# Patient Record
Sex: FEMALE | Race: WHITE | NOT HISPANIC OR LATINO | Employment: OTHER | URBAN - METROPOLITAN AREA
[De-identification: names, ages, dates, MRNs, and addresses within clinical notes are randomized per-mention and may not be internally consistent; named-entity substitution may affect disease eponyms.]

---

## 2017-03-16 ENCOUNTER — ALLSCRIPTS OFFICE VISIT (OUTPATIENT)
Dept: OTHER | Facility: OTHER | Age: 57
End: 2017-03-16

## 2017-03-16 DIAGNOSIS — M25.561 PAIN IN RIGHT KNEE: ICD-10-CM

## 2017-03-16 DIAGNOSIS — Z12.11 ENCOUNTER FOR SCREENING FOR MALIGNANT NEOPLASM OF COLON: ICD-10-CM

## 2017-03-16 DIAGNOSIS — M47.816 SPONDYLOSIS OF LUMBAR REGION WITHOUT MYELOPATHY OR RADICULOPATHY: ICD-10-CM

## 2017-03-16 DIAGNOSIS — M54.50 LOW BACK PAIN: ICD-10-CM

## 2017-03-16 DIAGNOSIS — M96.1 POSTLAMINECTOMY SYNDROME, NOT ELSEWHERE CLASSIFIED: ICD-10-CM

## 2017-03-30 ENCOUNTER — GENERIC CONVERSION - ENCOUNTER (OUTPATIENT)
Dept: OTHER | Facility: OTHER | Age: 57
End: 2017-03-30

## 2017-04-11 ENCOUNTER — ALLSCRIPTS OFFICE VISIT (OUTPATIENT)
Dept: OTHER | Facility: OTHER | Age: 57
End: 2017-04-11

## 2017-04-11 ENCOUNTER — TRANSCRIBE ORDERS (OUTPATIENT)
Dept: ADMINISTRATIVE | Facility: HOSPITAL | Age: 57
End: 2017-04-11

## 2017-04-11 ENCOUNTER — HOSPITAL ENCOUNTER (OUTPATIENT)
Dept: RADIOLOGY | Facility: MEDICAL CENTER | Age: 57
Discharge: HOME | End: 2017-04-11
Payer: MEDICARE

## 2017-04-11 DIAGNOSIS — M54.50 LOW BACK PAIN: ICD-10-CM

## 2017-04-11 DIAGNOSIS — M96.1 POSTLAMINECTOMY SYNDROME, NOT ELSEWHERE CLASSIFIED: ICD-10-CM

## 2017-04-11 PROCEDURE — 72114 X-RAY EXAM L-S SPINE BENDING: CPT

## 2017-04-14 ENCOUNTER — GENERIC CONVERSION - ENCOUNTER (OUTPATIENT)
Dept: OTHER | Facility: OTHER | Age: 57
End: 2017-04-14

## 2017-04-19 ENCOUNTER — GENERIC CONVERSION - ENCOUNTER (OUTPATIENT)
Dept: OTHER | Facility: OTHER | Age: 57
End: 2017-04-19

## 2017-04-19 ENCOUNTER — ALLSCRIPTS OFFICE VISIT (OUTPATIENT)
Dept: RADIOLOGY | Facility: MEDICAL CENTER | Age: 57
End: 2017-04-19
Payer: MEDICARE

## 2017-04-26 ENCOUNTER — GENERIC CONVERSION - ENCOUNTER (OUTPATIENT)
Dept: OTHER | Facility: OTHER | Age: 57
End: 2017-04-26

## 2017-05-04 ENCOUNTER — ALLSCRIPTS OFFICE VISIT (OUTPATIENT)
Dept: OTHER | Facility: OTHER | Age: 57
End: 2017-05-04

## 2017-05-10 ENCOUNTER — ALLSCRIPTS OFFICE VISIT (OUTPATIENT)
Dept: RADIOLOGY | Facility: MEDICAL CENTER | Age: 57
End: 2017-05-10
Payer: MEDICARE

## 2017-05-22 RX ORDER — DEXLANSOPRAZOLE 60 MG/1
60 CAPSULE, DELAYED RELEASE ORAL DAILY
COMMUNITY
End: 2018-02-09 | Stop reason: SDUPTHER

## 2017-05-22 RX ORDER — DULOXETIN HYDROCHLORIDE 60 MG/1
60 CAPSULE, DELAYED RELEASE ORAL DAILY
COMMUNITY
End: 2018-02-09 | Stop reason: SDUPTHER

## 2017-05-22 RX ORDER — ROPINIROLE 2 MG/1
2 TABLET, FILM COATED ORAL
COMMUNITY
End: 2018-02-06 | Stop reason: SDUPTHER

## 2017-05-24 ENCOUNTER — HOSPITAL ENCOUNTER (OUTPATIENT)
Facility: MEDICAL CENTER | Age: 57
Setting detail: OUTPATIENT SURGERY
Discharge: HOME | End: 2017-05-24
Attending: INTERNAL MEDICINE | Admitting: INTERNAL MEDICINE
Payer: MEDICARE

## 2017-05-24 ENCOUNTER — ANESTHESIA (OUTPATIENT)
Dept: GASTROENTEROLOGY | Facility: MEDICAL CENTER | Age: 57
End: 2017-05-24
Payer: MEDICARE

## 2017-05-24 ENCOUNTER — GENERIC CONVERSION - ENCOUNTER (OUTPATIENT)
Dept: GASTROENTEROLOGY | Facility: MEDICAL CENTER | Age: 57
End: 2017-05-24

## 2017-05-24 ENCOUNTER — ANESTHESIA EVENT (OUTPATIENT)
Dept: GASTROENTEROLOGY | Facility: MEDICAL CENTER | Age: 57
End: 2017-05-24
Payer: MEDICARE

## 2017-05-24 VITALS
HEIGHT: 64 IN | BODY MASS INDEX: 36.7 KG/M2 | WEIGHT: 215 LBS | TEMPERATURE: 97.5 F | OXYGEN SATURATION: 96 % | DIASTOLIC BLOOD PRESSURE: 50 MMHG | RESPIRATION RATE: 20 BRPM | SYSTOLIC BLOOD PRESSURE: 95 MMHG | HEART RATE: 79 BPM

## 2017-05-24 DIAGNOSIS — K22.70 BARRETT'S ESOPHAGUS WITHOUT DYSPLASIA: ICD-10-CM

## 2017-05-24 DIAGNOSIS — M46.96 INFLAMMATORY SPONDYLOPATHY OF LUMBAR REGION (HCC): ICD-10-CM

## 2017-05-24 DIAGNOSIS — M17.11 PRIMARY OSTEOARTHRITIS OF RIGHT KNEE: ICD-10-CM

## 2017-05-24 PROCEDURE — 88305 TISSUE EXAM BY PATHOLOGIST: CPT | Performed by: INTERNAL MEDICINE

## 2017-05-24 RX ORDER — SODIUM CHLORIDE 9 MG/ML
125 INJECTION, SOLUTION INTRAVENOUS CONTINUOUS
Status: DISCONTINUED | OUTPATIENT
Start: 2017-05-24 | End: 2017-05-24 | Stop reason: HOSPADM

## 2017-05-24 RX ORDER — RANITIDINE 150 MG/1
150 TABLET ORAL
Status: ON HOLD | COMMUNITY
End: 2018-04-04 | Stop reason: ALTCHOICE

## 2017-05-24 RX ORDER — PROPOFOL 10 MG/ML
INJECTION, EMULSION INTRAVENOUS AS NEEDED
Status: DISCONTINUED | OUTPATIENT
Start: 2017-05-24 | End: 2017-05-24 | Stop reason: SURG

## 2017-05-24 RX ADMIN — PROPOFOL 50 MG: 10 INJECTION, EMULSION INTRAVENOUS at 09:18

## 2017-05-24 RX ADMIN — SODIUM CHLORIDE 125 ML/HR: 0.9 INJECTION, SOLUTION INTRAVENOUS at 08:38

## 2017-05-24 RX ADMIN — PROPOFOL 150 MG: 10 INJECTION, EMULSION INTRAVENOUS at 09:11

## 2017-05-24 RX ADMIN — PROPOFOL 50 MG: 10 INJECTION, EMULSION INTRAVENOUS at 09:14

## 2017-05-25 ENCOUNTER — GENERIC CONVERSION - ENCOUNTER (OUTPATIENT)
Dept: OTHER | Facility: OTHER | Age: 57
End: 2017-05-25

## 2017-06-01 ENCOUNTER — HOSPITAL ENCOUNTER (EMERGENCY)
Facility: HOSPITAL | Age: 57
Discharge: HOME | End: 2017-06-01
Attending: EMERGENCY MEDICINE | Admitting: EMERGENCY MEDICINE
Payer: MEDICARE

## 2017-06-01 ENCOUNTER — APPOINTMENT (EMERGENCY)
Dept: NON INVASIVE DIAGNOSTICS | Facility: HOSPITAL | Age: 57
End: 2017-06-01
Payer: MEDICARE

## 2017-06-01 VITALS
TEMPERATURE: 97.7 F | BODY MASS INDEX: 36.9 KG/M2 | SYSTOLIC BLOOD PRESSURE: 112 MMHG | RESPIRATION RATE: 16 BRPM | WEIGHT: 215 LBS | OXYGEN SATURATION: 96 % | DIASTOLIC BLOOD PRESSURE: 57 MMHG | HEART RATE: 59 BPM

## 2017-06-01 DIAGNOSIS — R60.0 EDEMA OF RIGHT LOWER EXTREMITY: Primary | ICD-10-CM

## 2017-06-01 LAB
ATRIAL RATE: 63 BPM
P AXIS: 46 DEGREES
PR INTERVAL: 136 MS
QRS AXIS: 52 DEGREES
QRSD INTERVAL: 82 MS
QT INTERVAL: 404 MS
QTC INTERVAL: 413 MS
T WAVE AXIS: 85 DEGREES
VENTRICULAR RATE: 63 BPM

## 2017-06-01 PROCEDURE — 93971 EXTREMITY STUDY: CPT

## 2017-06-01 PROCEDURE — 99285 EMERGENCY DEPT VISIT HI MDM: CPT

## 2017-06-01 PROCEDURE — 93005 ELECTROCARDIOGRAM TRACING: CPT | Performed by: EMERGENCY MEDICINE

## 2017-06-02 ENCOUNTER — ALLSCRIPTS OFFICE VISIT (OUTPATIENT)
Dept: OTHER | Facility: OTHER | Age: 57
End: 2017-06-02

## 2017-06-02 ENCOUNTER — GENERIC CONVERSION - ENCOUNTER (OUTPATIENT)
Dept: OTHER | Facility: OTHER | Age: 57
End: 2017-06-02

## 2017-06-12 ENCOUNTER — ALLSCRIPTS OFFICE VISIT (OUTPATIENT)
Dept: RADIOLOGY | Facility: MEDICAL CENTER | Age: 57
End: 2017-06-12
Payer: MEDICARE

## 2017-06-12 PROCEDURE — 77003 FLUOROGUIDE FOR SPINE INJECT: CPT | Performed by: PHYSICAL MEDICINE & REHABILITATION

## 2017-06-14 ENCOUNTER — GENERIC CONVERSION - ENCOUNTER (OUTPATIENT)
Dept: OTHER | Facility: OTHER | Age: 57
End: 2017-06-14

## 2017-06-22 ENCOUNTER — GENERIC CONVERSION - ENCOUNTER (OUTPATIENT)
Dept: OTHER | Facility: OTHER | Age: 57
End: 2017-06-22

## 2017-07-10 ENCOUNTER — ALLSCRIPTS OFFICE VISIT (OUTPATIENT)
Dept: OTHER | Facility: OTHER | Age: 57
End: 2017-07-10

## 2017-07-10 DIAGNOSIS — M54.16 RADICULOPATHY OF LUMBAR REGION: ICD-10-CM

## 2017-07-10 DIAGNOSIS — Z12.31 ENCOUNTER FOR SCREENING MAMMOGRAM FOR MALIGNANT NEOPLASM OF BREAST: ICD-10-CM

## 2017-07-10 DIAGNOSIS — Z13.220 ENCOUNTER FOR SCREENING FOR LIPOID DISORDERS: ICD-10-CM

## 2017-07-10 DIAGNOSIS — Z13.29 ENCOUNTER FOR SCREENING FOR OTHER SUSPECTED ENDOCRINE DISORDER: ICD-10-CM

## 2017-07-10 DIAGNOSIS — M25.552 PAIN IN LEFT HIP: ICD-10-CM

## 2017-07-10 DIAGNOSIS — M25.561 PAIN IN RIGHT KNEE: ICD-10-CM

## 2017-07-10 DIAGNOSIS — M25.562 PAIN IN LEFT KNEE: ICD-10-CM

## 2017-07-10 DIAGNOSIS — Z13.21 ENCOUNTER FOR SCREENING FOR NUTRITIONAL DISORDER: ICD-10-CM

## 2017-07-10 DIAGNOSIS — Z13.89 ENCOUNTER FOR SCREENING FOR OTHER DISORDER: ICD-10-CM

## 2017-07-10 DIAGNOSIS — M17.11 PRIMARY OSTEOARTHRITIS OF RIGHT KNEE: ICD-10-CM

## 2017-07-19 ENCOUNTER — ALLSCRIPTS OFFICE VISIT (OUTPATIENT)
Dept: OTHER | Facility: OTHER | Age: 57
End: 2017-07-19

## 2017-07-19 PROCEDURE — G0145 SCR C/V CYTO,THINLAYER,RESCR: HCPCS | Performed by: OBSTETRICS & GYNECOLOGY

## 2017-07-20 ENCOUNTER — LAB REQUISITION (OUTPATIENT)
Dept: LAB | Facility: HOSPITAL | Age: 57
End: 2017-07-20
Payer: MEDICARE

## 2017-07-20 DIAGNOSIS — Z01.419 ENCOUNTER FOR GYNECOLOGICAL EXAMINATION WITHOUT ABNORMAL FINDING: ICD-10-CM

## 2017-07-25 ENCOUNTER — ALLSCRIPTS OFFICE VISIT (OUTPATIENT)
Dept: OTHER | Facility: OTHER | Age: 57
End: 2017-07-25

## 2017-07-25 ENCOUNTER — APPOINTMENT (OUTPATIENT)
Dept: RADIOLOGY | Facility: MEDICAL CENTER | Age: 57
End: 2017-07-25
Payer: MEDICARE

## 2017-07-25 ENCOUNTER — TRANSCRIBE ORDERS (OUTPATIENT)
Dept: ADMINISTRATIVE | Facility: HOSPITAL | Age: 57
End: 2017-07-25

## 2017-07-25 DIAGNOSIS — M25.552 PAIN IN LEFT HIP: ICD-10-CM

## 2017-07-25 LAB
LAB AP GYN PRIMARY INTERPRETATION: NORMAL
Lab: NORMAL

## 2017-07-25 PROCEDURE — 73502 X-RAY EXAM HIP UNI 2-3 VIEWS: CPT

## 2017-07-26 ENCOUNTER — APPOINTMENT (OUTPATIENT)
Dept: LAB | Facility: HOSPITAL | Age: 57
End: 2017-07-26
Payer: MEDICARE

## 2017-07-26 DIAGNOSIS — M25.561 PAIN IN RIGHT KNEE: ICD-10-CM

## 2017-07-26 DIAGNOSIS — Z13.220 ENCOUNTER FOR SCREENING FOR LIPOID DISORDERS: ICD-10-CM

## 2017-07-26 DIAGNOSIS — Z13.21 ENCOUNTER FOR SCREENING FOR NUTRITIONAL DISORDER: ICD-10-CM

## 2017-07-26 DIAGNOSIS — Z13.29 ENCOUNTER FOR SCREENING FOR OTHER SUSPECTED ENDOCRINE DISORDER: ICD-10-CM

## 2017-07-26 DIAGNOSIS — Z13.89 ENCOUNTER FOR SCREENING FOR OTHER DISORDER: ICD-10-CM

## 2017-07-26 LAB
25(OH)D3 SERPL-MCNC: 22.6 NG/ML (ref 30–100)
ALBUMIN SERPL BCP-MCNC: 3.7 G/DL (ref 3.5–5)
ALP SERPL-CCNC: 64 U/L (ref 46–116)
ALT SERPL W P-5'-P-CCNC: 29 U/L (ref 12–78)
ANION GAP SERPL CALCULATED.3IONS-SCNC: 9 MMOL/L (ref 4–13)
AST SERPL W P-5'-P-CCNC: 19 U/L (ref 5–45)
BASOPHILS # BLD AUTO: 0.04 THOUSANDS/ΜL (ref 0–0.1)
BASOPHILS NFR BLD AUTO: 1 % (ref 0–1)
BILIRUB SERPL-MCNC: 0.36 MG/DL (ref 0.2–1)
BILIRUB UR QL STRIP: NEGATIVE
BUN SERPL-MCNC: 21 MG/DL (ref 5–25)
CALCIUM SERPL-MCNC: 9.1 MG/DL (ref 8.3–10.1)
CHLORIDE SERPL-SCNC: 102 MMOL/L (ref 100–108)
CHOLEST SERPL-MCNC: 221 MG/DL (ref 50–200)
CLARITY UR: CLEAR
CO2 SERPL-SCNC: 28 MMOL/L (ref 21–32)
COLOR UR: YELLOW
CREAT SERPL-MCNC: 0.72 MG/DL (ref 0.6–1.3)
EOSINOPHIL # BLD AUTO: 0.1 THOUSAND/ΜL (ref 0–0.61)
EOSINOPHIL NFR BLD AUTO: 2 % (ref 0–6)
ERYTHROCYTE [DISTWIDTH] IN BLOOD BY AUTOMATED COUNT: 14.4 % (ref 11.6–15.1)
GFR SERPL CREATININE-BSD FRML MDRD: 95 ML/MIN/1.73SQ M
GLUCOSE P FAST SERPL-MCNC: 106 MG/DL (ref 65–99)
GLUCOSE UR STRIP-MCNC: NEGATIVE MG/DL
HCT VFR BLD AUTO: 42.4 % (ref 34.8–46.1)
HDLC SERPL-MCNC: 49 MG/DL (ref 40–60)
HGB BLD-MCNC: 13.5 G/DL (ref 11.5–15.4)
HGB UR QL STRIP.AUTO: NEGATIVE
KETONES UR STRIP-MCNC: NEGATIVE MG/DL
LDLC SERPL CALC-MCNC: 147 MG/DL (ref 0–100)
LEUKOCYTE ESTERASE UR QL STRIP: NEGATIVE
LYMPHOCYTES # BLD AUTO: 1.79 THOUSANDS/ΜL (ref 0.6–4.47)
LYMPHOCYTES NFR BLD AUTO: 32 % (ref 14–44)
MCH RBC QN AUTO: 27.5 PG (ref 26.8–34.3)
MCHC RBC AUTO-ENTMCNC: 31.8 G/DL (ref 31.4–37.4)
MCV RBC AUTO: 86 FL (ref 82–98)
MONOCYTES # BLD AUTO: 0.33 THOUSAND/ΜL (ref 0.17–1.22)
MONOCYTES NFR BLD AUTO: 6 % (ref 4–12)
NEUTROPHILS # BLD AUTO: 3.35 THOUSANDS/ΜL (ref 1.85–7.62)
NEUTS SEG NFR BLD AUTO: 59 % (ref 43–75)
NITRITE UR QL STRIP: NEGATIVE
PH UR STRIP.AUTO: 5 [PH] (ref 4.5–8)
PLATELET # BLD AUTO: 321 THOUSANDS/UL (ref 149–390)
PMV BLD AUTO: 9.6 FL (ref 8.9–12.7)
POTASSIUM SERPL-SCNC: 3.6 MMOL/L (ref 3.5–5.3)
PROT SERPL-MCNC: 7.4 G/DL (ref 6.4–8.2)
PROT UR STRIP-MCNC: NEGATIVE MG/DL
RBC # BLD AUTO: 4.91 MILLION/UL (ref 3.81–5.12)
SODIUM SERPL-SCNC: 139 MMOL/L (ref 136–145)
SP GR UR STRIP.AUTO: 1.02 (ref 1–1.03)
TRIGL SERPL-MCNC: 126 MG/DL
TSH SERPL DL<=0.05 MIU/L-ACNC: 2.58 UIU/ML (ref 0.36–3.74)
UROBILINOGEN UR QL STRIP.AUTO: 0.2 E.U./DL
WBC # BLD AUTO: 5.61 THOUSAND/UL (ref 4.31–10.16)

## 2017-07-26 PROCEDURE — 80053 COMPREHEN METABOLIC PANEL: CPT

## 2017-07-26 PROCEDURE — 84443 ASSAY THYROID STIM HORMONE: CPT

## 2017-07-26 PROCEDURE — 80061 LIPID PANEL: CPT

## 2017-07-26 PROCEDURE — 81003 URINALYSIS AUTO W/O SCOPE: CPT

## 2017-07-26 PROCEDURE — 82306 VITAMIN D 25 HYDROXY: CPT

## 2017-07-26 PROCEDURE — 85025 COMPLETE CBC W/AUTO DIFF WBC: CPT

## 2017-07-26 PROCEDURE — 36415 COLL VENOUS BLD VENIPUNCTURE: CPT

## 2017-07-31 ENCOUNTER — ALLSCRIPTS OFFICE VISIT (OUTPATIENT)
Dept: OTHER | Facility: OTHER | Age: 57
End: 2017-07-31

## 2017-07-31 ENCOUNTER — ALLSCRIPTS OFFICE VISIT (OUTPATIENT)
Dept: RADIOLOGY | Facility: MEDICAL CENTER | Age: 57
End: 2017-07-31
Payer: MEDICARE

## 2017-07-31 PROCEDURE — 77002 NEEDLE LOCALIZATION BY XRAY: CPT | Performed by: PHYSICAL MEDICINE & REHABILITATION

## 2017-08-03 ENCOUNTER — ALLSCRIPTS OFFICE VISIT (OUTPATIENT)
Dept: OTHER | Facility: OTHER | Age: 57
End: 2017-08-03

## 2017-08-03 ENCOUNTER — APPOINTMENT (OUTPATIENT)
Dept: RADIOLOGY | Facility: CLINIC | Age: 57
End: 2017-08-03
Payer: MEDICARE

## 2017-08-03 DIAGNOSIS — M25.561 PAIN IN RIGHT KNEE: ICD-10-CM

## 2017-08-03 DIAGNOSIS — M25.562 PAIN IN LEFT KNEE: ICD-10-CM

## 2017-08-03 PROCEDURE — 73564 X-RAY EXAM KNEE 4 OR MORE: CPT

## 2017-08-03 PROCEDURE — 73560 X-RAY EXAM OF KNEE 1 OR 2: CPT

## 2017-08-08 ENCOUNTER — GENERIC CONVERSION - ENCOUNTER (OUTPATIENT)
Dept: OTHER | Facility: OTHER | Age: 57
End: 2017-08-08

## 2017-08-11 ENCOUNTER — ALLSCRIPTS OFFICE VISIT (OUTPATIENT)
Dept: OTHER | Facility: OTHER | Age: 57
End: 2017-08-11

## 2017-08-14 ENCOUNTER — HOSPITAL ENCOUNTER (OUTPATIENT)
Dept: MRI IMAGING | Facility: HOSPITAL | Age: 57
Discharge: HOME | End: 2017-08-14
Attending: PHYSICAL MEDICINE & REHABILITATION
Payer: MEDICARE

## 2017-08-14 ENCOUNTER — APPOINTMENT (OUTPATIENT)
Dept: PHYSICAL THERAPY | Age: 57
End: 2017-08-14
Payer: MEDICARE

## 2017-08-14 DIAGNOSIS — M54.16 RADICULOPATHY OF LUMBAR REGION: ICD-10-CM

## 2017-08-14 DIAGNOSIS — M17.11 PRIMARY OSTEOARTHRITIS OF RIGHT KNEE: ICD-10-CM

## 2017-08-14 PROCEDURE — G8990 OTHER PT/OT CURRENT STATUS: HCPCS

## 2017-08-14 PROCEDURE — 97162 PT EVAL MOD COMPLEX 30 MIN: CPT

## 2017-08-14 PROCEDURE — G8991 OTHER PT/OT GOAL STATUS: HCPCS

## 2017-08-14 PROCEDURE — 72148 MRI LUMBAR SPINE W/O DYE: CPT

## 2017-08-15 ENCOUNTER — TRANSCRIBE ORDERS (OUTPATIENT)
Dept: SLEEP CENTER | Facility: CLINIC | Age: 57
End: 2017-08-15

## 2017-08-15 DIAGNOSIS — G47.33 OSA (OBSTRUCTIVE SLEEP APNEA): Primary | ICD-10-CM

## 2017-08-17 ENCOUNTER — APPOINTMENT (OUTPATIENT)
Dept: PHYSICAL THERAPY | Age: 57
End: 2017-08-17
Payer: MEDICARE

## 2017-08-17 PROCEDURE — 97113 AQUATIC THERAPY/EXERCISES: CPT

## 2017-08-21 ENCOUNTER — GENERIC CONVERSION - ENCOUNTER (OUTPATIENT)
Dept: OTHER | Facility: OTHER | Age: 57
End: 2017-08-21

## 2017-08-22 ENCOUNTER — APPOINTMENT (OUTPATIENT)
Dept: PHYSICAL THERAPY | Age: 57
End: 2017-08-22
Payer: MEDICARE

## 2017-08-22 PROCEDURE — 97113 AQUATIC THERAPY/EXERCISES: CPT

## 2017-08-24 ENCOUNTER — HOSPITAL ENCOUNTER (OUTPATIENT)
Dept: MAMMOGRAPHY | Facility: MEDICAL CENTER | Age: 57
Discharge: HOME | End: 2017-08-24
Payer: MEDICARE

## 2017-08-24 ENCOUNTER — APPOINTMENT (OUTPATIENT)
Dept: PHYSICAL THERAPY | Age: 57
End: 2017-08-24
Payer: MEDICARE

## 2017-08-24 DIAGNOSIS — Z12.31 ENCOUNTER FOR SCREENING MAMMOGRAM FOR MALIGNANT NEOPLASM OF BREAST: ICD-10-CM

## 2017-08-24 DIAGNOSIS — R92.8 OTHER ABNORMAL AND INCONCLUSIVE FINDINGS ON DIAGNOSTIC IMAGING OF BREAST: ICD-10-CM

## 2017-08-24 PROCEDURE — 77063 BREAST TOMOSYNTHESIS BI: CPT

## 2017-08-24 PROCEDURE — G0202 SCR MAMMO BI INCL CAD: HCPCS

## 2017-08-25 ENCOUNTER — GENERIC CONVERSION - ENCOUNTER (OUTPATIENT)
Dept: OTHER | Facility: OTHER | Age: 57
End: 2017-08-25

## 2017-08-29 ENCOUNTER — APPOINTMENT (OUTPATIENT)
Dept: PHYSICAL THERAPY | Age: 57
End: 2017-08-29
Payer: MEDICARE

## 2017-08-30 ENCOUNTER — ALLSCRIPTS OFFICE VISIT (OUTPATIENT)
Dept: OTHER | Facility: OTHER | Age: 57
End: 2017-08-30

## 2017-08-30 ENCOUNTER — HOSPITAL ENCOUNTER (OUTPATIENT)
Dept: MAMMOGRAPHY | Facility: CLINIC | Age: 57
Discharge: HOME | End: 2017-08-30
Payer: MEDICARE

## 2017-08-30 ENCOUNTER — HOSPITAL ENCOUNTER (OUTPATIENT)
Dept: ULTRASOUND IMAGING | Facility: CLINIC | Age: 57
Discharge: HOME | End: 2017-08-30
Payer: MEDICARE

## 2017-08-30 DIAGNOSIS — R92.8 ABNORMAL SCREENING MAMMOGRAM: ICD-10-CM

## 2017-08-30 DIAGNOSIS — R92.8 OTHER ABNORMAL AND INCONCLUSIVE FINDINGS ON DIAGNOSTIC IMAGING OF BREAST: ICD-10-CM

## 2017-08-30 PROCEDURE — 76642 ULTRASOUND BREAST LIMITED: CPT

## 2017-08-30 PROCEDURE — G0279 TOMOSYNTHESIS, MAMMO: HCPCS

## 2017-08-30 PROCEDURE — G0206 DX MAMMO INCL CAD UNI: HCPCS

## 2017-08-31 ENCOUNTER — HOSPITAL ENCOUNTER (OUTPATIENT)
Facility: HOSPITAL | Age: 57
Setting detail: OUTPATIENT SURGERY
Discharge: HOME | End: 2017-08-31
Attending: INTERNAL MEDICINE | Admitting: INTERNAL MEDICINE
Payer: MEDICARE

## 2017-08-31 ENCOUNTER — APPOINTMENT (OUTPATIENT)
Dept: PHYSICAL THERAPY | Age: 57
End: 2017-08-31
Payer: MEDICARE

## 2017-08-31 ENCOUNTER — ANESTHESIA (OUTPATIENT)
Dept: GASTROENTEROLOGY | Facility: HOSPITAL | Age: 57
End: 2017-08-31
Payer: MEDICARE

## 2017-08-31 ENCOUNTER — GENERIC CONVERSION - ENCOUNTER (OUTPATIENT)
Dept: OTHER | Facility: OTHER | Age: 57
End: 2017-08-31

## 2017-08-31 ENCOUNTER — ANESTHESIA EVENT (OUTPATIENT)
Dept: GASTROENTEROLOGY | Facility: HOSPITAL | Age: 57
End: 2017-08-31
Payer: MEDICARE

## 2017-08-31 VITALS
WEIGHT: 199 LBS | TEMPERATURE: 98.5 F | BODY MASS INDEX: 33.97 KG/M2 | SYSTOLIC BLOOD PRESSURE: 161 MMHG | OXYGEN SATURATION: 94 % | RESPIRATION RATE: 16 BRPM | HEART RATE: 80 BPM | DIASTOLIC BLOOD PRESSURE: 86 MMHG | HEIGHT: 64 IN

## 2017-08-31 PROCEDURE — C1769 GUIDE WIRE: HCPCS | Performed by: INTERNAL MEDICINE

## 2017-08-31 PROCEDURE — C1726 CATH, BAL DIL, NON-VASCULAR: HCPCS | Performed by: INTERNAL MEDICINE

## 2017-08-31 RX ORDER — MONTELUKAST SODIUM 10 MG/1
10 TABLET ORAL
COMMUNITY
End: 2018-02-27 | Stop reason: SDUPTHER

## 2017-08-31 RX ORDER — GABAPENTIN 300 MG/1
100 CAPSULE ORAL 3 TIMES DAILY
COMMUNITY
End: 2018-02-09 | Stop reason: SDUPTHER

## 2017-08-31 RX ORDER — SUCRALFATE ORAL 1 G/10ML
1000 SUSPENSION ORAL EVERY 6 HOURS SCHEDULED
Status: DISCONTINUED | OUTPATIENT
Start: 2017-08-31 | End: 2017-08-31 | Stop reason: HOSPADM

## 2017-08-31 RX ORDER — PROPOFOL 10 MG/ML
INJECTION, EMULSION INTRAVENOUS CONTINUOUS PRN
Status: DISCONTINUED | OUTPATIENT
Start: 2017-08-31 | End: 2017-08-31 | Stop reason: SURG

## 2017-08-31 RX ORDER — LIDOCAINE HYDROCHLORIDE 10 MG/ML
INJECTION, SOLUTION EPIDURAL; INFILTRATION; INTRACAUDAL; PERINEURAL AS NEEDED
Status: DISCONTINUED | OUTPATIENT
Start: 2017-08-31 | End: 2017-08-31 | Stop reason: SURG

## 2017-08-31 RX ORDER — ACETAMINOPHEN 160 MG/5ML
500 SUSPENSION, ORAL (FINAL DOSE FORM) ORAL ONCE
Status: COMPLETED | OUTPATIENT
Start: 2017-08-31 | End: 2017-08-31

## 2017-08-31 RX ORDER — FENTANYL CITRATE 50 UG/ML
INJECTION, SOLUTION INTRAMUSCULAR; INTRAVENOUS AS NEEDED
Status: DISCONTINUED | OUTPATIENT
Start: 2017-08-31 | End: 2017-08-31 | Stop reason: SURG

## 2017-08-31 RX ORDER — PROPOFOL 10 MG/ML
INJECTION, EMULSION INTRAVENOUS AS NEEDED
Status: DISCONTINUED | OUTPATIENT
Start: 2017-08-31 | End: 2017-08-31 | Stop reason: SURG

## 2017-08-31 RX ORDER — SODIUM CHLORIDE 9 MG/ML
50 INJECTION, SOLUTION INTRAVENOUS CONTINUOUS
Status: DISCONTINUED | OUTPATIENT
Start: 2017-08-31 | End: 2017-08-31 | Stop reason: HOSPADM

## 2017-08-31 RX ADMIN — SODIUM CHLORIDE 50 ML/HR: 0.9 INJECTION, SOLUTION INTRAVENOUS at 10:43

## 2017-08-31 RX ADMIN — LIDOCAINE HYDROCHLORIDE 10 ML: 20 SOLUTION ORAL; TOPICAL at 13:35

## 2017-08-31 RX ADMIN — PROPOFOL 90 MG: 10 INJECTION, EMULSION INTRAVENOUS at 11:56

## 2017-08-31 RX ADMIN — LIDOCAINE HYDROCHLORIDE 50 MG: 10 INJECTION, SOLUTION EPIDURAL; INFILTRATION; INTRACAUDAL; PERINEURAL at 11:56

## 2017-08-31 RX ADMIN — SUCRALFATE 1000 MG: 1 SUSPENSION ORAL at 13:34

## 2017-08-31 RX ADMIN — FENTANYL CITRATE 25 MCG: 50 INJECTION, SOLUTION INTRAMUSCULAR; INTRAVENOUS at 11:56

## 2017-08-31 RX ADMIN — FENTANYL CITRATE 25 MCG: 50 INJECTION, SOLUTION INTRAMUSCULAR; INTRAVENOUS at 12:05

## 2017-08-31 RX ADMIN — ACETAMINOPHEN: 160 SUSPENSION ORAL at 13:32

## 2017-08-31 RX ADMIN — FENTANYL CITRATE 25 MCG: 50 INJECTION, SOLUTION INTRAMUSCULAR; INTRAVENOUS at 12:32

## 2017-08-31 RX ADMIN — PROPOFOL 120 MCG/KG/MIN: 10 INJECTION, EMULSION INTRAVENOUS at 11:56

## 2017-08-31 RX ADMIN — FENTANYL CITRATE 25 MCG: 50 INJECTION, SOLUTION INTRAMUSCULAR; INTRAVENOUS at 12:15

## 2017-09-05 ENCOUNTER — APPOINTMENT (OUTPATIENT)
Dept: PHYSICAL THERAPY | Age: 57
End: 2017-09-05
Payer: MEDICARE

## 2017-09-05 PROCEDURE — 97113 AQUATIC THERAPY/EXERCISES: CPT

## 2017-09-07 ENCOUNTER — APPOINTMENT (OUTPATIENT)
Dept: PHYSICAL THERAPY | Age: 57
End: 2017-09-07
Payer: MEDICARE

## 2017-09-07 PROCEDURE — 97113 AQUATIC THERAPY/EXERCISES: CPT

## 2017-09-11 ENCOUNTER — GENERIC CONVERSION - ENCOUNTER (OUTPATIENT)
Dept: OTHER | Facility: OTHER | Age: 57
End: 2017-09-11

## 2017-09-12 ENCOUNTER — APPOINTMENT (OUTPATIENT)
Dept: PHYSICAL THERAPY | Age: 57
End: 2017-09-12
Payer: MEDICARE

## 2017-09-12 PROCEDURE — 97113 AQUATIC THERAPY/EXERCISES: CPT

## 2017-09-14 ENCOUNTER — APPOINTMENT (OUTPATIENT)
Dept: PHYSICAL THERAPY | Age: 57
End: 2017-09-14
Payer: MEDICARE

## 2017-09-14 PROCEDURE — G8991 OTHER PT/OT GOAL STATUS: HCPCS

## 2017-09-14 PROCEDURE — 97113 AQUATIC THERAPY/EXERCISES: CPT

## 2017-09-14 PROCEDURE — G8990 OTHER PT/OT CURRENT STATUS: HCPCS

## 2017-09-25 ENCOUNTER — APPOINTMENT (OUTPATIENT)
Dept: PHYSICAL THERAPY | Age: 57
End: 2017-09-25
Payer: MEDICARE

## 2017-09-27 ENCOUNTER — GENERIC CONVERSION - ENCOUNTER (OUTPATIENT)
Dept: OTHER | Facility: OTHER | Age: 57
End: 2017-09-27

## 2017-09-28 ENCOUNTER — ALLSCRIPTS OFFICE VISIT (OUTPATIENT)
Dept: OTHER | Facility: OTHER | Age: 57
End: 2017-09-28

## 2017-09-28 DIAGNOSIS — M25.552 PAIN IN LEFT HIP: ICD-10-CM

## 2017-09-28 DIAGNOSIS — M96.1 POSTLAMINECTOMY SYNDROME, NOT ELSEWHERE CLASSIFIED: ICD-10-CM

## 2017-09-28 DIAGNOSIS — M54.50 LOW BACK PAIN: ICD-10-CM

## 2017-09-29 ENCOUNTER — HOSPITAL ENCOUNTER (OUTPATIENT)
Dept: SLEEP CENTER | Facility: CLINIC | Age: 57
Discharge: HOME | End: 2017-09-29
Payer: MEDICARE

## 2017-09-29 ENCOUNTER — TRANSCRIBE ORDERS (OUTPATIENT)
Dept: SLEEP CENTER | Facility: CLINIC | Age: 57
End: 2017-09-29

## 2017-09-29 DIAGNOSIS — G47.33 OBSTRUCTIVE SLEEP APNEA (ADULT) (PEDIATRIC): Primary | ICD-10-CM

## 2017-09-29 DIAGNOSIS — G47.33 OSA (OBSTRUCTIVE SLEEP APNEA): ICD-10-CM

## 2017-09-29 NOTE — PROGRESS NOTES
Consultation - Sleep Center   [facility]  56 y o  female  Sheeba Lin  IKI:[id number]    Physician Requesting Consult: Diandra Pérez MD     Reason for Consult : At your kind request I saw this patient for initial sleep evaluation today  She was diagnosed with restless leg syndrome approximately a year ago for which she was prescribed ropinirole  She is here because she is experiencing nausea with use of the medication  Medications, Past, Family and Social Histories were reviewed  Comorbidities are notable for chronic back pain, lumbar post laminectomy syndrome, left hip pain, mood disturbance and asthma  She also has history of iron deficiency for which she required iron infusion in the past  Herewith findings in summary  Full details are available from our records  HPI:  Her restless leg symptoms manifested after she weaned herself of OxyContin  The restless leg symptoms are controlled on ropinirole and taking split dose has helped with the nausea but causes drowsiness  She has radicular symptoms involving the left lower extremity  She was started on gabapentin recently and her drowsiness has escalated  She has been snoring for years but states it is intermittent  She sleeps alone and could not characterize the snoring any further  She is not aware of breathing difficulties during sleep  She reported no features of parasomnia  Sleep Routine:  She is spending 8 hours in bed  She takes up to 2 hours to fall asleep  She reports excessive stressors but denied racing thoughts  Sleep is interrupted 3-4 times a night because of her musculoskeletal aches and pain and headaches around 4 times a week  She awakens feeling and refreshed and is dragging all day  She feels like napping and tends to doze off when sedentary while watching TV  Habits:   reports that she has never smoked  She does not have any smokeless tobacco history on file  ,  reports that she does not drink alcohol ,  reports that she does not use drugs  , she uses limited caffeine  She exercises by walking  ROS:  She reports approximately 60 lb weight gain over the past few years  She has nasal congestion  She feels her asthma is stable  She reports difficulty with memory  She has swelling of her legs that she attributed to venous insufficiency  She denied abnormal blood loss  A 10 point review of systems was otherwise unremarkable  Physical Exam: Salient findings on exam, are a body mass index of approximately 35 (she declined being weighed)  Vitals are stable  Mental state appears normal   Craniofacial anatomy is normal  Neck Circumference is 38 cm  There are no abnormal neck masses  Nasal airway is patent  Mucous membranes appeared normal  The oral airway revealed Base of tongue is at Mallampati class I  Respiratory effort is normal  Air entry is good bilaterally  There are no wheezes or rales  She has truncal obesity  The rest of her exam was unremarkable  Impression:   1  Restless leg syndrome and she may also have periodic limb movements of sleep  2  Insomnia partly due to the above  3  Possible obstructive sleep apnea  4  Chronic pain and   5  Mood disturbance are contributing to her symptoms  6  Comorbidities as outlined above    Plan:  1  With respect to above conditions, I counseled on pathophysiology, diagnosis, treatment options, risks and benefits; interrelationship and effects on symptoms and comorbidities; risks of no treatment; costs and insurance aspects  2  I advised on weight reduction, avoiding sleeping supine, using alcohol or sedating medications close to bed time and on safe driving practices  3  I did some  Cognitive behavioral therapy, advised on Sleep Hygiene and behavioral techniques to manage Insomnia  Specifically limiting her time in bed to 7 hours or less, avoiding daytime naps and on relaxation techniques    4   Since she is now taking gabapentin, this may be sufficient to also control her restless leg symptoms  She may require dose adjustment with up to 600 mg q h s   If sufficient for control of her restless leg symptoms, she may discontinue ropinirole  5   I requested ferritin assay in view of her history of iron deficiency  6 Nocturnal polysomnography is indicated and a diagnostic study will be scheduled  7  Patient is willing to try Positive airway pressure therapy and will be scheduled for a titration study if indicated  8  Follow-up will be scheduled after the studies to review results, further details of treatment options and to initiate/adjust therapy  Thank you for allowing me to participate in the care of this patient  I will keep you apprised of developments       Sincerely,    Ernesto Romero MD  Board Certified Specialist

## 2017-10-04 ENCOUNTER — APPOINTMENT (OUTPATIENT)
Dept: PHYSICAL THERAPY | Age: 57
End: 2017-10-04
Payer: MEDICARE

## 2017-10-04 PROCEDURE — G8991 OTHER PT/OT GOAL STATUS: HCPCS

## 2017-10-04 PROCEDURE — G8990 OTHER PT/OT CURRENT STATUS: HCPCS

## 2017-10-04 PROCEDURE — 97164 PT RE-EVAL EST PLAN CARE: CPT

## 2017-10-09 ENCOUNTER — GENERIC CONVERSION - ENCOUNTER (OUTPATIENT)
Dept: OTHER | Facility: OTHER | Age: 57
End: 2017-10-09

## 2017-10-10 ENCOUNTER — GENERIC CONVERSION - ENCOUNTER (OUTPATIENT)
Dept: OTHER | Facility: OTHER | Age: 57
End: 2017-10-10

## 2017-10-25 NOTE — PROGRESS NOTES
Assessment  1  Intervertebral disc disorder with radiculopathy of lumbar region (724 4) (M51 16)   2  Anterolisthesis (756 12) (M43 10)   3  Chronic pain syndrome (338 4) (G89 4)   4  Chronic low back pain (724 2,338 29) (M54 5,G89 29)    Plan  Chronic pain syndrome    · Cyclobenzaprine HCl - 10 MG Oral Tablet; Take 1 tablet 2 times daily as needed   Rx By: Edmond Conde; Dispense: 3 Days ; #:6 Tablet; Refill: 2;For: Chronic pain syndrome; TOSHA = N; Verified Transmission to [facility]; Last Updated By: System, [employer]; 8/25/2017 4:15:29 PM  PMH: Left lumbar radiculitis    · Gabapentin 300 MG Oral Capsule; 1 TAB QHS X 3D, THEN 1 TAB BID X 3D, THEN  1 TAB TID   Rx By: Lonna Phoenix; Dispense: 30 Days ; #:180 Capsule; Refill: 1;For: PMH: Left lumbar radiculitis; TOSHA = N; Verified Transmission to [facility]; Last Updated By: System, [employer]; 8/30/2017 1:58:09 PM    Discussion/Summary    Ms Cheryle Hipps presents today to review her Lumbar spine MRI and discuss treatment options  She is currently taking cyclobenzaprine 10 mg 1 tablet twice a day  She does state that these medications make her feel tired and spacey but they do help her sleep better  did review her lumbar spine MRI today that does show moderate to severe S-shaped scoliotic deformity of the spine  Postop changes as she has a fusion at L3-4  She does have mild grade 1 anterior listhesis of L4 on L5 with a left neural foraminal disc protrusion at this level as well  I did review with her that based on her symptoms and the MRI we could provide her with a left L5 transforaminal epidural steroid injection to decrease any inflammatory components of her pain symptoms   The patient tells me that she is not interested at this time in taking a steroid injection as she states that she is trying to lose weight and she knows that the steroid will not help with that   did offer her the option of gabapentin 300 mg working up to one tablet 3 times a day for her pain symptoms  The patient is more agreeable to this option as she would like to try things other than the injection at this time  She was given instruction on how to safely increase the dose by taking one tablet at bedtime for 3 days, then increasing to 2 tablets daily for 3 days, then finally staying at 3 tablets daily  She was educated on the most common side effects which are drowsiness, dizziness, blurry vision, and swelling of the hands and feet  She is aware that if she would like to start the medication she needs to call the office so we can give her weaning instructions or else she puts herself at risk of having a seizure  PDMP was reviewed today and was appropriate  will follow up in 8 weeks at that time we will reevaluate her response to gabapentin  Patient is able to Self-Care  The treatment plan was reviewed with the patient/guardian  The patient/guardian understands and agrees with the treatment plan      Chief Complaint  1  Back Pain  left low back and leg pain; worsened      History of Present Illness  Ms Demetra Virgen presents today to review her Lumbar spine MRI and discuss treatment options  She is currently taking cyclobenzaprine 10 mg 1 tablet twice a day  She does state that these medications make her feel tired and spacey but they do help her sleep better  the patient rates her pain 7-8/10, this is constant in nature and bothersome throughout the entirety of the day  She describes her pain as pressure-like and soreness  She localizes her pain to her left hip and her left leg as well as her low back    have personally reviewed and/or updated the patient's past medical history, past surgical history, family history, social history, current medications, allergies, and vital signs today  Alfonso Velazquez presents with complaints of gradual onset of constant episodes of severe bilateral lower back pain, radiating to the left buttock, left thigh, left lower leg and left foot   On a scale of 1 to 10, the patient rates the pain as 8  Symptoms are worsening  Review of Systems    Constitutional: no fever,-- no recent weight gain-- and-- no recent weight loss  Eyes: no double vision-- and-- no blurry vision  Cardiovascular: no chest pain,-- no palpitations-- and-- no lower extremity edema  Respiratory: no complaints of shortness of breath-- and-- no wheezing  Musculoskeletal: difficulty walking,-- joint stiffness-- and-- decreased range of motion, but-- no muscle weakness,-- no joint swelling,-- no limb swelling-- and-- no pain in extremity  Neurological: no dizziness,-- no difficulty swallowing,-- no memory loss,-- no loss of consciousness-- and-- no seizures  Gastrointestinal: nausea, but-- no vomiting,-- no constipation-- and-- no diarrhea  Genitourinary: no difficulty initiating urine stream,-- no genital pain-- and-- no frequent urination  Integumentary: no complaints of skin rash  Psychiatric: no depression  Endocrine: no excessive thirst,-- no adrenal disease,-- no hypothyroidism-- and-- no hyperthyroidism  Hematologic/Lymphatic: no tendency for easy bruising-- and-- no tendency for easy bleeding  Active Problems  1  Abnormal finding on mammography (793 80) (R92 8)   2  Acute pain of right knee (719 46) (M25 561)   3  Anosmia (781 1) (R43 0)   4  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 96)   5  Arthritis of right knee (716 96) (M17 11)   6  Dolan's esophagus (530 85) (K22 70)   7  Blood glucose elevated (790 29) (R73 9)   8  Chronic low back pain (724 2,338 29) (M54 5,G89 29)   9  Chronic pain syndrome (338 4) (G89 4)   10  Chronic venous insufficiency (459 81) (I87 2)   11  Cough variant asthma (493 82) (J45 991)   12  Depression (311) (F32 9)   13  Hyperlipidemia (272 4) (E78 5)   14  Left hip pain (719 45) (M25 552)   15  Left knee pain (719 46) (M25 562)   16  Lumbar postlaminectomy syndrome (722 83) (M96 1)   17  Morbid obesity (278 01) (E66 01)   18  Prepatellar bursitis of right knee (726 65) (M70 41)   19  Primary localized osteoarthritis of right knee (715 16) (M17 11)   20  Primary osteoarthritis of left hip (715 15) (M16 12)   21  Restless legs syndrome (333 94) (G25 81)   22  Right knee pain (719 46) (M25 561)   23  Seasonal allergies (477 9) (J30 2)   24  Visit for screening mammogram (V76 12) (Z12 31)    Past Medical History  1  History of asthma (V12 69) (Z87 09)   2  Denied: History of pregnancy   3  History of Left lumbar radiculitis (724 4) (M54 16)    Surgical History  1  History of Arthrodesis Lumbar   2  History of Spinal Arthrodesis For Deformity    Family History  Mother    1  Family history of lung cancer (V16 1) (Z80 1)   2  Family history of malignant neoplasm (V16 9) (Z80 9)  Father    3  Family history of alcoholism (V17 0) (Z81 1)   4  Family history of cardiac disorder (V17 49) (Z82 49)   5  Family history of depression (V17 0) (Z81 8)   6  Family history of hypertension (V17 49) (Z82 49)   7  Family history of myocardial infarction (V17 3) (Z82 49)  Maternal Grandmother    8  Family history of malignant neoplasm of breast (V16 3) (Z80 3)  Aunt    9  Family history of type 1 diabetes mellitus (V18 0) (Z83 3)    Social History   ·    · Never a smoker   · No alcohol use   · No illicit drug use    Current Meds   1  Cetirizine HCl - 10 MG Oral Tablet; TAKE 1 TABLET Bedtime PRN; Therapy: 56SXX2799 to (Evaluate:45Gss4171); Last Rx:02Jun2017 Ordered   2  Dexilant 60 MG Oral Capsule Delayed Release; Therapy: 11FRE2499 to Recorded   3  DULoxetine HCl - 60 MG Oral Capsule Delayed Release Particles; TAKE ONE CAPSULE   BY MOUTH AT BEDTIME; Therapy: 11CZB5992 to (Evaluate:63Uiq2175); Last Rx:16Mar2017 Ordered   4  Flovent HFA 44 MCG/ACT Inhalation Aerosol; INHALE 2 PUFFS Daily; Therapy: 11Aug2017 to (Evaluate:11Obn3020)  Requested for: 11Aug2017; Last   Rx:11Aug2017 Ordered   5   Montelukast Sodium 10 MG Oral Tablet; TAKE 1 TABLET BY MOUTH EVERY DAY; Therapy: 22CWF6958 to (Evaluate:45Brj6361)  Requested for: 30Vdz5675; Last   Rx:06Aug2017 Ordered   6  ProAir RespiClick 187 (90 Base) MCG/ACT Inhalation Aerosol Powder Breath Activated; 2   PUFFS EVERY 4 TO 6 HOURS AS NEEDED FOR SYMPTOMS;   Therapy: 09TLG9917 to (Last Rx:02Jun2017) Ordered   7  Zantac 150 MG Oral Tablet; take 1 tablet at bedtime; Therapy: 30VAE8987 to (96 [id number])  Requested for: 56QZN4363; Last   Rx:84Zhu7935 Ordered    Allergies  1  Latex Exam Gloves MISC   2  Sulfa Drugs    Vitals  Vital Signs    Recorded: 30Aug2017 01:29PM   Heart Rate 76   Respiration 14   Systolic 847   Diastolic 86   Height 5 ft 3 in   Weight 204 lb    BMI Calculated 36 14   BSA Calculated 1 95   Pain Scale 8     Physical Exam    Constitutional   General appearance: Well developed, well nourished, alert, in no distress, non-toxic and no overt pain behavior  Eyes   Sclera: anicteric   HEENT   Hearing grossly intact  Pulmonary   Respiratory effort: Even and unlabored  Psychiatric   Mood and affect: Mood and affect appropriate  Neurologic   Cranial nerves: Cranial nerves II-XII grossly intact  Musculoskeletal   Gait and station: Normal     Lumbar/Sacral Spine examination demonstrates Lumbosacral Spine:   Tenderness: right paraspinal-- and-- left paraspinal    ROM Lumbosacral Spine: Full except as noted: Left lateral flexion was restricted-- and-- was painful  Right lateral flexion was restricted-- and-- was painful  Rotation to the left was painful  Rotation to the right was painful  Foot and ankle strength was normal bilaterally  Knee strength was normal bilaterally  Hip strength was normal bilaterally  Special Tests: positive Slump test on left, but-- negative Slump test on right  Results/Data  Results Free Text Form [facility]:   Results     MRI:  MRI LUMBAR SPINE WITHOUT CONTRAST    INDICATION: M54 16: Radiculopathy, lumbar region   History taken directly from the electronic ordering system  Chronic spinal pain  History of prior lumbar spine surgery for scoliosis in 1978  History of previous spine surgery at L4 and 2001  COMPARISON: Radiograph 4/11/2017    TECHNIQUE: Sagittal T1, sagittal T2, sagittal inversion recovery, axial T1 and axial T2, coronal T2     IMAGE QUALITY: Diagnostic    FINDINGS:    ALIGNMENT: Moderate to severe S-shaped scoliotic deformity of the visualized spine with a dextroscoliosis of the mid to lower thoracic spine and a levoscoliosis of the mid lumbar spine  Trace grade 1 anterolisthesis of L4 and L5  MARROW SIGNAL: Pedicle screws at L3 and L4 noted  The vertebrae are somewhat elongated, possibly related to developmental variation  No definite segmentation anomaly  DISTAL CORD AND CONUS: Normal size and signal within the distal cord and conus  The conus ends at the L1 level  PARASPINAL SOFT TISSUES: Paraspinal soft tissues are unremarkable  SACRUM: Normal signal within the sacrum  No evidence of insufficiency or stress fracture  LOWER THORACIC DISC SPACES: Normal disc height and signal  No disc herniation, canal stenosis or foraminal narrowing  LUMBAR DISC SPACES:    L1-L2: Normal     L2-L3: Normal     L3-L4: Postoperative fusion noted  No evidence of recurrent or residual disc herniation  Central canal and neural foramina surgically capacious  L4-L5: Mild uncovering of the intervertebral disc space  Advanced facet hypertrophy  Small left neural foraminal disc trusion  Moderate left neural foraminal narrowing  Central canal and right neural foramen patent  L5-S1: There is a diffuse disk bulge  No significant central canal or neural foraminal narrowing  Bilateral facet hypertrophy noted  IMPRESSION:      1  Moderate to severe S-shaped scoliotic deformity of the visualized spine  2  Postoperative changes status post fusion L3-4  3   Mild grade 1 anterolisthesis of L4 on L5 with a left neural foraminal disc protrusion at this level as well  Future Appointments    Date/Time Provider Specialty Site   09/28/2017 01:45 PM Durga Wills DO Pain Management [facility] 15   08/31/2017 11:00 AM Vesna Leary MD [facility]ology [facility] [place] OUT[facility]   10/25/2017 02:15 PM ROGE Gil Pain [facility] 15     Signatures   Electronically signed by : Louise Proctor;  Aug 30 2017  2:22PM EST                       (Author)    Electronically signed by : Nayan Hernández DO; Aug 30 2017  4:28PM EST

## 2017-11-03 ENCOUNTER — HOSPITAL ENCOUNTER (OUTPATIENT)
Dept: SLEEP CENTER | Facility: CLINIC | Age: 57
Discharge: HOME | End: 2017-11-03
Payer: MEDICARE

## 2017-11-03 DIAGNOSIS — G47.33 OBSTRUCTIVE SLEEP APNEA: ICD-10-CM

## 2017-11-03 PROCEDURE — 95810 POLYSOM 6/> YRS 4/> PARAM: CPT

## 2017-11-10 DIAGNOSIS — R73.9 HYPERGLYCEMIA: ICD-10-CM

## 2017-11-10 DIAGNOSIS — E78.5 HYPERLIPIDEMIA: ICD-10-CM

## 2017-11-16 ENCOUNTER — ALLSCRIPTS OFFICE VISIT (OUTPATIENT)
Dept: OTHER | Facility: OTHER | Age: 57
End: 2017-11-16

## 2017-11-17 NOTE — PROGRESS NOTES
Assessment    1  Cough variant asthma (493 82) (J45 991)   2  GERD (gastroesophageal reflux disease) (530 81) (K21 9)   3  Sleep disturbance (780 50) (G47 9)    Plan  Cough variant asthma    · Flovent HFA 44 MCG/ACT Inhalation Aerosol   · Montelukast Sodium 10 MG Oral Tablet; TAKE 1 TABLET BY MOUTH EVERY DAY   · ProAir RespiClick 427 (90 Base) MCG/ACT Inhalation Aerosol Powder BreathActivated; 2 PUFFS EVERY 4 TO 6 HOURS AS NEEDED FOR SYMPTOMS    Discussion/Summary    Cough variant asthma: We had a long conversation about this diagnosis, the need to reestablish preventive care, especially with low-grade but chronic wheezing, made worse when she lays down including at night when she goes sleep, which seems to be contributing to her sleep disturbance  Current increased symptoms seem to be post viral in nature  is to restart ProAir inhaler, 2 inhalations every 4 hours as needed, and also 2 relation to poor bedtime on a regular basis, restart montelukast   discussed that the Flovent prescribed in the past was to prevent symptoms and that this medicine does not cause any immediate symptom relief  She stopped this but she did not apparently understand the way this medicine works, and a lack of immediate benefit  We agreed that if she was not responding to montelukast as far symptom prevention, we would restart Flovent  will call back to talk to me in 12 to 14 days regarding her initial response to these changes  with Dolan's esophagus: The need to continue PPI therapy indefinitely was discussed  The symptoms seem to be well controlled  She is going to follow up with Gastroenterology for retesting regarding Dolan's esophagus cytology  disturbance:  This seems multifactorial including elements of GERD, which seem well controlled, elements of nocturnal asthma, which are not well controlled, some degree of restless leg syndrome which is responding to ropinirole and apparently very mild obstructive sleep apnea for which the patient wants to hold off on CPAP which is reasonable  is to assess how she is doing with sleep after controlling her asthma, including patient calling back and told to 14 days  Chief Complaint  c/o cough for a few weeks  History of Present Illness  HPI: Luis Ng is here today with persistent cough for 10 to 14 days, following viral upper respiratory illness symptoms  Luis Ng also has a history of asthma with a cough variant asthma component, and this is year-round and Luis Ng has been instructed to use Flovent daily, and Singulair, which she has not complying with  also has not used her rescue medicine for her cough and notices wheezing when she does cough  There is no severe dyspnea, and Luis Ng does report nocturnal wheezing and occasionally waking up, over many years, with gasping feeling  has well documented GERD and is on PPI therapy, and denies any heartburn, hoarseness of voice, or any active reflux symptoms  also was re-evaluated by her sleep specialist recently, because of history of restless legs syndrome, and testing results showed mild sleep apnea, which Luis Ng did not feel was a significant factor in her restless leg symptoms, which have responded medication, nor her waking up with coughing and wheezing  She declined CPAP therapy  Active Problems  1  Anosmia (781 1) (R43 0)   2  Anterolisthesis (756 12) (M43 10)   3  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 96)   4  Arthritis of right knee (716 96) (M17 11)   5  Dolan's esophagus (530 85) (K22 70)   6  Blood glucose elevated (790 29) (R73 9)   7  Chronic low back pain (724 2,338 29) (M54 5,G89 29)   8  Chronic pain syndrome (338 4) (G89 4)   9  Chronic venous insufficiency (459 81) (I87 2)   10  Cough variant asthma (493 82) (J45 991)   11  Counseling About Travel   12  Depression (311) (F32 9)   13  Hyperlipidemia (272 4) (E78 5)   14  Intervertebral disc disorder with radiculopathy of lumbar region (724 4) (M51 16)   15   Left hip pain (719 45) (M25 552)   16  Left knee pain (719 46) (M25 562)   17  Lumbar postlaminectomy syndrome (722 83) (M96 1)   18  Morbid obesity (278 01) (E66 01)   19  Prepatellar bursitis of right knee (726 65) (M70 41)   20  Primary localized osteoarthritis of right knee (715 16) (M17 11)   21  Primary osteoarthritis of left hip (715 15) (M16 12)   22  Restless legs syndrome (333 94) (G25 81)   23  Right knee pain (719 46) (M25 561)   24  Seasonal allergies (477 9) (J30 2)    Past Medical History  Active Problems And Past Medical History Reviewed: The active problems and past medical history were reviewed and updated today  Social History     ·    · Never a smoker   · No alcohol use   · No illicit drug use  The social history was reviewed and updated today  The social history was reviewed and is unchanged  Current Meds   1  Cetirizine HCl - 10 MG Oral Tablet; TAKE 1 TABLET Bedtime PRN; Therapy: 15VTH2003 to (Evaluate:10Enn1810); Last Rx:02Jun2017 Ordered   2  Dexilant 60 MG Oral Capsule Delayed Release; Therapy: 60BDZ1903 to Recorded   3  DULoxetine HCl - 60 MG Oral Capsule Delayed Release Particles; TAKE ONE CAPSULE BY MOUTH AT BEDTIME; Therapy: 90ULB0683 to (Evaluate:78Zzi0494); Last Rx:16Mar2017 Ordered   4  Flovent HFA 44 MCG/ACT Inhalation Aerosol; INHALE 2 PUFFS Daily; Therapy: 11Aug2017 to (Evaluate:42Eyz7669)  Requested for: 11Aug2017; Last Rx:11Aug2017 Ordered   5  Gabapentin 300 MG Oral Capsule; 1 TAB QHS X 3D, THEN 1 TAB BID X 3D, THEN 1 TAB TID; Therapy: 72Mlh2556 to (96 022032)  Requested for: 02Eal5384; Last Rx:30Aug2017 Ordered   6  ProAir RespiClick 272 (90 Base) MCG/ACT Inhalation Aerosol Powder Breath Activated; 2 PUFFS EVERY 4 TO 6 HOURS AS NEEDED FOR SYMPTOMS; Therapy: 59YDE2659 to (Last Rx:02Jun2017) Ordered   7  Zantac 150 MG Oral Tablet; take 1 tablet at bedtime;  Therapy: 37FGV8731 to (96 059950)  Requested for: 13HSG1096; Last Rx:46Wjx7928 Ordered    The medication list was reviewed and updated today  Allergies  1  Latex Exam Gloves MISC   2  Sulfa Drugs    Vitals   Recorded: 23HZC0804 09:09AM   Temperature 98 9 F, Tympanic    Heart Rate 83    Systolic 955, LUE, Sitting    Diastolic 86, LUE, Sitting    Height 5 ft 3 in    Patient Refused Weight Yes Yes   O2 Saturation 98        Physical Exam   Constitutional Vital signs stable, afebrile, pulse regular, awake and alert in good spirits  There is occasional coughing with obvious wheezing identified coughing  The cough sounds nonproductive  ENT exam normal  There is no JVD  There is no head or neck adenopathy  Trachea is midline without stridor and there is no thyromegaly  Lungs: Clear other than when the patient coughs and then there is obvious expiratory wheezing  There are no rales throughout  There are no focal decreased or generalized decreased breath sounds  The expiratory phase of respiration is not prolonged  Cardiac: Regular rate and rhythm, normal S1-S2, no murmur, no S4 or S3  There is no clubbing cyanosis or edema  Peripheral circulation intact          Future Appointments    Date/Time Provider Specialty Site   12/04/2017 10:45 AM Keven Pina MD Gastroenterology Adult [facility] [address] [place] [address] ENDOSCOPY       Signatures   Electronically signed by : BRENNA Lew ; Nov 16 2017 12:22PM EST                       (Author)

## 2017-11-30 ENCOUNTER — ANESTHESIA EVENT (OUTPATIENT)
Dept: GASTROENTEROLOGY | Facility: MEDICAL CENTER | Age: 57
End: 2017-11-30

## 2017-12-04 ENCOUNTER — ANESTHESIA (OUTPATIENT)
Dept: GASTROENTEROLOGY | Facility: MEDICAL CENTER | Age: 57
End: 2017-12-04

## 2017-12-15 ENCOUNTER — GENERIC CONVERSION - ENCOUNTER (OUTPATIENT)
Dept: OTHER | Facility: OTHER | Age: 57
End: 2017-12-15

## 2018-01-09 NOTE — MISCELLANEOUS
Message   Recorded as Task   Date: 10/09/2017 02:35 PM, Created By: Gee Zamora   Task Name: Miscellaneous   Assigned To: [id number] [place] [address] clinical,Team   Regarding Patient: Nick Meza, Status: Active   Comment:    RenemaribelSary herman - 09 Oct 2017 2:35 PM     TASK CREATED  Pt called stating she had a sleep study done due to her restless leg syndrome  She said the sleep study  suggested she have her Gabapentin/Neurotin dose increased because that medication is good for that diagnosis  She said it is a [facility]'s doctor and a note should be in her chart with that information  She is asking Dr Dio Hill to take a look at it  If it is increased, pt uses [facility] (on file in [facility])  Pt can be reached at [phone]  Debbie Madsen - 09 Oct 2017 2:42 PM     TASK IN PROGRESS   Chan Muniz - 09 Oct 2017 2:49 PM     TASK EDITED  S/w pt  She states she is scheduled for sleep study on 11/3  States sleep study  suggested an increase in her gabapentin dosing prior to sleep study  Pt is currently on 300mg 3x daily  Advised that Vu Orr is not in office today but will give him message upon returning to office  Office to call pt back to let her know if JE recommends an increase  Chan Muniz - 09 Oct 2017 2:49 PM     TASK REASSIGNED: Previously Assigned To [id number] [place] [address] clinical,Team   Sherif Peterson - 10 Oct 2017 7:04 AM     TASK REPLIED TO: Previously Assigned To Sherif Peterson                      i see nothing from a sleep specialist in her chart  please get the recommended dosing and we will adjust    Barbara Denson - 10 Oct 2017 9:58 AM     TASK EDITED   Barbara Denson - 10 Oct 2017 10:04 AM     TASK EDITED  Attemtped to reach pt regarding above  VMMLOM on home/cell phone # with c/b # office hours and location provided  Argelia Da Silva - 10 Oct 2017 1:17 PM     TASK EDITED  Pt left a message w/the service returning a call  Pt can be reached at [phone]     Barbara Denson - 10 Oct 2017 2:06 PM TASK EDITED  ***FYI***    S/w pt regarding same  Pt stated that she has a sleep study appt on 11/3/17  Able to read  sleep study consult in EPIC part of chart dated 9/29 by Dr Ban Rodriguez  Per consult recommendation made to have pt split up her 900mg of gabapentin a day into 300mg in evening and 600mg at HS  Dr Nam Holland agreed with same and RN advised pt of change in medication schedule  Pt aware of office number if any questions or concerns about same  Pt appreciative of our time  Luana Romero - 10 Oct 2017 2:10 PM     TASK REPLIED TO: Previously Assigned To Luana Romero                      agree thanks        Active Problems    1  Abnormal finding on mammography (793 80) (R92 8)   2  Acute pain of right knee (719 46) (M25 561)   3  Anosmia (781 1) (R43 0)   4  Anterolisthesis (756 12) (M43 10)   5  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 96)   6  Arthritis of right knee (716 96) (M17 11)   7  Dolan's esophagus (530 85) (K22 70)   8  Blood glucose elevated (790 29) (R73 9)   9  Chronic low back pain (724 2,338 29) (M54 5,G89 29)   10  Chronic pain syndrome (338 4) (G89 4)   11  Chronic venous insufficiency (459 81) (I87 2)   12  Cough variant asthma (493 82) (J45 991)   13  Counseling About Travel   14  Depression (311) (F32 9)   15  Hyperlipidemia (272 4) (E78 5)   16  Intervertebral disc disorder with radiculopathy of lumbar region (724 4) (M51 16)   17  Left hip pain (719 45) (M25 552)   18  Left knee pain (719 46) (M25 562)   19  Lumbar postlaminectomy syndrome (722 83) (M96 1)   20  Morbid obesity (278 01) (E66 01)   21  Prepatellar bursitis of right knee (726 65) (M70 41)   22  Primary localized osteoarthritis of right knee (715 16) (M17 11)   23  Primary osteoarthritis of left hip (715 15) (M16 12)   24  Restless legs syndrome (333 94) (G25 81)   25  Right knee pain (719 46) (M25 561)   26  Seasonal allergies (477 9) (J30 2)   27   Visit for screening mammogram (V76 12) (Z12 31)    Current Meds   1  Carafate 1 GM/10ML Oral Suspension; TAKE 10 ML 4 TIMES DAILY; Therapy: 67Lvp1000 to (Evaluate:33Xlz3148)  Requested for: 12Ehy2627; Last   Rx:81Lvg7776 Ordered   2  Cetirizine HCl - 10 MG Oral Tablet; TAKE 1 TABLET Bedtime PRN; Therapy: 74KNW1460 to (Evaluate:62Ddt3119); Last Rx:02Jun2017 Ordered   3  Cyclobenzaprine HCl - 10 MG Oral Tablet; Take 1 tablet 2 times daily as needed; Therapy: 68Uwa0312 to (Evaluate:47Dmp5862)  Requested for: 37Zkq2876; Last   Rx:85Exn0530 Ordered   4  Dexilant 60 MG Oral Capsule Delayed Release; Therapy: 56ONL5236 to Recorded   5  DULoxetine HCl - 60 MG Oral Capsule Delayed Release Particles (Cymbalta); TAKE   ONE CAPSULE BY MOUTH AT BEDTIME; Therapy: 15LKR8587 to (Evaluate:40Lud5631); Last Rx:16Mar2017 Ordered   6  Flovent HFA 44 MCG/ACT Inhalation Aerosol; INHALE 2 PUFFS Daily; Therapy: 11Aug2017 to (Evaluate:03Ocg4520)  Requested for: 11Aug2017; Last   Rx:11Aug2017 Ordered   7  Gabapentin 300 MG Oral Capsule; 1 TAB QHS X 3D, THEN 1 TAB BID X 3D, THEN 1   TAB TID; Therapy: 44Wud4719 to [phone])  Requested for: 71Kzn5353; Last   Rx:62Rpa9273 Ordered   8  Montelukast Sodium 10 MG Oral Tablet; TAKE 1 TABLET BY MOUTH EVERY DAY; Therapy: 26ZIN8487 to (Evaluate:28Pwt5819)  Requested for: 33Lkg1554; Last   Rx:36Tpv3483 Ordered   9  Omeprazole 40 MG Oral Capsule Delayed Release; TAKE 1 CAPSULE TWICE DAILY; Therapy: 43Sfp8200 to (Evaluate:54Vxb3537)  Requested for: 32Epy2885; Last   Rx:57Sgm0914 Ordered   10  ProAir RespiClick 854 (90 Base) MCG/ACT Inhalation Aerosol Powder Breath Activated;    2 PUFFS EVERY 4 TO 6 HOURS AS NEEDED FOR SYMPTOMS;    Therapy: 63QHB5591 to (Last Rx:02Jun2017) Ordered   11  Zantac 150 MG Oral Tablet (RaNITidine HCl); take 1 tablet at bedtime; Therapy: 84WCE8888 to [phone])  Requested for: 20VZS7259; Last    Rx:94Sqa5704 Ordered    Allergies    1  Latex Exam Gloves MISC   2   Sulfa Drugs    Signatures   Electronically signed by : Sherlyn Sneed RN; Oct 10 2017  2:17PM EST                       (Author)

## 2018-01-10 NOTE — RESULT NOTES
Verified Results  * MRI LUMBAR SPINE WO CONTRAST [id number] 08:08PM [facility] Order Number: [id number]    - Patient Instructions: To schedule this appointment, please contact Central Scheduling at [phone]  Test Name Result Flag Reference   MRI LUMBAR SPINE [address] (Report)     MRI LUMBAR SPINE WITHOUT CONTRAST     INDICATION: M54 16: Radiculopathy, lumbar region  History taken directly from the electronic ordering system  Chronic spinal pain  History of prior lumbar spine surgery for scoliosis in 1978  History of previous spine surgery at L4 and 2001  COMPARISON: Radiograph 4/11/2017     TECHNIQUE: Sagittal T1, sagittal T2, sagittal inversion recovery, axial T1 and axial T2, coronal T2       IMAGE QUALITY: Diagnostic     FINDINGS:     ALIGNMENT: Moderate to severe S-shaped scoliotic deformity of the visualized spine with a dextroscoliosis of the mid to lower thoracic spine and a levoscoliosis of the mid lumbar spine  Trace grade 1 anterolisthesis of L4 and L5  MARROW SIGNAL: Pedicle screws at L3 and L4 noted  The vertebrae are somewhat elongated, possibly related to developmental variation  No definite segmentation anomaly  DISTAL CORD AND CONUS: Normal size and signal within the distal cord and conus  The conus ends at the L1 level  PARASPINAL SOFT TISSUES: Paraspinal soft tissues are unremarkable  SACRUM: Normal signal within the sacrum  No evidence of insufficiency or stress fracture  LOWER THORACIC DISC SPACES: Normal disc height and signal  No disc herniation, canal stenosis or foraminal narrowing  LUMBAR DISC SPACES:        L1-L2: Normal      L2-L3: Normal      L3-L4: Postoperative fusion noted  No evidence of recurrent or residual disc herniation  Central canal and neural foramina surgically capacious  L4-L5: Mild uncovering of the intervertebral disc space  Advanced facet hypertrophy  Small left neural foraminal disc trusion   Moderate left neural foraminal narrowing  Central canal and right neural foramen patent  L5-S1: There is a diffuse disk bulge  No significant central canal or neural foraminal narrowing  Bilateral facet hypertrophy noted  IMPRESSION:       1  Moderate to severe S-shaped scoliotic deformity of the visualized spine   2  Postoperative changes status post fusion L3-4   3  Mild grade 1 anterolisthesis of L4 on L5 with a left neural foraminal disc protrusion at this level as well  Workstation performed: [id number]     Signed by:    Yadira Calabrese MD   8/15/17

## 2018-01-10 NOTE — MISCELLANEOUS
Message   Recorded as Task   Date: 06/13/2017 10:21 AM, Created By: Shamar Frederick   Task Name: Follow Up   Assigned To: [id number] [address] end clinical,Team   Regarding Patient: Lucy Acosta, Status: Active   Comment:    Barbara Denson - 13 Jun 2017 10:21 AM     TASK CREATED  Pt s/p LT L4-5 RFA on 6/12 at [address] w/ JE  For 6W F/U POST RFA on 7/25  at 1PM arrival time w/ JE at [address]  Barbara Denson - 13 Jun 2017 10:59 AM     TASK EDITED  Attempted to reach pt regarding above  LVMMOM w/ c/b number, office location and hours provided  Zabrina Medina - 14 Jun 2017 9:03 AM     TASK EDITED  *2nd attempt to reach pt~ spoke to pt she states that the first day was painful and in the morning her back felt bruised,pt used ice which helped and the discomfort has subsided  Pt reported that she still has pain and pain in her hips which she was hoping this procedure was going to help  Did advise pt that it can take a few weeks until she notices improvement in pain  Pt stated that she forgot that  Confirmed upcoming sovs    Po Holguin - 14 Jun 2017 11:53 AM     TASK REPLIED TO: Previously Assigned To Po Holguin                      agree        Active Problems    1  Anosmia (781 1) (R43 0)   2  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 96)   3  Arthritis of right knee (716 96) (M17 11)   4  Dolan's esophagus (530 85) (K22 70)   5  Chronic low back pain (724 2,338 29) (M54 5,G89 29)   6  Chronic pain syndrome (338 4) (G89 4)   7  Chronic venous insufficiency (459 81) (I87 2)   8  Cough variant asthma (493 82) (J45 991)   9  Depression (311) (F32 9)   10  Lumbar postlaminectomy syndrome (722 83) (M96 1)   11  Seasonal allergies (477 9) (J30 2)    Current Meds   1  Cetirizine HCl - 10 MG Oral Tablet; TAKE 1 TABLET Bedtime PRN; Therapy: 05BFE4173 to (Evaluate:85Kro1706); Last Rx:02Jun2017 Ordered   2  Dexilant 60 MG Oral Capsule Delayed Release; Therapy: 47YWI3317 to Recorded   3   DULoxetine HCl - 60 MG Oral Capsule Delayed Release Particles (Cymbalta); TAKE   ONE CAPSULE BY MOUTH AT BEDTIME; Therapy: 80PKH3758 to (Evaluate:58Jzq2682); Last Rx:16Mar2017 Ordered   4  Montelukast Sodium 10 MG Oral Tablet; TAKE 1 TABLET BY MOUTH EVERY DAY; Therapy: 23SKC2192 to (Evaluate:22Jun2017)  Requested for: 42NDI7213; Last   Rx:02Jun2017 Ordered   5  ProAir RespiClick 037 (90 Base) MCG/ACT Inhalation Aerosol Powder Breath Activated;   2 PUFFS EVERY 4 TO 6 HOURS AS NEEDED FOR SYMPTOMS;   Therapy: 50XPD9636 to (Last Rx:02Jun2017) Ordered   6  ROPINIRole HCl - 2 MG Oral Tablet; TAKE 1 TABLET Bedtime PRN insomia; Therapy: 23EYS6363 to (Evaluate:15Apr2017)  Requested for: 44WRA1915; Last   Rx:16Mar2017 Ordered   7  Zantac 150 MG Oral Tablet (RaNITidine HCl); take 1 tablet at bedtime; Therapy: 45IIJ5657 to (Gregorio Smith)  Requested for: 24QUJ9649; Last   CW:58HOU4831 Ordered    Allergies    1  Latex Exam Gloves MISC   2   Sulfa Drugs    Signatures   Electronically signed by : Gianfranco Fernandez RN; Jun 14 2017 12:01PM EST                       (Author)

## 2018-01-11 NOTE — MISCELLANEOUS
Message     Recorded as Task   Date: 08/21/2017 12:59 PM, Created By: Durga Wills   Task Name: Call Patient with results   Assigned To: Durga Wills   Regarding Patient: Dk Cohen, Status: In Progress   CommentUrban Crimes - 21 Aug 2017 12:59 PM     Patient Phone: [phone]      please review impression with patient  recommend sovs to discuss procedure options   Destini Diehl - 21 Aug 2017 1:12 PM     TASK IN [employer] - 21 Aug 2017 2:05 PM     TASK EDITED  S/W pt and reviewed with her the impression of the Lumbar MRI as stated in report  I explained what anterolisthesis grade 1 was  Pt prefers to c/b to schedule the ov to discuss procedure options when she is near a calendar  Pt told she does not need to s/w a nurse when she c/b to schedule that ov, front office staff can assist her with making appt  Active Problems    1  Acute pain of right knee (719 46) (M25 561)   2  Anosmia (781 1) (R43 0)   3  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 96)   4  Arthritis of right knee (716 96) (M17 11)   5  Dolan's esophagus (530 85) (K22 70)   6  Blood glucose elevated (790 29) (R73 9)   7  Chronic low back pain (724 2,338 29) (M54 5,G89 29)   8  Chronic pain syndrome (338 4) (G89 4)   9  Chronic venous insufficiency (459 81) (I87 2)   10  Cough variant asthma (493 82) (J45 991)   11  Depression (311) (F32 9)   12  Hyperlipidemia (272 4) (E78 5)   13  Left hip pain (719 45) (M25 552)   14  Left knee pain (719 46) (M25 562)   15  Left lumbar radiculitis (724 4) (M54 16)   16  Lumbar postlaminectomy syndrome (722 83) (M96 1)   17  Morbid obesity (278 01) (E66 01)   18  Prepatellar bursitis of right knee (726 65) (M70 41)   19  Primary localized osteoarthritis of right knee (715 16) (M17 11)   20  Primary osteoarthritis of left hip (715 15) (M16 12)   21  Restless legs syndrome (333 94) (G25 81)   22  Right knee pain (719 46) (M25 561)   23   Seasonal allergies (477 9) (J30 2)    Current Meds   1  Cetirizine HCl - 10 MG Oral Tablet; TAKE 1 TABLET Bedtime PRN; Therapy: 72YYD0741 to (Evaluate:73Lvx8969); Last Rx:02Jun2017 Ordered   2  Dexilant 60 MG Oral Capsule Delayed Release; Therapy: 21NZK7022 to Recorded   3  DULoxetine HCl - 60 MG Oral Capsule Delayed Release Particles (Cymbalta); TAKE   ONE CAPSULE BY MOUTH AT BEDTIME; Therapy: 65UFE2257 to (Evaluate:17Ovr9101); Last Rx:16Mar2017 Ordered   4  Flovent HFA 44 MCG/ACT Inhalation Aerosol; INHALE 2 PUFFS Daily; Therapy: 11Aug2017 to (Evaluate:26Xmi7537)  Requested for: 11Aug2017; Last   Rx:11Aug2017 Ordered   5  Montelukast Sodium 10 MG Oral Tablet; TAKE 1 TABLET BY MOUTH EVERY DAY; Therapy: 28LHW3116 to (Evaluate:94Vxe2572)  Requested for: 66Ywv9662; Last   Rx:06Aug2017 Ordered   6  ProAir RespiClick 364 (90 Base) MCG/ACT Inhalation Aerosol Powder Breath Activated;   2 PUFFS EVERY 4 TO 6 HOURS AS NEEDED FOR SYMPTOMS;   Therapy: 65BZA0015 to (Last Rx:02Jun2017) Ordered   7  Zantac 150 MG Oral Tablet (RaNITidine HCl); take 1 tablet at bedtime; Therapy: 23QTY6041 to (96 [phone])  Requested for: 47CEU4608; Last   Rx:67Llz5013 Ordered    Allergies    1  Latex Exam Gloves MISC   2   Sulfa Drugs    Signatures   Electronically signed by : Lucia Fischer, ; Aug 21 2017  2:05PM EST                       (Author)

## 2018-01-11 NOTE — MISCELLANEOUS
Active Problems    1  Abnormal finding on mammography (793 80) (R92 8)   2  Acute pain of right knee (719 46) (M25 561)   3  Anosmia (781 1) (R43 0)   4  Anterolisthesis (756 12) (M43 10)   5  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 96)   6  Arthritis of right knee (716 96) (M17 11)   7  Dolan's esophagus (530 85) (K22 70)   8  Blood glucose elevated (790 29) (R73 9)   9  Chronic low back pain (724 2,338 29) (M54 5,G89 29)   10  Chronic pain syndrome (338 4) (G89 4)   11  Chronic venous insufficiency (459 81) (I87 2)   12  Cough variant asthma (493 82) (J45 991)   13  Counseling About Travel   14  Depression (311) (F32 9)   15  Hyperlipidemia (272 4) (E78 5)   16  Intervertebral disc disorder with radiculopathy of lumbar region (724 4) (M51 16)   17  Left hip pain (719 45) (M25 552)   18  Left knee pain (719 46) (M25 562)   19  Lumbar postlaminectomy syndrome (722 83) (M96 1)   20  Morbid obesity (278 01) (E66 01)   21  Prepatellar bursitis of right knee (726 65) (M70 41)   22  Primary localized osteoarthritis of right knee (715 16) (M17 11)   23  Primary osteoarthritis of left hip (715 15) (M16 12)   24  Restless legs syndrome (333 94) (G25 81)   25  Right knee pain (719 46) (M25 561)   26  Seasonal allergies (477 9) (J30 2)   27  Visit for screening mammogram (V76 12) (Z12 31)    Current Meds   1  Carafate 1 GM/10ML Oral Suspension; TAKE 10 ML 4 TIMES DAILY; Therapy: 61Agm8170 to (Evaluate:50Rld3934)  Requested for: 31Aug2017; Last   Rx:31Aug2017 Ordered   2  Cetirizine HCl - 10 MG Oral Tablet; TAKE 1 TABLET Bedtime PRN; Therapy: 86OBY5318 to (Evaluate:04Myo9580); Last Rx:02Jun2017 Ordered   3  Cyclobenzaprine HCl - 10 MG Oral Tablet; Take 1 tablet 2 times daily as needed; Therapy: 47Dvs4731 to (Evaluate:03Sep2017)  Requested for: 25Aug2017; Last   Rx:00Xac8457 Ordered   4  Dexilant 60 MG Oral Capsule Delayed Release; Therapy: 57OLX7238 to Recorded   5   DULoxetine HCl - 60 MG Oral Capsule Delayed Release Particles (Cymbalta); TAKE   ONE CAPSULE BY MOUTH AT BEDTIME; Therapy: 23DSQ9977 to (Evaluate:03Qxc8109); Last Rx:16Mar2017 Ordered   6  Flovent HFA 44 MCG/ACT Inhalation Aerosol; INHALE 2 PUFFS Daily; Therapy: 11Aug2017 to (Evaluate:52Ail3121)  Requested for: 11Aug2017; Last   Rx:11Aug2017 Ordered   7  Gabapentin 300 MG Oral Capsule; 1 TAB QHS X 3D, THEN 1 TAB BID X 3D, THEN 1   TAB TID; Therapy: 72Sqa8323 to (03 17 74 30 53)  Requested for: 50Szx4609; Last   Rx:98Pxx8898 Ordered   8  Montelukast Sodium 10 MG Oral Tablet; TAKE 1 TABLET BY MOUTH EVERY DAY; Therapy: 30XPO0089 to (Evaluate:44Mdj4347)  Requested for: 25Pib5770; Last   Rx:34Pha4239 Ordered   9  Omeprazole 40 MG Oral Capsule Delayed Release; TAKE 1 CAPSULE TWICE DAILY; Therapy: 91Lzd3637 to (Evaluate:06Ldm1358)  Requested for: 66Xth4367; Last   Rx:97Rzl4693 Ordered   10  ProAir RespiClick 188 (90 Base) MCG/ACT Inhalation Aerosol Powder Breath Activated;    2 PUFFS EVERY 4 TO 6 HOURS AS NEEDED FOR SYMPTOMS;    Therapy: 43JEY9712 to (Last Rx:02Jun2017) Ordered   11  Zantac 150 MG Oral Tablet (RaNITidine HCl); take 1 tablet at bedtime; Therapy: 06HNE7910 to (03 17 74 30 53)  Requested for: 27JBZ8536; Last    Rx:29Cow2483 Ordered    Allergies    1  Latex Exam Gloves MISC   2   Sulfa Drugs    Signatures   Electronically signed by : Stew Valencia RN; Oct 10 2017  2:17PM EST                       (Author)

## 2018-01-11 NOTE — MISCELLANEOUS
Message  GI Reminder Recall Bartolome Lowers:   Date: 09/11/2017   Dear Joselyn Torres:     Review of our records shows you are due for the following: EGD  Or records indicate that you are due at this time to have a follow-up examination for a EGD  As you know, these tests are done to prevent cancer, a very common disease in the [place] and responsible for thousands of patient deaths each year  We at [facility] are concerned for your health, and would very much appreciate you getting in touch with us at your earliest convenience  Again, this examination is vital to your proper health maintenance and for the prevention of cancer  We have attempted to reach you and have been unsuccessful  Please contact our office to schedule your procedure  Thank You       Please call the following office to schedule your appointment:   [address], [address], [place], [address] [facility] [phone]  We look forward to hearing from you!      Sincerely,         Signatures   Electronically signed by : Bin Sepulveda, ; Sep 11 2017  2:07PM EST                       (Author)

## 2018-01-12 VITALS
DIASTOLIC BLOOD PRESSURE: 76 MMHG | HEART RATE: 72 BPM | RESPIRATION RATE: 14 BRPM | SYSTOLIC BLOOD PRESSURE: 122 MMHG | HEIGHT: 63 IN

## 2018-01-12 VITALS
SYSTOLIC BLOOD PRESSURE: 142 MMHG | DIASTOLIC BLOOD PRESSURE: 88 MMHG | BODY MASS INDEX: 36.14 KG/M2 | WEIGHT: 204 LBS | HEIGHT: 63 IN

## 2018-01-12 NOTE — MISCELLANEOUS
Message   Recorded as Task   Date: 06/13/2017 10:21 AM, Created By: Daria Springer   Task Name: Follow Up   Assigned To: [facility] clerical,Team   Regarding Patient: Lonny Traore, Status: In Progress   Comment:    Barbara Denson - 13 Jun 2017 10:21 AM     TASK CREATED  Pt s/p LT L4-5 RFA on 6/12 at [address] w/ JE  For 6W F/U POST RFA on 7/25  at 1PM arrival time w/ JE at [address]  Barbara Denson - 13 Jun 2017 10:59 AM     TASK EDITED  Attempted to reach pt regarding above  LVMMOM w/ c/b number, office location and hours provided  Zabrina Medina - 14 Jun 2017 9:03 AM     TASK EDITED  *2nd attempt to reach pt~ spoke to pt she states that the first day was painful and in the morning her back felt bruised,pt used ice which helped and the discomfort has subsided  Pt reported that she still has pain and pain in her hips which she was hoping this procedure was going to help  Did advise pt that it can take a few weeks until she notices improvement in pain  Pt stated that she forgot that  Confirmed upcoming sovs    Josseline Chawla - 14 Jun 2017 11:53 AM     TASK REPLIED TO: Previously Assigned To Josseline Chawla                      agree   Katelyn Knife - 14 Jun 2017 12:01 PM     TASK COMPLETED   Yolette Beltre - 20 Jun 2017 1:33 PM     TASK REACTIVATED   Yolette Beltre - 20 Jun 2017 1:34 PM     TASK EDITED  pt is calling saying that she is still in pain after the procedure and wants to know if this is normal  please call pt back at [phone]   Henri Rodriguez - 20 Jun 2017 3:23 PM     TASK EDITED  Left a detailed message on machine as per release of info on file  Advised pt, returning your call re: RFA w/ JE on 6/12  Normal to have pain after procedure - relief will come gradually over 4 - 6 weeks  If redness, drainage, swelling, fever 100+ or sunburn like sensation in the are of the procedure present, please cb - that is not normal  Ok to take medications as prescribed / directed, rest, ice / heat  c/b w/ questions or concerns  provided cb number and office hrs  Linda Pena - 20 Jun 2017 3:25 PM     TASK EDITED  [address] Call Center- Patient called back stating she listened to message that was left but is confused  Stated that the pain is not her normal pain  Please call patient back [phone]   Marilee Murphyint - 20 Jun 2017 3:48 PM     TASK EDITED  Attempted to contact pt [facility]   Roseanne Vela - 20 Jun 2017 3:48 PM     TASK IN PROGRESS   Cara Seymour - 21 Jun 2017 3:59 PM     TASK EDITED  pt returning call please call pt back at [phone]   [facility] - 21 Jun 2017 4:34 PM     TASK EDITED    I spoke with pt, states she is now having left sided low back pain that goes into the back of her left thigh  States she normally doesn't get this pain and has a hard time getting comfortable and is having difficulty sleeping  No S/S infection  Advised I will discuss with DR Yesica Tubbs and [facility] tomorrow with recommendations  ***************   Segun Shafer - 22 Jun 2017 8:24 AM     TASK REPLIED TO: Previously Assigned To Torrey Boyd              aware please schedule follow up visit for further evaluation   Barbara Denson - 22 Jun 2017 8:54 AM     TASK REASSIGNED: Previously Assigned To [address] [place] [address] clinical,Team    *****please schedule****  Please contact pt and schedule above as per Lakisha Lopez - 22 Jun 2017 2:57 PM     TASK IN PROGRESS   Lakisha Keith - 22 Jun 2017 2:58 PM     TASK EDITED  Pt already has f/u scheduled per IDX        Active Problems    1  Anosmia (781 1) (R43 0)   2  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 96)   3  Arthritis of right knee (716 96) (M17 11)   4  Dolan's esophagus (530 85) (K22 70)   5  Chronic low back pain (724 2,338 29) (M54 5,G89 29)   6  Chronic pain syndrome (338 4) (G89 4)   7  Chronic venous insufficiency (459 81) (I87 2)   8  Cough variant asthma (493 82) (J45 991)   9  Depression (311) (F32 9)   10  Lumbar postlaminectomy syndrome (722 83) (M96 1)   11   Seasonal allergies (477 9) (J30 2)    Current Meds   1  Cetirizine HCl - 10 MG Oral Tablet; TAKE 1 TABLET Bedtime PRN; Therapy: 23CHC9684 to (Evaluate:15Aof4668); Last Rx:02Jun2017 Ordered   2  Dexilant 60 MG Oral Capsule Delayed Release; Therapy: 06XRT1347 to Recorded   3  DULoxetine HCl - 60 MG Oral Capsule Delayed Release Particles (Cymbalta); TAKE   ONE CAPSULE BY MOUTH AT BEDTIME; Therapy: 16OSX2840 to (Evaluate:00Ejr4658); Last Rx:16Mar2017 Ordered   4  Montelukast Sodium 10 MG Oral Tablet; TAKE 1 TABLET BY MOUTH EVERY DAY; Therapy: 38OQB1616 to (Evaluate:22Jun2017)  Requested for: 71FSL0551; Last   Rx:02Jun2017 Ordered   5  ProAir RespiClick 430 (90 Base) MCG/ACT Inhalation Aerosol Powder Breath Activated;   2 PUFFS EVERY 4 TO 6 HOURS AS NEEDED FOR SYMPTOMS;   Therapy: 83ZMQ2922 to (Last Rx:02Jun2017) Ordered   6  ROPINIRole HCl - 2 MG Oral Tablet; TAKE 1 TABLET Bedtime PRN insomia; Therapy: 74MUD6447 to (Evaluate:15Apr2017)  Requested for: 38OIW5026; Last   Rx:16Mar2017 Ordered   7  Zantac 150 MG Oral Tablet (RaNITidine HCl); take 1 tablet at bedtime; Therapy: 96NVW3900 to (Alva Dye)  Requested for: 90HGU2527; Last   JK:23UOE7695 Ordered    Allergies    1  Latex Exam Gloves MISC   2   Sulfa Drugs    Signatures   Electronically signed by : Isha Bonds, ; Jun 22 2017  2:58PM EST                       (Author)

## 2018-01-12 NOTE — RESULT NOTES
Message   Recorded as Task   Date: 08/25/2017 03:25 PM, Created By: Scarlet Payne   Task Name: Miscellaneous   Assigned To: [id number] [address] clinical,Team   Regarding Patient: Mark Baires, Status: Active   Comment:    NetojanetSary merchant - 25 Aug 2017 3:25 PM     TASK CREATED  Pt called requesting a non-narcotic pain medication to get through the weekend  She said the last 2 days she has been having increased pain in her lower back  She said she cannot take nsaids and tylenol does not work  Pls call pt at [phone]  Ghazal Mulligan - 25 Aug 2017 3:54 PM     TASK EDITED  S/w the pt  and she stated she started with increased pain for the past few days  She is c/o pain in her LB going down the left side  She is taking the duloxetine at [facility], she can not take nsaid's because of reflux  She would like something called in until she comes next week  SI to advise  Thanks  ******SHARMILA pt  -WE********   Romel Zapata - 25 Aug 2017 4:13 PM     TASK REPLIED TO: Previously Assigned To Romel Zapata  I spoke with patient to get a better idea of her pain  She states that she feels very sore in her low back due antherolisthesis  She states that it does feel like spasms  I informed her that I will send over a short term supply of a muscle relaxant flexeril 10mg po bid x 3 days to get her through the weekend          Signatures   Electronically signed by : Isis Cota, ; Aug 25 2017  4:15PM EST                       (Author)

## 2018-01-13 VITALS
TEMPERATURE: 98.4 F | OXYGEN SATURATION: 97 % | HEART RATE: 100 BPM | DIASTOLIC BLOOD PRESSURE: 80 MMHG | HEIGHT: 64 IN | WEIGHT: 221 LBS | BODY MASS INDEX: 37.73 KG/M2 | SYSTOLIC BLOOD PRESSURE: 124 MMHG

## 2018-01-13 VITALS
TEMPERATURE: 98.9 F | SYSTOLIC BLOOD PRESSURE: 124 MMHG | OXYGEN SATURATION: 98 % | HEIGHT: 63 IN | HEART RATE: 83 BPM | DIASTOLIC BLOOD PRESSURE: 86 MMHG

## 2018-01-13 VITALS
OXYGEN SATURATION: 97 % | DIASTOLIC BLOOD PRESSURE: 60 MMHG | HEART RATE: 83 BPM | SYSTOLIC BLOOD PRESSURE: 118 MMHG | BODY MASS INDEX: 36.54 KG/M2 | WEIGHT: 214 LBS | HEIGHT: 64 IN

## 2018-01-13 VITALS
DIASTOLIC BLOOD PRESSURE: 86 MMHG | RESPIRATION RATE: 14 BRPM | WEIGHT: 204 LBS | BODY MASS INDEX: 36.14 KG/M2 | SYSTOLIC BLOOD PRESSURE: 122 MMHG | HEIGHT: 63 IN | HEART RATE: 76 BPM

## 2018-01-13 VITALS
OXYGEN SATURATION: 98 % | BODY MASS INDEX: 35.79 KG/M2 | HEIGHT: 63 IN | DIASTOLIC BLOOD PRESSURE: 82 MMHG | SYSTOLIC BLOOD PRESSURE: 110 MMHG | WEIGHT: 202 LBS | HEART RATE: 83 BPM

## 2018-01-13 NOTE — RESULT NOTES
Message   Recorded as Task   Date: 05/24/2017 03:52 PM, Created By: Davonte Horton   Task Name: Follow Up   Assigned To: [facility] surgery sched,Team   Regarding Patient: Mike Jett, Status: Active   Comment:    Davonte Horton - 24 May 2017 3:52 PM     TASK CREATED  please schedule RFA and follow up 6 weeks later   Barbara Denson - 24 May 2017 3:59 PM     TASK REASSIGNED: Previously Assigned To [address] [place] [address] Boo   Dee Mart 25 May 2017 10:26 AM     TASK IN PROGRESS   Dee Mart 25 May 2017 11:05 AM     TASK EDITED  Called patient and scheduled:     left L4-5 RFA on 6/12  right L4-5 RFA on 7/3  6w f/u on 7/25    Reviewed instructions: , NPO 1 hour prior, loose-fitting/comfortable pants, if ill/start abx/fever/infx to call and reschedule  She stated verbal understanding  Dee Mart 25 May 2017 11:07 AM     TASK EDITED  While scheduling her RFA, she mentioned that she does not have pain on the right side but definately on the left and center of her back  She questioned about needing right RFA  I said that right side would probably help to capture that center pain but that I would inquire about needing the right side also  We did go ahead and schedule both  Davonte Horton - 25 May 2017 11:56 AM     TASK REPLIED TO: Previously Assigned To Davonte Horton                      if no pain on right side cancel that one and we will see what's still painful after the left sided RFA in follow up    thanks!    Dee Mart 25 May 2017 1:39 PM     TASK EDITED  noted

## 2018-01-14 VITALS
BODY MASS INDEX: 36.32 KG/M2 | WEIGHT: 205 LBS | SYSTOLIC BLOOD PRESSURE: 126 MMHG | TEMPERATURE: 96.5 F | HEART RATE: 71 BPM | HEIGHT: 63 IN | OXYGEN SATURATION: 98 % | DIASTOLIC BLOOD PRESSURE: 74 MMHG

## 2018-01-14 VITALS
RESPIRATION RATE: 14 BRPM | HEIGHT: 64 IN | HEART RATE: 76 BPM | SYSTOLIC BLOOD PRESSURE: 128 MMHG | DIASTOLIC BLOOD PRESSURE: 78 MMHG | BODY MASS INDEX: 37.73 KG/M2 | WEIGHT: 221 LBS

## 2018-01-14 VITALS
HEART RATE: 76 BPM | SYSTOLIC BLOOD PRESSURE: 102 MMHG | OXYGEN SATURATION: 97 % | BODY MASS INDEX: 35.17 KG/M2 | DIASTOLIC BLOOD PRESSURE: 60 MMHG | WEIGHT: 206 LBS | HEIGHT: 64 IN

## 2018-01-14 VITALS
BODY MASS INDEX: 35.97 KG/M2 | WEIGHT: 203 LBS | SYSTOLIC BLOOD PRESSURE: 119 MMHG | DIASTOLIC BLOOD PRESSURE: 77 MMHG | HEIGHT: 63 IN | HEART RATE: 78 BPM

## 2018-01-14 VITALS
DIASTOLIC BLOOD PRESSURE: 76 MMHG | HEIGHT: 63 IN | WEIGHT: 203 LBS | HEART RATE: 72 BPM | RESPIRATION RATE: 12 BRPM | SYSTOLIC BLOOD PRESSURE: 108 MMHG | BODY MASS INDEX: 35.97 KG/M2

## 2018-01-14 NOTE — MISCELLANEOUS
Message   Recorded as Task   Date: 03/30/2017 01:39 PM, Created By: Monica Gan   Task Name: Follow Up   Assigned To: Cristy Gordon   Regarding Patient: Tim Berman, Status: Active   CommentBertrum Patient - 30 Mar 2017 1:39 PM     TASK CREATED   pt called lm on script line stating that when she was in she decided that she wanted to start decreasing her Vicodin script from three times a day to twice daily and in the beginning it was ok but pain is getting back again and wants to go back on 3 times daily is this ok to do? and if so can we give her a new script when she runs out of this one? Please advised  She may be reach at [address] 2:12 PM     TASK REPLIED TO: Previously Assigned To Katherine Vences  Yes: She will need to c/b when she is out of her current rx and we will prescribe at 3 times per day until April 11, when she will be seeing Dr Masood Davila for the first time  Cristy Gordon - 30 Mar 2017 2:43 PM     TASK EDITED  called pt gave information        Active Problems    1  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 96)   2  Arthritis of right knee (716 96) (M17 11)   3  Dolan's esophagus (530 85) (K22 70)   4  Chronic pain syndrome (338 4) (G89 4)   5  Depression (311) (F32 9)    Current Meds   1  DULoxetine HCl - 60 MG Oral Capsule Delayed Release Particles; TAKE ONE CAPSULE   BY MOUTH AT BEDTIME; Therapy: 74GWH6047 to (Evaluate:68Ulk2134); Last Rx:16Mar2017 Ordered   2  Esomeprazole Magnesium 40 MG Oral Capsule Delayed Release; TAKE 1 CAPSULE   ONCE DAILY; Therapy: 22FVL1199 to (Evaluate:11Mar2018); Last Rx:16Mar2017 Ordered   3  Hydrocodone-Acetaminophen 7 5-325 MG Oral Tablet; Take 1 tablet twice daily; Therapy: 90CGF1865 to (Evaluate:15Apr2017); Last Rx:16Mar2017 Ordered   4  ROPINIRole HCl - 2 MG Oral Tablet; TAKE 1 TABLET Bedtime PRN insomia;    Therapy: 37QPF9148 to (Evaluate:15Apr2017)  Requested for: 26ANU8580; Last   Rx:16Mar2017 Ordered    Allergies    1  Latex Exam Gloves MISC   2   Sulfa Drugs    Signatures   Electronically signed by : BRENNA Sandoval ; Mar 30 2017  2:59PM EST                       (Author)

## 2018-01-15 VITALS
HEART RATE: 114 BPM | HEIGHT: 64 IN | WEIGHT: 217.5 LBS | OXYGEN SATURATION: 98 % | DIASTOLIC BLOOD PRESSURE: 90 MMHG | BODY MASS INDEX: 37.13 KG/M2 | SYSTOLIC BLOOD PRESSURE: 132 MMHG

## 2018-01-15 NOTE — RESULT NOTES
Message   Recorded as Task   Date: 04/24/2017 11:32 AM, Created By: Brien Vargas   Task Name: Miscellaneous   Assigned To: [facility] surgery sched,Team   Regarding Patient: Nick Meza, Status: In Progress   Saray Erickson - 24 Apr 2017 11:32 AM     TASK CREATED  Unable to reactivate task from 4/19  Pt called regarding having pain post procedure on 4/19  Has questions regarding what she can do  Please call back at [phone]  Barbara Denson - 24 Apr 2017 3:13 PM     TASK EDITED  ***pt of JE***    S/w pt regarding above  Per pt she did send her pain diary back on Thursday via mail  RN advised pt that it is not as of yet scanned into computer  RN asked pt to describe the pain relief  Per pt she had great pain relief on the right side of her back, however she never really has a problem on the right side  Per pt the left side she had relief in the % range for several hours and then moved into the 50-80% range and then ws back in the less than 50% range by 6PM     Per pt she went to the mall to go walking post MBB because the mall gives her a lot of pain usually when walking there  Per pt she noticed that her left low back felt really good, however she had pain in her left hip that she doesn't ususally notice  Per pt she realizes that when you take one pain away at times you notice other pain  Per pt then the back pain returned, however she still has the left hip pain as well  Per pt this pain is starting to become "unbearable "  Per pt not enough to have to go to the ER, but enough that it is stopping her from being able to do anything for herself  Per pt she was supposed to go grocery shopping today and just can't go with this amt  of pain  Per pt she is taking her last vicodin today, and although it takes the edge off she realizes that it wers off in one hour  Pt stated that she tried heat and rest, and would like to know what else she can do for the pain        Advised pt that if she received the amt of pain relied during her first MBB then Lisa Hubbard will likely have her scheduled for the second/ confirmatory MBB  Advised pt that Lisa Hubbard is out of the office until 5/1, however I will send this information to AO who is in the office tomorrow to see if she can recommend anything else for her pain at this point  Per pt  she has a diagnosis of mccartney's esophagus and that is the reason she does not consistently take nsaids  Pt appreciative of c/b and will wait for suggestions  ***please advise, thank you***   Theodora Ruiz - 25 Apr 2017 1:48 PM     TASK REPLIED TO: Previously Assigned To Theodora Ruiz  please schedule patient for b/l L4-5 MBB #2  We expect the pain to return as the MBB is just a diagnostic test    Barbara Denson - 25 Apr 2017 2:52 PM     TASK REASSIGNED: Previously Assigned To [address] [place] [address] clinical,Team   Dee Mart - 25 Apr 2017 3:40 PM     TASK EDITED  noted, will coordinate   Dee Mart - 26 Apr 2017 11:17 AM     TASK EDITED  Left message for patient to call me back   Dee Mart - 26 Apr 2017 11:31 AM     TASK EDITED  Patient called and scheduled:     bilateral L4-5 MBB #2 on 5/10    Reviewed instructions: , NPO 1 hour prior, loose-fitting/comfortable pants, if ill/start abx/infx/fever to call and reschedule  Also reviewed to refrain from PRN, as-needed pain meds 6h prior  She stated verbal understanding

## 2018-01-16 NOTE — RESULT NOTES
Verified Results  XR SPINE LUMBAR COMPLETE Rigoberto Amelia MINIMUM 6 VIEWS [id number] 09:46AM Shawn Velazco    Order Number: [id number]     Test Name Result Flag Reference   XR SPINE LUMBAR COMPLETE W BENDING MINIMUM 6 VIEWS (Report)     LUMBAR SPINE     INDICATION: Low back pain  Prior history of laminectomy  COMPARISON: None     VIEWS: AP, bilateral oblique, coned down and lateral projections in neutral, flexion and extension     IMAGES: 8     FINDINGS:     There is a spinal stabilization nydia seen extending from T9 through L3  Additional posterior plain screw fixation is noted extending across L3-L4 satisfactory appearance of this indwelling hardware  There is grade 1 anterolisthesis L4 on L5  This appears to increase with flexion and decreases with extension which may suggest a degree of instability  There is no radiographic evidence of acute fracture or destructive osseous lesion  Disc space narrowing seen at the L4-L5 L5-S1 levels  Visualized soft tissues appear unremarkable  IMPRESSION:     Grade 1 anterolisthesis L4 on L5 which appears to increase with flexion suggesting a degree of instability  Stable appearance of the indwelling hardware and posterior fusion of L3-L4         Workstation performed: [id number]     Signed by:   Yeny Terry MD   4/12/17

## 2018-01-16 NOTE — RESULT NOTES
Verified Results  (1) TISSUE EXAM [id number] 09:15AM Moreno Richards     Test Name Result Flag Reference   LAB AP CASE REPORT (Report)     Surgical Pathology Report             Case: [id number]                   Authorizing Provider: Faisal Salazar MD       Collected:      05/24/2017 0915        Ordering Location:   [facility]    Received:      05/24/2017 [facility]                            Pathologist:      Hollie Alves DO                               Specimens:  A) - Stomach, gastric polyp bx                                     B) - Esophagus, bx          C) - Esophagus, bx          D) - Esophagus, bx   LAB AP FINAL DIAGNOSIS (Report)     A  Stomach, polyp, biopsy:  - Fundic gland polyp  B  Esophagus, biopsy 31 cm:  - Junctional mucosa with intestinal metaplasia consistent with Dolan's   esophagus   - Acute and chronic inflammation with reactive epithelial changes  - Negative for dysplasia  C  Esophagus, biopsy at 32 cm:  - Gastric glandular mucosa with foveolar hyperplasia and chronic   inflammation   - Negative for intestinal metaplasia or dysplasia  D  Esophagus, biopsy at 29 cm:  - Gastric glandular mucosa with intestinal metaplasia, consistent with   Dolan's esophagus   - Negative for dysplasia  Interpretation performed at [facility]  Electronically signed by Hollie Alves DO on 5/25/2017 at 12:21 PM   LAB AP SURGICAL ADDITIONAL INFORMATION (Report)     These tests were developed and their performance characteristics   determined by [facility] or [employer]  They may not be cleared or approved by the [place]  Food and   Drug Administration  The FDA has determined that such clearance or   approval is not necessary  These tests are used for clinical purposes  They should not be regarded as investigational or for research   This   laboratory has been approved by CLIA 88, designated as a high-complexity   laboratory and is qualified to perform these tests  LAB AP GROSS DESCRIPTION (Report)     A  The specimen is received in formalin, labeled with the patient's name   and hospital number, and is designated gastric polyp, is a single   irregularly shaped fragment of tan soft tissue measuring 0 2 cm in   greatest dimension  Entirely submitted  One cassette  B  The specimen is received in formalin, labeled with the patient's name   and hospital number, and is designated biopsy at 31 cm, are two   irregularly shaped fragments of tan soft tissue each measuring 0 3 cm in   greatest dimension  Entirely submitted  One cassette  C  The specimen is received in formalin, labeled with the patient's name   and hospital number, and is designated biopsy at 32 cm, are two   irregularly shaped fragments of tan soft tissue measuring 0 3 and 0 1 cm   in greatest dimension  Entirely submitted  One cassette  D  The specimen is received in formalin, labeled with the patient's name   and hospital number, and is designated biopsy 29 cm, are two   irregularly shaped fragments of tan soft tissue measuring 0 3 and 0 2 cm   in greatest dimension  Entirely submitted  One cassette  Note: The estimated total formalin fixation time based upon information   provided by the submitting clinician and the standard processing schedule   is 17 25 hours        RLR   LAB AP CLINICAL INFORMATION Gastric polyp     Gastric polyp

## 2018-01-16 NOTE — MISCELLANEOUS
Message   Recorded as Task   Date: 04/14/2017 04:22 PM, Created By: Suzi Mireles   Task Name: Call Patient with results   Assigned To: [address] [place] [address] clinical,Team   Regarding Patient: Marva Ariza, Status: In Progress   Comment:    Suzi Mireles - 14 Apr 2017 4:22 PM     Patient Phone: [phone]      please schedule follow up to review imaging with me and discuss procedural options   Debbie Quintanillain - 17 Apr 2017 9:23 AM     TASK REASSIGNED: Previously Assigned To Michael Caballero - 17 Apr 2017 1:12 PM     TASK IN PROGRESS   Sangeetha Frank - 17 Apr 2017 1:19 PM     TASK REPLIED TO: Previously Assigned To [address] [place] [address] clinical,Team  Appointment scheduled for 5/5/17, Dr Zo Duarte next available [facility]  Pt states she would like to discuss results over the phone if possible so she can be scheduled for procedure sooner  Sangeetha Frank - 17 Apr 2017 1:19 PM     TASK EDITED   Cathie Jean - 17 Apr 2017 1:21 PM     TASK REASSIGNED: Previously Assigned To [address] [place] [address] clinical,Team  Please see above notation from [place]  Is there any particular information regarding imaging results you would like RN to relay to pt  or is pt  just to f/u as scheduled on 5/5? Please advise  Thank you  Suzi Mireles - 17 Apr 2017 3:02 PM     TASK REPLIED TO: Previously Assigned To Suzi Mireles                      you can share the impression with her   Rosemary Quintanilla - 17 Apr 2017 3:15 PM     TASK REPLIED TO: Previously Assigned To [address] [place] [address] clinical,Team  Will call pt  and relay impression  However, above it states that pt  wants the results relayed to her by phone, so that she can be scheduled for a sooner procedure, instead of waiting until her OV on 5/5/17 to schedule an inj  Do you still want pt  to be seen in office on 5/5/17 to discuss treatment options or is there an injection that you would like the pt  to be offered before then? Please advise  Thank you     Suzi Mireles - 17 Apr 2017 3:32 PM     TASK REPLIED TO: Previously Assigned To Torrey Boyd                      schedule for bilateral L4-5 MBB#1    M47 816  [id number]   RollyDebbie mendozain - 17 Apr 2017 4:33 PM     TASK REPLIED TO: Previously Assigned To [address] [place] [address] clinical,Team  S/w pt  and advised of the impression of lumbar x-ray and Dr Mike Montano procedure recommendation  Pt  verbalized understanding and asked if JE mentioned anything about the SCS  Pt  was advised that usually if there is a less invasive option, [address] [place] [address] will try that first, before moving to the SCS trial  Pt  verbalized understanding  **Pt  states she had a pain management doctor in the past, Dr Miesha Alvarez at [facility], who did multiple MBB's and rhizotomy's on her  Pt  states that she is wondering if it would be beneficial for Dr Roderick Dickinson to obtain those files for review  Pt  states that she understands that Dr Roderick Dickinson may still want to try the MBB even w/ the review of files, but she figured it may be helpful  Pt  states that Dr Zahira Shaw number is [phone], if [address] [place] [address] wants to retrieve records  Pt  was advised that RN will send this information to [address] [place] [address] and see how he would like to proceed  Pt  appreciative  **    **Pt  also states that the anterolisthesis noted on the x-ray is concerning her slightly  Pt  wondering what could have caused that if her fusion and hardware is stable  Pt  was advised that RN will relay her concerns to [address] [place] [address] and see if he has any information regarding the anterolisthesis noted at L4 and L5 on the x-ray  ** Pt  appreciative and asked for the x-ray results to also be faxed to Dr Zunilda Glaser at [facility] since he is the doctor who did her fusion  Pt  was advised will fax the x-ray results to Dr Zunilda Glaser at this time  Pt  appreciative  Lumbar x-ray results faxed to Dr Zunilda Glaser at [phone]  Please advise  Thank you     Gilmer Proctor - 18 Apr 2017 7:14 AM     TASK REPLIED TO: Previously Assigned To Gilmer Proctor  her entire spine is not fused down to the sacrum and it's common to develop a small amount of anterolisthesis  this is where all the stress is going  she may still benefit from [facility] and RFA  reviewing records will not help me unless it includes actual imaging of the procedure with placement of the RFA probes which it will not have  I recommend pursuing the MBBs and RFA if she has a good result and can still consider SCS if that doesn't help her  Rosemary Quintanilla - 18 Apr 2017 9:24 AM     TASK REPLIED TO: Previously Assigned To [id number] [place] [address] clinical,Team  Attempted to reach pt  LMOM for pt  to c/b  RollyRosemary mendoza - 18 Apr 2017 9:24 AM     TASK IN PROGRESS   Yolette Beltre - 18 Apr 2017 11:01 AM     TASK EDITED  pt returning call please call pt back at [phone]   Tommy Angelesmann - 18 Apr 2017 11:29 AM     TASK REPLIED TO: Previously Assigned To [id number] [place] [address] clinical,Team  Attempted to reach pt  LMOM for pt  to c/b  Rosemary Quintanilla - 18 Apr 2017 11:29 AM     TASK IN PROGRESS   Sary Wolfe - 18 Apr 2017 11:36 AM     TASK EDITED  Pt returned call  Pls call pt at [phone]  Rosemary Quintanilla - 18 Apr 2017 11:50 AM     TASK REPLIED TO: Previously Assigned To [address] [place] [address] clinical,Team  S/w pt  and advised of above  Pt  agreeable w/ plan, will try MBB  Pt  asked if she would need a   Pt  was told yes, a  is required, can be numb for several hours after the procedure because of the local anesthetic  Pt  verbalized understanding and states that she is going to call her friend quick and see if he/she can drive her tomorrow  Pt  did request to tentatively schedule the procedure for tomorrow so she does not miss the appt  Pt  was tentatively scheduled for tomorrow 4/19 at 945 am for B/L L4-5 MBB #1  Pt  will c/b after she s/w her friend     DwightRosemary - 18 Apr 2017 11:50 AM     TASK IN PROGRESS   Myron Og - 18 Apr 2017 12:26 PM     TASK EDITED  [facility] call center- patient called back and stated  she will make scheduled appt  Julian Nobletlin - 18 Apr 2017 1:29 PM     TASK REPLIED TO: Previously Assigned To [address] [address] clinical,Team  Attempted to reach pt  Left detailed message, per release, advising that RN did receive the message that pt  can proceed as scheduled  Pre-procedure instructions left on message: arrival time 930 AM, report to [address] [facility], [address], same location that pt  saw Dr Nam Holland in the office  Pain level needs to be a 5 or greater in order to proceed w/ procedure, avoid short-acting pain medications tomorrow morning  If pt  wakes up w/ no pain or pain below a 5, call our office, may need to reschedule  NPO 1 hr prior, needs , if pt  develops infection or is put on abx call our office to reschedule  Wear loose fitting/comfortable clothing  Message advised that if pt  has any questions regarding procedure instructions, c/b    DwightRosemary - 18 Apr 2017 1:29 PM     TASK IN PROGRESS        Active Problems    1  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 96)   2  Arthritis of right knee (716 96) (M17 11)   3  Dolan's esophagus (530 85) (K22 70)   4  Chronic low back pain (724 2,338 29) (M54 5,G89 29)   5  Chronic pain syndrome (338 4) (G89 4)   6  Depression (311) (F32 9)   7  Lumbar postlaminectomy syndrome (722 83) (M96 1)    Current Meds   1  DULoxetine HCl - 60 MG Oral Capsule Delayed Release Particles (Cymbalta); TAKE   ONE CAPSULE BY MOUTH AT BEDTIME; Therapy: 46MAT1746 to (Evaluate:99Keh9197); Last Rx:16Mar2017 Ordered   2  Esomeprazole Magnesium 40 MG Oral Capsule Delayed Release (NexIUM); TAKE 1   CAPSULE ONCE DAILY; Therapy: 84JJX8877 to (Evaluate:11Mar2018); Last Rx:16Mar2017 Ordered   3  Hydrocodone-Acetaminophen 7 5-325 MG Oral Tablet; Take 1 tablet twice daily for 7   days then 1 tablet daily for 7 days and then discontinue; Therapy: 76JJP5536 to (Evaluate:25Apr2017); Last Rx:11Apr2017 Ordered   4   ROPINIRole HCl - 2 MG Oral Tablet; TAKE 1 TABLET Bedtime PRN insomia; Therapy: 12BHV5155 to (Evaluate:15Apr2017)  Requested for: 79XNR0929; Last   Rx:16Mar2017 Ordered    Allergies    1  Latex Exam Gloves MISC   2   Sulfa Drugs    Signatures   Electronically signed by : Milka Johnston RN; Apr 19 2017  8:00AM EST                       (Author)

## 2018-01-17 NOTE — RESULT NOTES
Message   Recorded as Task   Date: 08/04/2017 01:23 PM, Created By: Elyssa Medicine   Task Name: Follow Up   Assigned To: [id number] [address] end procedure,Team   Regarding Patient: Mark Baires, Status: Active   [facility] - 04 Aug 2017 1:23 PM     TASK CREATED  Pt  is S/P LT HIP INJ on 7/31 by Dr Julieta Sanchez  F/U is PRN  Elyssa Lucia - 08 Aug 2017 8:54 AM     TASK EDITED  Pt  reports no relief post inj   missed appt  for MRI will reschedule when returns from vac  F/U is PRN     Austin Birch - 08 Aug 2017 9:02 AM     TASK REPLIED TO: Previously Assigned To Austin Birch                      aware agree        Signatures   Electronically signed by : George Crook, ; Aug  8 2017  9:25AM EST                       (Author)

## 2018-01-17 NOTE — MISCELLANEOUS
Message   Recorded as Task   Date: 08/21/2017 10:47 AM, Created By: Mey Dias   Task Name: Miscellaneous   Assigned To: [address] [address] clinical,Team   Regarding Patient: Karen Allen, Status: Active   Comment:    Mey Dias - 21 Aug 2017 10:47 AM     TASK CREATED  pt calling in to see if her mri results are in  please call pt back at [phone] - 21 Aug 2017 11:32 AM     TASK EDITED  Results for Lumbar MRI from 8/14 are avail in allscripts to be reviewed  Hanna Wilson - 21 Aug 2017 12:59 PM     TASK REPLIED TO: Previously Assigned To Hanna Wilson                      see other task   Omayra  - 21 Aug 2017 2:06 PM     TASK EDITED  Already addressed in another task  Active Problems    1  Acute pain of right knee (719 46) (M25 561)   2  Anosmia (781 1) (R43 0)   3  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 96)   4  Arthritis of right knee (716 96) (M17 11)   5  Dolan's esophagus (530 85) (K22 70)   6  Blood glucose elevated (790 29) (R73 9)   7  Chronic low back pain (724 2,338 29) (M54 5,G89 29)   8  Chronic pain syndrome (338 4) (G89 4)   9  Chronic venous insufficiency (459 81) (I87 2)   10  Cough variant asthma (493 82) (J45 991)   11  Depression (311) (F32 9)   12  Hyperlipidemia (272 4) (E78 5)   13  Left hip pain (719 45) (M25 552)   14  Left knee pain (719 46) (M25 562)   15  Left lumbar radiculitis (724 4) (M54 16)   16  Lumbar postlaminectomy syndrome (722 83) (M96 1)   17  Morbid obesity (278 01) (E66 01)   18  Prepatellar bursitis of right knee (726 65) (M70 41)   19  Primary localized osteoarthritis of right knee (715 16) (M17 11)   20  Primary osteoarthritis of left hip (715 15) (M16 12)   21  Restless legs syndrome (333 94) (G25 81)   22  Right knee pain (719 46) (M25 561)   23  Seasonal allergies (477 9) (J30 2)    Current Meds   1  Cetirizine HCl - 10 MG Oral Tablet; TAKE 1 TABLET Bedtime PRN; Therapy: 48VYA3136 to (Evaluate:63Mxl8162);  Last Rx:02Jun2017 Ordered   2  Dexilant 60 MG Oral Capsule Delayed Release; Therapy: 65PAR7516 to Recorded   3  DULoxetine HCl - 60 MG Oral Capsule Delayed Release Particles (Cymbalta); TAKE   ONE CAPSULE BY MOUTH AT BEDTIME; Therapy: 27SAX6453 to (Evaluate:14Bsg7097); Last Rx:16Mar2017 Ordered   4  Flovent HFA 44 MCG/ACT Inhalation Aerosol; INHALE 2 PUFFS Daily; Therapy: 11Aug2017 to (Evaluate:46Uxb6189)  Requested for: 11Aug2017; Last   Rx:11Aug2017 Ordered   5  Montelukast Sodium 10 MG Oral Tablet; TAKE 1 TABLET BY MOUTH EVERY DAY; Therapy: 29SWM9629 to (Evaluate:18Rmx4426)  Requested for: 43Kod0962; Last   Rx:06Aug2017 Ordered   6  ProAir RespiClick 111 (90 Base) MCG/ACT Inhalation Aerosol Powder Breath Activated;   2 PUFFS EVERY 4 TO 6 HOURS AS NEEDED FOR SYMPTOMS;   Therapy: 20VXN8410 to (Last Rx:02Jun2017) Ordered   7  Zantac 150 MG Oral Tablet (RaNITidine HCl); take 1 tablet at bedtime; Therapy: 40DBL8223 to [phone])  Requested for: 06FRQ9007; Last   Rx:85Lto9492 Ordered    Allergies    1  Latex Exam Gloves MISC   2   Sulfa Drugs    Signatures   Electronically signed by : Stephanie Wood, ; Aug 21 2017  2:06PM EST                       (Author)

## 2018-01-18 ENCOUNTER — ALLSCRIPTS OFFICE VISIT (OUTPATIENT)
Dept: OTHER | Facility: OTHER | Age: 58
End: 2018-01-18

## 2018-01-19 ENCOUNTER — GENERIC CONVERSION - ENCOUNTER (OUTPATIENT)
Dept: OTHER | Facility: OTHER | Age: 58
End: 2018-01-19

## 2018-01-19 NOTE — PROGRESS NOTES
Assessment   1  Cough variant asthma (493 82) (J45 991)   2  Environmental allergies (V15 09) (Z91 09)   3  Caregiver stress (V61 49) (Z63 6)    Plan   Cough variant asthma    · Advair Diskus 100-50 MCG/DOSE Inhalation Aerosol Powder Breath Activated; INHALE 1    PUFFS Every twelve hours   · Fexofenadine HCl - 60 MG Oral Tablet (Allegra Allergy); TAKE 1 TABLET EVERY 12 HOURS    DAILY   · ProAir RespiClick 763 (90 Base) MCG/ACT Inhalation Aerosol Powder Breath Activated; 2    PUFFS EVERY 4 TO 6 HOURS AS NEEDED FOR SYMPTOMS  Lumbar postlaminectomy syndrome    · MethylPREDNISolone 4 MG Oral Tablet Therapy Pack    Discussion/Summary   Discussion Summary:    Cough variant asthma: Continue montelukast, discontinue cetirizine because of drowsiness and start Allegra 60 milligrams twice daily, continue Proventil, and because patient cannot afford inhaled steroids and I have no samples, I do of samples of Advair 100/50, 1 lesion twice daily which will be started today and she will be re-evaluated in 10 to 14 days  allergies: Plan is to consider change in environment by moving on to a new job  In the meantime, start Allegra and re-evaluate in 10 to 14 days  stress: This situation she is in his does not seem to be good for the patient she is taking care of or for her, and I did suggest, after counselor today, which did help, to contact area on aging  Chief Complaint   Chief Complaint Free Text Note Form: pt is here for follow up to asthma      History of Present Illness   HPI: Alonso Esposito made appointment today because of problems with control of her environmental allergies and her cough variant asthma  Since last visit, she has continue Flovent but just ran out and can't afford a new prescription  She also has been taking montelukast daily, and has been trying to take cetirizine but it makes her too drowsy  is using ProAir twice a day but still has some background wheezing with no severe dyspnea     has not had any upper respiratory or lower respiratory infectious problems  is in a very terrie environment with little air movement 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 by a family near by    is under lot of stress as she is not being provided extra help by this family, can't afford to move out of the home, and does not even have enough time to find a new job    had a lengthy discussion about this and this situation does not seem to be 10 oval for the patient she has take care for for her  She has had meetings with the family but they are not helping  did suggest contacting the area on aging regarding the situation  Active Problems   1  Anosmia (781 1) (R43 0)   2  Anterolisthesis (756 12) (M43 10)   3  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 96)   4  Arthritis of right knee (716 96) (M17 11)   5  Dolan's esophagus (530 85) (K22 70)   6  Blood glucose elevated (790 29) (R73 9)   7  Chronic low back pain (724 2,338 29) (M54 5,G89 29)   8  Chronic pain syndrome (338 4) (G89 4)   9  Chronic venous insufficiency (459 81) (I87 2)   10  Cough variant asthma (493 82) (J45 991)   11  Counseling About Travel   12  Depression (311) (F32 9)   13  GERD (gastroesophageal reflux disease) (530 81) (K21 9)   14  Hyperlipidemia (272 4) (E78 5)   15  Intervertebral disc disorder with radiculopathy of lumbar region (724 4) (M51 16)   16  Left hip pain (719 45) (M25 552)   17  Left knee pain (719 46) (M25 562)   18  Lumbar postlaminectomy syndrome (722 83) (M96 1)   19  Morbid obesity (278 01) (E66 01)   20  Prepatellar bursitis of right knee (726 65) (M70 41)   21  Primary localized osteoarthritis of right knee (715 16) (M17 11)   22  Primary osteoarthritis of left hip (715 15) (M16 12)   23  Restless legs syndrome (333 94) (G25 81)   24  Right knee pain (719 46) (M25 561)   25  Seasonal allergies (477 9) (J30 2)   26  Sleep disturbance (780 50) (G47 9)    Past Medical History   1   History of asthma (V12 69) (Z87 09)   2  Denied: History of pregnancy   3  History of Left lumbar radiculitis (724 4) (M54 16)  Active Problems And Past Medical History Reviewed: The active problems and past medical history were reviewed and updated today  Surgical History   1  History of Arthrodesis Lumbar   2  History of Spinal Arthrodesis For Deformity    Family History   Mother    1  Family history of lung cancer (V16 1) (Z80 1)   2  Family history of malignant neoplasm (V16 9) (Z80 9)  Father    3  Family history of alcoholism (V17 0) (Z81 1)   4  Family history of cardiac disorder (V17 49) (Z82 49)   5  Family history of depression (V17 0) (Z81 8)   6  Family history of hypertension (V17 49) (Z82 49)   7  Family history of myocardial infarction (V17 3) (Z82 49)  Maternal Grandmother    8  Family history of malignant neoplasm of breast (V16 3) (Z80 3)  Aunt    9  Family history of type 1 diabetes mellitus (V18 0) (Z83 3)    Social History    ·    · Never a smoker   · No alcohol use   · No illicit drug use    Current Meds    1  Cetirizine HCl - 10 MG Oral Tablet; TAKE 1 TABLET Bedtime PRN; Therapy: 22GDN8886 to (Evaluate:55Fjm5557); Last Rx:02Jun2017 Ordered   2  Dexilant 60 MG Oral Capsule Delayed Release; Therapy: 77CLN1815 to Recorded   3  DULoxetine HCl - 60 MG Oral Capsule Delayed Release Particles; TAKE ONE CAPSULE BY MOUTH     AT BEDTIME; Therapy: 98NOZ8585 to (Evaluate:11Omp5079); Last Rx:16Mar2017 Ordered   4  Gabapentin 300 MG Oral Capsule; 1 TAB QHS X 3D, THEN 1 TAB BID X 3D, THEN 1 TAB TID; Therapy: 22Dhj1826 to [phone])  Requested for: 04Ghe1365; Last Rx:97Nxf4070     Ordered   5  MethylPREDNISolone 4 MG Oral Tablet Therapy Pack; TAKE AS DIRECTED ON DOSEPAK; Therapy: 89COK0252 to (Last Rx:04Piv4867)  Requested for: 23SYB1105 Ordered   6  Montelukast Sodium 10 MG Oral Tablet; TAKE 1 TABLET BY MOUTH EVERY DAY;      Therapy: 70HPU3993 to (Evaluate:11Nov2018)  Requested for: 07TAR5640; Last Rx:16Nov2017     Ordered   7  ProAir RespiClick 589 (90 Base) MCG/ACT Inhalation Aerosol Powder Breath Activated; 2 PUFFS     EVERY 4 TO 6 HOURS AS NEEDED FOR SYMPTOMS;     Therapy: 70KCV9466 to (Last Rx:16Nov2017) Ordered   8  Zantac 150 MG Oral Tablet; take 1 tablet at bedtime; Therapy: 40OWO3682 to (Mikhail Goodwin)  Requested for: 90NNP4063; Last Rx:74Cvg1249 Ordered  Medication List Reviewed: The medication list was reviewed and updated today  Allergies   1  Latex Exam Gloves MISC   2  Sulfa Drugs    Vitals   Vital Signs    Recorded: 95KMN9597 01:12PM   Heart Rate 96    Systolic 747    Diastolic 84    Height 5 ft 3 in    Patient Refused Weight Yes Yes   O2 Saturation 98      Physical Exam        Constitutional Vital signs stable, pulse regular  There is no acute respiratory distress  There is occasional coughing with expiratory wheezing heard at the end of expiration  Lungs: Clear to auscultation including no wheezing, no focal decreased breath sounds, and expiratory phase of respiration is not prolonged  ENT exam unremarkable  No JVD  Cardiac exam normal on auscultation           Signatures    Electronically signed by : Colletta Labella, M D ; Jan 18 2018  2:07PM EST                       (Author)

## 2018-01-23 VITALS
DIASTOLIC BLOOD PRESSURE: 84 MMHG | HEIGHT: 63 IN | HEART RATE: 96 BPM | OXYGEN SATURATION: 98 % | SYSTOLIC BLOOD PRESSURE: 120 MMHG

## 2018-01-23 NOTE — MISCELLANEOUS
Message   Recorded as Task   Date: 01/18/2018 03:34 PM, Created By: Chad Lockett   Task Name: Call Back   Assigned To: [id number] [address] clinical,Team   Regarding Patient: Jojo Michaels, Status: Active   Comment:    Chad Lockett - 18 Jan 2018 3:34 PM     TASK CREATED  [address]- patient called requesting to get a refill of Gabapentin   not sure how may mg   would like refill sent to her pharmacy   stated she doesn't remember how many she has left   c/b [phone]   Barbara Denson - 18 Jan 2018 3:51 PM     TASK EDITED  Attempted to reach pt regarding above  detailed VMMLOM requesting a c/b for more information regarding refill request     office hours location and phone number provided  Linda Pena - 18 Jan 2018 4:04 PM     TASK EDITED  [facility]- Patient returned call   c/b [phone]   Ashley Hayden - 19 Jan 2018 8:43 AM     TASK EDITED  S/W pt  Pt asking for refill of Gabapentin 300mg capsule, takes 1 in am or early afternoon and takes 2 at hs  Pt has about 3-4 pills left  Pharmacy on file  Please advise  Ascencion White - 19 Jan 2018 10:36 AM     TASK REPLIED TO: Previously Assigned To Torrey Boyd                      Refill sent, if patient needs further refills she  will need to schedule a follow-up appointment with Tho Torres or have her primary care physician take over prescribing   Ashley Hayden - 19 Jan 2018 11:03 AM     TASK EDITED  S/W pt  Advised pt of the same  Pt verbalized understanding  Active Problems    1  Anosmia (781 1) (R43 0)   2  Anterolisthesis (756 12) (M43 10)   3  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 96)   4  Arthritis of right knee (716 96) (M17 11)   5  Dolan's esophagus (530 85) (K22 70)   6  Blood glucose elevated (790 29) (R73 9)   7  Caregiver stress (V61 49) (Z63 6)   8  Chronic low back pain (724 2,338 29) (M54 5,G89 29)   9  Chronic pain syndrome (338 4) (G89 4)   10  Chronic venous insufficiency (459 81) (I87 2)   11   Cough variant asthma (493 82) (J45 991)   12  Counseling About Travel   13  Depression (311) (F32 9)   14  Environmental allergies (V15 09) (Z91 09)   15  GERD (gastroesophageal reflux disease) (530 81) (K21 9)   16  Hyperlipidemia (272 4) (E78 5)   17  Intervertebral disc disorder with radiculopathy of lumbar region (724 4) (M51 16)   18  Left hip pain (719 45) (M25 552)   19  Left knee pain (719 46) (M25 562)   20  Lumbar postlaminectomy syndrome (722 83) (M96 1)   21  Morbid obesity (278 01) (E66 01)   22  Prepatellar bursitis of right knee (726 65) (M70 41)   23  Primary localized osteoarthritis of right knee (715 16) (M17 11)   24  Primary osteoarthritis of left hip (715 15) (M16 12)   25  Restless legs syndrome (333 94) (G25 81)   26  Right knee pain (719 46) (M25 561)   27  Seasonal allergies (477 9) (J30 2)   28  Sleep disturbance (780 50) (G47 9)    Current Meds   1  Advair Diskus 100-50 MCG/DOSE Inhalation Aerosol Powder Breath Activated; INHALE   1 PUFFS Every twelve hours; Therapy: 80QZZ5346 to (Last Rx:18Jan2018) Ordered   2  Cetirizine HCl - 10 MG Oral Tablet; TAKE 1 TABLET Bedtime PRN; Therapy: 70MDO3851 to (Evaluate:27Rqd0621); Last Rx:02Jun2017 Ordered   3  Dexilant 60 MG Oral Capsule Delayed Release; Therapy: 15BTX6455 to Recorded   4  DULoxetine HCl - 60 MG Oral Capsule Delayed Release Particles (Cymbalta); TAKE   ONE CAPSULE BY MOUTH AT BEDTIME; Therapy: 69PBL3140 to (Evaluate:86Rhk6154); Last Rx:16Mar2017 Ordered   5  Fexofenadine HCl - 60 MG Oral Tablet (Allegra Allergy); TAKE 1 TABLET EVERY 12   HOURS DAILY; Therapy: 81WWQ1019 to (Evaluate:19Mar2018); Last Rx:18Jan2018 Ordered   6  Gabapentin 300 MG Oral Capsule; 1 TAB QHS X 3D, THEN 1 TAB BID X 3D, THEN 1   TAB TID; Therapy: 29Das2746 to (Evaluate:20Mar2018)  Requested for: 00HMB7358; Last   Rx:19Jan2018 Ordered   7  Montelukast Sodium 10 MG Oral Tablet; TAKE 1 TABLET BY MOUTH EVERY DAY;    Therapy: 87XLS4640 to (Evaluate:11Nov2018)  Requested for: 16INP6213; Last   Rx:16Nov2017 Ordered   8  ProAir  (90 Base) MCG/ACT Inhalation Aerosol Solution; INHALE 1 TO 2 PUFFS   EVERY 4 TO 6 HOURS AS NEEDED; Therapy: 32SGQ4183 to (Jannice Fillers)  Requested for: 83HPI1454; Last   Rx:18Jan2018 Ordered   9  ProAir RespiClick 320 (90 Base) MCG/ACT Inhalation Aerosol Powder Breath Activated;   2 PUFFS EVERY 4 TO 6 HOURS AS NEEDED FOR SYMPTOMS;   Therapy: 72CIG1127 to (Last Rx:18Jan2018)  Requested for: 75DBW1338 Ordered   10  Zantac 150 MG Oral Tablet (RaNITidine HCl); take 1 tablet at bedtime; Therapy: 88AJL7376 to (77 873 135)  Requested for: 56PAY8234; Last    Rx:60Tcs1098 Ordered    Allergies    1  Latex Exam Gloves MISC   2   Sulfa Drugs    Signatures   Electronically signed by : Evelyne Schmitz, ; Jan 19 2018 11:03AM EST                       (Author)

## 2018-01-23 NOTE — MISCELLANEOUS
Message   Recorded as Task   Date: 12/15/2017 11:42 AM, Created By: Imtiaz Solorzano   Task Name: Miscellaneous   Assigned To: [id number] [address] end clinical,Team   Regarding Patient: Bradford Washington, Status: Active   Comment:    Imtiaz Solorzano - 15 Dec 2017 11:42 AM     TASK CREATED  Pt called stating that she is having some nerve pain again and she is supposed to be leaving tomorrow for the holidays  Pt wants to know if there is anything that you can prescribe for her that is non-narcotic  She is already taking Gabapentin  Pt uses [facility] (on file)  Pt can be reached at [phone]  InoRocio haq - 15 Dec 2017 1:13 PM     TASK EDITED  Attempted to reach pt left message on  and provided cb# and office hours  [employer] - 15 Dec 2017 2:06 PM     TASK EDITED  Given to:             [address] [facility]    Reason: ROUTINE/OFFICE   Pt's Dr: Jammie Nance       For: OFFICE     2nd Call: NO        From: Bradford Washington     Phone: [phone]   Ext:      Pt Name:     Pt : 1960     Message: Gareth Shah PT, [address] [place] [address]              -----------------------------------------------------------------     CALLER ID: [id number]      CSN: [id number]      Taken By: CARA 12/15/2017 01:23 PM  Delivered By: EDUAR 12/15/2017 01:30 PM  -----------------------------------------------------------------  12/15/2017 01:26 PM    OP1    ,[id number]   Dianna Monroy - 15 Dec 2017 3:19 PM     TASK EDITED   Imtiaz Solorzano - 15 Dec 2017 3:21 PM     TASK EDITED  Pt calling back stating that the pain is getting worse  The heating pad helps a little bit, but the Gabapentin is not working at all for the pain  She is supposed to leave tomorrow for the holidays  With the clinical staff being in training this afternoon, I am not comfortable making the determination if this can wait until Monday  Please advise  Pt can be reacheda t [phone]     José Miguel Ochoa - 15 Dec 2017 3:27 PM     TASK REPLIED TO: Previously Assigned To Dev Debby                      will send in medrol dosepak,  recommend she increase her gabapentin to 2 tabs at night  Roico Lazo - 15 Dec 2017 3:36 PM     TASK EDITED  spoke with pt and made her aware that Samia Limon sent in a medrol dosepak into her pharmacy and to increase her gabapentin to 2tabs at night  Pt verbalized understanding  Active Problems    1  Anosmia (781 1) (R43 0)   2  Anterolisthesis (756 12) (M43 10)   3  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 96)   4  Arthritis of right knee (716 96) (M17 11)   5  Dolan's esophagus (530 85) (K22 70)   6  Blood glucose elevated (790 29) (R73 9)   7  Chronic low back pain (724 2,338 29) (M54 5,G89 29)   8  Chronic pain syndrome (338 4) (G89 4)   9  Chronic venous insufficiency (459 81) (I87 2)   10  Cough variant asthma (493 82) (J45 991)   11  Counseling About Travel   12  Depression (311) (F32 9)   13  GERD (gastroesophageal reflux disease) (530 81) (K21 9)   14  Hyperlipidemia (272 4) (E78 5)   15  Intervertebral disc disorder with radiculopathy of lumbar region (724 4) (M51 16)   16  Left hip pain (719 45) (M25 552)   17  Left knee pain (719 46) (M25 562)   18  Lumbar postlaminectomy syndrome (722 83) (M96 1)   19  Morbid obesity (278 01) (E66 01)   20  Prepatellar bursitis of right knee (726 65) (M70 41)   21  Primary localized osteoarthritis of right knee (715 16) (M17 11)   22  Primary osteoarthritis of left hip (715 15) (M16 12)   23  Restless legs syndrome (333 94) (G25 81)   24  Right knee pain (719 46) (M25 561)   25  Seasonal allergies (477 9) (J30 2)   26  Sleep disturbance (780 50) (G47 9)    Current Meds   1  Cetirizine HCl - 10 MG Oral Tablet; TAKE 1 TABLET Bedtime PRN; Therapy: 02IHE9380 to (Evaluate:51Zws3105); Last Rx:02Jun2017 Ordered   2  Dexilant 60 MG Oral Capsule Delayed Release; Therapy: 94MEM0590 to Recorded   3   DULoxetine HCl - 60 MG Oral Capsule Delayed Release Particles (Cymbalta); TAKE   ONE CAPSULE BY MOUTH AT BEDTIME; Therapy: 73DJA7536 to (Evaluate:27Xro2850); Last Rx:16Mar2017 Ordered   4  Gabapentin 300 MG Oral Capsule; 1 TAB QHS X 3D, THEN 1 TAB BID X 3D, THEN 1   TAB TID; Therapy: 14Cfk8331 to (0488 71 46 12)  Requested for: 73Lyu4045; Last   Rx:14Fue9314 Ordered   5  MethylPREDNISolone 4 MG Oral Tablet Therapy Pack; TAKE AS DIRECTED ON   DOSEPAK; Therapy: 94RDH3909 to (Last Rx:67Coh6092)  Requested for: 87IWZ1790 Ordered   6  Montelukast Sodium 10 MG Oral Tablet; TAKE 1 TABLET BY MOUTH EVERY DAY; Therapy: 59QCR5571 to (Evaluate:11Nov2018)  Requested for: 06KZM7971; Last   Rx:16Nov2017 Ordered   7  ProAir RespiClick 496 (90 Base) MCG/ACT Inhalation Aerosol Powder Breath Activated;   2 PUFFS EVERY 4 TO 6 HOURS AS NEEDED FOR SYMPTOMS;   Therapy: 36ZIF3353 to (Last Rx:16Nov2017) Ordered   8  Zantac 150 MG Oral Tablet (RaNITidine HCl); take 1 tablet at bedtime; Therapy: 24IWN4516 to (0488 71 46 12)  Requested for: 83BVS0166; Last   Rx:71Dvy5664 Ordered    Allergies    1  Latex Exam Gloves MISC   2   Sulfa Drugs    Signatures   Electronically signed by : Jeanette Sorensen, ; Dec 15 2017  3:36PM EST                       (Author)

## 2018-02-01 ENCOUNTER — OFFICE VISIT (OUTPATIENT)
Dept: INTERNAL MEDICINE CLINIC | Facility: CLINIC | Age: 58
End: 2018-02-01
Payer: MEDICARE

## 2018-02-01 VITALS
SYSTOLIC BLOOD PRESSURE: 110 MMHG | HEART RATE: 84 BPM | OXYGEN SATURATION: 97 % | DIASTOLIC BLOOD PRESSURE: 86 MMHG | HEIGHT: 64 IN

## 2018-02-01 DIAGNOSIS — J30.2 SEASONAL ALLERGIES: ICD-10-CM

## 2018-02-01 DIAGNOSIS — J45.991 COUGH VARIANT ASTHMA: Primary | ICD-10-CM

## 2018-02-01 DIAGNOSIS — K43.9 HERNIA OF ANTERIOR ABDOMINAL WALL: ICD-10-CM

## 2018-02-01 PROBLEM — K22.70 BARRETT'S ESOPHAGUS: Status: ACTIVE | Noted: 2017-03-16

## 2018-02-01 PROBLEM — K21.9 GERD (GASTROESOPHAGEAL REFLUX DISEASE): Status: ACTIVE | Noted: 2017-11-16

## 2018-02-01 PROBLEM — M16.12 PRIMARY OSTEOARTHRITIS OF LEFT HIP: Status: ACTIVE | Noted: 2017-07-26

## 2018-02-01 PROBLEM — R73.9 BLOOD GLUCOSE ELEVATED: Status: ACTIVE | Noted: 2017-08-11

## 2018-02-01 PROBLEM — M17.11 ARTHRITIS OF RIGHT KNEE: Status: ACTIVE | Noted: 2017-03-16

## 2018-02-01 PROBLEM — G89.29 CHRONIC LOW BACK PAIN: Status: ACTIVE | Noted: 2017-04-11

## 2018-02-01 PROBLEM — G47.9 SLEEP DISTURBANCE: Status: ACTIVE | Noted: 2017-11-16

## 2018-02-01 PROBLEM — R43.0 ANOSMIA: Status: ACTIVE | Noted: 2017-06-02

## 2018-02-01 PROBLEM — F32.A DEPRESSION: Status: ACTIVE | Noted: 2017-03-16

## 2018-02-01 PROBLEM — M17.11 PRIMARY LOCALIZED OSTEOARTHRITIS OF RIGHT KNEE: Status: ACTIVE | Noted: 2017-08-03

## 2018-02-01 PROBLEM — E66.01 MORBID OBESITY (HCC): Status: ACTIVE | Noted: 2017-08-11

## 2018-02-01 PROBLEM — G25.81 RESTLESS LEGS SYNDROME: Status: ACTIVE | Noted: 2017-07-11

## 2018-02-01 PROBLEM — M43.10 ANTEROLISTHESIS: Status: ACTIVE | Noted: 2017-08-30

## 2018-02-01 PROBLEM — M54.50 CHRONIC LOW BACK PAIN: Status: ACTIVE | Noted: 2017-04-11

## 2018-02-01 PROBLEM — M47.816 ARTHRITIS OF LUMBAR SPINE: Status: ACTIVE | Noted: 2017-03-16

## 2018-02-01 PROBLEM — E78.5 HYPERLIPIDEMIA: Status: ACTIVE | Noted: 2017-08-11

## 2018-02-01 PROBLEM — M96.1 LUMBAR POSTLAMINECTOMY SYNDROME: Status: ACTIVE | Noted: 2017-04-11

## 2018-02-01 PROBLEM — I87.2 CHRONIC VENOUS INSUFFICIENCY: Status: ACTIVE | Noted: 2017-06-02

## 2018-02-01 PROBLEM — G89.4 CHRONIC PAIN DISORDER: Status: ACTIVE | Noted: 2017-03-16

## 2018-02-01 PROCEDURE — 99213 OFFICE O/P EST LOW 20 MIN: CPT | Performed by: INTERNAL MEDICINE

## 2018-02-01 RX ORDER — MONTELUKAST SODIUM 10 MG/1
1 TABLET ORAL DAILY
COMMUNITY
Start: 2017-11-16 | End: 2018-02-09 | Stop reason: SDUPTHER

## 2018-02-01 RX ORDER — FLUTICASONE PROPIONATE 44 MCG
2 AEROSOL WITH ADAPTER (GRAM) INHALATION 4 TIMES DAILY PRN
Refills: 5 | Status: ON HOLD | COMMUNITY
Start: 2017-11-16 | End: 2019-03-14 | Stop reason: ALTCHOICE

## 2018-02-01 RX ORDER — CETIRIZINE HYDROCHLORIDE 10 MG/1
1 TABLET ORAL DAILY
COMMUNITY
Start: 2017-06-02 | End: 2021-01-15

## 2018-02-01 RX ORDER — GABAPENTIN 300 MG/1
300 CAPSULE ORAL 3 TIMES DAILY
COMMUNITY
Start: 2017-08-30 | End: 2018-06-21 | Stop reason: ALTCHOICE

## 2018-02-01 RX ORDER — RANITIDINE 150 MG/1
1 TABLET ORAL 2 TIMES DAILY
COMMUNITY
Start: 2017-07-31 | End: 2018-02-09 | Stop reason: SDUPTHER

## 2018-02-01 RX ORDER — DULOXETIN HYDROCHLORIDE 60 MG/1
1 CAPSULE, DELAYED RELEASE ORAL DAILY
COMMUNITY
Start: 2017-03-16 | End: 2018-03-29 | Stop reason: CLARIF

## 2018-02-01 RX ORDER — FEXOFENADINE HYDROCHLORIDE 60 MG/1
1 TABLET, FILM COATED ORAL EVERY 12 HOURS
COMMUNITY
Start: 2018-01-18 | End: 2018-02-27 | Stop reason: SDUPTHER

## 2018-02-01 RX ORDER — DEXLANSOPRAZOLE 60 MG/1
60 CAPSULE, DELAYED RELEASE ORAL DAILY
COMMUNITY
Start: 2017-05-04 | End: 2018-03-29 | Stop reason: CLARIF

## 2018-02-01 RX ORDER — ALBUTEROL SULFATE 90 UG/1
1-2 AEROSOL, METERED RESPIRATORY (INHALATION) 4 TIMES DAILY PRN
COMMUNITY
Start: 2018-01-18 | End: 2018-02-27 | Stop reason: SDUPTHER

## 2018-02-01 NOTE — PROGRESS NOTES
HPI:  Olivia Colón returns for follow-up visit, 2 weeks after being seen for cough variant asthma  In the interim, she was able to fill her original prescription for her controller medicine prescribed by her specialist but forgets what this is  This has resulted in substantial improvement in her symptoms with resolution of cough and dyspnea is now minimal   She still using her rescue medication twice a day on a regular basis as suggested, and has not had to resort to as needed use  She is looking to transition to a new job in getting help from a local the job training center  We discussed that he does her environment is very allergenic for her, that she should stay on her controller medication, and then come back here once her environment is changed and we can try tapering the dose  She also reports development of a protrusion the upper abdomen that may be hernia  She has had some very minor local pain but GI review of systems is negative  There is no previous history of hernia  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 and has to lift at times  She feels she will not be able to lift exam today did confirm hernia and 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  The possibility of carcinoid shin symptoms to look out were discussed including the need to go the emergency room right away  Confirmation of diagnosis and early elective repair were recommended and surgical referral was made  Physical exam:    Vital signs stable, in no distress  No respiratory distress in particular  No use of accessory muscles of respiration  No cough  Trachea midline without stridor  Lungs clear including resolution of wheezing present last visit  This includes when asked to cough  Cardiac exam normal on auscultation    Abdominal exam:  Normal bowel sounds and nondistended with at localized upper abdominal hernia to the right of the midline in the epigastric zone, easily reducible, nontender  No abdominal masses noted otherwise

## 2018-02-01 NOTE — PATIENT INSTRUCTIONS
Please see the general surgeon in the near future  Continue her current medicines were asthma and allergies  Please call for any problems  I would like to see you after you change your living location

## 2018-02-05 ENCOUNTER — OFFICE VISIT (OUTPATIENT)
Dept: SURGERY | Facility: CLINIC | Age: 58
End: 2018-02-05
Payer: MEDICARE

## 2018-02-05 ENCOUNTER — LAB (OUTPATIENT)
Dept: LAB | Facility: HOSPITAL | Age: 58
End: 2018-02-05
Payer: MEDICARE

## 2018-02-05 ENCOUNTER — TRANSCRIBE ORDERS (OUTPATIENT)
Dept: ADMINISTRATIVE | Facility: HOSPITAL | Age: 58
End: 2018-02-05

## 2018-02-05 VITALS
HEIGHT: 64 IN | TEMPERATURE: 98.6 F | WEIGHT: 220 LBS | DIASTOLIC BLOOD PRESSURE: 80 MMHG | BODY MASS INDEX: 37.56 KG/M2 | HEART RATE: 76 BPM | SYSTOLIC BLOOD PRESSURE: 170 MMHG | RESPIRATION RATE: 16 BRPM

## 2018-02-05 DIAGNOSIS — K43.6 INCARCERATED VENTRAL HERNIA: Primary | ICD-10-CM

## 2018-02-05 DIAGNOSIS — R73.9 HYPERGLYCEMIA: ICD-10-CM

## 2018-02-05 DIAGNOSIS — G89.4 CHRONIC PAIN DISORDER: ICD-10-CM

## 2018-02-05 DIAGNOSIS — E78.5 HYPERLIPIDEMIA: ICD-10-CM

## 2018-02-05 DIAGNOSIS — E66.9 OBESITY (BMI 35.0-39.9 WITHOUT COMORBIDITY): ICD-10-CM

## 2018-02-05 DIAGNOSIS — K43.6 INCARCERATED VENTRAL HERNIA: ICD-10-CM

## 2018-02-05 LAB
BUN SERPL-MCNC: 10 MG/DL (ref 5–25)
CHOLEST SERPL-MCNC: 240 MG/DL (ref 50–200)
CREAT SERPL-MCNC: 0.69 MG/DL (ref 0.6–1.3)
EST. AVERAGE GLUCOSE BLD GHB EST-MCNC: 120 MG/DL
GFR SERPL CREATININE-BSD FRML MDRD: 98 ML/MIN/1.73SQ M
HBA1C MFR BLD: 5.8 % (ref 4.2–6.3)
HDLC SERPL-MCNC: 59 MG/DL (ref 40–60)
LDLC SERPL CALC-MCNC: 148 MG/DL (ref 0–100)
TRIGL SERPL-MCNC: 166 MG/DL

## 2018-02-05 PROCEDURE — 82565 ASSAY OF CREATININE: CPT

## 2018-02-05 PROCEDURE — 99214 OFFICE O/P EST MOD 30 MIN: CPT | Performed by: SURGERY

## 2018-02-05 PROCEDURE — 80061 LIPID PANEL: CPT

## 2018-02-05 PROCEDURE — 84520 ASSAY OF UREA NITROGEN: CPT

## 2018-02-05 PROCEDURE — 36415 COLL VENOUS BLD VENIPUNCTURE: CPT

## 2018-02-05 PROCEDURE — 83036 HEMOGLOBIN GLYCOSYLATED A1C: CPT

## 2018-02-05 NOTE — PROGRESS NOTES
Assessment/Plan:   Chel Beach is a 56 y  o female who is here for The primary encounter diagnosis was Incarcerated ventral hernia  Diagnoses of Obesity (BMI 35 0-39 9 without comorbidity) and Chronic pain disorder were also pertinent to this visit  2   Screening colonoscopy  She does see Dr Luz Maria Londono of Gastroenterology for Dolan's esophagitis and I recommended she return to him for her screening colonoscopy  This needs to be done prior to a ventral hernia repair  This is to prevent any unnecessary incision through the mesh that could have been prevented by an appropriate preoperative colonoscopy  Plan:  1   CT scan abdomen and pelvis to evaluate size and extent of ventral hernia which is incarcerated    2  Eventual open repair of incarcerated ventral hernia, supraumbilical, likely with mesh  Patient is very aware that any weight gain in her or development of diabetes or smoking will also inhibit the wound healing and increase the risk of recurrence  Her BMI is approximately 45 and this gives her about a 40% recurrence rate for this hernia  Of note, 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, who falls a lot  I again strongly recommended she does not do a whole lot of heavy lifting although occasional assistance with this elderly woman is probably not at great risk regarding the hernia  However she has chronic pain and back problems and likely should defer from heavy lifting  Certainly around the time of surgery she will need assistance herself and will not be of the care for this elderly home bound patient  Preoperative Clearance: None          Imaging: No new pertinent imaging studies  No results found  _____________________________________________      HPI:  Chel Beach is a 56 y  o female who was referred for evaluation of Abdominal Wall Hernia and Patient Education        Currently complaining of initial Ventral Hernia  worse with bending, coughing, lifting, standing, walking,   no nausea and no vomiting,   regular bowel movement  Prior abdominal surgery: back surgeries but not through abdomen     Lab Results   Component Value Date    WBC 5 61 07/26/2017    HGB 13 5 07/26/2017    HCT 42 4 07/26/2017    MCV 86 07/26/2017     07/26/2017     Lab Results   Component Value Date     07/26/2017    K 3 6 07/26/2017     07/26/2017    CO2 28 07/26/2017    ANIONGAP 9 07/26/2017    BUN 21 07/26/2017    CREATININE 0 72 07/26/2017    GLUF 106 (H) 07/26/2017    CALCIUM 9 1 07/26/2017    AST 19 07/26/2017    ALT 29 07/26/2017    ALKPHOS 64 07/26/2017    PROT 7 4 07/26/2017    BILITOT 0 36 07/26/2017    EGFR 95 07/26/2017     No results found for: INR, PROTIME        ROS:  General ROS: negative  negative for - chills, fatigue, fever or night sweats, weight loss  Respiratory ROS: no cough, shortness of breath, or wheezing  Cardiovascular ROS: no chest pain or dyspnea on exertion  Genito-Urinary ROS: no dysuria, trouble voiding, or hematuria  Musculoskeletal ROS: negative for - gait disturbance, joint pain or muscle pain  Neurological ROS: no TIA or stroke symptoms  Abdominal ROS: see HPI  GI ROS: see HPI  Skin ROS: no new rashes or lesions   Lymphatic ROS: no new adenopathy noted by pt     GYN ROS: see HPI, no new GYN history or bleeding noted  Psy ROS: no new mental or behavioral disturbances       Patient Active Problem List   Diagnosis    Anosmia    Anterolisthesis    Arthritis of lumbar spine (HCC)    Arthritis of right knee    Dolan's esophagus    Blood glucose elevated    Chronic low back pain    Chronic pain disorder    Chronic venous insufficiency    Cough variant asthma    Depression    GERD (gastroesophageal reflux disease)    Hyperlipidemia    Lumbar postlaminectomy syndrome    Morbid obesity (HCC)    Primary localized osteoarthritis of right knee    Primary osteoarthritis of left hip    Restless legs syndrome    Seasonal allergies    Sleep disturbance    Incarcerated ventral hernia    Obesity (BMI 35 0-39 9 without comorbidity)         Allergies: Latex;  Other; and Sulfa antibiotics    Meds:  Current Outpatient Prescriptions:     albuterol (PROAIR HFA) 90 mcg/act inhaler, Inhale 1-2 puffs 4 (four) times a day as needed, Disp: , Rfl:     cetirizine (ZyrTEC) 10 mg tablet, Take 1 tablet by mouth, Disp: , Rfl:     dexlansoprazole (DEXILANT) 60 MG capsule, Take 60 mg by mouth daily, Disp: , Rfl:     DULoxetine (CYMBALTA) 60 mg delayed release capsule, Take 60 mg by mouth daily, Disp: , Rfl:     FLOVENT HFA 44 MCG/ACT inhaler, Inhale 2 actuation Daily at 2am, Disp: , Rfl: 5    gabapentin (NEURONTIN) 300 mg capsule, Take 100 mg by mouth 3 (three) times a day, Disp: , Rfl:     ranitidine (ZANTAC) 150 mg tablet, Take 150 mg by mouth daily at bedtime, Disp: , Rfl:     rOPINIRole (REQUIP) 2 mg tablet, Take 2 mg by mouth daily at bedtime, Disp: , Rfl:     Albuterol Sulfate (PROAIR RESPICLICK) 280 (90 Base) MCG/ACT AEPB, Inhale, Disp: , Rfl:     Cetirizine HCl 10 MG CAPS, Take 10 mg by mouth daily at bedtime as needed, Disp: , Rfl:     dexlansoprazole (DEXILANT) 60 MG capsule, Take 60 mg by mouth daily, Disp: , Rfl:     DULoxetine (CYMBALTA) 60 mg delayed release capsule, Take 1 capsule by mouth daily, Disp: , Rfl:     fexofenadine (ALLEGRA) 60 MG tablet, Take 1 tablet by mouth every 12 (twelve) hours, Disp: , Rfl:     fluticasone-salmeterol (ADVAIR DISKUS) 100-50 mcg/dose, Inhale 1 puff every 12 (twelve) hours, Disp: , Rfl:     fluticasone-salmeterol (ADVAIR HFA) 115-21 MCG/ACT inhaler, Inhale 2 puffs 2 (two) times a day, Disp: , Rfl:     gabapentin (NEURONTIN) 300 mg capsule, Take 300 mg by mouth 3 (three) times a day, Disp: , Rfl:     montelukast (SINGULAIR) 10 mg tablet, Take 10 mg by mouth daily at bedtime, Disp: , Rfl:     montelukast (SINGULAIR) 10 mg tablet, Take 1 tablet by mouth daily, Disp: , Rfl:     ranitidine (ZANTAC) 150 mg tablet, Take 1 tablet by mouth 2 (two) times a day, Disp: , Rfl:     Triprolidine-Pseudoephedrine (HISTA-TABS PO), Take by mouth, Disp: , Rfl:     PMH:  Past Medical History:   Diagnosis Date    Arthritis     Asthma     Back pain     Dolan's esophagus     Chronic venous insufficiency     Cough variant asthma     Depression     Left lumbar radiculitis     Last Assessed: [id number]    Lumbar postlaminectomy syndrome     Osteoarthritis     of left hip       PSH:  Past Surgical History:   Procedure Laterality Date    ARTHRODESIS      lumbar    ARTHRODESIS      Spinal Arthrodesis for Deformity; Last Assessed: [id number]    BACK SURGERY      lumbar fusion    NH ESOPHAGOGASTRODUODENOSCOPY TRANSORAL DIAGNOSTIC N/A 5/24/2017    Procedure: ESOPHAGOGASTRODUODENOSCOPY (EGD); Surgeon: Jerome Chappell MD;  Location: [facility]; Service: Gastroenterology    NH ESOPHAGOGASTRODUODENOSCOPY TRANSORAL DIAGNOSTIC N/A 8/31/2017    Procedure: ESOPHAGOGASTRODUODENOSCOPY (EGD) W RFA;  Surgeon: Georgiana Darden MD;  Location:  GI LAB; Service: Gastroenterology       Family History   Problem Relation Age of Onset    Lung cancer Mother     Cancer Mother     Alcohol abuse Father     Other Father      Cardiac Disorder    Depression Father     Hypertension Father     Heart attack Father     Breast cancer Maternal Grandmother     Diabetes type I Other         reports that she has never smoked  She has never used smokeless tobacco  She reports that she does not drink alcohol or use drugs      PHYSICAL EXAM  General Appearance:    Alert, cooperative, no distress,    Head:    Normocephalic without obvious abnormality   Eyes:    PERRL, conjunctiva/corneas clear, EOM's intact        Neck:   Supple, no adenopathy, no JVD   Back:     Symmetric, no spinal or CVA tenderness   Lungs:     Clear to auscultation bilaterally, no wheezing or rhonchi   Heart:    Regular rate and rhythm, S1 and S2 normal, no murmur   Abdomen:      abdomen is soft without significant tenderness, masses, organomegaly or guarding Bowel sounds are normal     Supraumbilical incarcerated ventral hernia  The hernia is, incarcerated,   Extremities:   Extremities normal  No clubbing, cyanosis or edema   Psych:   Normal Affect, AOx3  Neurologic:  Skin:   CNII-XII intact  Strength symmetric, speech intact    Warm, dry, intact, no visible rashes or lesions                   Informed consent for procedure was personally discussed, reviewed, and signed by Dr Cruz Rome  Discussion by Dr Cruz Rome was carried out regarding risks, benefits, and alternatives with the patient  Risks include but are not limited to:  bleeding, infection, and delayed wound healing or an open wound, pulmonary embolus, leaks from bowel or bile ducts or other viscus, transfusions, death  Discussed in further detail the more common complications and their rates of occurrence   was used if necessary  Patient expressed understanding of the issues discussed and wished/consented to proceed  All questions were answered by Dr Cruz Rome  Discussion performed between patient and the provider signing below  Signature:   Subha Pearce MD    Date: 2/5/2018 Time: 2:42 PM       Some portions of this record may have been generated with voice recognition software  There may be translation, syntax,  or grammatical errors  Occasional wrong word or "sound-a-like" substitutions may have occurred due to the inherent limitations of the voice recognition software  Read the chart carefully and recognize, using context, where substitutions may have occurred  If you have any questions, please contact the dictating provider for clarification or correction, as needed  This encounter has been coded by a non-certified coder

## 2018-02-05 NOTE — LETTER
February 5, 2018     Linnea Dash MD  [id number]     Patient: Oswald Yee   YOB: 1960   Date of Visit: 2/5/2018       Dear Dr Ishmael Doyle: Thank you for referring Oswald Yee to me for evaluation  Below are my notes for this consultation  If you have questions, please do not hesitate to call me  I look forward to following your patient along with you  Sincerely,        Lara Prado MD        CC: No Recipients  Lara Prado MD  2/5/2018  2:51 PM  Sign at close encounter  Assessment/Plan:   Oswald Yee is a 56 y  o female who is here for The primary encounter diagnosis was Incarcerated ventral hernia  Diagnoses of Obesity (BMI 35 0-39 9 without comorbidity) and Chronic pain disorder were also pertinent to this visit  2   Screening colonoscopy  She does see Dr Yesika Ruggiero of Gastroenterology for Dolna's esophagitis and I recommended she return to him for her screening colonoscopy  This needs to be done prior to a ventral hernia repair  This is to prevent any unnecessary incision through the mesh that could have been prevented by an appropriate preoperative colonoscopy  Plan:  1   CT scan abdomen and pelvis to evaluate size and extent of ventral hernia which is incarcerated    2  Eventual open repair of incarcerated ventral hernia, supraumbilical, likely with mesh  Patient is very aware that any weight gain in her or development of diabetes or smoking will also inhibit the wound healing and increase the risk of recurrence  Her BMI is approximately 45 and this gives her about a 40% recurrence rate for this hernia  Of note, 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, who falls a lot  I again strongly recommended she does not do a whole lot of heavy lifting although occasional assistance with this elderly woman is probably not at great risk regarding the hernia   However she has chronic pain and back problems and likely should defer from heavy lifting  Certainly around the time of surgery she will need assistance herself and will not be of the care for this elderly home bound patient  Preoperative Clearance: None          Imaging: No new pertinent imaging studies  No results found  _____________________________________________      HPI:  Yeny Meehan is a 56 y  o female who was referred for evaluation of Abdominal Wall Hernia and Patient Education    Currently complaining of initial Ventral Hernia  worse with bending, coughing, lifting, standing, walking,   no nausea and no vomiting,   regular bowel movement  Prior abdominal surgery: back surgeries but not through abdomen     Lab Results   Component Value Date    WBC 5 61 07/26/2017    HGB 13 5 07/26/2017    HCT 42 4 07/26/2017    MCV 86 07/26/2017     07/26/2017     Lab Results   Component Value Date     07/26/2017    K 3 6 07/26/2017     07/26/2017    CO2 28 07/26/2017    ANIONGAP 9 07/26/2017    BUN 21 07/26/2017    CREATININE 0 72 07/26/2017    GLUF 106 (H) 07/26/2017    CALCIUM 9 1 07/26/2017    AST 19 07/26/2017    ALT 29 07/26/2017    ALKPHOS 64 07/26/2017    PROT 7 4 07/26/2017    BILITOT 0 36 07/26/2017    EGFR 95 07/26/2017     No results found for: INR, PROTIME        ROS:  General ROS: negative  negative for - chills, fatigue, fever or night sweats, weight loss  Respiratory ROS: no cough, shortness of breath, or wheezing  Cardiovascular ROS: no chest pain or dyspnea on exertion  Genito-Urinary ROS: no dysuria, trouble voiding, or hematuria  Musculoskeletal ROS: negative for - gait disturbance, joint pain or muscle pain  Neurological ROS: no TIA or stroke symptoms  Abdominal ROS: see HPI  GI ROS: see HPI  Skin ROS: no new rashes or lesions   Lymphatic ROS: no new adenopathy noted by pt     GYN ROS: see HPI, no new GYN history or bleeding noted  Psy ROS: no new mental or behavioral disturbances       Patient Active Problem List   Diagnosis    Anosmia    Anterolisthesis    [facility]    Arthritis of right knee    Dolan's esophagus    Blood glucose elevated    Chronic low back pain    Chronic pain disorder    Chronic venous insufficiency    Cough variant asthma    Depression    GERD (gastroesophageal reflux disease)    Hyperlipidemia    Lumbar postlaminectomy syndrome    Morbid obesity ([facility]    Primary localized osteoarthritis of right knee    Primary osteoarthritis of left hip    Restless legs syndrome    Seasonal allergies    Sleep disturbance    Incarcerated ventral hernia    Obesity (BMI 35 0-39 9 without comorbidity)         Allergies: Latex;  Other; and Sulfa antibiotics    Meds:  Current Outpatient Prescriptions:     albuterol (PROAIR HFA) 90 mcg/act inhaler, Inhale 1-2 puffs 4 (four) times a day as needed, Disp: , Rfl:     cetirizine (ZyrTEC) 10 mg tablet, Take 1 tablet by mouth, Disp: , Rfl:     dexlansoprazole (DEXILANT) 60 MG capsule, Take 60 mg by mouth daily, Disp: , Rfl:     DULoxetine (CYMBALTA) 60 mg delayed release capsule, Take 60 mg by mouth daily, Disp: , Rfl:     FLOVENT HFA 44 MCG/ACT inhaler, Inhale 2 actuation Daily at 2am, Disp: , Rfl: 5    gabapentin (NEURONTIN) 300 mg capsule, Take 100 mg by mouth 3 (three) times a day, Disp: , Rfl:     ranitidine (ZANTAC) 150 mg tablet, Take 150 mg by mouth daily at bedtime, Disp: , Rfl:     rOPINIRole (REQUIP) 2 mg tablet, Take 2 mg by mouth daily at bedtime, Disp: , Rfl:     Albuterol Sulfate (PROAIR RESPICLICK) 358 (90 Base) MCG/ACT AEPB, Inhale, Disp: , Rfl:     Cetirizine HCl 10 MG CAPS, Take 10 mg by mouth daily at bedtime as needed, Disp: , Rfl:     dexlansoprazole (DEXILANT) 60 MG capsule, Take 60 mg by mouth daily, Disp: , Rfl:     DULoxetine (CYMBALTA) 60 mg delayed release capsule, Take 1 capsule by mouth daily, Disp: , Rfl:     fexofenadine (ALLEGRA) 60 MG tablet, Take 1 tablet by mouth every 12 (twelve) hours, Disp: , Rfl:     fluticasone-salmeterol (ADVAIR DISKUS) 100-50 mcg/dose, Inhale 1 puff every 12 (twelve) hours, Disp: , Rfl:     fluticasone-salmeterol (ADVAIR HFA) 115-21 MCG/ACT inhaler, Inhale 2 puffs 2 (two) times a day, Disp: , Rfl:     gabapentin (NEURONTIN) 300 mg capsule, Take 300 mg by mouth 3 (three) times a day, Disp: , Rfl:     montelukast (SINGULAIR) 10 mg tablet, Take 10 mg by mouth daily at bedtime, Disp: , Rfl:     montelukast (SINGULAIR) 10 mg tablet, Take 1 tablet by mouth daily, Disp: , Rfl:     ranitidine (ZANTAC) 150 mg tablet, Take 1 tablet by mouth 2 (two) times a day, Disp: , Rfl:     Triprolidine-Pseudoephedrine (HISTA-TABS PO), Take by mouth, Disp: , Rfl:     PMH:  Past Medical History:   Diagnosis Date    Arthritis     Asthma     Back pain     Dolan's esophagus     Chronic venous insufficiency     Cough variant asthma     Depression     Left lumbar radiculitis     Last Assessed: [id number]    Lumbar postlaminectomy syndrome     Osteoarthritis     of left hip       PSH:  Past Surgical History:   Procedure Laterality Date    ARTHRODESIS      lumbar    ARTHRODESIS      Spinal Arthrodesis for Deformity; Last Assessed: [id number]    BACK SURGERY      lumbar fusion    [place] ESOPHAGOGASTRODUODENOSCOPY TRANSORAL DIAGNOSTIC N/A 5/24/2017    Procedure: ESOPHAGOGASTRODUODENOSCOPY (EGD); Surgeon: Carol aMrquez MD;  Location[facility] GI [facility]; Service: Gastroenterology    [place] ESOPHAGOGASTRODUODENOSCOPY TRANSORAL DIAGNOSTIC N/A 8/31/2017    Procedure: ESOPHAGOGASTRODUODENOSCOPY (EGD) W RFA;  Surgeon: Ramez Geller MD;  Location:  GI LAB;   Service: Gastroenterology       Family History   Problem Relation Age of Onset    Lung cancer Mother     Cancer Mother     Alcohol abuse Father     Other Father      Cardiac Disorder    Depression Father     Hypertension Father     Heart attack Father     Breast cancer Maternal Grandmother     Diabetes type I Other         reports that she has never smoked  She has never used smokeless tobacco  She reports that she does not drink alcohol or use drugs  PHYSICAL EXAM  General Appearance:    Alert, cooperative, no distress,    Head:    Normocephalic without obvious abnormality   Eyes:    PERRL, conjunctiva/corneas clear, EOM's intact        Neck:   Supple, no adenopathy, no JVD   Back:     Symmetric, no spinal or CVA tenderness   Lungs:     Clear to auscultation bilaterally, no wheezing or rhonchi   Heart:    Regular rate and rhythm, S1 and S2 normal, no murmur   Abdomen:      abdomen is soft without significant tenderness, masses, organomegaly or guarding Bowel sounds are normal     Supraumbilical incarcerated ventral hernia  The hernia is, incarcerated,   Extremities:   Extremities normal  No clubbing, cyanosis or edema   Psych:   Normal Affect, AOx3  Neurologic:  Skin:   CNII-XII intact  Strength symmetric, speech intact    Warm, dry, intact, no visible rashes or lesions                   Informed consent for procedure was personally discussed, reviewed, and signed by Dr Yanira Madison  Discussion by Dr Yanira Madison was carried out regarding risks, benefits, and alternatives with the patient  Risks include but are not limited to:  bleeding, infection, and delayed wound healing or an open wound, pulmonary embolus, leaks from bowel or bile ducts or other viscus, transfusions, death  Discussed in further detail the more common complications and their rates of occurrence   was used if necessary  Patient expressed understanding of the issues discussed and wished/consented to proceed  All questions were answered by Dr Yanira Madison  Discussion performed between patient and the provider signing below  Signature:   Rita Mike MD    Date: 2/5/2018 Time: 2:42 PM       Some portions of this record may have been generated with voice recognition software   There may be translation, syntax,  or grammatical errors  Occasional wrong word or "sound-a-like" substitutions may have occurred due to the inherent limitations of the voice recognition software  Read the chart carefully and recognize, using context, where substitutions may have occurred  If you have any questions, please contact the dictating provider for clarification or correction, as needed  This encounter has been coded by a non-certified coder

## 2018-02-06 DIAGNOSIS — G25.81 RESTLESS LEG SYNDROME: Primary | ICD-10-CM

## 2018-02-06 DIAGNOSIS — J45.909 ASTHMA: Primary | ICD-10-CM

## 2018-02-06 RX ORDER — MONTELUKAST SODIUM 10 MG/1
TABLET ORAL
Qty: 90 TABLET | Refills: 11 | Status: SHIPPED | OUTPATIENT
Start: 2018-02-06 | End: 2018-02-27 | Stop reason: SDUPTHER

## 2018-02-06 RX ORDER — ROPINIROLE 2 MG/1
2 TABLET, FILM COATED ORAL
Qty: 90 TABLET | Refills: 3 | Status: SHIPPED | OUTPATIENT
Start: 2018-02-06 | End: 2018-08-24 | Stop reason: SDUPTHER

## 2018-02-07 PROBLEM — Z12.11 COLON CANCER SCREENING: Status: ACTIVE | Noted: 2018-02-07

## 2018-02-08 ENCOUNTER — HOSPITAL ENCOUNTER (OUTPATIENT)
Dept: CT IMAGING | Facility: HOSPITAL | Age: 58
Discharge: HOME | End: 2018-02-08
Attending: SURGERY
Payer: MEDICARE

## 2018-02-08 DIAGNOSIS — K43.6 INCARCERATED VENTRAL HERNIA: ICD-10-CM

## 2018-02-08 PROCEDURE — 74177 CT ABD & PELVIS W/CONTRAST: CPT

## 2018-02-08 RX ADMIN — IOHEXOL 100 ML: 350 INJECTION, SOLUTION INTRAVENOUS at 16:23

## 2018-02-09 ENCOUNTER — TELEPHONE (OUTPATIENT)
Dept: SURGERY | Facility: CLINIC | Age: 58
End: 2018-02-09

## 2018-02-09 NOTE — PROGRESS NOTES
Please call pt with abnormal results and schedule follow up  Call patient with 2 cm periumbilical hernia which is palpable  Surgery will be entertained after she is cleared by GI  Sooner if she has any symptoms

## 2018-02-18 ENCOUNTER — ANESTHESIA EVENT (OUTPATIENT)
Dept: GASTROENTEROLOGY | Facility: HOSPITAL | Age: 58
End: 2018-02-18
Payer: MEDICARE

## 2018-02-20 ENCOUNTER — HOSPITAL ENCOUNTER (OUTPATIENT)
Facility: HOSPITAL | Age: 58
Setting detail: OUTPATIENT SURGERY
Discharge: HOME | End: 2018-02-20
Attending: SURGERY | Admitting: SURGERY
Payer: MEDICARE

## 2018-02-20 ENCOUNTER — ANESTHESIA (OUTPATIENT)
Dept: GASTROENTEROLOGY | Facility: HOSPITAL | Age: 58
End: 2018-02-20
Payer: MEDICARE

## 2018-02-20 VITALS
OXYGEN SATURATION: 100 % | WEIGHT: 200 LBS | SYSTOLIC BLOOD PRESSURE: 103 MMHG | DIASTOLIC BLOOD PRESSURE: 57 MMHG | BODY MASS INDEX: 34.15 KG/M2 | RESPIRATION RATE: 18 BRPM | HEIGHT: 64 IN | TEMPERATURE: 97.2 F | HEART RATE: 68 BPM

## 2018-02-20 DIAGNOSIS — Z12.11 COLON CANCER SCREENING: ICD-10-CM

## 2018-02-20 PROCEDURE — 45380 COLONOSCOPY AND BIOPSY: CPT | Performed by: SURGERY

## 2018-02-20 PROCEDURE — 88305 TISSUE EXAM BY PATHOLOGIST: CPT | Performed by: SURGERY

## 2018-02-20 PROCEDURE — 88305 TISSUE EXAM BY PATHOLOGIST: CPT | Performed by: PATHOLOGY

## 2018-02-20 RX ORDER — SODIUM CHLORIDE 9 MG/ML
125 INJECTION, SOLUTION INTRAVENOUS CONTINUOUS
Status: DISCONTINUED | OUTPATIENT
Start: 2018-02-20 | End: 2018-02-20 | Stop reason: HOSPADM

## 2018-02-20 RX ORDER — PROPOFOL 10 MG/ML
INJECTION, EMULSION INTRAVENOUS AS NEEDED
Status: DISCONTINUED | OUTPATIENT
Start: 2018-02-20 | End: 2018-02-20 | Stop reason: SURG

## 2018-02-20 RX ORDER — KETOROLAC TROMETHAMINE 30 MG/ML
30 INJECTION, SOLUTION INTRAMUSCULAR; INTRAVENOUS ONCE
Status: COMPLETED | OUTPATIENT
Start: 2018-02-20 | End: 2018-02-20

## 2018-02-20 RX ADMIN — PROPOFOL 20 MG: 10 INJECTION, EMULSION INTRAVENOUS at 11:34

## 2018-02-20 RX ADMIN — SODIUM CHLORIDE 125 ML/HR: 0.9 INJECTION, SOLUTION INTRAVENOUS at 10:54

## 2018-02-20 RX ADMIN — PROPOFOL 40 MG: 10 INJECTION, EMULSION INTRAVENOUS at 11:41

## 2018-02-20 RX ADMIN — LIDOCAINE HYDROCHLORIDE 50 MG: 20 INJECTION, SOLUTION INTRAVENOUS at 11:28

## 2018-02-20 RX ADMIN — PROPOFOL 40 MG: 10 INJECTION, EMULSION INTRAVENOUS at 11:39

## 2018-02-20 RX ADMIN — PROPOFOL 40 MG: 10 INJECTION, EMULSION INTRAVENOUS at 11:36

## 2018-02-20 RX ADMIN — PROPOFOL 80 MG: 10 INJECTION, EMULSION INTRAVENOUS at 11:28

## 2018-02-20 RX ADMIN — PROPOFOL 60 MG: 10 INJECTION, EMULSION INTRAVENOUS at 11:30

## 2018-02-20 RX ADMIN — KETOROLAC TROMETHAMINE 30 MG: 30 INJECTION, SOLUTION INTRAMUSCULAR at 10:59

## 2018-02-20 NOTE — LETTER
COLONOSCOPY POLYPS    Patient: Haven Mills   YOB: 1960    MRN: [id number]    Surgery Date: 02/20/2018       Dear Doctor,    I performed a colonoscopy this week on your patient, Haven Mills  On colonoscopy benign appearing polyps were seen  She was given written patient information regarding colon polyps prior to discharge  Unfortunately, Yesika's colon preparation was sub-optimal     Polyps were removed entirely and sent for pathology  Pathology showed tubular adenoma with no evidence of dysplasia or malignancy  Diverticulosis and hemorrhoids were both seen and I gave her some written educational material regarding this, as well  Given the polyps, Eric Mullins will need to have another colonoscopy in approximately ONE YEAR - due to poor preparation - for repeat screening  Thank you for allowing me to participate in the care of this pleasant patient  I will keep you informed of any further updates        Sincerely,

## 2018-02-20 NOTE — H&P (VIEW-ONLY)
Assessment/Plan:   Vesna Echevarria is a 56 y  o female who is here for The primary encounter diagnosis was Incarcerated ventral hernia  Diagnoses of Obesity (BMI 35 0-39 9 without comorbidity) and Chronic pain disorder were also pertinent to this visit  2   Screening colonoscopy  She does see Dr Dave Gibbs of Gastroenterology for Dolan's esophagitis and I recommended she return to him for her screening colonoscopy  This needs to be done prior to a ventral hernia repair  This is to prevent any unnecessary incision through the mesh that could have been prevented by an appropriate preoperative colonoscopy  Plan:  1   CT scan abdomen and pelvis to evaluate size and extent of ventral hernia which is incarcerated    2  Eventual open repair of incarcerated ventral hernia, supraumbilical, likely with mesh  Patient is very aware that any weight gain in her or development of diabetes or smoking will also inhibit the wound healing and increase the risk of recurrence  Her BMI is approximately 45 and this gives her about a 40% recurrence rate for this hernia  Of note, 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, who falls a lot  I again strongly recommended she does not do a whole lot of heavy lifting although occasional assistance with this elderly woman is probably not at great risk regarding the hernia  However she has chronic pain and back problems and likely should defer from heavy lifting  Certainly around the time of surgery she will need assistance herself and will not be of the care for this elderly home bound patient  Preoperative Clearance: None          Imaging: No new pertinent imaging studies  No results found  _____________________________________________      HPI:  Vesna Echevarria is a 56 y  o female who was referred for evaluation of Abdominal Wall Hernia and Patient Education        Currently complaining of initial Ventral Hernia  worse with bending, coughing, lifting, standing, walking,   no nausea and no vomiting,   regular bowel movement  Prior abdominal surgery: back surgeries but not through abdomen     Lab Results   Component Value Date    WBC 5 61 07/26/2017    HGB 13 5 07/26/2017    HCT 42 4 07/26/2017    MCV 86 07/26/2017     07/26/2017     Lab Results   Component Value Date     07/26/2017    K 3 6 07/26/2017     07/26/2017    CO2 28 07/26/2017    ANIONGAP 9 07/26/2017    BUN 21 07/26/2017    CREATININE 0 72 07/26/2017    GLUF 106 (H) 07/26/2017    CALCIUM 9 1 07/26/2017    AST 19 07/26/2017    ALT 29 07/26/2017    ALKPHOS 64 07/26/2017    PROT 7 4 07/26/2017    BILITOT 0 36 07/26/2017    EGFR 95 07/26/2017     No results found for: INR, PROTIME        ROS:  General ROS: negative  negative for - chills, fatigue, fever or night sweats, weight loss  Respiratory ROS: no cough, shortness of breath, or wheezing  Cardiovascular ROS: no chest pain or dyspnea on exertion  Genito-Urinary ROS: no dysuria, trouble voiding, or hematuria  Musculoskeletal ROS: negative for - gait disturbance, joint pain or muscle pain  Neurological ROS: no TIA or stroke symptoms  Abdominal ROS: see HPI  GI ROS: see HPI  Skin ROS: no new rashes or lesions   Lymphatic ROS: no new adenopathy noted by pt     GYN ROS: see HPI, no new GYN history or bleeding noted  Psy ROS: no new mental or behavioral disturbances       Patient Active Problem List   Diagnosis    Anosmia    Anterolisthesis    Arthritis of lumbar spine (HCC)    Arthritis of right knee    Dolan's esophagus    Blood glucose elevated    Chronic low back pain    Chronic pain disorder    Chronic venous insufficiency    Cough variant asthma    Depression    GERD (gastroesophageal reflux disease)    Hyperlipidemia    Lumbar postlaminectomy syndrome    Morbid obesity (HCC)    Primary localized osteoarthritis of right knee    Primary osteoarthritis of left hip    Restless legs syndrome    Seasonal allergies    Sleep disturbance    Incarcerated ventral hernia    Obesity (BMI 35 0-39 9 without comorbidity)         Allergies: Latex;  Other; and Sulfa antibiotics    Meds:  Current Outpatient Prescriptions:     albuterol (PROAIR HFA) 90 mcg/act inhaler, Inhale 1-2 puffs 4 (four) times a day as needed, Disp: , Rfl:     cetirizine (ZyrTEC) 10 mg tablet, Take 1 tablet by mouth, Disp: , Rfl:     dexlansoprazole (DEXILANT) 60 MG capsule, Take 60 mg by mouth daily, Disp: , Rfl:     DULoxetine (CYMBALTA) 60 mg delayed release capsule, Take 60 mg by mouth daily, Disp: , Rfl:     FLOVENT HFA 44 MCG/ACT inhaler, Inhale 2 actuation Daily at 2am, Disp: , Rfl: 5    gabapentin (NEURONTIN) 300 mg capsule, Take 100 mg by mouth 3 (three) times a day, Disp: , Rfl:     ranitidine (ZANTAC) 150 mg tablet, Take 150 mg by mouth daily at bedtime, Disp: , Rfl:     rOPINIRole (REQUIP) 2 mg tablet, Take 2 mg by mouth daily at bedtime, Disp: , Rfl:     Albuterol Sulfate (PROAIR RESPICLICK) 247 (90 Base) MCG/ACT AEPB, Inhale, Disp: , Rfl:     Cetirizine HCl 10 MG CAPS, Take 10 mg by mouth daily at bedtime as needed, Disp: , Rfl:     dexlansoprazole (DEXILANT) 60 MG capsule, Take 60 mg by mouth daily, Disp: , Rfl:     DULoxetine (CYMBALTA) 60 mg delayed release capsule, Take 1 capsule by mouth daily, Disp: , Rfl:     fexofenadine (ALLEGRA) 60 MG tablet, Take 1 tablet by mouth every 12 (twelve) hours, Disp: , Rfl:     fluticasone-salmeterol (ADVAIR DISKUS) 100-50 mcg/dose, Inhale 1 puff every 12 (twelve) hours, Disp: , Rfl:     fluticasone-salmeterol (ADVAIR HFA) 115-21 MCG/ACT inhaler, Inhale 2 puffs 2 (two) times a day, Disp: , Rfl:     gabapentin (NEURONTIN) 300 mg capsule, Take 300 mg by mouth 3 (three) times a day, Disp: , Rfl:     montelukast (SINGULAIR) 10 mg tablet, Take 10 mg by mouth daily at bedtime, Disp: , Rfl:     montelukast (SINGULAIR) 10 mg tablet, Take 1 tablet by mouth daily, Disp: , Rfl:     ranitidine (ZANTAC) 150 mg tablet, Take 1 tablet by mouth 2 (two) times a day, Disp: , Rfl:     Triprolidine-Pseudoephedrine (HISTA-TABS PO), Take by mouth, Disp: , Rfl:     PMH:  Past Medical History:   Diagnosis Date    Arthritis     Asthma     Back pain     Dolan's esophagus     Chronic venous insufficiency     Cough variant asthma     Depression     Left lumbar radiculitis     Last Assessed: [id number]    Lumbar postlaminectomy syndrome     Osteoarthritis     of left hip       PSH:  Past Surgical History:   Procedure Laterality Date    ARTHRODESIS      lumbar    ARTHRODESIS      Spinal Arthrodesis for Deformity; Last Assessed: [id number]    BACK SURGERY      lumbar fusion    [place] ESOPHAGOGASTRODUODENOSCOPY TRANSORAL DIAGNOSTIC N/A 5/24/2017    Procedure: ESOPHAGOGASTRODUODENOSCOPY (EGD); Surgeon: Robert Brito MD;  Location: [facility] GI LAB; Service: Gastroenterology    [place] ESOPHAGOGASTRODUODENOSCOPY TRANSORAL DIAGNOSTIC N/A 8/31/2017    Procedure: ESOPHAGOGASTRODUODENOSCOPY (EGD) W RFA;  Surgeon: Holly Rosa MD;  Location:  GI LAB; Service: Gastroenterology       Family History   Problem Relation Age of Onset    Lung cancer Mother     Cancer Mother     Alcohol abuse Father     Other Father      Cardiac Disorder    Depression Father     Hypertension Father     Heart attack Father     Breast cancer Maternal Grandmother     Diabetes type I Other         reports that she has never smoked  She has never used smokeless tobacco  She reports that she does not drink alcohol or use drugs      PHYSICAL EXAM  General Appearance:    Alert, cooperative, no distress,    Head:    Normocephalic without obvious abnormality   Eyes:    PERRL, conjunctiva/corneas clear, EOM's intact        Neck:   Supple, no adenopathy, no JVD   Back:     Symmetric, no spinal or CVA tenderness   Lungs:     Clear to auscultation bilaterally, no wheezing or rhonchi   Heart:    Regular rate and rhythm, S1 and S2 normal, no murmur   Abdomen:      abdomen is soft without significant tenderness, masses, organomegaly or guarding Bowel sounds are normal     Supraumbilical incarcerated ventral hernia  The hernia is, incarcerated,   Extremities:   Extremities normal  No clubbing, cyanosis or edema   Psych:   Normal Affect, AOx3  Neurologic:  Skin:   CNII-XII intact  Strength symmetric, speech intact    Warm, dry, intact, no visible rashes or lesions                   Informed consent for procedure was personally discussed, reviewed, and signed by Dr Melanie Puckett  Discussion by Dr Melanie Puckett was carried out regarding risks, benefits, and alternatives with the patient  Risks include but are not limited to:  bleeding, infection, and delayed wound healing or an open wound, pulmonary embolus, leaks from bowel or bile ducts or other viscus, transfusions, death  Discussed in further detail the more common complications and their rates of occurrence   was used if necessary  Patient expressed understanding of the issues discussed and wished/consented to proceed  All questions were answered by Dr Melanie Puckett  Discussion performed between patient and the provider signing below  Signature:   Jeannette Carlisle MD    Date: 2/5/2018 Time: 2:42 PM       Some portions of this record may have been generated with voice recognition software  There may be translation, syntax,  or grammatical errors  Occasional wrong word or "sound-a-like" substitutions may have occurred due to the inherent limitations of the voice recognition software  Read the chart carefully and recognize, using context, where substitutions may have occurred  If you have any questions, please contact the dictating provider for clarification or correction, as needed  This encounter has been coded by a non-certified coder

## 2018-02-20 NOTE — OP NOTE
COLONOSCOPY  Postoperative Note  PATIENT NAME: Bita Maria  : 1960  MRN: [id number]  [facility] ROOM 01    Surgery Date: 2018      Pre operative diagnosis:   Colon cancer screening [Z12 11]    Operative Indications:  Due for Colonoscopy      Consent:  The risks, benefits, and alternatives to the surgery were discussed with the patient and with the family prior to surgery if available, personally by Dr Pratik Forrest  If the consent was obtained by the physician assistant or other representative, the consent was reviewed once again personally by the operating physician  Common complications particular for this procedure as well as unusual complications were discussed, including but not limited to:  bleeding, wound infection, prolonged wound healing, open wounds, reoperation, leak from the bowel or viscus, leak from the bile duct or injury to adjacent or other organs or blood vessels in the abdomen, and possible surgery or reoperation  A  was used if necessary  The patient expressed understanding of the issues discussed and wished and consented to the procedure to proceed  All questions were answered  Dr Pratik Forrest personally discussed the informed consent with this patient  Post-Operative Diagnosis and Findings:     Diverticulosis and Hemorrhoids     Single polyp seen in Transverse Colon      Procedure:    Procedure(s):  COLONOSCOPY with cold forceps       Surgeon(s) and Role:     * Jesse Howe MD - Primary    I was present for the entire procedure  Specimens:  ID Type Source Tests Collected by Time Destination   1 : 5mm polyp at proximal transverse colon Tissue Colon TISSUE EXAM Jesse Howe MD 2018 1136        Estimated Blood Loss:   Minimal    Anesthesia Type:   Choice     Procedure: The patient was seen in the Holding Room  The risks, benefits, complications, treatment options, and expected outcomes were discussed with the patient   The possibilities of reaction to medication, pulmonary aspiration, perforation of viscus, bleeding, recurrent infection, the need for additional procedures, failure to diagnose a condition, missed polyps, a complication requiring transfusion or operation were discussed with the patient  The patient concurred with the proposed plan, giving informed consent  The patient was taken to Endoscopy Suite, identified as [facility]  Staff confirmed patient name, , and procedure  A Time Out was held and the above information confirmed  A visual and digital exam was carried out of the anus and anal canal   Findings were normal unless specified below  The colonoscope was passed per anus and traversed to the cecum  The cecum was clearly visualized in the right lower quadrant by light reflex and palpation  The scope was withdrawn  There were mucosal lesion(s) and/or polyp(s) seen in the colon  These polyps were located at: transverse colon  The polyp(s) were addressed using polypectomy technique described above  There were diverticula scattered throughout the sigmoid colon and grade 1 and 2 hemorrhoids  A photograph was taken  The scope was retroflexed  There were no anorectal lesions other than the hemorrhoids  The scope was removed  The patient tolerated the procedure well  Withdrawal time was greater than 8 minutes  Prep was poor at a 2/5  Some portions of this record may have been generated with voice recognition software  There may be translation, syntax,  or grammatical errors  Occasional wrong word or "sound-a-like" substitutions may have occurred due to the inherent limitations of the voice recognition software  Read the chart carefully and recognize, using context, where substations may have occurred  If you have any questions, please contact the dictating provider for clarification or correction, as needed         Complications: None    Condition: Stable to PACU    SIGNATURE: Charmayne Honey, MD   DATE:  2018   TIME: 11:43 AM

## 2018-02-20 NOTE — DISCHARGE SUMMARY
Discharge Summary - Amy Parkinson 56 y o  female MRN: [id number]    Unit/Bed#: ENDO POOL Encounter: [id number]      Pre-Operative Diagnosis: Pre-Op Diagnosis Codes:     * Colon cancer screening [Z12 11]    Post-Operative Diagnosis: Post-Op Diagnosis Codes:     * Colon cancer screening [Z12 11]     * Adenomatous polyps [D36 9]     * Diverticulosis [K57 90]    Procedures Performed:  Procedure(s):  COLONOSCOPY    Surgeon: Rigo Martinez MD    See H & P for full details of admission and Operative Note for full details of operations performed  Patient was seen and examined prior to discharge  Provisions for Follow-Up Care:  See After Visit Summary for information related to follow-up care and home orders  Disposition: Home, in stable condition  Planned Readmission: No    Discharge Medications:  See after visit summary for reconciled discharge medications provided to patient and family  Post Operative instructions: Reviewed with patient and/or family  Some portions of this record may have been generated with voice recognition software  There may be translation, syntax,  or grammatical errors  Occasional wrong word or "sound-a-like" substitutions may have occurred due to the inherent limitations of the voice recognition software  Read the chart carefully and recognize, using context, where substitutions may have occurred  If you have any questions, please contact the dictating provider for clarification or correction, as needed  This encounter has been coded by non certified Coder      Signature:   Rigo Martinez MD  Date: 2/20/2018 Time: 11:45 AM

## 2018-02-20 NOTE — DISCHARGE INSTRUCTIONS
[facility]  Endoscopy Post-Operative Instructions  Dr Didier Mullins MD, FACS    Procedure: Colonoscopy    Findings:  Diverticulosis, Hemorrhoids and Polyp(s)     Follow-Up: You will need a repeat Endoscopy in (generally)1 year due to mediocre prep  Will await final pathology report for final determination of number of years until your follow up endoscopy, if you had polyps on this exam   Different types of polyps require different lengths of follow up surveillance  Please call our office or your primary doctor's office if you have any questions, once the report is returned  You should have an endoscopy sooner than recommended if you have any symptoms of bleeding or change in stools or other concerns  You will receive a call from our office with your results, in addition to the the preliminary results you received today  You will usually receive a follow-up letter from our office in 1-2 weeks  Call the office if you do not hear from us  You are welcome to also schedule an office visit if desired to discuss the results further  It is your responsibility to contact our office for results in 1- 2 weeks if you do not hear from us  If a follow up endoscopy is needed, you are responsible for arranging that follow up appointment at the appropriate time  The office may or may not issue a reminder at that future time  Please take responsibility for your own follow up healthcare  Diet: Eat a light snack first, and then resume your previous diet  Discharge Medications:  See after visit summary for reconciled discharge medications provided to patient and family  Current Facility-Administered Medications:     sodium chloride 0 9 % infusion, 125 mL/hr, Intravenous, Continuous, Keerthi Solano, , Last Rate: 125 mL/hr at 02/20/18 1054, 125 mL/hr at 02/20/18 1054  Do not take aspirin or blood thinning medications for 2 days following procedure  Tylenol is okay      You may have been given a new medication  Please take this (usually an anti-ulcer -type medication) and see your primary care doctor for refills and follow up medications if needed       After the test: Notify physician for arm swelling or pain at the intravenous site  You may have some abdominal discomfort following the procedure  Belching or passing flatus will help relieve it  Following upper endoscopy, you may experience a temporary sore throat  Saline gargles or lozenges can be taken to relieve sore throat Following lower endoscopy, minor rectal bleeding is not uncommon      Activity: Do not drive a car, operate machinery, or sign legal documents for 24 hours after your procedure  Normal activity may be resumed on the day following the procedure      Call the office at [phone] for any of the following: Severe abdominal pain, significant rectal bleeding, chills, or fever above 100°, new onset of persistent cough or persistent vomiting      [address], [address] [place] [address], [address]   Phone: [phone]                        Colorectal Polyps   WHAT YOU NEED TO KNOW:   Colorectal polyps are small growths of tissue in the lining of the colon and rectum  Most polyps are hyperplastic polyps and are usually benign (noncancerous)  Certain types of polyps, called adenomatous polyps, may turn into cancer  DISCHARGE INSTRUCTIONS:   Follow up with your healthcare provider or gastroenterologist as directed: You may need to return for more tests, such as another colonoscopy  Write down your questions so you remember to ask them during your visits  Reduce your risk for colorectal polyps:   · Eat a variety of healthy foods:  Healthy foods include fruit, vegetables, whole-grain breads, low-fat dairy products, beans, lean meat, and fish  Ask if you need to be on a special diet      · Maintain a healthy weight:  Ask your healthcare provider if you need to lose weight and how much you need to lose  Ask for help with a weight loss program     · Exercise:  Begin to exercise slowly and do more as you get stronger  Talk with your healthcare provider before you start an exercise program      · Limit alcohol:  Your risk for polyps increases the more you drink  · Do not smoke: If you smoke, it is never too late to quit  Ask for information about how to stop  For support and more information:   · Joel Adeline ([facility])  [address] [place], [place] [address]  Phone: [phone]  Web Address: www digestive  niddk nih gov  Contact your healthcare provider or gastroenterologist if:   · You have a fever  · You have chills, a cough, or feel weak and achy  · You have abdominal pain that does not go away or gets worse after you take medicine  · Your abdomen is swollen  · You are losing weight without trying  · You have questions or concerns about your condition or care  Seek care immediately or call 911 if:   · You have sudden shortness of breath  · You have a fast heart rate, fast breathing, or are too dizzy to stand up  · You have severe abdominal pain  · You see blood in your bowel movement  © 2017 [address] [employer]  Information is for End User's use only and may not be sold, redistributed or otherwise used for commercial purposes  All illustrations and images included in CareNotes® are the copyrighted property of [employer]  or Keith Ureña  The above information is an  only  It is not intended as medical advice for individual conditions or treatments  Talk to your doctor, nurse or pharmacist before following any medical regimen to see if it is safe and effective for you  Diverticulosis   WHAT YOU NEED TO KNOW:   Diverticulosis is a condition that causes small pockets called diverticula to form in your intestine   These pockets make it difficult for bowel movements to pass through your digestive system  DISCHARGE INSTRUCTIONS:   Seek care immediately if:   · You have severe pain on the left side of your lower abdomen  · Your bowel movements are bright or dark red  Contact your healthcare provider if:   · You have a fever and chills  · You feel dizzy or lightheaded  · You have nausea, or you are vomiting  · You have a change in your bowel movements  · You have questions or concerns about your condition or care  Medicines:   · Medicines  to soften your bowel movements may be given  You may also need medicines to treat symptoms such as bloating and pain  · Take your medicine as directed  Contact your healthcare provider if you think your medicine is not helping or if you have side effects  Tell him or her if you are allergic to any medicine  Keep a list of the medicines, vitamins, and herbs you take  Include the amounts, and when and why you take them  Bring the list or the pill bottles to follow-up visits  Carry your medicine list with you in case of an emergency  Self-care: The goal of treatment is to manage any symptoms you have and prevent other problems such as diverticulitis  Diverticulitis is swelling or infection of the diverticula  Your healthcare provider may recommend any of the following:  · Eat a variety of high-fiber foods  High-fiber foods help you have regular bowel movements  High-fiber foods include cooked beans, fruits, vegetables, and some cereals  Most adults need 25 to 35 grams of fiber each day  Your healthcare provider may recommend that you have more  Ask your healthcare provider how much fiber you need  Increase fiber slowly  You may have abdominal discomfort, bloating, and gas if you add fiber to your diet too quickly  You may need to take a fiber supplement if you are not getting enough fiber from food  · Drink liquids as directed  You may need to drink 2 to 3 liters (8 to 12 cups) of liquids every day   Ask your healthcare provider how much liquid to drink each day and which liquids are best for you  · Apply heat  on your abdomen for 20 to 30 minutes every 2 hours for as many days as directed  Heat helps decrease pain and muscle spasms  Help prevent diverticulitis or other symptoms: The following may help decrease your risk for diverticulitis or symptoms, such as bleeding  Talk to your provider about these or other things you can do to prevent problems that may occur with diverticulosis  · Exercise regularly  Ask your healthcare provider about the best exercise plan for you  Exercise can help you have regular bowel movements  Get 30 minutes of exercise on most days of the week  · Maintain a healthy weight  Ask your healthcare provider how much you should weigh  Ask him or her to help you create a weight loss plan if you are overweight  · Do not smoke  Nicotine and other chemicals in cigarettes increase your risk for diverticulitis  Ask your healthcare provider for information if you currently smoke and need help to quit  E-cigarettes or smokeless tobacco still contain nicotine  Talk to your healthcare provider before you use these products  · Ask your healthcare provider if it is safe to take NSAIDs  NSAIDs may increase your risk of diverticulitis  Follow up with your healthcare provider as directed:  Write down your questions so you remember to ask them during your visits  © 2017 [address] [facility] Information is for End User's use only and may not be sold, redistributed or otherwise used for commercial purposes  All illustrations and images included in CareNotes® are the copyrighted property of [employer]  or Keith Ureña  The above information is an  only  It is not intended as medical advice for individual conditions or treatments   Talk to your doctor, nurse or pharmacist before following any medical regimen to see if it is safe and effective for you       Diverticulosis Diet   WHAT YOU NEED TO KNOW:   What is a diverticulosis diet? A diverticulosis diet includes high-fiber foods  High-fiber foods help you have regular bowel movements  Extra fiber may decrease your risk of forming new diverticula (small pockets) in your intestine  A high-fiber diet may also help prevent diverticulitis  Diverticulitis is a painful condition that occurs when diverticula become inflamed or infected  You do not need to avoid nuts, seeds, corn, or popcorn while you are on a diverticulosis diet  How much fiber do I need? You may need 25 to 35 grams of fiber each day  Ask your dietitian or healthcare provider how much fiber you should have  Increase your intake of fiber slowly  When you eat more fiber, you may have gas and feel bloated  You may need to take a fiber supplement if you do not get enough fiber from food  Drink plenty of liquids as you increase the fiber in your diet  Your dietitian or healthcare provider may recommend 8 eight-ounce cups or more each day  Ask which liquids are best for you  Which foods are high in fiber? · Foods with at least 4 grams of fiber per serving:      ¨ ? to ½ cup of high-fiber cereal (check the nutrition label on the box)    ¨ ½ cup of blackberries or raspberries    ¨ 4 dried prunes    ¨ 1 cooked artichoke    ¨ ½ cup of cooked legumes, such as lentils, or red, kidney, and hoffmann beans    · Foods with 1 to 3 grams of fiber per serving:      ¨ 1 slice of whole-wheat, pumpernickel, or rye bread    ¨ 4 whole-wheat crackers    ¨ ½ cup of cereal with 1 to 3 grams of fiber per serving (check the nutrition label on the box)    ¨ 1 piece of fruit, such as an apple, banana, pear, kiwi, or orange    ¨ 3 dates    ¨ ½ cup of canned apricots, fruit cocktail, peaches, or pears    ¨ ½ cup of raw or cooked vegetables, such as carrots, cauliflower, cabbage, spinach, squash, or corn  When should I contact my healthcare provider?    · You have questions about a high-fiber diet  · You have a change in your bowel movements  · You have an upset stomach  · You have a fever  · You have pain in your lower abdomen on the left side  · You have questions about your condition or care  CARE AGREEMENT:   You have the right to help plan your care  Learn about your health condition and how it may be treated  Discuss treatment options with your caregivers to decide what care you want to receive  You always have the right to refuse treatment  The above information is an  only  It is not intended as medical advice for individual conditions or treatments  Talk to your doctor, nurse or pharmacist before following any medical regimen to see if it is safe and effective for you  © 2017 [employer]  Information is for End User's use only and may not be sold, redistributed or otherwise used for commercial purposes  All illustrations and images included in CareNotes® are the copyrighted property of [employer]  or [employer]  Hemorrhoids   WHAT YOU NEED TO KNOW:   Hemorrhoids are swollen blood vessels inside your rectum (internal hemorrhoids) or on your anus (external hemorrhoids)  Sometimes a hemorrhoid may prolapse  This means it extends out of your anus  DISCHARGE INSTRUCTIONS:   Seek care immediately if:   · You have severe pain in your rectum or around your anus  · You have severe pain in your abdomen and you are vomiting  · You have bleeding from your anus that soaks through your underwear  Contact your healthcare provider if:   · You have frequent and painful bowel movements  · Your hemorrhoid looks or feels more swollen than usual      · You do not have a bowel movement for 2 days or more  · You see or feel tissue coming through your anus  · You have questions or concerns about your condition or care  Medicines:   You may  need any of the following:  · Medicine  may be given to decrease pain, swelling, and itching  The medicine may come as a pad, cream, or ointment  · Stool softeners  help treat or prevent constipation  · NSAIDs , such as ibuprofen, help decrease swelling, pain, and fever  NSAIDs can cause stomach bleeding or kidney problems in certain people  If you take blood thinner medicine, always ask your healthcare provider if NSAIDs are safe for you  Always read the medicine label and follow directions  · Take your medicine as directed  Contact your healthcare provider if you think your medicine is not helping or if you have side effects  Tell him or her if you are allergic to any medicine  Keep a list of the medicines, vitamins, and herbs you take  Include the amounts, and when and why you take them  Bring the list or the pill bottles to follow-up visits  Carry your medicine list with you in case of an emergency  Manage your symptoms:   · Apply ice on your anus for 15 to 20 minutes every hour or as directed  Use an ice pack, or put crushed ice in a plastic bag  Cover it with a towel before you apply it to your anus  Ice helps prevent tissue damage and decreases swelling and pain  · Take a sitz bath  Fill a bathtub with 4 to 6 inches of warm water  You may also use a sitz bath pan that fits inside a toilet bowl  Sit in the sitz bath for 15 minutes  Do this 3 times a day, and after each bowel movement  The warm water can help decrease pain and swelling  · Keep your anal area clean  Gently wash the area with warm water daily  Soap may irritate the area  After a bowel movement, wipe with moist towelettes or wet toilet paper  Dry toilet paper can irritate the area  Prevent hemorrhoids:   · Do not strain to have a bowel movement  Do not sit on the toilet too long  These actions can increase pressure on the tissues in your rectum and anus  · Drink plenty of liquids  Liquids can help prevent constipation   Ask how much liquid to drink each day and which liquids are best for you  · Eat a variety of high-fiber foods  Examples include fruits, vegetables, and whole grains  Ask your healthcare provider how much fiber you need each day  You may need to take a fiber supplement  · Exercise as directed  Exercise, such as walking, may make it easier to have a bowel movement  Ask your healthcare provider to help you create an exercise plan  · Do not have anal sex  Anal sex can weaken the skin around your rectum and anus  · Avoid heavy lifting  This can cause straining and increase your risk for another hemorrhoid  Follow up with your healthcare provider as directed:  Write down your questions so you remember to ask them during your visits  © 2017 [address] [employer] Information is for End User's use only and may not be sold, redistributed or otherwise used for commercial purposes  All illustrations and images included in CareNotes® are the copyrighted property of [employer]  or Keith Ureña  The above information is an  only  It is not intended as medical advice for individual conditions or treatments  Talk to your doctor, nurse or pharmacist before following any medical regimen to see if it is safe and effective for you  Colonoscopy   WHAT YOU NEED TO KNOW:   A colonoscopy is a procedure to examine the inside of your colon (intestine) with a scope  Polyps or tissue growths may have been removed during your colonoscopy  It is normal to feel bloated and to have some abdominal discomfort  You should be passing gas  If you have hemorrhoids or you had polyps removed, you may have a small amount of bleeding  DISCHARGE INSTRUCTIONS:   Seek care immediately if:   · You have a large amount of bright red blood in your bowel movements  · Your abdomen is hard and firm and you have severe pain  · You have sudden trouble breathing  Contact your healthcare provider if:   · You develop a rash or hives      · You have a fever within 24 hours of your procedure       · You have not had a bowel movement for 3 days after your procedure  · You have questions or concerns about your condition or care  Activity:   · Do not lift, strain, or run  for 3 days after your procedure  · Rest after your procedure  You have been given medicine to relax you  Do not  drive or make important decisions until the day after your procedure  Return to your normal activity as directed  · Relieve gas and discomfort from bloating  by lying on your right side with a heating pad on your abdomen  You may need to take short walks to help the gas move out  Eat small meals until bloating is relieved  If you had polyps removed: For 7 days after your procedure:  · Do not  take aspirin  · Do not  go on long car rides  Follow up with your healthcare provider as directed:  Write down your questions so you remember to ask them during your visits  © 2017 [address] [employer] is for End User's use only and may not be sold, redistributed or otherwise used for commercial purposes  All illustrations and images included in CareNotes® are the copyrighted property of [employer] M [employer]  or Keith Ureña  The above information is an  only  It is not intended as medical advice for individual conditions or treatments  Talk to your doctor, nurse or pharmacist before following any medical regimen to see if it is safe and effective for you

## 2018-02-20 NOTE — PROGRESS NOTES
D/C instructions given and pt verbalizes understanding  Dr Jeanine Edwards was here to talk with pt  OOB to ambulate, gait steady denies dizziness  Denies need to use BR

## 2018-02-20 NOTE — PERIOPERATIVE NURSING NOTE
Patient with complaints of headache and back pain  Dr Bert Banda aware of same  Ketorolac given as ordered

## 2018-02-20 NOTE — ANESTHESIA PREPROCEDURE EVALUATION
Review of Systems/Medical History  Patient summary reviewed  Chart reviewed  No history of anesthetic complications     Cardiovascular  Hyperlipidemia,    Pulmonary  Asthma: well controlled/ stable Last rescue: today Asthma type of rescue: daily inhaler,        GI/Hepatic    GERD well controlled, Bowel prep            Endo/Other    Obesity    GYN  Negative gynecology ROS          Hematology  Negative hematology ROS      Musculoskeletal  Back pain , lumbar pain, Sciatica (left lumbar),   Arthritis     Neurology    No neuromuscular disease , Headaches (migraine today),    Psychology   Depression ,              Physical Exam    Airway    Mallampati score: II  TM Distance: >3 FB  Neck ROM: full     Dental   No notable dental hx     Cardiovascular  Rhythm: regular, Rate: normal, Cardiovascular exam normal    Pulmonary  Pulmonary exam normal Breath sounds clear to auscultation,     Other Findings        Anesthesia Plan  ASA Score- 2     Anesthesia Type- IV sedation with anesthesia with ASA Monitors  Additional Monitors:   Airway Plan:         Plan Factors-Patient not instructed to abstain from smoking on day of procedure  Patient did not smoke on day of surgery  Induction- intravenous  Postoperative Plan-     Informed Consent- Anesthetic plan and risks discussed with patient  I personally reviewed this patient with the CRNA  Discussed and agreed on the Anesthesia Plan with the CRNA  Carola Flowers

## 2018-02-21 ENCOUNTER — TELEPHONE (OUTPATIENT)
Dept: SURGERY | Facility: CLINIC | Age: 58
End: 2018-02-21

## 2018-02-21 NOTE — TELEPHONE ENCOUNTER
Called and spoke to patient  S/P antonio 2/20/18  Patient states doing well, denies any F/C/N/V  States she has not had a BM yet, but will call the office with any issues  Patient is aware she will receive a call with pathology results and will receive a letter with recommended follow up  Path pending

## 2018-02-26 ENCOUNTER — TELEPHONE (OUTPATIENT)
Dept: INTERNAL MEDICINE CLINIC | Facility: CLINIC | Age: 58
End: 2018-02-26

## 2018-02-26 NOTE — TELEPHONE ENCOUNTER
Pt stated she has already been to orthopedic Doctor and was told everything was ok she remember that when she saw you, you had suggested it might have been vascular insufficiency and would like to see a vascular doctor for this   Please advise

## 2018-02-26 NOTE — TELEPHONE ENCOUNTER
Please call Jerrell Maldonado:  Most likely, and orthopedic doctor would be more helpful than a vascular doctor regarding her knee pain  I would like her to see [phone] about her chronic knee pain

## 2018-02-26 NOTE — TELEPHONE ENCOUNTER
Pt called stating that she has been here recently regarding her knee pain and swelling and states that it does not seem to be getting better  She has been checked for blood clots and arthritis already and states that walking is difficult for her and she is wondering if it is time to see a vascular doctor? She wants your opinion on this  Please advise [phone]

## 2018-02-26 NOTE — TELEPHONE ENCOUNTER
Ok:  Please 7 appointment with [facility]'s vascular surgery, diagnosis venous insufficiency related lower extremity discomfort and edema

## 2018-02-27 ENCOUNTER — OFFICE VISIT (OUTPATIENT)
Dept: INTERNAL MEDICINE CLINIC | Facility: CLINIC | Age: 58
End: 2018-02-27
Payer: MEDICARE

## 2018-02-27 VITALS
OXYGEN SATURATION: 98 % | HEART RATE: 89 BPM | DIASTOLIC BLOOD PRESSURE: 70 MMHG | SYSTOLIC BLOOD PRESSURE: 140 MMHG | TEMPERATURE: 98.6 F | RESPIRATION RATE: 20 BRPM | HEIGHT: 64 IN

## 2018-02-27 DIAGNOSIS — M17.11 PRIMARY LOCALIZED OSTEOARTHRITIS OF RIGHT KNEE: ICD-10-CM

## 2018-02-27 DIAGNOSIS — I99.8 VASCULAR INSUFFICIENCY OF LIMB: Primary | ICD-10-CM

## 2018-02-27 PROCEDURE — 99213 OFFICE O/P EST LOW 20 MIN: CPT | Performed by: INTERNAL MEDICINE

## 2018-02-27 RX ORDER — DICLOFENAC POTASSIUM 50 MG/1
50 TABLET, FILM COATED ORAL 3 TIMES DAILY
Qty: 45 TABLET | Refills: 0 | Status: SHIPPED | OUTPATIENT
Start: 2018-02-27 | End: 2018-03-26

## 2018-02-27 NOTE — PROGRESS NOTES
Assessment/Plan:    No problem-specific Assessment & Plan notes found for this encounter  Diagnoses and all orders for this visit:    Primary localized osteoarthritis of right knee  -     MRI knee right  wo contrast; Future  -     diclofenac potassium (CATAFLAM) 50 mg tablet; Take 1 tablet (50 mg total) by mouth 3 (three) times a day      Given the longevity of the pain, I did order further imaging of the knee  Previous xray revealed mild arthritis  Venous studies were negative for clot in the past as well  PT was previously helpful but given the degree of pain currently, I held off on this  Will follow  Subjective:      Patient ID: Damari Richter is a 56 y o  female  Rosilyn Rather is here today for somewhat acute on chronic right knee pain  She has had this for some time and appeared to be doing okay  She has seen Dr Sugey Morrow as well as PT in the past  She reports over the last week, she has had excruciating pain where it is difficult for her to weight bear at times  She is unable to kneel on this  She reports extension/flexion cause discomfort  She reports associated swelling  See below  Knee Pain    The incident occurred more than 1 week ago  There was no injury mechanism  The pain is present in the right knee  The pain is severe  The pain has been worsening since onset  Associated symptoms include a loss of motion  Pertinent negatives include no inability to bear weight, loss of sensation, muscle weakness, numbness or tingling  She has tried ice and elevation for the symptoms  The treatment provided mild relief  The following portions of the patient's history were reviewed and updated as appropriate: current medications, past family history, past medical history, past social history, past surgical history and problem list     Review of Systems   Neurological: Negative for tingling and numbness           Objective:      /70 (BP Location: Left arm, Patient Position: Sitting, Cuff Size: Large) Pulse 89   Temp 98 6 °F (37 °C) (Tympanic)   Resp 20   Ht 5' 4" (1 626 m)   SpO2 98%          Physical Exam   Constitutional: She appears well-developed and well-nourished  obese   Cardiovascular: Normal rate, regular rhythm and normal heart sounds  Pulmonary/Chest: Effort normal and breath sounds normal  No respiratory distress  Musculoskeletal:        Right knee: She exhibits swelling, laceration, erythema and bony tenderness  Tenderness found  MCL and LCL tenderness noted  Difficulty with flexion and extension; Has pain against resistance   Favors left when walking

## 2018-02-28 RX ORDER — DICLOFENAC POTASSIUM 50 MG/1
TABLET, FILM COATED ORAL
Qty: 270 TABLET | Refills: 0 | OUTPATIENT
Start: 2018-02-28

## 2018-03-05 ENCOUNTER — OFFICE VISIT (OUTPATIENT)
Dept: SURGERY | Facility: CLINIC | Age: 58
End: 2018-03-05
Payer: MEDICARE

## 2018-03-05 VITALS
RESPIRATION RATE: 20 BRPM | HEIGHT: 64 IN | WEIGHT: 220 LBS | DIASTOLIC BLOOD PRESSURE: 84 MMHG | BODY MASS INDEX: 37.56 KG/M2 | SYSTOLIC BLOOD PRESSURE: 134 MMHG | TEMPERATURE: 98.5 F | HEART RATE: 88 BPM

## 2018-03-05 DIAGNOSIS — K42.9 UMBILICAL HERNIA WITHOUT OBSTRUCTION AND WITHOUT GANGRENE: Primary | ICD-10-CM

## 2018-03-05 PROCEDURE — 99213 OFFICE O/P EST LOW 20 MIN: CPT | Performed by: SURGERY

## 2018-03-05 RX ORDER — TRAMADOL HYDROCHLORIDE 50 MG/1
TABLET ORAL
Refills: 0 | COMMUNITY
Start: 2018-02-28 | End: 2018-03-15 | Stop reason: SDUPTHER

## 2018-03-05 NOTE — PROGRESS NOTES
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 needs to make appropriate arrangements  Preoperative Clearance: None          Imaging: I personally reviewed the radiology studies, my impression is as follows:   Umbilical hernia supraumbilical hernia    No results found  _____________________________________________      HPI:  Idris Booth is a 66 y  o female who was referred for evaluation of Hernia (ventral) and Patient Education (Given to patient )    Currently complaining of  initial Ventral Hernia  worse with bending, coughing, walking,     no nausea and no vomiting,     regular bowel movement      Duration of pain or symptoms:  Intermittent and over 2 years      Prior abdominal surgery:   None      Lab Results   Component Value Date    WBC 5 61 07/26/2017    HGB 13 5 07/26/2017    HCT 42 4 07/26/2017    MCV 86 07/26/2017     07/26/2017     Lab Results   Component Value Date     07/26/2017    K 3 6 07/26/2017     07/26/2017    CO2 28 07/26/2017    ANIONGAP 9 07/26/2017    BUN 10 02/05/2018    CREATININE 0 69 02/05/2018    GLUF 106 (H) 07/26/2017    CALCIUM 9 1 07/26/2017    AST 19 07/26/2017    ALT 29 07/26/2017    ALKPHOS 64 07/26/2017    PROT 7 4 07/26/2017    BILITOT 0 36 07/26/2017    EGFR 98 02/05/2018     No results found for: INR, PROTIME        ROS:  General ROS: negative  negative for - chills, fatigue, fever or night sweats, weight loss  Respiratory ROS: no cough, shortness of breath, or wheezing  Cardiovascular ROS: no chest pain or dyspnea on exertion  Genito-Urinary ROS: no dysuria, trouble voiding, or hematuria  Musculoskeletal ROS: negative for - gait disturbance, joint pain or muscle pain  Neurological ROS: no TIA or stroke symptoms  Abdominal ROS: see HPI  GI ROS: see HPI  Skin ROS: no new rashes or lesions   Lymphatic ROS: no new adenopathy noted by pt  GYN ROS: see HPI, no new GYN history or bleeding noted  Psy ROS: no new mental or behavioral disturbances       Patient Active Problem List   Diagnosis    Anosmia    Anterolisthesis    [facility]    Dolan's esophagus    Blood glucose elevated    Chronic low back pain    Chronic pain disorder    Chronic venous insufficiency    Cough variant asthma    Depression    GERD (gastroesophageal reflux disease)    Hyperlipidemia    Lumbar postlaminectomy syndrome    Morbid obesity ([facility] )    Primary localized osteoarthritis of right knee    Primary osteoarthritis of left hip    Restless legs syndrome    Seasonal allergies    Sleep disturbance    Incarcerated ventral hernia    Obesity (BMI 35 0-39 9 without comorbidity)    Colon cancer screening         Allergies: Latex;  Other; and Sulfa antibiotics    Meds:  Current Outpatient Prescriptions:     cetirizine (ZyrTEC) 10 mg tablet, Take 1 tablet by mouth, Disp: , Rfl:     dexlansoprazole (DEXILANT) 60 MG capsule, Take 60 mg by mouth daily, Disp: , Rfl:     diclofenac potassium (CATAFLAM) 50 mg tablet, Take 1 tablet (50 mg total) by mouth 3 (three) times a day, Disp: 45 tablet, Rfl: 0    DULoxetine (CYMBALTA) 60 mg delayed release capsule, Take 1 capsule by mouth daily, Disp: , Rfl:     FLOVENT HFA 44 MCG/ACT inhaler, Inhale 2 actuation Daily at 2am, Disp: , Rfl: 5    gabapentin (NEURONTIN) 300 mg capsule, Take 300 mg by mouth 3 (three) times a day, Disp: , Rfl:     ranitidine (ZANTAC) 150 mg tablet, Take 150 mg by mouth daily at bedtime, Disp: , Rfl:     rOPINIRole (REQUIP) 2 mg tablet, Take 1 tablet (2 mg total) by mouth daily at bedtime, Disp: 90 tablet, Rfl: 3    traMADol (ULTRAM) 50 mg tablet, TK 1 T PO Q 6 H PRF P, Disp: , Rfl: 0    PMH:  Past Medical History:   Diagnosis Date    Arthritis     Asthma     Back pain     Dolan's esophagus     Chronic pain disorder     Chronic venous insufficiency     Cough variant asthma     Depression     GERD (gastroesophageal reflux disease)     Hyperlipidemia     Left lumbar radiculitis     Last Assessed: [id number]    Lumbar postlaminectomy syndrome     Morbid obesity (HCC)     Osteoarthritis     of left hip    Seasonal allergies        PSH:  Past Surgical History:   Procedure Laterality Date    ARTHRODESIS      lumbar    ARTHRODESIS      Spinal Arthrodesis for Deformity; Last Assessed: [id number]   [facility] BACK SURGERY      lumbar fusion    [place] COLONOSCOPY FLX DX W/COLLJ SPEC WHEN PFRMD N/A 2/20/2018    Procedure: COLONOSCOPY with polypectomy;  Surgeon: Damien Espana MD;  Location: [facility]; Service: General    [place] ESOPHAGOGASTRODUODENOSCOPY TRANSORAL DIAGNOSTIC N/A 5/24/2017    Procedure: ESOPHAGOGASTRODUODENOSCOPY (EGD); Surgeon: Kaila Maldonado MD;  [facility]; Service: Gastroenterology    [place] ESOPHAGOGASTRODUODENOSCOPY TRANSORAL DIAGNOSTIC N/A 8/31/2017    Procedure: ESOPHAGOGASTRODUODENOSCOPY (EGD) W RFA;  Surgeon: Fatuma Zavala MD;  Location:  GI LAB; Service: Gastroenterology       Family History   Problem Relation Age of Onset    Lung cancer Mother     Cancer Mother     Alcohol abuse Father     Other Father      Cardiac Disorder    Depression Father     Hypertension Father     Heart attack Father     Breast cancer Maternal Grandmother     Diabetes type I Other         reports that she has never smoked  She has never used smokeless tobacco  She reports that she does not drink alcohol or use drugs        PHYSICAL EXAM  General Appearance:    Alert, cooperative, no distress,    Head:    Normocephalic without obvious abnormality   Eyes:    PERRL, conjunctiva/corneas clear, EOM's intact        Neck:   Supple, no adenopathy, no JVD   Back:     Symmetric, no spinal or CVA tenderness   Lungs:     Clear to auscultation bilaterally, no wheezing or rhonchi   Heart:    Regular rate and rhythm, S1 and S2 normal, no murmur   Abdomen:      abdomen is soft without significant tenderness, masses, organomegaly or guarding Bowel sounds are normal     umbilical hernia and incarcerated  The hernia is, incarcerated,   Extremities:   Extremities normal  No clubbing, cyanosis or edema   Psych:   Normal Affect, AOx3  Neurologic:  Skin:   CNII-XII intact  Strength symmetric, speech intact    Warm, dry, intact, no visible rashes or lesions                   Informed consent for procedure was personally discussed, reviewed, and signed by Dr Navya Davison  Discussion by Dr Navya Davison was carried out regarding risks, benefits, and alternatives with the patient  Risks include but are not limited to:  bleeding, infection, and delayed wound healing or an open wound, pulmonary embolus, leaks from bowel or bile ducts or other viscus, transfusions, death  Discussed in further detail the more common complications and their rates of occurrence   was used if necessary  Patient expressed understanding of the issues discussed and wished/consented to proceed  All questions were answered by Dr Navya Davison  Discussion performed between patient and the provider signing below  Signature:   Matilda Terrell MD    Date: 3/5/2018 Time: 1:45 PM       Some portions of this record may have been generated with voice recognition software  There may be translation, syntax,  or grammatical errors  Occasional wrong word or "sound-a-like" substitutions may have occurred due to the inherent limitations of the voice recognition software  Read the chart carefully and recognize, using context, where substitutions may have occurred  If you have any questions, please contact the dictating provider for clarification or correction, as needed  This encounter has been coded by a non-certified coder

## 2018-03-06 ENCOUNTER — HOSPITAL ENCOUNTER (OUTPATIENT)
Dept: MRI IMAGING | Facility: HOSPITAL | Age: 58
Discharge: HOME | End: 2018-03-06
Attending: INTERNAL MEDICINE
Payer: MEDICARE

## 2018-03-06 DIAGNOSIS — M17.11 PRIMARY LOCALIZED OSTEOARTHRITIS OF RIGHT KNEE: ICD-10-CM

## 2018-03-06 PROCEDURE — 73721 MRI JNT OF LWR EXTRE W/O DYE: CPT

## 2018-03-08 ENCOUNTER — OFFICE VISIT (OUTPATIENT)
Dept: OBGYN CLINIC | Facility: MEDICAL CENTER | Age: 58
End: 2018-03-08
Payer: MEDICARE

## 2018-03-08 ENCOUNTER — APPOINTMENT (OUTPATIENT)
Dept: RADIOLOGY | Facility: CLINIC | Age: 58
End: 2018-03-08
Payer: MEDICARE

## 2018-03-08 VITALS
DIASTOLIC BLOOD PRESSURE: 84 MMHG | BODY MASS INDEX: 38.07 KG/M2 | HEIGHT: 64 IN | SYSTOLIC BLOOD PRESSURE: 94 MMHG | HEART RATE: 108 BPM | WEIGHT: 223 LBS

## 2018-03-08 DIAGNOSIS — S83.241A ACUTE MEDIAL MENISCAL TEAR, RIGHT, INITIAL ENCOUNTER: ICD-10-CM

## 2018-03-08 DIAGNOSIS — M17.11 PRIMARY OSTEOARTHRITIS OF RIGHT KNEE: Primary | ICD-10-CM

## 2018-03-08 PROCEDURE — 99214 OFFICE O/P EST MOD 30 MIN: CPT | Performed by: ORTHOPAEDIC SURGERY

## 2018-03-08 PROCEDURE — 73564 X-RAY EXAM KNEE 4 OR MORE: CPT

## 2018-03-08 NOTE — PROGRESS NOTES
Assessment/Plan:  1  Primary osteoarthritis of right knee    2  Acute medial meniscal tear, right, initial encounter      Orders Placed This Encounter   Procedures    XR knee 4+ vw right injury     Patient with medial meniscal tear and arthritis  We discussed her treatment options well as risks benefits of her treatment options  She has tried and failed conservative treatments in the past   She is limited and her pain is intolerable  She has elected to proceed with a surgical arthroscopy of the right knee  The risks of surgery include, but are not limited to infection, blood clot, wound healing problems, blood loss, damage to blood vessels and nerves, persistent pain and stiffness, need for additional surgery, heart attack, stroke, death  The patient understood and agreed to by oral and written consent  I answered all questions regarding surgery  She understands she may have some persistent pain due to arthritis after surgery  She will wear a knee brace in the meantime  She denies any history of blood clots any family history of blood clots  She has a history of Dolan's esophagus  She will discuss with her PCP if she is able to take aspirin for DVT prophylaxis after surgery  If not we may consider Javi stockings  She will continue on her current pain regimen of anti-inflammatory medication and tramadol  She will follow up 1 week from the date of surgery  She has a direct phone number to the office for any further questions  Return for 1 week after surgery  Subjective   Chief Complaint:   Chief Complaint   Patient presents with    Right Knee - Follow-up     Lyle Harmon is a 66 y o  female who presents   for follow-up of right knee pain  She was last seen on 8/3/2017  She received a right knee steroid injection which did help  She states her knee pain has been on and off since then  Patient states she started walking more in order to lose weight    She states she was on her walk and had severe increased knee pain  She went home and iced and elevated  She had no traumas or injuries  The pain was not going away  She wants her primary care doctor she was given tramadol and diclofenac and ordered an MRI  She was unaware the diclofenac was an NSAID and she is not to be taking NSAIDs due to Dolan's esophagus  She does have an old knee brace which does seem to help  Her right knee pain is medial   The pain does not radiate  She a dull achy pain  I she states she is feeling better now taking the tramadol and diclofenac  She states she About has had a Synvisc injection years ago which does seem to help  She states she has been walking with a crutch just for support  She does feel her knee is unstable  She is to get surgery for her hernia but has put that on hold until her right knee is taking care of  M*Modal software was used to dictate this note  It may contain errors with dictating incorrect words/spelling  Please contact physician directly for any questions  Review of Systems  ROS:    See HPI for musculoskeletal review  All other systems reviewed are negative     History:  Past Medical History:   Diagnosis Date    Arthritis     Asthma     Back pain     Dolan's esophagus     Chronic pain disorder     Chronic venous insufficiency     Cough variant asthma     Depression     GERD (gastroesophageal reflux disease)     Hyperlipidemia     Left lumbar radiculitis     Last Assessed: [id number]    Lumbar postlaminectomy syndrome     Morbid obesity (HCC)     Osteoarthritis     of left hip    Seasonal allergies      Past Surgical History:   Procedure Laterality Date    ARTHRODESIS      lumbar    ARTHRODESIS      Spinal Arthrodesis for Deformity; Last Assessed: [id number]   [facility] BACK SURGERY      lumbar fusion    CT COLONOSCOPY FLX DX W/COLLJ SPEC WHEN PFRMD N/A 2/20/2018    Procedure: COLONOSCOPY with polypectomy;  Surgeon: Thomas Blackwell MD;  Location: [facility];   Service: General  [place] ESOPHAGOGASTRODUODENOSCOPY TRANSORAL DIAGNOSTIC N/A 5/24/2017    Procedure: ESOPHAGOGASTRODUODENOSCOPY (EGD); Surgeon: Aida Selby MD;  Location: [facility] GI LAB; Service: Gastroenterology    [place] ESOPHAGOGASTRODUODENOSCOPY TRANSORAL DIAGNOSTIC N/A 8/31/2017    Procedure: ESOPHAGOGASTRODUODENOSCOPY (EGD) W RFA;  Surgeon: Rigo Partida MD;  Location:  GI LAB;   Service: Gastroenterology     Social History   History   Alcohol Use No     History   Drug Use No     History   Smoking Status    Never Smoker   Smokeless Tobacco    Never Used     Family History:   Family History   Problem Relation Age of Onset    Lung cancer Mother    Ned McKinley Cancer Mother     Alcohol abuse Father     Other Father      Cardiac Disorder    Depression Father     Hypertension Father     Heart attack Father     Breast cancer Maternal Grandmother     Diabetes type I Other        Meds/Allergies     (Not in a hospital admission)  Allergies   Allergen Reactions    Latex Blisters    Other      seasonal    Sulfa Antibiotics Vomiting     Topical-blistering          Objective     BP 94/84   Pulse (!) 108   Ht 5' 4" (1 626 m)   Wt 101 kg (223 lb)   BMI 38 28 kg/m²      PE:  AAOx 3  WDWN  Hearing intact, no drainage from eyes  no audible wheezing  no abdominal distension  LE compartments soft, skin intact    Ortho Exam:  right Knee:   No erythema  no swelling  no effusion  no warmth  AROM: full  Stable to varus/valgus stress  +medial/lateral shruthi's test    Imaging Studies: I have personally reviewed pertinent films in PACS   MRI R knee:  Medial meniscal tear, DJD

## 2018-03-08 NOTE — PROGRESS NOTES
Assessment/Plan:    No diagnosis found  Discussion:  ***  No Follow-up on file  Subjective    History of Present Illness   Chief Complaint   Patient presents with    Right Knee - Follow-up       Rajendra Galdamez is a 66 y o  female who presents   for follow-up of right knee pain  She was last seen on 8/3/2017  She received a right knee steroid injection which did help  She states her knee pain has been on and off since then  Patient states she started walking more in order to lose weight  She states she was on her walk and had severe increased knee pain  She went home and iced and elevated  She had no traumas or injuries  The pain was not going away  She wants her primary care doctor she was given tramadol and diclofenac and ordered an MRI  She was unaware the diclofenac was an NSAID and she is not to be taking NSAIDs due to Dolan's esophagus  She does have an old knee brace which does seem to help  Her right knee pain is medial   The pain does not radiate  She a dull achy pain  I she states she is feeling better now taking the tramadol and diclofenac  She states she About has had a Synvisc injection years ago which does seem to help  She states she has been walking with a crutch just for support  She does feel her knee is unstable  She is to get surgery for her hernia but has put that on hold until her right knee is taking care of  M*Modal software was used to dictate this note  It may contain errors with dictating incorrect words/spelling  Please contact physician directly for any questions        Review of Systems  Constitutional: negative  Eyes: negative  Respiratory: negative- no audible wheezing   Musculoskeletal: as noted in HPI  Neurological: negative for numbness, tingling, strength intact   Behavioral/Psych: negative    History:    Past Medical History:   Diagnosis Date    Arthritis     Asthma     Back pain     Dolan's esophagus     Chronic pain disorder     Chronic venous insufficiency     Cough variant asthma     Depression     GERD (gastroesophageal reflux disease)     Hyperlipidemia     Left lumbar radiculitis     Last Assessed: [id number]    Lumbar postlaminectomy syndrome     Morbid obesity (HCC)     Osteoarthritis     of left hip    Seasonal allergies      Past Surgical History:   Procedure Laterality Date    ARTHRODESIS      lumbar    ARTHRODESIS      Spinal Arthrodesis for Deformity; Last Assessed: [id number]   [facility] BACK SURGERY      lumbar fusion    [place] COLONOSCOPY FLX DX W/COLLJ SPEC WHEN PFRMD N/A 2/20/2018    Procedure: COLONOSCOPY with polypectomy;  Surgeon: Sara Walter MD;  Location: [facility]; Service: General    [place] ESOPHAGOGASTRODUODENOSCOPY TRANSORAL DIAGNOSTIC N/A 5/24/2017    Procedure: ESOPHAGOGASTRODUODENOSCOPY (EGD); Surgeon: Kaila Maldonado MD;  Location[facility] GI LAB; Service: Gastroenterology    [place] ESOPHAGOGASTRODUODENOSCOPY TRANSORAL DIAGNOSTIC N/A 8/31/2017    Procedure: ESOPHAGOGASTRODUODENOSCOPY (EGD) W RFA;  Surgeon: Fatuma Zavala MD;  Location:  GI LAB;   Service: Gastroenterology     Social History   History   Alcohol Use No     History   Drug Use No     History   Smoking Status    Never Smoker   Smokeless Tobacco    Never Used     Family History:   Family History   Problem Relation Age of Onset    Lung cancer Mother    Joy Armas Cancer Mother     Alcohol abuse Father     Other Father      Cardiac Disorder    Depression Father     Hypertension Father     Heart attack Father     Breast cancer Maternal Grandmother     Diabetes type I Other        Meds/Allergies   Allergies   Allergen Reactions    Latex Blisters    Other      seasonal    Sulfa Antibiotics Vomiting     Topical-blistering          Objective     BP 94/84   Pulse (!) 108   Ht 5' 4" (1 626 m)   Wt 101 kg (223 lb)   BMI 38 28 kg/m²     Exam   Ortho Exam  Procedures  Knee Exam    Appearance:  {Joint Appearance:73408::"Normal with no swelling or bruising and no obvious joint deformity"}  Palpation/Tenderness:  {Joint Palpation:68514::"Normal joint with no tenderness or effusions"}  Active Range of Motion:  {Active Range of Motion:09017}  Special Tests:  {Knee special tests:69125}    Imaging Studies: {Results Review Statement:00636}

## 2018-03-15 ENCOUNTER — TELEPHONE (OUTPATIENT)
Dept: INTERNAL MEDICINE CLINIC | Facility: CLINIC | Age: 58
End: 2018-03-15

## 2018-03-15 DIAGNOSIS — S83.241A ACUTE MEDIAL MENISCAL TEAR, RIGHT, INITIAL ENCOUNTER: Primary | ICD-10-CM

## 2018-03-15 RX ORDER — TRAMADOL HYDROCHLORIDE 50 MG/1
50 TABLET ORAL EVERY 6 HOURS PRN
Qty: 45 TABLET | Refills: 0 | Status: SHIPPED | OUTPATIENT
Start: 2018-03-15 | End: 2018-06-21 | Stop reason: ALTCHOICE

## 2018-03-22 RX ORDER — MONTELUKAST SODIUM 10 MG/1
TABLET ORAL
Refills: 11 | COMMUNITY
Start: 2018-03-03 | End: 2018-04-02 | Stop reason: SDUPTHER

## 2018-03-23 ENCOUNTER — OFFICE VISIT (OUTPATIENT)
Dept: INTERNAL MEDICINE CLINIC | Facility: CLINIC | Age: 58
End: 2018-03-23
Payer: MEDICARE

## 2018-03-23 VITALS
HEIGHT: 64 IN | TEMPERATURE: 97.8 F | SYSTOLIC BLOOD PRESSURE: 114 MMHG | HEART RATE: 95 BPM | DIASTOLIC BLOOD PRESSURE: 72 MMHG | OXYGEN SATURATION: 97 %

## 2018-03-23 DIAGNOSIS — J45.991 COUGH VARIANT ASTHMA: ICD-10-CM

## 2018-03-23 DIAGNOSIS — Z01.818 PREOPERATIVE EXAMINATION: Primary | ICD-10-CM

## 2018-03-23 DIAGNOSIS — S83.241D ACUTE MEDIAL MENISCUS TEAR OF RIGHT KNEE, SUBSEQUENT ENCOUNTER: ICD-10-CM

## 2018-03-23 PROBLEM — Z12.11 COLON CANCER SCREENING: Status: RESOLVED | Noted: 2018-02-07 | Resolved: 2018-03-23

## 2018-03-23 PROCEDURE — 99214 OFFICE O/P EST MOD 30 MIN: CPT | Performed by: INTERNAL MEDICINE

## 2018-03-23 NOTE — PATIENT INSTRUCTIONS
Please call this office after your preadmission tests are completed and I will review the results and render final clearance  Please call for any questions or problems

## 2018-03-23 NOTE — PROGRESS NOTES
Assessment/Plan    Preoperative examination: At this time, there are no contraindications to surgery however final clearance will be dependent upon results of EKG and lab work  Apparently this has been rescheduled due to inclement weather 2 days ago, and I asked Asif Umaña to contact me today she has these test done so I can check on the results and render final clearance    Overall, this is a moderate risk surgery with patient's personal risk low to moderate  Acute medial meniscal tear, right knee: There is severe pain with any weight-bearing, with significant impairment of ambulation, with temporary relief of pain with use of a crutch and a knee immobilizer  Discussed her March 6th MRI findings indicating full-thickness medial meniscal tear, tricompartmental arthritis of the knee, and moderate Javed cyst   Asif Umaña understands that she will have physical therapy after her surgery in April 4th  Cough variant asthma:  Symptoms are stable with combination of Flovent 44 mcg inhaler, 2 inhalations once daily, montelukast 10 mg daily with occasional need to use albuterol based inhalation therapy  Underlying GERD is also being treated with Dexilant, and underlying allergies are being treated with cetirizine  Thank you for this consultation and please contact me if I can be of further assistance  Problem List Items Addressed This Visit     None          Subjective   Patient ID: Sergio Castillo is a 66 y o  female  Asif Umaña presents today for a scheduled preoperative medical evaluation, following recent evaluation by her orthopedic surgeon, Dr Agnes Alcocer, for diagnosis of full thickness tear of the right medial meniscal cartilage of the knee, with her March 6 MRI reviewed with her today  Other findings were discussed including tricompartmental arthritis and moderate Javed cyst   Asif Umaña is unable to bear weight without severe pain and a feeling of instability    She is using a crutch in knee immobilizer which allows mobility with much reduced pain  Surgery is planned for April 4, 2018 at [address] to be performed by Dr Carlos Easley  There is no wheezing or dyspnea currently, with controlled chronic cough with cough variant asthma on multiple medications  GERD also seems to be well controlled  Restless leg syndrome has responded partially to current treatment  There is no history of obstructive sleep apnea or obesity hypoventilation syndrome  There is no history of difficulties with anesthesia, other than transient hypotension after anesthesia, which she will address with her anesthesiologist   There is no history of bleeding associated with surgery or other bleeding diathesis, and no history of pulmonary embolism or deep vein thrombosis, or phlebitis  This includes no family history of DVT or pulmonary embolism  Herve Galaviz does not wear dentures  Preadmission tests will be performed at [address] and are pending due to rescheduling of EKG and lab work due to inclement weather 2 days ago  Vitals:    03/23/18 1544   BP: 114/72   Pulse: 95   Temp: 97 8 °F (36 6 °C)   SpO2: 97%     HPI    The following portions of the patient's history were reviewed and updated as appropriate: allergies, current medications, past family history, past medical history, past social history, past surgical history and problem list     Review of Systems no popliteal or calf pain, no dyspnea, palpitations, lightheadedness, syncope or chest pain  Currently no pain from abdominal hernia, no nausea or vomiting, no change in bowel habits  Objective      Physical Exam   Vital signs stable, pulse regular  Alert and oriented in no distress  Knee immobilizer was removed for examination and then the patient places in proper position and secured afterwards  HEENT:  Normocephalic atraumatic  Nasal passages and oral airway are patent    This includes no nasal septal deviation, no polyps, and there is no visible tonsillar tissue, and diameter of oropharynx junction is normal   Head and neck without mass or adenopathy  Neck supple nontender  No trauma  No JVD  No carotid bruits and carotid pulses are normal   Trachea midline without stridor  There is no hoarseness of voice  Thyroid exam normal   There is no supraclavicular adenopathy  Lungs are clear throughout including no wheezes or rales  There is no accessory respiratory muscle use  There is no tachypnea or dyspnea  Cardiac:  Regular rate and rhythm, normal S1 and S2, no audible S4 or S3 or murmur  There are normal bowel sounds and the abdomen is nondistended and nontender  Back without CVA mass or tenderness  Extremities: There is asymmetric edema of the right leg versus the left leg, with bilateral lymphedema, approximately +2 on the left and +2 to 3 on the right  There is an effusion of the right knee, with medial tenderness, and obvious degenerative deformity  There is no popliteal mass or tenderness, no calf tenderness or phlebitic findings bilaterally  The peripheral arterial circulation is intact with 2/2 popliteal, posterior tibial and dorsalis pedis pulses bilaterally   Motor function, sensation, temperature in a distal right lower extremity is normal

## 2018-03-25 ENCOUNTER — ANESTHESIA EVENT (OUTPATIENT)
Dept: PERIOP | Facility: HOSPITAL | Age: 58
End: 2018-03-25
Payer: MEDICARE

## 2018-03-25 RX ORDER — SODIUM CHLORIDE 9 MG/ML
125 INJECTION, SOLUTION INTRAVENOUS CONTINUOUS
Status: CANCELLED | OUTPATIENT
Start: 2018-04-04

## 2018-03-26 ENCOUNTER — APPOINTMENT (OUTPATIENT)
Dept: LAB | Facility: HOSPITAL | Age: 58
End: 2018-03-26
Attending: ORTHOPAEDIC SURGERY
Payer: MEDICARE

## 2018-03-26 ENCOUNTER — APPOINTMENT (OUTPATIENT)
Dept: PREADMISSION TESTING | Facility: HOSPITAL | Age: 58
End: 2018-03-26
Payer: MEDICARE

## 2018-03-26 ENCOUNTER — HOSPITAL ENCOUNTER (OUTPATIENT)
Dept: NON INVASIVE DIAGNOSTICS | Facility: HOSPITAL | Age: 58
Discharge: HOME | End: 2018-03-26
Attending: ORTHOPAEDIC SURGERY
Payer: MEDICARE

## 2018-03-26 DIAGNOSIS — S83.241A ACUTE MEDIAL MENISCAL TEAR, RIGHT, INITIAL ENCOUNTER: ICD-10-CM

## 2018-03-26 DIAGNOSIS — Z01.818 PRE-OP TESTING: ICD-10-CM

## 2018-03-26 DIAGNOSIS — M17.11 PRIMARY OSTEOARTHRITIS OF RIGHT KNEE: ICD-10-CM

## 2018-03-26 LAB
ANION GAP SERPL CALCULATED.3IONS-SCNC: 9 MMOL/L (ref 4–13)
ATRIAL RATE: 73 BPM
BASOPHILS # BLD AUTO: 0.06 THOUSANDS/ΜL (ref 0–0.1)
BASOPHILS NFR BLD AUTO: 1 % (ref 0–1)
BUN SERPL-MCNC: 11 MG/DL (ref 5–25)
CALCIUM SERPL-MCNC: 9 MG/DL (ref 8.3–10.1)
CHLORIDE SERPL-SCNC: 102 MMOL/L (ref 100–108)
CO2 SERPL-SCNC: 29 MMOL/L (ref 21–32)
CREAT SERPL-MCNC: 0.75 MG/DL (ref 0.6–1.3)
EOSINOPHIL # BLD AUTO: 0.18 THOUSAND/ΜL (ref 0–0.61)
EOSINOPHIL NFR BLD AUTO: 2 % (ref 0–6)
ERYTHROCYTE [DISTWIDTH] IN BLOOD BY AUTOMATED COUNT: 14.9 % (ref 11.6–15.1)
GFR SERPL CREATININE-BSD FRML MDRD: 89 ML/MIN/1.73SQ M
GLUCOSE SERPL-MCNC: 103 MG/DL (ref 65–140)
HCT VFR BLD AUTO: 37.6 % (ref 34.8–46.1)
HGB BLD-MCNC: 11.8 G/DL (ref 11.5–15.4)
LYMPHOCYTES # BLD AUTO: 2.51 THOUSANDS/ΜL (ref 0.6–4.47)
LYMPHOCYTES NFR BLD AUTO: 32 % (ref 14–44)
MCH RBC QN AUTO: 27.1 PG (ref 26.8–34.3)
MCHC RBC AUTO-ENTMCNC: 31.4 G/DL (ref 31.4–37.4)
MCV RBC AUTO: 86 FL (ref 82–98)
MONOCYTES # BLD AUTO: 0.45 THOUSAND/ΜL (ref 0.17–1.22)
MONOCYTES NFR BLD AUTO: 6 % (ref 4–12)
NEUTROPHILS # BLD AUTO: 4.74 THOUSANDS/ΜL (ref 1.85–7.62)
NEUTS SEG NFR BLD AUTO: 59 % (ref 43–75)
NRBC BLD AUTO-RTO: 0 /100 WBCS
P AXIS: 46 DEGREES
PLATELET # BLD AUTO: 287 THOUSANDS/UL (ref 149–390)
PMV BLD AUTO: 9.7 FL (ref 8.9–12.7)
POTASSIUM SERPL-SCNC: 3.4 MMOL/L (ref 3.5–5.3)
PR INTERVAL: 122 MS
QRS AXIS: 46 DEGREES
QRSD INTERVAL: 84 MS
QT INTERVAL: 408 MS
QTC INTERVAL: 449 MS
RBC # BLD AUTO: 4.36 MILLION/UL (ref 3.81–5.12)
SODIUM SERPL-SCNC: 140 MMOL/L (ref 136–145)
T WAVE AXIS: 78 DEGREES
VENTRICULAR RATE: 73 BPM
WBC # BLD AUTO: 7.94 THOUSAND/UL (ref 4.31–10.16)

## 2018-03-26 PROCEDURE — 80048 BASIC METABOLIC PNL TOTAL CA: CPT

## 2018-03-26 PROCEDURE — 85025 COMPLETE CBC W/AUTO DIFF WBC: CPT

## 2018-03-26 PROCEDURE — 93005 ELECTROCARDIOGRAM TRACING: CPT

## 2018-03-26 PROCEDURE — 93010 ELECTROCARDIOGRAM REPORT: CPT | Performed by: INTERNAL MEDICINE

## 2018-03-26 PROCEDURE — 36415 COLL VENOUS BLD VENIPUNCTURE: CPT

## 2018-03-26 RX ORDER — ALBUTEROL SULFATE 90 UG/1
2 AEROSOL, METERED RESPIRATORY (INHALATION) DAILY
COMMUNITY
End: 2018-11-12 | Stop reason: SDUPTHER

## 2018-03-26 NOTE — ANESTHESIA PREPROCEDURE EVALUATION
Review of Systems/Medical History  Patient summary reviewed  Chart reviewed  No history of anesthetic complications     Cardiovascular  Hyperlipidemia,   Comment: HLD: no meds  Venous insufficiency,  Pulmonary  Asthma: seasonal/exercise induced Last rescue: today Asthma type of rescue: daily inhaler,        GI/Hepatic    GERD well controlled,  Hiatal hernia, No bowel prep       Negative  ROS        Endo/Other    Obesity    GYN  Negative gynecology ROS          Hematology  Negative hematology ROS      Musculoskeletal  Back pain , lumbar pain, Sciatica (left lumbar),   Comment: Chronic pain  RLS Arthritis     Neurology    No neuromuscular disease , Headaches (migraine today),    Psychology   No depression ,              Physical Exam    Airway    Mallampati score: II  TM Distance: >3 FB  Neck ROM: full     Dental   No notable dental hx     Cardiovascular  Rhythm: regular, Rate: normal, Cardiovascular exam normal    Pulmonary  Pulmonary exam normal Breath sounds clear to auscultation,     Other Findings        Anesthesia Plan  ASA Score- 2     Anesthesia Type- general and regional with ASA Monitors  Additional Monitors:   Airway Plan: LMA  Comment: IAB  Has had Low BP during surgery  Plan Factors-Patient not instructed to abstain from smoking on day of procedure  Patient did not smoke on day of surgery  Induction- intravenous  Postoperative Plan- Plan for postoperative opioid use  Informed Consent- Anesthetic plan and risks discussed with patient

## 2018-03-26 NOTE — PRE-PROCEDURE INSTRUCTIONS
Pre-Surgery Instructions:   Medication Instructions    albuterol (PROVENTIL HFA,VENTOLIN HFA) 90 mcg/act inhaler Patient was instructed by Physician and understands   cetirizine (ZyrTEC) 10 mg tablet Patient was instructed by Physician and understands   dexlansoprazole (DEXILANT) 60 MG capsule Patient was instructed by Physician and understands   DULoxetine (CYMBALTA) 60 mg delayed release capsule Patient was instructed by Physician and understands   FLOVENT HFA 44 MCG/ACT inhaler Patient was instructed by Physician and understands   gabapentin (NEURONTIN) 300 mg capsule Patient was instructed by Physician and understands   montelukast (SINGULAIR) 10 mg tablet Patient was instructed by Physician and understands   ranitidine (ZANTAC) 150 mg tablet Patient was instructed by Physician and understands   rOPINIRole (REQUIP) 2 mg tablet Patient was instructed by Physician and understands   traMADol (ULTRAM) 50 mg tablet Patient was instructed by Physician and understands  Pt instructed by Dr Lola Pham to take*neurontin,cymbalta, and dexilant and if needed tramadol with a sip of water the morning of surgery flovent prn Pt given/reviewed [facility] preop instructions and chlorhexadine soap   Pt to hold asa/NSAIDS/vitamins/herbal supplements one week before surgery or as per Dr Curtis Arguelles

## 2018-03-27 ENCOUNTER — TELEPHONE (OUTPATIENT)
Dept: INTERNAL MEDICINE CLINIC | Facility: CLINIC | Age: 58
End: 2018-03-27

## 2018-03-27 ENCOUNTER — TELEPHONE (OUTPATIENT)
Dept: OBGYN CLINIC | Facility: HOSPITAL | Age: 58
End: 2018-03-27

## 2018-03-27 NOTE — TELEPHONE ENCOUNTER
J, please obtain these results put them on my desk  I asked Deepakrinku Paulas to call us to let us know when/where she had her PAT's informed so we could retrieve them  Thanks

## 2018-03-27 NOTE — TELEPHONE ENCOUNTER
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

## 2018-03-29 ENCOUNTER — CONSULT (OUTPATIENT)
Dept: VASCULAR SURGERY | Facility: CLINIC | Age: 58
End: 2018-03-29
Payer: MEDICARE

## 2018-03-29 VITALS
HEART RATE: 88 BPM | DIASTOLIC BLOOD PRESSURE: 86 MMHG | HEIGHT: 64 IN | RESPIRATION RATE: 20 BRPM | TEMPERATURE: 96.5 F | BODY MASS INDEX: 38.07 KG/M2 | WEIGHT: 223 LBS | SYSTOLIC BLOOD PRESSURE: 136 MMHG

## 2018-03-29 DIAGNOSIS — G89.29 CHRONIC PAIN OF RIGHT KNEE: ICD-10-CM

## 2018-03-29 DIAGNOSIS — M25.561 CHRONIC PAIN OF RIGHT KNEE: ICD-10-CM

## 2018-03-29 DIAGNOSIS — R60.0 BILATERAL LOWER EXTREMITY EDEMA: Primary | ICD-10-CM

## 2018-03-29 DIAGNOSIS — I99.8 VASCULAR INSUFFICIENCY OF LIMB: ICD-10-CM

## 2018-03-29 DIAGNOSIS — E66.9 OBESITY (BMI 35.0-39.9 WITHOUT COMORBIDITY): ICD-10-CM

## 2018-03-29 PROCEDURE — 99203 OFFICE O/P NEW LOW 30 MIN: CPT | Performed by: NURSE PRACTITIONER

## 2018-03-29 NOTE — PATIENT INSTRUCTIONS
Bilateral lower extremity edema  -Your right knee pain is related to your torn meniscus    Proceed with your surgery as scheduled  -Your legs appear normal with swelling mainly localized to your right knee, not consistent with venous insufficiency  -Wearing knee high compression stockings help manage edema  -Followup as needed

## 2018-03-29 NOTE — PROGRESS NOTES
Assessment/Plan:    65 y/o F with obesity, RLS, chronic lower back pain s/p back surgery x2, right meniscus tear scheduled for surgical repair on 4/4/18, seen for evaluation of lower extremity edema  Patient's edema is mainly localized to the right knee  Describes pain and clicking to right knee  No complaints of lower extremity generalized pain or fatigue  No varicose veins, no ankle edema, no skin changes  Advised use of knee high stockings should she develop worsening pain throughout the leg or generalized swelling  Followup as needed  Diagnoses and all orders for this visit:    Bilateral lower extremity edema  -     Compression Stocking    Vascular insufficiency of limb  -     Ambulatory referral to Vascular Surgery    Chronic pain of right knee    Obesity (BMI 35 0-39 9 without comorbidity)           Patient ID: Eula Infante is a 66 y o  female  Chief Complaint: Pt is new to our office  Pt was referred by PCP Dr Sha Dhillon  Pt has LEV done  Pt admits to pain and swelling to right leg  Pt is wearing brace daily  Pt denies open wounds or sores  Patient is new to our practice, referred by Dr Sha Dhillon, PCP for evaluation of possible venous insufficieny  Patient describes swelling and pain to right leg  On exam legs are large, but do not appear swollen  They are non-pitting on exam   Right knee swelling is present  Patient complains of pain to the right knee and clicking  She states that she has a torn meniscus and is scheduled for surgical repair on 4/4/18  No pain to the left leg  She has no varicose veins  States that in the past her legs felt heavy, but that improved with use of an inhaler  She has palpable DP pulses bilaterally  Reports she has RLS for which she manages with [employer] and a support group  No skin changes          The following portions of the patient's history were reviewed and updated as appropriate: allergies, current medications, past family history, past medical history, past social history, past surgical history and problem list     Review of Systems   Constitutional: Positive for activity change and unexpected weight change  HENT: Negative  Eyes: Positive for discharge  Respiratory: Positive for shortness of breath and wheezing  Cardiovascular: Negative  Gastrointestinal: Positive for abdominal distention, abdominal pain and nausea  Endocrine: Negative  Genitourinary: Negative  Musculoskeletal: Negative  Skin: Negative  Allergic/Immunologic: Positive for environmental allergies  Neurological: Negative  Hematological: Negative  Psychiatric/Behavioral: Negative  Objective:     Physical Exam   Constitutional: She is oriented to person, place, and time  No distress  HENT:   Head: Normocephalic and atraumatic  Eyes: Conjunctivae are normal    Neck: Neck supple  No JVD present  Cardiovascular: Normal rate and regular rhythm  Pulses:       Dorsalis pedis pulses are 2+ on the right side, and 2+ on the left side  No varicose or spider veins   Pulmonary/Chest: Effort normal  No respiratory distress  Abdominal: Soft  She exhibits no distension  Midabdominal hernia   Musculoskeletal: Normal range of motion  She exhibits edema (edema localized to right knee)  Neurological: She is alert and oriented to person, place, and time  Skin: Skin is warm and dry  Psychiatric: She has a normal mood and affect           Vitals:    03/29/18 1443   BP: 136/86   BP Location: Right arm   Patient Position: Sitting   Cuff Size: Adult   Pulse: 88   Resp: 20   Temp: (!) 96 5 °F (35 8 °C)   TempSrc: Tympanic   Weight: 101 kg (223 lb)   Height: 5' 4" (1 626 m)       Patient Active Problem List   Diagnosis    Anosmia    Anterolisthesis    Arthritis of lumbar spine (HCC)    Dolan's esophagus    Blood glucose elevated    Chronic low back pain    Chronic pain disorder    Chronic venous insufficiency    Cough variant asthma    Depression    GERD (gastroesophageal reflux disease)    Hyperlipidemia    Lumbar postlaminectomy syndrome    Morbid obesity (HCC)    Primary localized osteoarthritis of right knee    Primary osteoarthritis of left hip    Restless legs syndrome    Seasonal allergies    Sleep disturbance    Incarcerated ventral hernia    Obesity (BMI 35 0-39 9 without comorbidity)    Primary osteoarthritis of right knee    Acute medial meniscal tear, right, initial encounter    Bilateral lower extremity edema    Chronic pain of right knee       Past Surgical History:   Procedure Laterality Date    ARTHRODESIS      lumbar    ARTHRODESIS      Spinal Arthrodesis for Deformity; Last Assessed: [id number]   Rosi Courser BACK SURGERY      lumbar fusion,with nydia/screw and cage implant    COLONOSCOPY      PLANTAR FASCIA SURGERY      OH COLONOSCOPY FLX DX W/COLLJ SPEC WHEN PFRMD N/A 2/20/2018    Procedure: COLONOSCOPY with polypectomy;  Surgeon: Thomas Blackwell MD;  Location: [facility]; Service: General    OH ESOPHAGOGASTRODUODENOSCOPY TRANSORAL DIAGNOSTIC N/A 5/24/2017    Procedure: ESOPHAGOGASTRODUODENOSCOPY (EGD); Surgeon: Adriana Espinal MD;  Location: [facility]; Service: Gastroenterology    OH ESOPHAGOGASTRODUODENOSCOPY TRANSORAL DIAGNOSTIC N/A 8/31/2017    Procedure: ESOPHAGOGASTRODUODENOSCOPY (EGD) W RFA;  Surgeon: Ladonna Zacarias MD;  Location:  GI LAB; Service: Gastroenterology    WISDOM TOOTH EXTRACTION         Family History   Problem Relation Age of Onset    Lung cancer Mother     Cancer Mother     Alcohol abuse Father     Other Father      Cardiac Disorder    Depression Father     Hypertension Father     Heart attack Father     Breast cancer Maternal Grandmother     Diabetes type I Other        Social History     Social History    Marital status: Single     Spouse name: N/A    Number of children: N/A    Years of education: N/A     Occupational History    Not on file       Social History Main Topics    Smoking status: Never Smoker    Smokeless tobacco: Never Used    Alcohol use Yes      Comment: minimal    Drug use: No    Sexual activity: Not on file     Other Topics Concern    Not on file     Social History Narrative           Allergies   Allergen Reactions    Dust Mite Extract Shortness Of Breath    Latex Itching and Blisters    Other      seasonal    Sulfa Antibiotics Vomiting     Topical-blistering         Current Outpatient Prescriptions:     albuterol (PROVENTIL HFA,VENTOLIN HFA) 90 mcg/act inhaler, Inhale 2 puffs daily, Disp: , Rfl:     cetirizine (ZyrTEC) 10 mg tablet, Take 1 tablet by mouth daily  , Disp: , Rfl:     FLOVENT HFA 44 MCG/ACT inhaler, Inhale 2 actuation Daily at 2am, Disp: , Rfl: 5    gabapentin (NEURONTIN) 300 mg capsule, Take 300 mg by mouth 3 (three) times a day, Disp: , Rfl:     montelukast (SINGULAIR) 10 mg tablet, TK 1 T PO QD, Disp: , Rfl: 11    ranitidine (ZANTAC) 150 mg tablet, Take 150 mg by mouth daily at bedtime, Disp: , Rfl:     rOPINIRole (REQUIP) 2 mg tablet, Take 1 tablet (2 mg total) by mouth daily at bedtime, Disp: 90 tablet, Rfl: 3    traMADol (ULTRAM) 50 mg tablet, Take 1 tablet (50 mg total) by mouth every 6 (six) hours as needed for moderate pain, Disp: 45 tablet, Rfl: 0

## 2018-04-02 ENCOUNTER — TELEPHONE (OUTPATIENT)
Dept: PAIN MEDICINE | Facility: CLINIC | Age: 58
End: 2018-04-02

## 2018-04-02 DIAGNOSIS — J45.991 COUGH VARIANT ASTHMA: Primary | ICD-10-CM

## 2018-04-02 RX ORDER — MONTELUKAST SODIUM 10 MG/1
TABLET ORAL
Qty: 90 TABLET | Refills: 11 | Status: ON HOLD | OUTPATIENT
Start: 2018-04-02 | End: 2018-04-04 | Stop reason: ALTCHOICE

## 2018-04-04 ENCOUNTER — HOSPITAL ENCOUNTER (OUTPATIENT)
Facility: HOSPITAL | Age: 58
Setting detail: OUTPATIENT SURGERY
Discharge: HOME | End: 2018-04-04
Attending: ORTHOPAEDIC SURGERY | Admitting: ORTHOPAEDIC SURGERY
Payer: MEDICARE

## 2018-04-04 ENCOUNTER — ANESTHESIA (OUTPATIENT)
Dept: PERIOP | Facility: HOSPITAL | Age: 58
End: 2018-04-04
Payer: MEDICARE

## 2018-04-04 VITALS
WEIGHT: 223 LBS | SYSTOLIC BLOOD PRESSURE: 136 MMHG | HEIGHT: 64 IN | OXYGEN SATURATION: 97 % | HEART RATE: 75 BPM | RESPIRATION RATE: 16 BRPM | BODY MASS INDEX: 38.07 KG/M2 | TEMPERATURE: 97.4 F | DIASTOLIC BLOOD PRESSURE: 74 MMHG

## 2018-04-04 DIAGNOSIS — Z98.890 S/P RIGHT KNEE ARTHROSCOPY: Primary | ICD-10-CM

## 2018-04-04 PROCEDURE — 29881 ARTHRS KNE SRG MNISECTMY M/L: CPT | Performed by: ORTHOPAEDIC SURGERY

## 2018-04-04 RX ORDER — ROPIVACAINE HYDROCHLORIDE 5 MG/ML
INJECTION, SOLUTION EPIDURAL; INFILTRATION; PERINEURAL AS NEEDED
Status: DISCONTINUED | OUTPATIENT
Start: 2018-04-04 | End: 2018-04-04 | Stop reason: SURG

## 2018-04-04 RX ORDER — FENTANYL CITRATE/PF 50 MCG/ML
25 SYRINGE (ML) INJECTION
Status: COMPLETED | OUTPATIENT
Start: 2018-04-04 | End: 2018-04-04

## 2018-04-04 RX ORDER — FENTANYL CITRATE 50 UG/ML
INJECTION, SOLUTION INTRAMUSCULAR; INTRAVENOUS AS NEEDED
Status: DISCONTINUED | OUTPATIENT
Start: 2018-04-04 | End: 2018-04-04 | Stop reason: SURG

## 2018-04-04 RX ORDER — ACETAMINOPHEN 325 MG/1
650 TABLET ORAL EVERY 6 HOURS PRN
Status: DISCONTINUED | OUTPATIENT
Start: 2018-04-04 | End: 2018-04-04 | Stop reason: HOSPADM

## 2018-04-04 RX ORDER — OXYCODONE HYDROCHLORIDE 5 MG/1
TABLET ORAL
Qty: 40 TABLET | Refills: 0 | Status: SHIPPED | OUTPATIENT
Start: 2018-04-04 | End: 2018-06-21 | Stop reason: ALTCHOICE

## 2018-04-04 RX ORDER — PROMETHAZINE HYDROCHLORIDE 12.5 MG/1
12.5 TABLET ORAL EVERY 6 HOURS PRN
Qty: 10 TABLET | Refills: 0 | Status: SHIPPED | OUTPATIENT
Start: 2018-04-04 | End: 2018-06-21 | Stop reason: ALTCHOICE

## 2018-04-04 RX ORDER — MAGNESIUM HYDROXIDE 1200 MG/15ML
LIQUID ORAL AS NEEDED
Status: DISCONTINUED | OUTPATIENT
Start: 2018-04-04 | End: 2018-04-04 | Stop reason: HOSPADM

## 2018-04-04 RX ORDER — MIDAZOLAM HYDROCHLORIDE 1 MG/ML
INJECTION INTRAMUSCULAR; INTRAVENOUS AS NEEDED
Status: DISCONTINUED | OUTPATIENT
Start: 2018-04-04 | End: 2018-04-04 | Stop reason: SURG

## 2018-04-04 RX ORDER — ONDANSETRON 2 MG/ML
INJECTION INTRAMUSCULAR; INTRAVENOUS AS NEEDED
Status: DISCONTINUED | OUTPATIENT
Start: 2018-04-04 | End: 2018-04-04 | Stop reason: SURG

## 2018-04-04 RX ORDER — ONDANSETRON 2 MG/ML
4 INJECTION INTRAMUSCULAR; INTRAVENOUS ONCE AS NEEDED
Status: DISCONTINUED | OUTPATIENT
Start: 2018-04-04 | End: 2018-04-04 | Stop reason: HOSPADM

## 2018-04-04 RX ORDER — PROPOFOL 10 MG/ML
INJECTION, EMULSION INTRAVENOUS AS NEEDED
Status: DISCONTINUED | OUTPATIENT
Start: 2018-04-04 | End: 2018-04-04 | Stop reason: SURG

## 2018-04-04 RX ORDER — SODIUM CHLORIDE 9 MG/ML
125 INJECTION, SOLUTION INTRAVENOUS CONTINUOUS
Status: DISCONTINUED | OUTPATIENT
Start: 2018-04-04 | End: 2018-04-04 | Stop reason: HOSPADM

## 2018-04-04 RX ORDER — ESOMEPRAZOLE MAGNESIUM 40 MG/1
40 CAPSULE, DELAYED RELEASE ORAL
COMMUNITY
End: 2018-08-13 | Stop reason: ALTCHOICE

## 2018-04-04 RX ORDER — OXYCODONE HYDROCHLORIDE 5 MG/1
5 TABLET ORAL
Status: DISCONTINUED | OUTPATIENT
Start: 2018-04-04 | End: 2018-04-04 | Stop reason: HOSPADM

## 2018-04-04 RX ORDER — ONDANSETRON 2 MG/ML
4 INJECTION INTRAMUSCULAR; INTRAVENOUS EVERY 6 HOURS PRN
Status: DISCONTINUED | OUTPATIENT
Start: 2018-04-04 | End: 2018-04-04 | Stop reason: HOSPADM

## 2018-04-04 RX ORDER — OXYCODONE HYDROCHLORIDE 5 MG/1
10 TABLET ORAL
Status: DISCONTINUED | OUTPATIENT
Start: 2018-04-04 | End: 2018-04-04 | Stop reason: HOSPADM

## 2018-04-04 RX ORDER — LIDOCAINE HYDROCHLORIDE AND EPINEPHRINE 10; 10 MG/ML; UG/ML
INJECTION, SOLUTION INFILTRATION; PERINEURAL AS NEEDED
Status: DISCONTINUED | OUTPATIENT
Start: 2018-04-04 | End: 2018-04-04 | Stop reason: HOSPADM

## 2018-04-04 RX ADMIN — SODIUM CHLORIDE 125 ML/HR: 0.9 INJECTION, SOLUTION INTRAVENOUS at 13:26

## 2018-04-04 RX ADMIN — DEXAMETHASONE SODIUM PHOSPHATE 8 MG: 10 INJECTION INTRAMUSCULAR; INTRAVENOUS at 11:56

## 2018-04-04 RX ADMIN — MIDAZOLAM HYDROCHLORIDE 4 MG: 1 INJECTION, SOLUTION INTRAMUSCULAR; INTRAVENOUS at 10:30

## 2018-04-04 RX ADMIN — FENTANYL CITRATE 25 MCG: 50 INJECTION, SOLUTION INTRAMUSCULAR; INTRAVENOUS at 13:00

## 2018-04-04 RX ADMIN — FENTANYL CITRATE 50 MCG: 50 INJECTION, SOLUTION INTRAMUSCULAR; INTRAVENOUS at 12:10

## 2018-04-04 RX ADMIN — FENTANYL CITRATE 25 MCG: 50 INJECTION, SOLUTION INTRAMUSCULAR; INTRAVENOUS at 12:27

## 2018-04-04 RX ADMIN — FENTANYL CITRATE 50 MCG: 50 INJECTION, SOLUTION INTRAMUSCULAR; INTRAVENOUS at 12:44

## 2018-04-04 RX ADMIN — FENTANYL CITRATE 25 MCG: 50 INJECTION, SOLUTION INTRAMUSCULAR; INTRAVENOUS at 12:15

## 2018-04-04 RX ADMIN — FENTANYL CITRATE 100 MCG: 50 INJECTION, SOLUTION INTRAMUSCULAR; INTRAVENOUS at 10:30

## 2018-04-04 RX ADMIN — HYDROMORPHONE HYDROCHLORIDE 0.5 MG: 1 INJECTION, SOLUTION INTRAMUSCULAR; INTRAVENOUS; SUBCUTANEOUS at 13:25

## 2018-04-04 RX ADMIN — ONDANSETRON HYDROCHLORIDE 4 MG: 2 INJECTION, SOLUTION INTRAVENOUS at 12:36

## 2018-04-04 RX ADMIN — SODIUM CHLORIDE 125 ML/HR: 0.9 INJECTION, SOLUTION INTRAVENOUS at 10:08

## 2018-04-04 RX ADMIN — FENTANYL CITRATE 25 MCG: 50 INJECTION, SOLUTION INTRAMUSCULAR; INTRAVENOUS at 13:07

## 2018-04-04 RX ADMIN — ROPIVACAINE HYDROCHLORIDE 30 ML: 5 INJECTION, SOLUTION EPIDURAL; INFILTRATION; PERINEURAL at 10:30

## 2018-04-04 RX ADMIN — CEFAZOLIN SODIUM 3000 MG: 10 INJECTION, POWDER, FOR SOLUTION INTRAVENOUS at 11:25

## 2018-04-04 RX ADMIN — HYDROMORPHONE HYDROCHLORIDE 0.5 MG: 1 INJECTION, SOLUTION INTRAMUSCULAR; INTRAVENOUS; SUBCUTANEOUS at 13:35

## 2018-04-04 RX ADMIN — FENTANYL CITRATE 50 MCG: 50 INJECTION, SOLUTION INTRAMUSCULAR; INTRAVENOUS at 11:57

## 2018-04-04 RX ADMIN — PROPOFOL 200 MG: 10 INJECTION, EMULSION INTRAVENOUS at 11:33

## 2018-04-04 RX ADMIN — SODIUM CHLORIDE: 0.9 INJECTION, SOLUTION INTRAVENOUS at 12:15

## 2018-04-04 RX ADMIN — FENTANYL CITRATE 25 MCG: 50 INJECTION, SOLUTION INTRAMUSCULAR; INTRAVENOUS at 13:15

## 2018-04-04 RX ADMIN — FENTANYL CITRATE 25 MCG: 50 INJECTION, SOLUTION INTRAMUSCULAR; INTRAVENOUS at 13:23

## 2018-04-04 NOTE — INTERVAL H&P NOTE
H&P reviewed  After examining the patient I find no changes in the patients condition since the H&P had been written  Note was later updated with hand written note that patient was cleared for surgery  No changes today

## 2018-04-04 NOTE — PROGRESS NOTES
Progress Note - Orthopedics   Uriel Feliz 66 y o  female MRN: [id number]  Unit/Bed#: OR POOL Encounter: [id number]    Assessment:  66 y o  female with right knee medial meniscal tear, arthritis    Plan: For right knee arthroscopy today  NPO  Consented  Pain control prn    Subjective: Pt S&E in preop holding  No further questions  Ready for OR  Vitals: Blood pressure 147/75, pulse 78, temperature (!) 97 4 °F (36 3 °C), resp  rate 22, height 5' 4 25" (1 632 m), weight 101 kg (223 lb), SpO2 97 %  ,Body mass index is 37 98 kg/m²        Intake/Output Summary (Last 24 hours) at 04/04/18 1332  Last data filed at 04/04/18 1325   Gross per 24 hour   Intake             2000 ml   Output                0 ml   Net             2000 ml       Invasive Devices     Peripheral Intravenous Line            Peripheral IV 04/04/18 Right Hand less than 1 day                Ortho Exam: rightLE: sensation grossly intact L4, L5, S1, palpable pedal pulse, EHL/AT/GS intact  Knee:  Skin intact

## 2018-04-04 NOTE — ANESTHESIA POSTPROCEDURE EVALUATION
Post-Op Assessment Note      CV Status:  Stable    Mental Status:  Alert and awake    Hydration Status:  Euvolemic    PONV Controlled:  Controlled    Airway Patency:  Patent    Post Op Vitals Reviewed: Yes          Staff: Anesthesiologist           /70 (04/04/18 1307)    Temp     Pulse 81 (04/04/18 1307)   Resp 17 (04/04/18 1307)    SpO2 100 % (04/04/18 1307)

## 2018-04-04 NOTE — POST OP PROGRESS NOTES
Progress Note - Orthopedics   Jeni Villa 66 y o  female MRN: [id number]  Unit/Bed#: OR POOL Encounter: [id number]    Assessment:  66 y o  female s/p surgical arthroscopy right knee    Plan:  Pain control prn  WBAT right LE with crutches or walker  BRENTON stockings for DVT prophylaxis  Ice/elevate affected extremity  D/C home per protocol  F/u in office in 10-14 days    Subjective: Pt S&E in PACU  Resting comfortably  Admits pain R knee  Vitals: Blood pressure 147/75, pulse 78, temperature (!) 97 4 °F (36 3 °C), resp  rate 22, height 5' 4 25" (1 632 m), weight 101 kg (223 lb), SpO2 97 %  ,Body mass index is 37 98 kg/m²        Intake/Output Summary (Last 24 hours) at 04/04/18 1333  Last data filed at 04/04/18 1325   Gross per 24 hour   Intake             2000 ml   Output                0 ml   Net             2000 ml       Invasive Devices     Peripheral Intravenous Line            Peripheral IV 04/04/18 Right Hand less than 1 day                Ortho Exam: rightLE:  Drsg:  C/D/I, sensation grossly intact L4, L5, S1, palpable pedal pulse, EHL/AT/GS intact

## 2018-04-04 NOTE — DISCHARGE INSTRUCTIONS
[facility]    Postoperative Instructions  Knee Arthroscopy    WOUND CARE: You may remove your dressing in 3 days  Replace the dressing with band aids over the incisions  Do not get your incisions wet for the next 3 days  After 3 days you may shower and let the water run over your incisions  Pat dry and place clean band-aids over your incisions daily  If purulent drainage (thick white, yellow or greenish in color) is coming from the wound, youre having drainage beyond 5 days, you notice any increasing redness around the wound, or you have a temperature greater than 101 degrees please let your doctor or the doctor on call know  WEIGHT BEARING:   You are permitted to bear weight as tolerated on your operative leg  Use crutches for the first 2-3 days until you are comfortable enough to walk  After the first few days you are permitted to move your knee to tolerance  Avoid deep knee bends, kneeling, and stooping  It is important to get up and move around to prevent blood clots  You may wear a knee brace for comfort  PAIN/SWELLING:  To help reduce swelling of the knee, remember to elevate the operative leg  Elevate for 30 minutes every 2 hours initially  Be sure to also use ice at least 3-4 times a day  Cover your dressing with a thin towel or pillowcase  Put ice in a zip lock bag or towel and place over your surgical site  You can alternate for 20 minutes with the ice on and then 20 minutes with the ice off  Use narcotic pain medication as prescribed  Try to wean down as tolerated  These medications can cause constipation and you may want to use an over the counter stool softener  Nausea and vomiting can also be a side effect of narcotic medications  You have been provided a prescription medication for nausea and/or vomiting to be used as needed    If the narcotic you have been given does not contain Tylenol, you may use it alongside your prescription pain medication  Do not take any medications that you are allergic to  If a refill of medication is needed, please call the office during regular business hours Monday through Friday  In general, refills will not be made after hours or on weekends so please plan ahead  BLOOD CLOT  For the first 2 weeks after surgery, use the BRENTON stockings while you are less active  PREVENTION:       DRIVING:   You can no longer be on narcotic pain medication and must feel strong and alert to drive  FOLLOW UP:  You should follow up in the office 2 weeks from the day of surgery  [facility]   [address] , [address], [address] [facility]   [phone]    We wish you a rapid and comfortable recovery!

## 2018-04-04 NOTE — ANESTHESIA PROCEDURE NOTES
Peripheral Block    Patient location during procedure: holding area  Start time: 4/4/2018 10:30 AM  Removal time: 4/4/2018 10:32 AM  Staffing  Anesthesiologist: Maru Baker  Performed: anesthesiologist   Preanesthetic Checklist  Completed: patient identified, site marked, surgical consent, pre-op evaluation, timeout performed, IV checked, risks and benefits discussed and monitors and equipment checked  Peripheral Block  Patient position: supine  Prep: ChloraPrep  Patient monitoring: heart rate, cardiac monitor, continuous pulse ox and frequent blood pressure checks  Anesthesia block type: intraarticular block  Laterality: right  Injection technique: single-shot  Local infiltration: ropivacaine  Infiltration strength: 0 5 %  Dose: 30 mL  Needle  Needle type: long-bevel   Needle gauge: 18g    Needle localization: anatomical landmarks  Test dose: negative  Assessment  Injection assessment: incremental injection  Paresthesia pain: none  Heart rate change: no  Slow fractionated injection: yes  Post-procedure:  site cleaned  patient tolerated the procedure well with no immediate complications

## 2018-04-04 NOTE — OP NOTE
OPERATIVE REPORT  PATIENT NAME: Kale Martin    :  1960  MRN: [id number]  Pt Location: [facility] 03    SURGERY DATE: 2018    Surgeon(s) and Role:     * Davidson Chandler, DO - Primary     * Joni Flood PA-C - Observing    Preop Diagnosis:  Primary osteoarthritis of right knee [M17 11]  Acute medial meniscal tear, right, initial encounter [S83 241A]    Post-Op Diagnosis Codes:     * Primary osteoarthritis of right knee [M17 11]     * Acute medial meniscal tear, right, initial encounter [S83 241A]    Procedure(s) (LRB):  ARTHROSCOPY KNEE PARTIAL MEDIAL MENISECTOMY , CHONDROPLASTY (Right)    Specimen(s):  * No specimens in log *    Estimated Blood Loss:   Minimal    Drains:   none    Anesthesia Type:   GA    Operative Indications:  Primary osteoarthritis of right knee [M17 11]  Acute medial meniscal tear, right, initial encounter [D92 486W]      Operative Findings:  See below    Complications:   None    Procedure and Technique:  INDICATIONS FOR PROCEDURE:  The patients is a 66 y o  female who presented to the office with right knee pain  MRI revealed a medial meniscal tear and arthritis  After trial and ultimate failure of conservative management surgery was recommended  Extensive counseling in regards to the reasons for surgical intervention as well as the risks and benefits of surgery were reviewed  The risks include, but are not limited to infection, wound healing problems, persistent pain and stiffness, blood clots, extensive blood loss, need for additional surgery, damage to blood vessels and nerves  The patient understood and agreed to by oral and written consent  OPERATIVE PROCEDURE:  The patient was identified as Kale Martin by Her ID bracelet by the surgical staff in the preoperative area at [address] [facility]   An intraarticular block was performed and the patient was wheeled back to the surgical room and placed on the operative table    Anesthesia was administered and the patient was intubated without complications  Preoperative antibiotics were given  The patient was remained in the supine position and all bony prominences were carefully protected  A tourniquet was applied to the patients right lower extremity  Theright leg was then prepped and draped in the usual sterile fashion  A timeout was performed where the patients name and surgical site were once again identified  The incisions were marked out  5mL of 1% Lidocaine with epinephine was injected in the anterolateral portal and 5mL of % Lidocaine with epinephrine was injected in the anteromedial portal       A stab incision was made over the anterolateral aspect of the knee and the trocar was placed in the patellofemoral joint  The camera was inserted  Examination of the patellofemoral joint took place  Grade 2 cartilage changes were noted on the patella  Next, the medial compartment was entered and the medial portal was established under direct visualization  Examination of medial compartment was performed  The patient was found to have a medial meniscal tear and cartilage changes  Grade 3 cartilage changes were noted on the medial femoral condyle and grade 2 were noted on the tibial plateau  Using biters and a shaver the damaged portion of the medial meniscus was resected leaving behind a stable rim  Chondroplasty was performed of the medial femoral condyle  Then the intraarticular notch was entered and the ACL was found to be intact and stable  Next, the lateral compartment was entered and examined  The lateral meniscus was found to be intact  Grade 1 cartilage changes were noted on the lateral femoral condyle and grade 1 cartilage changes were noted on the lateral tibial plateau  Finally we returned to the patellofemoral joint  Grade 3 cartilage changes were noted on the trochlea  Patient was also noted to have a plica band medially  Using a shaver this was debrided and released      Photos were taken throughout the case  The knee was copiously irrigated  The portals were closed with subcutanous Monocryl suture and steri strips were placed  A sterile dressing was then placed  The patient was then awakened and transferred to the stretcher and then to the recovery room in stable condition  I attest that I was present and performed this entire procedure       Patient Disposition:  extubated and stable    SIGNATURE: Charlie Correia DO  DATE: April 4, 2018  TIME: 1:05 PM

## 2018-04-04 NOTE — H&P (VIEW-ONLY)
Assessment/Plan    Preoperative examination: At this time, there are no contraindications to surgery however final clearance will be dependent upon results of EKG and lab work  Apparently this has been rescheduled due to inclement weather 2 days ago, and I asked Miley Mendoza to contact me today she has these test done so I can check on the results and render final clearance    Overall, this is a moderate risk surgery with patient's personal risk low to moderate  Acute medial meniscal tear, right knee: There is severe pain with any weight-bearing, with significant impairment of ambulation, with temporary relief of pain with use of a crutch and a knee immobilizer  Discussed her March 6th MRI findings indicating full-thickness medial meniscal tear, tricompartmental arthritis of the knee, and moderate Javed cyst   Miley Mendoza understands that she will have physical therapy after her surgery in April 4th  Cough variant asthma:  Symptoms are stable with combination of Flovent 44 mcg inhaler, 2 inhalations once daily, montelukast 10 mg daily with occasional need to use albuterol based inhalation therapy  Underlying GERD is also being treated with Dexilant, and underlying allergies are being treated with cetirizine  Thank you for this consultation and please contact me if I can be of further assistance  Problem List Items Addressed This Visit     None          Subjective   Patient ID: Justyna Pritchard is a 66 y o  female  Yeseniaayad Denisarichardson presents today for a scheduled preoperative medical evaluation, following recent evaluation by her orthopedic surgeon, Dr Paula Ogden, for diagnosis of full thickness tear of the right medial meniscal cartilage of the knee, with her March 6 MRI reviewed with her today  Other findings were discussed including tricompartmental arthritis and moderate Javed cyst   Miley Mendoza is unable to bear weight without severe pain and a feeling of instability    She is using a crutch in knee immobilizer which allows mobility with much reduced pain  Surgery is planned for April 4, 2018 at [address] [place] [address] to be performed by Dr Webster Cranker  There is no wheezing or dyspnea currently, with controlled chronic cough with cough variant asthma on multiple medications  GERD also seems to be well controlled  Restless leg syndrome has responded partially to current treatment  There is no history of obstructive sleep apnea or obesity hypoventilation syndrome  There is no history of difficulties with anesthesia, other than transient hypotension after anesthesia, which she will address with her anesthesiologist   There is no history of bleeding associated with surgery or other bleeding diathesis, and no history of pulmonary embolism or deep vein thrombosis, or phlebitis  This includes no family history of DVT or pulmonary embolism  Justen Agudelo does not wear dentures  Preadmission tests will be performed at [address] [place] [id number] and are pending due to rescheduling of EKG and lab work due to inclement weather 2 days ago  Vitals:    03/23/18 1544   BP: 114/72   Pulse: 95   Temp: 97 8 °F (36 6 °C)   SpO2: 97%     HPI    The following portions of the patient's history were reviewed and updated as appropriate: allergies, current medications, past family history, past medical history, past social history, past surgical history and problem list     Review of Systems no popliteal or calf pain, no dyspnea, palpitations, lightheadedness, syncope or chest pain  Currently no pain from abdominal hernia, no nausea or vomiting, no change in bowel habits  Objective      Physical Exam   Vital signs stable, pulse regular  Alert and oriented in no distress  Knee immobilizer was removed for examination and then the patient places in proper position and secured afterwards  HEENT:  Normocephalic atraumatic  Nasal passages and oral airway are patent    This includes no nasal septal deviation, no polyps, and there is no visible tonsillar tissue, and diameter of oropharynx junction is normal   Head and neck without mass or adenopathy  Neck supple nontender  No trauma  No JVD  No carotid bruits and carotid pulses are normal   Trachea midline without stridor  There is no hoarseness of voice  Thyroid exam normal   There is no supraclavicular adenopathy  Lungs are clear throughout including no wheezes or rales  There is no accessory respiratory muscle use  There is no tachypnea or dyspnea  Cardiac:  Regular rate and rhythm, normal S1 and S2, no audible S4 or S3 or murmur  There are normal bowel sounds and the abdomen is nondistended and nontender  Back without CVA mass or tenderness  Extremities: There is asymmetric edema of the right leg versus the left leg, with bilateral lymphedema, approximately +2 on the left and +2 to 3 on the right  There is an effusion of the right knee, with medial tenderness, and obvious degenerative deformity  There is no popliteal mass or tenderness, no calf tenderness or phlebitic findings bilaterally  The peripheral arterial circulation is intact with 2/2 popliteal, posterior tibial and dorsalis pedis pulses bilaterally   Motor function, sensation, temperature in a distal right lower extremity is normal

## 2018-04-10 ENCOUNTER — TELEPHONE (OUTPATIENT)
Dept: OBGYN CLINIC | Facility: HOSPITAL | Age: 58
End: 2018-04-10

## 2018-04-10 NOTE — TELEPHONE ENCOUNTER
Patient had surgery with Dr Bulmaro Diggs on 04/04  She had a partial menisectomy of her right knee  She has her first post op on 04/12 and is asking if she can drive herself  She is no longer taking the prescribed narcotics for pain

## 2018-04-10 NOTE — TELEPHONE ENCOUNTER
I called and left a message for the patient to call the office back  She may drive as long as she is off narcotics and practices in a parking lot first without pain

## 2018-04-12 ENCOUNTER — OFFICE VISIT (OUTPATIENT)
Dept: OBGYN CLINIC | Facility: MEDICAL CENTER | Age: 58
End: 2018-04-12

## 2018-04-12 VITALS
BODY MASS INDEX: 37.36 KG/M2 | HEIGHT: 64 IN | WEIGHT: 218.8 LBS | DIASTOLIC BLOOD PRESSURE: 82 MMHG | SYSTOLIC BLOOD PRESSURE: 125 MMHG | HEART RATE: 83 BPM

## 2018-04-12 DIAGNOSIS — Z98.890 S/P RIGHT KNEE ARTHROSCOPY: Primary | ICD-10-CM

## 2018-04-12 PROCEDURE — 99024 POSTOP FOLLOW-UP VISIT: CPT | Performed by: ORTHOPAEDIC SURGERY

## 2018-04-12 RX ORDER — TRAMADOL HYDROCHLORIDE 50 MG/1
50 TABLET ORAL EVERY 6 HOURS PRN
Qty: 30 TABLET | Refills: 0 | Status: SHIPPED | OUTPATIENT
Start: 2018-04-12 | End: 2018-06-21 | Stop reason: ALTCHOICE

## 2018-04-12 NOTE — PROGRESS NOTES
Assessment/Plan:  1  S/P right knee arthroscopy      Orders Placed This Encounter   Procedures    Ambulatory referral to Physical Therapy       Start PT  Pain control prn  Ambulation encouraged, has BRENTON stockings  Brace for comfort  Monitor knee  Pictures from surgery reviewed    Return in about 4 weeks (around 5/10/2018)  F/u sooner if needed      Subjective   Chief Complaint:   Chief Complaint   Patient presents with    Right Knee - Post-op       Cory aBrraza is a 66 y o  female who presents for 2 weeks  follow up s/p right knee arthroscopy on 4/4/18  She states that she still is having right knee pain  She is unsure if it is her still her pain prior to surgery or due to surgical pain  She states her knee pain is medial and  below her kneecap  The pain does not radiate  She tried ice which did not help but heat seems to help  She states her pain is on and off and gets better with each day  She is currently not taking anything for pain  she stopped taking oxycodone do to it causing  her sweats  she did not feel she needed it  She is not sure if her knee feels stable to her  She feels she may just be babying it from the pain and has not really tested it  She does have a knee brace which she has been wearing     She has been weight-bearing as tolerated without a walker or cane  She is not on any blood thinners do to mccartney's esophagus  aspirin  Review of Systems  ROS:    See HPI for musculoskeletal review     All other systems reviewed are negative     History:  Past Medical History:   Diagnosis Date    Anesthesia     "sometimes low BP upon waking up"    Arthritis     Asthma     Back pain     Mccartney's esophagus     Chronic pain disorder     Chronic venous insufficiency     Cough variant asthma     Environmental allergies     dust    GERD (gastroesophageal reflux disease)     Hiatal hernia     History of anemia     History of fusion of spine for scoliosis     "as a teenager"    History of transfusion     2001    Hyperlipidemia     Left lumbar radiculitis     Last Assessed: [id number]    Lumbar postlaminectomy syndrome     Migraines     Morbid obesity (HCC)     Motion sickness     Osteoarthritis     of left hip    Right knee pain     Seasonal allergies     Shortness of breath     Umbilical hernia     Uses brace     right knee    Uses crutches     one    Wears glasses      Past Surgical History:   Procedure Laterality Date    ARTHRODESIS      lumbar    ARTHRODESIS      Spinal Arthrodesis for Deformity; Last Assessed: [id number]   [facility] BACK SURGERY      lumbar fusion,with nydia/screw and cage implant    COLONOSCOPY      PLANTAR FASCIA SURGERY      AK COLONOSCOPY FLX DX W/COLLJ SPEC WHEN PFRMD N/A 2/20/2018    Procedure: COLONOSCOPY with polypectomy;  Surgeon: Yocasta Garcia MD;  Location: [facility]; Service: General    AK ESOPHAGOGASTRODUODENOSCOPY TRANSORAL DIAGNOSTIC N/A 5/24/2017    Procedure: ESOPHAGOGASTRODUODENOSCOPY (EGD); Surgeon: Jennifer Mejia MD;  Location: [facility]; Service: Gastroenterology    AK ESOPHAGOGASTRODUODENOSCOPY TRANSORAL DIAGNOSTIC N/A 8/31/2017    Procedure: ESOPHAGOGASTRODUODENOSCOPY (EGD) W RFA;  Surgeon: Mikhail Crawford MD;  Location:  GI LAB;   Service: Gastroenterology    AK KNEE SCOPE,MED/LAT MENISECTOMY Right 4/4/2018    Procedure: ARTHROSCOPY KNEE PARTIAL MEDIAL MENISECTOMY , CHONDROPLASTY;  Surgeon: Randi Ybarra DO;  Location: [facility];  Service: Orthopedics    WISDOM TOOTH EXTRACTION       Social History   History   Alcohol Use    Yes     Comment: minimal     History   Drug Use No     History   Smoking Status    Never Smoker   Smokeless Tobacco    Never Used     Family History:   Family History   Problem Relation Age of Onset    Lung cancer Mother     Cancer Mother     Alcohol abuse Father     Other Father      Cardiac Disorder    Depression Father     Hypertension Father     Heart attack Father     Breast cancer Maternal Grandmother     Diabetes type I Other        Meds/Allergies     (Not in a hospital admission)  Allergies   Allergen Reactions    Dust Mite Extract Shortness Of Breath    Latex Itching and Blisters    Other      seasonal    Sulfa Antibiotics Vomiting     Topical-blistering          Objective     /82   Pulse 83   Ht 5' 4 25" (1 632 m)   Wt 99 2 kg (218 lb 12 8 oz)   BMI 37 27 kg/m²      PE:  AAOx 3  WDWN  Hearing intact, no drainage from eyes  no audible wheezing  no abdominal distension  LE compartments soft, AT/GS intact    Ortho Exam:  right Knee:   INC: C/D/I, No erythema, + swelling  AROM:

## 2018-04-24 ENCOUNTER — EVALUATION (OUTPATIENT)
Dept: PHYSICAL THERAPY | Facility: CLINIC | Age: 58
End: 2018-04-24
Payer: MEDICARE

## 2018-04-24 DIAGNOSIS — Z98.890 S/P RIGHT KNEE ARTHROSCOPY: Primary | ICD-10-CM

## 2018-04-24 DIAGNOSIS — M25.561 RIGHT KNEE PAIN, UNSPECIFIED CHRONICITY: ICD-10-CM

## 2018-04-24 PROCEDURE — 97110 THERAPEUTIC EXERCISES: CPT | Performed by: PHYSICAL THERAPIST

## 2018-04-24 PROCEDURE — G8990 OTHER PT/OT CURRENT STATUS: HCPCS | Performed by: PHYSICAL THERAPIST

## 2018-04-24 PROCEDURE — 97161 PT EVAL LOW COMPLEX 20 MIN: CPT | Performed by: PHYSICAL THERAPIST

## 2018-04-24 PROCEDURE — G8991 OTHER PT/OT GOAL STATUS: HCPCS | Performed by: PHYSICAL THERAPIST

## 2018-04-24 NOTE — PROGRESS NOTES
PT Evaluation     Today's date: 2018  Patient name: Alcon Wang  : 1960  MRN: [id number]  Referring provider: Maddy Cooper DO  Dx:   Encounter Diagnosis     ICD-10-CM    1  S/P right knee arthroscopy Z98 890 Ambulatory referral to Physical Therapy   2  Right knee pain, unspecified chronicity M25 561                   Assessment  Impairments: abnormal coordination, abnormal or restricted ROM, activity intolerance, impaired balance, impaired physical strength and pain with function    Assessment details: Pt is a 66 y o  female presenting to outpatient physical therapy with S/P right knee arthroscopy  (primary encounter diagnosis), Right knee pain, unspecified chronicity  Pt presents with minimal pain and normal active and passive range of motion, however, displays decreased strength and decreased tolerance to activity  Pt would benefit from skilled physical therapy to address limitations and to achieve goals  Thank you for this referral    Understanding of Dx/Px/POC: good   Prognosis: good    Goals  ST  Patient will report 25% decrease in pain in 4 weeks  2  Patient will demonstrate 25% improvement in ROM in 4 weeks  3  Patient will demonstrate 1/2 grade improvement in strength in 4 weeks  LT  Patient will be able to perform IADLS without restriction or pain by discharge  2  Patient will be independent in HEP by discharge  3  Patient will be able to return to recreational/work duties without restriction or pain by discharge        Plan  Patient would benefit from: PT eval and skilled PT  Planned modality interventions: cryotherapy and thermotherapy: hydrocollator packs  Planned therapy interventions: IADL retraining, body mechanics training, flexibility, functional ROM exercises, home exercise program, neuromuscular re-education, manual therapy, postural training, strengthening, stretching, therapeutic activities and therapeutic exercise  Frequency: 2x week  Duration in visits: 8  Duration in weeks: 4        Subjective Evaluation    History of Present Illness  Date of surgery: 2018  Mechanism of injury: Pt reports she has seen improvements in knee since surgery  Notes she continues to have discomfort and discomfort in knee with sit-to-stand transfers from chair/toilet, sleeping with right LE in one position for long periods of time, and excessive activity  Pt reports has ascencion alternating between step-to and reciprocal patterns up and down stairs     Pain  Current pain ratin  At best pain ratin  At worst pain rating: 10  Relieving factors: heat and medications    Social Support  Steps to enter house: no  Stairs in house: yes   Lives in: multiple-level home    Patient Goals  Patient goals for therapy: decreased pain, increased motion, increased strength, independence with ADLs/IADLs and return to sport/leisure activities          Objective     Active Range of Motion   Left Knee   Flexion: 138 degrees   Extensor la degrees     Right Knee   Flexion: 138 degrees   Extensor la degrees     Additional Active Range of Motion Details  IE : denies pain with ROM testing    Passive Range of Motion   Left Knee   Flexion: 145 degrees     Right Knee   Flexion: 145 degrees     Additional Passive Range of Motion Details  IE : denies pain with ROM testing    Strength/Myotome Testing     Left Knee   Flexion: 4+  Extension: 4+    Right Knee   Flexion: 4  Extension: 4-    Additional Strength Details  IE : Mild discomfort with strength testing on right    Ambulation     Ambulation: Level Surfaces     Additional Level Surfaces Ambulation Details  IE : Unremarkable, with even weight-distribution     General Comments     Knee Comments  IE : SLS right 4-5 sec, left 4-5 sec      Flowsheet Rows    Flowsheet Row Most Recent Value   PT/OT G-Codes   Current Score  35   Projected Score  49   Assessment Type  Evaluation   G code set  Other PT/OT Primary   Other PT Primary Current Status ()  CL Other PT Primary Goal Status ()  CK          Precautions: asthma, GERD, h/o lumbar fusion    Daily Treatment Diary     Manual                                            Exercise Diary  4/24            bike                          Mini squats             TB Side stepping             SLS             Step ups/downs                          Sit to stands                          Seated H IR/ER             SLR - 4-way 10            Bridges w/TB 10            Clamshells w/TB                                                                                                            Modalities              CP PRN

## 2018-04-26 ENCOUNTER — APPOINTMENT (OUTPATIENT)
Dept: PHYSICAL THERAPY | Facility: CLINIC | Age: 58
End: 2018-04-26
Payer: MEDICARE

## 2018-04-27 ENCOUNTER — OFFICE VISIT (OUTPATIENT)
Dept: PHYSICAL THERAPY | Facility: CLINIC | Age: 58
End: 2018-04-27
Payer: MEDICARE

## 2018-04-27 DIAGNOSIS — J45.40 MODERATE PERSISTENT ASTHMA: Primary | ICD-10-CM

## 2018-04-27 DIAGNOSIS — M25.561 RIGHT KNEE PAIN, UNSPECIFIED CHRONICITY: ICD-10-CM

## 2018-04-27 DIAGNOSIS — Z98.890 S/P RIGHT KNEE ARTHROSCOPY: Primary | ICD-10-CM

## 2018-04-27 PROCEDURE — 97140 MANUAL THERAPY 1/> REGIONS: CPT | Performed by: PHYSICAL THERAPIST

## 2018-04-27 PROCEDURE — 97110 THERAPEUTIC EXERCISES: CPT | Performed by: PHYSICAL THERAPIST

## 2018-04-27 NOTE — PROGRESS NOTES
Daily Note     Today's date: 2018  Patient name: Isidra Madrid  : 1960  MRN: [id number]  Referring provider: Faisal Ayoub DO  Dx:   Encounter Diagnosis     ICD-10-CM    1  S/P right knee arthroscopy Z98 890    2  Right knee pain, unspecified chronicity M25 561        Start Time: 1000  Stop Time: 1045  Total time in clinic (min): 45 minutes    Subjective: Pt comes to therapy reporting soreness and tightness in right knee  Attributes symptoms to being overly active yesterday, with a lot of walking and stairs  Objective: See treatment diary below      Precautions: asthma, GERD, h/o lumbar fusion    Daily Treatment Diary     Manual              PROM R K' & H' perf                             Exercise Diary              bike 5'                         Mini squats 15            TB Side stepping 2 laps            SLS 30"x3            Step ups/downs nv                         Sit to stands nv                         Seated H IR/ER 10 ea            SLR - 4-way 2x10            Bridges w/TB 2x10 (tb-nv)            Clamshells w/TB nv                                                                                                           Modalities              CP PRN 10'                                                Assessment: Tolerated treatment well  Patient exhibited good technique with therapeutic exercises and would benefit from continued PT      Plan: Progress treatment as tolerated

## 2018-04-29 RX ORDER — MONTELUKAST SODIUM 10 MG/1
TABLET ORAL
Qty: 90 TABLET | Refills: 11 | Status: SHIPPED | OUTPATIENT
Start: 2018-04-29 | End: 2018-06-21 | Stop reason: ALTCHOICE

## 2018-04-30 ENCOUNTER — OFFICE VISIT (OUTPATIENT)
Dept: PHYSICAL THERAPY | Facility: CLINIC | Age: 58
End: 2018-04-30
Payer: MEDICARE

## 2018-04-30 DIAGNOSIS — Z98.890 S/P RIGHT KNEE ARTHROSCOPY: Primary | ICD-10-CM

## 2018-04-30 PROCEDURE — 97140 MANUAL THERAPY 1/> REGIONS: CPT | Performed by: PHYSICAL THERAPIST

## 2018-04-30 PROCEDURE — 97150 GROUP THERAPEUTIC PROCEDURES: CPT | Performed by: PHYSICAL THERAPIST

## 2018-04-30 NOTE — PROGRESS NOTES
Daily Note     Today's date: 2018  Patient name: Carolina Henry  : 1960  MRN: [id number]  Referring provider: Delia Kraft DO  Dx:   Encounter Diagnosis     ICD-10-CM    1  S/P right knee arthroscopy Z98 890                   Subjective: Pt comes to therapy reporting soreness and tightness in right knee  Notes she has been very active, not resting often  Objective: See treatment diary below      Precautions: asthma, GERD, h/o lumbar fusion    Daily Treatment Diary     Manual             PROM R K' & H' perf perf                            Exercise Diary             bike 5' 5'                        Mini squats 15 2x10           TB Side stepping 2 laps otb 4 laps           SLS 30"x3            Step ups/downs nv 1R 2x10 ea                        Sit to stands nv nv                        Seated H IR/ER 10 ea 2x10 ea           SLR - 4-way 2x10 2x10           Bridges w/TB 2x10 (tb-nv) 2x10           Clamshells w/TB nv 2x10                                                                                                          Modalities              CP PRN 10'                                          Assessment: Tolerated treatment well  Patient exhibited good technique with therapeutic exercises and would benefit from continued PT      Plan: Progress treatment as tolerated

## 2018-05-01 ENCOUNTER — APPOINTMENT (OUTPATIENT)
Dept: PHYSICAL THERAPY | Facility: CLINIC | Age: 58
End: 2018-05-01
Payer: MEDICARE

## 2018-05-02 ENCOUNTER — OFFICE VISIT (OUTPATIENT)
Dept: PHYSICAL THERAPY | Facility: CLINIC | Age: 58
End: 2018-05-02
Payer: MEDICARE

## 2018-05-02 DIAGNOSIS — Z98.890 S/P RIGHT KNEE ARTHROSCOPY: Primary | ICD-10-CM

## 2018-05-02 DIAGNOSIS — M25.561 RIGHT KNEE PAIN, UNSPECIFIED CHRONICITY: ICD-10-CM

## 2018-05-02 PROCEDURE — 97140 MANUAL THERAPY 1/> REGIONS: CPT | Performed by: PHYSICAL THERAPIST

## 2018-05-02 PROCEDURE — 97112 NEUROMUSCULAR REEDUCATION: CPT | Performed by: PHYSICAL THERAPIST

## 2018-05-02 PROCEDURE — 97110 THERAPEUTIC EXERCISES: CPT | Performed by: PHYSICAL THERAPIST

## 2018-05-03 ENCOUNTER — APPOINTMENT (OUTPATIENT)
Dept: PHYSICAL THERAPY | Facility: CLINIC | Age: 58
End: 2018-05-03
Payer: MEDICARE

## 2018-05-04 ENCOUNTER — OFFICE VISIT (OUTPATIENT)
Dept: PHYSICAL THERAPY | Facility: CLINIC | Age: 58
End: 2018-05-04
Payer: MEDICARE

## 2018-05-04 DIAGNOSIS — M25.561 RIGHT KNEE PAIN, UNSPECIFIED CHRONICITY: ICD-10-CM

## 2018-05-04 DIAGNOSIS — Z98.890 S/P RIGHT KNEE ARTHROSCOPY: Primary | ICD-10-CM

## 2018-05-04 PROCEDURE — 97150 GROUP THERAPEUTIC PROCEDURES: CPT

## 2018-05-04 PROCEDURE — 97140 MANUAL THERAPY 1/> REGIONS: CPT

## 2018-05-04 NOTE — PROGRESS NOTES
Daily Note     Today's date: 2018  Patient name: Jeni Villa  : 1960  MRN: [id number]  Referring provider: Giancarlo Ramirez DO  Dx:   Encounter Diagnosis     ICD-10-CM    1  S/P right knee arthroscopy Z98 890    2  Right knee pain, unspecified chronicity M25 561                   Subjective: Pt reports with c/o medial knee pain graded 5/10 at present time  Objective: See treatment diary below      Precautions: asthma, GERD, h/o lumbar fusion    Daily Treatment Diary     Manual           PROM R K' & H' perf perf perf Eileen Mini                          Exercise Diary           bike 5' 5' 5' 5'                      Mini squats 15 2x10 3x10 3x10         TB Side stepping 2 laps otb 4 laps otb 5 laps otb 5 laps         SLS 30"x3  30"x3 30"x3         Step ups/downs nv 1R 2x10 ea 1R 2x10 1R  2x10                      Sit to stands nv nv                        Seated H IR/ER 10 ea 2x10 ea 3x10 ea 3x10 ea         SLR - 4-way 2x10 2x10  2x10 flex/abd only         Bridges w/TB 2x10 (tb-nv) 2x10 3 x10 3x10  No tb         Clamshells w/TB nv 2x10 3x10 3x10  No tb                                                                                                        Modalities           CP PRN 10'  10' 10'                                       Assessment: Tolerated treatment well  Patient exhibited good technique with therapeutic exercises and would benefit from continued PT  Concluded with CP  Plan: Progress treatment as tolerated

## 2018-05-07 ENCOUNTER — OFFICE VISIT (OUTPATIENT)
Dept: PHYSICAL THERAPY | Facility: CLINIC | Age: 58
End: 2018-05-07
Payer: MEDICARE

## 2018-05-07 DIAGNOSIS — M25.561 RIGHT KNEE PAIN, UNSPECIFIED CHRONICITY: Primary | ICD-10-CM

## 2018-05-07 DIAGNOSIS — Z98.890 S/P RIGHT KNEE ARTHROSCOPY: ICD-10-CM

## 2018-05-07 PROCEDURE — 97140 MANUAL THERAPY 1/> REGIONS: CPT | Performed by: PHYSICAL THERAPIST

## 2018-05-07 PROCEDURE — 97110 THERAPEUTIC EXERCISES: CPT | Performed by: PHYSICAL THERAPIST

## 2018-05-07 NOTE — PROGRESS NOTES
Daily Note     Today's date: 2018  Patient name: Dimitris Delgado  : 1960  MRN: [id number]  Referring provider: Mike Pacheco DO  Dx:   Encounter Diagnosis     ICD-10-CM    1  Right knee pain, unspecified chronicity M25 561    2  S/P right knee arthroscopy Z98 890                   Subjective: Pt comes to therapy denying knee pain, however, notes she had some lumbar spine soreness following last session  Objective: See treatment diary below      Precautions: asthma, GERD, h/o lumbar fusion    Daily Treatment Diary     Manual          PROM R K' & H' perf perf perf [facility] [place] ISIDORO                         Exercise Diary          bike 5' 5' 5' 5' 5'                     Mini squats 15 2x10 3x10 3x10 3x10        TB Side stepping 2 laps otb 4 laps otb 5 laps otb 5 laps gtb 3 laps        SLS 30"x3  30"x3 30"x3 30"x3 foam        Step ups/downs nv 1R 2x10 ea 1R 2x10 1R  2x10 1R 2x10                     Sit to stands nv nv                        LAQ     30        Seated H IR/ER 10 ea 2x10 ea 3x10 ea 3x10 ea 30 ea        SLR - 4-way 2x10 2x10  2x10 flex/abd only 20        Bridges w/TB 2x10 (tb-nv) 2x10 3 x10 3x10  No tb 30        Clamshells w/TB nv 2x10 3x10 3x10  No tb 30                                                                                                       Modalities          CP PRN 10'  10' 10' 10'                                      Assessment: Tolerated treatment well  Reported reproduction of soreness in lumbar region with side-stepping with theraband, therefore, attempted without TB with better response  Patient exhibited good technique with therapeutic exercises and would benefit from continued PT  Concluded with CP  Plan: Progress treatment as tolerated

## 2018-05-08 ENCOUNTER — APPOINTMENT (OUTPATIENT)
Dept: PHYSICAL THERAPY | Facility: CLINIC | Age: 58
End: 2018-05-08
Payer: MEDICARE

## 2018-05-08 ENCOUNTER — TELEPHONE (OUTPATIENT)
Dept: PAIN MEDICINE | Facility: MEDICAL CENTER | Age: 58
End: 2018-05-08

## 2018-05-08 NOTE — TELEPHONE ENCOUNTER
S/w pt  States left lower back pain that is "gnawing and nonstop " Patient wondering if she could get an epidural prior to 5/17, when she goes out of town for surgery  Advised would have to check with JE but no appts available  Advised will make JE aware for any other recommendations at this time  Last ov 9/2017  States only taking "arthritis pain medicine" at this time  Please advise

## 2018-05-08 NOTE — TELEPHONE ENCOUNTER
Pt called stating that she is having hernia surgery and she is asking if it would be possible to schedule an injection prior to that  States that the pain has gotten worse and she has trouble laying down and sleeping  She will need to be in bed for several days after the surgery and was hoping that an injection would allow her to do that without being in pain  She will be leaving on 5/17 because she is having the surgery in [place], so she will have help afterwards  Pt wants to know if she can schedule an injection before she leaves  Pt would like a call back to discuss at [phone]

## 2018-05-08 NOTE — TELEPHONE ENCOUNTER
If she's not experiencing any radiating leg pain at this time I would not recommend the epidural  Also would want clearance from her surgeon if we did perform one as the steroid may interrupt healing

## 2018-05-09 ENCOUNTER — OFFICE VISIT (OUTPATIENT)
Dept: PHYSICAL THERAPY | Facility: CLINIC | Age: 58
End: 2018-05-09
Payer: MEDICARE

## 2018-05-09 DIAGNOSIS — Z98.890 S/P RIGHT KNEE ARTHROSCOPY: ICD-10-CM

## 2018-05-09 DIAGNOSIS — M25.561 RIGHT KNEE PAIN, UNSPECIFIED CHRONICITY: Primary | ICD-10-CM

## 2018-05-09 PROCEDURE — 97140 MANUAL THERAPY 1/> REGIONS: CPT

## 2018-05-09 PROCEDURE — 97110 THERAPEUTIC EXERCISES: CPT

## 2018-05-09 NOTE — PROGRESS NOTES
Daily Note     Today's date: 2018  Patient name: Court Escobar  : 1960  MRN: [id number]  Referring provider: Roseann Thomas DO  Dx:   Encounter Diagnosis     ICD-10-CM    1  Right knee pain, unspecified chronicity M25 561    2  S/P right knee arthroscopy Z98 890        Start Time: 4565  Stop Time: 1120  Total time in clinic (min): 40 minutes    Subjective: Pt comes to therapy reporting minimal knee pain, states it is a 1-2/10 at the moment  Objective: See treatment diary below    Precautions: asthma, GERD, h/o lumbar fusion    Daily Treatment Diary     Manual         PROM R K' & H' perf perf perf ISIDORO perf  [facility]                        Exercise Diary         bike 5' 5' 5' 5' 5' 8'                    Mini squats 15 2x10 3x10 3x10 3x10 3x10       TB Side stepping 2 laps otb 4 laps otb 5 laps otb 5 laps gtb 3 laps 3 laps no band due to back pain       SLS 30"x3  30"x3 30"x3 30"x3 foam 3x30" foam       Step ups/downs nv 1R 2x10 ea 1R 2x10 1R  2x10 1R 2x10 1R 2x10                    Sit to stands nv nv    10x                    LAQ     30 30x       Seated H IR/ER 10 ea 2x10 ea 3x10 ea 3x10 ea 30 ea 30x ea       SLR - 4-way 2x10 2x10  2x10 flex/abd only 20 20x ea        Bridges w/TB 2x10 (tb-nv) 2x10 3 x10 3x10  No tb 30 30       Clamshells w/TB nv 2x10 3x10 3x10  No tb 30 30                                                                                                      Modalities         CP PRN 10'  10' 10' 10' deferred                                     Assessment: Tolerated treatment well  Patient exhibited good technique with therapeutic exercises and would benefit from continued PT  Pt demonstrates good form and technique with current exercise program  Pt will benefit from further skilled PT to increase strength, flexibility and function  Continue to progress as able  Plan: Progress treatment as tolerated

## 2018-05-10 ENCOUNTER — APPOINTMENT (OUTPATIENT)
Dept: PHYSICAL THERAPY | Facility: CLINIC | Age: 58
End: 2018-05-10
Payer: MEDICARE

## 2018-05-11 ENCOUNTER — OFFICE VISIT (OUTPATIENT)
Dept: PHYSICAL THERAPY | Facility: CLINIC | Age: 58
End: 2018-05-11
Payer: MEDICARE

## 2018-05-11 DIAGNOSIS — Z98.890 S/P RIGHT KNEE ARTHROSCOPY: ICD-10-CM

## 2018-05-11 DIAGNOSIS — M25.561 RIGHT KNEE PAIN, UNSPECIFIED CHRONICITY: Primary | ICD-10-CM

## 2018-05-11 PROCEDURE — 97150 GROUP THERAPEUTIC PROCEDURES: CPT

## 2018-05-11 PROCEDURE — 97110 THERAPEUTIC EXERCISES: CPT

## 2018-05-11 PROCEDURE — 97140 MANUAL THERAPY 1/> REGIONS: CPT

## 2018-05-11 NOTE — PROGRESS NOTES
Daily Note     Today's date: 2018  Patient name: Amberly Beck  : 1960  MRN: [id number]  Referring provider: Hussain Lewis DO  Dx:   Encounter Diagnosis     ICD-10-CM    1  Right knee pain, unspecified chronicity M25 561    2  S/P right knee arthroscopy Z98 890                   Subjective: Pt reports with c/o increased R knee pain and swelling for unknown reasons  She denied change in activity since last visit and noted difficulty sitting for extended periods last evening 2* pain  Objective: See treatment diary below    Precautions: asthma, GERD, h/o lumbar fusion    Daily Treatment Diary     Manual        PROM R K' & H' perf perf perf ISIDORO perf  [facility]                       Exercise Diary        bike 5' 5' 5' 5' 5' 8' 8'                   Mini squats 15 2x10 3x10 3x10 3x10 3x10 3x10      TB Side stepping 2 laps otb 4 laps otb 5 laps otb 5 laps gtb 3 laps 3 laps no band due to back pain 3 laps      SLS 30"x3  30"x3 30"x3 30"x3 foam 3x30" foam 30"x3      Step ups/downs nv 1R 2x10 ea 1R 2x10 1R  2x10 1R 2x10 1R 2x10 1R 2x10                   Sit to stands nv nv    10x 10x                   LAQ     30 30x 30x      Seated H IR/ER 10 ea 2x10 ea 3x10 ea 3x10 ea 30 ea 30x ea 30x ea      SLR - 4-way 2x10 2x10  2x10 flex/abd only 20 20x ea  20x  Ea  (no ext)      Bridges w/TB 2x10 (tb-nv) 2x10 3 x10 3x10  No tb 30 30 30      Clamshells w/TB nv 2x10 3x10 3x10  No tb 30 30 30                                                                                                     Modalities         CP PRN 10'  10' 10' 10' deferred                                     Assessment: Tolerated treatment well  Patient exhibited good technique with therapeutic exercises and would benefit from continued PT  Pt demonstrates good form and technique with current exercise program      Plan: Progress treatment as tolerated

## 2018-05-14 ENCOUNTER — OFFICE VISIT (OUTPATIENT)
Dept: OBGYN CLINIC | Facility: MEDICAL CENTER | Age: 58
End: 2018-05-14

## 2018-05-14 ENCOUNTER — OFFICE VISIT (OUTPATIENT)
Dept: PHYSICAL THERAPY | Facility: CLINIC | Age: 58
End: 2018-05-14
Payer: MEDICARE

## 2018-05-14 VITALS — HEART RATE: 92 BPM | SYSTOLIC BLOOD PRESSURE: 114 MMHG | HEIGHT: 64 IN | DIASTOLIC BLOOD PRESSURE: 77 MMHG

## 2018-05-14 DIAGNOSIS — M25.561 RIGHT KNEE PAIN, UNSPECIFIED CHRONICITY: Primary | ICD-10-CM

## 2018-05-14 DIAGNOSIS — Z98.890 S/P RIGHT KNEE ARTHROSCOPY: Primary | ICD-10-CM

## 2018-05-14 DIAGNOSIS — Z98.890 S/P RIGHT KNEE ARTHROSCOPY: ICD-10-CM

## 2018-05-14 PROCEDURE — G8979 MOBILITY GOAL STATUS: HCPCS | Performed by: PHYSICAL THERAPIST

## 2018-05-14 PROCEDURE — 97150 GROUP THERAPEUTIC PROCEDURES: CPT | Performed by: PHYSICAL THERAPIST

## 2018-05-14 PROCEDURE — 99024 POSTOP FOLLOW-UP VISIT: CPT | Performed by: ORTHOPAEDIC SURGERY

## 2018-05-14 PROCEDURE — 97110 THERAPEUTIC EXERCISES: CPT | Performed by: PHYSICAL THERAPIST

## 2018-05-14 PROCEDURE — G8978 MOBILITY CURRENT STATUS: HCPCS | Performed by: PHYSICAL THERAPIST

## 2018-05-14 NOTE — PROGRESS NOTES
Assessment/Plan:  1  S/P right knee arthroscopy      No orders of the defined types were placed in this encounter  Continue with  PT until hernia surgery, then do home exercises as tolerated  Continue tylenol as needed for pain control     Return if symptoms worsen or fail to improve  - if any persistent pain after hernia surgery in knee f/u  Can resume PT at that time  Subjective   Chief Complaint:   Chief Complaint   Patient presents with    Right Knee - Post-op, Follow-up       Arianna Charles is a 66 y o  female who presents for 6 week follow up s/p right knee arthroscopy on 4/4  She has started Pt since her last visit  She states PT is helping  She still has pain that comes and goes  The pain is medial and does not radiate  Her pain is worse by the end of the day with activity  Her knee feels stable to her  She no longer needs to wear the knee brace  She is taking tylenol for pain control  Overall she feels she is improving  She is planning on having surgery for her hernia in the near future-possibly in 1-2 weeks  She is going to have this procedure done in [place] were she can stay with her sister  Review of Systems  ROS:    See HPI for musculoskeletal review     All other systems reviewed are negative     History:  Past Medical History:   Diagnosis Date    Anesthesia     "sometimes low BP upon waking up"    Arthritis     Asthma     Back pain     Dolan's esophagus     Chronic pain disorder     Chronic venous insufficiency     Cough variant asthma     Environmental allergies     dust    GERD (gastroesophageal reflux disease)     Hiatal hernia     History of anemia     History of fusion of spine for scoliosis     "as a teenager"    History of transfusion     2001    Hyperlipidemia     Left lumbar radiculitis     Last Assessed: [id number]    Lumbar postlaminectomy syndrome     Migraines     Morbid obesity ([address] )     Motion sickness     Osteoarthritis     of left hip    Right knee pain     Seasonal allergies     Shortness of breath     Umbilical hernia     Uses brace     right knee    Uses crutches     one    Wears glasses      Past Surgical History:   Procedure Laterality Date    ARTHRODESIS      lumbar    ARTHRODESIS      Spinal Arthrodesis for Deformity; Last Assessed: [id number]   [employer] BACK SURGERY      lumbar fusion,with nydia/screw and cage implant    COLONOSCOPY      PLANTAR FASCIA SURGERY      PA COLONOSCOPY FLX DX W/COLLJ SPEC WHEN PFRMD N/A 2/20/2018    Procedure: COLONOSCOPY with polypectomy;  Surgeon: Elinor Smith MD;  Location: [facility]; Service: General    PA ESOPHAGOGASTRODUODENOSCOPY TRANSORAL DIAGNOSTIC N/A 5/24/2017    Procedure: ESOPHAGOGASTRODUODENOSCOPY (EGD); Surgeon: Sander Lovelace MD;  Location: [facility]; Service: Gastroenterology    PA ESOPHAGOGASTRODUODENOSCOPY TRANSORAL DIAGNOSTIC N/A 8/31/2017    Procedure: ESOPHAGOGASTRODUODENOSCOPY (EGD) W RFA;  Surgeon: Ary Amin MD;  Location:  GI LAB;   Service: Gastroenterology    PA KNEE SCOPE,MED/LAT MENISECTOMY Right 4/4/2018    Procedure: ARTHROSCOPY KNEE PARTIAL MEDIAL MENISECTOMY , CHONDROPLASTY;  Surgeon: Bk Rao DO;  Location: [facility];  Service: Orthopedics    WISDOM TOOTH EXTRACTION       Social History   History   Alcohol Use    Yes     Comment: minimal     History   Drug Use No     History   Smoking Status    Never Smoker   Smokeless Tobacco    Never Used     Family History:   Family History   Problem Relation Age of Onset    Lung cancer Mother     Cancer Mother     Alcohol abuse Father     Other Father      Cardiac Disorder    Depression Father     Hypertension Father     Heart attack Father     Breast cancer Maternal Grandmother     Diabetes type I Other        Meds/Allergies     (Not in a hospital admission)  Allergies   Allergen Reactions    Dust Mite Extract Shortness Of Breath    Latex Itching and Blisters    Other      seasonal    Sulfa Antibiotics Vomiting     Topical-blistering          Objective     /77   Pulse 92   Ht 5' 4 25" (1 632 m)      PE:  AAOx 3  WDWN  Hearing intact, no drainage from eyes  no audible wheezing  no abdominal distension  LE compartments soft, AT/GS intact    Ortho Exam:  right Knee:   INCs:  Healed, No erythema, mild swelling  AROM:Full   Stable to varus/valgus stress

## 2018-05-14 NOTE — PROGRESS NOTES
Daily Note     Today's date: 2018  Patient name: Agustina Mathew  : 1960  MRN: [id number]  Referring provider: Case Mahoney DO  Dx:   No diagnosis found  Subjective: Pt comes to therapy reporting no pain in knee and states she has had minimal to no soreness lately  Notes she has follow up with surgeon today at noon  Objective: See treatment diary below    Precautions: asthma, GERD, h/o lumbar fusion    Daily Treatment Diary     Manual       PROM R K' & H' perf perf [address]  [facility] perf                      Exercise Diary       bike 5' 5' 5' 5' 5' 8' 8' 8'                  Mini squats 15 2x10 3x10 3x10 3x10 3x10 3x10 3x10     TB Side stepping 2 laps otb 4 laps otb 5 laps otb 5 laps gtb 3 laps 3 laps no band due to back pain 3 laps 3 laps     SLS 30"x3  30"x3 30"x3 30"x3 foam 3x30" foam 30"x3 3x30" foam     Step ups/downs nv 1R 2x10 ea 1R 2x10 1R  2x10 1R 2x10 1R 2x10 1R 2x10 2R 2x10                  Sit to stands nv nv    10x 10x 10x                  LAQ     30 30x 30x 30x     Seated H IR/ER 10 ea 2x10 ea 3x10 ea 3x10 ea 30 ea 30x ea 30x ea 30x ea     SLR - 4-way 2x10 2x10  2x10 flex/abd only 20 20x ea  20x  Ea  (no ext) 20x      Bridges w/TB 2x10 (tb-nv) 2x10 3 x10 3x10  No tb 30 30 30 30     Clamshells w/TB nv 2x10 3x10 3x10  No tb 30 30 30 np                                                                                                    Modalities        CP PRN 10'  10' 10' 10' deferred 5'                                    Assessment: Tolerated treatment well  Patient exhibited good technique with therapeutic exercises and would benefit from continued PT  Pt demonstrates good form and technique with current exercise program      Plan: Potential discharge next visit  Progress treatment as tolerated

## 2018-05-16 ENCOUNTER — OFFICE VISIT (OUTPATIENT)
Dept: PHYSICAL THERAPY | Facility: CLINIC | Age: 58
End: 2018-05-16
Payer: MEDICARE

## 2018-05-16 DIAGNOSIS — M25.561 RIGHT KNEE PAIN, UNSPECIFIED CHRONICITY: Primary | ICD-10-CM

## 2018-05-16 DIAGNOSIS — Z98.890 S/P RIGHT KNEE ARTHROSCOPY: ICD-10-CM

## 2018-05-16 PROCEDURE — 97110 THERAPEUTIC EXERCISES: CPT | Performed by: PHYSICAL THERAPIST

## 2018-05-16 NOTE — PROGRESS NOTES
Daily Note     Today's date: 2018  Patient name: Cory Barraza  : 1960  MRN: [id number]  Referring provider: Jun Bellamy DO  Dx:   Encounter Diagnosis     ICD-10-CM    1  Right knee pain, unspecified chronicity M25 561    2  S/P right knee arthroscopy Z98 890                   Subjective: Pt comes to therapy reporting no pain in knee and states she has had minimal to no soreness lately  Notes she had follow up with physician two days ago  Reports she is going to [place] to have hernia repair, therefore, will be discontinuing therapy here after today's session  Objective: See treatment diary below    Precautions: asthma, GERD, h/o lumbar fusion    Daily Treatment Diary     Manual      PROM R K' & H' perf perf [address]  [facility] perf np                     Exercise Diary      bike 5' 5' 5' 5' 5' 8' 8' 8' 8'                 Mini squats 15 2x10 3x10 3x10 3x10 3x10 3x10 3x10 3x10    TB Side stepping 2 laps otb 4 laps otb 5 laps otb 5 laps gtb 3 laps 3 laps no band due to back pain 3 laps 3 laps 5 laps    SLS 30"x3  30"x3 30"x3 30"x3 foam 3x30" foam 30"x3 3x30" foam 3x30" foam    Step ups/downs nv 1R 2x10 ea 1R 2x10 1R  2x10 1R 2x10 1R 2x10 1R 2x10 2R 2x10 2R 2x10                 Sit to stands nv nv    10x 10x 10x 10x                 LAQ     30 30x 30x 30x 30x    Seated H IR/ER 10 ea 2x10 ea 3x10 ea 3x10 ea 30 ea 30x ea 30x ea 30x ea 30x ea    SLR - 4-way 2x10 2x10  2x10 flex/abd only 20 20x ea  20x  Ea  (no ext) 20x  20x    Bridges w/TB 2x10 (tb-nv) 2x10 3 x10 3x10  No tb 30 30 30 30 30x    Clamshells w/TB nv 2x10 3x10 3x10  No tb 30 30 30 np 30x                                                                                                   Modalities  / 5/ 5/7 5/14 16     CP PRN 10'  10' 10' 10' deferred 5'                                    Assessment: Tolerated treatment well   Patient exhibited good technique with therapeutic exercises and would benefit from continued PT  Pt demonstrates good form and technique with current exercise program  Given updated HEP  Plan: Discharged to HEP  Expresses interest in pursuing clinic fitness program upon returning to the area following recovery from surgery

## 2018-05-18 ENCOUNTER — APPOINTMENT (OUTPATIENT)
Dept: PHYSICAL THERAPY | Facility: CLINIC | Age: 58
End: 2018-05-18
Payer: MEDICARE

## 2018-05-21 ENCOUNTER — APPOINTMENT (OUTPATIENT)
Dept: PHYSICAL THERAPY | Facility: CLINIC | Age: 58
End: 2018-05-21
Payer: MEDICARE

## 2018-05-23 ENCOUNTER — TRANSITIONAL CARE MANAGEMENT (OUTPATIENT)
Dept: INTERNAL MEDICINE CLINIC | Facility: CLINIC | Age: 58
End: 2018-05-23

## 2018-05-23 ENCOUNTER — APPOINTMENT (OUTPATIENT)
Dept: PHYSICAL THERAPY | Facility: CLINIC | Age: 58
End: 2018-05-23
Payer: MEDICARE

## 2018-05-23 ENCOUNTER — TELEPHONE (OUTPATIENT)
Dept: INTERNAL MEDICINE CLINIC | Facility: CLINIC | Age: 58
End: 2018-05-23

## 2018-05-25 ENCOUNTER — APPOINTMENT (OUTPATIENT)
Dept: PHYSICAL THERAPY | Facility: CLINIC | Age: 58
End: 2018-05-25
Payer: MEDICARE

## 2018-05-30 ENCOUNTER — APPOINTMENT (OUTPATIENT)
Dept: PHYSICAL THERAPY | Facility: CLINIC | Age: 58
End: 2018-05-30
Payer: MEDICARE

## 2018-06-07 ENCOUNTER — TELEPHONE (OUTPATIENT)
Dept: OBGYN CLINIC | Facility: CLINIC | Age: 58
End: 2018-06-07

## 2018-06-07 NOTE — TELEPHONE ENCOUNTER
Dr Mariano Pace had hernia surgery in [place] and will be leaving to come back home  You did knee surgery on her in April and she is requesting a prescription for Tramadol be sent to [facility] in [phone] so her knee won't be bothering her during the trip home  Best contact #[phone]    Thank you

## 2018-06-08 NOTE — TELEPHONE ENCOUNTER
Both myself and Dr Edinson Grande to talk to the patient today  We let her know that we will not call her in a prescription for tramadol in a different state and she should not be driving with tramadol anyway  We recommended she take up to 3000 mg of Tylenol and rest and ice her knee before her trip  We also states she should stop and stretch her knee periodically  When she gets back from her trip we can re-evaluate her knee if she still having pain

## 2018-06-08 NOTE — TELEPHONE ENCOUNTER
Pt called again  She is really hoping that she can have this med called in today so that she will have it for the weekend and her drive home   She is also requesting a new order for PT

## 2018-06-15 ENCOUNTER — TELEPHONE (OUTPATIENT)
Dept: OBGYN CLINIC | Facility: HOSPITAL | Age: 58
End: 2018-06-15

## 2018-06-15 NOTE — TELEPHONE ENCOUNTER
Patient is calling stating that her knee is still bothering her and that she was told she should do physical therapy for it but needs a script       Jyothi pt

## 2018-06-18 DIAGNOSIS — Z98.890 S/P RIGHT KNEE ARTHROSCOPY: Primary | ICD-10-CM

## 2018-06-18 NOTE — TELEPHONE ENCOUNTER
I called and left the patient know that I placed a PT order  She will start PT at her current place

## 2018-06-19 ENCOUNTER — EVALUATION (OUTPATIENT)
Dept: PHYSICAL THERAPY | Facility: CLINIC | Age: 58
End: 2018-06-19
Payer: MEDICARE

## 2018-06-19 DIAGNOSIS — Z98.890 S/P RIGHT KNEE ARTHROSCOPY: Primary | ICD-10-CM

## 2018-06-19 PROCEDURE — 97162 PT EVAL MOD COMPLEX 30 MIN: CPT | Performed by: PHYSICAL MEDICINE & REHABILITATION

## 2018-06-19 PROCEDURE — G8978 MOBILITY CURRENT STATUS: HCPCS | Performed by: PHYSICAL MEDICINE & REHABILITATION

## 2018-06-19 PROCEDURE — G8979 MOBILITY GOAL STATUS: HCPCS | Performed by: PHYSICAL MEDICINE & REHABILITATION

## 2018-06-19 NOTE — PROGRESS NOTES
PT Evaluation     Today's date: 2018  Patient name: Isidra Madrid  : 1960  MRN: [id number]  Referring provider: Alondra Kerns  Dx:   Encounter Diagnosis     ICD-10-CM    1  S/P right knee arthroscopy Z98 890 Ambulatory referral to Physical Therapy                  Assessment  Impairments: activity intolerance, impaired balance, impaired physical strength and pain with function    Assessment details: Patient is a 66 y o  female presenting to therapy with pain, decreased strength, balance abnormality  Current exacerbation presents following period of limited activity due to abdominal hernia repair earlier this month  Current deficits limit functional activity tolerance and ADL performance  Patient would benefit from skilled intervention to address current deficits and maximize function  Thank you for the referral     Understanding of Dx/Px/POC: good   Prognosis: good    Goals  - Patient will report pain reduction by at least 2 levels with activity within 4 weeks  - Patient will tolerate ambulation greater than 2 blocks by discharge  - Patient will be independent with HEP  - Patient will maintain neutral knee position without hyperextension during functional activity by discharge    Plan  Patient would benefit from: skilled physical therapy  Planned modality interventions: cryotherapy  Planned therapy interventions: neuromuscular re-education, manual therapy, balance, gait training, home exercise program and strengthening  Frequency: 2x week  Duration in weeks: 4  Treatment plan discussed with: patient        Subjective Evaluation    History of Present Illness  Onset date: Long history  Date of surgery: May  Mechanism of injury:  Increased pain with driving, stairclimbing, squatting, sit to stand transfers, walking increase pain  Patient notes recent increase in swelling  Minimal relief with OTC medication    ACL repair, menisectomy, bicompartmental OA  Pain  At best pain rating: 3  At worst pain rating: 10          Objective     Palpation     Additional Palpation Details  No TTP through the right knee in supported rest position, no palpable scar tissue/adhesions at incision sites      Active Range of Motion     Additional Active Range of Motion Details  Active and passive knee ROM WNL, minimal discomfort at end range flexion  Slight genu recurvatum in standing    Strength/Myotome Testing     Left Knee   Flexion: 4  Extension: 4+    Right Knee   Flexion: 4  Extension: 4+    Additional Strength Details  Ankle strength WNL  Hip flexion 4/5  Decreased hip control, valgus knee position noted during functional squat    General Comments     Knee Comments  Gait: Slight circumduction of RLE during swing, decreased gait speed  Balance: unable to maintain SLS for greater than 5 seconds        Precautions: Spinal fusion T7-L3, Abdominal hernia repair 6/5/18, previous knee surgery    Daily Treatment Diary     Manual                                                                                 No prone TE, limit end range forward flexion exercise  Exercise Diary              Bike             TKE with TB to neutral ext             Heel tap             Wall squat             Standing hip ext/abd             Clamshells with TB             Hip sliders with neutral trunk             SLS             Sidestepping                                                                                                                                                                Modalities              CP prn

## 2018-06-20 RX ORDER — DEXLANSOPRAZOLE 60 MG/1
60 CAPSULE, DELAYED RELEASE ORAL
COMMUNITY
End: 2018-07-11 | Stop reason: DRUGHIGH

## 2018-06-20 RX ORDER — DULOXETIN HYDROCHLORIDE 60 MG/1
60 CAPSULE, DELAYED RELEASE ORAL DAILY
COMMUNITY
End: 2019-02-14 | Stop reason: SDUPTHER

## 2018-06-21 ENCOUNTER — OFFICE VISIT (OUTPATIENT)
Dept: INTERNAL MEDICINE CLINIC | Facility: CLINIC | Age: 58
End: 2018-06-21
Payer: MEDICARE

## 2018-06-21 VITALS
DIASTOLIC BLOOD PRESSURE: 90 MMHG | HEIGHT: 64 IN | RESPIRATION RATE: 18 BRPM | TEMPERATURE: 98.6 F | HEART RATE: 88 BPM | OXYGEN SATURATION: 98 % | SYSTOLIC BLOOD PRESSURE: 142 MMHG

## 2018-06-21 DIAGNOSIS — M25.561 CHRONIC PAIN OF RIGHT KNEE: ICD-10-CM

## 2018-06-21 DIAGNOSIS — J45.991 COUGH VARIANT ASTHMA: ICD-10-CM

## 2018-06-21 DIAGNOSIS — Z98.890 S/P RIGHT KNEE ARTHROSCOPY: ICD-10-CM

## 2018-06-21 DIAGNOSIS — Z23 NEED FOR VACCINATION WITH 13-POLYVALENT PNEUMOCOCCAL CONJUGATE VACCINE: Primary | ICD-10-CM

## 2018-06-21 DIAGNOSIS — Z91.09 ENVIRONMENTAL ALLERGIES: ICD-10-CM

## 2018-06-21 DIAGNOSIS — G89.29 CHRONIC PAIN OF RIGHT KNEE: ICD-10-CM

## 2018-06-21 PROBLEM — K43.6 INCARCERATED VENTRAL HERNIA: Status: RESOLVED | Noted: 2018-02-05 | Resolved: 2018-06-21

## 2018-06-21 PROBLEM — S83.241A ACUTE MEDIAL MENISCAL TEAR, RIGHT, INITIAL ENCOUNTER: Status: RESOLVED | Noted: 2018-03-08 | Resolved: 2018-06-21

## 2018-06-21 PROCEDURE — 99214 OFFICE O/P EST MOD 30 MIN: CPT | Performed by: INTERNAL MEDICINE

## 2018-06-21 PROCEDURE — G0009 ADMIN PNEUMOCOCCAL VACCINE: HCPCS

## 2018-06-21 PROCEDURE — 90670 PCV13 VACCINE IM: CPT

## 2018-06-21 NOTE — PATIENT INSTRUCTIONS
Please start cetirizine daily and please call in 1 week regarding your response  Please follow through on her plans to restart physical therapy, find a new job and moved to a new residence

## 2018-06-21 NOTE — PROGRESS NOTES
Assessment/Plan   Problem List Items Addressed This Visit     Cough variant asthma    Chronic pain of right knee    S/P right knee arthroscopy    Environmental allergies      Other Visit Diagnoses     Need for vaccination with 13-polyvalent pneumococcal conjugate vaccine    -  Primary    Relevant Orders    PNEUMOCOCCAL CONJUGATE VACCINE 13-VALENT GREATER THAN 6 MONTHS      Continue Flovent with good control of cough variant asthma  Reassess once Mallory Pate is in a new living environment  Restart cetirizine 10 milligrams daily for environmental allergies with recent reintroduction to her longstanding allergenic environment  Mallory Pate will call in 1 week about her response  Right knee pain from arthritis and structural damage to right knee prior to surgery has resolved  There is some residual discomfort after surgery and Mallory Pate is going to restart physical therapy, with goal of regaining function, then losing weight with regular exercise at the gym associated with her physical therapy department, and better diet  Also, Prevnar was given today, with Pneumovax in 1 year  She will call her local pharmacy about coverage for the new shingles vaccine  Subjective   Patient ID: Marcos Goldsmith is a 66 y o  female  Vitals:    06/21/18 0734   BP: 142/90   Pulse: 88   Resp: 18   Temp: 98 6 °F (37 °C)   SpO2: 98%     HPI   Mallory Pate is here for several issues  First, she did undergo laparoscopic right knee surgery with recovery with her sister in [place] since her surgery 2 months ago  She had partial ACL tear and repair and removal of meniscal cartilage  She started physical therapy in [place], then had come back, to resume part-time work, and then had [facility]  Since she came back to her current living environment, her allergies have flared with increased nasal congestion and postnasal drainage   She ran out of cetirizine and has been taking a short-acting antihistamine, apparently generic and not sure the name  She has avoid decongestants  Blood pressure is mildly elevated with some right knee pain with increased ambulation recently, but also most likely because of deconditioning  This was discussed  Plan is to monitor blood pressure as she restarts recovery after knee surgery  Thomas Gamboa is a social situation is about to improve as she is going to be moving out of a residence where she has worked for years, and this is not a good environment for her  She is getting help from a local occupational assistance agency, and has some leads on a new job and will be able to live in her own apartment  This should be less allergenic environment and better for her psychologically and physically  The following portions of the patient's history were reviewed and updated as appropriate: allergies, current medications, past family history, past medical history, past social history, past surgical history and problem list     Review of Systems no chest pain or shortness of breath  No wheezing  Objective   Physical Exam   Appears well in no acute distress  ENT exam shows clear nasal congestion and postnasal drainage, no hemorrhages or exudates  No JVD  Lungs clear including no wheezing  Cardiac exam normal on auscultation  Exam lower extremities demonstrates chronic edema both lower legs slightly more prominent on the right than the left, well-healed laparoscopic scars from right knee surgery, mild effusion of right knee with no increased temperature redness, no instability, no popliteal mass or tenderness, no calf tenderness or phlebitic findings  Circulation intact  Gait is stable but slightly antalgic favoring bearing weight on the left leg        Patient Active Problem List   Diagnosis    Anosmia    Anterolisthesis    Arthritis of lumbar spine ([address] )    Dolan's esophagus    Blood glucose elevated    Chronic low back pain    Chronic pain disorder    Chronic venous insufficiency    Cough variant asthma    Depression    GERD (gastroesophageal reflux disease)    Hyperlipidemia    Lumbar postlaminectomy syndrome    Morbid obesity (HCC)    Primary localized osteoarthritis of right knee    Primary osteoarthritis of left hip    Restless legs syndrome    Seasonal allergies    Sleep disturbance    Obesity (BMI 35 0-39 9 without comorbidity)    Primary osteoarthritis of right knee    Bilateral lower extremity edema    Chronic pain of right knee    S/P right knee arthroscopy    Environmental allergies     Current Outpatient Prescriptions:     albuterol (PROVENTIL HFA,VENTOLIN HFA) 90 mcg/act inhaler, Inhale 2 puffs daily, Disp: , Rfl:     cetirizine (ZyrTEC) 10 mg tablet, Take 1 tablet by mouth daily  , Disp: , Rfl:     dexlansoprazole (DEXILANT) 60 MG capsule, Take 60 mg by mouth, Disp: , Rfl:     DULoxetine (CYMBALTA) 60 mg delayed release capsule, Take 60 mg by mouth, Disp: , Rfl:     esomeprazole (NexIUM) 40 MG capsule, Take 40 mg by mouth 2 (two) times a day before meals, Disp: , Rfl:     FLOVENT HFA 44 MCG/ACT inhaler, Inhale 2 actuation 4 (four) times a day as needed  , Disp: , Rfl: 5    rOPINIRole (REQUIP) 2 mg tablet, Take 1 tablet (2 mg total) by mouth daily at bedtime, Disp: 90 tablet, Rfl: 3

## 2018-06-22 ENCOUNTER — TELEPHONE (OUTPATIENT)
Dept: INTERNAL MEDICINE CLINIC | Facility: CLINIC | Age: 58
End: 2018-06-22

## 2018-06-26 ENCOUNTER — OFFICE VISIT (OUTPATIENT)
Dept: PHYSICAL THERAPY | Facility: CLINIC | Age: 58
End: 2018-06-26
Payer: MEDICARE

## 2018-06-26 DIAGNOSIS — Z98.890 S/P RIGHT KNEE ARTHROSCOPY: Primary | ICD-10-CM

## 2018-06-26 PROCEDURE — 97110 THERAPEUTIC EXERCISES: CPT

## 2018-06-26 PROCEDURE — 97112 NEUROMUSCULAR REEDUCATION: CPT

## 2018-06-26 NOTE — PROGRESS NOTES
Daily Note     Today's date: 2018  Patient name: Caleb Harris  : 1960  MRN: [id number]  Referring provider: Mark Chow  Dx:   Encounter Diagnosis     ICD-10-CM    1  S/P right knee arthroscopy Z98 890                   Subjective: Pt reports she is having pain in her knee today noting its been like this for awhile  Notes that her asthma is bothering her as well today  Objective: See treatment diary below  Precautions: Spinal fusion T7-L3, Abdominal hernia repair 18, previous knee surgery     Daily Treatment Diary      Manual                                                                                                                                                   No prone TE, limit end range forward flexion exercise  Exercise Diary                        Bike  5'                     TKE with TB to neutral ext  Peach 2x10                     Heel tap  20x ea                     Wall squat                       Standing hip ext/abd  2x10 ea                     Clamshells with TB  OTB 2x10                     Hip sliders with neutral trunk  2x10 ea flx/abd                     SLS  20"x3 ea                     Sidestepping  3 laps                      TM  NV                                                                                                                                                                                                                                                                           Modalities   6 26                     CP prn  10'                                                                               Assessment: Tolerated treatment fair having difficulty with hip sliders today needed extra breaks in between due to having trouble breathing  Pt requested using the TM NV due to wanting to start walking more   Patient demonstrated fatigue post treatment, exhibited good technique with therapeutic exercises and would benefit from continued PT      Plan: Continue per plan of care

## 2018-06-28 ENCOUNTER — OFFICE VISIT (OUTPATIENT)
Dept: PHYSICAL THERAPY | Facility: CLINIC | Age: 58
End: 2018-06-28
Payer: MEDICARE

## 2018-06-28 DIAGNOSIS — Z98.890 S/P RIGHT KNEE ARTHROSCOPY: Primary | ICD-10-CM

## 2018-06-28 PROCEDURE — 97110 THERAPEUTIC EXERCISES: CPT

## 2018-06-28 NOTE — PROGRESS NOTES
Daily Note     Today's date: 2018  Patient name: Cory Barraza  : 1960  MRN: [id number]  Referring provider: Karina Barba  Dx:   Encounter Diagnosis     ICD-10-CM    1  S/P right knee arthroscopy Z98 890                   Subjective: Pt presents to PT reporting pain and stiffness in R knee grading the pain a 2/10  Objective: See treatment diary below  Precautions: Spinal fusion T7-L3, Abdominal hernia repair 18, previous knee surgery     Daily Treatment Diary      Manual                                                                                                                                                   No prone TE, limit end range forward flexion exercise  Exercise Diary                      Bike  5'  5 min                   TKE with TB to neutral ext  Peach 2x10   Peach 2x10                   Heel tap  20x ea  20x ea                   Wall squat    attempted, pn                   Standing hip ext/abd  2x10 ea  2x10 ea                   Clamshells with TB  OTB 2x10  OTB 2x10                   Hip sliders with neutral trunk  2x10 ea flx/abd  pn                   SLS  20"x3 ea  20"x3 ea                   Sidestepping  3 laps  peach  2 laps                    TM  NV                      Mini squats   3" x 15                                                                                                                                                                                                                                                 Modalities                      CP prn  10'  10 mins                                                                             Assessment: Pt demonstrates good to fair tolerance to TE  She reported pain with sliders LV and reports pain with wall squats  Added mini squats at the bar with no complaints of pain  Pt reported pain while attempting wall squats despite modifications and proper technique     Patient demonstrated fatigue post treatment, exhibited good technique with therapeutic exercises and would benefit from continued PT      Plan: Continue per plan of care

## 2018-06-29 ENCOUNTER — TELEPHONE (OUTPATIENT)
Dept: INTERNAL MEDICINE CLINIC | Facility: CLINIC | Age: 58
End: 2018-06-29

## 2018-06-29 NOTE — TELEPHONE ENCOUNTER
Pt called stating she was here last week for allergy/asthma symptoms and was told to call back today on how she is feeling and she is still having sob and asthma  She states she is still taking all the meds you instructed her to take  She can be reached at [phone]

## 2018-06-29 NOTE — TELEPHONE ENCOUNTER
OK: please call Laura Mcdermott and asked her to continue Flovent and cetirizine, but also to add over-the-counter fluticasone nasal spray, which is generic for Flovent nasal spray, 2 sprays in each nostril at bedtime  I would like her to call back on Monday about how she is doing

## 2018-07-02 ENCOUNTER — TELEPHONE (OUTPATIENT)
Dept: PAIN MEDICINE | Facility: MEDICAL CENTER | Age: 58
End: 2018-07-02

## 2018-07-02 NOTE — TELEPHONE ENCOUNTER
Call from patient   Call back # [phone]  Dr Julieta Sanchez       Patient left message at 12:38p on 07/02/18  Patient would like to come into the office for an injection  Patient states she is having lower left side back pain

## 2018-07-02 NOTE — TELEPHONE ENCOUNTER
S/p right knee arthroscopy with Dr Farzaneh Montemayor on 4/4/18  S/p ventral hernia repair with Dr Kelly Scott at [facility] on 5/22/18    2017: 4/19 & 5/10 left MBB with left L4, L5 RFA on 6/12; left hip injection on 7/31  Last seen by Edwardo Maxwell on 9/28/17  Patient not seen in 9 months - please schedule f/u to assess and discuss plan of care   Can be with NP

## 2018-07-03 ENCOUNTER — OFFICE VISIT (OUTPATIENT)
Dept: PHYSICAL THERAPY | Facility: CLINIC | Age: 58
End: 2018-07-03
Payer: MEDICARE

## 2018-07-03 DIAGNOSIS — Z98.890 S/P RIGHT KNEE ARTHROSCOPY: Primary | ICD-10-CM

## 2018-07-03 PROCEDURE — 97110 THERAPEUTIC EXERCISES: CPT

## 2018-07-03 NOTE — PROGRESS NOTES
Daily Note     Today's date: 7/3/2018  Patient name: Teresa Ignacio  : 1960  MRN: [id number]  Referring provider: Armida Wood  Dx:   Encounter Diagnosis     ICD-10-CM    1  S/P right knee arthroscopy Z98 890                   Subjective: Pt presents to PT reporting pain and stiffness in R knee grading the pain a 2/10  Objective: See treatment diary below  Precautions: Spinal fusion T7-L3, Abdominal hernia repair 18, previous knee surgery     Daily Treatment Diary      Manual                                                                                                                                                   No prone TE, limit end range forward flexion exercise  Exercise Diary                      Bike  5'  5 min  5 min                 TKE with TB to neutral ext  Peach 2x10   Peach 2x10  peach  2x10                 Heel tap  20x ea  20x ea  20 ea                 Wall squat    attempted, pn  np                 Standing hip ext/abd  2x10 ea  2x10 ea  2x10 ea                 Clamshells with TB  OTB 2x10  OTB 2x10  otb 20x                 Hip sliders with neutral trunk  2x10 ea flx/abd  pn                   SLS  20"x3 ea  20"x3 ea  20"x3                 Sidestepping  3 laps  peach  2 laps  peach 3 laps                  TM  NV    NV                  Mini squats   3" x 15  3"x20                                                                                                                                                                                                                                               Modalities     7/3                 CP prn  10'  10 mins  10 min                                                                           Assessment: Pt demonstrates good tolerance with exercises  Mild soreness upon completion of exercises     Patient demonstrated fatigue post treatment, exhibited good technique with therapeutic exercises and would benefit from continued PT      Plan: Continue per plan of care

## 2018-07-05 ENCOUNTER — OFFICE VISIT (OUTPATIENT)
Dept: PHYSICAL THERAPY | Facility: CLINIC | Age: 58
End: 2018-07-05
Payer: MEDICARE

## 2018-07-05 DIAGNOSIS — Z98.890 S/P RIGHT KNEE ARTHROSCOPY: Primary | ICD-10-CM

## 2018-07-05 PROCEDURE — 97110 THERAPEUTIC EXERCISES: CPT | Performed by: PHYSICAL THERAPIST

## 2018-07-05 NOTE — PROGRESS NOTES
Daily Note     Today's date: 2018  Patient name: Jeni Villa  : 1960  MRN: [id number]  Referring provider: Lizzy Little  Dx:   Encounter Diagnosis     ICD-10-CM    1  S/P right knee arthroscopy Z98 890                   Subjective: Pt comes to therapy reporting right knee is feeling better, noting she didn't have any pain with stairs today, which pleasantly surprised her  Objective: See treatment diary below  Precautions: Spinal fusion T7-L3, Abdominal hernia repair 18, previous knee surgery     Daily Treatment Diary      Manual                                                                                                                                                   No prone TE, limit end range forward flexion exercise  Exercise Diary                  Bike  5'  5 min  5 min  5 min               TM     5 min                      TKE with TB to neutral ext  Peach 2x10   Peach 2x10  peach  2x10  otb 2x10               Heel tap  20x ea  20x ea  20 ea  20 ea               Wall squat    attempted, pn  np                 Standing hip ext/abd  2x10 ea  2x10 ea  2x10 ea  2x15 ea               Clamshells with TB  OTB 2x10  OTB 2x10  otb 20x  otb 20x ea               Hip sliders with neutral trunk  2x10 ea flx/abd  pn                   SLS  20"x3 ea  20"x3 ea  20"x3  20"x3ea               Sidestepping  3 laps  peach  2 laps  peach 3 laps  peach 3 laps                Mini squats   3" x 15  3"x20  3'x30                                                                                                                                                                                                                                             Modalities   6 26  6/28  7/3  7/5               CP prn  10'  10 mins  10 min  8 mins                                                                         Assessment: Pt demonstrates good tolerance with exercises   Denied soreness or discomfort at end of session  Patient demonstrated fatigue post treatment, exhibited good technique with therapeutic exercises and would benefit from continued PT      Plan: Continue per plan of care

## 2018-07-06 DIAGNOSIS — K21.9 GASTROESOPHAGEAL REFLUX DISEASE WITHOUT ESOPHAGITIS: Primary | ICD-10-CM

## 2018-07-06 DIAGNOSIS — K22.70 BARRETT'S ESOPHAGUS WITHOUT DYSPLASIA: ICD-10-CM

## 2018-07-06 NOTE — TELEPHONE ENCOUNTER
Tanika Godwin called from [facility] stating that patient called for refills on her Dexilant  Patient has been taking Dexilant bid daily instead of once daily  Please advise how patient is supposed to be taking this medication  Patient is not able to get refills because she used more than what the rx is written for  Rx can be printed out and faxed to [phone] if she is supposed to take it bid  Thanks

## 2018-07-08 NOTE — TELEPHONE ENCOUNTER
Please call Clarita Curtis for clarification  For example, was this recommended by DANIEL, was this a change that Clarita Curtis made? When did the dosage change?

## 2018-07-10 ENCOUNTER — OFFICE VISIT (OUTPATIENT)
Dept: PHYSICAL THERAPY | Facility: CLINIC | Age: 58
End: 2018-07-10
Payer: MEDICARE

## 2018-07-10 DIAGNOSIS — K22.70 BARRETT'S ESOPHAGUS WITHOUT DYSPLASIA: Primary | ICD-10-CM

## 2018-07-10 DIAGNOSIS — Z98.890 S/P RIGHT KNEE ARTHROSCOPY: Primary | ICD-10-CM

## 2018-07-10 DIAGNOSIS — K21.9 GASTROESOPHAGEAL REFLUX DISEASE WITHOUT ESOPHAGITIS: ICD-10-CM

## 2018-07-10 PROCEDURE — 97150 GROUP THERAPEUTIC PROCEDURES: CPT

## 2018-07-10 NOTE — PROGRESS NOTES
Can you please sign off on rx? Please keep rx on print, I will fax rx to the [facility] for patient to receive her medication   Thanks

## 2018-07-10 NOTE — TELEPHONE ENCOUNTER
Spoke to patient, she stated that her GI doctor increased her Dexilant to bid daily  Advised patient I would give the message to Dr Misael Moss but could not promise that he would take responsibility for the script  Patient stated that she needs to reschedule apt with the GI doctor for another procedure  Advised patient I will call her back with Dr Nicole Soria response

## 2018-07-10 NOTE — PROGRESS NOTES
Daily Note     Today's date: 7/10/2018  Patient name: Georges Cuellar  : 1960  MRN: [id number]  Referring provider: Bhupinder Fisher  Dx:   Encounter Diagnosis     ICD-10-CM    1  S/P right knee arthroscopy Z98 890                   Subjective: Pt reports with c/o increased soreness which she attributes to cleaning her bedroom yesterday  Objective: See treatment diary below  Precautions: Spinal fusion T7-L3, Abdominal hernia repair 18, previous knee surgery     Daily Treatment Diary      Manual                                                                                                                                                   No prone TE, limit end range forward flexion exercise  Exercise Diary     7/10             Bike  5'  5 min  5 min  5 min  5 min             TM     5 min 5 min                     TKE with TB to neutral ext  Peach 2x10   Peach 2x10  peach  2x10  otb 2x10  otb 5"x20             Heel tap  20x ea  20x ea  20 ea  20 ea  20 ea             Wall squat    attempted, pn  np                 Standing hip ext/abd  2x10 ea  2x10 ea  2x10 ea  2x15 ea  2x15 ea             Clamshells with TB  OTB 2x10  OTB 2x10  otb 20x  otb 20x ea  otb 20x             Hip sliders with neutral trunk  2x10 ea flx/abd  pn                   SLS  20"x3 ea  20"x3 ea  20"x3  20"x3ea  30"x2             Sidestepping  3 laps  peach  2 laps  peach 3 laps  peach 3 laps  peach 3 laps              Mini squats   3" x 15  3"x20  3'x30  3"x30                                                                                                                                                                                                                                           Modalities   6 26  6/28  7/3  7  7/10             CP prn  10'  10 mins  10 min  8 mins  10 min                                                                 Assessment: Tolerated treatment well   Patient demonstrated fatigue post treatment and exhibited good technique with therapeutic exercises      Plan: Continue per plan of care

## 2018-07-11 ENCOUNTER — OFFICE VISIT (OUTPATIENT)
Dept: PAIN MEDICINE | Facility: MEDICAL CENTER | Age: 58
End: 2018-07-11
Payer: MEDICARE

## 2018-07-11 VITALS
WEIGHT: 217.6 LBS | HEIGHT: 63 IN | RESPIRATION RATE: 14 BRPM | SYSTOLIC BLOOD PRESSURE: 118 MMHG | HEART RATE: 72 BPM | DIASTOLIC BLOOD PRESSURE: 76 MMHG | BODY MASS INDEX: 38.55 KG/M2

## 2018-07-11 DIAGNOSIS — M54.50 CHRONIC LEFT-SIDED LOW BACK PAIN WITHOUT SCIATICA: ICD-10-CM

## 2018-07-11 DIAGNOSIS — M96.1 LUMBAR POSTLAMINECTOMY SYNDROME: ICD-10-CM

## 2018-07-11 DIAGNOSIS — M53.3 SACROILIAC JOINT DYSFUNCTION: ICD-10-CM

## 2018-07-11 DIAGNOSIS — G89.29 CHRONIC LEFT-SIDED LOW BACK PAIN WITHOUT SCIATICA: ICD-10-CM

## 2018-07-11 DIAGNOSIS — M46.1 SACROILIITIS (HCC): Primary | ICD-10-CM

## 2018-07-11 PROCEDURE — 99214 OFFICE O/P EST MOD 30 MIN: CPT | Performed by: NURSE PRACTITIONER

## 2018-07-11 RX ORDER — DEXLANSOPRAZOLE 60 MG/1
60 CAPSULE, DELAYED RELEASE ORAL 2 TIMES DAILY
Qty: 60 CAPSULE | Refills: 0 | Status: SHIPPED | OUTPATIENT
Start: 2018-07-11 | End: 2018-07-17 | Stop reason: SDUPTHER

## 2018-07-11 NOTE — PROGRESS NOTES
Assessment:  1  Sacroiliitis ([address] ) - Left    2  Chronic left-sided low back pain without sciatica    3  Lumbar postlaminectomy syndrome    4  Sacroiliac joint dysfunction        Plan: At this time, I do believe the patient's pain complaint is caused by left sacroiliitis  I will schedule the patient for left SI joint injection with Dr Olivia Ramírez to help with the inflammatory component of the patient's pain  Complete risks and benefits including bleeding, infection, tissue reaction, nerve injury and allergic reaction were discussed  The approach was demonstrated using models and literature was provided  Verbal consent was obtained  Will follow up with the patient pending results of the joint injection or sooner if needed  [place] Prescription Drug Monitoring Program report was reviewed and was appropriate     My impressions and treatment recommendations were discussed in detail with the patient who verbalized understanding and had no further questions  Discharge instructions were provided  I personally saw and examined the patient and I agree with the above discussed plan of care  Orders Placed This Encounter   Procedures    FL spine and pain procedure     Standing Status:   Future     Standing Expiration Date:   7/11/2019     Order Specific Question:   Reason for Exam:     Answer:   Left SI Joint injection     Order Specific Question:   Is the patient pregnant? Answer:   No     Order Specific Question:   Anticoagulant hold needed? Answer:   N/A       History of Present Illness:    Eddie Camacho is a 66 y o  female last seen in the office on September 28, 2017 with a history of an L3-4 fusion presents today for follow-up visit in regards to her left lumbosacral back pain  At today's visit, her main complaint is pain over the left sacroiliac notch that radiates into the outer hip and into the left buttock  She denies any right-sided symptoms or radiating pain down her left leg   This pain has been present for the last 6 months  She denies any radiating pain into the left groin  She rates the pain a 3/10 on the numeric pain rating scale  She states the pain is constant and is bothersome throughout the entire day  She describes the pain as throbbing, cramping and pressure like  The pain is aggravated by sitting, walking and left side-lying  The pain is alleviated with rest     She is currently participating in physical therapy for her right knee and she just had a meniscus and ACL repair which she feels has aggravated her back pain  She is not currently taking any medication for her pain  I have personally reviewed and/or updated the patient's past medical history, past surgical history, family history, social history, current medications, allergies, and vital signs today  Review of Systems:    Review of Systems   Respiratory: Positive for shortness of breath  Cardiovascular: Negative for chest pain  Gastrointestinal: Positive for diarrhea  Negative for constipation, nausea and vomiting  Musculoskeletal: Positive for back pain, gait problem, joint swelling and myalgias  Negative for arthralgias  Skin: Negative for rash  Neurological: Negative for dizziness, seizures and weakness  All other systems reviewed and are negative        Patient Active Problem List   Diagnosis    Anosmia    Anterolisthesis    Arthritis of lumbar spine ([address] )    Dolan's esophagus    Blood glucose elevated    Chronic low back pain    Chronic pain disorder    Chronic venous insufficiency    Cough variant asthma    Depression    GERD (gastroesophageal reflux disease)    Hyperlipidemia    Lumbar postlaminectomy syndrome    Morbid obesity ([address] )    Primary localized osteoarthritis of right knee    Primary osteoarthritis of left hip    Restless legs syndrome    Seasonal allergies    Sleep disturbance    Obesity (BMI 35 0-39 9 without comorbidity)    Primary osteoarthritis of right knee  Bilateral lower extremity edema    Chronic pain of right knee    S/P right knee arthroscopy    Environmental allergies       Past Medical History:   Diagnosis Date    Anesthesia     "sometimes low BP upon waking up"    Arthritis     Asthma     Back pain     Dolan's esophagus     Chronic pain disorder     Chronic venous insufficiency     Cough variant asthma     Environmental allergies     dust    GERD (gastroesophageal reflux disease)     Hiatal hernia     History of anemia     History of fusion of spine for scoliosis     "as a teenager"    History of transfusion     2001    Hyperlipidemia     Left lumbar radiculitis     Last Assessed: [id number]    Lumbar postlaminectomy syndrome     Migraines     Morbid obesity ([address] )     Motion sickness     Osteoarthritis     of left hip    Right knee pain     Seasonal allergies     Shortness of breath     Umbilical hernia     Uses brace     right knee    Uses crutches     one    Wears glasses        Past Surgical History:   Procedure Laterality Date    ARTHRODESIS      lumbar    ARTHRODESIS      Spinal Arthrodesis for Deformity; Last Assessed: [id number]   Vena Caitlyn BACK SURGERY      lumbar fusion,with nydia/screw and cage implant    COLONOSCOPY      PLANTAR FASCIA SURGERY      [place] COLONOSCOPY FLX DX W/COLLJ SPEC WHEN PFRMD N/A 2/20/2018    Procedure: COLONOSCOPY with polypectomy;  Surgeon: Melvin Matson MD;  Location: [facility]; Service: General    AR ESOPHAGOGASTRODUODENOSCOPY TRANSORAL DIAGNOSTIC N/A 5/24/2017    Procedure: ESOPHAGOGASTRODUODENOSCOPY (EGD); Surgeon: Faisal Salazar MD;  Location: [facility]; Service: Gastroenterology    AR ESOPHAGOGASTRODUODENOSCOPY TRANSORAL DIAGNOSTIC N/A 8/31/2017    Procedure: ESOPHAGOGASTRODUODENOSCOPY (EGD) W RFA;  Surgeon: Abel Kaiser MD;  Location:  GI LAB;   Service: Gastroenterology    AR KNEE SCOPE,MED/LAT MENISECTOMY Right 4/4/2018    Procedure: ARTHROSCOPY KNEE PARTIAL MEDIAL MENISECTOMY , CHONDROPLASTY;  Surgeon: Gardenia Arias DO;  Location: [facility];  Service: Orthopedics    WISDOM TOOTH EXTRACTION         Family History   Problem Relation Age of Onset    Lung cancer Mother     Cancer Mother     Alcohol abuse Father     Other Father         Cardiac Disorder    Depression Father     Hypertension Father     Heart attack Father     Breast cancer Maternal Grandmother     Diabetes type I Other        Social History     Occupational History    Not on file  Social History Main Topics    Smoking status: Never Smoker    Smokeless tobacco: Never Used    Alcohol use Yes      Comment: minimal    Drug use: No    Sexual activity: Not on file       Current Outpatient Prescriptions on File Prior to Visit   Medication Sig    albuterol (PROVENTIL HFA,VENTOLIN HFA) 90 mcg/act inhaler Inhale 2 puffs daily    cetirizine (ZyrTEC) 10 mg tablet Take 1 tablet by mouth daily      dexlansoprazole (DEXILANT) 60 MG capsule Take 1 capsule (60 mg total) by mouth 2 (two) times a day    DULoxetine (CYMBALTA) 60 mg delayed release capsule Take 60 mg by mouth    esomeprazole (NexIUM) 40 MG capsule Take 40 mg by mouth 2 (two) times a day before meals    FLOVENT HFA 44 MCG/ACT inhaler Inhale 2 actuation 4 (four) times a day as needed      rOPINIRole (REQUIP) 2 mg tablet Take 1 tablet (2 mg total) by mouth daily at bedtime    [DISCONTINUED] dexlansoprazole (DEXILANT) 60 MG capsule Take 60 mg by mouth     No current facility-administered medications on file prior to visit          Allergies   Allergen Reactions    Dust Mite Extract Shortness Of Breath    Latex Itching and Blisters    Other      seasonal    Sulfa Antibiotics Vomiting     Topical-blistering       Physical Exam:    /76   Pulse 72   Resp 14   Ht 5' 3" (1 6 m)   Wt 98 7 kg (217 lb 9 6 oz)   BMI 38 55 kg/m²     Constitutional: Endomorphic body habitus  Eyes: anicteric  HEENT: grossly intact  Neck: supple, symmetric, trachea midline and no masses   Pulmonary:even and unlabored  Cardiovascular:No edema or pitting edema present  Skin:Normal without rashes or lesions and well hydrated  Psychiatric:Mood and affect appropriate  Neurologic:Cranial Nerves II-XII grossly intact  Musculoskeletal:Antalgic but steady without the use of assistive devices     Lumbar Spine Exam    Appearance:  Scoliosis  Palpation/Tenderness:  left sacroiliac joint tenderness  No right sacroiliac joint tenderness  Range of Motion:  Full range of motion with no pain or limitations in flexion, extension, lateral flexion and rotation  Motor Strength:  Left hip flexion:  5/5  Right hip flexion:  5/5  Left knee flexion:  5/5  Left knee extension:  5/5  Right knee flexion:  5/5  Right knee extension:  5/5  Left foot dorsiflexion:  5/5  Left foot plantar flexion:  5/5  Right foot dorsiflexion:  5/5  Right foot plantar flexion:  5/5  Special Tests:  Left Straight Leg Test:  negative  Right Straight Leg Test:  negative  Left Robert's Maneuver:  Equivocal  Right Robert's Maneuver:  negative  Left Gaenslen's Test:  positive  Right Gaenslen's Test:  negative  Left Pelvic Distraction Test:  positive  Right Pelvic Distraction Test:  negative  Lumbar facet loading and bilateral rotation did not reproduce the patient's pain complaint  Imaging    MRI LUMBAR SPINE WITHOUT CONTRAST DATED 8/14/17     INDICATION:  M54 16: Radiculopathy, lumbar region  History taken directly from the electronic ordering system  Chronic spinal pain  History of prior lumbar spine surgery for scoliosis in 1978    History of previous spine surgery at L4 and 2001      COMPARISON:  Radiograph 4/11/2017     TECHNIQUE:  Sagittal T1, sagittal T2, sagittal inversion recovery, axial T1 and axial T2, coronal T2        IMAGE QUALITY:  Diagnostic     FINDINGS:     ALIGNMENT:  Moderate to severe S-shaped scoliotic deformity of the visualized spine with a dextroscoliosis of the mid to lower thoracic spine and a levoscoliosis of the mid lumbar spine  Trace grade 1 anterolisthesis of L4 and L5      MARROW SIGNAL:  Pedicle screws at L3 and L4 noted  The vertebrae are somewhat elongated, possibly related to developmental variation  No definite segmentation anomaly      DISTAL CORD AND CONUS:  Normal size and signal within the distal cord and conus  The conus ends at the L1 level      PARASPINAL SOFT TISSUES:  Paraspinal soft tissues are unremarkable      SACRUM:  Normal signal within the sacrum  No evidence of insufficiency or stress fracture      LOWER THORACIC DISC SPACES:  Normal disc height and signal   No disc herniation, canal stenosis or foraminal narrowing      LUMBAR DISC SPACES:          L1-L2:  Normal      L2-L3:  Normal      L3-L4:  Postoperative fusion noted  No evidence of recurrent or residual disc herniation  Central canal and neural foramina surgically capacious      L4-L5:  Mild uncovering of the intervertebral disc space  Advanced facet hypertrophy  Small left neural foraminal disc trusion  Moderate left neural foraminal narrowing  Central canal and right neural foramen patent      L5-S1:  There is a diffuse disk bulge  No significant central canal or neural foraminal narrowing  Bilateral facet hypertrophy noted       IMPRESSION:        1  Moderate to severe S-shaped scoliotic deformity of the visualized spine  2  Postoperative changes status post fusion L3-4  3  Mild grade 1 anterolisthesis of L4 on L5 with a left neural foraminal disc protrusion at this level as well

## 2018-07-12 ENCOUNTER — OFFICE VISIT (OUTPATIENT)
Dept: PHYSICAL THERAPY | Facility: CLINIC | Age: 58
End: 2018-07-12
Payer: MEDICARE

## 2018-07-12 DIAGNOSIS — Z98.890 S/P RIGHT KNEE ARTHROSCOPY: Primary | ICD-10-CM

## 2018-07-12 PROCEDURE — 97112 NEUROMUSCULAR REEDUCATION: CPT | Performed by: PHYSICAL THERAPIST

## 2018-07-12 PROCEDURE — 97110 THERAPEUTIC EXERCISES: CPT | Performed by: PHYSICAL THERAPIST

## 2018-07-12 PROCEDURE — G8991 OTHER PT/OT GOAL STATUS: HCPCS | Performed by: PHYSICAL THERAPIST

## 2018-07-12 PROCEDURE — G8990 OTHER PT/OT CURRENT STATUS: HCPCS | Performed by: PHYSICAL THERAPIST

## 2018-07-12 NOTE — PROGRESS NOTES
Daily Note     Today's date: 2018  Patient name: Dimitris Delgado  : 1960  MRN: [id number]  Referring provider: Carole Colunga  Dx:   Encounter Diagnosis     ICD-10-CM    1  S/P right knee arthroscopy Z98 890                   Subjective: Pt comes to therapy reporting soreness in legs, secondary to being active lately  Notes she also feels her legs have been swollen         Objective: See treatment diary below    Precautions: Spinal fusion T7-L3, Abdominal hernia repair 18, previous knee surgery     Daily Treatment Diary      Manual                                                                                                                                                   No prone TE, limit end range forward flexion exercise  Exercise Diary   6/26  6/28    7/5  7/10 7/12          Bike  5'  5 min  5 min  5 min  5 min  5 mins         TM     5 min 5 min 5' 2 0 mph                  TKE with TB to neutral ext  Peach 2x10   Peach 2x10  peach  2x10  otb 2x10  otb 5"x20  otb 5"x20         Heel tap  20x ea  20x ea  20 ea  20 ea  20 ea  20 ea         Wall squat    attempted, pn  np               Standing hip ext/abd  2x10 ea  2x10 ea  2x10 ea  2x15 ea  2x15 ea  2# 2x10         Clamshells with TB  OTB 2x10  OTB 2x10  otb 20x  otb 20x ea  otb 20x  otb 20x         Hip sliders with neutral trunk  2x10 ea flx/abd  pn                 SLS  20"x3 ea  20"x3 ea  20"x3  20"x3ea  30"x2  30"x2         Sidestepping  3 laps  peach  2 laps  peach 3 laps  peach 3 laps  peach 3 laps  ptb 3 laps          Mini squats   3" x 15  3"x20  3'x30  3"x30  3"x30                                                                                                                                                                                                                     Modalities   6 26  6/28  7/3  7/5  7/10  7/12           CP prn  10'  10 mins  10 min  8 mins  10 min  10 min                                                         Assessment: Tolerated treatment well  Patient demonstrated fatigue post treatment and exhibited good technique with therapeutic exercises      Plan: Continue per plan of care

## 2018-07-14 RX ORDER — DEXLANSOPRAZOLE 60 MG/1
60 CAPSULE, DELAYED RELEASE ORAL 2 TIMES DAILY
Qty: 60 CAPSULE | Refills: 0 | OUTPATIENT
Start: 2018-07-14 | End: 2020-12-01 | Stop reason: SDUPTHER

## 2018-07-17 ENCOUNTER — OFFICE VISIT (OUTPATIENT)
Dept: PHYSICAL THERAPY | Facility: CLINIC | Age: 58
End: 2018-07-17
Payer: MEDICARE

## 2018-07-17 DIAGNOSIS — K21.9 GASTROESOPHAGEAL REFLUX DISEASE WITHOUT ESOPHAGITIS: ICD-10-CM

## 2018-07-17 DIAGNOSIS — Z98.890 S/P RIGHT KNEE ARTHROSCOPY: Primary | ICD-10-CM

## 2018-07-17 DIAGNOSIS — K22.70 BARRETT'S ESOPHAGUS WITHOUT DYSPLASIA: ICD-10-CM

## 2018-07-17 PROCEDURE — 97150 GROUP THERAPEUTIC PROCEDURES: CPT

## 2018-07-17 PROCEDURE — 97110 THERAPEUTIC EXERCISES: CPT

## 2018-07-17 RX ORDER — DEXLANSOPRAZOLE 60 MG/1
60 CAPSULE, DELAYED RELEASE ORAL 2 TIMES DAILY
Qty: 180 CAPSULE | Refills: 0 | Status: SHIPPED | OUTPATIENT
Start: 2018-07-17 | End: 2018-08-13 | Stop reason: SDUPTHER

## 2018-07-17 NOTE — PROGRESS NOTES
Daily Note     Today's date: 2018  Patient name: Tom Christianson  : 1960  MRN: [id number]  Referring provider: Alek Castro  Dx:   Encounter Diagnosis     ICD-10-CM    1  S/P right knee arthroscopy Z98 [id number]                   Subjective: Pt comes to therapy reporting soreness in legs,noting she has had sharp pains on occasion in anteriormedial right knee recently      Objective: See treatment diary below    Precautions: Spinal fusion T7-L3, Abdominal hernia repair 18, previous knee surgery     Daily Treatment Diary      Manual                                                                                                                                                   No prone TE, limit end range forward flexion exercise  Exercise Diary   6/26  6/28    7/5  7/10 7/12   7/17       Bike  5'  5 min  5 min  5 min  5 min  5 mins  5 mins       TM     5 min 5 min 5' 2 0 mph 5' 2 2 mph                 TKE with TB to neutral ext  Peach 2x10   Peach 2x10  peach  2x10  otb 2x10  otb 5"x20  otb 5"x20  otb 5"x20      Heel tap  20x ea  20x ea  20 ea  20 ea  20 ea  20 ea  20 ea       Wall squat    attempted, pn  np               Standing hip ext/abd  2x10 ea  2x10 ea  2x10 ea  2x15 ea  2x15 ea  2# 2x10  2# 2x10       Clamshells with TB  OTB 2x10  OTB 2x10  otb 20x  otb 20x ea  otb 20x  otb 20x  otb 20x       Hip sliders with neutral trunk  2x10 ea flx/abd  pn                 SLS  20"x3 ea  20"x3 ea  20"x3  20"x3ea  30"x2  30"x2  30"x2       Sidestepping  3 laps  peach  2 laps  peach 3 laps  peach 3 laps  peach 3 laps  ptb 3 laps  ptb 3 laps        Mini squats   3" x 15  3"x20  3'x30  3"x30  3"x30  3"x30                                                                                                                                                                                                                   Modalities   6 26  6/28  7/3  7/5  7/10  7/12  7/17         CP prn  10'  10 mins  10 min  8 mins  10 min  10 min                                                               Assessment: Tolerated treatment well  Patient demonstrated fatigue post treatment and exhibited good technique with therapeutic exercises      Plan: Continue per plan of care

## 2018-07-19 ENCOUNTER — APPOINTMENT (OUTPATIENT)
Dept: PHYSICAL THERAPY | Facility: CLINIC | Age: 58
End: 2018-07-19
Payer: MEDICARE

## 2018-07-20 ENCOUNTER — TELEPHONE (OUTPATIENT)
Dept: INTERNAL MEDICINE CLINIC | Facility: CLINIC | Age: 58
End: 2018-07-20

## 2018-07-20 NOTE — TELEPHONE ENCOUNTER
FYI-Pt called wanting to let you know that she just found out her house does have mold and is the cause of her asthma problems and wanted to make you aware

## 2018-07-24 ENCOUNTER — OFFICE VISIT (OUTPATIENT)
Dept: PHYSICAL THERAPY | Facility: CLINIC | Age: 58
End: 2018-07-24
Payer: MEDICARE

## 2018-07-24 DIAGNOSIS — Z98.890 S/P RIGHT KNEE ARTHROSCOPY: Primary | ICD-10-CM

## 2018-07-24 PROCEDURE — 97112 NEUROMUSCULAR REEDUCATION: CPT

## 2018-07-24 PROCEDURE — 97110 THERAPEUTIC EXERCISES: CPT

## 2018-07-24 NOTE — PROGRESS NOTES
Daily Note     Today's date: 2018  Patient name: Thomas Brink  : 1960  MRN: [id number]  Referring provider: James Cruz  Dx:   Encounter Diagnosis     ICD-10-CM    1  S/P right knee arthroscopy Z98 890      Subjective: Pt notes that she continues to have intermittent swelling but notes improvement in knee pain and strength  Objective: See treatment diary below    Precautions: Spinal fusion T7-L3, Abdominal hernia repair 18, previous knee surgery     Daily Treatment Diary    No prone TE, limit end range forward flexion exercise  Exercise Diary   6/26  6/28    7/5  7/10 7/12   7/17  7/24     Bike  5'  5 min  5 min  5 min  5 min  5 mins  5 mins 8'      TM     5 min 5 min 5' 2 0 mph 5' 2 2 mph 10'   2 3 mph                TKE with TB to neutral ext  Peach 2x10   Peach 2x10  peach  2x10  otb 2x10  otb 5"x20  otb 5"x20  otb 5"x20 GTB 5"x20     Heel tap  20x ea  20x ea  20 ea  20 ea  20 ea  20 ea  20 ea 20x ea      Wall squat    attempted, pn  np          -     Standing hip ext/abd  2x10 ea  2x10 ea  2x10 ea  2x15 ea  2x15 ea  2# 2x10  2# 2x10 2# 2x10      Sl clamshells with TB  OTB 2x10  OTB 2x10  otb 20x  otb 20x ea  otb 20x  otb 20x  otb 20x OTB  20x ea     Hip sliders with neutral trunk  2x10 ea flx/abd  pn            -     SLS  20"x3 ea  20"x3 ea  20"x3  20"x3ea  30"x2  30"x2  30"x2  30"x2 ea     Sidestepping  3 laps  peach  2 laps  peach 3 laps  peach 3 laps  peach 3 laps  ptb 3 laps  ptb 3 laps OTB   3 laps      Mini squats   3" x 15  3"x20  3'x30  3"x30  3"x30  3"x30 3"x30                                                                                                 Modalities   6 26  6/28  7/3  7/5  7/10  7/12  7/17 7/24        CP prn  10'  10 mins  10 min  8 mins  10 min  10 min   10'          Assessment: Tolerated treatment well   Patient demonstrated fatigue post treatment, exhibited good technique with therapeutic exercises and would benefit from continued PT to address continued swelling and pain  Plan: Progress treatment as tolerated    Supervised by Peyman Kemp PT from 12-12:10pm    Kavon Echeverria PTA

## 2018-07-31 ENCOUNTER — EVALUATION (OUTPATIENT)
Dept: PHYSICAL THERAPY | Facility: CLINIC | Age: 58
End: 2018-07-31
Payer: MEDICARE

## 2018-07-31 ENCOUNTER — OFFICE VISIT (OUTPATIENT)
Dept: OBGYN CLINIC | Facility: MEDICAL CENTER | Age: 58
End: 2018-07-31
Payer: MEDICARE

## 2018-07-31 VITALS — SYSTOLIC BLOOD PRESSURE: 131 MMHG | HEART RATE: 112 BPM | HEIGHT: 64 IN | DIASTOLIC BLOOD PRESSURE: 82 MMHG

## 2018-07-31 DIAGNOSIS — Z98.890 S/P RIGHT KNEE ARTHROSCOPY: Primary | ICD-10-CM

## 2018-07-31 DIAGNOSIS — Z98.890 S/P RIGHT KNEE ARTHROSCOPY: ICD-10-CM

## 2018-07-31 DIAGNOSIS — M17.11 PRIMARY OSTEOARTHRITIS OF RIGHT KNEE: Primary | ICD-10-CM

## 2018-07-31 PROCEDURE — 97110 THERAPEUTIC EXERCISES: CPT | Performed by: PHYSICAL THERAPIST

## 2018-07-31 PROCEDURE — G8991 OTHER PT/OT GOAL STATUS: HCPCS | Performed by: PHYSICAL THERAPIST

## 2018-07-31 PROCEDURE — 20610 DRAIN/INJ JOINT/BURSA W/O US: CPT | Performed by: PHYSICIAN ASSISTANT

## 2018-07-31 PROCEDURE — 97112 NEUROMUSCULAR REEDUCATION: CPT | Performed by: PHYSICAL THERAPIST

## 2018-07-31 PROCEDURE — 99213 OFFICE O/P EST LOW 20 MIN: CPT | Performed by: PHYSICIAN ASSISTANT

## 2018-07-31 PROCEDURE — G8990 OTHER PT/OT CURRENT STATUS: HCPCS | Performed by: PHYSICAL THERAPIST

## 2018-07-31 RX ORDER — METHYLPREDNISOLONE ACETATE 40 MG/ML
2 INJECTION, SUSPENSION INTRA-ARTICULAR; INTRALESIONAL; INTRAMUSCULAR; SOFT TISSUE
Status: COMPLETED | OUTPATIENT
Start: 2018-07-31 | End: 2018-07-31

## 2018-07-31 RX ORDER — BUPIVACAINE HYDROCHLORIDE 2.5 MG/ML
2 INJECTION, SOLUTION INFILTRATION; PERINEURAL
Status: COMPLETED | OUTPATIENT
Start: 2018-07-31 | End: 2018-07-31

## 2018-07-31 RX ADMIN — BUPIVACAINE HYDROCHLORIDE 2 ML: 2.5 INJECTION, SOLUTION INFILTRATION; PERINEURAL at 08:52

## 2018-07-31 RX ADMIN — METHYLPREDNISOLONE ACETATE 2 ML: 40 INJECTION, SUSPENSION INTRA-ARTICULAR; INTRALESIONAL; INTRAMUSCULAR; SOFT TISSUE at 08:52

## 2018-07-31 NOTE — PROGRESS NOTES
Assessment/Plan:  1  Primary osteoarthritis of right knee    2  S/P right knee arthroscopy      Orders Placed This Encounter   Procedures    Large joint arthrocentesis    Ambulatory referral to Physical Therapy     -patient received a right knee steroid injection today  She should avoid strenuous activities rest ice her knee for 1-2 days   -she will start aquatic therapy for right knee  -continue with Tylenol as needed for pain control  -she will call should week to consider viscosupplementation in the future  Return if symptoms worsen or fail to improve  Subjective   Chief Complaint:   Chief Complaint   Patient presents with    Right Knee - Follow-up       Dimitris Delgado is a 66 y o  female who presents for follow up for her right knee pain  She had a knee scope on 4/4/2018  She states she still having medial knee pain and swelling  Her knee does feel stable  She has been doing physical therapy which has been having strength  She does walk 2 miles a da to  try to lose weight with knee pain  She has been icing and elevating  She states her knee does  not bend very well  The she has had steroid injections in the past but unsure how well they helped her  Review of Systems  ROS:    See HPI for musculoskeletal review     All other systems reviewed are negative     History:  Past Medical History:   Diagnosis Date    Anesthesia     "sometimes low BP upon waking up"    Arthritis     Asthma     Back pain     Dolan's esophagus     Chronic pain disorder     Chronic venous insufficiency     Cough variant asthma     Environmental allergies     dust    GERD (gastroesophageal reflux disease)     Hiatal hernia     History of anemia     History of fusion of spine for scoliosis     "as a teenager"    History of transfusion     2001    Hyperlipidemia     Left lumbar radiculitis     Last Assessed: [id number]    Lumbar postlaminectomy syndrome     Migraines     Morbid obesity ([facility] [address] )     Motion sickness  Osteoarthritis     of left hip    Right knee pain     Seasonal allergies     Shortness of breath     Umbilical hernia     Uses brace     right knee    Uses crutches     one    Wears glasses      Past Surgical History:   Procedure Laterality Date    ARTHRODESIS      lumbar    ARTHRODESIS      Spinal Arthrodesis for Deformity; Last Assessed: [id number]   Porsche Godfrey BACK SURGERY      lumbar fusion,with nydia/screw and cage implant    COLONOSCOPY      PLANTAR FASCIA SURGERY      [place] COLONOSCOPY FLX DX W/COLLJ SPEC WHEN PFRMD N/A 2/20/2018    Procedure: COLONOSCOPY with polypectomy;  Surgeon: Aida Colunga MD;  Location: [facility]; Service: General    [place] ESOPHAGOGASTRODUODENOSCOPY TRANSORAL DIAGNOSTIC N/A 5/24/2017    Procedure: ESOPHAGOGASTRODUODENOSCOPY (EGD); Surgeon: Kiya Swain MD;  Location[facility]; Service: Gastroenterology    [place] ESOPHAGOGASTRODUODENOSCOPY TRANSORAL DIAGNOSTIC N/A 8/31/2017    Procedure: ESOPHAGOGASTRODUODENOSCOPY (EGD) W RFA;  Surgeon: Pablo Alcocer MD;  Location:  GI LAB;   Service: Gastroenterology    [place] KNEE SCOPE,MED/LAT MENISECTOMY Right 4/4/2018    Procedure: ARTHROSCOPY KNEE PARTIAL MEDIAL MENISECTOMY , CHONDROPLASTY;  Surgeon: Charlie Correia DO;  [facility];  Service: Orthopedics    WISDOM TOOTH EXTRACTION       Social History   History   Alcohol Use    Yes     Comment: minimal     History   Drug Use No     History   Smoking Status    Never Smoker   Smokeless Tobacco    Never Used     Family History:   Family History   Problem Relation Age of Onset    Lung cancer Mother     Cancer Mother     Alcohol abuse Father     Other Father         Cardiac Disorder    Depression Father     Hypertension Father     Heart attack Father     Breast cancer Maternal Grandmother     Diabetes type I Other        Meds/Allergies     (Not in a hospital admission)  Allergies   Allergen Reactions    Dust Mite Extract Shortness Of Breath    Latex Itching and Blisters  Other      seasonal    Sulfa Antibiotics Vomiting     Topical-blistering          Objective     /82   Pulse (!) 112   Ht 5' 4" (1 626 m)      PE:  AAOx 3  WDWN  Hearing intact, no drainage from eyes  no audible wheezing  no abdominal distension  LE compartments soft, skin intact    Ortho Exam:   right Knee:   No erythema  no swelling  no effusion  no warmth  AROM: full  Stable to varus/valgus stress  Large joint arthrocentesis  Date/Time: 7/31/2018 8:52 AM  Consent given by: patient  Site marked: site marked  Timeout: Immediately prior to procedure a time out was called to verify the correct patient, procedure, equipment, support staff and site/side marked as required   Supporting Documentation  Indications: pain   Procedure Details  Location: knee - R knee  Needle size: 22 G  Approach: anterolateral  Medications administered: 2 mL methylPREDNISolone acetate 40 mg/mL; 2 mL bupivacaine 0 25 %    Patient tolerance: patient tolerated the procedure well with no immediate complications  Dressing:  Sterile dressing applied

## 2018-08-02 ENCOUNTER — OFFICE VISIT (OUTPATIENT)
Dept: PHYSICAL THERAPY | Facility: CLINIC | Age: 58
End: 2018-08-02
Payer: MEDICARE

## 2018-08-02 DIAGNOSIS — Z98.890 S/P RIGHT KNEE ARTHROSCOPY: Primary | ICD-10-CM

## 2018-08-02 PROCEDURE — 97110 THERAPEUTIC EXERCISES: CPT

## 2018-08-02 PROCEDURE — G8991 OTHER PT/OT GOAL STATUS: HCPCS | Performed by: PHYSICAL THERAPIST

## 2018-08-02 PROCEDURE — G8990 OTHER PT/OT CURRENT STATUS: HCPCS | Performed by: PHYSICAL THERAPIST

## 2018-08-02 NOTE — PROGRESS NOTES
Daily Note     Today's date: 2018  Patient name: Georges Cuellar  : 1960  MRN: [id number]  Referring provider: Bhupinder Fisher  Dx:   Encounter Diagnosis     ICD-10-CM    1  S/P right knee arthroscopy Z98 [id number]                   Subjective: Pt reports with c/o continued swelling and discomfort in R knee mainly with prolonged walking  She noted walking more for weight loss, but every other day only  She noted plans to begin aquatic therapy at some point next week        Objective: See treatment diary below  Precautions: Spinal fusion T7-L3, Abdominal hernia repair 18, previous knee surgery     Daily Treatment Diary    No prone TE, limit end range forward flexion exercise  Exercise Diary   6/26  6/28    7/5  7/10 7/12   7/17  7/24 7/31  8/2   Bike  5'  5 min  5 min  5 min  5 min  5 mins  5 mins 8'   8' np   TM     5 min 5 min 5' 2 0 mph 5' 2 2 mph 10'   2 3 mph np np                TKE with TB to neutral ext  Peach 2x10   Peach 2x10  peach  2x10  otb 2x10  otb 5"x20  otb 5"x20  otb 5"x20 GTB 5"x20 No TB 2x10  No TB   20   Heel tap  20x ea  20x ea  20 ea  20 ea  20 ea  20 ea  20 ea 20x ea  D/C  --   Wall squat    attempted, pn  np          -      Standing hip ext/abd  2x10 ea  2x10 ea  2x10 ea  2x15 ea  2x15 ea  2# 2x10  2# 2x10 2# 2x10  0# 2x10  2# 2x10   Sl clamshells with TB  OTB 2x10  OTB 2x10  otb 20x  otb 20x ea  otb 20x  otb 20x  otb 20x OTB  20x ea No TB 20x  No    Hip sliders with neutral trunk  2x10 ea flx/abd  pn            -      SLS  20"x3 ea  20"x3 ea  20"x3  20"x3ea  30"x2  30"x2  30"x2  30"x2 ea  30"x2 ea 30"x2 ea   Sidestepping  3 laps  peach  2 laps  peach 3 laps  peach 3 laps  peach 3 laps  ptb 3 laps  ptb 3 laps OTB   3 laps No tb 3 laps  No tb 3 laps    Mini squats   3" x 15  3"x20  3'x30  3"x30  3"x30  3"x30 3"x30  3"2x10  3x10                                                                                                  Modalities   6 26  6/28  7/3  7/5  7/10  7/12  7/17 7/24  7/31 8/2   CP prn  10'  10 mins  10 min  8 mins  10 min  10 min   10'   10' 10'       Assessment: Tolerated treatment well  No c/o increased pain throughout treatment  Patient demonstrated fatigue post treatment and exhibited good technique with therapeutic exercises  Issued/reviewed updated HEP; no questions or concerns expressed  Plan: Pt currently discahrded to be independent in HEP and will begin aquatic therapy in near future

## 2018-08-06 ENCOUNTER — HOSPITAL ENCOUNTER (OUTPATIENT)
Dept: RADIOLOGY | Facility: MEDICAL CENTER | Age: 58
Discharge: HOME | End: 2018-08-06
Admitting: PHYSICAL MEDICINE & REHABILITATION
Payer: MEDICARE

## 2018-08-06 VITALS
DIASTOLIC BLOOD PRESSURE: 84 MMHG | SYSTOLIC BLOOD PRESSURE: 139 MMHG | RESPIRATION RATE: 18 BRPM | TEMPERATURE: 98.3 F | OXYGEN SATURATION: 95 % | HEART RATE: 95 BPM

## 2018-08-06 DIAGNOSIS — M46.1 SACROILIITIS, NOT ELSEWHERE CLASSIFIED (HCC): ICD-10-CM

## 2018-08-06 DIAGNOSIS — M53.3 SACROILIAC JOINT DYSFUNCTION: ICD-10-CM

## 2018-08-06 PROCEDURE — 27096 INJECT SACROILIAC JOINT: CPT | Performed by: PHYSICAL MEDICINE & REHABILITATION

## 2018-08-06 RX ORDER — BUPIVACAINE HCL/PF 2.5 MG/ML
10 VIAL (ML) INJECTION ONCE
Status: COMPLETED | OUTPATIENT
Start: 2018-08-06 | End: 2018-08-06

## 2018-08-06 RX ORDER — METHYLPREDNISOLONE ACETATE 40 MG/ML
40 INJECTION, SUSPENSION INTRA-ARTICULAR; INTRALESIONAL; INTRAMUSCULAR; PARENTERAL; SOFT TISSUE ONCE
Status: COMPLETED | OUTPATIENT
Start: 2018-08-06 | End: 2018-08-06

## 2018-08-06 RX ORDER — LIDOCAINE HYDROCHLORIDE 10 MG/ML
5 INJECTION, SOLUTION EPIDURAL; INFILTRATION; INTRACAUDAL; PERINEURAL ONCE
Status: COMPLETED | OUTPATIENT
Start: 2018-08-06 | End: 2018-08-06

## 2018-08-06 RX ADMIN — METHYLPREDNISOLONE ACETATE 40 MG: 40 INJECTION, SUSPENSION INTRA-ARTICULAR; INTRALESIONAL; INTRAMUSCULAR; PARENTERAL; SOFT TISSUE at 14:12

## 2018-08-06 RX ADMIN — IOHEXOL 0.5 ML: 300 INJECTION, SOLUTION INTRAVENOUS at 14:12

## 2018-08-06 RX ADMIN — Medication 1.5 ML: at 14:12

## 2018-08-06 RX ADMIN — LIDOCAINE HYDROCHLORIDE 1 ML: 10 INJECTION, SOLUTION EPIDURAL; INFILTRATION; INTRACAUDAL; PERINEURAL at 14:12

## 2018-08-06 NOTE — DISCHARGE INSTR - LAB
Steroid Joint Injection   WHAT YOU NEED TO KNOW:   A steroid joint injection is a procedure to inject steroid medicine into a joint  Steroid medicine decreases pain and inflammation  The injection may also contain an anesthetic (numbing medicine) to decrease pain  It may be done to treat conditions such as arthritis, gout, or carpal tunnel syndrome  The injections may be given in your knee, ankle, shoulder, elbow, wrist, ankle or sacroiliac joint  1  Do not apply heat to any area that is numb  If you have discomfort or soreness at the injection site, you may apply ice today, 20 minutes on and 20 minutes off  Tomorrow you may use ice or warm, moist heat  Do not apply ice or heat directly to the skin  2  You may have an increase or change in the discomfort for 36-48 hours after your treatment  Apply ice and continue with any pain medicine you have been prescribed  3  Do not do anything strenuous today  You may shower, but no tub baths or hot tubs today  You may resume your normal activities tomorrow, but do not overdo it  Resume normal activities slowly when you are feeling better  4  If you experience redness, drainage or swelling at the injection site, or if you develop a fever above 100 degrees, please call [facility] at [phone] or go to the Emergency Room  5  Continue to take all routine medicines prescribed by your primary care physician unless otherwise instructed by our staff  Most blood thinners should be started again according to your regularly scheduled dosing  If you have any questions, please give our office a call  If you have a problem specifically related to your procedure, please call our office at [phone]  Problems not related to your procedure should be directed to your primary care physician

## 2018-08-07 ENCOUNTER — APPOINTMENT (OUTPATIENT)
Dept: PHYSICAL THERAPY | Facility: CLINIC | Age: 58
End: 2018-08-07
Payer: MEDICARE

## 2018-08-09 ENCOUNTER — APPOINTMENT (OUTPATIENT)
Dept: PHYSICAL THERAPY | Facility: CLINIC | Age: 58
End: 2018-08-09
Payer: MEDICARE

## 2018-08-13 ENCOUNTER — OFFICE VISIT (OUTPATIENT)
Dept: INTERNAL MEDICINE CLINIC | Facility: CLINIC | Age: 58
End: 2018-08-13
Payer: MEDICARE

## 2018-08-13 VITALS
OXYGEN SATURATION: 97 % | HEIGHT: 64 IN | DIASTOLIC BLOOD PRESSURE: 86 MMHG | TEMPERATURE: 97.4 F | SYSTOLIC BLOOD PRESSURE: 118 MMHG | HEART RATE: 86 BPM

## 2018-08-13 DIAGNOSIS — M17.11 PRIMARY LOCALIZED OSTEOARTHRITIS OF RIGHT KNEE: ICD-10-CM

## 2018-08-13 DIAGNOSIS — S60.042A CONTUSION OF LEFT RING FINGER WITHOUT DAMAGE TO NAIL, INITIAL ENCOUNTER: Primary | ICD-10-CM

## 2018-08-13 DIAGNOSIS — M46.1 SACROILIITIS, NOT ELSEWHERE CLASSIFIED (HCC): ICD-10-CM

## 2018-08-13 DIAGNOSIS — E66.01 MORBID OBESITY (HCC): ICD-10-CM

## 2018-08-13 PROCEDURE — 99214 OFFICE O/P EST MOD 30 MIN: CPT | Performed by: INTERNAL MEDICINE

## 2018-08-13 NOTE — PROGRESS NOTES
Assessment/Plan   Problem List Items Addressed This Visit     Morbid obesity ([facility] [address] )    Primary localized osteoarthritis of right knee    Sacroiliitis, not elsewhere classified ([facility] [address] )      Other Visit Diagnoses     Contusion of left ring finger without damage to nail, initial encounter    -  Primary      Contusion of left ring finger: This was due to minor trauma, symptoms are improving and follow-up will be as needed  Morbid obesity:  Plan is to continue to follow through on plans to start water based physical therapy recommended by her physical therapy team, and continue calorie restriction and weight reduction  Reassess next visit  Related issue would be glucose intolerance and plan will be rechecking hemoglobin A1c and fasting glucose next visit  Primary osteoarthritis of right knee:  Continue wear knee brace and continue active follow-up with Orthopedics  Pain is slowly improving, knee seems more stable  Plan is to avoid high impact physical activity and exercise will be water based  Chronic back pain with sacroiliitis:  She has had good temporary relief of pain after recent injection by Dr Malvin Cabrera  Follow-up is at the end of the month with consideration for as needed tramadol as she is starting to have intermittent low back pain and has knee pain as well  Subjective   Patient ID: Uriel Feliz is a 66 y o  female  Morro Degree made appointment regarding slight pain in the left ring finger after minor trauma recently  Vitals:    08/13/18 0805   BP: 118/86   Pulse: 86   Temp: (!) 97 4 °F (36 3 °C)   SpO2: 97%     HPI   Leslinickthaddeus Nadia caring out trash about 10 days ago and pinched her left ring finger, and notice localized swelling and black and blueness  There is no numbness  There is no loss of function  Symptoms are starting to improve  We also discussed recent arthroscopic knee surgery, follow-up, plans for therapy, and ongoing follow-up for chronic pain in the back      Morro Dai also is preparing to move to a new apartment and now has a new part-time job  She is restricting calories and starting to lose weight  Overall her quality of life is improving  The following portions of the patient's history were reviewed and updated as appropriate: allergies, current medications, past family history, past medical history, past social history, past surgical history and problem list     Review of Systems no weakness of lower extremities  Objective   Physical Exam   Vital signs stable, in no distress in good spirits wearing a knee brace on the right knee  Gait is stable and slightly antalgic favoring weight-bearing on the left leg  Examination of the left hand demonstrates a small contusion of the palmar aspect of the left ring finger in the PIP joint region  There is good range of motion, distally function is intact and sensation and temperature is normal and capillary refill is normal   No other trauma is noted        Patient Active Problem List   Diagnosis    Anosmia    Anterolisthesis    [facility]    Dolan's esophagus    Blood glucose elevated    Chronic low back pain    Chronic pain disorder    Chronic venous insufficiency    Cough variant asthma    Depression    GERD (gastroesophageal reflux disease)    Hyperlipidemia    Lumbar postlaminectomy syndrome    Morbid obesity ([facility] [address] )    Primary localized osteoarthritis of right knee    Primary osteoarthritis of left hip    Restless legs syndrome    Seasonal allergies    Sleep disturbance    Obesity (BMI 35 0-39 9 without comorbidity)    Primary osteoarthritis of right knee    Bilateral lower extremity edema    Chronic pain of right knee    S/P right knee arthroscopy    Environmental allergies    Sacroiliitis, not elsewhere classified ([facility]     Current Outpatient Prescriptions:     albuterol (PROVENTIL HFA,VENTOLIN HFA) 90 mcg/act inhaler, Inhale 2 puffs daily, Disp: , Rfl:     cetirizine (ZyrTEC) 10 mg tablet, Take 1 tablet by mouth daily  , Disp: , Rfl:     dexlansoprazole (DEXILANT) 60 MG capsule, Take 1 capsule (60 mg total) by mouth 2 (two) times a day, Disp: 60 capsule, Rfl: 0    DULoxetine (CYMBALTA) 60 mg delayed release capsule, Take 60 mg by mouth, Disp: , Rfl:     FLOVENT HFA 44 MCG/ACT inhaler, Inhale 2 actuation 4 (four) times a day as needed  , Disp: , Rfl: 5    rOPINIRole (REQUIP) 2 mg tablet, Take 1 tablet (2 mg total) by mouth daily at bedtime, Disp: 90 tablet, Rfl: 3

## 2018-08-14 ENCOUNTER — TELEPHONE (OUTPATIENT)
Dept: PAIN MEDICINE | Facility: MEDICAL CENTER | Age: 58
End: 2018-08-14

## 2018-08-14 ENCOUNTER — APPOINTMENT (OUTPATIENT)
Dept: PHYSICAL THERAPY | Facility: CLINIC | Age: 58
End: 2018-08-14
Payer: MEDICARE

## 2018-08-14 NOTE — TELEPHONE ENCOUNTER
Pt reports 50% relief post inj with a 4/10 pain level  She still has some pain, but is much better than she had hoped for    F/U with AO on 8/29

## 2018-08-16 ENCOUNTER — APPOINTMENT (OUTPATIENT)
Dept: PHYSICAL THERAPY | Facility: CLINIC | Age: 58
End: 2018-08-16
Payer: MEDICARE

## 2018-08-24 DIAGNOSIS — G25.81 RESTLESS LEG SYNDROME: ICD-10-CM

## 2018-08-24 RX ORDER — ROPINIROLE 2 MG/1
TABLET, FILM COATED ORAL
Qty: 90 TABLET | Refills: 0 | Status: SHIPPED | OUTPATIENT
Start: 2018-08-24 | End: 2018-11-12 | Stop reason: SDUPTHER

## 2018-08-24 RX ORDER — ROPINIROLE 2 MG/1
TABLET, FILM COATED ORAL
Qty: 90 TABLET | Refills: 0 | Status: SHIPPED | OUTPATIENT
Start: 2018-08-24 | End: 2018-08-24 | Stop reason: SDUPTHER

## 2018-08-29 ENCOUNTER — OFFICE VISIT (OUTPATIENT)
Dept: PAIN MEDICINE | Facility: MEDICAL CENTER | Age: 58
End: 2018-08-29
Payer: MEDICARE

## 2018-08-29 VITALS
DIASTOLIC BLOOD PRESSURE: 70 MMHG | WEIGHT: 190 LBS | SYSTOLIC BLOOD PRESSURE: 118 MMHG | HEART RATE: 80 BPM | BODY MASS INDEX: 32.44 KG/M2 | HEIGHT: 64 IN

## 2018-08-29 DIAGNOSIS — G89.29 CHRONIC BILATERAL LOW BACK PAIN WITH LEFT-SIDED SCIATICA: ICD-10-CM

## 2018-08-29 DIAGNOSIS — M47.816 LUMBAR SPONDYLOSIS: Primary | ICD-10-CM

## 2018-08-29 DIAGNOSIS — M54.42 CHRONIC BILATERAL LOW BACK PAIN WITH LEFT-SIDED SCIATICA: ICD-10-CM

## 2018-08-29 PROCEDURE — 99214 OFFICE O/P EST MOD 30 MIN: CPT | Performed by: NURSE PRACTITIONER

## 2018-08-29 RX ORDER — TRAMADOL HYDROCHLORIDE 50 MG/1
50 TABLET ORAL DAILY PRN
Qty: 15 TABLET | Refills: 0 | Status: SHIPPED | OUTPATIENT
Start: 2018-08-29 | End: 2018-09-13 | Stop reason: ALTCHOICE

## 2018-08-29 NOTE — PROGRESS NOTES
Assessment:  1  Lumbar spondylosis    2  Chronic bilateral low back pain with left-sided sciatica        Plan: We will schedule the patient for a repeat left L3-5 facet medial branch blocks with intention of moving forward towards radiofrequency ablation if there is an appropriate diagnostic response  The initial blocks will be performed with 2% lidocaine and if an appropriate response is obtained upon review of the patient's pain diary, a confirmatory block will be scheduled with 0 5% bupivacaine  Patient last had a left L3-5 radiofrequency ablation on June 12, 2017 this gave her at least 6 months relief  Patient tells me that she discuss with Dr Cara Carlisle at her procedure visit about having a pain medication to take as needed because she can not use NSAID medication and Tylenol does not give her any relief  She reports that Dr Cara Carlisle was agreeable to this and wanted her to discuss that at today's office visit I will give her 15 tablets of tramadol 50 mg she can use this very sparingly  There are risks associated with opioid medications, including dependence, addiction and tolerance  The patient understands and agrees to use these medications only as prescribed  Potential side effects of the medications include, but are not limited to, constipation, drowsiness, addiction, impaired judgment and risk of fatal overdose if not taken as prescribed  The patient was warned against driving while taking sedation medications  Sharing medications is a felony  At this point in time, the patient is showing no signs of addiction, abuse, diversion or suicidal ideation  [place] Prescription Drug Monitoring Program report was reviewed and was appropriate     Complete risks and benefits including bleeding, infection, tissue reaction, nerve injury and allergic reaction were discussed  The approach was demonstrated using models and literature was provided   Verbal and written consent was obtained  History of Present Illness: The patient is a 66 y o  female who presents for follow-up visit related to her left low back and posterior thigh pain  Patient is status post a left sacroiliac joint injection on August 6, 2018 with Dr Tamara Portillo  The patient reports that she did get relief however it only lasted 1 week  I have personally reviewed and/or updated the patient's past medical history, past surgical history, family history, social history, current medications, allergies, and vital signs today  Review of Systems:    Review of Systems   Cardiovascular: Positive for leg swelling  Musculoskeletal: Positive for joint swelling  Past Medical History:   Diagnosis Date    Anesthesia     "sometimes low BP upon waking up"    Arthritis     Asthma     Back pain     Dolan's esophagus     Chronic pain disorder     Chronic venous insufficiency     Cough variant asthma     Environmental allergies     dust    GERD (gastroesophageal reflux disease)     Hiatal hernia     History of anemia     History of fusion of spine for scoliosis     "as a teenager"    History of transfusion     2001    Hyperlipidemia     Left lumbar radiculitis     Last Assessed: [id number]    Lumbar postlaminectomy syndrome     Migraines     Morbid obesity ([address] )     Motion sickness     Osteoarthritis     of left hip    Right knee pain     Seasonal allergies     Shortness of breath     Umbilical hernia     Uses brace     right knee    Uses crutches     one    Wears glasses        Past Surgical History:   Procedure Laterality Date    ARTHRODESIS      lumbar    ARTHRODESIS      Spinal Arthrodesis for Deformity;  Last Assessed: [id number]   [facility] BACK SURGERY      lumbar fusion,with nydia/screw and cage implant    COLONOSCOPY      PLANTAR FASCIA SURGERY      [place] COLONOSCOPY FLX DX W/COLLJ SPEC WHEN PFRMD N/A 2/20/2018    Procedure: COLONOSCOPY with polypectomy;  Surgeon: Diana Klein MD; Location: [facility]; Service: General    ME ESOPHAGOGASTRODUODENOSCOPY TRANSORAL DIAGNOSTIC N/A 5/24/2017    Procedure: ESOPHAGOGASTRODUODENOSCOPY (EGD); Surgeon: Oscar Pennington MD;  Location: [facility]; Service: Gastroenterology    ME ESOPHAGOGASTRODUODENOSCOPY TRANSORAL DIAGNOSTIC N/A 8/31/2017    Procedure: ESOPHAGOGASTRODUODENOSCOPY (EGD) W RFA;  Surgeon: Elder Mcardle, MD;  Location:  GI LAB; Service: Gastroenterology    ME KNEE SCOPE,MED/LAT MENISECTOMY Right 4/4/2018    Procedure: ARTHROSCOPY KNEE PARTIAL MEDIAL MENISECTOMY , CHONDROPLASTY;  Surgeon: Jaz Longoria DO;  Location: [facility];  Service: Orthopedics    WISDOM TOOTH EXTRACTION         Family History   Problem Relation Age of Onset    Lung cancer Mother     Cancer Mother     Alcohol abuse Father     Other Father         Cardiac Disorder    Depression Father     Hypertension Father     Heart attack Father     Breast cancer Maternal Grandmother     Diabetes type I Other        Social History     Occupational History    Not on file       Social History Main Topics    Smoking status: Never Smoker    Smokeless tobacco: Never Used    Alcohol use Yes      Comment: minimal    Drug use: No    Sexual activity: Not on file         Current Outpatient Prescriptions:     albuterol (PROVENTIL HFA,VENTOLIN HFA) 90 mcg/act inhaler, Inhale 2 puffs daily, Disp: , Rfl:     cetirizine (ZyrTEC) 10 mg tablet, Take 1 tablet by mouth daily  , Disp: , Rfl:     dexlansoprazole (DEXILANT) 60 MG capsule, Take 1 capsule (60 mg total) by mouth 2 (two) times a day, Disp: 60 capsule, Rfl: 0    DULoxetine (CYMBALTA) 60 mg delayed release capsule, Take 60 mg by mouth, Disp: , Rfl:     FLOVENT HFA 44 MCG/ACT inhaler, Inhale 2 actuation 4 (four) times a day as needed  , Disp: , Rfl: 5    rOPINIRole (REQUIP) 2 mg tablet, One tablet at bedtime, Disp: 90 tablet, Rfl: 0    traMADol (ULTRAM) 50 mg tablet, Take 1 tablet (50 mg total) by mouth daily as needed for moderate pain for up to 15 days, Disp: 15 tablet, Rfl: 0    Allergies   Allergen Reactions    Dust Mite Extract Shortness Of Breath    Latex Itching and Blisters    Other      seasonal    Sulfa Antibiotics Vomiting     Topical-blistering       Physical Exam:    /70   Pulse 80   Ht 5' 4" (1 626 m)   Wt 86 2 kg (190 lb)   BMI 32 61 kg/m²     Constitutional: normal, well developed, well nourished, alert, in no distress and non-toxic and no overt pain behavior    Endomorphic body habitus  Eyes: anicteric  HEENT: grossly intact  Neck: supple, symmetric, trachea midline and no masses   Pulmonary:even and unlabored  Cardiovascular:No edema or pitting edema present  Skin:Normal without rashes or lesions and well hydrated  Psychiatric:Mood and affect appropriate  Neurologic:Cranial Nerves II-XII grossly intact  Musculoskeletal:normal   Lumbar Spine Exam    Appearance:  Normal lordosis  Palpation/Tenderness:  left lumbar paraspinal tenderness      Range of Motion:  Flexion:  No limitation  without pain  Extension:  No limitation  with pain  Lateral Flexion - Left:  No limitation  without pain  Lateral Flexion - Right:  No limitation  without pain  Rotation - Left:  No limitation  without pain  Rotation - Right:  No limitation  without pain  Motor Strength:  Left hip flexion:  5/5  Left hip extension:  5/5  Right hip flexion:  5/5  Right hip extension:  5/5  Left knee flexion:  5/5  Left knee extension:  5/5  Right knee flexion:  5/5  Right knee extension:  5/5  Left foot dorsiflexion:  5/5  Left foot plantar flexion:  5/5  Right foot dorsiflexion:  5/5  Right foot plantar flexion:  5/5    Special Tests:   Facet loading maneuvers reproduce patient's pain complaints bilaterally      Imaging  FL spine and pain procedure    (Results Pending)       Orders Placed This Encounter   Procedures    FL spine and pain procedure

## 2018-08-30 ENCOUNTER — TELEPHONE (OUTPATIENT)
Dept: RADIOLOGY | Facility: MEDICAL CENTER | Age: 58
End: 2018-08-30

## 2018-08-30 NOTE — TELEPHONE ENCOUNTER
Patient seen by Jm Wisdom on 8/29; to be scheduled for repeat left L3-5 MBB #1  Left message for patient to call me back  Please transfer to [facility] procedure scheduling line

## 2018-09-04 NOTE — TELEPHONE ENCOUNTER
Returned message from patient and scheduled:     Repeat left L3-5 MBB #1 on 9/19    Reviewed instructions: , NPO 1 hour prior, loose-fitting/comfortable clothes, if ill/fever/infx/abx to call and reschedule  Also pain level at leat 5/10 and refrain from PRN, as-needed pain meds 6h prior  Patient stated verbal understanding

## 2018-09-19 ENCOUNTER — HOSPITAL ENCOUNTER (OUTPATIENT)
Dept: RADIOLOGY | Facility: MEDICAL CENTER | Age: 58
Discharge: HOME | End: 2018-09-19
Admitting: PHYSICAL MEDICINE & REHABILITATION
Payer: MEDICARE

## 2018-09-19 VITALS
RESPIRATION RATE: 20 BRPM | SYSTOLIC BLOOD PRESSURE: 102 MMHG | OXYGEN SATURATION: 96 % | DIASTOLIC BLOOD PRESSURE: 70 MMHG | HEART RATE: 81 BPM | TEMPERATURE: 97.5 F

## 2018-09-19 DIAGNOSIS — M47.816 LUMBAR SPONDYLOSIS: ICD-10-CM

## 2018-09-19 PROCEDURE — 64493 INJ PARAVERT F JNT L/S 1 LEV: CPT | Performed by: PHYSICAL MEDICINE & REHABILITATION

## 2018-09-19 RX ORDER — MONTELUKAST SODIUM 10 MG/1
10 TABLET ORAL
COMMUNITY
End: 2021-03-08

## 2018-09-19 RX ADMIN — Medication 1 ML: at 14:39

## 2018-09-19 RX ADMIN — IOHEXOL 0.5 ML: 300 INJECTION, SOLUTION INTRAVENOUS at 14:38

## 2018-09-19 NOTE — H&P
History of Present Illness:  The patient is a 66 y o  female who presents with complaints of  Left low back pain    Patient Active Problem List   Diagnosis    Anosmia    Anterolisthesis    Arthritis of lumbar spine ([facility] [address] )    Dolan's esophagus    Blood glucose elevated    Chronic low back pain    Chronic pain disorder    Chronic venous insufficiency    Cough variant asthma    Depression    GERD (gastroesophageal reflux disease)    Hyperlipidemia    Lumbar postlaminectomy syndrome    Morbid obesity (HCC)    Primary localized osteoarthritis of right knee    Primary osteoarthritis of left hip    Restless legs syndrome    Seasonal allergies    Sleep disturbance    Obesity (BMI 35 0-39 9 without comorbidity)    Primary osteoarthritis of right knee    Bilateral lower extremity edema    Chronic pain of right knee    S/P right knee arthroscopy    Environmental allergies    Sacroiliitis, not elsewhere classified ([facility] )    Lumbar spondylosis       Past Medical History:   Diagnosis Date    Anesthesia     "sometimes low BP upon waking up"    Arthritis     Asthma     Back pain     Dolan's esophagus     Chronic pain disorder     Chronic venous insufficiency     Cough variant asthma     Environmental allergies     dust    GERD (gastroesophageal reflux disease)     Hiatal hernia     History of anemia     History of fusion of spine for scoliosis     "as a teenager"    History of transfusion     2001    Hyperlipidemia     Left lumbar radiculitis     Last Assessed: [id number]    Lumbar postlaminectomy syndrome     Migraines     Morbid obesity ([address] )     Motion sickness     Osteoarthritis     of left hip    Right knee pain     Seasonal allergies     Shortness of breath     Umbilical hernia     Uses brace     right knee    Uses crutches     one    Wears glasses        Past Surgical History:   Procedure Laterality Date    ARTHRODESIS      lumbar    ARTHRODESIS      Spinal Arthrodesis for Deformity; Last Assessed: [id number]   [place] BACK SURGERY      lumbar fusion,with nydia/screw and cage implant    COLONOSCOPY      PLANTAR FASCIA SURGERY      [place] COLONOSCOPY FLX DX W/COLLJ SPEC WHEN PFRMD N/A 2/20/2018    Procedure: COLONOSCOPY with polypectomy;  Surgeon: Eliana Frias MD;  Location: [facility]; Service: General    [place] ESOPHAGOGASTRODUODENOSCOPY TRANSORAL DIAGNOSTIC N/A 5/24/2017    Procedure: ESOPHAGOGASTRODUODENOSCOPY (EGD); Surgeon: Estella Jimenez MD;  Location: [facility]; Service: Gastroenterology    [place] ESOPHAGOGASTRODUODENOSCOPY TRANSORAL DIAGNOSTIC N/A 8/31/2017    Procedure: ESOPHAGOGASTRODUODENOSCOPY (EGD) W RFA;  Surgeon: Ethan Little MD;  Location:  GI LAB; Service: Gastroenterology    [place] KNEE SCOPE,MED/LAT MENISECTOMY Right 4/4/2018    Procedure: ARTHROSCOPY KNEE PARTIAL MEDIAL MENISECTOMY , CHONDROPLASTY;  Surgeon: Nieves Renner DO;  [facility];  Service: Orthopedics    WISDOM TOOTH EXTRACTION           Current Outpatient Prescriptions:     albuterol (PROVENTIL HFA,VENTOLIN HFA) 90 mcg/act inhaler, Inhale 2 puffs daily, Disp: , Rfl:     cetirizine (ZyrTEC) 10 mg tablet, Take 1 tablet by mouth daily  , Disp: , Rfl:     dexlansoprazole (DEXILANT) 60 MG capsule, Take 1 capsule (60 mg total) by mouth 2 (two) times a day, Disp: 60 capsule, Rfl: 0    DULoxetine (CYMBALTA) 60 mg delayed release capsule, Take 60 mg by mouth, Disp: , Rfl:     FLOVENT HFA 44 MCG/ACT inhaler, Inhale 2 actuation 4 (four) times a day as needed  , Disp: , Rfl: 5    montelukast (SINGULAIR) 10 mg tablet, Take 10 mg by mouth daily at bedtime, Disp: , Rfl:     rOPINIRole (REQUIP) 2 mg tablet, One tablet at bedtime, Disp: 90 tablet, Rfl: 0  No current facility-administered medications for this encounter       Allergies   Allergen Reactions    Dust Mite Extract Shortness Of Breath    Latex Itching and Blisters    Other      seasonal    Sulfa Antibiotics Vomiting     Topical-blistering Physical Exam:   Vitals:    09/19/18 1427   BP: 102/70   Pulse: 81   Resp: 20   Temp: 97 5 °F (36 4 °C)   SpO2: 96%     General: Awake, Alert, Oriented x 3, Mood and affect appropriate  Respiratory: Respirations even and unlabored  Cardiovascular: Peripheral pulses intact; no edema  Musculoskeletal Exam:  Left low back pain    ASA Score:  2  Patient/Chart Verification  Patient ID Verified: Verbal  ID Band Applied: No  Consents Confirmed: Procedural, To be obtained in the Pre-Procedure area  H&P( within 30 days) Verified: To be obtained in the Pre-Procedure area  Interval H&P(within 24 hr) Complete (required for Outpatients and Surgery Admit only): To be obtained in the Pre-Procedure area  Allergies Reviewed: Yes (Latex)  Anticoag/NSAID held?: NA  Currently on antibiotics?: No    Assessment:   1   Lumbar spondylosis        Plan: LT L4 AND LT L5 MBB #1 ( repeat procedure only 1 mBB needed)

## 2018-09-19 NOTE — H&P
History of Present Illness: The patient is a 66 y o  female who presents with complaints of  Left low back pain    Patient Active Problem List   Diagnosis    Anosmia    Anterolisthesis    Arthritis of lumbar spine ([facility] [address] )    Dolan's esophagus    Blood glucose elevated    Chronic low back pain    Chronic pain disorder    Chronic venous insufficiency    Cough variant asthma    Depression    GERD (gastroesophageal reflux disease)    Hyperlipidemia    Lumbar postlaminectomy syndrome    Morbid obesity (HCC)    Primary localized osteoarthritis of right knee    Primary osteoarthritis of left hip    Restless legs syndrome    Seasonal allergies    Sleep disturbance    Obesity (BMI 35 0-39 9 without comorbidity)    Primary osteoarthritis of right knee    Bilateral lower extremity edema    Chronic pain of right knee    S/P right knee arthroscopy    Environmental allergies    Sacroiliitis, not elsewhere classified ([facility] [address] )       Past Medical History:   Diagnosis Date    Anesthesia     "sometimes low BP upon waking up"    Arthritis     Asthma     Back pain     Dolan's esophagus     Chronic pain disorder     Chronic venous insufficiency     Cough variant asthma     Environmental allergies     dust    GERD (gastroesophageal reflux disease)     Hiatal hernia     History of anemia     History of fusion of spine for scoliosis     "as a teenager"    History of transfusion     2001    Hyperlipidemia     Left lumbar radiculitis     Last Assessed: [id number]    Lumbar postlaminectomy syndrome     Migraines     Morbid obesity ([facility] [address] )     Motion sickness     Osteoarthritis     of left hip    Right knee pain     Seasonal allergies     Shortness of breath     Umbilical hernia     Uses brace     right knee    Uses crutches     one    Wears glasses        Past Surgical History:   Procedure Laterality Date    ARTHRODESIS      lumbar    ARTHRODESIS      Spinal Arthrodesis for Deformity;  Last Assessed: [id number]    BACK SURGERY      lumbar fusion,with nydia/screw and cage implant    COLONOSCOPY      PLANTAR FASCIA SURGERY      SC COLONOSCOPY FLX DX W/COLLJ SPEC WHEN PFRMD N/A 2/20/2018    Procedure: COLONOSCOPY with polypectomy;  Surgeon: Damari Andersen MD;  [facility]; Service: General    SC ESOPHAGOGASTRODUODENOSCOPY TRANSORAL DIAGNOSTIC N/A 5/24/2017    Procedure: ESOPHAGOGASTRODUODENOSCOPY (EGD); Surgeon: Gris Gamboa MD;  Location: [facility]; Service: Gastroenterology    SC ESOPHAGOGASTRODUODENOSCOPY TRANSORAL DIAGNOSTIC N/A 8/31/2017    Procedure: ESOPHAGOGASTRODUODENOSCOPY (EGD) W RFA;  Surgeon: Andrew Saenz MD;  Location:  GI LAB;   Service: Gastroenterology    SC KNEE SCOPE,MED/LAT MENISECTOMY Right 4/4/2018    Procedure: ARTHROSCOPY KNEE PARTIAL MEDIAL MENISECTOMY , CHONDROPLASTY;  Surgeon: Lety Tan DO;  [facility];  Service: Orthopedics    WISDOM TOOTH EXTRACTION           Current Outpatient Prescriptions:     albuterol (PROVENTIL HFA,VENTOLIN HFA) 90 mcg/act inhaler, Inhale 2 puffs daily, Disp: , Rfl:     cetirizine (ZyrTEC) 10 mg tablet, Take 1 tablet by mouth daily  , Disp: , Rfl:     dexlansoprazole (DEXILANT) 60 MG capsule, Take 1 capsule (60 mg total) by mouth 2 (two) times a day, Disp: 60 capsule, Rfl: 0    DULoxetine (CYMBALTA) 60 mg delayed release capsule, Take 60 mg by mouth, Disp: , Rfl:     FLOVENT HFA 44 MCG/ACT inhaler, Inhale 2 actuation 4 (four) times a day as needed  , Disp: , Rfl: 5    montelukast (SINGULAIR) 10 mg tablet, Take 10 mg by mouth daily at bedtime, Disp: , Rfl:     rOPINIRole (REQUIP) 2 mg tablet, One tablet at bedtime, Disp: 90 tablet, Rfl: 0    Current Facility-Administered Medications:     iohexol (OMNIPAQUE) 300 mg/mL injection 50 mL, 50 mL, Perineural, Once, Shan Iniguez DO    lidocaine (PF) (XYLOCAINE-MPF) 2 % injection 2 mL, 2 mL, Perineural, Once, Shan Gouge, DO    Allergies   Allergen Reactions  Dust Mite Extract Shortness Of Breath    Latex Itching and Blisters    Other      seasonal    Sulfa Antibiotics Vomiting     Topical-blistering       Physical Exam:   Vitals:    09/19/18 1427   BP: 102/70   Pulse: 81   Resp: 20   Temp: 97 5 °F (36 4 °C)   SpO2: 96%     General: Awake, Alert, Oriented x 3, Mood and affect appropriate  Respiratory: Respirations even and unlabored  Cardiovascular: Peripheral pulses intact; no edema  Musculoskeletal Exam:  Left low back    ASA Score:  2  Patient/Chart Verification  Patient ID Verified: Verbal  ID Band Applied: No  Consents Confirmed: Procedural, To be obtained in the Pre-Procedure area  H&P( within 30 days) Verified: To be obtained in the Pre-Procedure area  Interval H&P(within 24 hr) Complete (required for Outpatients and Surgery Admit only): To be obtained in the Pre-Procedure area  Allergies Reviewed: Yes (Latex)  Anticoag/NSAID held?: NA  Currently on antibiotics?: No    Assessment:   1   Lumbar spondylosis        Plan: left L3-5 MBB #1 ( repeat procedure only 1 mBB needed)

## 2018-09-19 NOTE — DISCHARGE INSTRUCTIONS

## 2018-09-24 ENCOUNTER — TELEPHONE (OUTPATIENT)
Dept: PAIN MEDICINE | Facility: MEDICAL CENTER | Age: 58
End: 2018-09-24

## 2018-09-24 NOTE — TELEPHONE ENCOUNTER
--please advise thank you--  Pt had a Lt L4-L5 MBB on 9/19 w/ Maite Belcher only one needed due to repeat    F/u to re eval?

## 2018-09-24 NOTE — TELEPHONE ENCOUNTER
Pt called and left a voicemail stating that she has her pain diary done but did not send it in  She would like to let the doctor know that the procedure did not work, she is miserable and in a lot of pain   Pt can be reached at [phone]

## 2018-09-26 NOTE — TELEPHONE ENCOUNTER
Pt left message in voicemail at 2:29 yesterday afternoon  Pt was inquiring as to the next step  Asked if it is necessary for her to send in the pain diary  Pt would like a call back to speak with someone, or for someone to leave a very detailed message in her voicemail letting her know what needs to be done at this point  Pt left a call back number of [phone]

## 2018-10-01 ENCOUNTER — OFFICE VISIT (OUTPATIENT)
Dept: PAIN MEDICINE | Facility: MEDICAL CENTER | Age: 58
End: 2018-10-01
Payer: MEDICARE

## 2018-10-01 VITALS — HEART RATE: 80 BPM | SYSTOLIC BLOOD PRESSURE: 140 MMHG | DIASTOLIC BLOOD PRESSURE: 94 MMHG

## 2018-10-01 DIAGNOSIS — M47.816 LUMBAR SPONDYLOSIS: ICD-10-CM

## 2018-10-01 DIAGNOSIS — M54.16 LUMBAR RADICULOPATHY: Primary | ICD-10-CM

## 2018-10-01 DIAGNOSIS — M46.1 SACROILIITIS, NOT ELSEWHERE CLASSIFIED (HCC): ICD-10-CM

## 2018-10-01 DIAGNOSIS — M96.1 LUMBAR POSTLAMINECTOMY SYNDROME: ICD-10-CM

## 2018-10-01 PROCEDURE — 99214 OFFICE O/P EST MOD 30 MIN: CPT | Performed by: NURSE PRACTITIONER

## 2018-10-01 RX ORDER — GABAPENTIN 300 MG/1
300 CAPSULE ORAL 3 TIMES DAILY
Qty: 90 CAPSULE | Refills: 0 | Status: SHIPPED | OUTPATIENT
Start: 2018-10-01 | End: 2018-10-31 | Stop reason: SDUPTHER

## 2018-10-01 NOTE — PROGRESS NOTES
Assessment:  1  Lumbar radiculopathy    2  Lumbar postlaminectomy syndrome    3  Sacroiliitis, not elsewhere classified ([facility] [address] )    4  Lumbar spondylosis        Plan:  1  Will schedule the patient for a left L5 LESI with Dr Hany Resendiz for the inflammatory component of the patient's bilateral low back and left lower extremity pain  Complete risks and benefits including bleeding, infection, tissue reaction, nerve injury and allergic reaction were discussed  The approach was demonstrated using models and literature was provided  Verbal consent was obtained  2   The patient did not have any relief from left L4 and L5 MBB, therefore we will not move forward with the ablation at this time  3   The patient will reiniate on gabapentin 300mg QHS for her nerve pain complaints, titrating to TID dosing  A titration calendar was provided to the patient at today's office visit  Patient was educated on medications most common side effects which include dizziness, drowsiness, and swelling of the hands and feet  Patient was instructed to call the office with any adverse medication side effects  The patient is also aware that if they would like to come off of the medication, they need to let us know as suddenly stopping the medication puts them at risk to have a seizure  4   Patient can continue on Duloxetine 60 mg daily  5  Patient will continue with her home exercise program  6  Patient may benefit from a spinal cord stimulator trial if she fails epidural steroid injections  7  I will follow up with the patient pending results of the injection or sooner if needed  [facility] Prescription Drug Monitoring Program report was reviewed and was appropriate     History of Present Illness:     The patient is a 66 y o  female with a history of scoliosis which was treated with a Guerra nydia and a spinal fusion in 2001 last seen in the office on August 29, 2018 who presents for a follow up office visit in regards to chronic pain secondary to lumbar post-laminectomy syndrome, lumbar stenosis, lumbar spondylosis and sacroiliitis  The patient reports that she was treated by a pain specialist in [place] who attempted a spinal cord stimulator trial and was unsuccessful  She also had a trial for an intrathecal drug delivery system however did not have this implanted due to side effects  The patient has had previous SI joint injections in the past and only provided about a week of relief  She is status post repeat left L4 and L5 medial branch block with Dr Praneeth Badillo on September 19, 2018 which did not provide any relief to her pain complaints  At today's office visit, she complains of bilateral low back pain that occasionally radiates into the left lower extremity in an L5-S1 dermatomal distribution  She states the radicular symptoms are intermittent, but the bilateral low back pain is constant and is interfering with her daily activities and her part-time job  She denies any bowel or bladder incontinence or saddle anesthesia  She currently rates her pain a 9/10 on the numeric pain rating scale  She states her pain is constant in nature and bothersome throughout the entirety of the day  She characterizes the pain as pressure-like  Current pain medications includes:  Duloxetine 60 mg daily  She was given 15 tablets of tramadol at our last office visit with minimal relief  The patient reports that this regimen is providing minimal pain relief  The patient is reporting no side effects from this pain medication regimen  She previously tried gabapentin, but is unsure why this was discontinued  She states she is interested in medical marijuana, however she cannot afford the license  She has previously been treated with high-dose narcotics such as OxyContin hydrocodone      I have personally reviewed and/or updated the patient's past medical history, past surgical history, family history, social history, current medications, allergies, and vital signs today  Review of Systems:    Review of Systems   Respiratory: Positive for shortness of breath  Cardiovascular: Negative for chest pain  Gastrointestinal: Negative for constipation, diarrhea, nausea and vomiting  Musculoskeletal: Positive for gait problem  Negative for arthralgias, joint swelling and myalgias  Difficulty walking, decreased ROM   Skin: Negative for rash  Neurological: Positive for weakness  Negative for dizziness and seizures  Muscle weakness, joint stiffness, memory loss   All other systems reviewed and are negative  Past Medical History:   Diagnosis Date    Anesthesia     "sometimes low BP upon waking up"    Arthritis     Asthma     Back pain     Dolan's esophagus     Chronic pain disorder     Chronic venous insufficiency     Cough variant asthma     Environmental allergies     dust    GERD (gastroesophageal reflux disease)     Hiatal hernia     History of anemia     History of fusion of spine for scoliosis     "as a teenager"    History of transfusion     2001    Hyperlipidemia     Left lumbar radiculitis     Last Assessed: [id number]    Lumbar postlaminectomy syndrome     Migraines     Morbid obesity ([facility] [address] [facility]     Motion sickness     Osteoarthritis     of left hip    Right knee pain     Seasonal allergies     Shortness of breath     Umbilical hernia     Uses brace     right knee    Uses crutches     one    Wears glasses        Past Surgical History:   Procedure Laterality Date    ARTHRODESIS      lumbar    ARTHRODESIS      Spinal Arthrodesis for Deformity; Last Assessed: [id number]   [facility] [place], operated by [facility] BACK SURGERY      lumbar fusion,with nydia/screw and cage implant    COLONOSCOPY      PLANTAR FASCIA SURGERY      VA COLONOSCOPY FLX DX W/COLLJ SPEC WHEN PFRMD N/A 2/20/2018    Procedure: COLONOSCOPY with polypectomy;  Surgeon: Zain Franco MD;  [facility];   Service: General    VA ESOPHAGOGASTRODUODENOSCOPY TRANSORAL DIAGNOSTIC N/A 5/24/2017    Procedure: ESOPHAGOGASTRODUODENOSCOPY (EGD); Surgeon: Charlie Wilkes MD;  Location: [facility]; Service: Gastroenterology    IL ESOPHAGOGASTRODUODENOSCOPY TRANSORAL DIAGNOSTIC N/A 8/31/2017    Procedure: ESOPHAGOGASTRODUODENOSCOPY (EGD) W RFA;  Surgeon: Deepthi Carranza MD;  Location:  GI LAB; Service: Gastroenterology    [place] KNEE SCOPE,MED/LAT MENISECTOMY Right 4/4/2018    Procedure: ARTHROSCOPY KNEE PARTIAL MEDIAL MENISECTOMY , CHONDROPLASTY;  Surgeon: Lillian Barajas DO;  [facility];  Service: Orthopedics    WISDOM TOOTH EXTRACTION         Family History   Problem Relation Age of Onset    Lung cancer Mother     Cancer Mother     Alcohol abuse Father     Other Father         Cardiac Disorder    Depression Father     Hypertension Father     Heart attack Father     Breast cancer Maternal Grandmother     Diabetes type I Other        Social History     Occupational History    Not on file       Social History Main Topics    Smoking status: Never Smoker    Smokeless tobacco: Never Used    Alcohol use Yes      Comment: minimal    Drug use: No    Sexual activity: Not on file         Current Outpatient Prescriptions:     albuterol (PROVENTIL HFA,VENTOLIN HFA) 90 mcg/act inhaler, Inhale 2 puffs daily, Disp: , Rfl:     cetirizine (ZyrTEC) 10 mg tablet, Take 1 tablet by mouth daily  , Disp: , Rfl:     dexlansoprazole (DEXILANT) 60 MG capsule, Take 1 capsule (60 mg total) by mouth 2 (two) times a day, Disp: 60 capsule, Rfl: 0    DULoxetine (CYMBALTA) 60 mg delayed release capsule, Take 60 mg by mouth, Disp: , Rfl:     FLOVENT HFA 44 MCG/ACT inhaler, Inhale 2 actuation 4 (four) times a day as needed  , Disp: , Rfl: 5    montelukast (SINGULAIR) 10 mg tablet, Take 10 mg by mouth daily at bedtime, Disp: , Rfl:     rOPINIRole (REQUIP) 2 mg tablet, One tablet at bedtime, Disp: 90 tablet, Rfl: 0    gabapentin (NEURONTIN) 300 mg capsule, Take 1 capsule (300 mg total) by mouth 3 (three) times a day, Disp: 90 capsule, Rfl: 0    Allergies   Allergen Reactions    Dust Mite Extract Shortness Of Breath    Latex Itching and Blisters    Other      seasonal    Sulfa Antibiotics Vomiting     Topical-blistering       Physical Exam:    /94   Pulse 80     Constitutional:normal, well developed, well nourished, alert, in no distress and non-toxic and no overt pain behavior  Eyes:anicteric  HEENT:grossly intact  Neck:supple, symmetric, trachea midline and no masses   Pulmonary:even and unlabored  Cardiovascular:No edema or pitting edema present  Skin:Normal without rashes or lesions and well hydrated  Psychiatric:Mood and affect appropriate  Neurologic:Cranial Nerves II-XII grossly intact  Musculoskeletal:  Antalgic gait but steady without the use of assistive devices  Lumbar facet loading does reproduce the patient's low back pain  Lumbar Spine Exam    Appearance:  Normal lordosis  Palpation/Tenderness:  left lumbar paraspinal tenderness  right lumbar paraspinal tenderness  Motor Strength:   Strength 5/5 in all muscle groups of the right lower extremity  Strength 4/5 in all muscle groups of the left lower extremity with the exception of left knee extension and flexion which was a 4/5  Special Tests:   Negative seated straight leg raise bilaterally  Negative Robert's test bilaterally  Imaging  FL spine and pain procedure    (Results Pending)     MRI LUMBAR SPINE WITHOUT CONTRAST     INDICATION:  M54 [id number]: Radiculopathy, lumbar region  History taken directly from the electronic ordering system  Chronic spinal pain  History of prior lumbar spine surgery for scoliosis in 1978    History of previous spine surgery at L4 and 2001      COMPARISON:  Radiograph 4/11/2017     TECHNIQUE:  Sagittal T1, sagittal T2, sagittal inversion recovery, axial T1 and axial T2, coronal T2        IMAGE QUALITY:  Diagnostic     FINDINGS:     ALIGNMENT:  Moderate to severe S-shaped scoliotic deformity of the visualized spine with a dextroscoliosis of the mid to lower thoracic spine and a levoscoliosis of the mid lumbar spine  Trace grade 1 anterolisthesis of L4 and L5      MARROW SIGNAL:  Pedicle screws at L3 and L4 noted  The vertebrae are somewhat elongated, possibly related to developmental variation  No definite segmentation anomaly      DISTAL CORD AND CONUS:  Normal size and signal within the distal cord and conus  The conus ends at the L1 level      PARASPINAL SOFT TISSUES:  Paraspinal soft tissues are unremarkable      SACRUM:  Normal signal within the sacrum  No evidence of insufficiency or stress fracture      LOWER THORACIC DISC SPACES:  Normal disc height and signal   No disc herniation, canal stenosis or foraminal narrowing      LUMBAR DISC SPACES:          L1-L2:  Normal      L2-L3:  Normal      L3-L4:  Postoperative fusion noted  No evidence of recurrent or residual disc herniation  Central canal and neural foramina surgically capacious      L4-L5:  Mild uncovering of the intervertebral disc space  Advanced facet hypertrophy  Small left neural foraminal disc trusion  Moderate left neural foraminal narrowing  Central canal and right neural foramen patent      L5-S1:  There is a diffuse disk bulge  No significant central canal or neural foraminal narrowing  Bilateral facet hypertrophy noted       IMPRESSION:        1  Moderate to severe S-shaped scoliotic deformity of the visualized spine  2  Postoperative changes status post fusion L3-4  3  Mild grade 1 anterolisthesis of L4 on L5 with a left neural foraminal disc protrusion at this level as well      Orders Placed This Encounter   Procedures    FL spine and pain procedure

## 2018-10-04 ENCOUNTER — TELEPHONE (OUTPATIENT)
Dept: PAIN MEDICINE | Facility: MEDICAL CENTER | Age: 58
End: 2018-10-04

## 2018-10-04 NOTE — TELEPHONE ENCOUNTER
--please advise thank you--  [address] [place] [address] Drive saw pt on 10/1, pt for L5 LESI on 10/17

## 2018-10-04 NOTE — TELEPHONE ENCOUNTER
The patient was just reinitiated on gabapentin at bedtime  She needs to give this medication some time to work  If she is taking the gabapentin 300QHS without side effects, she can titrate up to 3 times a day according to the paper that I provided her at the office visit

## 2018-10-04 NOTE — TELEPHONE ENCOUNTER
Pt is calling stating that the Cymbalta that was called in for her on 10/1 is not working and that she just finished PT and she is in severe pain  Pt is asking if Tramadol can be called into her pharmacy for the pain   Pt can be reached at [phone]

## 2018-10-05 NOTE — TELEPHONE ENCOUNTER
Pt returned call and can be reached at [phone]  Asking if we can leave a detailed message on her machine if she does not answer

## 2018-10-17 ENCOUNTER — HOSPITAL ENCOUNTER (OUTPATIENT)
Dept: RADIOLOGY | Facility: MEDICAL CENTER | Age: 58
Discharge: HOME | End: 2018-10-17
Admitting: PHYSICAL MEDICINE & REHABILITATION
Payer: MEDICARE

## 2018-10-17 VITALS
SYSTOLIC BLOOD PRESSURE: 136 MMHG | DIASTOLIC BLOOD PRESSURE: 99 MMHG | HEART RATE: 80 BPM | OXYGEN SATURATION: 98 % | RESPIRATION RATE: 20 BRPM | TEMPERATURE: 98.7 F

## 2018-10-17 DIAGNOSIS — M54.16 LUMBAR RADICULOPATHY: ICD-10-CM

## 2018-10-17 PROCEDURE — 62323 NJX INTERLAMINAR LMBR/SAC: CPT | Performed by: PHYSICAL MEDICINE & REHABILITATION

## 2018-10-17 RX ORDER — LIDOCAINE HYDROCHLORIDE 10 MG/ML
5 INJECTION, SOLUTION EPIDURAL; INFILTRATION; INTRACAUDAL; PERINEURAL ONCE
Status: COMPLETED | OUTPATIENT
Start: 2018-10-17 | End: 2018-10-17

## 2018-10-17 RX ORDER — METHYLPREDNISOLONE ACETATE 80 MG/ML
80 INJECTION, SUSPENSION INTRA-ARTICULAR; INTRALESIONAL; INTRAMUSCULAR; PARENTERAL; SOFT TISSUE ONCE
Status: COMPLETED | OUTPATIENT
Start: 2018-10-17 | End: 2018-10-17

## 2018-10-17 RX ADMIN — LIDOCAINE HYDROCHLORIDE 1.5 ML: 10 INJECTION, SOLUTION EPIDURAL; INFILTRATION; INTRACAUDAL; PERINEURAL at 13:18

## 2018-10-17 RX ADMIN — METHYLPREDNISOLONE ACETATE 80 MG: 80 INJECTION, SUSPENSION INTRA-ARTICULAR; INTRALESIONAL; INTRAMUSCULAR; PARENTERAL; SOFT TISSUE at 13:20

## 2018-10-17 RX ADMIN — IOHEXOL 1 ML: 300 INJECTION, SOLUTION INTRAVENOUS at 13:20

## 2018-10-17 NOTE — H&P
History of Present Illness:  The patient is a 66 y o  female who presents with complaints of Back and left leg pain    Patient Active Problem List   Diagnosis    Anosmia    Anterolisthesis    Arthritis of lumbar spine    Dolan's esophagus    Blood glucose elevated    Chronic low back pain    Chronic pain disorder    Chronic venous insufficiency    Cough variant asthma    Depression    GERD (gastroesophageal reflux disease)    Hyperlipidemia    Lumbar postlaminectomy syndrome    Morbid obesity (HCC)    Primary localized osteoarthritis of right knee    Primary osteoarthritis of left hip    Restless legs syndrome    Seasonal allergies    Sleep disturbance    Obesity (BMI 35 0-39 9 without comorbidity)    Primary osteoarthritis of right knee    Bilateral lower extremity edema    Chronic pain of right knee    S/P right knee arthroscopy    Environmental allergies    Sacroiliitis, not elsewhere classified ([address] )    Lumbar spondylosis    Lumbar radiculopathy       Past Medical History:   Diagnosis Date    Anesthesia     "sometimes low BP upon waking up"    Arthritis     Asthma     Back pain     Dolan's esophagus     Chronic pain disorder     Chronic venous insufficiency     Cough variant asthma     Environmental allergies     dust    GERD (gastroesophageal reflux disease)     Hiatal hernia     History of anemia     History of fusion of spine for scoliosis     "as a teenager"    History of transfusion     2001    Hyperlipidemia     Left lumbar radiculitis     Last Assessed: [id number]    Lumbar postlaminectomy syndrome     Migraines     Morbid obesity ([address] )     Motion sickness     Osteoarthritis     of left hip    Right knee pain     Seasonal allergies     Shortness of breath     Umbilical hernia     Uses brace     right knee    Uses crutches     one    Wears glasses        Past Surgical History:   Procedure Laterality Date    ARTHRODESIS      lumbar    ARTHRODESIS Spinal Arthrodesis for Deformity; Last Assessed: [id number]   [facility] BACK SURGERY      lumbar fusion,with nydia/screw and cage implant    COLONOSCOPY      PLANTAR FASCIA SURGERY      IN COLONOSCOPY FLX DX W/COLLJ SPEC WHEN PFRMD N/A 2/20/2018    Procedure: COLONOSCOPY with polypectomy;  Surgeon: Gurpreet Thomas MD;  [facility]; Service: General    IN ESOPHAGOGASTRODUODENOSCOPY TRANSORAL DIAGNOSTIC N/A 5/24/2017    Procedure: ESOPHAGOGASTRODUODENOSCOPY (EGD); Surgeon: Jessica Hartman MD;  Location: [facility]; Service: Gastroenterology    IN ESOPHAGOGASTRODUODENOSCOPY TRANSORAL DIAGNOSTIC N/A 8/31/2017    Procedure: ESOPHAGOGASTRODUODENOSCOPY (EGD) W RFA;  Surgeon: Eric Shields MD;  Location:  GI LAB;   Service: Gastroenterology    IN KNEE SCOPE,MED/LAT MENISECTOMY Right 4/4/2018    Procedure: ARTHROSCOPY KNEE PARTIAL MEDIAL MENISECTOMY , CHONDROPLASTY;  Surgeon: Julian Warner DO;  [facility];  Service: Orthopedics    WISDOM TOOTH EXTRACTION           Current Outpatient Prescriptions:     albuterol (PROVENTIL HFA,VENTOLIN HFA) 90 mcg/act inhaler, Inhale 2 puffs daily, Disp: , Rfl:     cetirizine (ZyrTEC) 10 mg tablet, Take 1 tablet by mouth daily  , Disp: , Rfl:     dexlansoprazole (DEXILANT) 60 MG capsule, Take 1 capsule (60 mg total) by mouth 2 (two) times a day, Disp: 60 capsule, Rfl: 0    DULoxetine (CYMBALTA) 60 mg delayed release capsule, Take 60 mg by mouth daily  , Disp: , Rfl:     FLOVENT HFA 44 MCG/ACT inhaler, Inhale 2 actuation 4 (four) times a day as needed  , Disp: , Rfl: 5    gabapentin (NEURONTIN) 300 mg capsule, Take 1 capsule (300 mg total) by mouth 3 (three) times a day, Disp: 90 capsule, Rfl: 0    montelukast (SINGULAIR) 10 mg tablet, Take 10 mg by mouth daily at bedtime, Disp: , Rfl:     rOPINIRole (REQUIP) 2 mg tablet, One tablet at bedtime, Disp: 90 tablet, Rfl: 0    Current Facility-Administered Medications:     iohexol (OMNIPAQUE) 300 mg/mL injection 50 mL, 50 mL, Epidural, Once, Cozette Raina, DO    lidocaine (PF) (XYLOCAINE-MPF) 1 % injection 5 mL, 5 mL, Infiltration, Once, Cozette Raina, DO    methylPREDNISolone acetate (DEPO-MEDROL) injection 80 mg, 80 mg, Epidural, Once, Cozette Raina, DO    Allergies   Allergen Reactions    Dust Mite Extract Shortness Of Breath    Latex Itching and Blisters    Other      seasonal    Sulfa Antibiotics Vomiting     Topical-blistering       Physical Exam:   Vitals:    10/17/18 1301   BP: 113/76   Pulse: 85   Resp: 16   Temp: 98 7 °F (37 1 °C)   SpO2: 98%     General: Awake, Alert, Oriented x 3, Mood and affect appropriate  Respiratory: Respirations even and unlabored  Cardiovascular: Peripheral pulses intact; no edema  Musculoskeletal Exam:  Back and left leg pain    ASA Score:  2  Patient/Chart Verification  Patient ID Verified: Verbal  Consents Confirmed: Procedural, To be obtained in the Pre-Procedure area  H&P( within 30 days) Verified: To be obtained in the Pre-Procedure area  Interval H&P(within 24 hr) Complete (required for Outpatients and Surgery Admit only): To be obtained in the Pre-Procedure area  Allergies Reviewed: Yes  Anticoag/NSAID held?: Yes (Ibuprofen last dose at least 1 week ago)  Currently on antibiotics?: No  Pregnancy denied?: Yes    Assessment:   1   Lumbar radiculopathy        Plan: Left L5 LESI

## 2018-10-17 NOTE — DISCHARGE INSTRUCTIONS
Epidural Steroid Injection   WHAT YOU NEED TO KNOW:   An epidural steroid injection (ANDREI) is a procedure to inject steroid medicine into the epidural space  The epidural space is between your spinal cord and vertebrae  Steroids reduce inflammation and fluid buildup in your spine that may be causing pain  You may be given pain medicine along with the steroids  ACTIVITY  · Do not drive or operate machinery today  · No strenuous activity today - bending, lifting, etc   · You may resume normal activites starting tomorrow - start slowly and as tolerated  · You may shower today, but no tub baths or hot tubs  · You may have numbness for several hours from the local anesthetic  Please use caution and common sense, especially with weight-bearing activities  CARE OF THE INJECTION SITE  · If you have soreness or pain, apply ice to the area today (20 minutes on/20 minutes off)  · Starting tomorrow, you may use warm, moist heat or ice if needed  · You may have an increase or change in your discomfort for 36-48 hours after your treatment  · Apply ice and continue with any pain medication you have been prescribed  · Notify [facility] if you have any of the following: redness, drainage, swelling, headache, stiff neck or fever above 100°F     SPECIAL INSTRUCTIONS  · Our office will contact you in approximately 7 days for a progress report  MEDICATIONS  · Continue to take all routine medications  May restart ibuprofen tomorrow, 10/18/18  · Our office may have instructed you to hold some medications  If you have a problem specifically related to your procedure, please call our office at [phone]  Problems not related to your procedure should be directed to your primary care physician

## 2018-10-24 ENCOUNTER — TELEPHONE (OUTPATIENT)
Dept: OBGYN CLINIC | Facility: HOSPITAL | Age: 58
End: 2018-10-24

## 2018-10-24 NOTE — TELEPHONE ENCOUNTER
Needs office visit for opioid refills  Schedule follow up with [facility] or Wilmar pierce

## 2018-10-24 NOTE — TELEPHONE ENCOUNTER
Pt reports 20% improvement post injection  Pain level is 4/10  She is still having the tightness in her back and would like a prescription of Tramadol for it  I did tell her that the injection takes about 2 weeks  Patient says injection site is sore almost feels bruised     Pain level is 4/10

## 2018-10-25 ENCOUNTER — TELEPHONE (OUTPATIENT)
Dept: PAIN MEDICINE | Facility: MEDICAL CENTER | Age: 58
End: 2018-10-25

## 2018-10-25 DIAGNOSIS — M54.50 CHRONIC BILATERAL LOW BACK PAIN WITHOUT SCIATICA: Primary | ICD-10-CM

## 2018-10-25 DIAGNOSIS — G89.29 CHRONIC BILATERAL LOW BACK PAIN WITHOUT SCIATICA: Primary | ICD-10-CM

## 2018-10-25 RX ORDER — CYCLOBENZAPRINE HCL 10 MG
10 TABLET ORAL 3 TIMES DAILY PRN
Qty: 30 TABLET | Refills: 0 | Status: SHIPPED | OUTPATIENT
Start: 2018-10-25 | End: 2018-10-31 | Stop reason: SDUPTHER

## 2018-10-25 NOTE — TELEPHONE ENCOUNTER
Scheduled patient for first avail with Parish Georges on 10/31 @ 2:15 patient didn't quite understand why she needed an appointment for tramadol, explained that it was an opioid and this is what is needed, patient compliant with that  Also asked what she could do in the mean time for her pain  Said it didn't have to be a script just wants advice, also stated she is using heating pad and ice to help with pain   Please advise

## 2018-10-25 NOTE — TELEPHONE ENCOUNTER
S/W pt  Pt stated she has b/l lower back pain 3-6/10, low back is tight and having muscle spasms  On 8/29/18 AO gave her a script for tramadol and she is out of it  Advised her a narcotic refill needs a SOVS   Pt is taking gabapentin 300 mg 3x/day and is giving her diarrhea and she eventually wants to titrate off of it but it is helping her pain  Advised her when she needs a refill of the gabapentin to call [facility] at least 48 hrs prior to running out  Pt stated she does not need a refill at this time  Advised pt it can take up to 2 weeks to see the full effect of the injection  Pt stated she takes tylenol and ibuprofen for headaches and tylenol does not help her back pain  Pt stated she would be willing to try a muscle relaxer  Pharmacy on file  Pt stated okay to leave a detailed message if she does not answer  Pt verbalized understanding  Please advise

## 2018-10-31 ENCOUNTER — OFFICE VISIT (OUTPATIENT)
Dept: PAIN MEDICINE | Facility: MEDICAL CENTER | Age: 58
End: 2018-10-31
Payer: MEDICARE

## 2018-10-31 VITALS — SYSTOLIC BLOOD PRESSURE: 110 MMHG | HEART RATE: 86 BPM | DIASTOLIC BLOOD PRESSURE: 70 MMHG

## 2018-10-31 DIAGNOSIS — M47.816 ARTHRITIS OF LUMBAR SPINE: ICD-10-CM

## 2018-10-31 DIAGNOSIS — G89.29 CHRONIC BILATERAL LOW BACK PAIN WITH LEFT-SIDED SCIATICA: ICD-10-CM

## 2018-10-31 DIAGNOSIS — M54.50 CHRONIC BILATERAL LOW BACK PAIN WITHOUT SCIATICA: ICD-10-CM

## 2018-10-31 DIAGNOSIS — M54.16 LUMBAR RADICULOPATHY: Primary | ICD-10-CM

## 2018-10-31 DIAGNOSIS — M96.1 LUMBAR POSTLAMINECTOMY SYNDROME: ICD-10-CM

## 2018-10-31 DIAGNOSIS — M54.42 CHRONIC BILATERAL LOW BACK PAIN WITH LEFT-SIDED SCIATICA: ICD-10-CM

## 2018-10-31 DIAGNOSIS — G89.29 CHRONIC BILATERAL LOW BACK PAIN WITHOUT SCIATICA: ICD-10-CM

## 2018-10-31 PROCEDURE — 99213 OFFICE O/P EST LOW 20 MIN: CPT | Performed by: NURSE PRACTITIONER

## 2018-10-31 RX ORDER — GABAPENTIN 300 MG/1
600 CAPSULE ORAL 3 TIMES DAILY
Qty: 180 CAPSULE | Refills: 0 | Status: SHIPPED | OUTPATIENT
Start: 2018-10-31 | End: 2018-11-19 | Stop reason: SDUPTHER

## 2018-10-31 RX ORDER — CYCLOBENZAPRINE HCL 10 MG
10 TABLET ORAL
Qty: 30 TABLET | Refills: 0 | Status: SHIPPED | OUTPATIENT
Start: 2018-10-31 | End: 2019-01-08 | Stop reason: SDUPTHER

## 2018-10-31 NOTE — PROGRESS NOTES
Assessment:  1  Lumbar radiculopathy    2  Arthritis of lumbar spine    3  Chronic bilateral low back pain with left-sided sciatica    4  Lumbar postlaminectomy syndrome    5  Chronic bilateral low back pain without sciatica        Plan:  Continue with gabapentin I will increase her dose to 2 tablets 3 times a day which puts her at the 1800 mg therapeutic dose range  She was given instruction on how to safely increase her dose  Continue with cyclobenzaprine only at bedtime since it does make her drowsy  This was refilled today  I will initiate physical therapy the patient can participate in either aqua lower land therapy whichever she feels would be the most comfortable  She can attend once or twice a week for 4-6 weeks  I did have a long discussion with the patient regarding spinal cord stimulation as she does have history of back surgery and she is still experiencing low back and sharp shooting pain down her left leg  She also has had left L5 lumbar epidural steroid injection with our office and no relief  We did try medial branch blocks which did not provide any relief as well  Patient is not interested at this time she states that she did try to have a spinal cord stimulator in the past and they were on able to place the leads due to scar tissue  Follow-up in 6 weeks for re-evaluation        [place] Prescription Drug Monitoring Program report was reviewed and was appropriate         My impressions and treatment recommendations were discussed in detail with the patient who verbalized understanding and had no further questions  Discharge instructions were provided  I personally saw and examined the patient and I agree with the above discussed plan of care      Orders Placed This Encounter   Procedures    Ambulatory referral to Physical Therapy     Standing Status:   Future     Standing Expiration Date:   4/30/2019     Referral Priority:   Routine     Referral Type:   Physical Therapy Referral Reason:   Specialty Services Required     Requested Specialty:   Physical Therapy     Number of Visits Requested:   1     Expiration Date:   10/31/2019     New Medications Ordered This Visit   Medications    gabapentin (NEURONTIN) 300 mg capsule     Sig: Take 2 capsules (600 mg total) by mouth 3 (three) times a day     Dispense:  180 capsule     Refill:  0    cyclobenzaprine (FLEXERIL) 10 mg tablet     Sig: Take 1 tablet (10 mg total) by mouth daily at bedtime     Dispense:  30 tablet     Refill:  0       History of Present Illness:    Chel Beach is a 66 y o  female who presents for follow-up related to her low back and left leg pain  Today she rates her pain 5/10 this is i constant in nature and described as dull, aching, sharp, throbbing, pins and needles, pressure-like, and shooting  The patient is status post a left L5 lumbar epidural steroid injection on October 17, 2018 with Dr Martínez Standard  She tells me that she only received 1 day relief from the injection  Patient takes gabapentin 300 mg 3 times a day along with cyclobenzaprine that she takes at bedtime  She does feel that the gabapentin helps some with this sharp shooting pain however she desires further relief  She does note diarrhea, fatigue, and dry mouth as side effects  I have personally reviewed and/or updated the patient's past medical history, past surgical history, family history, social history, current medications, allergies, and vital signs today  Review of Systems:    Review of Systems   Constitutional: Negative for fever and unexpected weight change  HENT: Negative for trouble swallowing  Eyes: Negative for visual disturbance  Respiratory: Negative for shortness of breath and wheezing  Cardiovascular: Negative for chest pain and palpitations  Gastrointestinal: Negative for constipation, diarrhea, nausea and vomiting  Endocrine: Negative for cold intolerance, heat intolerance and polydipsia  Genitourinary: Negative for difficulty urinating and frequency  Musculoskeletal: Positive for gait problem  Negative for arthralgias, joint swelling and myalgias  Pain in extremity   Skin: Negative for rash  Neurological: Positive for dizziness  Negative for seizures, syncope, weakness and headaches  Hematological: Does not bruise/bleed easily  Psychiatric/Behavioral: Negative for dysphoric mood  All other systems reviewed and are negative        Patient Active Problem List   Diagnosis    Anosmia    Anterolisthesis    Arthritis of lumbar spine    Dolan's esophagus    Blood glucose elevated    Chronic low back pain    Chronic pain disorder    Chronic venous insufficiency    Cough variant asthma    Depression    GERD (gastroesophageal reflux disease)    Hyperlipidemia    Lumbar postlaminectomy syndrome    Morbid obesity ([facility] [address] [facility]    Primary localized osteoarthritis of right knee    Primary osteoarthritis of left hip    Restless legs syndrome    Seasonal allergies    Sleep disturbance    Obesity (BMI 35 0-39 9 without comorbidity)    Primary osteoarthritis of right knee    Bilateral lower extremity edema    Chronic pain of right knee    S/P right knee arthroscopy    Environmental allergies    Sacroiliitis, not elsewhere classified ([facility] [address] )    Lumbar spondylosis    Lumbar radiculopathy       Past Medical History:   Diagnosis Date    Anesthesia     "sometimes low BP upon waking up"    Arthritis     Asthma     Back pain     Dolan's esophagus     Chronic pain disorder     Chronic venous insufficiency     Cough variant asthma     Environmental allergies     dust    GERD (gastroesophageal reflux disease)     Hiatal hernia     History of anemia     History of fusion of spine for scoliosis     "as a teenager"    History of transfusion     2001    Hyperlipidemia     Left lumbar radiculitis     Last Assessed: [id number]    Lumbar postlaminectomy syndrome     Migraines  Morbid obesity ([address] )     Motion sickness     Osteoarthritis     of left hip    Right knee pain     Seasonal allergies     Shortness of breath     Umbilical hernia     Uses brace     right knee    Uses crutches     one    Wears glasses        Past Surgical History:   Procedure Laterality Date    ARTHRODESIS      lumbar    ARTHRODESIS      Spinal Arthrodesis for Deformity; Last Assessed: [id number]   Ardeth Needs BACK SURGERY      lumbar fusion,with nydia/screw and cage implant    COLONOSCOPY      PLANTAR FASCIA SURGERY      DE COLONOSCOPY FLX DX W/COLLJ SPEC WHEN PFRMD N/A 2/20/2018    Procedure: COLONOSCOPY with polypectomy;  Surgeon: Emeka Quinonez MD;  Location: [facility]; Service: General    DE ESOPHAGOGASTRODUODENOSCOPY TRANSORAL DIAGNOSTIC N/A 5/24/2017    Procedure: ESOPHAGOGASTRODUODENOSCOPY (EGD); Surgeon: Rosey Painter MD;  Location: [facility]; Service: Gastroenterology    DE ESOPHAGOGASTRODUODENOSCOPY TRANSORAL DIAGNOSTIC N/A 8/31/2017    Procedure: ESOPHAGOGASTRODUODENOSCOPY (EGD) W RFA;  Surgeon: Jose Angel Aparicio MD;  Location:  GI LAB; Service: Gastroenterology    DE KNEE SCOPE,MED/LAT MENISECTOMY Right 4/4/2018    Procedure: ARTHROSCOPY KNEE PARTIAL MEDIAL MENISECTOMY , CHONDROPLASTY;  Surgeon: Mumtaz Meek DO;  Location: [facility];  Service: Orthopedics    WISDOM TOOTH EXTRACTION         Family History   Problem Relation Age of Onset    Lung cancer Mother     Cancer Mother     Alcohol abuse Father     Other Father         Cardiac Disorder    Depression Father     Hypertension Father     Heart attack Father     Breast cancer Maternal Grandmother     Diabetes type I Other        Social History     Occupational History    Not on file       Social History Main Topics    Smoking status: Never Smoker    Smokeless tobacco: Never Used    Alcohol use Yes      Comment: minimal    Drug use: No    Sexual activity: Not on file       Current Outpatient Prescriptions on File Prior to Visit   Medication Sig    albuterol (PROVENTIL HFA,VENTOLIN HFA) 90 mcg/act inhaler Inhale 2 puffs daily    cetirizine (ZyrTEC) 10 mg tablet Take 1 tablet by mouth daily      dexlansoprazole (DEXILANT) 60 MG capsule Take 1 capsule (60 mg total) by mouth 2 (two) times a day    DULoxetine (CYMBALTA) 60 mg delayed release capsule Take 60 mg by mouth daily      FLOVENT HFA 44 MCG/ACT inhaler Inhale 2 actuation 4 (four) times a day as needed      montelukast (SINGULAIR) 10 mg tablet Take 10 mg by mouth daily at bedtime    rOPINIRole (REQUIP) 2 mg tablet One tablet at bedtime    [DISCONTINUED] cyclobenzaprine (FLEXERIL) 10 mg tablet Take 1 tablet (10 mg total) by mouth 3 (three) times a day as needed for muscle spasms    [DISCONTINUED] gabapentin (NEURONTIN) 300 mg capsule Take 1 capsule (300 mg total) by mouth 3 (three) times a day     No current facility-administered medications on file prior to visit  Allergies   Allergen Reactions    Dust Mite Extract Shortness Of Breath    Latex Itching and Blisters    Other      seasonal    Sulfa Antibiotics Vomiting     Topical-blistering       Physical Exam:    /70   Pulse 86     Constitutional: normal, well developed, well nourished, alert, in no distress and non-toxic and no overt pain behavior    Eyes: anicteric  HEENT: grossly intact  Neck: supple, symmetric, trachea midline and no masses   Pulmonary:even and unlabored  Cardiovascular:No edema or pitting edema present  Skin:Normal without rashes or lesions and well hydrated  Psychiatric:Mood and affect appropriate  Neurologic:Cranial Nerves II-XII grossly intact  Musculoskeletal:normal    Imaging

## 2018-11-05 ENCOUNTER — OFFICE VISIT (OUTPATIENT)
Dept: INTERNAL MEDICINE CLINIC | Facility: CLINIC | Age: 58
End: 2018-11-05
Payer: MEDICARE

## 2018-11-05 VITALS
TEMPERATURE: 99.4 F | BODY MASS INDEX: 33.13 KG/M2 | SYSTOLIC BLOOD PRESSURE: 124 MMHG | HEART RATE: 65 BPM | HEIGHT: 64 IN | OXYGEN SATURATION: 96 % | DIASTOLIC BLOOD PRESSURE: 88 MMHG

## 2018-11-05 DIAGNOSIS — R73.9 BLOOD GLUCOSE ELEVATED: Primary | ICD-10-CM

## 2018-11-05 DIAGNOSIS — G89.4 CHRONIC PAIN DISORDER: ICD-10-CM

## 2018-11-05 DIAGNOSIS — E78.5 HYPERLIPIDEMIA, UNSPECIFIED HYPERLIPIDEMIA TYPE: ICD-10-CM

## 2018-11-05 DIAGNOSIS — E66.9 OBESITY (BMI 35.0-39.9 WITHOUT COMORBIDITY): ICD-10-CM

## 2018-11-05 DIAGNOSIS — J45.991 COUGH VARIANT ASTHMA: ICD-10-CM

## 2018-11-05 DIAGNOSIS — Z23 NEED FOR INFLUENZA VACCINATION: ICD-10-CM

## 2018-11-05 PROBLEM — E66.01 MORBID OBESITY (HCC): Status: RESOLVED | Noted: 2017-08-11 | Resolved: 2018-11-05

## 2018-11-05 PROCEDURE — 90682 RIV4 VACC RECOMBINANT DNA IM: CPT

## 2018-11-05 PROCEDURE — 90471 IMMUNIZATION ADMIN: CPT

## 2018-11-05 PROCEDURE — 99214 OFFICE O/P EST MOD 30 MIN: CPT | Performed by: INTERNAL MEDICINE

## 2018-11-05 NOTE — PROGRESS NOTES
Assessment/Plan   Problem List Items Addressed This Visit     Blood glucose elevated - Primary    Relevant Orders    HEMOGLOBIN A1C W/ EAG ESTIMATION    UA w Reflex to Microscopic w Reflex to Culture -Lab Collect    Chronic pain disorder    Cough variant asthma    Hyperlipidemia    Relevant Orders    CBC and differential    Comprehensive metabolic panel    Obesity (BMI 35 0-39 9 without comorbidity)      Other Visit Diagnoses     Need for influenza vaccination        Relevant Orders    influenza vaccine, [id number], quadrivalent, recombinant, PF, 0 5 mL, for patients 18 yr+ (FLUBLOK)      Subacute exacerbation of cough variant asthma:  Increase Flovent from current dose of 1 lesion to 2 inhalations once a day, to 2 lesions twice daily and re-evaluate next week  Continue Singulair, cetirizine and p r n  albuterol  Flu vaccine was also given today  Chronic pain disorder:  Plan is for Faby Streeter to contact her pain management physician directly she has a number of questions and would like to see him instead of the physician assistants  Obesity:  Her chronic pain is interfering with ability to be active and eat properly and she admits she does not eat the way she should  This in turn tends to make her back pain worse and she is interested in breaking the cycle but not interested in bariatric surgery which is appropriate  Faby Streeter was referred to the [address] [place] [address] weight loss program and I will follow her progress  Hyperlipidemia:  Plan is to start medical weight loss program and reassess lipids in February of next year just ahead of her annual visits which will take place in mid February  Glucose intolerance: There are no symptoms of hyperglycemia currently and hemoglobin A1c was 5 8 on February 5th of this year  Plan is to start medical weight loss and assess hemoglobin A1c and fasting glucose part of annual labs in February of next year      Faby Streeter will also return next week for reassessment of cough variant asthma  Subjective   Patient ID: Eula Infante is a 66 y o  female  Ratna Dean is here to discuss several issues  Vitals:    11/05/18 1003   BP: 124/88   Pulse: 65   Temp: 99 4 °F (37 4 °C)   SpO2: 96%     HPI   First, Ratna Dean has had increase in her cough variant asthma is using Flovent 1 lesion once a day and sometimes to relations once a day  This is not consistent use  She is using albuterol to relieve symptoms which works for a few hours  She is taking Singulair and cetirizine  Her cough increased in last 2 weeks coinciding with the more warm and humid weather recently during the fall and the fall mold season with that leaves  There is no severe dyspnea  Ratna Dean is following with Pain Management has seen position assistance recently but has questions as she is not making progress and would like to see her pain management physician  Did suggest she contact him through my chart  She is gaining weight over time and admits that she eats because she has lot of pain and weight gain is making her back pain worse and we discussed the medical weight loss program through [facility] and she is very interested in this and was signed up  Reassessing hyperlipidemia and glucose tolerance was discussed through labs in February after losing weight first   A lab slip was given  The following portions of the patient's history were reviewed and updated as appropriate: allergies, current medications, past family history, past medical history, past social history, past surgical history and problem list     Review of Systems no fever, no chills, no productive cough or chest pain  Objective   Physical Exam   Vital signs stable with regular pulse in no distress  That includes no tachypnea or dyspnea  There is no cyanosis  There is no use of accessory muscles of respiration  ENT exam unremarkable  No JVD   Lungs clear with occasional cough sounds dry, with extreme wheezes heard when coughing in the anterior chest   Cardiac exam normal on auscultation  There is bilateral mild lymphedema bilaterally  No phlebitic findings are calf tenderness and circulation is intact            Patient Active Problem List   Diagnosis    Anosmia    Anterolisthesis    Arthritis of lumbar spine    Dolan's esophagus    Blood glucose elevated    Chronic low back pain    Chronic pain disorder    Chronic venous insufficiency    Cough variant asthma    Depression    GERD (gastroesophageal reflux disease)    Hyperlipidemia    Lumbar postlaminectomy syndrome    Primary localized osteoarthritis of right knee    Primary osteoarthritis of left hip    Restless legs syndrome    Seasonal allergies    Sleep disturbance    Obesity (BMI 35 0-39 9 without comorbidity)    Primary osteoarthritis of right knee    Bilateral lower extremity edema    Chronic pain of right knee    S/P right knee arthroscopy    Environmental allergies    Sacroiliitis, not elsewhere classified ([facility] [address] )    Lumbar spondylosis    Lumbar radiculopathy     Current Outpatient Prescriptions:     albuterol (PROVENTIL HFA,VENTOLIN HFA) 90 mcg/act inhaler, Inhale 2 puffs daily, Disp: , Rfl:     cetirizine (ZyrTEC) 10 mg tablet, Take 1 tablet by mouth daily  , Disp: , Rfl:     cyclobenzaprine (FLEXERIL) 10 mg tablet, Take 1 tablet (10 mg total) by mouth daily at bedtime, Disp: 30 tablet, Rfl: 0    dexlansoprazole (DEXILANT) 60 MG capsule, Take 1 capsule (60 mg total) by mouth 2 (two) times a day, Disp: 60 capsule, Rfl: 0    DULoxetine (CYMBALTA) 60 mg delayed release capsule, Take 60 mg by mouth daily  , Disp: , Rfl:     FLOVENT HFA 44 MCG/ACT inhaler, Inhale 2 actuation 4 (four) times a day as needed  , Disp: , Rfl: 5    gabapentin (NEURONTIN) 300 mg capsule, Take 2 capsules (600 mg total) by mouth 3 (three) times a day, Disp: 180 capsule, Rfl: 0    montelukast (SINGULAIR) 10 mg tablet, Take 10 mg by mouth daily at bedtime, Disp: , Rfl:     rOPINIRole (REQUIP) 2 mg tablet, One tablet at bedtime, Disp: 90 tablet, Rfl: 0

## 2018-11-12 ENCOUNTER — OFFICE VISIT (OUTPATIENT)
Dept: INTERNAL MEDICINE CLINIC | Facility: CLINIC | Age: 58
End: 2018-11-12
Payer: MEDICARE

## 2018-11-12 VITALS
OXYGEN SATURATION: 97 % | TEMPERATURE: 98.3 F | DIASTOLIC BLOOD PRESSURE: 82 MMHG | HEART RATE: 90 BPM | SYSTOLIC BLOOD PRESSURE: 124 MMHG

## 2018-11-12 DIAGNOSIS — J45.991 COUGH VARIANT ASTHMA: Primary | ICD-10-CM

## 2018-11-12 DIAGNOSIS — Z91.09 ENVIRONMENTAL ALLERGIES: ICD-10-CM

## 2018-11-12 DIAGNOSIS — G25.81 RESTLESS LEG SYNDROME: ICD-10-CM

## 2018-11-12 DIAGNOSIS — K21.9 GASTROESOPHAGEAL REFLUX DISEASE, ESOPHAGITIS PRESENCE NOT SPECIFIED: ICD-10-CM

## 2018-11-12 DIAGNOSIS — Z12.31 BREAST CANCER SCREENING BY MAMMOGRAM: ICD-10-CM

## 2018-11-12 PROCEDURE — 99214 OFFICE O/P EST MOD 30 MIN: CPT | Performed by: INTERNAL MEDICINE

## 2018-11-12 RX ORDER — ROPINIROLE 2 MG/1
TABLET, FILM COATED ORAL
Qty: 90 TABLET | Refills: 0 | Status: SHIPPED | OUTPATIENT
Start: 2018-11-12 | End: 2019-07-11 | Stop reason: SDUPTHER

## 2018-11-12 RX ORDER — ALBUTEROL SULFATE 90 UG/1
2 AEROSOL, METERED RESPIRATORY (INHALATION) DAILY
Qty: 1 INHALER | Refills: 1 | Status: SHIPPED | OUTPATIENT
Start: 2018-11-12 | End: 2019-01-08 | Stop reason: ALTCHOICE

## 2018-11-12 NOTE — PROGRESS NOTES
Assessment/Plan   Problem List Items Addressed This Visit     Cough variant asthma - Primary    GERD (gastroesophageal reflux disease)    Environmental allergies      Cough variant asthma:  Continue Flovent 2 puffs twice daily and ProAir inhaler 2 relations every 6 hours as needed for cough and call back the second half this week  We both decided to avoid oral steroids because of potential systemic side effects including weight gain  Longstanding GERD:  Jorge Mckeon is ready to proceed with Gastroenterology evaluation previously advised  Referral was made to [facility]  We discussed the possible relationship between her chronic cough and GERD  Continue omeprazole  We discussed weight reduction again today  Environmental allergies:  Plan is referral to Dr Tesha Kennedy for comprehensive allergy evaluation of environmental allergies and cough variant asthma, including known constant exposure to cats, terrie environments  Morbid obesity:  Initial evaluation by [address] [place] [address],[facility] weight loss is December 11th  Chronic pain:  Jorge Mckeon is going to contact her pain management doctor directly and request an appoint with him to follow-up regarding advice given by his physician assistant, as Jorge Mckeon still has significant pain  Prevention:  Annual mammogram was ordered as well  Subjective   Patient ID: Nuvia Ramsay is a 66 y o  female  This is a 1 week follow-up  Vitals:    11/12/18 1358   BP: 124/82   Pulse: 90   Temp: 98 3 °F (36 8 °C)   SpO2: 97%     HPI   Jorge Mckoen feels about the same with possibly a slight reduction in her cough variant asthma  There is no dyspnea  Cough is worse at night  She is not noticing tamara reflux, but has a history of GERD, and is overdue for follow-up regarding GERD  She is compliant with Flovent and ProAir  Jorge Mckeon has never been evaluated by an allergist and we discussed initial evaluation and referral was made    There insight are significant potential environmental triggers for her chronic symptoms  Ratna Dean does have an appointment for medical weight loss on December 11th  She looks forward to starting this process  The following portions of the patient's history were reviewed and updated as appropriate: allergies, current medications, past family history, past medical history, past social history, past surgical history and problem list     Review of Systems    Objective   Physical Exam   Vital signs stable, in no acute distress  There is occasional dry coughing and short paroxysms  There is no stridor  There is no tachypnea or dyspnea or cyanosis or use of  accessory muscles of respiration at    Lungs are clear cardiac exam is normal         Patient Active Problem List   Diagnosis    Anosmia    Anterolisthesis    Arthritis of lumbar spine    Dolan's esophagus    Blood glucose elevated    Chronic low back pain    Chronic pain disorder    Chronic venous insufficiency    Cough variant asthma    Depression    GERD (gastroesophageal reflux disease)    Hyperlipidemia    Lumbar postlaminectomy syndrome    Primary localized osteoarthritis of right knee    Primary osteoarthritis of left hip    Restless legs syndrome    Seasonal allergies    Sleep disturbance    Obesity (BMI 35 0-39 9 without comorbidity)    Primary osteoarthritis of right knee    Bilateral lower extremity edema    Chronic pain of right knee    S/P right knee arthroscopy    Environmental allergies    Sacroiliitis, not elsewhere classified ([facility] [address] )    Lumbar spondylosis    Lumbar radiculopathy     Current Outpatient Prescriptions:     albuterol (PROVENTIL HFA,VENTOLIN HFA) 90 mcg/act inhaler, Inhale 2 puffs daily, Disp: , Rfl:     cetirizine (ZyrTEC) 10 mg tablet, Take 1 tablet by mouth daily  , Disp: , Rfl:     cyclobenzaprine (FLEXERIL) 10 mg tablet, Take 1 tablet (10 mg total) by mouth daily at bedtime, Disp: 30 tablet, Rfl: 0    dexlansoprazole (DEXILANT) 60 MG capsule, Take 1 capsule (60 mg total) by mouth 2 (two) times a day, Disp: 60 capsule, Rfl: 0    DULoxetine (CYMBALTA) 60 mg delayed release capsule, Take 60 mg by mouth daily  , Disp: , Rfl:     FLOVENT HFA 44 MCG/ACT inhaler, Inhale 2 actuation 4 (four) times a day as needed  , Disp: , Rfl: 5    gabapentin (NEURONTIN) 300 mg capsule, Take 2 capsules (600 mg total) by mouth 3 (three) times a day, Disp: 180 capsule, Rfl: 0    montelukast (SINGULAIR) 10 mg tablet, Take 10 mg by mouth daily at bedtime, Disp: , Rfl:     rOPINIRole (REQUIP) 2 mg tablet, One tablet at bedtime, Disp: 90 tablet, Rfl: 0

## 2018-11-13 ENCOUNTER — TELEPHONE (OUTPATIENT)
Dept: INTERNAL MEDICINE CLINIC | Facility: CLINIC | Age: 58
End: 2018-11-13

## 2018-11-13 NOTE — TELEPHONE ENCOUNTER
Yesterday, I sent in an rx for Albuterol and in parentheses the rx allows either Proventil or Ventolin  Please resubmit as Ventolin again for [facility] (possibly the rx needs to be Ventolin only)

## 2018-11-14 ENCOUNTER — TELEPHONE (OUTPATIENT)
Dept: PAIN MEDICINE | Facility: MEDICAL CENTER | Age: 58
End: 2018-11-14

## 2018-11-14 DIAGNOSIS — J45.909 UNCOMPLICATED ASTHMA, UNSPECIFIED ASTHMA SEVERITY, UNSPECIFIED WHETHER PERSISTENT: Primary | ICD-10-CM

## 2018-11-14 RX ORDER — ALBUTEROL SULFATE 90 UG/1
2 AEROSOL, METERED RESPIRATORY (INHALATION) EVERY 6 HOURS PRN
Qty: 18 G | Refills: 1 | Status: SHIPPED | OUTPATIENT
Start: 2018-11-14 | End: 2019-02-11

## 2018-11-14 NOTE — TELEPHONE ENCOUNTER
Dr Anne Bruno    Patient is having some issues with medications and pain  She is scheduled with Emilia Loya but would really like to speak with the doctor about her situation this time  I did look through his schedule and the first available he had was in December       She is keeping the appt with lucretia but would like to have a talk with Dr Anne Bruno    # [phone]

## 2018-11-15 ENCOUNTER — TELEPHONE (OUTPATIENT)
Dept: INTERNAL MEDICINE CLINIC | Facility: CLINIC | Age: 58
End: 2018-11-15

## 2018-11-15 NOTE — TELEPHONE ENCOUNTER
FYI: Pt reports still wheezing but feeling a little better  Will continue using inhaler    Appt with Pain Management 12/20

## 2018-11-15 NOTE — TELEPHONE ENCOUNTER
Thanks Delfino Tavarez  Ask David Portal to call pain management weekly to see if they have any cancellations  David Portal should continue the inhaler

## 2018-11-19 ENCOUNTER — TELEPHONE (OUTPATIENT)
Dept: OBGYN CLINIC | Facility: MEDICAL CENTER | Age: 58
End: 2018-11-19

## 2018-11-19 ENCOUNTER — OFFICE VISIT (OUTPATIENT)
Dept: PAIN MEDICINE | Facility: MEDICAL CENTER | Age: 58
End: 2018-11-19
Payer: MEDICARE

## 2018-11-19 DIAGNOSIS — M54.42 CHRONIC BILATERAL LOW BACK PAIN WITH LEFT-SIDED SCIATICA: ICD-10-CM

## 2018-11-19 DIAGNOSIS — M46.1 SACROILIITIS, NOT ELSEWHERE CLASSIFIED (HCC): ICD-10-CM

## 2018-11-19 DIAGNOSIS — G89.29 CHRONIC BILATERAL LOW BACK PAIN WITH LEFT-SIDED SCIATICA: ICD-10-CM

## 2018-11-19 DIAGNOSIS — M96.1 LUMBAR POSTLAMINECTOMY SYNDROME: ICD-10-CM

## 2018-11-19 DIAGNOSIS — M54.16 LUMBAR RADICULOPATHY: Primary | ICD-10-CM

## 2018-11-19 DIAGNOSIS — M47.816 LUMBAR SPONDYLOSIS: ICD-10-CM

## 2018-11-19 PROCEDURE — 99213 OFFICE O/P EST LOW 20 MIN: CPT | Performed by: NURSE PRACTITIONER

## 2018-11-19 RX ORDER — GABAPENTIN 300 MG/1
CAPSULE ORAL
Qty: 120 CAPSULE | Refills: 2 | Status: SHIPPED | OUTPATIENT
Start: 2018-11-19 | End: 2019-01-08

## 2018-11-19 NOTE — TELEPHONE ENCOUNTER
Order has been placed for right knee synvisc one injection  Patient will be contacted after patient's medicare insurance has approved this visco injection  Patient will also be scheduled by one of the team members who coordinate these prior-auths

## 2018-11-19 NOTE — PROGRESS NOTES
Assessment:  1  Lumbar radiculopathy    2  Sacroiliitis, not elsewhere classified ([facility] [address] )    3  Lumbar spondylosis    4  Chronic bilateral low back pain with left-sided sciatica    5  Lumbar postlaminectomy syndrome        Plan:  Continue with gabapentin the patient feels that the dose is a little too strong so we will decrease her dose to 1 tablet in the morning, 1 tablet in the afternoon, and 2 tablets at bedtime  She was given instruction on how to safely decrease the dose  A refill was sent to her pharmacy  I did encourage the patient to begin her physical therapy program     She would like to continue to hold off on the spinal cord stimulator at this time  Follow-up visit in 12 weeks to refill medication and re-evaluate physical therapy      [place] Prescription Drug Monitoring Program report was reviewed and was appropriate         My impressions and treatment recommendations were discussed in detail with the patient who verbalized understanding and had no further questions  Discharge instructions were provided  I personally saw and examined the patient and I agree with the above discussed plan of care  No orders of the defined types were placed in this encounter  New Medications Ordered This Visit   Medications    gabapentin (NEURONTIN) 300 mg capsule     Sig: Take 1 tablet in the morning, 1 tablet afternoon and 2 tablets at bedtime     Dispense:  120 capsule     Refill:  2       History of Present Illness:    Damari Richter is a 66 y o  female who presents for follow-up related to her low back and left leg pain  Patient reports that her pain is constant in nature and bothersome throughout the entirety of the day  She describes her pain as sharp, throbbing, cramping, pressure-like, shooting, pins and needles, and tightness      Patient is been taking gabapentin 300 milligrams 2 tablets 3 times a day she feels that the medication is helping with the nerve pain however she feels that is making her too tired and foggy  She is also using cyclobenzaprine at bedtime  Patient has not yet been able to begin physical therapy  I have personally reviewed and/or updated the patient's past medical history, past surgical history, family history, social history, current medications, allergies, and vital signs today  Review of Systems:    Review of Systems   Respiratory: Positive for shortness of breath  Gastrointestinal: Positive for diarrhea  Musculoskeletal: Positive for back pain, gait problem, joint swelling and myalgias  Neurological: Positive for weakness  Psychiatric/Behavioral: Positive for decreased concentration         Patient Active Problem List   Diagnosis    Anosmia    Anterolisthesis    Arthritis of lumbar spine    Dolan's esophagus    Blood glucose elevated    Chronic low back pain    Chronic pain disorder    Chronic venous insufficiency    Cough variant asthma    Depression    GERD (gastroesophageal reflux disease)    Hyperlipidemia    Lumbar postlaminectomy syndrome    Primary localized osteoarthritis of right knee    Primary osteoarthritis of left hip    Restless legs syndrome    Seasonal allergies    Sleep disturbance    Obesity (BMI 35 0-39 9 without comorbidity)    Primary osteoarthritis of right knee    Bilateral lower extremity edema    Chronic pain of right knee    S/P right knee arthroscopy    Environmental allergies    Sacroiliitis, not elsewhere classified ([facility] [address] )    Lumbar spondylosis    Lumbar radiculopathy       Past Medical History:   Diagnosis Date    Anesthesia     "sometimes low BP upon waking up"    Arthritis     Asthma     Back pain     Dolan's esophagus     Chronic pain disorder     Chronic venous insufficiency     Cough variant asthma     Environmental allergies     dust    GERD (gastroesophageal reflux disease)     Hiatal hernia     History of anemia     History of fusion of spine for scoliosis     "as a teenager"    History of transfusion     2001    Hyperlipidemia     Left lumbar radiculitis     Last Assessed: [id number]    Lumbar postlaminectomy syndrome     Migraines     Morbid obesity (HCC)     Motion sickness     Osteoarthritis     of left hip    Right knee pain     Seasonal allergies     Shortness of breath     Umbilical hernia     Uses brace     right knee    Uses crutches     one    Wears glasses        Past Surgical History:   Procedure Laterality Date    ARTHRODESIS      lumbar    ARTHRODESIS      Spinal Arthrodesis for Deformity; Last Assessed: [id number]   Dawit Naveed BACK SURGERY      lumbar fusion,with nydia/screw and cage implant    COLONOSCOPY      PLANTAR FASCIA SURGERY      NH COLONOSCOPY FLX DX W/COLLJ SPEC WHEN PFRMD N/A 2/20/2018    Procedure: COLONOSCOPY with polypectomy;  Surgeon: Lara Prado MD;  Location: [facility]; Service: General    NH ESOPHAGOGASTRODUODENOSCOPY TRANSORAL DIAGNOSTIC N/A 5/24/2017    Procedure: ESOPHAGOGASTRODUODENOSCOPY (EGD); Surgeon: Dennise Vance MD;  Location: [facility]; Service: Gastroenterology    NH ESOPHAGOGASTRODUODENOSCOPY TRANSORAL DIAGNOSTIC N/A 8/31/2017    Procedure: ESOPHAGOGASTRODUODENOSCOPY (EGD) W RFA;  Surgeon: Jeannie Torres MD;  Location:  GI LAB; Service: Gastroenterology    NH KNEE SCOPE,MED/LAT MENISECTOMY Right 4/4/2018    Procedure: ARTHROSCOPY KNEE PARTIAL MEDIAL MENISECTOMY , CHONDROPLASTY;  Surgeon: Anita Méndez DO;  Location: [facility];  Service: Orthopedics    WISDOM TOOTH EXTRACTION         Family History   Problem Relation Age of Onset    Lung cancer Mother     Cancer Mother     Alcohol abuse Father     Other Father         Cardiac Disorder    Depression Father     Hypertension Father     Heart attack Father     Breast cancer Maternal Grandmother     Diabetes type I Other        Social History     Occupational History    Not on file       Social History Main Topics    Smoking status: Never Smoker    Smokeless tobacco: Never Used    Alcohol use Yes      Comment: minimal    Drug use: No    Sexual activity: Not on file       Current Outpatient Prescriptions on File Prior to Visit   Medication Sig    albuterol (PROVENTIL HFA,VENTOLIN HFA) 90 mcg/act inhaler Inhale 2 puffs daily    albuterol (VENTOLIN HFA) 90 mcg/act inhaler Inhale 2 puffs every 6 (six) hours as needed for wheezing    cetirizine (ZyrTEC) 10 mg tablet Take 1 tablet by mouth daily      cyclobenzaprine (FLEXERIL) 10 mg tablet Take 1 tablet (10 mg total) by mouth daily at bedtime    dexlansoprazole (DEXILANT) 60 MG capsule Take 1 capsule (60 mg total) by mouth 2 (two) times a day    DULoxetine (CYMBALTA) 60 mg delayed release capsule Take 60 mg by mouth daily      FLOVENT HFA 44 MCG/ACT inhaler Inhale 2 actuation 4 (four) times a day as needed      montelukast (SINGULAIR) 10 mg tablet Take 10 mg by mouth daily at bedtime    rOPINIRole (REQUIP) 2 mg tablet One tablet at bedtime    [DISCONTINUED] gabapentin (NEURONTIN) 300 mg capsule Take 2 capsules (600 mg total) by mouth 3 (three) times a day     No current facility-administered medications on file prior to visit  Allergies   Allergen Reactions    Dust Mite Extract Shortness Of Breath    Latex Itching and Blisters    Other      seasonal    Sulfa Antibiotics Vomiting     Topical-blistering       Physical Exam:    There were no vitals taken for this visit  Constitutional: normal, well developed, well nourished, alert, in no distress and non-toxic and no overt pain behavior    Eyes: anicteric  HEENT: grossly intact  Neck: supple, symmetric, trachea midline and no masses   Pulmonary:even and unlabored  Cardiovascular:No edema or pitting edema present  Skin:Normal without rashes or lesions and well hydrated  Psychiatric:Mood and affect appropriate  Neurologic:Cranial Nerves II-XII grossly intact  Musculoskeletal:normal    Imaging

## 2018-11-21 NOTE — TELEPHONE ENCOUNTER
RN s/w pt regarding previous  Pt did see AO on 11/19 but was still interested in seeing and speaking to [address]  Per pt she just wants to go over a few questions that she will have written down regarding her spine and the new increase in horrible nerve pain  Per pt she has recently been weaning down from the gabapentin due to excessive sleepiness and she has a PT eval next week  Pt aware that [address] is scheduled out into late December  RN advised that she will send this to [address] who is back in the office on Tuesday for any recs regarding sooner appt if available  Pt appreciative of same  ---please advise thank you--  May we give her a same day appt if available?

## 2018-11-26 ENCOUNTER — TELEPHONE (OUTPATIENT)
Dept: OBGYN CLINIC | Facility: OTHER | Age: 58
End: 2018-11-26

## 2018-11-26 NOTE — TELEPHONE ENCOUNTER
TO BE COMPLETED BY CENTRAL AUTH TEAM:     Physician: DR Rafael Thayer    Medication: SYNVISC-ONE     Number of Injections in Series (Appointments scheduled 1 week apart from one another):   1  Schedule after this date: 3-5 DAYS FROM CALL BACK DATE    Billing Info: Buy and Bill/Specialty Pharmacy-Patient Supply  BUY & BILL     Appointment Message Line:   RIGHT KNEE SYNVISC-ONE ([address])    (please copy and paste appointment message line when scheduling appointment)    Additional Comments:  LEFT MSG TO SCHEDULE VISCO APPT 1ST ATTEMPT

## 2018-11-27 ENCOUNTER — TRANSCRIBE ORDERS (OUTPATIENT)
Dept: ADMINISTRATIVE | Facility: HOSPITAL | Age: 58
End: 2018-11-27

## 2018-11-27 DIAGNOSIS — Z12.31 VISIT FOR SCREENING MAMMOGRAM: Primary | ICD-10-CM

## 2018-11-27 NOTE — TELEPHONE ENCOUNTER
Called pt and [facility]same day appt slot" okay by Dr Ethan Wylie, Please sched pt  Thanks

## 2018-11-30 ENCOUNTER — OFFICE VISIT (OUTPATIENT)
Dept: OBGYN CLINIC | Facility: MEDICAL CENTER | Age: 58
End: 2018-11-30
Payer: MEDICARE

## 2018-11-30 ENCOUNTER — HOSPITAL ENCOUNTER (OUTPATIENT)
Dept: MAMMOGRAPHY | Facility: MEDICAL CENTER | Age: 58
Discharge: HOME | End: 2018-11-30
Payer: MEDICARE

## 2018-11-30 VITALS
DIASTOLIC BLOOD PRESSURE: 84 MMHG | HEIGHT: 64 IN | HEART RATE: 81 BPM | SYSTOLIC BLOOD PRESSURE: 116 MMHG | BODY MASS INDEX: 33.13 KG/M2

## 2018-11-30 VITALS — BODY MASS INDEX: 32.44 KG/M2 | HEIGHT: 64 IN | WEIGHT: 190 LBS

## 2018-11-30 DIAGNOSIS — M17.11 PRIMARY OSTEOARTHRITIS OF RIGHT KNEE: Primary | ICD-10-CM

## 2018-11-30 DIAGNOSIS — G89.29 CHRONIC PAIN OF RIGHT KNEE: ICD-10-CM

## 2018-11-30 DIAGNOSIS — Z98.890 S/P RIGHT KNEE ARTHROSCOPY: ICD-10-CM

## 2018-11-30 DIAGNOSIS — M25.561 CHRONIC PAIN OF RIGHT KNEE: ICD-10-CM

## 2018-11-30 DIAGNOSIS — Z12.31 VISIT FOR SCREENING MAMMOGRAM: ICD-10-CM

## 2018-11-30 PROCEDURE — 20610 DRAIN/INJ JOINT/BURSA W/O US: CPT | Performed by: ORTHOPAEDIC SURGERY

## 2018-11-30 PROCEDURE — 77063 BREAST TOMOSYNTHESIS BI: CPT

## 2018-11-30 PROCEDURE — 77067 SCR MAMMO BI INCL CAD: CPT

## 2018-11-30 NOTE — PROGRESS NOTES
Assessment/Plan:  1  Primary osteoarthritis of right knee    2  Chronic pain of right knee    3  S/P right knee arthroscopy      Orders Placed This Encounter   Procedures    Large joint arthrocentesis    Brace    Patient received Synvisc-One injection today she was encouraged to rest ice and avoid strenuous activities for the next 1-2 days  Continue Tylenol p r n  Pain  Patient will be fitted for a new short hinged knee brace today  Patient has 1 from years ago and states that the current brace is very beat up therefore she would need a new one that is in better condition  Return in about 2 months (around 1/30/2019)  Subjective   Chief Complaint:   Chief Complaint   Patient presents with    Right Knee - Follow-up       Brett Guillen is a 66 y o  female who presents for follow up for right knee pain, she is here for Synvisc-One injection  She had a steroid injection on 07/31/2018 which she states offered her pain relief for several weeks before her pain returned to baseline  She wears a short hinged knee brace on her right knee but states her brace is starting to fall apart, as it is several years old  She ices and elevates her knee daily to minimize swelling and pain  She takes Tylenol occasionally for pain, she cannot take NSAIDs due to a medical history of Dolan's esophagus  Review of Systems  ROS:    See HPI for musculoskeletal review     All other systems reviewed are negative     History:  Past Medical History:   Diagnosis Date    Anesthesia     "sometimes low BP upon waking up"    Arthritis     Asthma     Back pain     Dolan's esophagus     Chronic pain disorder     Chronic venous insufficiency     Cough variant asthma     Environmental allergies     dust    GERD (gastroesophageal reflux disease)     Hiatal hernia     History of anemia     History of fusion of spine for scoliosis     "as a teenager"    History of transfusion     2001    Hyperlipidemia     Left lumbar radiculitis     Last Assessed: [id number]    Lumbar postlaminectomy syndrome     Migraines     Morbid obesity (HCC)     Motion sickness     Osteoarthritis     of left hip    Right knee pain     Seasonal allergies     Shortness of breath     Umbilical hernia     Uses brace     right knee    Uses crutches     one    Wears glasses      Past Surgical History:   Procedure Laterality Date    ARTHRODESIS      lumbar    ARTHRODESIS      Spinal Arthrodesis for Deformity; Last Assessed: [id number]   Diana Riis BACK SURGERY      lumbar fusion,with nydia/screw and cage implant    BREAST CYST EXCISION      benign    COLONOSCOPY      PLANTAR FASCIA SURGERY      HI COLONOSCOPY FLX DX W/COLLJ SPEC WHEN PFRMD N/A 2/20/2018    Procedure: COLONOSCOPY with polypectomy;  Surgeon: Paola Gaspar MD;  Location: [facility]; Service: General    HI ESOPHAGOGASTRODUODENOSCOPY TRANSORAL DIAGNOSTIC N/A 5/24/2017    Procedure: ESOPHAGOGASTRODUODENOSCOPY (EGD); Surgeon: Brent Robledo MD;  Location: [facility] GI [facility]; Service: Gastroenterology    HI ESOPHAGOGASTRODUODENOSCOPY TRANSORAL DIAGNOSTIC N/A 8/31/2017    Procedure: ESOPHAGOGASTRODUODENOSCOPY (EGD) W RFA;  Surgeon: Debra Hernandez MD;  Location:  GI LAB;   Service: Gastroenterology    HI KNEE SCOPE,MED/LAT MENISECTOMY Right 4/4/2018    Procedure: ARTHROSCOPY KNEE PARTIAL MEDIAL MENISECTOMY , CHONDROPLASTY;  Surgeon: Peter Pham DO;  [facility];  Service: Orthopedics    WISDOM TOOTH EXTRACTION       Social History   History   Alcohol Use    Yes     Comment: minimal     History   Drug Use No     History   Smoking Status    Never Smoker   Smokeless Tobacco    Never Used     Family History:   Family History   Problem Relation Age of Onset    Lung cancer Mother     Cancer Mother     Alcohol abuse Father     Other Father         Cardiac Disorder    Depression Father     Hypertension Father     Heart attack Father     Breast cancer Maternal Grandmother     Diabetes type I Other        Meds/Allergies     (Not in a hospital admission)  Allergies   Allergen Reactions    Dust Mite Extract Shortness Of Breath    Latex Itching and Blisters    Other      seasonal    Sulfa Antibiotics Vomiting     Topical-blistering          Objective     /84   Pulse 81   Ht 5' 3 5" (1 613 m)   BMI 33 13 kg/m²      PE:  AAOx 3  WDWN  Hearing intact, no drainage from eyes  no audible wheezing  no abdominal distension  LE compartments soft, skin intact    Ortho Exam:  right Knee:   No erythema  no swelling  no effusion  no warmth  AROM: full  Stable to varus/valgus stress    Large joint arthrocentesis  Date/Time: 11/30/2018 12:45 PM  Consent given by: patient  Site marked: site marked  Timeout: Immediately prior to procedure a time out was called to verify the correct patient, procedure, equipment, support staff and site/side marked as required   Supporting Documentation  Indications: pain   Procedure Details  Location: knee - R knee  Preparation: Patient was prepped and draped in the usual sterile fashion  Needle size: 22 G  Ultrasound guidance: no  Approach: anterolateral  Medications administered: 48 mg hylan 48 MG/6ML    Patient tolerance: patient tolerated the procedure well with no immediate complications  Dressing:  Sterile dressing applied

## 2018-12-11 ENCOUNTER — OFFICE VISIT (OUTPATIENT)
Dept: BARIATRICS | Facility: CLINIC | Age: 58
End: 2018-12-11
Payer: MEDICARE

## 2018-12-11 VITALS
SYSTOLIC BLOOD PRESSURE: 140 MMHG | HEIGHT: 63 IN | WEIGHT: 229 LBS | HEART RATE: 94 BPM | DIASTOLIC BLOOD PRESSURE: 80 MMHG | RESPIRATION RATE: 16 BRPM | TEMPERATURE: 98.3 F | BODY MASS INDEX: 40.57 KG/M2

## 2018-12-11 DIAGNOSIS — K21.9 GASTROESOPHAGEAL REFLUX DISEASE, ESOPHAGITIS PRESENCE NOT SPECIFIED: ICD-10-CM

## 2018-12-11 DIAGNOSIS — E78.5 HYPERLIPIDEMIA, UNSPECIFIED HYPERLIPIDEMIA TYPE: ICD-10-CM

## 2018-12-11 DIAGNOSIS — E66.01 OBESITY, CLASS III, BMI 40-49.9 (MORBID OBESITY) (HCC): Primary | ICD-10-CM

## 2018-12-11 DIAGNOSIS — E66.9 OBESITY (BMI 35.0-39.9 WITHOUT COMORBIDITY): ICD-10-CM

## 2018-12-11 PROBLEM — E66.813 OBESITY, CLASS III, BMI 40-49.9 (MORBID OBESITY): Status: ACTIVE | Noted: 2017-08-11

## 2018-12-11 PROCEDURE — 99211 OFF/OP EST MAY X REQ PHY/QHP: CPT | Performed by: PHYSICIAN ASSISTANT

## 2018-12-11 RX ORDER — FAMOTIDINE 40 MG/1
TABLET, FILM COATED ORAL
Refills: 6 | COMMUNITY
Start: 2018-11-20 | End: 2018-12-11 | Stop reason: ALTCHOICE

## 2018-12-11 RX ORDER — SODIUM CHLORIDE FOR INHALATION 0.9 %
VIAL, NEBULIZER (ML) INHALATION AS NEEDED
Refills: 6 | COMMUNITY
Start: 2018-11-27 | End: 2021-03-08

## 2018-12-11 RX ORDER — ALBUTEROL SULFATE 2.5 MG/3ML
SOLUTION RESPIRATORY (INHALATION)
Refills: 3 | COMMUNITY
Start: 2018-11-21 | End: 2022-01-11 | Stop reason: ALTCHOICE

## 2018-12-11 NOTE — PROGRESS NOTES
Assessment/Plan:    Obesity, Class III, BMI 40-49 9 (morbid obesity) ([address] )  -Discussed options of HealthyCORE-Intensive Lifestyle Intervention Program, Very Low Calorie Diet-VLCD, Conservative Program and Charan-En-Y Gastric Bypass and the role of weight loss medications  Patient did want me to go into detail regarding Fee based programs as she reports can not afford them  -Initial weight loss goal of 5-10% weight loss for improved health  -Screening labs: reviewed  TSH done greater than one year ago  Patient defers recheck  -Patient is interested in pursuing conservative program    GERD (gastroesophageal reflux disease)  -on PPI  -alos w/ hx  Dolan's  Not a candidate for VSG    Hyperlipidemia  -likely to improve with dietary changes/weight loss  -continue surveillance with PCP    Impaired fasting glucose  -HgbA1c 5 8  -recommend avoid refined carbohydrates  Increase exercise as tolerated  Suggested aqua aerobics, pool exercises, stationary bike due to her physical limitations related to her back    -can consider metformin    Vitamin D deficiency  -not on supplementation  Recommend 2000 IU D3 daily and will give lab slip at follow up to recheck this level    Goals:    Food log (ie ) www myfitnesspal com,sparkpeople  com,loseit com,calorieking  com,etc    No sugary beverages  At least 64oz of water daily  Increase physical activity by 10 minutes daily  Gradually increase physical activity to a goal of 5 days per week for 30 minutes of MODERATE intensity PLUS 2 days per week of FULL BODY resistance training  7274-5548 calories per day  5-10 servings of fruits and vegetables per day  2000 IU D3 daily, should have level repeated in 2-3 months    Follow up in approximately 6 weeks with Non-Surgical Physician/Advanced Practitioner      Diagnoses and all orders for this visit:    Obesity, Class III, BMI 40-49 9 (morbid obesity) ([facility])    Obesity (BMI 35 0-39 9 without comorbidity)  -     Ambulatory referral to Weight Management    Gastroesophageal reflux disease, esophagitis presence not specified    Hyperlipidemia, unspecified hyperlipidemia type    Other orders  -     albuterol (2 5 mg/3 mL) 0 083 % nebulizer solution; VVN Q 4 H PRN  -     Discontinue: famotidine (PEPCID) 40 MG tablet; TK 1 T PO PRIOR TO DINNER AND 1 T HS  -     sodium chloride 0 9 % nebulizer solution; USE 3 ML VIA NEB 1-2 TIMES D PRN          Subjective:   Chief Complaint   Patient presents with    Consult     MWM consult        Patient ID: Amy Rosa  is a 66 y o  female with excess weight/obesity here to pursue weight managment  Past Medical History:   Diagnosis Date    Anesthesia     "sometimes low BP upon waking up"    Arthritis     Asthma     Back pain     Dolan's esophagus     Chronic pain disorder     Chronic venous insufficiency     Cough variant asthma     Environmental allergies     dust    GERD (gastroesophageal reflux disease)     Hiatal hernia     History of anemia     History of fusion of spine for scoliosis     "as a teenager"    History of transfusion     2001    Hyperlipidemia     Left lumbar radiculitis     Last Assessed: [id number]    Lumbar postlaminectomy syndrome     Migraines     Morbid obesity ([address] )     Motion sickness     Osteoarthritis     of left hip    Right knee pain     Seasonal allergies     Shortness of breath     Umbilical hernia     Uses brace     right knee    Uses crutches     one    Wears glasses        HPI:  Obesity/Excess Weight:  Severity: Severe  Onset:  Past 6 years, has been more sedentary due to medical conditions  Modifiers: Diet and Exercise  Contributing factors:  Insufficient Physical Activity and steroid injections, side effect of gabapentin  Associated symptoms: comorbid conditions, fatigue and increased joint pain    Goals: 140 lbs    The following portions of the patient's history were reviewed and updated as appropriate: allergies, current medications, past family history, past medical history, past social history, past surgical history and problem list     Review of Systems   Constitutional: Negative for chills and fever  HENT: Negative for sore throat  Respiratory: Positive for cough and shortness of breath (related to asthma/allergies)  Cardiovascular: Negative for chest pain and palpitations  Gastrointestinal: Positive for constipation  Negative for abdominal pain, diarrhea, nausea and vomiting  +GERD, controlled fairly well controlled on PPI   Genitourinary: Negative for dysuria  Musculoskeletal: Positive for arthralgias and back pain  Skin: Negative for rash  Neurological: Positive for headaches (Hx Migraine's)  Negative for dizziness  Psychiatric/Behavioral: The patient is nervous/anxious  -denies depression       Objective:    /80 (BP Location: Left arm, Patient Position: Sitting, Cuff Size: Adult)   Pulse 94   Temp 98 3 °F (36 8 °C) (Tympanic)   Resp 16   Ht 5' 3" (1 6 m)   Wt 104 kg (229 lb)   BMI 40 57 kg/m²     Physical Exam   Nursing note and vitals reviewed  Constitutional   General appearance: Abnormal   well developed and morbidly obese  Eyes No conjunctival pallor  Ears, Nose, Mouth, and Throat Oral mucosa moist    Pulmonary   Respiratory effort: No increased work of breathing or signs of respiratory distress  Auscultation of lungs: Clear to auscultation, equal breath sounds bilaterally, no wheezes, no rales, no rhonci  Cardiovascular   Auscultation of heart: Normal rate and rhythm, normal S1 and S2, without murmurs  Examination of extremities for edema and/or varicosities: Normal   no edema  Abdomen   Abdomen: Abnormal   The abdomen was obese  Bowel sounds were normal  The abdomen was soft and nontender     Musculoskeletal   Gait and station: Normal     Psychiatric   Orientation to person, place and time: Normal     Affect: appropriate

## 2018-12-11 NOTE — PATIENT INSTRUCTIONS
Goals: Food log (ie ) www myfitnesspal com,sparkpeople  com,loseit com,calorieking  com,etc    No sugary beverages  At least 64oz of water daily  Increase physical activity by 10 minutes daily   Gradually increase physical activity to a goal of 5 days per week for 30 minutes of MODERATE intensity PLUS 2 days per week of FULL BODY resistance training  7886-3840 calories per day  5-10 servings of fruits and vegetables per day  2000 IU D3 daily, should have level repeated in 2 months

## 2018-12-11 NOTE — ASSESSMENT & PLAN NOTE
-Discussed options of HealthyCORE-Intensive Lifestyle Intervention Program, Very Low Calorie Diet-VLCD, Conservative Program and Charan-En-Y Gastric Bypass and the role of weight loss medications  Patient did want me to go into detail regarding Fee based programs as she reports can not afford them  -Initial weight loss goal of 5-10% weight loss for improved health  -Screening labs: reviewed  TSH done greater than one year ago   Patient defers recheck  -Patient is interested in pursuing conservative program

## 2018-12-12 PROBLEM — E55.9 VITAMIN D DEFICIENCY: Status: ACTIVE | Noted: 2018-12-12

## 2018-12-12 PROBLEM — R73.01 IMPAIRED FASTING GLUCOSE: Status: ACTIVE | Noted: 2018-12-12

## 2018-12-12 NOTE — ASSESSMENT & PLAN NOTE
-not on supplementation   Recommend 2000 IU D3 daily and will give lab slip at follow up to recheck this level

## 2018-12-12 NOTE — ASSESSMENT & PLAN NOTE
-HgbA1c 5 8  -recommend avoid refined carbohydrates  Increase exercise as tolerated   Suggested aqua aerobics, pool exercises, stationary bike due to her physical limitations related to her back    -can consider metformin

## 2019-01-08 ENCOUNTER — APPOINTMENT (OUTPATIENT)
Dept: RADIOLOGY | Facility: MEDICAL CENTER | Age: 59
End: 2019-01-08
Payer: MEDICARE

## 2019-01-08 ENCOUNTER — OFFICE VISIT (OUTPATIENT)
Dept: PAIN MEDICINE | Facility: MEDICAL CENTER | Age: 59
End: 2019-01-08
Payer: MEDICARE

## 2019-01-08 VITALS
WEIGHT: 225 LBS | SYSTOLIC BLOOD PRESSURE: 137 MMHG | HEART RATE: 106 BPM | BODY MASS INDEX: 39.87 KG/M2 | HEIGHT: 63 IN | DIASTOLIC BLOOD PRESSURE: 84 MMHG | RESPIRATION RATE: 16 BRPM

## 2019-01-08 DIAGNOSIS — M54.50 CHRONIC BILATERAL LOW BACK PAIN WITHOUT SCIATICA: ICD-10-CM

## 2019-01-08 DIAGNOSIS — M79.2 NEUROPATHIC PAIN: Primary | ICD-10-CM

## 2019-01-08 DIAGNOSIS — G89.29 CHRONIC BILATERAL LOW BACK PAIN WITHOUT SCIATICA: ICD-10-CM

## 2019-01-08 PROCEDURE — 99214 OFFICE O/P EST MOD 30 MIN: CPT | Performed by: PHYSICAL MEDICINE & REHABILITATION

## 2019-01-08 PROCEDURE — 72120 X-RAY BEND ONLY L-S SPINE: CPT

## 2019-01-08 RX ORDER — CYCLOBENZAPRINE HCL 10 MG
10 TABLET ORAL
Qty: 30 TABLET | Refills: 0 | Status: SHIPPED | OUTPATIENT
Start: 2019-01-08 | End: 2019-11-14 | Stop reason: ALTCHOICE

## 2019-01-08 RX ORDER — PREGABALIN 75 MG/1
75 CAPSULE ORAL 2 TIMES DAILY
Qty: 60 CAPSULE | Refills: 0 | Status: SHIPPED | OUTPATIENT
Start: 2019-01-08 | End: 2019-02-04 | Stop reason: SDUPTHER

## 2019-01-08 NOTE — PROGRESS NOTES
Assessment:  1  Neuropathic pain    2  Chronic bilateral low back pain without sciatica        Plan:  1  Discontinue gabapentin secondary to side effects  2  Obtain flexion-extension x-rays of the lumbar spine  3  Initiate Lyrica 75 mg 1 tablet twice daily  4  Follow-up in 4 weeks with 1 of our nurse practitioners to review her response and consider further options which may include titrating the medication or transitioning to something like Nortriptyline      I did review with her that it is unlikely that I spinal cord stimulator would provide relief of her current pain complaint  My impressions and treatment recommendations were discussed in detail with the patient who verbalized understanding and had no further questions  Discharge instructions were provided  I personally saw and examined the patient and I agree with the above discussed plan of care  Orders Placed This Encounter   Procedures    XR lumbar sp/bending only min 2-3 v     Standing Status:   Future     Standing Expiration Date:   1/8/2023     Scheduling Instructions:      Bring along any outside films relating to this procedure  Order Specific Question:   Reason for Exam:     Answer:   low back pain, history of spinal fusion     Order Specific Question:   Is the patient pregnant? Answer:   No     New Medications Ordered This Visit   Medications    cyclobenzaprine (FLEXERIL) 10 mg tablet     Sig: Take 1 tablet (10 mg total) by mouth daily at bedtime     Dispense:  30 tablet     Refill:  0    pregabalin (LYRICA) 75 mg capsule     Sig: Take 1 capsule (75 mg total) by mouth 2 (two) times a day for 30 days     Dispense:  60 capsule     Refill:  0       History of Present Illness:    Damari Richter is a 66 y o  female Follows up in the office regarding left-sided lower back neuropathic pain with nerve irritation symptoms  This ranges in severity from 5-10/10  This is intermittent in nature and random    She is unable to make this happen  She states that she will occasionally have some radiating leg pain  This is not her main pain complaint however  She has noted side effects from the gabapentin including diarrhea, fatigue, and foggy feeling  She notes that as she has decreased some of this medication however that her pain has worsened  She is hopeful for an alternative medication  I have personally reviewed and/or updated the patient's past medical history, past surgical history, family history, social history, current medications, allergies, and vital signs today  Review of Systems:    Review of Systems   Respiratory: Negative for shortness of breath  Cardiovascular: Negative for chest pain  Gastrointestinal: Positive for diarrhea  Negative for constipation, nausea and vomiting  Musculoskeletal: Positive for back pain and gait problem  Negative for arthralgias, joint swelling and myalgias  Skin: Negative for rash  Neurological: Negative for dizziness, seizures and weakness  All other systems reviewed and are negative        Patient Active Problem List   Diagnosis    Anosmia    Anterolisthesis    Arthritis of lumbar spine    Dolan's esophagus    Blood glucose elevated    Chronic low back pain    Chronic pain disorder    Chronic venous insufficiency    Cough variant asthma    Depression    GERD (gastroesophageal reflux disease)    Hyperlipidemia    Lumbar postlaminectomy syndrome    Obesity, Class III, BMI 40-49 9 (morbid obesity) ([facility] [address] )    Primary localized osteoarthritis of right knee    Primary osteoarthritis of left hip    Restless legs syndrome    Seasonal allergies    Sleep disturbance    Primary osteoarthritis of right knee    Bilateral lower extremity edema    Chronic pain of right knee    S/P right knee arthroscopy    Environmental allergies    Sacroiliitis, not elsewhere classified ([address] )    Lumbar spondylosis    Lumbar radiculopathy    Impaired fasting glucose    Vitamin D deficiency       Past Medical History:   Diagnosis Date    Anesthesia     "sometimes low BP upon waking up"    Arthritis     Asthma     Back pain     Dolan's esophagus     Chronic pain disorder     Chronic venous insufficiency     Cough variant asthma     Environmental allergies     dust    GERD (gastroesophageal reflux disease)     Hiatal hernia     History of anemia     History of fusion of spine for scoliosis     "as a teenager"    History of transfusion     2001    Hyperlipidemia     Left lumbar radiculitis     Last Assessed: [id number]    Lumbar postlaminectomy syndrome     Migraines     Morbid obesity ([address] )     Motion sickness     Osteoarthritis     of left hip    Right knee pain     Seasonal allergies     Shortness of breath     Umbilical hernia     Uses brace     right knee    Uses crutches     one    Wears glasses        Past Surgical History:   Procedure Laterality Date    ARTHRODESIS      lumbar    ARTHRODESIS      Spinal Arthrodesis for Deformity; Last Assessed: [id number]   [facility] BACK SURGERY      lumbar fusion,with nydia/screw and cage implant    BREAST CYST EXCISION      benign    COLONOSCOPY      PLANTAR FASCIA SURGERY      OR COLONOSCOPY FLX DX W/COLLJ SPEC WHEN PFRMD N/A 2/20/2018    Procedure: COLONOSCOPY with polypectomy;  Surgeon: Holley Alves MD;  Location: [facility]; Service: General    OR ESOPHAGOGASTRODUODENOSCOPY TRANSORAL DIAGNOSTIC N/A 5/24/2017    Procedure: ESOPHAGOGASTRODUODENOSCOPY (EGD); Surgeon: Errol Hilliard MD;  Location: [facility]; Service: Gastroenterology    OR ESOPHAGOGASTRODUODENOSCOPY TRANSORAL DIAGNOSTIC N/A 8/31/2017    Procedure: ESOPHAGOGASTRODUODENOSCOPY (EGD) W RFA;  Surgeon: Ester Rodriguez MD;  Location:  GI LAB;   Service: Gastroenterology    OR KNEE SCOPE,MED/LAT MENISECTOMY Right 4/4/2018    Procedure: ARTHROSCOPY KNEE PARTIAL MEDIAL MENISECTOMY , CHONDROPLASTY;  Surgeon: Fausto Chavez DO;  Location[facility];  Service: Orthopedics    WISDOM TOOTH EXTRACTION         Family History   Problem Relation Age of Onset    Lung cancer Mother     Cancer Mother     Alcohol abuse Father     Other Father         Cardiac Disorder    Depression Father     Hypertension Father     Heart attack Father     Breast cancer Maternal Grandmother     Diabetes type I Other     Stroke Neg Hx        Social History     Occupational History    Not on file  Social History Main Topics    Smoking status: Never Smoker    Smokeless tobacco: Never Used    Alcohol use Yes      Comment: minimal    Drug use: No    Sexual activity: Not on file       Current Outpatient Prescriptions on File Prior to Visit   Medication Sig    albuterol (2 5 mg/3 mL) 0 083 % nebulizer solution VVN Q 4 H PRN    albuterol (VENTOLIN HFA) 90 mcg/act inhaler Inhale 2 puffs every 6 (six) hours as needed for wheezing    cetirizine (ZyrTEC) 10 mg tablet Take 1 tablet by mouth daily      dexlansoprazole (DEXILANT) 60 MG capsule Take 1 capsule (60 mg total) by mouth 2 (two) times a day    DULoxetine (CYMBALTA) 60 mg delayed release capsule Take 60 mg by mouth daily      FLOVENT HFA 44 MCG/ACT inhaler Inhale 2 actuation 4 (four) times a day as needed      montelukast (SINGULAIR) 10 mg tablet Take 10 mg by mouth daily at bedtime    rOPINIRole (REQUIP) 2 mg tablet One tablet at bedtime    sodium chloride 0 9 % nebulizer solution USE 3 ML VIA NEB 1-2 TIMES D PRN    [DISCONTINUED] cyclobenzaprine (FLEXERIL) 10 mg tablet Take 1 tablet (10 mg total) by mouth daily at bedtime    [DISCONTINUED] gabapentin (NEURONTIN) 300 mg capsule Take 1 tablet in the morning, 1 tablet afternoon and 2 tablets at bedtime    [DISCONTINUED] albuterol (PROVENTIL HFA,VENTOLIN HFA) 90 mcg/act inhaler Inhale 2 puffs daily (Patient not taking: Reported on 1/8/2019 )     No current facility-administered medications on file prior to visit          Allergies   Allergen Reactions    Dust Mite Extract Shortness Of Breath    Latex Itching and Blisters    Other      seasonal    Sulfa Antibiotics Vomiting     Topical-blistering       Physical Exam:    /84   Pulse (!) 106   Resp 16   Ht 5' 3" (1 6 m)   Wt 102 kg (225 lb)   BMI 39 86 kg/m²     Constitutional: normal, well developed, well nourished, alert, in no distress and non-toxic and no overt pain behavior    Eyes: anicteric  HEENT: grossly intact  Neck: supple, symmetric, trachea midline and no masses   Pulmonary:even and unlabored  Cardiovascular:No edema or pitting edema present  Skin:Normal without rashes or lesions and well hydrated  Psychiatric:Mood and affect appropriate  Neurologic:Cranial Nerves II-XII grossly intact  Musculoskeletal:normal    Imaging

## 2019-01-15 ENCOUNTER — TELEPHONE (OUTPATIENT)
Dept: PAIN MEDICINE | Facility: MEDICAL CENTER | Age: 59
End: 2019-01-15

## 2019-01-15 NOTE — TELEPHONE ENCOUNTER
----- Message from Omid Barnard DO sent at 1/14/2019  3:16 PM EST -----  Unchanged alignment of the lumbar spine since 4/11/2017    No change in alignment during flexion and extension

## 2019-01-15 NOTE — TELEPHONE ENCOUNTER
RN s/w pt regarding previous  Per pt she already saw the results in her "My Chart "    Pt wanted JE to know that she did not start her Lyrica as of yet because it was 135$   Per pt she will try again in February because by that time she should have met her deductible  RN asked what pt is doing about the gabapentin  Per pt she is staying on the gabapentin at present and just dealing with the loose stools and the tiredness  Per pt she has also started using CBD oil which is helping with her low back pain but not her nerve pain  Per pt the gabapentin does really help the nerve pain, just has the side effects that she does not like  Again pt reiterated that she will try again in February when her deductible is met      Pt appreciative of call     ---FYI--

## 2019-02-01 ENCOUNTER — TELEPHONE (OUTPATIENT)
Dept: PAIN MEDICINE | Facility: CLINIC | Age: 59
End: 2019-02-01

## 2019-02-01 DIAGNOSIS — M54.16 LUMBAR RADICULOPATHY: Primary | ICD-10-CM

## 2019-02-01 RX ORDER — GABAPENTIN 300 MG/1
300 CAPSULE ORAL 2 TIMES DAILY
Qty: 60 CAPSULE | Refills: 1 | Status: SHIPPED | OUTPATIENT
Start: 2019-02-01 | End: 2019-11-14 | Stop reason: ALTCHOICE

## 2019-02-01 NOTE — TELEPHONE ENCOUNTER
RN s/w pt regarding previous  Per pt she has not started the lyrica yet because she has not been able to purchase it  Pt is aware she can stay on the gabapentin until able to purchase the lyrica  Pt's representative ,Olga Lidia Currythaddeus ph# is [phone]  Per pt Sintia Sethi has reached out to AO regarding getting Jerman Mole for free from [employer] [facility]  --please advise thank you--  Have you been contacted? Can this scripts be sent to this company?

## 2019-02-01 NOTE — TELEPHONE ENCOUNTER
Patient is calling    [phone]    Patient is stating that she tried to refill her lyrica but because she has not hit her deductible the price was $135  Patient can get lyrica for free from a company named [employer] [facility]  Patient is stating that her rep Rodríguez Hightower should have reached out to us already  Patient is asking that because it is going to take several weeks for that prescription to get to her, she would like the doctor to refill her gabapentin 300mg to hold her over  Please send gabapentin to [address]  Please advise

## 2019-02-01 NOTE — TELEPHONE ENCOUNTER
Sure  She can take gabapentin 300mg BID in the place of Lyrica  Once the patient has Lyrica, please have her call us for further instructions on returning back to Lyrica as she cannot take these medications together

## 2019-02-01 NOTE — TELEPHONE ENCOUNTER
RN s/w pt regarding previous  Per pt she tried to fill the Lyrica but it was too expensive  Per pt she would like to just stay on the gabapentin at 300 mg tid until she can get the Lyrica prescription cost figured out  Per pt she would need another 2 weeks to a month worth of gabapentin called into her [facility], and cannot wait until @/4 because she will run out  RN advised that she would send this to [facility] to see if able to refill same and will call back to advise  ---Please advise thank you--  Possible to send a gabapentin refill to [facility] until Lyrica prescription cost figured out?

## 2019-02-04 ENCOUNTER — TELEPHONE (OUTPATIENT)
Dept: PAIN MEDICINE | Facility: MEDICAL CENTER | Age: 59
End: 2019-02-04

## 2019-02-04 DIAGNOSIS — M54.42 CHRONIC BILATERAL LOW BACK PAIN WITH LEFT-SIDED SCIATICA: ICD-10-CM

## 2019-02-04 DIAGNOSIS — M54.50 CHRONIC BILATERAL LOW BACK PAIN WITHOUT SCIATICA: ICD-10-CM

## 2019-02-04 DIAGNOSIS — M79.2 NEUROPATHIC PAIN: ICD-10-CM

## 2019-02-04 DIAGNOSIS — G89.29 CHRONIC BILATERAL LOW BACK PAIN WITHOUT SCIATICA: ICD-10-CM

## 2019-02-04 DIAGNOSIS — M54.16 LUMBAR RADICULOPATHY: Primary | ICD-10-CM

## 2019-02-04 DIAGNOSIS — G89.29 CHRONIC BILATERAL LOW BACK PAIN WITH LEFT-SIDED SCIATICA: ICD-10-CM

## 2019-02-04 RX ORDER — PREGABALIN 75 MG/1
75 CAPSULE ORAL 2 TIMES DAILY
Qty: 180 CAPSULE | Refills: 3 | Status: SHIPPED | OUTPATIENT
Start: 2019-02-04 | End: 2019-07-24 | Stop reason: SDUPTHER

## 2019-02-04 NOTE — TELEPHONE ENCOUNTER
Mailed forms out as requested on paper 2/4/19  They will go out next business day   Also scanned into patients chart under Media

## 2019-02-04 NOTE — TELEPHONE ENCOUNTER
RN s/w Betsy Biggs ph# of [phone]  RN gave information needed to have Lyrica ordered [facility]   RN provided fax # of [phone], per Ilia Silence she will fax the paper work today attn Larisa Baltazar     --[phone]--

## 2019-02-04 NOTE — TELEPHONE ENCOUNTER
Patient  [phone]  Otilia Santiago    Patient calling in stating that she needs a new referral for physical therapy   Please fax the script to [phone]

## 2019-02-07 ENCOUNTER — OFFICE VISIT (OUTPATIENT)
Dept: OBGYN CLINIC | Facility: MEDICAL CENTER | Age: 59
End: 2019-02-07
Payer: MEDICARE

## 2019-02-07 VITALS — HEART RATE: 90 BPM | DIASTOLIC BLOOD PRESSURE: 79 MMHG | SYSTOLIC BLOOD PRESSURE: 134 MMHG

## 2019-02-07 DIAGNOSIS — Z98.890 S/P RIGHT KNEE ARTHROSCOPY: ICD-10-CM

## 2019-02-07 DIAGNOSIS — M17.11 PRIMARY OSTEOARTHRITIS OF RIGHT KNEE: Primary | ICD-10-CM

## 2019-02-07 PROCEDURE — 99213 OFFICE O/P EST LOW 20 MIN: CPT | Performed by: ORTHOPAEDIC SURGERY

## 2019-02-07 NOTE — PROGRESS NOTES
Assessment/Plan:  1  Primary osteoarthritis of right knee    2  S/P right knee arthroscopy      No orders of the defined types were placed in this encounter     -pt declined a steroid injection today    -she will continue with PT  -continue with tylenol as needed for pain  She will talk to her GI doctor before taking NSAIDs  -she knows she can have viscosupplementation again after 5/30  Return if symptoms worsen or fail to improve  I answered all of the patient's questions during the visit and provided education of the patient's condition during the visit  The patient verbalized understanding of the information given and agrees with the plan  This note was dictated using [employer] software  It may contain errors including improperly dictated words  Please contact physician directly for any questions  Subjective   Chief Complaint:   Chief Complaint   Patient presents with    Right Knee - Follow-up       Claribel Dyer is a 66 y o  female who presents for follow up for right knee pain  She had synvisc injection on 11/30  She states it did help until recently  She has been painting recently and aggravated her knee  She states her knee pain is medial  It comes and goes  It does feel stable but she does have a popping sensation  She take tylenol as needed for pain  She can not have NSAIDs due to Barrets esophagus  She has been doing PT for her back and knee  She would like to avoid steroids  She feels she has been having too much steroid due to     Review of Systems  ROS:    See HPI for musculoskeletal review     All other systems reviewed are negative     History:  Past Medical History:   Diagnosis Date    Anesthesia     "sometimes low BP upon waking up"    Arthritis     Asthma     Back pain     Dolan's esophagus     Chronic pain disorder     Chronic venous insufficiency     Cough variant asthma     Environmental allergies     dust    GERD (gastroesophageal reflux disease)     Hiatal hernia     History of anemia     History of fusion of spine for scoliosis     "as a teenager"    History of transfusion     2001    Hyperlipidemia     Left lumbar radiculitis     Last Assessed: [id number]    Lumbar postlaminectomy syndrome     Migraines     Morbid obesity ([address] )     Motion sickness     Osteoarthritis     of left hip    Right knee pain     Seasonal allergies     Shortness of breath     Umbilical hernia     Uses brace     right knee    Uses crutches     one    Wears glasses      Past Surgical History:   Procedure Laterality Date    ARTHRODESIS      lumbar    ARTHRODESIS      Spinal Arthrodesis for Deformity; Last Assessed: [id number]   [facility] BACK SURGERY      lumbar fusion,with nydia/screw and cage implant    BREAST CYST EXCISION      benign    COLONOSCOPY      PLANTAR FASCIA SURGERY      IL COLONOSCOPY FLX DX W/COLLJ SPEC WHEN PFRMD N/A 2/20/2018    Procedure: COLONOSCOPY with polypectomy;  Surgeon: Anirudh Salinas MD;  Location: [facility]; Service: General    IL ESOPHAGOGASTRODUODENOSCOPY TRANSORAL DIAGNOSTIC N/A 5/24/2017    Procedure: ESOPHAGOGASTRODUODENOSCOPY (EGD); Surgeon: Key Bolivar MD;  [facility]; Service: Gastroenterology    IL ESOPHAGOGASTRODUODENOSCOPY TRANSORAL DIAGNOSTIC N/A 8/31/2017    Procedure: ESOPHAGOGASTRODUODENOSCOPY (EGD) W RFA;  Surgeon: Krzysztof Rodney MD;  Location:  GI LAB;   Service: Gastroenterology    IL KNEE SCOPE,MED/LAT MENISECTOMY Right 4/4/2018    Procedure: ARTHROSCOPY KNEE PARTIAL MEDIAL MENISECTOMY , CHONDROPLASTY;  Surgeon: Arnold Chang DO;  Location[facility] OR;  Service: Orthopedics    WISDOM TOOTH EXTRACTION       Social History   History   Alcohol Use    Yes     Comment: minimal     History   Drug Use No     History   Smoking Status    Never Smoker   Smokeless Tobacco    Never Used     Family History:   Family History   Problem Relation Age of Onset    Lung cancer Mother     Cancer Mother     Alcohol abuse Father     Other Father         Cardiac Disorder    Depression Father     Hypertension Father     Heart attack Father     Breast cancer Maternal Grandmother     Diabetes type I Other     Stroke Neg Hx        Current Outpatient Prescriptions on File Prior to Visit   Medication Sig Dispense Refill    albuterol (2 5 mg/3 mL) 0 083 % nebulizer solution VVN Q 4 H PRN  3    albuterol (VENTOLIN HFA) 90 mcg/act inhaler Inhale 2 puffs every 6 (six) hours as needed for wheezing 18 g 1    cetirizine (ZyrTEC) 10 mg tablet Take 1 tablet by mouth daily        cyclobenzaprine (FLEXERIL) 10 mg tablet Take 1 tablet (10 mg total) by mouth daily at bedtime 30 tablet 0    dexlansoprazole (DEXILANT) 60 MG capsule Take 1 capsule (60 mg total) by mouth 2 (two) times a day 60 capsule 0    DULoxetine (CYMBALTA) 60 mg delayed release capsule Take 60 mg by mouth daily        FLOVENT HFA 44 MCG/ACT inhaler Inhale 2 actuation 4 (four) times a day as needed    5    gabapentin (NEURONTIN) 300 mg capsule Take 1 capsule (300 mg total) by mouth 2 (two) times a day 60 capsule 1    montelukast (SINGULAIR) 10 mg tablet Take 10 mg by mouth daily at bedtime      pregabalin (LYRICA) 75 mg capsule Take 1 capsule (75 mg total) by mouth 2 (two) times a day for 90 days 180 capsule 3    rOPINIRole (REQUIP) 2 mg tablet One tablet at bedtime 90 tablet 0    sodium chloride 0 9 % nebulizer solution USE 3 ML VIA NEB 1-2 TIMES D PRN  6     No current facility-administered medications on file prior to visit        Allergies   Allergen Reactions    Dust Mite Extract Shortness Of Breath    Latex Itching and Blisters    Other      seasonal    Sulfa Antibiotics Vomiting     Topical-blistering        Objective     /79   Pulse 90      PE:  AAOx 3  WDWN  Hearing intact, no drainage from eyes  no audible wheezing  no abdominal distension  LE compartments soft, skin intact    Ortho Exam:  right Knee:   No erythema  no swelling  no effusion  no warmth  AROM: full  Stable to varus/valgus stress

## 2019-02-11 ENCOUNTER — OFFICE VISIT (OUTPATIENT)
Dept: GASTROENTEROLOGY | Facility: MEDICAL CENTER | Age: 59
End: 2019-02-11
Payer: MEDICARE

## 2019-02-11 VITALS
SYSTOLIC BLOOD PRESSURE: 124 MMHG | TEMPERATURE: 96.8 F | HEIGHT: 63 IN | HEART RATE: 89 BPM | DIASTOLIC BLOOD PRESSURE: 82 MMHG | BODY MASS INDEX: 39.86 KG/M2

## 2019-02-11 DIAGNOSIS — K22.70 BARRETT'S ESOPHAGUS WITHOUT DYSPLASIA: Primary | ICD-10-CM

## 2019-02-11 DIAGNOSIS — Z12.11 SCREENING FOR COLON CANCER: ICD-10-CM

## 2019-02-11 DIAGNOSIS — K21.9 GASTROESOPHAGEAL REFLUX DISEASE, ESOPHAGITIS PRESENCE NOT SPECIFIED: ICD-10-CM

## 2019-02-11 PROCEDURE — 99214 OFFICE O/P EST MOD 30 MIN: CPT | Performed by: PHYSICIAN ASSISTANT

## 2019-02-11 RX ORDER — FAMOTIDINE 10 MG
10 TABLET ORAL 2 TIMES DAILY
COMMUNITY
End: 2019-08-23

## 2019-02-11 NOTE — LETTER
February 11, 2019     Antione Johnson MD  [id number] [address]    Patient: Fran Fernandez   YOB: 1960   Date of Visit: 2/11/2019       Dear Dr Pienda Areas: Thank you for referring Fran Fernandez to me for evaluation  Below are my notes for this consultation  If you have questions, please do not hesitate to call me  I look forward to following your patient along with you  Sincerely,        Deana Ferraro PA-C        CC: No Recipients  Hiral Stout  2/11/2019  3:54 PM  Sign at close encounter  Assessment/Plan:     Diagnoses and all orders for this visit:    Dolan's esophagus without dysplasia  She has a history of Dolan's requiring RFA in the past   She was supposed to follow up for repeat session however it has been more than a year  She is agreeable to proceeding at this time  [facility]   -     Case request operating room: ESOPHAGOGASTRODUODENOSCOPY (EGD) w/ RFA, COLONOSCOPY; Standing  -     Case request operating room: ESOPHAGOGASTRODUODENOSCOPY (EGD) w/ RFA, COLONOSCOPY    Gastroesophageal reflux disease, esophagitis presence not specified  She has a history of GERD and is on Dexilant and Pepcid  She states she gets occasional heartburn but has more atypical symptoms of cough and a globus sensation  It is possible that these are related to reflux and we can evaluate further on endoscopy as mentioned above but may also be related to her asthma   -     Case request operating room: ESOPHAGOGASTRODUODENOSCOPY (EGD) w/ RFA, COLONOSCOPY; Standing  -     Case request operating room: ESOPHAGOGASTRODUODENOSCOPY (EGD) w/ RFA, COLONOSCOPY    Screening for colon cancer  She did have a colonoscopy last year and was found to have 1 tubular adenomatous polyp however she had a poor prep and it was recommended that she have this repeated  Since we are proceeding with endoscopy would recommend repeating at this time  Patient is agreeable  -     Case request operating room: ESOPHAGOGASTRODUODENOSCOPY (EGD) w/ RFA, COLONOSCOPY; Standing  -     Case request operating room: ESOPHAGOGASTRODUODENOSCOPY (EGD) w/ RFA, COLONOSCOPY    Will see her back after procedures to discuss all results  Subjective:      Patient ID: Erica Bautista is a 66 y o  female  HPI     This is a follow-up for GERD & Dolan's  Patient was last seen in 2017 after her endoscopy which showed Dolan's  She was on Dexilant in the morning and Zantac in the evening at that time  She underwent endoscopy with RFA in August 2017 it was recommended that she return in 3 months time to have this repeated however we have not seen her since  She complains of occasional heartburn despite the Dexilant and now Pepcid but continues to have cough and a globus sensation in the back of her throat  She has seen an allergist in was felt that this was related to GERD and not asthma  She also complains of wheezing and occasional shortness of breath  She denies any abdominal pain  She denies any difficulty with her bowels  She did have a colonoscopy last year and was found to have 1 tubular adenomatous polyp but had a poor prep and it was recommended that she have this repeated as well but she has not      Patient Active Problem List   Diagnosis    Anosmia    Anterolisthesis    Arthritis of lumbar spine    Dolan's esophagus    Blood glucose elevated    Chronic low back pain    Chronic pain disorder    Chronic venous insufficiency    Cough variant asthma    Depression    GERD (gastroesophageal reflux disease)    Hyperlipidemia    Lumbar postlaminectomy syndrome    Obesity, Class III, BMI 40-49 9 (morbid obesity) ([address] )    Primary localized osteoarthritis of right knee    Primary osteoarthritis of left hip    Restless legs syndrome    Seasonal allergies    Sleep disturbance    Screening for colon cancer    Primary osteoarthritis of right knee    Bilateral lower extremity edema    Chronic pain of right knee    S/P right knee arthroscopy    Environmental allergies    Sacroiliitis, not elsewhere classified (HCC)    Lumbar spondylosis    Lumbar radiculopathy    Impaired fasting glucose    Vitamin D deficiency     Allergies   Allergen Reactions    Dust Mite Extract Shortness Of Breath    Latex Itching and Blisters    Other      seasonal    Sulfa Antibiotics Vomiting     Topical-blistering     Current Outpatient Medications on File Prior to Visit   Medication Sig    albuterol (2 5 mg/3 mL) 0 083 % nebulizer solution VVN Q 4 H PRN    cetirizine (ZyrTEC) 10 mg tablet Take 1 tablet by mouth daily      cyclobenzaprine (FLEXERIL) 10 mg tablet Take 1 tablet (10 mg total) by mouth daily at bedtime    dexlansoprazole (DEXILANT) 60 MG capsule Take 1 capsule (60 mg total) by mouth 2 (two) times a day    DULoxetine (CYMBALTA) 60 mg delayed release capsule Take 60 mg by mouth daily      famotidine (PEPCID) 10 mg tablet Take 10 mg by mouth 2 (two) times a day    FLOVENT HFA 44 MCG/ACT inhaler Inhale 2 actuation 4 (four) times a day as needed      gabapentin (NEURONTIN) 300 mg capsule Take 1 capsule (300 mg total) by mouth 2 (two) times a day    montelukast (SINGULAIR) 10 mg tablet Take 10 mg by mouth daily at bedtime    rOPINIRole (REQUIP) 2 mg tablet One tablet at bedtime    sodium chloride 0 9 % nebulizer solution USE 3 ML VIA NEB 1-2 TIMES D PRN    pregabalin (LYRICA) 75 mg capsule Take 1 capsule (75 mg total) by mouth 2 (two) times a day for 90 days (Patient not taking: Reported on 2/11/2019)    [DISCONTINUED] albuterol (VENTOLIN HFA) 90 mcg/act inhaler Inhale 2 puffs every 6 (six) hours as needed for wheezing (Patient not taking: Reported on 2/11/2019)     No current facility-administered medications on file prior to visit        Family History   Problem Relation Age of Onset    Lung cancer Mother     Cancer Mother     Alcohol abuse Father    Branden Washingtno Other Father         Cardiac Disorder    Depression Father     Hypertension Father     Heart attack Father     Breast cancer Maternal Grandmother     Diabetes type I Other     Stroke Neg Hx      Past Medical History:   Diagnosis Date    Anesthesia     "sometimes low BP upon waking up"    Arthritis     Asthma     Back pain     Dolan's esophagus     Chronic pain disorder     Chronic venous insufficiency     Cough variant asthma     Environmental allergies     dust    GERD (gastroesophageal reflux disease)     Hiatal hernia     History of anemia     History of fusion of spine for scoliosis     "as a teenager"    History of transfusion     2001    Hyperlipidemia     Left lumbar radiculitis     Last Assessed: [id number]    Lumbar postlaminectomy syndrome     Migraines     Morbid obesity (HCC)     Motion sickness     Osteoarthritis     of left hip    Right knee pain     Seasonal allergies     Shortness of breath     Umbilical hernia     Uses brace     right knee    Uses crutches     one    Wears glasses      Social History     Socioeconomic History    Marital status: Single     Spouse name: None    Number of children: None    Years of education: None    Highest education level: None   Occupational History    None   Social Needs    Financial resource strain: None    Food insecurity:     Worry: None     Inability: None    Transportation needs:     Medical: None     Non-medical: None   Tobacco Use    Smoking status: Never Smoker    Smokeless tobacco: Never Used   Substance and Sexual Activity    Alcohol use: Yes     Comment: minimal    Drug use: No    Sexual activity: None   Lifestyle    Physical activity:     Days per week: None     Minutes per session: None    Stress: None   Relationships    Social connections:     Talks on phone: None     Gets together: None     Attends Evangelical service: None     Active member of club or organization: None     Attends meetings of clubs or organizations: None     Relationship status: None    Intimate partner violence:     Fear of current or ex partner: None     Emotionally abused: None     Physically abused: None     Forced sexual activity: None   Other Topics Concern    None   Social History Narrative         Past Surgical History:   Procedure Laterality Date    ARTHRODESIS      lumbar    ARTHRODESIS      Spinal Arthrodesis for Deformity; Last Assessed: [id number]   Ofelia Clunes BACK SURGERY      lumbar fusion,with nydia/screw and cage implant    BREAST CYST EXCISION      benign    COLONOSCOPY      PLANTAR FASCIA SURGERY      MS COLONOSCOPY FLX DX W/COLLJ SPEC WHEN PFRMD N/A 2/20/2018    Procedure: COLONOSCOPY with polypectomy;  Surgeon: Rigo Martinez MD;  Location: [facility]; Service: General    MS ESOPHAGOGASTRODUODENOSCOPY TRANSORAL DIAGNOSTIC N/A 5/24/2017    Procedure: ESOPHAGOGASTRODUODENOSCOPY (EGD); Surgeon: Brianna Buchanan MD;  Location: [facility]; Service: Gastroenterology    MS ESOPHAGOGASTRODUODENOSCOPY TRANSORAL DIAGNOSTIC N/A 8/31/2017    Procedure: ESOPHAGOGASTRODUODENOSCOPY (EGD) W RFA;  Surgeon: Raina Love MD;  Location:  GI LAB; Service: Gastroenterology    MS KNEE SCOPE,MED/LAT MENISECTOMY Right 4/4/2018    Procedure: ARTHROSCOPY KNEE PARTIAL MEDIAL MENISECTOMY , CHONDROPLASTY;  Surgeon: Margot Galvez DO;  [facility];  Service: Orthopedics    WISDOM TOOTH EXTRACTION           Review of Systems   All other systems reviewed and are negative  Objective:      /82 (BP Location: Left arm, Patient Position: Sitting, Cuff Size: Adult)   Pulse 89   Temp (!) 96 8 °F (36 °C) (Tympanic)   Ht 5' 3" (1 6 m)   BMI 39 86 kg/m²           Physical Exam   Constitutional: She is oriented to person, place, and time  She appears well-developed and well-nourished  HENT:   Head: Normocephalic and atraumatic  Eyes: Conjunctivae and EOM are normal    Neck: Normal range of motion     Cardiovascular: Normal rate and regular rhythm  Pulmonary/Chest: Effort normal and breath sounds normal    Abdominal: Soft  Bowel sounds are normal  She exhibits no distension  There is no tenderness  Musculoskeletal: Normal range of motion  Neurological: She is alert and oriented to person, place, and time  Skin: Skin is warm and dry  Psychiatric: She has a normal mood and affect   Her behavior is normal

## 2019-02-11 NOTE — PROGRESS NOTES
Assessment/Plan:     Diagnoses and all orders for this visit:    Dolan's esophagus without dysplasia  She has a history of Dolan's requiring RFA in the past   She was supposed to follow up for repeat session however it has been more than a year  She is agreeable to proceeding at this time  Will schedule her with Dr Allyn Lee in [facility]   -     Case request operating room: ESOPHAGOGASTRODUODENOSCOPY (EGD) w/ RFA, COLONOSCOPY; Standing  -     Case request operating room: ESOPHAGOGASTRODUODENOSCOPY (EGD) w/ RFA, COLONOSCOPY    Gastroesophageal reflux disease, esophagitis presence not specified  She has a history of GERD and is on Dexilant and Pepcid  She states she gets occasional heartburn but has more atypical symptoms of cough and a globus sensation  It is possible that these are related to reflux and we can evaluate further on endoscopy as mentioned above but may also be related to her asthma   -     Case request operating room: ESOPHAGOGASTRODUODENOSCOPY (EGD) w/ RFA, COLONOSCOPY; Standing  -     Case request operating room: ESOPHAGOGASTRODUODENOSCOPY (EGD) w/ RFA, COLONOSCOPY    Screening for colon cancer  She did have a colonoscopy last year and was found to have 1 tubular adenomatous polyp however she had a poor prep and it was recommended that she have this repeated  Since we are proceeding with endoscopy would recommend repeating at this time  Patient is agreeable  -     Case request operating room: ESOPHAGOGASTRODUODENOSCOPY (EGD) w/ RFA, COLONOSCOPY; Standing  -     Case request operating room: ESOPHAGOGASTRODUODENOSCOPY (EGD) w/ RFA, COLONOSCOPY    Will see her back after procedures to discuss all results  Subjective:      Patient ID: Idris Booth is a 66 y o  female  HPI     This is a follow-up for GERD & Dolan's  Patient was last seen in 2017 after her endoscopy which showed Dolan's  She was on Dexilant in the morning and Zantac in the evening at that time     She underwent endoscopy with RFA in August 2017 it was recommended that she return in 3 months time to have this repeated however we have not seen her since  She complains of occasional heartburn despite the Dexilant and now Pepcid but continues to have cough and a globus sensation in the back of her throat  She has seen an allergist in was felt that this was related to GERD and not asthma  She also complains of wheezing and occasional shortness of breath  She denies any abdominal pain  She denies any difficulty with her bowels  She did have a colonoscopy last year and was found to have 1 tubular adenomatous polyp but had a poor prep and it was recommended that she have this repeated as well but she has not      Patient Active Problem List   Diagnosis    Anosmia    Anterolisthesis    Arthritis of lumbar spine    Dolan's esophagus    Blood glucose elevated    Chronic low back pain    Chronic pain disorder    Chronic venous insufficiency    Cough variant asthma    Depression    GERD (gastroesophageal reflux disease)    Hyperlipidemia    Lumbar postlaminectomy syndrome    Obesity, Class III, BMI 40-49 9 (morbid obesity) ([facility] [address] )    Primary localized osteoarthritis of right knee    Primary osteoarthritis of left hip    Restless legs syndrome    Seasonal allergies    Sleep disturbance    Screening for colon cancer    Primary osteoarthritis of right knee    Bilateral lower extremity edema    Chronic pain of right knee    S/P right knee arthroscopy    Environmental allergies    Sacroiliitis, not elsewhere classified (HCC)    Lumbar spondylosis    Lumbar radiculopathy    Impaired fasting glucose    Vitamin D deficiency     Allergies   Allergen Reactions    Dust Mite Extract Shortness Of Breath    Latex Itching and Blisters    Other      seasonal    Sulfa Antibiotics Vomiting     Topical-blistering     Current Outpatient Medications on File Prior to Visit   Medication Sig    albuterol (2 5 mg/3 mL) 0 083 % nebulizer solution VVN Q 4 H PRN    cetirizine (ZyrTEC) 10 mg tablet Take 1 tablet by mouth daily      cyclobenzaprine (FLEXERIL) 10 mg tablet Take 1 tablet (10 mg total) by mouth daily at bedtime    dexlansoprazole (DEXILANT) 60 MG capsule Take 1 capsule (60 mg total) by mouth 2 (two) times a day    DULoxetine (CYMBALTA) 60 mg delayed release capsule Take 60 mg by mouth daily      famotidine (PEPCID) 10 mg tablet Take 10 mg by mouth 2 (two) times a day    FLOVENT HFA 44 MCG/ACT inhaler Inhale 2 actuation 4 (four) times a day as needed      gabapentin (NEURONTIN) 300 mg capsule Take 1 capsule (300 mg total) by mouth 2 (two) times a day    montelukast (SINGULAIR) 10 mg tablet Take 10 mg by mouth daily at bedtime    rOPINIRole (REQUIP) 2 mg tablet One tablet at bedtime    sodium chloride 0 9 % nebulizer solution USE 3 ML VIA NEB 1-2 TIMES D PRN    pregabalin (LYRICA) 75 mg capsule Take 1 capsule (75 mg total) by mouth 2 (two) times a day for 90 days (Patient not taking: Reported on 2/11/2019)    [DISCONTINUED] albuterol (VENTOLIN HFA) 90 mcg/act inhaler Inhale 2 puffs every 6 (six) hours as needed for wheezing (Patient not taking: Reported on 2/11/2019)     No current facility-administered medications on file prior to visit        Family History   Problem Relation Age of Onset    Lung cancer Mother     Cancer Mother     Alcohol abuse Father     Other Father         Cardiac Disorder    Depression Father     Hypertension Father     Heart attack Father     Breast cancer Maternal Grandmother     Diabetes type I Other     Stroke Neg Hx      Past Medical History:   Diagnosis Date    Anesthesia     "sometimes low BP upon waking up"    Arthritis     Asthma     Back pain     Dolan's esophagus     Chronic pain disorder     Chronic venous insufficiency     Cough variant asthma     Environmental allergies     dust    GERD (gastroesophageal reflux disease)     Hiatal hernia     History of anemia     History of fusion of spine for scoliosis     "as a teenager"    History of transfusion     2001    Hyperlipidemia     Left lumbar radiculitis     Last Assessed: [id number]    Lumbar postlaminectomy syndrome     Migraines     Morbid obesity ([address] )     Motion sickness     Osteoarthritis     of left hip    Right knee pain     Seasonal allergies     Shortness of breath     Umbilical hernia     Uses brace     right knee    Uses crutches     one    Wears glasses      Social History     Socioeconomic History    Marital status: Single     Spouse name: None    Number of children: None    Years of education: None    Highest education level: None   Occupational History    None   Social Needs    Financial resource strain: None    Food insecurity:     Worry: None     Inability: None    Transportation needs:     Medical: None     Non-medical: None   Tobacco Use    Smoking status: Never Smoker    Smokeless tobacco: Never Used   Substance and Sexual Activity    Alcohol use: Yes     Comment: minimal    Drug use: No    Sexual activity: None   Lifestyle    Physical activity:     Days per week: None     Minutes per session: None    Stress: None   Relationships    Social connections:     Talks on phone: None     Gets together: None     Attends Anabaptism service: None     Active member of club or organization: None     Attends meetings of clubs or organizations: None     Relationship status: None    Intimate partner violence:     Fear of current or ex partner: None     Emotionally abused: None     Physically abused: None     Forced sexual activity: None   Other Topics Concern    None   Social History Narrative         Past Surgical History:   Procedure Laterality Date    ARTHRODESIS      lumbar    ARTHRODESIS      Spinal Arthrodesis for Deformity;  Last Assessed: [id number]    BACK SURGERY      lumbar fusion,with nydia/screw and cage implant    BREAST CYST EXCISION      benign  COLONOSCOPY      PLANTAR FASCIA SURGERY      MN COLONOSCOPY FLX DX W/COLLJ SPEC WHEN PFRMD N/A 2/20/2018    Procedure: COLONOSCOPY with polypectomy;  Surgeon: Subha Pearce MD;  Location[facility]; Service: General    MN ESOPHAGOGASTRODUODENOSCOPY TRANSORAL DIAGNOSTIC N/A 5/24/2017    Procedure: ESOPHAGOGASTRODUODENOSCOPY (EGD); Surgeon: Tamar Ledbetter MD;  Location: [facility]; Service: Gastroenterology    MN ESOPHAGOGASTRODUODENOSCOPY TRANSORAL DIAGNOSTIC N/A 8/31/2017    Procedure: ESOPHAGOGASTRODUODENOSCOPY (EGD) W RFA;  Surgeon: Cara Diana MD;  Location:  GI LAB; Service: Gastroenterology    MN KNEE SCOPE,MED/LAT MENISECTOMY Right 4/4/2018    Procedure: ARTHROSCOPY KNEE PARTIAL MEDIAL MENISECTOMY , CHONDROPLASTY;  Surgeon: Chance Alexis DO;  [facility];  Service: Orthopedics    WISDOM TOOTH EXTRACTION           Review of Systems   All other systems reviewed and are negative  Objective:      /82 (BP Location: Left arm, Patient Position: Sitting, Cuff Size: Adult)   Pulse 89   Temp (!) 96 8 °F (36 °C) (Tympanic)   Ht 5' 3" (1 6 m)   BMI 39 86 kg/m²          Physical Exam   Constitutional: She is oriented to person, place, and time  She appears well-developed and well-nourished  HENT:   Head: Normocephalic and atraumatic  Eyes: Conjunctivae and EOM are normal    Neck: Normal range of motion  Cardiovascular: Normal rate and regular rhythm  Pulmonary/Chest: Effort normal and breath sounds normal    Abdominal: Soft  Bowel sounds are normal  She exhibits no distension  There is no tenderness  Musculoskeletal: Normal range of motion  Neurological: She is alert and oriented to person, place, and time  Skin: Skin is warm and dry  Psychiatric: She has a normal mood and affect   Her behavior is normal

## 2019-02-12 PROBLEM — K21.9 GASTROESOPHAGEAL REFLUX DISEASE: Status: ACTIVE | Noted: 2019-02-12

## 2019-02-12 PROBLEM — K22.70 BARRETT'S ESOPHAGUS WITHOUT DYSPLASIA: Status: ACTIVE | Noted: 2019-02-12

## 2019-02-13 ENCOUNTER — TELEPHONE (OUTPATIENT)
Dept: INTERNAL MEDICINE CLINIC | Facility: CLINIC | Age: 59
End: 2019-02-13

## 2019-02-13 NOTE — TELEPHONE ENCOUNTER
Pt has changed RX plans and would like to refill Duloxetine 60 mg, 1Tab daily which was previously prescribed by Dr Kyrie Kessler in [place] for back pain  Will you refill this through [facility] [employer]  PT is almost out and has been on for several years  PT also asked for the name of a therapist to discuss her stress as related to health and personal living situation issues  She prefers to visit a provider in [facility] or [place]

## 2019-02-13 NOTE — TELEPHONE ENCOUNTER
Please renew this rx for Libia Buitrago, one daily, #30 and 5 refills  Ask Robyn Trever to contact her insurance company for a list of therapist participating with her insurance, then send the list to me and I'll select a provider  Thanks

## 2019-02-14 DIAGNOSIS — F32.1 CURRENT MODERATE EPISODE OF MAJOR DEPRESSIVE DISORDER WITHOUT PRIOR EPISODE (HCC): ICD-10-CM

## 2019-02-14 DIAGNOSIS — F32.1 CURRENT MODERATE EPISODE OF MAJOR DEPRESSIVE DISORDER WITHOUT PRIOR EPISODE (HCC): Primary | ICD-10-CM

## 2019-02-14 RX ORDER — DULOXETIN HYDROCHLORIDE 60 MG/1
CAPSULE, DELAYED RELEASE ORAL
Qty: 90 CAPSULE | Refills: 5 | Status: SHIPPED | OUTPATIENT
Start: 2019-02-14 | End: 2019-09-12 | Stop reason: SDUPTHER

## 2019-02-14 RX ORDER — DULOXETIN HYDROCHLORIDE 60 MG/1
60 CAPSULE, DELAYED RELEASE ORAL DAILY
Qty: 30 CAPSULE | Refills: 5 | Status: SHIPPED | OUTPATIENT
Start: 2019-02-14 | End: 2019-02-14 | Stop reason: SDUPTHER

## 2019-02-14 NOTE — TELEPHONE ENCOUNTER
I called and spoke with Luis Alberto Sellers  She will let us know the list of therapists that are covered by her insurance

## 2019-02-18 ENCOUNTER — TELEPHONE (OUTPATIENT)
Dept: GASTROENTEROLOGY | Facility: CLINIC | Age: 59
End: 2019-02-18

## 2019-03-11 ENCOUNTER — TRANSCRIBE ORDERS (OUTPATIENT)
Dept: ADMINISTRATIVE | Facility: HOSPITAL | Age: 59
End: 2019-03-11

## 2019-03-11 DIAGNOSIS — M47.816 LUMBAR SPONDYLOSIS: ICD-10-CM

## 2019-03-11 DIAGNOSIS — M47.816 ARTHRITIS OF LUMBAR SPINE: Primary | ICD-10-CM

## 2019-03-14 ENCOUNTER — ANESTHESIA EVENT (OUTPATIENT)
Dept: GASTROENTEROLOGY | Facility: HOSPITAL | Age: 59
End: 2019-03-14
Payer: MEDICARE

## 2019-03-14 ENCOUNTER — HOSPITAL ENCOUNTER (OUTPATIENT)
Facility: HOSPITAL | Age: 59
Setting detail: OUTPATIENT SURGERY
Discharge: HOME | End: 2019-03-14
Attending: INTERNAL MEDICINE | Admitting: INTERNAL MEDICINE
Payer: MEDICARE

## 2019-03-14 ENCOUNTER — ANESTHESIA (OUTPATIENT)
Dept: GASTROENTEROLOGY | Facility: HOSPITAL | Age: 59
End: 2019-03-14
Payer: MEDICARE

## 2019-03-14 VITALS
TEMPERATURE: 97.8 F | HEIGHT: 63 IN | RESPIRATION RATE: 16 BRPM | HEART RATE: 78 BPM | BODY MASS INDEX: 39.87 KG/M2 | SYSTOLIC BLOOD PRESSURE: 129 MMHG | OXYGEN SATURATION: 98 % | DIASTOLIC BLOOD PRESSURE: 86 MMHG | WEIGHT: 225 LBS

## 2019-03-14 DIAGNOSIS — K22.70 BARRETT'S ESOPHAGUS WITHOUT DYSPLASIA: Primary | ICD-10-CM

## 2019-03-14 PROCEDURE — G0105 COLORECTAL SCRN; HI RISK IND: HCPCS | Performed by: INTERNAL MEDICINE

## 2019-03-14 PROCEDURE — 43270 EGD LESION ABLATION: CPT | Performed by: INTERNAL MEDICINE

## 2019-03-14 RX ORDER — ACETYLCYSTEINE 200 MG/ML
SOLUTION ORAL; RESPIRATORY (INHALATION) AS NEEDED
Status: DISCONTINUED | OUTPATIENT
Start: 2019-03-14 | End: 2019-03-14 | Stop reason: HOSPADM

## 2019-03-14 RX ORDER — SUCRALFATE ORAL 1 G/10ML
1 SUSPENSION ORAL 4 TIMES DAILY
Qty: 420 ML | Refills: 0 | Status: SHIPPED | OUTPATIENT
Start: 2019-03-14 | End: 2020-02-04 | Stop reason: ALTCHOICE

## 2019-03-14 RX ORDER — PROPOFOL 10 MG/ML
INJECTION, EMULSION INTRAVENOUS AS NEEDED
Status: DISCONTINUED | OUTPATIENT
Start: 2019-03-14 | End: 2019-03-14 | Stop reason: SURG

## 2019-03-14 RX ORDER — SODIUM CHLORIDE 9 MG/ML
50 INJECTION, SOLUTION INTRAVENOUS CONTINUOUS
Status: DISCONTINUED | OUTPATIENT
Start: 2019-03-14 | End: 2019-03-14 | Stop reason: HOSPADM

## 2019-03-14 RX ORDER — SUCRALFATE ORAL 1 G/10ML
1 SUSPENSION ORAL 4 TIMES DAILY
Qty: 420 ML | Refills: 0 | OUTPATIENT
Start: 2019-03-14

## 2019-03-14 RX ORDER — PROPOFOL 10 MG/ML
INJECTION, EMULSION INTRAVENOUS CONTINUOUS PRN
Status: DISCONTINUED | OUTPATIENT
Start: 2019-03-14 | End: 2019-03-14 | Stop reason: SURG

## 2019-03-14 RX ORDER — FENTANYL CITRATE/PF 50 MCG/ML
25 SYRINGE (ML) INJECTION
Status: DISCONTINUED | OUTPATIENT
Start: 2019-03-14 | End: 2019-03-14 | Stop reason: HOSPADM

## 2019-03-14 RX ADMIN — FENTANYL CITRATE 25 MCG: 50 INJECTION, SOLUTION INTRAMUSCULAR; INTRAVENOUS at 11:31

## 2019-03-14 RX ADMIN — PROPOFOL 100 MG: 10 INJECTION, EMULSION INTRAVENOUS at 10:20

## 2019-03-14 RX ADMIN — PROPOFOL 100 MCG/KG/MIN: 10 INJECTION, EMULSION INTRAVENOUS at 10:21

## 2019-03-14 RX ADMIN — SODIUM CHLORIDE 50 ML/HR: 0.9 INJECTION, SOLUTION INTRAVENOUS at 10:03

## 2019-03-14 NOTE — DISCHARGE INSTRUCTIONS
OPERATIVE REPORT  PATIENT NAME: Aubrey Gastelum    :  1960  MRN: [id number]  Pt Location: [facility] 01    SURGERY DATE: 3/14/2019    Surgeon(s) and Role:     * Jose Manuel Harvey MD - Primary    ESOPHAGOGASTRODUODENOSCOPY    PROCEDURE: EGD    SEDATION: Monitored anesthesia care, check anesthesia records    ASA Class: 2    INDICATIONS:  Dolan's esophagus here for EGD with RFA    CONSENT:  Informed consent was obtained for the procedure, including sedation after explaining the risks and benefits of the procedure  Risks including but not limited to bleeding, perforation, infection, and missed lesion  PREPARATION:   Telemetry, pulse oximetry, blood pressure were monitored throughout the procedure  Patient was identified by myself both verbally and by visual inspection of ID band  DESCRIPTION:   Patient was placed in the left lateral decubitus position and was sedated with the above medication  The gastroscope was introduced in to the oropharynx and the esophagus was intubated under direct visualization  Scope was passed down the esophagus up to 2nd part of the duodenum  A careful inspection was made as the gastroscope was withdrawn, including a retroflexed view of the stomach; findings and interventions are described below  FINDINGS:    #1  Esophagus- Dolan's esophagus noted again  No evidence of esophagitis or nodule  The Dolan's covered less than 50% of the circumference with 2 additional mucosal island  Top of gastric fold was at 32 cm  Top of intestinal metaplasia at 28 cm  Ablation was performed using 90 RFA focal catheter over the scope  Two ablations were performed at each site and a total of 24 ablation performed on the 1st round  Then the coagulum was scraped using distal attachment cap  Gastroscope was reintroduced and repeat ablation was performed in the similar fashion  Overall a total of 49 ablations were performed  Small hiatal hernia noted        #2  Stomach- normal stomach    #3  Duodenum- normal duodenum         IMPRESSIONS:      Dolan's esophagus ablation was performed using 90 RFA focal catheter  The procedure was successful  Normal stomach and duodenum  RECOMMENDATIONS:     Continue Dexilant 60 mg daily  Carafate 1 g t i d  For 2 weeks  Full liquid diet for 1 day  Surgical soft diet for 1 week  Take Magic mouthwash as needed for pain  He can also take Tylenol as needed for pain  Recommend repeat EGD with ablation in 3 months with Dr Maddy Coker  COMPLICATIONS:  None; patient tolerated the procedure well  SPECIMENS:  * No specimens in log *    ESTIMATED BLOOD LOSS:  Minimal        SIGNATURE: Tobias Strange MD  DATE: 2019  TIME: 11:05 AM        OPERATIVE REPORT  PATIENT NAME: Fran Fernandez    :  1960  MRN: [id number]  Pt Location:  GI ROOM 01    SURGERY DATE: 3/14/2019    Surgeon(s) and Role:     * Tobias Strange MD - Primary    Colonoscopy Procedure Note    Procedure: Colonoscopy    Sedation: Monitored anesthesia care, check anesthesia records      ASA Class: 2    INDICATIONS:  Colon cancer screening  She has personal history of tubular adenoma in the colon a year ago and the prep was suboptimal    POST-OP DIAGNOSIS: See the impression below    Procedure Details     Prior colonoscopy:  A year ago the prep was suboptimal    Informed consent was obtained for the procedure, including sedation  Risks of perforation, hemorrhage, adverse drug reaction and aspiration were discussed  The patient was placed in the left lateral decubitus position  Based on the pre-procedure assessment, including review of the patient's medical history, medications, allergies, and review of systems, she had been deemed to be an appropriate candidate for conscious sedation; she was therefore sedated with the medications listed below  The patient was monitored continuously with telemetry, pulse oximetry, blood pressure monitoring, and direct observations        A rectal examination was performed  The colonoscope was inserted into the rectum and advanced under direct vision to the cecum, which was identified by the ileocecal valve and appendiceal orifice  The quality of the colonic preparation was good  A careful inspection was made as the colonoscope was withdrawn, including a retroflexed view of the rectum; findings and interventions are described below  Findings:  Colonoscopy was performed with distal endo cuff attachment  Small internal hemorrhoids otherwise normal colonic mucosa  No polyps or lesions seen  Complications: None; patient tolerated the procedure well  Impression:    Small internal hemorrhoids otherwise normal colonoscopy  Recommendations:  Colonoscopy is recommended in 5 years given her history of adenomatous polyp on the previous colonoscopy  High-fiber diet  Avoid constipation  If you have abdominal pain, bleeding or fever call my office at [phone]  COMPLICATIONS:  None; patient tolerated the procedure well      SPECIMENS:  * No specimens in log *    ESTIMATED BLOOD LOSS:  Minimal  SIGNATURE: Fernando Brian MD  DATE: March 14, 2019  TIME: 11:11 AM

## 2019-03-14 NOTE — OP NOTE
OPERATIVE REPORT  PATIENT NAME: Santiago Dolan    :  1960  MRN: [id number]  Pt Location: [facility] 01    SURGERY DATE: 3/14/2019    Surgeon(s) and Role:     * Bruce Bradley MD - Primary    ESOPHAGOGASTRODUODENOSCOPY    PROCEDURE: EGD    SEDATION: Monitored anesthesia care, check anesthesia records    ASA Class: 2    INDICATIONS:  Dolan's esophagus here for EGD with RFA    CONSENT:  Informed consent was obtained for the procedure, including sedation after explaining the risks and benefits of the procedure  Risks including but not limited to bleeding, perforation, infection, and missed lesion  PREPARATION:   Telemetry, pulse oximetry, blood pressure were monitored throughout the procedure  Patient was identified by myself both verbally and by visual inspection of ID band  DESCRIPTION:   Patient was placed in the left lateral decubitus position and was sedated with the above medication  The gastroscope was introduced in to the oropharynx and the esophagus was intubated under direct visualization  Scope was passed down the esophagus up to 2nd part of the duodenum  A careful inspection was made as the gastroscope was withdrawn, including a retroflexed view of the stomach; findings and interventions are described below  FINDINGS:    #1  Esophagus- Dolan's esophagus noted again  No evidence of esophagitis or nodule  The Dolan's covered less than 50% of the circumference with 2 additional mucosal island  Top of gastric fold was at 32 cm  Top of intestinal metaplasia at 28 cm  Ablation was performed using 90 RFA focal catheter over the scope  Two ablations were performed at each site and a total of 24 ablation performed on the 1st round  Then the coagulum was scraped using distal attachment cap  Gastroscope was reintroduced and repeat ablation was performed in the similar fashion  Overall a total of 49 ablations were performed  Small hiatal hernia noted        #2  Stomach- normal stomach    #3  Duodenum- normal duodenum         IMPRESSIONS:      Dolan's esophagus ablation was performed using 90 RFA focal catheter  The procedure was successful  Normal stomach and duodenum  RECOMMENDATIONS:     Continue Dexilant 60 mg daily  Carafate 1 g t i d  For 2 weeks  Full liquid diet for 1 day  Surgical soft diet for 1 week  Take Magic mouthwash as needed for pain  He can also take Tylenol as needed for pain  Recommend repeat EGD with ablation in 3 months with Dr Diaz Nichole  COMPLICATIONS:  None; patient tolerated the procedure well      SPECIMENS:  * No specimens in log *    ESTIMATED BLOOD LOSS:  Minimal        SIGNATURE: Alberto Medina MD  DATE: March 14, 2019  TIME: 11:05 AM

## 2019-03-14 NOTE — ANESTHESIA PREPROCEDURE EVALUATION
Review of Systems/Medical History  Patient summary reviewed  Chart reviewed  No history of anesthetic complications     Cardiovascular  Hyperlipidemia,   Comment: HLD: no meds  Venous insufficiency,  Pulmonary  Asthma , seasonal/exercise induced Last rescue: today Asthma type of rescue: daily inhaler,        GI/Hepatic    GERD well controlled,  Hiatal hernia, No bowel prep       Negative  ROS        Endo/Other    Obesity    GYN  Negative gynecology ROS          Hematology  Negative hematology ROS      Musculoskeletal  Back pain , lumbar pain, Sciatica (left lumbar),   Comment: Chronic pain  RLS Arthritis     Neurology    No neuromuscular disease , Headaches (migraine today),    Psychology   No depression ,              Physical Exam    Airway    Mallampati score: II  TM Distance: >3 FB  Neck ROM: full     Dental   No notable dental hx     Cardiovascular  Rhythm: regular, Rate: normal, Cardiovascular exam normal    Pulmonary  Pulmonary exam normal Breath sounds clear to auscultation,     Other Findings        Anesthesia Plan  ASA Score- 2     Anesthesia Type- IV sedation with anesthesia with ASA Monitors  Additional Monitors:   Airway Plan:     Comment: IAB  Has had Low BP during surgery  Plan Factors-Patient not instructed to abstain from smoking on day of procedure  Patient did not smoke on day of surgery  Induction- intravenous  Postoperative Plan-     Informed Consent- Anesthetic plan and risks discussed with patient  I personally reviewed this patient with the CRNA  Discussed and agreed on the Anesthesia Plan with the CRNA  Karrie Nissen

## 2019-03-14 NOTE — OP NOTE
OPERATIVE REPORT  PATIENT NAME: Gerda Aldridge    :  1960  MRN: [id number]  Pt Location: [facility] 01    SURGERY DATE: 3/14/2019    Surgeon(s) and Role:     * Vivek Horton MD - Primary    Colonoscopy Procedure Note    Procedure: Colonoscopy    Sedation: Monitored anesthesia care, check anesthesia records      ASA Class: 2    INDICATIONS:  Colon cancer screening  She has personal history of tubular adenoma in the colon a year ago and the prep was suboptimal    POST-OP DIAGNOSIS: See the impression below    Procedure Details     Prior colonoscopy:  A year ago the prep was suboptimal    Informed consent was obtained for the procedure, including sedation  Risks of perforation, hemorrhage, adverse drug reaction and aspiration were discussed  The patient was placed in the left lateral decubitus position  Based on the pre-procedure assessment, including review of the patient's medical history, medications, allergies, and review of systems, she had been deemed to be an appropriate candidate for conscious sedation; she was therefore sedated with the medications listed below  The patient was monitored continuously with telemetry, pulse oximetry, blood pressure monitoring, and direct observations  A rectal examination was performed  The colonoscope was inserted into the rectum and advanced under direct vision to the cecum, which was identified by the ileocecal valve and appendiceal orifice  The quality of the colonic preparation was good  A careful inspection was made as the colonoscope was withdrawn, including a retroflexed view of the rectum; findings and interventions are described below  Findings:  Colonoscopy was performed with distal endo cuff attachment  Small internal hemorrhoids otherwise normal colonic mucosa  No polyps or lesions seen  Complications: None; patient tolerated the procedure well      Impression:    Small internal hemorrhoids otherwise normal colonoscopy  Recommendations:  Colonoscopy is recommended in 5 years given her history of adenomatous polyp on the previous colonoscopy  High-fiber diet  Avoid constipation  If you have abdominal pain, bleeding or fever call my office at [phone]  COMPLICATIONS:  None; patient tolerated the procedure well      SPECIMENS:  * No specimens in log *    ESTIMATED BLOOD LOSS:  Minimal  SIGNATURE: True Lundberg MD  DATE: March 14, 2019  TIME: 11:11 AM

## 2019-03-14 NOTE — H&P
History and Physical - [facility]  Pamela Franco 64 y o  female MRN: [id number]    HPI: Pamela Franco is a 64y o  year old female who presents with for EGD and colonoscopy  EGD with radiofrequency ablation for Dolan's esophagus  Colonoscopy for personal history of colon polyp and thermal prep      Review of Systems    Historical Information   Past Medical History:   Diagnosis Date    Anesthesia     "sometimes low BP upon waking up"    Arthritis     Asthma     Back pain     Dolan's esophagus     Chronic pain disorder     Chronic venous insufficiency     Cough variant asthma     Environmental allergies     dust    GERD (gastroesophageal reflux disease)     Hiatal hernia     History of anemia     History of fusion of spine for scoliosis     "as a teenager"    History of transfusion     2001    Hyperlipidemia     Left lumbar radiculitis     Last Assessed: [id number]    Lumbar postlaminectomy syndrome     Migraines     Morbid obesity ([address] )     Motion sickness     Osteoarthritis     of left hip    Right knee pain     Seasonal allergies     Shortness of breath     Umbilical hernia     Uses brace     right knee    Uses crutches     one    Wears glasses      Past Surgical History:   Procedure Laterality Date    ARTHRODESIS      lumbar    ARTHRODESIS      Spinal Arthrodesis for Deformity; Last Assessed: [id number]    BACK SURGERY      lumbar fusion,with nydia/screw and cage implant    BACK SURGERY      surgery for scoliosis    BREAST CYST EXCISION      benign    COLONOSCOPY      HERNIA REPAIR      umbilical hernia repair    PLANTAR FASCIA SURGERY Left     NV COLONOSCOPY FLX DX W/COLLJ SPEC WHEN PFRMD N/A 2/20/2018    Procedure: COLONOSCOPY with polypectomy;  Surgeon: Charmayne Honey, MD;  Location: [facility]; Service: General    NV ESOPHAGOGASTRODUODENOSCOPY TRANSORAL DIAGNOSTIC N/A 5/24/2017    Procedure: ESOPHAGOGASTRODUODENOSCOPY (EGD);   Surgeon: Ahmed Galeazzi, MD; Location: [facility]; Service: Gastroenterology    AK ESOPHAGOGASTRODUODENOSCOPY TRANSORAL DIAGNOSTIC N/A 8/31/2017    Procedure: ESOPHAGOGASTRODUODENOSCOPY (EGD) W RFA;  Surgeon: Kiran Robison MD;  Location:  GI LAB;   Service: Gastroenterology    AK KNEE SCOPE,MED/LAT MENISECTOMY Right 4/4/2018    Procedure: ARTHROSCOPY KNEE PARTIAL MEDIAL MENISECTOMY , CHONDROPLASTY;  Surgeon: Myra Eaton DO;  Location: [facility];  Service: Orthopedics    WISDOM TOOTH EXTRACTION       Social History   Social History     Substance and Sexual Activity   Alcohol Use Yes    Frequency: Monthly or less    Comment: minimal     Social History     Substance and Sexual Activity   Drug Use No     Social History     Tobacco Use   Smoking Status Never Smoker   Smokeless Tobacco Never Used     Family History   Problem Relation Age of Onset    Lung cancer Mother     Cancer Mother     Alcohol abuse Father     Other Father         Cardiac Disorder    Depression Father     Hypertension Father     Heart attack Father     Breast cancer Maternal Grandmother     Diabetes type I Other     Stroke Neg Hx        Meds/Allergies     Medications Prior to Admission   Medication    albuterol (2 5 mg/3 mL) 0 083 % nebulizer solution    cetirizine (ZyrTEC) 10 mg tablet    dexlansoprazole (DEXILANT) 60 MG capsule    DULoxetine (CYMBALTA) 60 mg delayed release capsule    famotidine (PEPCID) 10 mg tablet    gabapentin (NEURONTIN) 300 mg capsule    montelukast (SINGULAIR) 10 mg tablet    pregabalin (LYRICA) 75 mg capsule    rOPINIRole (REQUIP) 2 mg tablet    sodium chloride 0 9 % nebulizer solution    cyclobenzaprine (FLEXERIL) 10 mg tablet       Allergies   Allergen Reactions    Dust Mite Extract Shortness Of Breath    Latex Itching and Blisters    Other      seasonal    Sulfa Antibiotics Vomiting     Topical-blistering       Objective     Blood pressure 137/63, pulse 92, temperature 97 8 °F (36 6 °C), temperature source Oral, resp  rate 18, height 5' 3" (1 6 m), weight 102 kg (225 lb), SpO2 94 %  PHYSICAL EXAM    Gen: NAD  CV: RRR  CHEST: Clear  ABD: soft, NT/ND  EXT: no edema  Neuro: AAO      ASSESSMENT/PLAN:  This is a 64y o  year old female here for and colonoscopy  EGD wit RFA        PLAN:   Procedure:  EGD with RFA and colonoscopy

## 2019-03-14 NOTE — ANESTHESIA POSTPROCEDURE EVALUATION
Post-Op Assessment Note    CV Status:  Stable    Pain management: adequate     Mental Status:  Awake   Hydration Status:  Stable   PONV Controlled:  Controlled   Airway Patency:  Patent   Post Op Vitals Reviewed: Yes      Staff: Anesthesiologist, GRISELDA           BP   132/68   Temp      Pulse  88   Resp   18   SpO2   100

## 2019-03-15 ENCOUNTER — TELEPHONE (OUTPATIENT)
Dept: GASTROENTEROLOGY | Facility: CLINIC | Age: 59
End: 2019-03-15

## 2019-03-15 ENCOUNTER — TELEPHONE (OUTPATIENT)
Dept: GASTROENTEROLOGY | Facility: AMBULARY SURGERY CENTER | Age: 59
End: 2019-03-15

## 2019-03-15 NOTE — TELEPHONE ENCOUNTER
She reports she is feeling slightly better than yesterday but did have a fever as high as 100 degrees yesterday  She states she was told to go to the ER but did not since the fever came down  She reports a sore throat despite magic mouthwash and Tylenol but does feel better today than yesterday; she is going to try a liquid diet soon  She denies any rectal bleeding or other symptoms at this time  I let her know you would call her later as well

## 2019-03-15 NOTE — TELEPHONE ENCOUNTER
I personally called the patient and discussed with her  Her fever has subsided  She has some discomfort on swallowing which is expected after RFA  I asked the patient to go to emergency room if she has recurrent fever or chest pain  She verbalized understanding

## 2019-03-15 NOTE — TELEPHONE ENCOUNTER
----- Message from Bartolo Narayan MA sent at 3/15/2019  3:07 PM EDT -----  Regarding: answering service  Hi Lubna Herman,    This patient called in last night and mentioned that she hAD A PROCEDURE TODAY AND HAS A LOW GRADE FEVER OF 99 3 AND CANT SEEM TO GET WARM  Can you please call her to make sure she is okay? Thank you!

## 2019-04-22 ENCOUNTER — TELEPHONE (OUTPATIENT)
Dept: INTERNAL MEDICINE CLINIC | Facility: CLINIC | Age: 59
End: 2019-04-22

## 2019-04-22 DIAGNOSIS — J20.8 VIRAL BRONCHITIS: Primary | ICD-10-CM

## 2019-04-22 RX ORDER — BENZONATATE 200 MG/1
200 CAPSULE ORAL 3 TIMES DAILY PRN
Qty: 20 CAPSULE | Refills: 0 | Status: SHIPPED | OUTPATIENT
Start: 2019-04-22 | End: 2019-09-12 | Stop reason: ALTCHOICE

## 2019-06-07 ENCOUNTER — TELEPHONE (OUTPATIENT)
Dept: INTERNAL MEDICINE CLINIC | Facility: CLINIC | Age: 59
End: 2019-06-07

## 2019-07-05 ENCOUNTER — OFFICE VISIT (OUTPATIENT)
Dept: URGENT CARE | Age: 59
End: 2019-07-05
Payer: MEDICARE

## 2019-07-05 ENCOUNTER — APPOINTMENT (OUTPATIENT)
Dept: RADIOLOGY | Age: 59
End: 2019-07-05
Payer: MEDICARE

## 2019-07-05 VITALS
WEIGHT: 225 LBS | SYSTOLIC BLOOD PRESSURE: 151 MMHG | HEART RATE: 79 BPM | BODY MASS INDEX: 39.87 KG/M2 | DIASTOLIC BLOOD PRESSURE: 70 MMHG | TEMPERATURE: 97.7 F | RESPIRATION RATE: 16 BRPM | OXYGEN SATURATION: 99 % | HEIGHT: 63 IN

## 2019-07-05 DIAGNOSIS — S59.901A ELBOW INJURY, RIGHT, INITIAL ENCOUNTER: Primary | ICD-10-CM

## 2019-07-05 DIAGNOSIS — S59.901A ELBOW INJURY, RIGHT, INITIAL ENCOUNTER: ICD-10-CM

## 2019-07-05 PROCEDURE — 73080 X-RAY EXAM OF ELBOW: CPT

## 2019-07-05 PROCEDURE — G0463 HOSPITAL OUTPT CLINIC VISIT: HCPCS | Performed by: PHYSICIAN ASSISTANT

## 2019-07-05 PROCEDURE — 99213 OFFICE O/P EST LOW 20 MIN: CPT | Performed by: PHYSICIAN ASSISTANT

## 2019-07-05 NOTE — PATIENT INSTRUCTIONS
Rest, ice, elevate the affected limb  Take over-the-counter pain medication for symptom relief  Follow up with Orthopedics this week  Call Pati Gamino to schedule an appointment: [phone]  Go to ER if new or worsening symptoms occur  Elbow Sprain   WHAT YOU NEED TO KNOW:   An elbow sprain is caused by a stretched or torn ligament in the elbow joint  Ligaments are the strong tissues that connect bones  DISCHARGE INSTRUCTIONS:   Return to the emergency department if:   · The skin of your injured arm looks bluish or pale (less color than normal)  · You have new or increased numbness in your injured arm  Contact your healthcare provider if:   · You have increased swelling and pain in your elbow  · You have new or increased stiffness or trouble moving your injured arm  · You have questions or concerns about your condition or care  Medicines:   · Prescription pain medicine  may be given  Ask how to take this medicine safely  · Take your medicine as directed  Contact your healthcare provider if you think your medicine is not helping or if you have side effects  Tell him or her if you are allergic to any medicine  Keep a list of the medicines, vitamins, and herbs you take  Include the amounts, and when and why you take them  Bring the list or the pill bottles to follow-up visits  Carry your medicine list with you in case of an emergency  Rest your elbow: You will need to rest your elbow for 1 to 2 days after your injury  This will help decrease the risk of more damage to your elbow  Ice your elbow:  Apply ice on your elbow for 15 to 20 minutes every hour or as directed  Use an ice pack, or put crushed ice in a plastic bag  Cover it with a towel  Ice helps prevent tissue damage and decreases swelling and pain    Compress your elbow:  Compression provides support and helps decrease swelling and movement so your elbow can heal  You may be told to keep your elbow wrapped with a tight elastic bandage  Follow instructions about how to apply your bandage  Elevate your elbow:  Elevate your elbow above the level of your heart as often as you can  This will help decrease swelling and pain  Prop your elbow on pillows or blankets to keep it elevated comfortably  Exercise your elbow: You should begin to exercise your arm in a few days, once you are able to move your elbow without pain  Exercises will help decrease stiffness and improve the strength of your arm  Ask your healthcare provider what kind of exercises you should do  Prevent another elbow sprain:   · Make sure you warm up and stretch before you exercise  · Do not exercise when you feel pain or you are tired  · Wear equipment to protect yourself when you play sports  · Stop exercising and playing sports if your symptoms from a past injury return  Follow up with your healthcare provider within 1 week:  Write down any questions you have so you remember to ask them in your follow-up visits  © 2017 [facility] Information is for End User's use only and may not be sold, redistributed or otherwise used for commercial purposes  All illustrations and images included in CareNotes® are the copyrighted property of [employer]  or Keith Ureña  The above information is an  only  It is not intended as medical advice for individual conditions or treatments  Talk to your doctor, nurse or pharmacist before following any medical regimen to see if it is safe and effective for you

## 2019-07-05 NOTE — PROGRESS NOTES
[facility]        NAME: Azul Carrillo is a 64 y o  female  : 1960    MRN: [id number]  DATE: 2019  TIME: 5:58 PM    Assessment and Plan   Elbow injury, right, initial encounter [S59 901A]  1  Elbow injury, right, initial encounter  XR elbow 3+ vw right     X-ray provider read, no acute findings  Patient placed in a sling  Patient educated on rice  Patient given information on when to follow up with Orthopedics  Patient states she understands and agrees  Patient Instructions       Rest, ice, elevate the affected limb  Take over-the-counter pain medication for symptom relief  Follow up with Orthopedics this week  Call Georgie Garza to schedule an appointment: [phone]  Go to ER if new or worsening symptoms occur  Chief Complaint     Chief Complaint   Patient presents with    Elbow Pain     Pt c/o right elbow pain  Pt reports she tripped over a luggage  a hotel room last night and fell  Pt states she thinks she twisted her right elbow as she fell  History of Present Illness       Elbow Pain   Chronicity: Patient had a prior elbow injury years ago, radial head  Last night she tripped, fell forward a soft bed and twisted her arm awkwardly  The current episode started yesterday  The problem occurs constantly  The problem has been unchanged  Pertinent negatives include no abdominal pain, chest pain, chills, fatigue, fever, joint swelling, nausea, neck pain, numbness, rash, vomiting or weakness  Exacerbated by: Worse with pronation supination  She has tried nothing for the symptoms  The treatment provided no relief  Review of Systems   Review of Systems   Constitutional: Negative  Negative for chills, fatigue and fever  HENT: Negative  Eyes: Negative  Respiratory: Negative  Negative for chest tightness, shortness of breath and wheezing  Cardiovascular: Negative  Negative for chest pain and palpitations     Gastrointestinal: Negative for abdominal pain, constipation, diarrhea, nausea and vomiting  Endocrine: Negative  Genitourinary: Negative for dysuria and vaginal pain  Musculoskeletal: Negative for back pain, gait problem, joint swelling, neck pain and neck stiffness  Skin: Negative  Negative for pallor and rash  Allergic/Immunologic: Negative  Neurological: Negative  Negative for weakness and numbness  Hematological: Negative  Psychiatric/Behavioral: Negative            Current Medications       Current Outpatient Medications:     cetirizine (ZyrTEC) 10 mg tablet, Take 1 tablet by mouth daily  , Disp: , Rfl:     dexlansoprazole (DEXILANT) 60 MG capsule, Take 1 capsule (60 mg total) by mouth 2 (two) times a day, Disp: 60 capsule, Rfl: 0    DULoxetine (CYMBALTA) 60 mg delayed release capsule, TAKE 1 CAPSULE(60 MG) BY MOUTH DAILY, Disp: 90 capsule, Rfl: 5    gabapentin (NEURONTIN) 300 mg capsule, Take 1 capsule (300 mg total) by mouth 2 (two) times a day, Disp: 60 capsule, Rfl: 1    montelukast (SINGULAIR) 10 mg tablet, Take 10 mg by mouth daily at bedtime, Disp: , Rfl:     rOPINIRole (REQUIP) 2 mg tablet, One tablet at bedtime, Disp: 90 tablet, Rfl: 0    al mag oxide-diphenhydramine-lidocaine viscous (MAGIC MOUTHWASH) 1:1:1 suspension, Swish and swallow 10 mL every 4 (four) hours as needed for mouth pain or discomfort (Patient not taking: Reported on 7/5/2019), Disp: 180 mL, Rfl: 0    albuterol (2 5 mg/3 mL) 0 083 % nebulizer solution, VVN Q 4 H PRN, Disp: , Rfl: 3    benzonatate (TESSALON) 200 MG capsule, Take 1 capsule (200 mg total) by mouth 3 (three) times a day as needed for cough (Patient not taking: Reported on 7/5/2019), Disp: 20 capsule, Rfl: 0    cyclobenzaprine (FLEXERIL) 10 mg tablet, Take 1 tablet (10 mg total) by mouth daily at bedtime (Patient not taking: Reported on 7/5/2019), Disp: 30 tablet, Rfl: 0    famotidine (PEPCID) 10 mg tablet, Take 10 mg by mouth 2 (two) times a day, Disp: , Rfl:    pregabalin (LYRICA) 75 mg capsule, Take 1 capsule (75 mg total) by mouth 2 (two) times a day for 90 days, Disp: 180 capsule, Rfl: 3    sodium chloride 0 9 % nebulizer solution, USE 3 ML VIA NEB 1-2 TIMES D PRN, Disp: , Rfl: 6    sucralfate (CARAFATE) 1 g/10 mL suspension, Take 10 mL (1 g total) by mouth 4 (four) times a day (Patient not taking: Reported on 7/5/2019), Disp: 420 mL, Rfl: 0    Current Allergies     Allergies as of 07/05/2019 - Reviewed 07/05/2019   Allergen Reaction Noted    Dust mite extract Shortness Of Breath     Latex Itching and Blisters 05/22/2017    Other  05/24/2017    Sulfa antibiotics Vomiting 05/20/2015            The following portions of the patient's history were reviewed and updated as appropriate: allergies, current medications, past family history, past medical history, past social history, past surgical history and problem list      Past Medical History:   Diagnosis Date    Anesthesia     "sometimes low BP upon waking up"    Arthritis     Asthma     Back pain     Dolan's esophagus     Chronic pain disorder     Chronic venous insufficiency     Cough variant asthma     Environmental allergies     dust    GERD (gastroesophageal reflux disease)     Hiatal hernia     History of anemia     History of fusion of spine for scoliosis     "as a teenager"    History of transfusion     2001    Hyperlipidemia     Left lumbar radiculitis     Last Assessed: [id number]    Lumbar postlaminectomy syndrome     Migraines     Morbid obesity ([address] )     Motion sickness     Osteoarthritis     of left hip    Right knee pain     Seasonal allergies     Shortness of breath     Umbilical hernia     Uses brace     right knee    Uses crutches     one    Wears glasses        Past Surgical History:   Procedure Laterality Date    ARTHRODESIS      lumbar    ARTHRODESIS      Spinal Arthrodesis for Deformity;  Last Assessed: [id number]   [facility] BACK SURGERY      lumbar fusion,with nydia/screw and cage implant    BACK SURGERY      surgery for scoliosis    BREAST CYST EXCISION      benign    COLONOSCOPY      COLONOSCOPY N/A 3/14/2019    Procedure: COLONOSCOPY;  Surgeon: Mariusz Dolan MD;  [facility]; Service: Gastroenterology    ESOPHAGOGASTRODUODENOSCOPY N/A 3/14/2019    Procedure: ESOPHAGOGASTRODUODENOSCOPY (EGD) W RFA(BARRX); Surgeon: Mariusz Dolan MD;  [facility]; Service: Gastroenterology    HERNIA REPAIR      umbilical hernia repair    PLANTAR FASCIA SURGERY Left     NJ COLONOSCOPY FLX DX W/COLLJ SPEC WHEN PFRMD N/A 2/20/2018    Procedure: COLONOSCOPY with polypectomy;  Surgeon: Larisa Pizarro MD;  [facility]; Service: General    NJ ESOPHAGOGASTRODUODENOSCOPY TRANSORAL DIAGNOSTIC N/A 5/24/2017    Procedure: ESOPHAGOGASTRODUODENOSCOPY (EGD); Surgeon: Lila Villegas MD;  [facility]; Service: Gastroenterology    NJ ESOPHAGOGASTRODUODENOSCOPY TRANSORAL DIAGNOSTIC N/A 8/31/2017    Procedure: ESOPHAGOGASTRODUODENOSCOPY (EGD) W RFA;  Surgeon: Kinjal Wilson MD;  [facility]; Service: Gastroenterology    NJ KNEE SCOPE,MED/LAT MENISECTOMY Right 4/4/2018    Procedure: ARTHROSCOPY KNEE PARTIAL MEDIAL MENISECTOMY , CHONDROPLASTY;  Surgeon: Khushbu Lopez DO;  [facility];  Service: Orthopedics    WISDOM TOOTH EXTRACTION         Family History   Problem Relation Age of Onset    Lung cancer Mother     Cancer Mother     Alcohol abuse Father     Other Father         Cardiac Disorder    Depression Father     Hypertension Father     Heart attack Father     Breast cancer Maternal Grandmother     Diabetes type I Other     Stroke Neg Hx          Medications have been verified  Objective   /70   Pulse 79   Temp 97 7 °F (36 5 °C) (Tympanic)   Resp 16   Ht 5' 3" (1 6 m)   Wt 102 kg (225 lb)   SpO2 99%   BMI 39 86 kg/m²        Physical Exam     Physical Exam   Constitutional: She appears well-developed and well-nourished  No distress  HENT:   Head: Normocephalic and atraumatic  Cardiovascular: Normal rate, regular rhythm, normal heart sounds and intact distal pulses  Pulmonary/Chest: Effort normal and breath sounds normal  No respiratory distress  She has no wheezes  She has no rales  Musculoskeletal:        Right elbow: She exhibits normal range of motion (Full range of motion but patient has pain with full supination ), no swelling, no deformity and no laceration  Tenderness (Antecubital area) found  Skin: Skin is warm  Capillary refill takes less than 2 seconds  No rash noted  She is not diaphoretic  Nursing note and vitals reviewed

## 2019-07-11 ENCOUNTER — OFFICE VISIT (OUTPATIENT)
Dept: INTERNAL MEDICINE CLINIC | Facility: CLINIC | Age: 59
End: 2019-07-11
Payer: MEDICARE

## 2019-07-11 ENCOUNTER — APPOINTMENT (OUTPATIENT)
Dept: LAB | Facility: HOSPITAL | Age: 59
End: 2019-07-11
Payer: MEDICARE

## 2019-07-11 VITALS
OXYGEN SATURATION: 98 % | HEART RATE: 78 BPM | HEIGHT: 63 IN | DIASTOLIC BLOOD PRESSURE: 90 MMHG | SYSTOLIC BLOOD PRESSURE: 138 MMHG | BODY MASS INDEX: 39.86 KG/M2

## 2019-07-11 DIAGNOSIS — R60.9 EDEMA, UNSPECIFIED TYPE: ICD-10-CM

## 2019-07-11 DIAGNOSIS — E78.5 HYPERLIPIDEMIA, UNSPECIFIED HYPERLIPIDEMIA TYPE: ICD-10-CM

## 2019-07-11 DIAGNOSIS — I87.2 CHRONIC VENOUS INSUFFICIENCY: Primary | ICD-10-CM

## 2019-07-11 DIAGNOSIS — R73.03 PREDIABETES: ICD-10-CM

## 2019-07-11 DIAGNOSIS — G25.81 RESTLESS LEG SYNDROME: ICD-10-CM

## 2019-07-11 DIAGNOSIS — K21.9 GASTROESOPHAGEAL REFLUX DISEASE, ESOPHAGITIS PRESENCE NOT SPECIFIED: Primary | ICD-10-CM

## 2019-07-11 DIAGNOSIS — G25.81 RESTLESS LEGS SYNDROME: ICD-10-CM

## 2019-07-11 DIAGNOSIS — R73.01 IMPAIRED FASTING GLUCOSE: ICD-10-CM

## 2019-07-11 DIAGNOSIS — K21.00 GASTROESOPHAGEAL REFLUX DISEASE WITH ESOPHAGITIS: ICD-10-CM

## 2019-07-11 DIAGNOSIS — F32.A DEPRESSION, UNSPECIFIED DEPRESSION TYPE: ICD-10-CM

## 2019-07-11 DIAGNOSIS — E66.01 OBESITY, CLASS III, BMI 40-49.9 (MORBID OBESITY) (HCC): ICD-10-CM

## 2019-07-11 DIAGNOSIS — J45.991 COUGH VARIANT ASTHMA: ICD-10-CM

## 2019-07-11 LAB
ALBUMIN SERPL BCP-MCNC: 3.5 G/DL (ref 3.5–5)
ALP SERPL-CCNC: 97 U/L (ref 46–116)
ALT SERPL W P-5'-P-CCNC: 29 U/L (ref 12–78)
ANION GAP SERPL CALCULATED.3IONS-SCNC: 9 MMOL/L (ref 4–13)
AST SERPL W P-5'-P-CCNC: 16 U/L (ref 5–45)
BASOPHILS # BLD AUTO: 0.06 THOUSANDS/ΜL (ref 0–0.1)
BASOPHILS NFR BLD AUTO: 1 % (ref 0–1)
BILIRUB SERPL-MCNC: 0.24 MG/DL (ref 0.2–1)
BILIRUB UR QL STRIP: NEGATIVE
BUN SERPL-MCNC: 12 MG/DL (ref 5–25)
CALCIUM SERPL-MCNC: 9.5 MG/DL (ref 8.3–10.1)
CHLORIDE SERPL-SCNC: 102 MMOL/L (ref 100–108)
CHOLEST SERPL-MCNC: 233 MG/DL (ref 50–200)
CLARITY UR: CLEAR
CO2 SERPL-SCNC: 28 MMOL/L (ref 21–32)
COLOR UR: YELLOW
CREAT SERPL-MCNC: 0.68 MG/DL (ref 0.6–1.3)
EOSINOPHIL # BLD AUTO: 0.13 THOUSAND/ΜL (ref 0–0.61)
EOSINOPHIL NFR BLD AUTO: 2 % (ref 0–6)
ERYTHROCYTE [DISTWIDTH] IN BLOOD BY AUTOMATED COUNT: 14.8 % (ref 11.6–15.1)
EST. AVERAGE GLUCOSE BLD GHB EST-MCNC: 131 MG/DL
GFR SERPL CREATININE-BSD FRML MDRD: 97 ML/MIN/1.73SQ M
GLUCOSE P FAST SERPL-MCNC: 99 MG/DL (ref 65–99)
GLUCOSE UR STRIP-MCNC: NEGATIVE MG/DL
HBA1C MFR BLD: 6.2 % (ref 4.2–6.3)
HCT VFR BLD AUTO: 41.3 % (ref 34.8–46.1)
HDLC SERPL-MCNC: 44 MG/DL (ref 40–60)
HGB BLD-MCNC: 12.4 G/DL (ref 11.5–15.4)
HGB UR QL STRIP.AUTO: NEGATIVE
IMM GRANULOCYTES # BLD AUTO: 0.05 THOUSAND/UL (ref 0–0.2)
IMM GRANULOCYTES NFR BLD AUTO: 1 % (ref 0–2)
KETONES UR STRIP-MCNC: NEGATIVE MG/DL
LDLC SERPL CALC-MCNC: 162 MG/DL (ref 0–100)
LEUKOCYTE ESTERASE UR QL STRIP: NEGATIVE
LYMPHOCYTES # BLD AUTO: 2.37 THOUSANDS/ΜL (ref 0.6–4.47)
LYMPHOCYTES NFR BLD AUTO: 28 % (ref 14–44)
MCH RBC QN AUTO: 26.1 PG (ref 26.8–34.3)
MCHC RBC AUTO-ENTMCNC: 30 G/DL (ref 31.4–37.4)
MCV RBC AUTO: 87 FL (ref 82–98)
MONOCYTES # BLD AUTO: 0.45 THOUSAND/ΜL (ref 0.17–1.22)
MONOCYTES NFR BLD AUTO: 5 % (ref 4–12)
NEUTROPHILS # BLD AUTO: 5.55 THOUSANDS/ΜL (ref 1.85–7.62)
NEUTS SEG NFR BLD AUTO: 63 % (ref 43–75)
NITRITE UR QL STRIP: NEGATIVE
NONHDLC SERPL-MCNC: 189 MG/DL
NRBC BLD AUTO-RTO: 0 /100 WBCS
PH UR STRIP.AUTO: 6 [PH]
PLATELET # BLD AUTO: 337 THOUSANDS/UL (ref 149–390)
PMV BLD AUTO: 9.4 FL (ref 8.9–12.7)
POTASSIUM SERPL-SCNC: 3.6 MMOL/L (ref 3.5–5.3)
PROT SERPL-MCNC: 7.5 G/DL (ref 6.4–8.2)
PROT UR STRIP-MCNC: NEGATIVE MG/DL
RBC # BLD AUTO: 4.75 MILLION/UL (ref 3.81–5.12)
SODIUM SERPL-SCNC: 139 MMOL/L (ref 136–145)
SP GR UR STRIP.AUTO: 1.01 (ref 1–1.03)
TRIGL SERPL-MCNC: 135 MG/DL
UROBILINOGEN UR QL STRIP.AUTO: 0.2 E.U./DL
WBC # BLD AUTO: 8.61 THOUSAND/UL (ref 4.31–10.16)

## 2019-07-11 PROCEDURE — 99214 OFFICE O/P EST MOD 30 MIN: CPT | Performed by: INTERNAL MEDICINE

## 2019-07-11 PROCEDURE — 80061 LIPID PANEL: CPT | Performed by: INTERNAL MEDICINE

## 2019-07-11 PROCEDURE — 80053 COMPREHEN METABOLIC PANEL: CPT | Performed by: INTERNAL MEDICINE

## 2019-07-11 PROCEDURE — 81003 URINALYSIS AUTO W/O SCOPE: CPT | Performed by: INTERNAL MEDICINE

## 2019-07-11 PROCEDURE — 36415 COLL VENOUS BLD VENIPUNCTURE: CPT | Performed by: INTERNAL MEDICINE

## 2019-07-11 PROCEDURE — 83036 HEMOGLOBIN GLYCOSYLATED A1C: CPT | Performed by: INTERNAL MEDICINE

## 2019-07-11 PROCEDURE — 85025 COMPLETE CBC W/AUTO DIFF WBC: CPT | Performed by: INTERNAL MEDICINE

## 2019-07-11 RX ORDER — ROPINIROLE 2 MG/1
TABLET, FILM COATED ORAL
Qty: 90 TABLET | Refills: 0 | Status: SHIPPED | OUTPATIENT
Start: 2019-07-11 | End: 2019-07-11 | Stop reason: SDUPTHER

## 2019-07-11 RX ORDER — ROPINIROLE 2 MG/1
TABLET, FILM COATED ORAL
Qty: 90 TABLET | Refills: 2 | Status: SHIPPED | OUTPATIENT
Start: 2019-07-11 | End: 2019-10-02 | Stop reason: SDUPTHER

## 2019-07-11 RX ORDER — FUROSEMIDE 20 MG/1
TABLET ORAL
Qty: 90 TABLET | Refills: 5 | Status: SHIPPED | OUTPATIENT
Start: 2019-07-11 | End: 2021-08-23 | Stop reason: ALTCHOICE

## 2019-07-11 RX ORDER — FUROSEMIDE 20 MG/1
20 TABLET ORAL DAILY
Qty: 30 TABLET | Refills: 5 | Status: SHIPPED | OUTPATIENT
Start: 2019-07-11 | End: 2019-07-11 | Stop reason: SDUPTHER

## 2019-07-11 NOTE — PROGRESS NOTES
Assessment/Plan:     Diagnoses and all orders for this visit:    Chronic venous insufficiency    Edema, unspecified type  -     furosemide (LASIX) 20 mg tablet; Take 1 tablet (20 mg total) by mouth daily    Cough variant asthma    Gastroesophageal reflux disease with esophagitis    Restless legs syndrome    Restless leg syndrome  -     Discontinue: rOPINIRole (REQUIP) 2 mg tablet; One tablet at bedtime  -     rOPINIRole (REQUIP) 2 mg tablet; One tablet at bedtime    Obesity, Class III, BMI 40-49 9 (morbid obesity) ([facility] )          Subjective:      Patient ID: Tamar Williamson is a 64 y o  female here to discuss several issues  First, she had a living situation where 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 and living with them for years and this situation was dysfunctional   She recently moved down and is feeling much better  She has been able to visit family intake vacations  Quality of life is much improved and she smiling appears much better from an emotional standpoint  Overall discretion is much better and to some degree has endogenous so we will also continue duloxetine  Edema from chronic venous insufficiency persists despite low-sodium diet and best efforts to lose weight  Physical activity is increased recently which is good and has increased edema  She does not feel that she can wear support stockings comfortably  The be a trial of furosemide, with follow-up in 2 weeks  Increasing dietary potassium intake was discussed  Lasix should reduce blood pressure which was slightly elevated today and will reassess at next visit  Notes from her allergist were reviewed including April 12th note with good response to as needed albuterol for cough variant asthma  Also noted from a this communication was improvement in GERD symptoms on PPI therapy as prescribed by Gastroenterology  We reviewed the upper endoscopy performed earlier this year showing Dolan's esophagus without dysplasia      Restless leg syndrome symptoms have had a limited response to Requip, sometimes Requip causes nausea, sometimes not, responses partial   CPAP therapy was recommended by the her sleep specialist but she does not wish to continue follow-up there are consider this  Plan is renewal here and continue discussed  Emily Le notices pain in her foot as she is more active and has some deformity and plan is referral to Podiatry  Also, she saw Dermatology who felt that she had a lipoma of the right upper back, confirmed today on exam and referral to Plastic surgery was arranged as she feels she symptomatic from the location of this  Will review preventive care next visit  HPI  Current complaints include some involuntary cramping of lower legs during the day, probably related restless leg syndrome, and some general fatigue with history of idiopathic anemia according to the patient but not confirmed on previous lab assessments  There is no clinical bleeding  Also, pre diabetes needs to be reassessed and we discussed healthy lifestyle including recent improvements in lifestyle  Lab work was drawn and results will be reviewed and communicated    The following portions of the patient's history were reviewed and updated as appropriate: allergies, current medications, past family history, past medical history, past social history, past surgical history and problem list     Review of Systems      Objective:      /90 (BP Location: Left arm, Patient Position: Sitting, Cuff Size: Standard)   Pulse 78   Ht 5' 3" (1 6 m)   SpO2 98%   BMI 39 86 kg/m²      Wt Readings from Last 3 Encounters:   07/05/19 102 kg (225 lb)   03/14/19 102 kg (225 lb)   01/08/19 102 kg (225 lb)     Temp Readings from Last 3 Encounters:   07/05/19 97 7 °F (36 5 °C) (Tympanic)   03/14/19 97 8 °F (36 6 °C) (Oral)   02/11/19 (!) 96 8 °F (36 °C) (Tympanic)     BP Readings from Last 3 Encounters:   07/11/19 138/90   07/05/19 151/70   03/14/19 129/86     Pulse Readings from Last 3 Encounters:   07/11/19 78   07/05/19 79   03/14/19 78        Physical Exam    Vital signs stable, very pleasant in no distress with marked improvement in mood  Weight is stable compared to last visit  There is no JVD  Skin and hair appear normal   No carotid bruits  Carotid pulses normal   Lungs clear cardiac exam is normal on auscultation  Back:  There appears to be a large lipoma of the right thoracic back between the spine and scapular region  Well-healed scars from previous surgery the back are present  Peripheral circulation is intact  There is no change in chronic symmetric +1 to 2 edema of lower legs with marked varicosities  There are no phlebitic findings are calf tenderness  Peripheral circulation is intact    Patient Active Problem List   Diagnosis    Anosmia    Arthritis of lumbar spine    Chronic low back pain    Chronic pain disorder    Chronic venous insufficiency    Cough variant asthma    Depression    GERD (gastroesophageal reflux disease)    Hyperlipidemia    Lumbar postlaminectomy syndrome    Obesity, Class III, BMI 40-49 9 (morbid obesity) ([facility]    Primary osteoarthritis of left hip    Restless legs syndrome    Sleep disturbance    S/P right knee arthroscopy    Environmental allergies    Lumbar spondylosis    Lumbar radiculopathy    Prediabetes    Vitamin D deficiency    Dolan's esophagus without dysplasia    Gastroesophageal reflux disease       Current Outpatient Medications on File Prior to Visit   Medication Sig Dispense Refill    al mag oxide-diphenhydramine-lidocaine viscous (MAGIC MOUTHWASH) 1:1:1 suspension Swish and swallow 10 mL every 4 (four) hours as needed for mouth pain or discomfort (Patient not taking: Reported on 7/5/2019) 180 mL 0    albuterol (2 5 mg/3 mL) 0 083 % nebulizer solution VVN Q 4 H PRN  3    benzonatate (TESSALON) 200 MG capsule Take 1 capsule (200 mg total) by mouth 3 (three) times a day as needed for cough (Patient not taking: Reported on 7/5/2019) 20 capsule 0    cetirizine (ZyrTEC) 10 mg tablet Take 1 tablet by mouth daily        cyclobenzaprine (FLEXERIL) 10 mg tablet Take 1 tablet (10 mg total) by mouth daily at bedtime (Patient not taking: Reported on 7/5/2019) 30 tablet 0    dexlansoprazole (DEXILANT) 60 MG capsule Take 1 capsule (60 mg total) by mouth 2 (two) times a day 60 capsule 0    DULoxetine (CYMBALTA) 60 mg delayed release capsule TAKE 1 CAPSULE(60 MG) BY MOUTH DAILY 90 capsule 5    famotidine (PEPCID) 10 mg tablet Take 10 mg by mouth 2 (two) times a day      gabapentin (NEURONTIN) 300 mg capsule Take 1 capsule (300 mg total) by mouth 2 (two) times a day 60 capsule 1    montelukast (SINGULAIR) 10 mg tablet Take 10 mg by mouth daily at bedtime      pregabalin (LYRICA) 75 mg capsule Take 1 capsule (75 mg total) by mouth 2 (two) times a day for 90 days 180 capsule 3    sodium chloride 0 9 % nebulizer solution USE 3 ML VIA NEB 1-2 TIMES D PRN  6    sucralfate (CARAFATE) 1 g/10 mL suspension Take 10 mL (1 g total) by mouth 4 (four) times a day (Patient not taking: Reported on 7/5/2019) 420 mL 0    [DISCONTINUED] rOPINIRole (REQUIP) 2 mg tablet One tablet at bedtime 90 tablet 0     No current facility-administered medications on file prior to visit

## 2019-07-24 DIAGNOSIS — M54.50 CHRONIC BILATERAL LOW BACK PAIN WITHOUT SCIATICA: ICD-10-CM

## 2019-07-24 DIAGNOSIS — G89.29 CHRONIC BILATERAL LOW BACK PAIN WITHOUT SCIATICA: ICD-10-CM

## 2019-07-24 DIAGNOSIS — M79.2 NEUROPATHIC PAIN: ICD-10-CM

## 2019-07-24 RX ORDER — PREGABALIN 75 MG/1
75 CAPSULE ORAL 2 TIMES DAILY
Qty: 180 CAPSULE | Refills: 3 | Status: SHIPPED | OUTPATIENT
Start: 2019-07-24 | End: 2019-08-28 | Stop reason: SDUPTHER

## 2019-07-24 RX ORDER — PREGABALIN 75 MG/1
75 CAPSULE ORAL 2 TIMES DAILY
Qty: 180 CAPSULE | Refills: 3 | Status: SHIPPED | OUTPATIENT
Start: 2019-07-24 | End: 2019-07-24 | Stop reason: SDUPTHER

## 2019-07-26 ENCOUNTER — TELEPHONE (OUTPATIENT)
Dept: INTERNAL MEDICINE CLINIC | Facility: CLINIC | Age: 59
End: 2019-07-26

## 2019-07-26 ENCOUNTER — OFFICE VISIT (OUTPATIENT)
Dept: INTERNAL MEDICINE CLINIC | Facility: CLINIC | Age: 59
End: 2019-07-26
Payer: MEDICARE

## 2019-07-26 VITALS
SYSTOLIC BLOOD PRESSURE: 120 MMHG | BODY MASS INDEX: 39.86 KG/M2 | TEMPERATURE: 99 F | HEART RATE: 100 BPM | OXYGEN SATURATION: 95 % | DIASTOLIC BLOOD PRESSURE: 90 MMHG | HEIGHT: 63 IN

## 2019-07-26 DIAGNOSIS — R73.03 PREDIABETES: ICD-10-CM

## 2019-07-26 DIAGNOSIS — I87.2 CHRONIC VENOUS INSUFFICIENCY: Primary | ICD-10-CM

## 2019-07-26 DIAGNOSIS — E78.01 FAMILIAL HYPERCHOLESTEROLEMIA: ICD-10-CM

## 2019-07-26 DIAGNOSIS — E66.01 OBESITY, CLASS III, BMI 40-49.9 (MORBID OBESITY) (HCC): ICD-10-CM

## 2019-07-26 PROCEDURE — 99213 OFFICE O/P EST LOW 20 MIN: CPT | Performed by: INTERNAL MEDICINE

## 2019-07-26 NOTE — TELEPHONE ENCOUNTER
Clarita Curtis had a blood pressure today of 120/90 here in the office  Please ask Clarita Curtis to monitor her blood pressure 3 times per day and call with readings early next week  If blood pressure continues to be in the 372 diastolic range, then I would suggest going to urgent care

## 2019-07-26 NOTE — TELEPHONE ENCOUNTER
Pt went to visit a friend today and had her home care nurse take her bp, The patient states her bp was 138/100 and she is very concerned about the bottom number  Advise?

## 2019-07-26 NOTE — PROGRESS NOTES
Assessment/Plan:     Diagnoses and all orders for this visit:    Chronic venous insufficiency    Obesity, Class III, BMI 40-49 9 (morbid obesity) (HCC)    Prediabetes    Familial hypercholesterolemia          Subjective:      Patient ID: Teresa Ignacio is a 64 y o  female who is here for scheduled follow-up visit  We reviewed recent labs showing normal electrolytes on low-dose furosemide, which has improved edema, which is improving resistant to support stockings because Laura Mcdermott has skin irritation wearing knee-high are thigh-high support stockings  We discussed limiting sodium intake to 2 g per day, elevating legs off her feet, continuing low-impact exercise as tolerated  Her plan is to continue work on losing weight with underlying morbid obesity, prediabetes, hyperlipidemia with recent labs reviewed including hemoglobin A1c of 6 2  Will recheck labs at next visit in 6 months  Laura Mcdermott is limited means would like to avoid medication if possible  May need to add statin therapy next visit depending on lipid panel results  Laura Mcdermott continues have less stress in her life as she has moved into a new situation but lives with elderly woman who she has not related to, and in the long normal probably have to find a new place to live and does have family members who are supportive  She is going to continue her water-based exercise to offload her joints, especially back to allow continue physical exercise, calorie restriction and weight reduction part of this plan to reduce joint pain, and is planning on joining and indoor pool once the weather becomes cold      HPI    The following portions of the patient's history were reviewed and updated as appropriate: allergies, current medications, past family history, past medical history, past social history, past surgical history and problem list     Review of Systems      Objective:      /90 (BP Location: Left arm, Patient Position: Sitting, Cuff Size: Standard)   Pulse 100 Temp 99 °F (37 2 °C) (Tympanic)   Ht 5' 3" (1 6 m)   SpO2 95%   BMI 39 86 kg/m²      Wt Readings from Last 3 Encounters:   07/05/19 102 kg (225 lb)   03/14/19 102 kg (225 lb)   01/08/19 102 kg (225 lb)     Temp Readings from Last 3 Encounters:   07/26/19 99 °F (37 2 °C) (Tympanic)   07/05/19 97 7 °F (36 5 °C) (Tympanic)   03/14/19 97 8 °F (36 6 °C) (Oral)     BP Readings from Last 3 Encounters:   07/26/19 120/90   07/11/19 138/90   07/05/19 151/70     Pulse Readings from Last 3 Encounters:   07/26/19 100   07/11/19 78   07/05/19 79        Physical Exam    Vital signs stable, with decreased leg edema as compared to last visit, but with persistent significant lymphedema, some venous insufficiency related edema and varicosities with good skin integrity overall  Thee Urena is in good spirits today    Patient Active Problem List   Diagnosis    Anosmia    Arthritis of lumbar spine    Chronic low back pain    Chronic pain disorder    Chronic venous insufficiency    Cough variant asthma    Depression    GERD (gastroesophageal reflux disease)    Hyperlipidemia    Lumbar postlaminectomy syndrome    Obesity, Class III, BMI 40-49 9 (morbid obesity) ([facility]    Primary osteoarthritis of left hip    Restless legs syndrome    Sleep disturbance    S/P right knee arthroscopy    Environmental allergies    Lumbar spondylosis    Lumbar radiculopathy    Prediabetes    Vitamin D deficiency    Dolan's esophagus without dysplasia    Gastroesophageal reflux disease       Current Outpatient Medications on File Prior to Visit   Medication Sig Dispense Refill    al mag oxide-diphenhydramine-lidocaine viscous (MAGIC MOUTHWASH) 1:1:1 suspension Swish and swallow 10 mL every 4 (four) hours as needed for mouth pain or discomfort (Patient not taking: Reported on 7/5/2019) 180 mL 0    albuterol (2 5 mg/3 mL) 0 083 % nebulizer solution VVN Q 4 H PRN  3    benzonatate (TESSALON) 200 MG capsule Take 1 capsule (200 mg total) by mouth 3 (three) times a day as needed for cough (Patient not taking: Reported on 7/5/2019) 20 capsule 0    cetirizine (ZyrTEC) 10 mg tablet Take 1 tablet by mouth daily        cyclobenzaprine (FLEXERIL) 10 mg tablet Take 1 tablet (10 mg total) by mouth daily at bedtime (Patient not taking: Reported on 7/5/2019) 30 tablet 0    dexlansoprazole (DEXILANT) 60 MG capsule Take 1 capsule (60 mg total) by mouth 2 (two) times a day 60 capsule 0    DULoxetine (CYMBALTA) 60 mg delayed release capsule TAKE 1 CAPSULE(60 MG) BY MOUTH DAILY 90 capsule 5    famotidine (PEPCID) 10 mg tablet Take 10 mg by mouth 2 (two) times a day      furosemide (LASIX) 20 mg tablet TAKE 1 TABLET BY MOUTH DAILY 90 tablet 5    gabapentin (NEURONTIN) 300 mg capsule Take 1 capsule (300 mg total) by mouth 2 (two) times a day 60 capsule 1    montelukast (SINGULAIR) 10 mg tablet Take 10 mg by mouth daily at bedtime      pregabalin (LYRICA) 75 mg capsule Take 1 capsule (75 mg total) by mouth 2 (two) times a day for 90 days 180 capsule 3    rOPINIRole (REQUIP) 2 mg tablet One tablet at bedtime 90 tablet 2    sodium chloride 0 9 % nebulizer solution USE 3 ML VIA NEB 1-2 TIMES D PRN  6    sucralfate (CARAFATE) 1 g/10 mL suspension Take 10 mL (1 g total) by mouth 4 (four) times a day (Patient not taking: Reported on 7/5/2019) 420 mL 0     No current facility-administered medications on file prior to visit

## 2019-08-19 DIAGNOSIS — F32.1 CURRENT MODERATE EPISODE OF MAJOR DEPRESSIVE DISORDER WITHOUT PRIOR EPISODE (HCC): ICD-10-CM

## 2019-08-19 RX ORDER — DULOXETIN HYDROCHLORIDE 60 MG/1
CAPSULE, DELAYED RELEASE ORAL
Qty: 30 CAPSULE | Refills: 0 | Status: SHIPPED | OUTPATIENT
Start: 2019-08-19 | End: 2020-03-30 | Stop reason: SDUPTHER

## 2019-08-23 ENCOUNTER — OFFICE VISIT (OUTPATIENT)
Dept: GASTROENTEROLOGY | Facility: MEDICAL CENTER | Age: 59
End: 2019-08-23
Payer: MEDICARE

## 2019-08-23 VITALS
HEART RATE: 78 BPM | HEIGHT: 63 IN | SYSTOLIC BLOOD PRESSURE: 120 MMHG | DIASTOLIC BLOOD PRESSURE: 80 MMHG | BODY MASS INDEX: 39.86 KG/M2 | TEMPERATURE: 98.7 F

## 2019-08-23 DIAGNOSIS — K21.00 GASTROESOPHAGEAL REFLUX DISEASE WITH ESOPHAGITIS: ICD-10-CM

## 2019-08-23 DIAGNOSIS — K22.70 BARRETT'S ESOPHAGUS WITHOUT DYSPLASIA: Primary | ICD-10-CM

## 2019-08-23 DIAGNOSIS — K21.9 LARYNGOPHARYNGEAL REFLUX (LPR): ICD-10-CM

## 2019-08-23 DIAGNOSIS — K22.70 BARRETT'S ESOPHAGUS WITHOUT DYSPLASIA: ICD-10-CM

## 2019-08-23 PROCEDURE — 99214 OFFICE O/P EST MOD 30 MIN: CPT | Performed by: INTERNAL MEDICINE

## 2019-08-23 RX ORDER — OMEPRAZOLE 40 MG/1
CAPSULE, DELAYED RELEASE ORAL
Qty: 180 CAPSULE | Refills: 0 | Status: SHIPPED | OUTPATIENT
Start: 2019-08-23 | End: 2020-01-06

## 2019-08-23 RX ORDER — OMEPRAZOLE 40 MG/1
40 CAPSULE, DELAYED RELEASE ORAL 2 TIMES DAILY
Qty: 60 CAPSULE | Refills: 0 | Status: SHIPPED | OUTPATIENT
Start: 2019-08-23 | End: 2019-08-23 | Stop reason: SDUPTHER

## 2019-08-23 RX ORDER — FAMOTIDINE 20 MG/1
20 TABLET, FILM COATED ORAL DAILY
Qty: 90 TABLET | Refills: 1 | Status: SHIPPED | OUTPATIENT
Start: 2019-08-23 | End: 2019-11-14 | Stop reason: ALTCHOICE

## 2019-08-23 NOTE — PROGRESS NOTES
Outpatient Follow up  [address] [facility]  Mitesh Jacobs MD  Ph : [phone]  Fax : [phone]  Mobile : [phone]  Email : [email or web address]  Also available on [employer]    Dimitris Delgado 64 y o  female MRN: [id number]    PCP: Jojo Arana MD  Referring: No referring provider defined for this encounter  Dimitris Delgado presented for a follow up visit  My recommendations are included  Please do not hesitate to contact me with any questions you may have  ASSESSMENT AND PLAN:      Laryngopharyngeal reflux (LPR)  Continue treatment as for GERD  GERD (gastroesophageal reflux disease)  Omeprazole 40 mg twice daily and Pepcid 20 mg at night  Continue dietary and lifestyle modifications  May consider Kaycee  after her Dolan's is ablated  Dolan's esophagus without dysplasia  Discussed treatment of Dolan's esophagus  Last treatment was in March 2019  She needs a repeat RFA at this time  Continue PPI as mentioned above  Diagnoses and all orders for this visit:    Dolan's esophagus without dysplasia  -     EGD Barrx/RFA; Future  -     Discontinue: omeprazole (PriLOSEC) 40 MG capsule; Take 1 capsule (40 mg total) by mouth 2 (two) times a day for 30 days  -     famotidine (PEPCID) 20 mg tablet; Take 1 tablet (20 mg total) by mouth daily for 90 days    Gastroesophageal reflux disease with esophagitis    Laryngopharyngeal reflux (LPR)  -     Ambulatory Referral to Otolaryngology; Future    Other orders  -     Diet NPO; Sips with meds; Standing  -     Void on call to OR; Standing  -     Insert peripheral IV; Standing  -     Diet NPO; Sips with meds; Standing  -     Void on call to OR; Standing  -     Insert peripheral IV; Standing      ______________________________________________________________________    SUBJECTIVE:  She presents for follow up Dolan's (4cm) and GERD   Her last EGD was in March 2019 and ablation was performed with a 90 catheter  She is also have symptoms of LPR and has a cough and asthma  She had chest pain after the last endoscopy / RFA  She is on Dexilant and has some break through  She is not taking Pepcid  She has no dysphagia  No nausea, vomiting  She has restless leg syndrome  REVIEW OF SYSTEMS IS OTHERWISE NEGATIVE  Historical Information   Past Medical History:   Diagnosis Date    Anesthesia     "sometimes low BP upon waking up"    Arthritis     Asthma     Back pain     Dolan's esophagus     Chronic pain disorder     Chronic venous insufficiency     Cough variant asthma     Environmental allergies     dust    GERD (gastroesophageal reflux disease)     Hiatal hernia     History of anemia     History of fusion of spine for scoliosis     "as a teenager"    History of transfusion     2001    Hyperlipidemia     Left lumbar radiculitis     Last Assessed: [id number]    Lumbar postlaminectomy syndrome     Migraines     Morbid obesity ([address] )     Motion sickness     Osteoarthritis     of left hip    Right knee pain     Seasonal allergies     Shortness of breath     Umbilical hernia     Uses brace     right knee    Uses crutches     one    Wears glasses      Past Surgical History:   Procedure Laterality Date    ARTHRODESIS      lumbar    ARTHRODESIS      Spinal Arthrodesis for Deformity; Last Assessed: [id number]    BACK SURGERY      lumbar fusion,with nydia/screw and cage implant    BACK SURGERY      surgery for scoliosis    BREAST CYST EXCISION      benign    COLONOSCOPY      COLONOSCOPY N/A 3/14/2019    Procedure: COLONOSCOPY;  Surgeon: Vangie Rankin MD;  [facility]; Service: Gastroenterology    ESOPHAGOGASTRODUODENOSCOPY N/A 3/14/2019    Procedure: ESOPHAGOGASTRODUODENOSCOPY (EGD) W RFA(BARRX); Surgeon: Vangie Rankin MD;  [facility];   Service: Gastroenterology    HERNIA REPAIR      umbilical hernia repair    PLANTAR FASCIA SURGERY Left     CT COLONOSCOPY FLX DX W/COLLJ SPEC WHEN PFRMD N/A 2/20/2018    Procedure: COLONOSCOPY with polypectomy;  Surgeon: William Espitia MD;  [facility]; Service: General    CT ESOPHAGOGASTRODUODENOSCOPY TRANSORAL DIAGNOSTIC N/A 5/24/2017    Procedure: ESOPHAGOGASTRODUODENOSCOPY (EGD); Surgeon: Alex Bojorquez MD;  Location: [facility]; Service: Gastroenterology    CT ESOPHAGOGASTRODUODENOSCOPY TRANSORAL DIAGNOSTIC N/A 8/31/2017    Procedure: ESOPHAGOGASTRODUODENOSCOPY (EGD) W RFA;  Surgeon: Nickie Correia MD;  Location:  GI LAB;   Service: Gastroenterology    CT KNEE SCOPE,MED/LAT MENISECTOMY Right 4/4/2018    Procedure: ARTHROSCOPY KNEE PARTIAL MEDIAL MENISECTOMY , CHONDROPLASTY;  Surgeon: Alhaji Thomas DO;  [facility];  Service: Orthopedics    WISDOM TOOTH EXTRACTION       Social History   Social History     Substance and Sexual Activity   Alcohol Use Yes    Frequency: Monthly or less    Comment: minimal     Social History     Substance and Sexual Activity   Drug Use No     Social History     Tobacco Use   Smoking Status Never Smoker   Smokeless Tobacco Never Used     Family History   Problem Relation Age of Onset    Lung cancer Mother     Cancer Mother     Alcohol abuse Father     Other Father         Cardiac Disorder    Depression Father     Hypertension Father     Heart attack Father     Breast cancer Maternal Grandmother     Diabetes type I Other     Stroke Neg Hx        Meds/Allergies       Current Outpatient Medications:     gabapentin (NEURONTIN) 300 mg capsule    montelukast (SINGULAIR) 10 mg tablet    pregabalin (LYRICA) 75 mg capsule    rOPINIRole (REQUIP) 2 mg tablet    sodium chloride 0 9 % nebulizer solution    al mag oxide-diphenhydramine-lidocaine viscous (MAGIC MOUTHWASH) 1:1:1 suspension    albuterol (2 5 mg/3 mL) 0 083 % nebulizer solution    benzonatate (TESSALON) 200 MG capsule    cetirizine (ZyrTEC) 10 mg tablet    cyclobenzaprine (FLEXERIL) 10 mg tablet    dexlansoprazole (DEXILANT) 60 MG capsule    DULoxetine (CYMBALTA) 60 mg delayed release capsule    DULoxetine (CYMBALTA) 60 mg delayed release capsule    famotidine (PEPCID) 20 mg tablet    furosemide (LASIX) 20 mg tablet    omeprazole (PriLOSEC) 40 MG capsule    sucralfate (CARAFATE) 1 g/10 mL suspension    Allergies   Allergen Reactions    Dust Mite Extract Shortness Of Breath    Latex Itching and Blisters    Other      seasonal    Sulfa Antibiotics Vomiting     Topical-blistering           Objective     Blood pressure 120/80, pulse 78, temperature 98 7 °F (37 1 °C), temperature source Tympanic, height 5' 3" (1 6 m)  Body mass index is 39 86 kg/m²  PHYSICAL EXAM:      Physical Exam   Constitutional: She is oriented to person, place, and time  Vital signs are normal  She appears well-developed and well-nourished  HENT:   Head: Normocephalic and atraumatic  Eyes: Pupils are equal, round, and reactive to light  Conjunctivae are normal  No scleral icterus  Neck: Normal range of motion  Cardiovascular: Normal rate, regular rhythm and normal heart sounds  Lower extremity edema   Pulmonary/Chest: Effort normal and breath sounds normal  No respiratory distress  Abdominal: Soft  Normal appearance and bowel sounds are normal  She exhibits no distension, no ascites and no mass  There is no hepatosplenomegaly  There is no tenderness  No hernia  Musculoskeletal: Normal range of motion  Lymphadenopathy:     She has no cervical adenopathy  Neurological: She is alert and oriented to person, place, and time  Skin: Skin is warm  Psychiatric: She has a normal mood and affect  Her behavior is normal  Thought content normal        Lab Results:   No visits with results within 1 Day(s) from this visit     Latest known visit with results is:   Orders Only on 07/11/2019   Component Date Value    WBC 07/11/2019 8 61     RBC 07/11/2019 4 75     Hemoglobin 07/11/2019 12 4  Hematocrit 07/11/2019 41 3     MCV 07/11/2019 87     MCH 07/11/2019 26 1*    MCHC 07/11/2019 30 0*    RDW 07/11/2019 14 8     MPV 07/11/2019 9 4     Platelets 36/52/9112 337     nRBC 07/11/2019 0     Neutrophils Relative 07/11/2019 63     Immat GRANS % 07/11/2019 1     Lymphocytes Relative 07/11/2019 28     Monocytes Relative 07/11/2019 5     Eosinophils Relative 07/11/2019 2     Basophils Relative 07/11/2019 1     Neutrophils Absolute 07/11/2019 5 55     Immature Grans Absolute 07/11/2019 0 05     Lymphocytes Absolute 07/11/2019 2 37     Monocytes Absolute 07/11/2019 0 45     Eosinophils Absolute 07/11/2019 0 13     Basophils Absolute 07/11/2019 0 06     Sodium 07/11/2019 139     Potassium 07/11/2019 3 6     Chloride 07/11/2019 102     CO2 07/11/2019 28     ANION GAP 07/11/2019 9     BUN 07/11/2019 12     Creatinine 07/11/2019 0 68     Glucose, Fasting 07/11/2019 99     Calcium 07/11/2019 9 5     AST 07/11/2019 16     ALT 07/11/2019 29     Alkaline Phosphatase 07/11/2019 97     Total Protein 07/11/2019 7 5     Albumin 07/11/2019 3 5     Total Bilirubin 07/11/2019 0 24     eGFR 07/11/2019 97     Cholesterol 07/11/2019 233*    Triglycerides 07/11/2019 135     HDL, Direct 07/11/2019 44     LDL Calculated 07/11/2019 162*    Non-HDL-Chol (CHOL-HDL) 07/11/2019 189     Color, UA 07/11/2019 Yellow     Clarity, UA 07/11/2019 Clear     Specific Gravity, UA 07/11/2019 1 010     pH, UA 07/11/2019 6 0     Leukocytes, UA 07/11/2019 Negative     Nitrite, UA 07/11/2019 Negative     Protein, UA 07/11/2019 Negative     Glucose, UA 07/11/2019 Negative     Ketones, UA 07/11/2019 Negative     Urobilinogen, UA 07/11/2019 0 2     Bilirubin, UA 07/11/2019 Negative     Blood, UA 07/11/2019 Negative     Hemoglobin A1C 07/11/2019 6 2     EAG 07/11/2019 131          Radiology Results:   No results found

## 2019-08-23 NOTE — PATIENT INSTRUCTIONS
EGD RFA scheduled on 11/14/2019 with Dr Denise Chawla at the [facility]  Instructions given to patient

## 2019-08-26 NOTE — ASSESSMENT & PLAN NOTE
Omeprazole 40 mg twice daily and Pepcid 20 mg at night  Continue dietary and lifestyle modifications  May consider Priya Wing after her Dolan's is ablated

## 2019-08-26 NOTE — ASSESSMENT & PLAN NOTE
Discussed treatment of Dolan's esophagus  Last treatment was in March 2019  She needs a repeat RFA at this time  Continue PPI as mentioned above

## 2019-08-27 ENCOUNTER — TELEPHONE (OUTPATIENT)
Dept: PAIN MEDICINE | Facility: MEDICAL CENTER | Age: 59
End: 2019-08-27

## 2019-08-27 NOTE — TELEPHONE ENCOUNTER
RN received a call from Gildardo Tomas a pt representative, stating the the pt needs a refill of her lyrica  Per Gildardo Tomas the script has to have an original signature not an electronic signature and it has to be faxed with a cover sheet to [facility] at [phone]  Per Gildardo Tomas they have [place] [facility] last two weeks to fax # [phone] without a response  RN advised that AO has been out of the office for the last two weeks  Informed that will be faxed tomorrow      --please advise thank you--  Script for lyrica 90 day supply 75mg bid to [facility] at [phone]

## 2019-08-28 DIAGNOSIS — M79.2 NEUROPATHIC PAIN: ICD-10-CM

## 2019-08-28 DIAGNOSIS — G89.29 CHRONIC BILATERAL LOW BACK PAIN WITHOUT SCIATICA: ICD-10-CM

## 2019-08-28 DIAGNOSIS — M54.50 CHRONIC BILATERAL LOW BACK PAIN WITHOUT SCIATICA: ICD-10-CM

## 2019-08-28 RX ORDER — PREGABALIN 75 MG/1
75 CAPSULE ORAL 2 TIMES DAILY
Qty: 180 CAPSULE | Refills: 3 | Status: SHIPPED | OUTPATIENT
Start: 2019-08-28 | End: 2019-09-11 | Stop reason: SDUPTHER

## 2019-09-10 NOTE — TELEPHONE ENCOUNTER
RN attempted to reach pt regarding previous  VMMLOM with c/b number office hours and location provided  RN will send to AO who is back in office tomorrow to resend same  ---Please advise thank you--  AO please see previous, script was to have a hand written signature with a cover sheet  See that you stated "complete" but can not see what was faxed  Can this be refaxed with [facility]?

## 2019-09-10 NOTE — TELEPHONE ENCOUNTER
Ky Williamson is a patient representative  She is calling in stating that [address] [place] [address] needs a new script for this patient because the old one has   They are requesting a script for Lyrica 75 mgs, patient takes 2 pills a day  They are asking for a 90 day supple with 1 refill  Please fax script to fax number below  Also this patient is out of pills and has been for a few days  The rep says that this has already taken a month with no success       Pifzer ID: [id number] (if needed)  Fax: [phone]

## 2019-09-11 DIAGNOSIS — G89.29 CHRONIC BILATERAL LOW BACK PAIN WITHOUT SCIATICA: ICD-10-CM

## 2019-09-11 DIAGNOSIS — M54.50 CHRONIC BILATERAL LOW BACK PAIN WITHOUT SCIATICA: ICD-10-CM

## 2019-09-11 DIAGNOSIS — M79.2 NEUROPATHIC PAIN: ICD-10-CM

## 2019-09-11 RX ORDER — PREGABALIN 75 MG/1
75 CAPSULE ORAL 2 TIMES DAILY
Qty: 180 CAPSULE | Refills: 3 | Status: SHIPPED | OUTPATIENT
Start: 2019-09-11 | End: 2020-06-22

## 2019-09-11 NOTE — TELEPHONE ENCOUNTER
S/W Adnre Garza  Advised her of the same  She stated she recently check with [facility] and they did not have the script  [facility] in on the phone call and the women at [facility] stated they do not have the script  She stated they got a fax on 8/26 but it was a request form not a script  They are asking for the script to be faxed to [facility] [phone], pt ID # [id number]  Andre Garza stated the pt is out of Lyrica    Please advise     -Do not see anything under media about the script

## 2019-09-12 ENCOUNTER — OFFICE VISIT (OUTPATIENT)
Dept: INTERNAL MEDICINE CLINIC | Facility: CLINIC | Age: 59
End: 2019-09-12
Payer: MEDICARE

## 2019-09-12 VITALS
HEART RATE: 93 BPM | HEIGHT: 63 IN | TEMPERATURE: 99 F | OXYGEN SATURATION: 96 % | DIASTOLIC BLOOD PRESSURE: 86 MMHG | WEIGHT: 221.8 LBS | SYSTOLIC BLOOD PRESSURE: 130 MMHG | BODY MASS INDEX: 39.3 KG/M2

## 2019-09-12 DIAGNOSIS — L90.5 SCAR TISSUE: Primary | ICD-10-CM

## 2019-09-12 PROCEDURE — 99213 OFFICE O/P EST LOW 20 MIN: CPT | Performed by: INTERNAL MEDICINE

## 2019-09-12 NOTE — PROGRESS NOTES
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  She is moving about without difficulty recently  HPI  We discussed that her history and exam were consistent with normal scar tissue with remote history of hernia repair, and we discussed that scarring tissue is live tissue degree miles itself, and that the recent change in the appearance around the scar may simply be due to changes in life scar tissue  There is no evidence of recurrent hernia, or mass, or other abnormality otherwise  Plan is observation and I did encourage Karin Crawley back for any questions or problems     The following portions of the patient's history were reviewed and updated as appropriate: allergies, current medications, past family history, past medical history, past social history, past surgical history and problem list     Review of Systems      Objective:      /86 (BP Location: Left arm, Patient Position: Sitting, Cuff Size: Standard)   Pulse 93   Temp 99 °F (37 2 °C) (Tympanic)   Ht 5' 3" (1 6 m)   Wt 101 kg (221 lb 12 8 oz)   SpO2 96%   BMI 39 29 kg/m²      Wt Readings from Last 3 Encounters:   09/12/19 101 kg (221 lb 12 8 oz)   07/05/19 102 kg (225 lb)   03/14/19 102 kg (225 lb)     Temp Readings from Last 3 Encounters:   09/12/19 99 °F (37 2 °C) (Tympanic)   08/23/19 98 7 °F (37 1 °C) (Tympanic)   07/26/19 99 °F (37 2 °C) (Tympanic)     BP Readings from Last 3 Encounters:   09/12/19 130/86 08/23/19 120/80   07/26/19 120/90     Pulse Readings from Last 3 Encounters:   09/12/19 93   08/23/19 78   07/26/19 100        Physical Exam    Vital signs stable, in good spirits in no distress  Jennifer Hui got right up from the chair, without pain, and points to the well-healed scar of the left lower quadrant of the abdomen, which is somewhat diagonal, as her area of concern    On palpation there is no mass, no hernia, no tenderness, no increased temperature, and overlying skin is normal   Patient Active Problem List   Diagnosis    Anosmia    Arthritis of lumbar spine    Chronic low back pain    Chronic pain disorder    Chronic venous insufficiency    Cough variant asthma    Depression    Hyperlipidemia    Lumbar postlaminectomy syndrome    Obesity, Class III, BMI 40-49 9 (morbid obesity) ([facility]    Primary osteoarthritis of left hip    Restless legs syndrome    Sleep disturbance    S/P right knee arthroscopy    Environmental allergies    Lumbar spondylosis    Lumbar radiculopathy    Prediabetes    Vitamin D deficiency    Dolan's esophagus without dysplasia    Gastroesophageal reflux disease    Laryngopharyngeal reflux (LPR)       Current Outpatient Medications on File Prior to Visit   Medication Sig Dispense Refill    albuterol (2 5 mg/3 mL) 0 083 % nebulizer solution VVN Q 4 H PRN  3    al mag oxide-diphenhydramine-lidocaine viscous (MAGIC MOUTHWASH) 1:1:1 suspension Swish and swallow 10 mL every 4 (four) hours as needed for mouth pain or discomfort (Patient not taking: Reported on 7/5/2019) 180 mL 0    cetirizine (ZyrTEC) 10 mg tablet Take 1 tablet by mouth daily        cyclobenzaprine (FLEXERIL) 10 mg tablet Take 1 tablet (10 mg total) by mouth daily at bedtime (Patient not taking: Reported on 7/5/2019) 30 tablet 0    dexlansoprazole (DEXILANT) 60 MG capsule Take 1 capsule (60 mg total) by mouth 2 (two) times a day 60 capsule 0    DULoxetine (CYMBALTA) 60 mg delayed release capsule TAKE 1 CAPSULE(60 MG) BY MOUTH DAILY 30 capsule 0    famotidine (PEPCID) 20 mg tablet Take 1 tablet (20 mg total) by mouth daily for 90 days 90 tablet 1    furosemide (LASIX) 20 mg tablet TAKE 1 TABLET BY MOUTH DAILY 90 tablet 5    gabapentin (NEURONTIN) 300 mg capsule Take 1 capsule (300 mg total) by mouth 2 (two) times a day 60 capsule 1    montelukast (SINGULAIR) 10 mg tablet Take 10 mg by mouth daily at bedtime      omeprazole (PriLOSEC) 40 MG capsule TAKE 1 CAPSULE(40 MG) BY MOUTH TWICE DAILY 180 capsule 0    pregabalin (LYRICA) 75 mg capsule Take 1 capsule (75 mg total) by mouth 2 (two) times a day 180 capsule 3    rOPINIRole (REQUIP) 2 mg tablet One tablet at bedtime 90 tablet 2    sodium chloride 0 9 % nebulizer solution USE 3 ML VIA NEB 1-2 TIMES D PRN  6    sucralfate (CARAFATE) 1 g/10 mL suspension Take 10 mL (1 g total) by mouth 4 (four) times a day (Patient not taking: Reported on 7/5/2019) 420 mL 0    [DISCONTINUED] benzonatate (TESSALON) 200 MG capsule Take 1 capsule (200 mg total) by mouth 3 (three) times a day as needed for cough (Patient not taking: Reported on 7/5/2019) 20 capsule 0    [DISCONTINUED] DULoxetine (CYMBALTA) 60 mg delayed release capsule TAKE 1 CAPSULE(60 MG) BY MOUTH DAILY 90 capsule 5     No current facility-administered medications on file prior to visit

## 2019-10-02 DIAGNOSIS — G25.81 RESTLESS LEG SYNDROME: ICD-10-CM

## 2019-10-02 RX ORDER — ROPINIROLE 2 MG/1
TABLET, FILM COATED ORAL
Qty: 90 TABLET | Refills: 2 | Status: SHIPPED | OUTPATIENT
Start: 2019-10-02 | End: 2020-08-26

## 2019-10-09 ENCOUNTER — APPOINTMENT (OUTPATIENT)
Dept: LAB | Facility: HOSPITAL | Age: 59
End: 2019-10-09
Attending: OTOLARYNGOLOGY
Payer: MEDICARE

## 2019-10-09 ENCOUNTER — TRANSCRIBE ORDERS (OUTPATIENT)
Dept: ADMINISTRATIVE | Facility: HOSPITAL | Age: 59
End: 2019-10-09

## 2019-10-09 ENCOUNTER — HOSPITAL ENCOUNTER (OUTPATIENT)
Dept: RADIOLOGY | Facility: HOSPITAL | Age: 59
Discharge: HOME | End: 2019-10-09
Attending: OTOLARYNGOLOGY
Payer: MEDICARE

## 2019-10-09 DIAGNOSIS — K90.41 GLUTEN INTOLERANCE: ICD-10-CM

## 2019-10-09 DIAGNOSIS — K21.9 GASTROESOPHAGEAL REFLUX DISEASE WITHOUT ESOPHAGITIS: ICD-10-CM

## 2019-10-09 DIAGNOSIS — R10.13 DYSPEPSIA: ICD-10-CM

## 2019-10-09 DIAGNOSIS — K21.9 GASTROESOPHAGEAL REFLUX DISEASE WITHOUT ESOPHAGITIS: Primary | ICD-10-CM

## 2019-10-09 DIAGNOSIS — K90.49 FOOD INTOLERANCE: ICD-10-CM

## 2019-10-09 DIAGNOSIS — R05.3 CHRONIC COUGH: ICD-10-CM

## 2019-10-09 DIAGNOSIS — R05.9 COUGH: ICD-10-CM

## 2019-10-09 LAB
T4 FREE SERPL-MCNC: 0.99 NG/DL (ref 0.76–1.46)
TSH SERPL DL<=0.05 MIU/L-ACNC: 1.61 UIU/ML (ref 0.36–3.74)

## 2019-10-09 PROCEDURE — 86618 LYME DISEASE ANTIBODY: CPT

## 2019-10-09 PROCEDURE — 84439 ASSAY OF FREE THYROXINE: CPT

## 2019-10-09 PROCEDURE — 36415 COLL VENOUS BLD VENIPUNCTURE: CPT

## 2019-10-09 PROCEDURE — 86038 ANTINUCLEAR ANTIBODIES: CPT

## 2019-10-09 PROCEDURE — 84443 ASSAY THYROID STIM HORMONE: CPT

## 2019-10-09 PROCEDURE — 86738 MYCOPLASMA ANTIBODY: CPT

## 2019-10-09 PROCEDURE — 71046 X-RAY EXAM CHEST 2 VIEWS: CPT

## 2019-10-09 PROCEDURE — 83516 IMMUNOASSAY NONANTIBODY: CPT

## 2019-10-09 PROCEDURE — 86430 RHEUMATOID FACTOR TEST QUAL: CPT

## 2019-10-10 LAB
B BURGDOR IGG+IGM SER-ACNC: <0.91 ISR (ref 0–0.9)
RHEUMATOID FACT SER QL LA: NEGATIVE

## 2019-10-11 ENCOUNTER — TELEPHONE (OUTPATIENT)
Dept: GASTROENTEROLOGY | Facility: AMBULARY SURGERY CENTER | Age: 59
End: 2019-10-11

## 2019-10-11 DIAGNOSIS — K42.9 UMBILICAL HERNIA WITHOUT OBSTRUCTION AND WITHOUT GANGRENE: Primary | ICD-10-CM

## 2019-10-11 LAB
M PNEUMO IGG SER IA-ACNC: <100 U/ML (ref 0–99)
M PNEUMO IGM SER IA-ACNC: <770 U/ML (ref 0–769)
RYE IGE QN: NEGATIVE

## 2019-10-11 NOTE — TELEPHONE ENCOUNTER
I spoke with Aubree Born over the phone  She would like general surgery referral for previous umbilical hernia  Please provide with number to Dr Arland Dandy office   Thank you

## 2019-10-11 NOTE — TELEPHONE ENCOUNTER
Patients GI provider:  Dr Caroline Callejas    Number to return call: ( [phone]      Reason for call: Pt calling because she had prev hernia surg and has a question abput upcoming egd    Scheduled procedure/appointment date if applicable: Apt/procedure  11-14-19

## 2019-10-18 LAB — MISCELLANEOUS LAB TEST RESULT: NORMAL

## 2019-11-03 PROBLEM — J38.01 VOCAL FOLD PARESIS, RIGHT: Status: ACTIVE | Noted: 2019-11-03

## 2019-11-03 PROBLEM — K22.719 BARRETT'S ESOPHAGUS WITH DYSPLASIA: Status: ACTIVE | Noted: 2019-02-12

## 2019-11-03 PROBLEM — J30.89 ALLERGIC RHINITIS DUE TO AMERICAN HOUSE DUST MITE: Status: ACTIVE | Noted: 2019-11-03

## 2019-11-03 PROBLEM — R49.0 MUSCLE TENSION DYSPHONIA: Status: ACTIVE | Noted: 2019-11-03

## 2019-11-03 PROBLEM — R05.3 CHRONIC COUGH: Status: ACTIVE | Noted: 2019-11-03

## 2019-11-03 PROBLEM — J45.20 MILD INTERMITTENT ASTHMA WITHOUT COMPLICATION: Status: ACTIVE | Noted: 2019-11-03

## 2019-11-03 PROBLEM — J38.4 LARYNGEAL EDEMA: Status: ACTIVE | Noted: 2019-11-03

## 2019-11-03 PROBLEM — R49.0 DYSPHONIA: Status: ACTIVE | Noted: 2019-11-03

## 2019-11-03 PROBLEM — K21.9 REFLUX LARYNGITIS: Status: ACTIVE | Noted: 2019-11-03

## 2019-11-03 PROBLEM — J04.0 REFLUX LARYNGITIS: Status: ACTIVE | Noted: 2019-11-03

## 2019-11-03 PROBLEM — J38.3 GLOTTIC INSUFFICIENCY: Status: ACTIVE | Noted: 2019-11-03

## 2019-11-13 ENCOUNTER — TELEPHONE (OUTPATIENT)
Dept: GASTROENTEROLOGY | Facility: HOSPITAL | Age: 59
End: 2019-11-13

## 2019-11-14 ENCOUNTER — ANESTHESIA (OUTPATIENT)
Dept: GASTROENTEROLOGY | Facility: HOSPITAL | Age: 59
End: 2019-11-14

## 2019-11-14 ENCOUNTER — HOSPITAL ENCOUNTER (OUTPATIENT)
Dept: GASTROENTEROLOGY | Facility: HOSPITAL | Age: 59
Setting detail: OUTPATIENT SURGERY
Discharge: HOME | End: 2019-11-14
Attending: INTERNAL MEDICINE | Admitting: INTERNAL MEDICINE
Payer: MEDICARE

## 2019-11-14 ENCOUNTER — ANESTHESIA EVENT (OUTPATIENT)
Dept: GASTROENTEROLOGY | Facility: HOSPITAL | Age: 59
End: 2019-11-14

## 2019-11-14 VITALS
WEIGHT: 215 LBS | HEIGHT: 63 IN | TEMPERATURE: 97.4 F | BODY MASS INDEX: 38.09 KG/M2 | HEART RATE: 89 BPM | OXYGEN SATURATION: 98 % | DIASTOLIC BLOOD PRESSURE: 59 MMHG | RESPIRATION RATE: 18 BRPM | SYSTOLIC BLOOD PRESSURE: 101 MMHG

## 2019-11-14 DIAGNOSIS — K22.70 BARRETT'S ESOPHAGUS WITHOUT DYSPLASIA: ICD-10-CM

## 2019-11-14 DIAGNOSIS — K22.719 BARRETT'S ESOPHAGUS WITH DYSPLASIA: Primary | ICD-10-CM

## 2019-11-14 PROCEDURE — 43270 EGD LESION ABLATION: CPT | Performed by: INTERNAL MEDICINE

## 2019-11-14 RX ORDER — SODIUM CHLORIDE 9 MG/ML
50 INJECTION, SOLUTION INTRAVENOUS CONTINUOUS
Status: DISCONTINUED | OUTPATIENT
Start: 2019-11-14 | End: 2019-11-18 | Stop reason: HOSPADM

## 2019-11-14 RX ORDER — FENTANYL CITRATE 50 UG/ML
INJECTION, SOLUTION INTRAMUSCULAR; INTRAVENOUS AS NEEDED
Status: DISCONTINUED | OUTPATIENT
Start: 2019-11-14 | End: 2019-11-14 | Stop reason: SURG

## 2019-11-14 RX ORDER — SUCRALFATE ORAL 1 G/10ML
1 SUSPENSION ORAL 4 TIMES DAILY
Qty: 420 ML | Refills: 0 | Status: SHIPPED | OUTPATIENT
Start: 2019-11-14 | End: 2020-01-28

## 2019-11-14 RX ORDER — PROPOFOL 10 MG/ML
INJECTION, EMULSION INTRAVENOUS AS NEEDED
Status: DISCONTINUED | OUTPATIENT
Start: 2019-11-14 | End: 2019-11-14 | Stop reason: SURG

## 2019-11-14 RX ORDER — OXYCODONE AND ACETAMINOPHEN 2.5; 325 MG/1; MG/1
1 TABLET ORAL EVERY 8 HOURS PRN
Qty: 4 TABLET | Refills: 0 | Status: SHIPPED | OUTPATIENT
Start: 2019-11-14 | End: 2020-01-28

## 2019-11-14 RX ORDER — ACETYLCYSTEINE 200 MG/ML
SOLUTION ORAL; RESPIRATORY (INHALATION) CODE/TRAUMA/SEDATION MEDICATION
Status: COMPLETED | OUTPATIENT
Start: 2019-11-14 | End: 2019-11-14

## 2019-11-14 RX ADMIN — PROPOFOL 50 MG: 10 INJECTION, EMULSION INTRAVENOUS at 15:04

## 2019-11-14 RX ADMIN — PROPOFOL 50 MG: 10 INJECTION, EMULSION INTRAVENOUS at 14:49

## 2019-11-14 RX ADMIN — FENTANYL CITRATE 50 MCG: 50 INJECTION, SOLUTION INTRAMUSCULAR; INTRAVENOUS at 14:47

## 2019-11-14 RX ADMIN — SODIUM CHLORIDE 50 ML/HR: 0.9 INJECTION, SOLUTION INTRAVENOUS at 13:48

## 2019-11-14 RX ADMIN — PROPOFOL 50 MG: 10 INJECTION, EMULSION INTRAVENOUS at 14:56

## 2019-11-14 RX ADMIN — FENTANYL CITRATE 50 MCG: 50 INJECTION, SOLUTION INTRAMUSCULAR; INTRAVENOUS at 14:57

## 2019-11-14 RX ADMIN — PROPOFOL 50 MG: 10 INJECTION, EMULSION INTRAVENOUS at 14:52

## 2019-11-14 RX ADMIN — SODIUM CHLORIDE: 0.9 INJECTION, SOLUTION INTRAVENOUS at 14:42

## 2019-11-14 RX ADMIN — PROPOFOL 50 MG: 10 INJECTION, EMULSION INTRAVENOUS at 14:54

## 2019-11-14 RX ADMIN — ACETYLCYSTEINE 600 MG: 200 SOLUTION ORAL; RESPIRATORY (INHALATION) at 14:56

## 2019-11-14 RX ADMIN — PROPOFOL 50 MG: 10 INJECTION, EMULSION INTRAVENOUS at 15:01

## 2019-11-14 RX ADMIN — PROPOFOL 50 MG: 10 INJECTION, EMULSION INTRAVENOUS at 15:08

## 2019-11-14 RX ADMIN — PROPOFOL 50 MG: 10 INJECTION, EMULSION INTRAVENOUS at 15:06

## 2019-11-14 RX ADMIN — PROPOFOL 100 MG: 10 INJECTION, EMULSION INTRAVENOUS at 14:47

## 2019-11-14 NOTE — H&P
History and Physical - [facility]  Cruzito Sheridan 64 y o  female MRN: [id number]    HPI: Cruzito Sheridan is a 64y o  year old female who presents for  EGD with RFA for Dolan's esophagus  Patient presenting with history of GERD on omeprazole 40 mg b i d  and Pepcid 20 mg at night  Patient to undergo EGD Barrx/RFA for Dolan's esophagus without dysplasia  REVIEW OF SYSTEMS: Per the HPI, and otherwise unremarkable  Historical Information   Past Medical History:   Diagnosis Date    Anesthesia     "sometimes low BP upon waking up"    Arthritis     Asthma     Back pain     Dolan's esophagus     Chronic pain disorder     Chronic venous insufficiency     Cough variant asthma     Environmental allergies     dust    GERD (gastroesophageal reflux disease)     Hiatal hernia     History of anemia     History of fusion of spine for scoliosis     "as a teenager"    History of transfusion     2001    Hyperlipidemia     Left lumbar radiculitis     Last Assessed: [id number]    Lumbar postlaminectomy syndrome     Migraines     Morbid obesity ([address] )     Motion sickness     Osteoarthritis     of left hip    Right knee pain     Seasonal allergies     Shortness of breath     Umbilical hernia     Uses brace     right knee    Uses crutches     one    Wears glasses      Past Surgical History:   Procedure Laterality Date    ARTHRODESIS      lumbar    ARTHRODESIS      Spinal Arthrodesis for Deformity; Last Assessed: [id number]    BACK SURGERY      lumbar fusion,with nydia/screw and cage implant    BACK SURGERY      surgery for scoliosis    BREAST CYST EXCISION      benign    COLONOSCOPY      COLONOSCOPY N/A 3/14/2019    Procedure: COLONOSCOPY;  Surgeon: My Christian MD;  [facility]; Service: Gastroenterology    ESOPHAGOGASTRODUODENOSCOPY N/A 3/14/2019    Procedure: ESOPHAGOGASTRODUODENOSCOPY (EGD) W RFA(BARRX);   Surgeon: My Christian MD;  [facility]; Service: Gastroenterology    HERNIA REPAIR      umbilical hernia repair    PLANTAR FASCIA SURGERY Left     DE COLONOSCOPY FLX DX W/COLLJ SPEC WHEN PFRMD N/A 2/20/2018    Procedure: COLONOSCOPY with polypectomy;  Surgeon: Estefania Thurman MD;  [facility]; Service: General    DE ESOPHAGOGASTRODUODENOSCOPY TRANSORAL DIAGNOSTIC N/A 5/24/2017    Procedure: ESOPHAGOGASTRODUODENOSCOPY (EGD); Surgeon: Bk Tejada MD;  Location: [facility]; Service: Gastroenterology    DE ESOPHAGOGASTRODUODENOSCOPY TRANSORAL DIAGNOSTIC N/A 8/31/2017    Procedure: ESOPHAGOGASTRODUODENOSCOPY (EGD) W RFA;  Surgeon: Yenny Medel MD;  Location:  GI LAB;   Service: Gastroenterology    DE KNEE SCOPE,MED/LAT MENISECTOMY Right 4/4/2018    Procedure: ARTHROSCOPY KNEE PARTIAL MEDIAL MENISECTOMY , CHONDROPLASTY;  Surgeon: Jeannette Painter DO;  [facility];  Service: Orthopedics    WISDOM TOOTH EXTRACTION       Social History   Social History     Substance and Sexual Activity   Alcohol Use Yes    Frequency: Monthly or less    Comment: minimal     Social History     Substance and Sexual Activity   Drug Use No     Social History     Tobacco Use   Smoking Status Never Smoker   Smokeless Tobacco Never Used     Family History   Problem Relation Age of Onset    Lung cancer Mother     Cancer Mother     Alcohol abuse Father     Other Father         Cardiac Disorder    Depression Father     Hypertension Father     Heart attack Father     Breast cancer Maternal Grandmother     Diabetes type I Other     Stroke Neg Hx        Meds/Allergies       (Not in a hospital admission)    Allergies   Allergen Reactions    Dust Mite Extract Shortness Of Breath    Latex Itching and Blisters    Other      seasonal    Sulfa Antibiotics Vomiting     Topical-blistering       Objective     /58   Pulse 71   Temp (!) 97 4 °F (36 3 °C) (Tympanic)   Resp 18   Ht 5' 3" (1 6 m)   Wt 97 5 kg (215 lb)   SpO2 98%   BMI 38 09 kg/m² PHYSICAL EXAM    Gen: NAD  CV: RRR  CHEST: Clear  ABD: soft, NT/ND  EXT: no edema      ASSESSMENT/PLAN:  This is a 64y o  year old female here for EGD for Barrx/RFA of Dloan's esophagus, and she is stable and optimized for her procedure

## 2019-11-14 NOTE — ANESTHESIA PREPROCEDURE EVALUATION
Review of Systems/Medical History  Patient summary reviewed  Chart reviewed  No history of anesthetic complications     Cardiovascular  Negative cardio ROS Hyperlipidemia,    Pulmonary  Negative pulmonary ROS Asthma ,   Comment: H/o R vocal fold paresis     GI/Hepatic  Negative GI/hepatic ROS   GERD ,  Hiatal hernia,        Negative  ROS        Endo/Other  Negative endo/other ROS   Obesity    GYN  Negative gynecology ROS          Hematology  Negative hematology ROS      Musculoskeletal  Negative musculoskeletal ROS Back pain , lumbar pain,   Arthritis     Neurology  Negative neurology ROS   Headaches,    Psychology   Negative psychology ROS Depression ,          EKG 3/26/19: NSR, normal ECG    Recent Results (from the past 1008 hour(s))   T4, free    Collection Time: 10/09/19 12:06 PM   Result Value Ref Range    Free T4 0 99 0 76 - 1 46 ng/dL   TSH, 3rd generation    Collection Time: 10/09/19 12:06 PM   Result Value Ref Range    TSH 3RD GENERATON 1 614 0 358 - 3 740 uIU/mL   PRESLEY Screen w/ Reflex to Titer/Pattern    Collection Time: 10/09/19 12:06 PM   Result Value Ref Range    PRESLEY Negative Negative   RF Screen w/ Reflex to Titer    Collection Time: 10/09/19 12:06 PM   Result Value Ref Range    Rheumatoid Factor Negative Negative   Lyme Antibody Profile with reflex to WB    Collection Time: 10/09/19 12:06 PM   Result Value Ref Range    Lyme IgG/IgM Ab <0 91 0 00 - 0 90 ISR   Mycoplasma Pneumoniae AB, IgG/IgM    Collection Time: 10/09/19 12:06 PM   Result Value Ref Range    Mycoplasma pneumo IgG <100 0 - 99 U/mL    Mycoplasma pneumo IgM <770 0 - 769 U/mL   MISCELLANEOUS LAB TEST    Collection Time: 10/09/19 12:06 PM   Result Value Ref Range    Miscellaneous Lab Test Result see written report        Physical Exam    Airway    Mallampati score: II  TM Distance: >3 FB  Neck ROM: full     Dental   No notable dental hx     Cardiovascular  Comment: Negative ROS, Rhythm: regular, Rate: normal, No murmur, Pulmonary  Breath sounds clear to auscultation,     Other Findings        Anesthesia Plan  ASA Score- 3     Anesthesia Type- IV sedation with anesthesia with ASA Monitors  Additional Monitors:   Airway Plan:         Plan Factors-    Induction-     Postoperative Plan-     Informed Consent- Anesthetic plan and risks discussed with patient  I personally reviewed this patient with the CRNA  Discussed and agreed on the Anesthesia Plan with the CRNA  Abilio Rivera

## 2019-12-03 PROBLEM — R05.8 UPPER AIRWAY COUGH SYNDROME: Status: ACTIVE | Noted: 2019-12-03

## 2020-01-04 DIAGNOSIS — K22.70 BARRETT'S ESOPHAGUS WITHOUT DYSPLASIA: ICD-10-CM

## 2020-01-06 RX ORDER — OMEPRAZOLE 40 MG/1
CAPSULE, DELAYED RELEASE ORAL
Qty: 180 CAPSULE | Refills: 0 | Status: SHIPPED | OUTPATIENT
Start: 2020-01-06 | End: 2020-04-08

## 2020-01-20 ENCOUNTER — TELEPHONE (OUTPATIENT)
Dept: PAIN MEDICINE | Facility: MEDICAL CENTER | Age: 60
End: 2020-01-20

## 2020-01-20 NOTE — TELEPHONE ENCOUNTER
Pt called stating in a lot of pain  She has appt with Gaurang Martinez 3 weeks out and would like to know if he ector order a MRI for her before her appt on 2/11/20       Pt can be reached at [phone]

## 2020-01-21 NOTE — TELEPHONE ENCOUNTER
I haven't seen her in over a year  I can't prescribe anything until she's evaluated  Can put her on with Harry Arora tomorrow if she prefers

## 2020-01-21 NOTE — TELEPHONE ENCOUNTER
RN s/w pt regarding previous  Pt aware not able to have MRI ordered prior to being seen in the office, and then only if appropriate  Per pt she is now seeing Terence Whitlock on 1/28  Per pt the pain she has been having is debilitating  Per pt the pain is in neck and bilat shoulders greater on left  Trouble turning head  Pt has been taking aleve/ibuprofen and using heat  Per pt tylenol does not work for her and she doesn't use her Lyrica any longer  Per pt she does not have any muscle relaxer's at present and they usually make her very tired during the day, she can use them at HS  RN advised that she will send this to Terence Whitlock and call back with his suggestions and recs for same      --please advise thank you--  Anything that can be ordered until pt is seen in office on 1/28 with SHARMILA?

## 2020-01-21 NOTE — TELEPHONE ENCOUNTER
--BRENT--    RN s/w pt regarding previous  Per pt she has several things she wants to speak with Socorro Last about so she will keep the appt for 1/28  Pt appreciative of call back

## 2020-01-21 NOTE — TELEPHONE ENCOUNTER
Patient called & left message on S/P voicemail returning nurses call         Patient s contact # [phone]

## 2020-01-28 ENCOUNTER — OFFICE VISIT (OUTPATIENT)
Dept: PAIN MEDICINE | Facility: MEDICAL CENTER | Age: 60
End: 2020-01-28
Payer: MEDICARE

## 2020-01-28 VITALS
SYSTOLIC BLOOD PRESSURE: 128 MMHG | HEART RATE: 88 BPM | DIASTOLIC BLOOD PRESSURE: 80 MMHG | RESPIRATION RATE: 14 BRPM | BODY MASS INDEX: 38.09 KG/M2 | HEIGHT: 63 IN

## 2020-01-28 DIAGNOSIS — M54.2 NECK PAIN: Primary | ICD-10-CM

## 2020-01-28 DIAGNOSIS — M47.812 CERVICAL SPONDYLOSIS: ICD-10-CM

## 2020-01-28 DIAGNOSIS — R29.898 COMPLAINTS OF WEAKNESS OF LOWER EXTREMITY: ICD-10-CM

## 2020-01-28 PROCEDURE — 99214 OFFICE O/P EST MOD 30 MIN: CPT | Performed by: PHYSICAL MEDICINE & REHABILITATION

## 2020-01-28 NOTE — PROGRESS NOTES
Assessment  1  Neck pain    2  Cervical spondylosis    3  Complaints of weakness of lower extremity        Plan  1  We will schedule the patient for right C4-6 medial branch nerve blocks with intention of moving forward towards radiofrequency ablation if there is an appropriate diagnostic response  The initial blocks will be performed with 2% lidocaine and if an appropriate response is obtained upon review of the patient's pain diary, a confirmatory block will be scheduled with 0 5% bupivacaine  In the office today, we reviewed the nature of facet joint pathology in depth using a spine model  We discussed the approach we would use for the injections and provided literature for home review  The patient understands the risks associated with the procedure including bleeding, infection, tissue injury, and allergic reaction and provided verbal informed consent in the office today  2  Restart Lyrica  3  Obtain bilateral lower extremity EMG based on subjective complaints of lower extremity weakness  4  If she continues to experience weakness will refer for formal neurologic evaluation     My impressions and treatment recommendations were discussed in detail with the patient who verbalized understanding and had no further questions  Discharge instructions were provided  I personally saw and examined the patient and I agree with the above discussed plan of care  Orders Placed This Encounter   Procedures    FL spine and pain procedure     Standing Status:   Future     Standing Expiration Date:   1/28/2021     Order Specific Question:   Reason for Exam:     Answer:   (R) C4-6 MBB#1     Order Specific Question:   Anticoagulant hold needed? Answer:   no    EMG 2 limb lower extremity     Standing Status:   Future     Standing Expiration Date:   1/28/2021     Order Specific Question:   Possible Diagnosis:      Answer:   lumbar radiculopathy     Comments:   weakness     No orders of the defined types were placed in this encounter  History of Present Illness    Calixto Ellis is a 64 y o  female presents in follow-up related to chronic neck pain complaints and bilateral lower extremity weakness  She is also having some lower back pain which seems facetogenic on the left  She is having worsening pain in the neck rated as a 7/10  This is constant and described as dull, aching, sharp, throbbing, shooting, and a bee sting sensation  She denies any radicular features in the upper extremities  She is complaining of bilateral lower extremity subjective weakness with difficulty going up or down stairs  She also has trouble with sleeping  I have personally reviewed and/or updated the patient's past medical history, past surgical history, family history, social history, current medications, allergies, and vital signs today  Review of Systems   Respiratory: Negative for shortness of breath  Cardiovascular: Negative for chest pain  Gastrointestinal: Negative for constipation, diarrhea, nausea and vomiting  Musculoskeletal: Positive for joint swelling, myalgias and neck pain (posterior B/L to the shoulders)  Negative for arthralgias and gait problem  Skin: Negative for rash  Neurological: Negative for dizziness, seizures and weakness  All other systems reviewed and are negative        Patient Active Problem List   Diagnosis    Anosmia    Arthritis of lumbar spine    Chronic low back pain    Chronic pain disorder    Chronic venous insufficiency    Cough variant asthma    Depression    Hyperlipidemia    Lumbar postlaminectomy syndrome    Obesity, Class III, BMI 40-49 9 (morbid obesity) ([facility] [address]    Primary osteoarthritis of left hip    Restless legs syndrome    Sleep disturbance    S/P right knee arthroscopy    Environmental allergies    Lumbar spondylosis    Lumbar radiculopathy    Prediabetes    Vitamin D deficiency    Dolan's esophagus with dysplasia    Gastroesophageal reflux disease  Laryngopharyngeal reflux (LPR)    Chronic cough    Vocal fold paresis, right    Dysphonia    Muscle tension dysphonia    Glottic insufficiency    Reflux laryngitis    Laryngeal edema    Allergic rhinitis due to [employer] house dust mite    Mild intermittent asthma without complication    Upper airway cough syndrome       Past Medical History:   Diagnosis Date    Anesthesia     "sometimes low BP upon waking up"    Arthritis     Asthma     Back pain     Dolan's esophagus     Chronic pain disorder     Chronic venous insufficiency     Cough variant asthma     Environmental allergies     dust    GERD (gastroesophageal reflux disease)     Hiatal hernia     History of anemia     History of fusion of spine for scoliosis     "as a teenager"    History of transfusion     2001    Hyperlipidemia     Left lumbar radiculitis     Last Assessed: [id number]    Lumbar postlaminectomy syndrome     Migraines     Morbid obesity ([facility] [address] [facility]     Motion sickness     Osteoarthritis     of left hip    Right knee pain     Seasonal allergies     Shortness of breath     Umbilical hernia     Uses brace     right knee    Uses crutches     one    Wears glasses        Past Surgical History:   Procedure Laterality Date    ARTHRODESIS      lumbar    ARTHRODESIS      Spinal Arthrodesis for Deformity; Last Assessed: [id number]    BACK SURGERY      lumbar fusion,with nydia/screw and cage implant    BACK SURGERY      surgery for scoliosis    BREAST CYST EXCISION      benign    COLONOSCOPY      COLONOSCOPY N/A 3/14/2019    Procedure: COLONOSCOPY;  Surgeon: Sallye Olszewski, MD;  [facility]; Service: Gastroenterology    ESOPHAGOGASTRODUODENOSCOPY N/A 3/14/2019    Procedure: ESOPHAGOGASTRODUODENOSCOPY (EGD) W RFA(BARRX); Surgeon: Sallye Olszewski, MD;  [facility];   Service: Gastroenterology    HERNIA REPAIR      umbilical hernia repair    PLANTAR FASCIA SURGERY Left     DC COLONOSCOPY FLX DX W/COLLJ SPEC WHEN PFRMD N/A 2/20/2018    Procedure: COLONOSCOPY with polypectomy;  Surgeon: Bahman Nicholson MD;  [facility]; Service: General    NV ESOPHAGOGASTRODUODENOSCOPY TRANSORAL DIAGNOSTIC N/A 5/24/2017    Procedure: ESOPHAGOGASTRODUODENOSCOPY (EGD); Surgeon: Caroline Horton MD;  [facility]; Service: Gastroenterology    NV ESOPHAGOGASTRODUODENOSCOPY TRANSORAL DIAGNOSTIC N/A 8/31/2017    Procedure: ESOPHAGOGASTRODUODENOSCOPY (EGD) W RFA;  Surgeon: Emily Cuevas MD;  Location:  GI LAB;   Service: Gastroenterology    NV KNEE SCOPE,MED/LAT MENISECTOMY Right 4/4/2018    Procedure: ARTHROSCOPY KNEE PARTIAL MEDIAL MENISECTOMY , CHONDROPLASTY;  Surgeon: Gustavo Dolan DO;  [facility];  Service: Orthopedics    WISDOM TOOTH EXTRACTION         Family History   Problem Relation Age of Onset    Lung cancer Mother     Cancer Mother     Alcohol abuse Father     Other Father         Cardiac Disorder    Depression Father     Hypertension Father     Heart attack Father     Breast cancer Maternal Grandmother     Diabetes type I Other     Stroke Neg Hx        Social History     Occupational History    Not on file   Tobacco Use    Smoking status: Never Smoker    Smokeless tobacco: Never Used   Substance and Sexual Activity    Alcohol use: Yes     Frequency: Monthly or less     Comment: minimal    Drug use: No    Sexual activity: Not Currently     Partners: Male       Current Outpatient Medications on File Prior to Visit   Medication Sig    dexlansoprazole (DEXILANT) 60 MG capsule Take 1 capsule (60 mg total) by mouth 2 (two) times a day    DULoxetine (CYMBALTA) 60 mg delayed release capsule TAKE 1 CAPSULE(60 MG) BY MOUTH DAILY    furosemide (LASIX) 20 mg tablet TAKE 1 TABLET BY MOUTH DAILY    omeprazole (PriLOSEC) 40 MG capsule TAKE ONE CAPSULE BY MOUTH TWICE DAILY    pregabalin (LYRICA) 75 mg capsule Take 1 capsule (75 mg total) by mouth 2 (two) times a day    rOPINIRole (REQUIP) 2 mg tablet One tablet at bedtime    al mag oxide-diphenhydramine-lidocaine viscous (MAGIC MOUTHWASH) 1:1:1 suspension Swish and swallow 10 mL every 4 (four) hours as needed for mouth pain or discomfort (Patient not taking: Reported on 7/5/2019)    albuterol (2 5 mg/3 mL) 0 083 % nebulizer solution VVN Q 4 H PRN    azelastine (ASTELIN) 0 1 % nasal spray 1 spray into each nostril 2 (two) times a day Use in each nostril as directed (Patient not taking: Reported on 1/28/2020)    cetirizine (ZyrTEC) 10 mg tablet Take 1 tablet by mouth daily      montelukast (SINGULAIR) 10 mg tablet Take 10 mg by mouth daily at bedtime    sodium chloride 0 9 % nebulizer solution USE 3 ML VIA NEB 1-2 TIMES D PRN    sucralfate (CARAFATE) 1 g/10 mL suspension Take 10 mL (1 g total) by mouth 4 (four) times a day (Patient not taking: Reported on 7/5/2019)    [DISCONTINUED] oxyCODONE-acetaminophen (PERCOCET) 2 5-325 MG per tablet Take 1 tablet by mouth every 8 (eight) hours as needed for moderate pain for up to 4 dosesMax Daily Amount: 3 tablets (Patient not taking: Reported on 1/28/2020)    [DISCONTINUED] sucralfate (CARAFATE) 1 g/10 mL suspension Take 10 mL (1 g total) by mouth 4 (four) times a day     No current facility-administered medications on file prior to visit  Allergies   Allergen Reactions    Dust Mite Extract Shortness Of Breath    Latex Itching and Blisters    Other      seasonal    Sulfa Antibiotics Vomiting     Topical-blistering       Physical Exam    /80   Pulse 88   Resp 14   Ht 5' 3" (1 6 m)   BMI 38 09 kg/m²     CERVICAL  General: Well-developed, well-nourished individual in no acute distress  Mental: Appropriate mood and affect  Grossly oriented with coherent speech and thought processing   Neuro:  Cranial nerves: Cranial nerve function is grossly intact bilaterally   Strength: Bilateral upper extremity strength is normal and symmetric    No atrophy or tone abnormalities noted    Reflexes: Bilateral upper extremity muscle stretch reflexes are physiologic and symmetric   No Perez sign   Sensation: No loss of sensation is noted   Foraminal Compression Maneuvers:  Spurling sign is absent   Gait:  Gait/gross motor: Gait is normal  Station is normal      Musculoskeletal:  Palpation: Inspection and palpation of the spine and extremities are unremarkable except for tenderness to palpation along the lower cervical facet joints  Spine: Normal pain-free range of motion except for end range cervical extension, rotation bilaterally right worse than left and side-bending bilaterally  No gross axial skeletal deformities   Skin: Skin inspection grossly negative for erythema, breakdown, or concerning lesions in affected area   Lymph: No lymphadenopathy is appreciated in the involved extremity   Vessels: No lower extremity edema   Lungs: Breathing is comfortable and regular  No dyspnea noted during examination   Eyes: Visual field grossly intact to confrontation  No redness appreciated  ENT: No craniofacial deformities or asymmetry  No neck masses appreciated            Imaging

## 2020-02-04 ENCOUNTER — HOSPITAL ENCOUNTER (OUTPATIENT)
Dept: RADIOLOGY | Facility: MEDICAL CENTER | Age: 60
Discharge: HOME | End: 2020-02-04
Attending: PHYSICAL MEDICINE & REHABILITATION | Admitting: PHYSICAL MEDICINE & REHABILITATION
Payer: MEDICARE

## 2020-02-04 VITALS
OXYGEN SATURATION: 97 % | TEMPERATURE: 98.4 F | DIASTOLIC BLOOD PRESSURE: 76 MMHG | SYSTOLIC BLOOD PRESSURE: 124 MMHG | RESPIRATION RATE: 18 BRPM | HEART RATE: 81 BPM

## 2020-02-04 DIAGNOSIS — M47.812 CERVICAL SPONDYLOSIS: ICD-10-CM

## 2020-02-04 DIAGNOSIS — M54.2 NECK PAIN: ICD-10-CM

## 2020-02-04 PROCEDURE — 64490 INJ PARAVERT F JNT C/T 1 LEV: CPT | Performed by: PHYSICAL MEDICINE & REHABILITATION

## 2020-02-04 PROCEDURE — 64491 INJ PARAVERT F JNT C/T 2 LEV: CPT | Performed by: PHYSICAL MEDICINE & REHABILITATION

## 2020-02-04 RX ADMIN — Medication 1.5 ML: at 11:10

## 2020-02-04 NOTE — H&P
History of Present Illness:  The patient is a 64 y o  female who presents with complaints of right-sided neck pain    Patient Active Problem List   Diagnosis    Anosmia    Arthritis of lumbar spine    Chronic low back pain    Chronic pain disorder    Chronic venous insufficiency    Cough variant asthma    Depression    Hyperlipidemia    Lumbar postlaminectomy syndrome    Obesity, Class III, BMI 40-49 9 (morbid obesity) ([facility])    Primary osteoarthritis of left hip    Restless legs syndrome    Sleep disturbance    S/P right knee arthroscopy    Environmental allergies    Lumbar spondylosis    Lumbar radiculopathy    Prediabetes    Vitamin D deficiency    Dolan's esophagus with dysplasia    Gastroesophageal reflux disease    Laryngopharyngeal reflux (LPR)    Chronic cough    Vocal fold paresis, right    Dysphonia    Muscle tension dysphonia    Glottic insufficiency    Reflux laryngitis    Laryngeal edema    Allergic rhinitis due to [employer] house dust mite    Mild intermittent asthma without complication    Upper airway cough syndrome       Past Medical History:   Diagnosis Date    Anesthesia     "sometimes low BP upon waking up"    Arthritis     Asthma     Back pain     Dolan's esophagus     Chronic pain disorder     Chronic venous insufficiency     Cough variant asthma     Environmental allergies     dust    GERD (gastroesophageal reflux disease)     Hiatal hernia     History of anemia     History of fusion of spine for scoliosis     "as a teenager"    History of transfusion     2001    Hyperlipidemia     Left lumbar radiculitis     Last Assessed: [id number]    Lumbar postlaminectomy syndrome     Migraines     Morbid obesity ([facility] [address]     Motion sickness     Osteoarthritis     of left hip    Right knee pain     Seasonal allergies     Shortness of breath     Umbilical hernia     Uses brace     right knee    Uses crutches     one    Wears glasses        Past Surgical History:   Procedure Laterality Date    ARTHRODESIS      lumbar    ARTHRODESIS      Spinal Arthrodesis for Deformity; Last Assessed: [id number]    BACK SURGERY      lumbar fusion,with nydia/screw and cage implant    BACK SURGERY      surgery for scoliosis    BREAST CYST EXCISION      benign    COLONOSCOPY      COLONOSCOPY N/A 3/14/2019    Procedure: COLONOSCOPY;  Surgeon: Lacey Quiñones MD;  [facility]; Service: Gastroenterology    ESOPHAGOGASTRODUODENOSCOPY N/A 3/14/2019    Procedure: ESOPHAGOGASTRODUODENOSCOPY (EGD) W RFA(BARRX); Surgeon: Lacey Quiñones MD;  [facility]; Service: Gastroenterology    HERNIA REPAIR      umbilical hernia repair    PLANTAR FASCIA SURGERY Left     NV COLONOSCOPY FLX DX W/COLLJ SPEC WHEN PFRMD N/A 2/20/2018    Procedure: COLONOSCOPY with polypectomy;  Surgeon: Aakash Garcia MD;  [facility]; Service: General    NV ESOPHAGOGASTRODUODENOSCOPY TRANSORAL DIAGNOSTIC N/A 5/24/2017    Procedure: ESOPHAGOGASTRODUODENOSCOPY (EGD); Surgeon: Trudi Wilson MD;  [facility]; Service: Gastroenterology    NV ESOPHAGOGASTRODUODENOSCOPY TRANSORAL DIAGNOSTIC N/A 8/31/2017    Procedure: ESOPHAGOGASTRODUODENOSCOPY (EGD) W RFA;  Surgeon: Odette Paul MD;  [facility];   Service: Gastroenterology    NV KNEE SCOPE,MED/LAT MENISECTOMY Right 4/4/2018    Procedure: ARTHROSCOPY KNEE PARTIAL MEDIAL MENISECTOMY , CHONDROPLASTY;  Surgeon: Micheal Britt DO;  [facility];  Service: Orthopedics    WISDOM TOOTH EXTRACTION           Current Outpatient Medications:     cetirizine (ZyrTEC) 10 mg tablet, Take 1 tablet by mouth daily  , Disp: , Rfl:     dexlansoprazole (DEXILANT) 60 MG capsule, Take 1 capsule (60 mg total) by mouth 2 (two) times a day, Disp: 60 capsule, Rfl: 0    DULoxetine (CYMBALTA) 60 mg delayed release capsule, TAKE 1 CAPSULE(60 MG) BY MOUTH DAILY, Disp: 30 capsule, Rfl: 0    furosemide (LASIX) 20 mg tablet, TAKE 1 TABLET BY MOUTH DAILY, Disp: 90 tablet, Rfl: 5    montelukast (SINGULAIR) 10 mg tablet, Take 10 mg by mouth daily at bedtime, Disp: , Rfl:     omeprazole (PriLOSEC) 40 MG capsule, TAKE ONE CAPSULE BY MOUTH TWICE DAILY, Disp: 180 capsule, Rfl: 0    pregabalin (LYRICA) 75 mg capsule, Take 1 capsule (75 mg total) by mouth 2 (two) times a day, Disp: 180 capsule, Rfl: 3    rOPINIRole (REQUIP) 2 mg tablet, One tablet at bedtime, Disp: 90 tablet, Rfl: 2    sodium chloride 0 9 % nebulizer solution, USE 3 ML VIA NEB 1-2 TIMES D PRN, Disp: , Rfl: 6    albuterol (2 5 mg/3 mL) 0 083 % nebulizer solution, VVN Q 4 H PRN, Disp: , Rfl: 3    azelastine (ASTELIN) 0 1 % nasal spray, 1 spray into each nostril 2 (two) times a day Use in each nostril as directed (Patient not taking: Reported on 2/4/2020), Disp: 1 Bottle, Rfl: 6    Current Facility-Administered Medications:     lidocaine (PF) (XYLOCAINE-MPF) 2 % injection 5 mL, 5 mL, Perineural, Once, [place], DO    Allergies   Allergen Reactions    Dust Mite Extract Shortness Of Breath    Latex Itching and Blisters    Other      seasonal    Sulfa Antibiotics Vomiting     Topical-blistering       Physical Exam: There were no vitals filed for this visit  General: Awake, Alert, Oriented x 3, Mood and affect appropriate  Respiratory: Respirations even and unlabored  Cardiovascular: Peripheral pulses intact; no edema  Musculoskeletal Exam:  Right-sided neck pain    ASA Score:  2  Patient/Chart Verification  Patient ID Verified: Verbal  Consents Confirmed: Procedural, To be obtained in the Pre-Procedure area  H&P( within 30 days) Verified: To be obtained in the Pre-Procedure area  Allergies Reviewed: Yes  Anticoag/NSAID held?: NA  Currently on antibiotics?: No  Pregnancy denied?: Yes    Assessment:   1  Neck pain    2   Cervical spondylosis        Plan: (R) C4-6 MBB#1

## 2020-02-04 NOTE — DISCHARGE INSTRUCTIONS

## 2020-02-10 ENCOUNTER — OFFICE VISIT (OUTPATIENT)
Dept: INTERNAL MEDICINE CLINIC | Facility: CLINIC | Age: 60
End: 2020-02-10
Payer: MEDICARE

## 2020-02-10 VITALS
BODY MASS INDEX: 38.09 KG/M2 | HEIGHT: 63 IN | OXYGEN SATURATION: 97 % | DIASTOLIC BLOOD PRESSURE: 80 MMHG | SYSTOLIC BLOOD PRESSURE: 138 MMHG | HEART RATE: 64 BPM

## 2020-02-10 DIAGNOSIS — F32.A DEPRESSION, UNSPECIFIED DEPRESSION TYPE: ICD-10-CM

## 2020-02-10 DIAGNOSIS — M15.9 OSTEOARTHRITIS OF MULTIPLE JOINTS, UNSPECIFIED OSTEOARTHRITIS TYPE: ICD-10-CM

## 2020-02-10 DIAGNOSIS — Z12.39 SCREENING FOR BREAST CANCER: Primary | ICD-10-CM

## 2020-02-10 DIAGNOSIS — G89.4 CHRONIC PAIN DISORDER: ICD-10-CM

## 2020-02-10 DIAGNOSIS — E66.01 OBESITY, CLASS III, BMI 40-49.9 (MORBID OBESITY) (HCC): ICD-10-CM

## 2020-02-10 PROBLEM — G89.29 OTHER CHRONIC PAIN: Status: ACTIVE | Noted: 2020-02-10

## 2020-02-10 PROCEDURE — 1036F TOBACCO NON-USER: CPT | Performed by: INTERNAL MEDICINE

## 2020-02-10 PROCEDURE — 99213 OFFICE O/P EST LOW 20 MIN: CPT | Performed by: INTERNAL MEDICINE

## 2020-02-10 NOTE — PROGRESS NOTES
Assessment/Plan:     Diagnoses and all orders for this visit:    Screening for breast cancer  -     Mammo screening bilateral w 3d & cad; Future    Osteoarthritis of multiple joints, unspecified osteoarthritis type    Chronic pain disorder    Depression, unspecified depression type    Obesity, Class III, BMI 40-49 9 (morbid obesity) ([facility] )          Subjective:      Patient ID: Louie Westbrook is a 64 y o  female  HPI  Kuldip Martinez returns to discuss several issues  We reviewed her chronic pain syndrome diffuse arthritis and I encouraged Kuldip Martinez to continue to follow-up with her pain management physician  Norwood Young Americaitzel Martinez has rather diffuse pain and understands that this will be a process and should be addressed through pain management and a coordinated fashion  Kuldip Martinez reports that depression is well managed, and is in a better living situation  Medication is affective  Her main issue is her pain and trouble with pain with day-to-day function  Obesity will remain and untreated issue is Kuldip Martinez does not wish surgery, has been educated, and struggles with compliant with diet  Active follow-up continues with Gastroenterology with underlying GERD and hiatal hernia with Dolan's esophagus  November 14th EGD results showed both diagnoses, and cough is diminished on omeprazole and Pepcid  Follow-up was recommended with Dr Ki Blanco within the next 3 months and sooner as needed and was encouraged       The following portions of the patient's history were reviewed and updated as appropriate: allergies, current medications, past family history, past medical history, past social history, past surgical history and problem list     Review of Systems      Objective:      /80 (BP Location: Left arm, Patient Position: Sitting, Cuff Size: Standard)   Pulse 64   Ht 5' 3" (1 6 m)   SpO2 97%   BMI 38 09 kg/m²      Wt Readings from Last 3 Encounters:   11/14/19 97 5 kg (215 lb)   11/11/19 100 kg (221 lb)   09/25/19 100 kg (221 lb)     Temp Readings from Last 3 Encounters:   02/04/20 98 4 °F (36 9 °C) (Oral)   11/14/19 (!) 97 4 °F (36 3 °C) (Tympanic)   09/12/19 99 °F (37 2 °C) (Tympanic)     BP Readings from Last 3 Encounters:   02/10/20 138/80   02/04/20 124/76   01/28/20 128/80     Pulse Readings from Last 3 Encounters:   02/10/20 64   02/04/20 81   01/28/20 88        Physical Exam    Alert oriented, in good spirits today  Vital signs stable  Gait is stable    Patient Active Problem List   Diagnosis    Anosmia    Arthritis of lumbar spine    Chronic low back pain    Chronic pain disorder    Chronic venous insufficiency    Cough variant asthma    Depression    Hyperlipidemia    Lumbar postlaminectomy syndrome    Obesity, Class III, BMI 40-49 9 (morbid obesity) ([facility])    Primary osteoarthritis of left hip    Restless legs syndrome    Sleep disturbance    S/P right knee arthroscopy    Environmental allergies    Lumbar spondylosis    Lumbar radiculopathy    Prediabetes    Vitamin D deficiency    Dolan's esophagus with dysplasia    Gastroesophageal reflux disease    Laryngopharyngeal reflux (LPR)    Chronic cough    Vocal fold paresis, right    Dysphonia    Muscle tension dysphonia    Glottic insufficiency    Reflux laryngitis    Laryngeal edema    Allergic rhinitis due to [employer] house dust mite    Mild intermittent asthma without complication    Upper airway cough syndrome    Neck pain    Cervical spondylosis    Osteoarthritis of multiple joints       Current Outpatient Medications on File Prior to Visit   Medication Sig Dispense Refill    albuterol (2 5 mg/3 mL) 0 083 % nebulizer solution VVN Q 4 H PRN  3    azelastine (ASTELIN) 0 1 % nasal spray 1 spray into each nostril 2 (two) times a day Use in each nostril as directed (Patient not taking: Reported on 2/4/2020) 1 Bottle 6    cetirizine (ZyrTEC) 10 mg tablet Take 1 tablet by mouth daily        dexlansoprazole (DEXILANT) 60 MG capsule Take 1 capsule (60 mg total) by mouth 2 (two) times a day 60 capsule 0    DULoxetine (CYMBALTA) 60 mg delayed release capsule TAKE 1 CAPSULE(60 MG) BY MOUTH DAILY 30 capsule 0    furosemide (LASIX) 20 mg tablet TAKE 1 TABLET BY MOUTH DAILY 90 tablet 5    montelukast (SINGULAIR) 10 mg tablet Take 10 mg by mouth daily at bedtime      omeprazole (PriLOSEC) 40 MG capsule TAKE ONE CAPSULE BY MOUTH TWICE DAILY 180 capsule 0    pregabalin (LYRICA) 75 mg capsule Take 1 capsule (75 mg total) by mouth 2 (two) times a day 180 capsule 3    rOPINIRole (REQUIP) 2 mg tablet One tablet at bedtime 90 tablet 2    sodium chloride 0 9 % nebulizer solution USE 3 ML VIA NEB 1-2 TIMES D PRN  6     No current facility-administered medications on file prior to visit

## 2020-02-17 ENCOUNTER — TELEPHONE (OUTPATIENT)
Dept: RADIOLOGY | Facility: MEDICAL CENTER | Age: 60
End: 2020-02-17

## 2020-02-17 NOTE — TELEPHONE ENCOUNTER
Patient had right C4-6 MBB #1 on 2/4  Scheduled tentatively for MBB #2 on 2/21  Provider out of office on 2/21  I called patient for verbal pain diary response as she did not return diary  States that she had 100% relief for the first 2 hours and then pain returned and she had 0% relief for the remained of the 12 hours  Please advise

## 2020-03-13 ENCOUNTER — HOSPITAL ENCOUNTER (OUTPATIENT)
Dept: RADIOLOGY | Facility: MEDICAL CENTER | Age: 60
Discharge: HOME | End: 2020-03-13
Attending: PHYSICAL MEDICINE & REHABILITATION | Admitting: PHYSICAL MEDICINE & REHABILITATION
Payer: MEDICARE

## 2020-03-13 VITALS
RESPIRATION RATE: 18 BRPM | SYSTOLIC BLOOD PRESSURE: 123 MMHG | DIASTOLIC BLOOD PRESSURE: 86 MMHG | TEMPERATURE: 98 F | HEART RATE: 78 BPM | OXYGEN SATURATION: 97 %

## 2020-03-13 DIAGNOSIS — M54.2 NECK PAIN: ICD-10-CM

## 2020-03-13 DIAGNOSIS — M47.812 CERVICAL SPONDYLOSIS WITHOUT MYELOPATHY: ICD-10-CM

## 2020-03-13 PROCEDURE — 64490 INJ PARAVERT F JNT C/T 1 LEV: CPT | Performed by: PHYSICAL MEDICINE & REHABILITATION

## 2020-03-13 PROCEDURE — 64491 INJ PARAVERT F JNT C/T 2 LEV: CPT | Performed by: PHYSICAL MEDICINE & REHABILITATION

## 2020-03-13 RX ORDER — BUPIVACAINE HYDROCHLORIDE 5 MG/ML
10 INJECTION, SOLUTION EPIDURAL; INTRACAUDAL ONCE
Status: COMPLETED | OUTPATIENT
Start: 2020-03-13 | End: 2020-03-13

## 2020-03-13 RX ORDER — BUPIVACAINE HYDROCHLORIDE 5 MG/ML
10 INJECTION, SOLUTION EPIDURAL; INTRACAUDAL ONCE
Status: DISCONTINUED | OUTPATIENT
Start: 2020-03-13 | End: 2020-03-13

## 2020-03-13 RX ADMIN — BUPIVACAINE HYDROCHLORIDE 1.5 ML: 5 INJECTION, SOLUTION EPIDURAL; INTRACAUDAL at 10:47

## 2020-03-13 NOTE — H&P
History of Present Illness:  The patient is a 64 y o  female who presents with complaints of right-sided neck pain    Patient Active Problem List   Diagnosis    Anosmia    Arthritis of lumbar spine    Chronic low back pain    Chronic pain disorder    Chronic venous insufficiency    Cough variant asthma    Depression    Hyperlipidemia    Lumbar postlaminectomy syndrome    Obesity, Class III, BMI 40-49 9 (morbid obesity) (HCC)    Primary osteoarthritis of left hip    Restless legs syndrome    Sleep disturbance    S/P right knee arthroscopy    Environmental allergies    Lumbar spondylosis    Lumbar radiculopathy    Prediabetes    Vitamin D deficiency    Dolan's esophagus with dysplasia    Gastroesophageal reflux disease    Laryngopharyngeal reflux (LPR)    Chronic cough    Vocal fold paresis, right    Dysphonia    Muscle tension dysphonia    Glottic insufficiency    Reflux laryngitis    Laryngeal edema    Allergic rhinitis due to [employer] house dust mite    Mild intermittent asthma without complication    Upper airway cough syndrome    Neck pain    Cervical spondylosis    Osteoarthritis of multiple joints       Past Medical History:   Diagnosis Date    Anesthesia     "sometimes low BP upon waking up"    Arthritis     Asthma     Back pain     Dolan's esophagus     Chronic pain disorder     Chronic venous insufficiency     Cough variant asthma     Environmental allergies     dust    GERD (gastroesophageal reflux disease)     Hiatal hernia     History of anemia     History of fusion of spine for scoliosis     "as a teenager"    History of transfusion     2001    Hyperlipidemia     Left lumbar radiculitis     Last Assessed: [id number]    Lumbar postlaminectomy syndrome     Migraines     Morbid obesity (HCC)     Motion sickness     Osteoarthritis     of left hip    Right knee pain     Seasonal allergies     Shortness of breath     Umbilical hernia     Uses brace right knee    Uses crutches     one    Wears glasses        Past Surgical History:   Procedure Laterality Date    ARTHRODESIS      lumbar    ARTHRODESIS      Spinal Arthrodesis for Deformity; Last Assessed: [id number]    BACK SURGERY      lumbar fusion,with nydia/screw and cage implant    BACK SURGERY      surgery for scoliosis    BREAST CYST EXCISION      benign    COLONOSCOPY      COLONOSCOPY N/A 3/14/2019    Procedure: COLONOSCOPY;  Surgeon: Sofiya Landeros MD;  [facility]; Service: Gastroenterology    ESOPHAGOGASTRODUODENOSCOPY N/A 3/14/2019    Procedure: ESOPHAGOGASTRODUODENOSCOPY (EGD) W RFA(BARRX); Surgeon: Sofiya Landeros MD;  [facility]; Service: Gastroenterology    HERNIA REPAIR      umbilical hernia repair    PLANTAR FASCIA SURGERY Left     SC COLONOSCOPY FLX DX W/COLLJ SPEC WHEN PFRMD N/A 2/20/2018    Procedure: COLONOSCOPY with polypectomy;  Surgeon: Anel Silveira MD;  [facility]; Service: General    SC ESOPHAGOGASTRODUODENOSCOPY TRANSORAL DIAGNOSTIC N/A 5/24/2017    Procedure: ESOPHAGOGASTRODUODENOSCOPY (EGD); Surgeon: Chito Mccoy MD;  [facility]; Service: Gastroenterology    SC ESOPHAGOGASTRODUODENOSCOPY TRANSORAL DIAGNOSTIC N/A 8/31/2017    Procedure: ESOPHAGOGASTRODUODENOSCOPY (EGD) W RFA;  Surgeon: Holli Acuña MD;  [facility];   Service: Gastroenterology    SC KNEE SCOPE,MED/LAT MENISECTOMY Right 4/4/2018    Procedure: ARTHROSCOPY KNEE PARTIAL MEDIAL MENISECTOMY , CHONDROPLASTY;  Surgeon: Niya Grullon DO;  [facility];  Service: Orthopedics    WISDOM TOOTH EXTRACTION           Current Outpatient Medications:     albuterol (2 5 mg/3 mL) 0 083 % nebulizer solution, VVN Q 4 H PRN, Disp: , Rfl: 3    azelastine (ASTELIN) 0 1 % nasal spray, 1 spray into each nostril 2 (two) times a day Use in each nostril as directed, Disp: 1 Bottle, Rfl: 6    cetirizine (ZyrTEC) 10 mg tablet, Take 1 tablet by mouth daily  , Disp: , Rfl:     dexlansoprazole (DEXILANT) 60 MG capsule, Take 1 capsule (60 mg total) by mouth 2 (two) times a day, Disp: 60 capsule, Rfl: 0    DULoxetine (CYMBALTA) 60 mg delayed release capsule, TAKE 1 CAPSULE(60 MG) BY MOUTH DAILY, Disp: 30 capsule, Rfl: 0    furosemide (LASIX) 20 mg tablet, TAKE 1 TABLET BY MOUTH DAILY, Disp: 90 tablet, Rfl: 5    montelukast (SINGULAIR) 10 mg tablet, Take 10 mg by mouth daily at bedtime, Disp: , Rfl:     omeprazole (PriLOSEC) 40 MG capsule, TAKE ONE CAPSULE BY MOUTH TWICE DAILY, Disp: 180 capsule, Rfl: 0    rOPINIRole (REQUIP) 2 mg tablet, One tablet at bedtime, Disp: 90 tablet, Rfl: 2    sodium chloride 0 9 % nebulizer solution, USE 3 ML VIA NEB 1-2 TIMES D PRN, Disp: , Rfl: 6    pregabalin (LYRICA) 75 mg capsule, Take 1 capsule (75 mg total) by mouth 2 (two) times a day, Disp: 180 capsule, Rfl: 3    Current Facility-Administered Medications:     bupivacaine (PF) (MARCAINE) 0 5 % injection 10 mL, 10 mL, Injection, Once, Arlene , DO    Allergies   Allergen Reactions    Dust Mite Extract Shortness Of Breath    Latex Itching and Blisters    Other      seasonal    Sulfa Antibiotics Vomiting     Topical-blistering       Physical Exam:   Vitals:    03/13/20 1031   BP: 123/83   Pulse: 76   Resp: 18   Temp: 98 °F (36 7 °C)   SpO2: 97%     General: Awake, Alert, Oriented x 3, Mood and affect appropriate  Respiratory: Respirations even and unlabored  Cardiovascular: Peripheral pulses intact; no edema  Musculoskeletal Exam:  Right-sided neck pain    ASA Score:  2  Patient/Chart Verification  Patient ID Verified: Verbal  Consents Confirmed: Procedural, To be obtained in the Pre-Procedure area  H&P( within 30 days) Verified: To be obtained in the Pre-Procedure area  Allergies Reviewed: Yes  Anticoag/NSAID held?: NA  Currently on antibiotics?: No  Pregnancy denied?: Yes    Assessment:   1  Cervical spondylosis without myelopathy    2   Neck pain        Plan: RT C4-6 MBB #2

## 2020-03-13 NOTE — DISCHARGE INSTRUCTIONS

## 2020-03-17 ENCOUNTER — TELEPHONE (OUTPATIENT)
Dept: GASTROENTEROLOGY | Facility: CLINIC | Age: 60
End: 2020-03-17

## 2020-03-17 ENCOUNTER — TELEPHONE (OUTPATIENT)
Dept: RADIOLOGY | Facility: MEDICAL CENTER | Age: 60
End: 2020-03-17

## 2020-03-17 NOTE — TELEPHONE ENCOUNTER
Patients GI provider:  Dr Tarun Sanders    Number to return call: NA    Reason for call: Pt calling to r/s sched procedure due to [address]   Transferred called to     Scheduled procedure/appointment date if applicable: Procedure - 04/09/20

## 2020-03-17 NOTE — TELEPHONE ENCOUNTER
Pain diary reviewed by Dr Arina Hull; proceed with RFA and f/u; I will call and coordinate       Right C4-6

## 2020-03-23 LAB — EXT SARS-COV-2: DETECTED

## 2020-03-25 ENCOUNTER — TELEMEDICINE (OUTPATIENT)
Dept: INTERNAL MEDICINE CLINIC | Facility: CLINIC | Age: 60
End: 2020-03-25
Payer: MEDICARE

## 2020-03-25 DIAGNOSIS — K21.9 GASTROESOPHAGEAL REFLUX DISEASE, ESOPHAGITIS PRESENCE NOT SPECIFIED: ICD-10-CM

## 2020-03-25 DIAGNOSIS — B34.9 VIRAL SYNDROME: Primary | ICD-10-CM

## 2020-03-25 PROCEDURE — 99213 OFFICE O/P EST LOW 20 MIN: CPT | Performed by: INTERNAL MEDICINE

## 2020-03-25 NOTE — PROGRESS NOTES
Virtual Regular Visit     1  Viral syndrome  The patient has been exposed to Covid 19  She has been tested in the result is pending  Fortunately, her current symptoms are relatively mild  I told her that she should use acetomeniphen if need be for aches and pains  I asked her to make sure that she has adequate fluid intake  At the moment, no other intervention is needed  I asked her to contact us if her symptoms worsen  2  Gastroesophageal reflux  Currently stable    Reason for visit is low-grade fevers and myalgias    Encounter provider Wesley King MD    Provider located at [facility] [id number]      After connecting through [employer], the patient was identified by name and date of birth  Lala Weiss was informed that this is a telemedicine visit and that the visit is being conducted through [employer] and patient was informed that this is not a secure, HIPAA-complaint platform  she agrees to proceed  which may not be secure and therefore, might not be HIPAA-compliant  My office door was closed  No one else was in the room  She acknowledged consent and understanding of privacy and security of the video platform  The patient has agreed to participate and understands they can discontinue the visit at any time  Subjective  Lala Weiss is a 64 y o  female who called the office because of low-grade fever and myalgias  She lives with an elderly woman who was recently hospitalized on 2 occasions  During her 2nd admission, her roommate had corona virus 19  She was contacted by public health authorities  Both she and her housemate were tested  The results are currently pending  Today, the patient's housemate has a temperature of 100°  She seems generally weak  The patient herself had a temperature of 99°  She has minimal cough  She does feel achy and fatigued  She has not been short of breath    She has had no sputum or wheezing  She has no abdominal symptoms  She is eating and drinking well  Past Medical History:   Diagnosis Date    Anesthesia     "sometimes low BP upon waking up"    Arthritis     Asthma     Back pain     Dolan's esophagus     Chronic pain disorder     Chronic venous insufficiency     Cough variant asthma     Environmental allergies     dust    GERD (gastroesophageal reflux disease)     Hiatal hernia     History of anemia     History of fusion of spine for scoliosis     "as a teenager"    History of transfusion     2001    Hyperlipidemia     Left lumbar radiculitis     Last Assessed: [id number]    Lumbar postlaminectomy syndrome     Migraines     Morbid obesity ([address] )     Motion sickness     Osteoarthritis     of left hip    Right knee pain     Seasonal allergies     Shortness of breath     Umbilical hernia     Uses brace     right knee    Uses crutches     one    Wears glasses        Past Surgical History:   Procedure Laterality Date    ARTHRODESIS      lumbar    ARTHRODESIS      Spinal Arthrodesis for Deformity; Last Assessed: [id number]    BACK SURGERY      lumbar fusion,with nydia/screw and cage implant    BACK SURGERY      surgery for scoliosis    BREAST CYST EXCISION      benign    COLONOSCOPY      COLONOSCOPY N/A 3/14/2019    Procedure: COLONOSCOPY;  Surgeon: Jameson Fox MD;  [facility]; Service: Gastroenterology    ESOPHAGOGASTRODUODENOSCOPY N/A 3/14/2019    Procedure: ESOPHAGOGASTRODUODENOSCOPY (EGD) W RFA(BARRX); Surgeon: Jameson Fox MD;  [facility]; Service: Gastroenterology    HERNIA REPAIR      umbilical hernia repair    PLANTAR FASCIA SURGERY Left     FL COLONOSCOPY FLX DX W/COLLJ SPEC WHEN PFRMD N/A 2/20/2018    Procedure: COLONOSCOPY with polypectomy;  Surgeon: Charito Coronel MD;  [facility];   Service: General    FL ESOPHAGOGASTRODUODENOSCOPY TRANSORAL DIAGNOSTIC N/A 5/24/2017    Procedure: ESOPHAGOGASTRODUODENOSCOPY (EGD); Surgeon: Melvin Schmitt MD;  [facility]; Service: Gastroenterology    TX ESOPHAGOGASTRODUODENOSCOPY TRANSORAL DIAGNOSTIC N/A 8/31/2017    Procedure: ESOPHAGOGASTRODUODENOSCOPY (EGD) W RFA;  Surgeon: Glenna Jackman MD;  [facility]; Service: Gastroenterology    TX KNEE SCOPE,MED/LAT MENISECTOMY Right 4/4/2018    Procedure: ARTHROSCOPY KNEE PARTIAL MEDIAL MENISECTOMY , CHONDROPLASTY;  Surgeon: Mian Gastelum DO;  [facility];  Service: Orthopedics    WISDOM TOOTH EXTRACTION         Current Outpatient Medications   Medication Sig Dispense Refill    albuterol (2 5 mg/3 mL) 0 083 % nebulizer solution VVN Q 4 H PRN  3    azelastine (ASTELIN) 0 1 % nasal spray 1 spray into each nostril 2 (two) times a day Use in each nostril as directed 1 Bottle 6    cetirizine (ZyrTEC) 10 mg tablet Take 1 tablet by mouth daily        dexlansoprazole (DEXILANT) 60 MG capsule Take 1 capsule (60 mg total) by mouth 2 (two) times a day 60 capsule 0    DULoxetine (CYMBALTA) 60 mg delayed release capsule TAKE 1 CAPSULE(60 MG) BY MOUTH DAILY 30 capsule 0    furosemide (LASIX) 20 mg tablet TAKE 1 TABLET BY MOUTH DAILY 90 tablet 5    montelukast (SINGULAIR) 10 mg tablet Take 10 mg by mouth daily at bedtime      omeprazole (PriLOSEC) 40 MG capsule TAKE ONE CAPSULE BY MOUTH TWICE DAILY 180 capsule 0    pregabalin (LYRICA) 75 mg capsule Take 1 capsule (75 mg total) by mouth 2 (two) times a day 180 capsule 3    rOPINIRole (REQUIP) 2 mg tablet One tablet at bedtime 90 tablet 2    sodium chloride 0 9 % nebulizer solution USE 3 ML VIA NEB 1-2 TIMES D PRN  6     No current facility-administered medications for this visit           Allergies   Allergen Reactions    Dust Mite Extract Shortness Of Breath    Latex Itching and Blisters    Other      seasonal    Sulfa Antibiotics Vomiting     Topical-blistering       Review of Systems   Constitutional: Positive for fatigue and fever  Negative for activity change, appetite change, chills, diaphoresis and unexpected weight change  HENT: Negative for congestion, ear pain, postnasal drip, rhinorrhea, sore throat and trouble swallowing  Eyes: Negative for pain, discharge, redness and visual disturbance  Respiratory: Negative for cough, shortness of breath and wheezing  Cardiovascular: Negative  Gastrointestinal: Negative  Endocrine: Negative  Genitourinary: Negative for difficulty urinating, dysuria, frequency, hematuria and urgency  Musculoskeletal: Positive for myalgias  Negative for arthralgias, gait problem and joint swelling  Skin: Negative for rash  Neurological: Negative for dizziness, speech difficulty, weakness, light-headedness, numbness and headaches  Hematological: Negative  Psychiatric/Behavioral: Negative for confusion, decreased concentration, dysphoric mood and sleep disturbance  The patient is not nervous/anxious  Physical Exam   Constitutional: She appears well-developed and well-nourished  No distress  HENT:   Head: Normocephalic and atraumatic  Psychiatric: She has a normal mood and affect  Her behavior is normal  Judgment and thought content normal     The patient appeared to be breathing comfortably  Her respiratory rate was normal   She had no difficulty saying a full sentence  I spent 20 minutes with the patient during this visit

## 2020-03-30 DIAGNOSIS — F32.1 CURRENT MODERATE EPISODE OF MAJOR DEPRESSIVE DISORDER WITHOUT PRIOR EPISODE (HCC): ICD-10-CM

## 2020-03-30 RX ORDER — DULOXETIN HYDROCHLORIDE 60 MG/1
60 CAPSULE, DELAYED RELEASE ORAL DAILY
Qty: 90 CAPSULE | Refills: 0 | Status: SHIPPED | OUTPATIENT
Start: 2020-03-30 | End: 2020-07-29 | Stop reason: SDUPTHER

## 2020-03-31 ENCOUNTER — TELEMEDICINE (OUTPATIENT)
Dept: INTERNAL MEDICINE CLINIC | Facility: CLINIC | Age: 60
End: 2020-03-31
Payer: MEDICARE

## 2020-03-31 DIAGNOSIS — J45.20 MILD INTERMITTENT ASTHMA WITHOUT COMPLICATION: ICD-10-CM

## 2020-03-31 DIAGNOSIS — U07.1 COVID-19 VIRUS INFECTION: Primary | ICD-10-CM

## 2020-03-31 PROCEDURE — 99213 OFFICE O/P EST LOW 20 MIN: CPT | Performed by: INTERNAL MEDICINE

## 2020-03-31 NOTE — PROGRESS NOTES
Virtual Regular Visit    1  COVID-19 infection  Fortunately, the patient seems to be recovering  She still has low-grade fever  Her cough has improved  She has no shortness of breath  I suggested continued rest and emphasized adequate nutritional intake  2  Mild intermittent asthma  Stable despite 1        the patient seems to be slowly improving  I think that she will make an uncomplicated recovery  I asked her to call us if anything changes for the worse  Unfortunately, her friend and roommate  at the end of last week  I recommended against her going to the [facility] service later this week  Reason for visit is follow-up of COVID-19    Encounter provider Zo Yuan MD    Provider located at [facility] [id number]      Recent Visits  Date Type Provider Dept   20 Telemedicine Zo Yuan MD [facility] [place]   Showing recent visits within past 7 days and meeting all other requirements     Today's Visits  Date Type Provider Dept   20 Telemedicine Zo Yuan MD [facility] [place]   Showing today's visits and meeting all other requirements     Future Appointments  No visits were found meeting these conditions  Showing future appointments within next 150 days and meeting all other requirements        The patient was identified by name and date of birth  Mik Cargo was informed that this is a telemedicine visit and that the visit is being conducted through [address] [place] [address] and patient was informed that this is not a secure, HIPAA-complaint platform  she agrees to proceed     My office door was closed  No one else was in the room  She acknowledged consent and understanding of privacy and security of the video platform  The patient has agreed to participate and understands they can discontinue the visit at any time  Patient is aware this is a billable service       Patric Vences Chikis Horton is a 64 y o  female who was recently diagnosed with COVID-19  The patient had been living with an older friend  Her friend was hospitalized on 2 occasions recently  During her 2nd hospitalization, her roommate had COVID-19  This became known after her discharge  Both the roommate and the patient were tested  I spoke to Angélica Nayak last week  At that time she was having low give rate fever and some cough  She had no shortness of breath  She felt generally achy and weak  Subsequently, her COVID test has returned positive  Fortunately, she has remained stable  She continues with low-grade fever but not above 100  Her cough has improved  Her breathing has been stable  She has had headache for the past 2 days  Her body aches have improved  She feels generally stronger  Unfortunately, her roommate passed away  The patient is using acetomeniphen as needed  She is maintaining adequate oral intake  Ze Benjamin Past Medical History:   Diagnosis Date    Anesthesia     "sometimes low BP upon waking up"    Arthritis     Asthma     Back pain     Dolan's esophagus     Chronic pain disorder     Chronic venous insufficiency     Cough variant asthma     Environmental allergies     dust    GERD (gastroesophageal reflux disease)     Hiatal hernia     History of anemia     History of fusion of spine for scoliosis     "as a teenager"    History of transfusion     2001    Hyperlipidemia     Left lumbar radiculitis     Last Assessed: [id number]    Lumbar postlaminectomy syndrome     Migraines     Morbid obesity ([address] )     Motion sickness     Osteoarthritis     of left hip    Right knee pain     Seasonal allergies     Shortness of breath     Umbilical hernia     Uses brace     right knee    Uses crutches     one    Wears glasses        Past Surgical History:   Procedure Laterality Date    ARTHRODESIS      lumbar    ARTHRODESIS      Spinal Arthrodesis for Deformity;  Last Assessed: [id number]    BACK SURGERY      lumbar fusion,with nydia/screw and cage implant    BACK SURGERY      surgery for scoliosis    BREAST CYST EXCISION      benign    COLONOSCOPY      COLONOSCOPY N/A 3/14/2019    Procedure: COLONOSCOPY;  Surgeon: Yaritza Linares MD;  [facility]; Service: Gastroenterology    ESOPHAGOGASTRODUODENOSCOPY N/A 3/14/2019    Procedure: ESOPHAGOGASTRODUODENOSCOPY (EGD) W RFA(BARRX); Surgeon: Yaritza Linares MD;  [facility]; Service: Gastroenterology    HERNIA REPAIR      umbilical hernia repair    PLANTAR FASCIA SURGERY Left     MS COLONOSCOPY FLX DX W/COLLJ SPEC WHEN PFRMD N/A 2/20/2018    Procedure: COLONOSCOPY with polypectomy;  Surgeon: Liz Wheeler MD;  [facility]; Service: General    MS ESOPHAGOGASTRODUODENOSCOPY TRANSORAL DIAGNOSTIC N/A 5/24/2017    Procedure: ESOPHAGOGASTRODUODENOSCOPY (EGD); Surgeon: Isael Grimaldo MD;  [facility]; Service: Gastroenterology    MS ESOPHAGOGASTRODUODENOSCOPY TRANSORAL DIAGNOSTIC N/A 8/31/2017    Procedure: ESOPHAGOGASTRODUODENOSCOPY (EGD) W RFA;  Surgeon: Kassandra Quesada MD;  [facility];   Service: Gastroenterology    [place] KNEE SCOPE,MED/LAT MENISECTOMY Right 4/4/2018    Procedure: ARTHROSCOPY KNEE PARTIAL MEDIAL MENISECTOMY , CHONDROPLASTY;  Surgeon: Nickie Rothman DO;  [facility];  Service: Orthopedics    WISDOM TOOTH EXTRACTION         Current Outpatient Medications   Medication Sig Dispense Refill    albuterol (2 5 mg/3 mL) 0 083 % nebulizer solution VVN Q 4 H PRN  3    azelastine (ASTELIN) 0 1 % nasal spray 1 spray into each nostril 2 (two) times a day Use in each nostril as directed 1 Bottle 6    cetirizine (ZyrTEC) 10 mg tablet Take 1 tablet by mouth daily        dexlansoprazole (DEXILANT) 60 MG capsule Take 1 capsule (60 mg total) by mouth 2 (two) times a day 60 capsule 0    DULoxetine (CYMBALTA) 60 mg delayed release capsule Take 1 capsule (60 mg total) by mouth daily 90 capsule 0    furosemide (LASIX) 20 mg tablet TAKE 1 TABLET BY MOUTH DAILY 90 tablet 5    montelukast (SINGULAIR) 10 mg tablet Take 10 mg by mouth daily at bedtime      omeprazole (PriLOSEC) 40 MG capsule TAKE ONE CAPSULE BY MOUTH TWICE DAILY 180 capsule 0    pregabalin (LYRICA) 75 mg capsule Take 1 capsule (75 mg total) by mouth 2 (two) times a day 180 capsule 3    rOPINIRole (REQUIP) 2 mg tablet One tablet at bedtime 90 tablet 2    sodium chloride 0 9 % nebulizer solution USE 3 ML VIA NEB 1-2 TIMES D PRN  6     No current facility-administered medications for this visit  Allergies   Allergen Reactions    Dust Mite Extract Shortness Of Breath    Latex Itching and Blisters    Other      seasonal    Sulfa Antibiotics Vomiting     Topical-blistering       Review of Systems   Constitutional: Positive for fatigue  Negative for activity change, appetite change, chills, diaphoresis, fever and unexpected weight change  HENT: Negative for congestion, ear pain, postnasal drip, rhinorrhea, sore throat and trouble swallowing  Eyes: Negative for pain, discharge, redness and visual disturbance  Respiratory: Negative for cough, shortness of breath and wheezing  Cardiovascular: Negative  Gastrointestinal: Negative  Endocrine: Negative  Genitourinary: Negative for difficulty urinating, dysuria, frequency, hematuria and urgency  Musculoskeletal: Negative for arthralgias, gait problem, joint swelling and myalgias  Skin: Negative for rash  Neurological: Positive for headaches  Negative for dizziness, speech difficulty, weakness, light-headedness and numbness  Hematological: Negative  Psychiatric/Behavioral: Negative for confusion, decreased concentration, dysphoric mood and sleep disturbance  The patient is not nervous/anxious  Physical Exam   Constitutional: She appears well-developed and well-nourished  No distress  HENT:   Head: Normocephalic and atraumatic     Pulmonary/Chest: Effort normal    Neurological:   The patient is alert and oriented  She can move all limbs  Psychiatric: She has a normal mood and affect  Her behavior is normal  Judgment and thought content normal         I spent 15 minutes with the patient during this visit

## 2020-04-08 ENCOUNTER — TELEPHONE (OUTPATIENT)
Dept: INTERNAL MEDICINE CLINIC | Facility: CLINIC | Age: 60
End: 2020-04-08

## 2020-04-08 DIAGNOSIS — K22.70 BARRETT'S ESOPHAGUS WITHOUT DYSPLASIA: ICD-10-CM

## 2020-04-08 RX ORDER — OMEPRAZOLE 40 MG/1
CAPSULE, DELAYED RELEASE ORAL
Qty: 180 CAPSULE | Refills: 0 | Status: SHIPPED | OUTPATIENT
Start: 2020-04-08 | End: 2021-08-23 | Stop reason: ALTCHOICE

## 2020-04-16 ENCOUNTER — TELEMEDICINE (OUTPATIENT)
Dept: INTERNAL MEDICINE CLINIC | Facility: CLINIC | Age: 60
End: 2020-04-16
Payer: MEDICARE

## 2020-04-16 DIAGNOSIS — U07.1 COVID-19 VIRUS INFECTION: ICD-10-CM

## 2020-04-16 DIAGNOSIS — J45.991 COUGH VARIANT ASTHMA: Primary | ICD-10-CM

## 2020-04-16 PROCEDURE — 99213 OFFICE O/P EST LOW 20 MIN: CPT | Performed by: INTERNAL MEDICINE

## 2020-04-16 RX ORDER — BENZONATATE 100 MG/1
100 CAPSULE ORAL 3 TIMES DAILY PRN
Qty: 30 CAPSULE | Refills: 0 | Status: SHIPPED | OUTPATIENT
Start: 2020-04-16 | End: 2020-05-12 | Stop reason: SDUPTHER

## 2020-04-22 ENCOUNTER — TELEMEDICINE (OUTPATIENT)
Dept: INTERNAL MEDICINE CLINIC | Facility: CLINIC | Age: 60
End: 2020-04-22
Payer: MEDICARE

## 2020-04-22 DIAGNOSIS — Z00.00 MEDICARE WELCOME EXAM: Primary | ICD-10-CM

## 2020-04-22 PROCEDURE — 1036F TOBACCO NON-USER: CPT | Performed by: INTERNAL MEDICINE

## 2020-04-22 PROCEDURE — G0438 PPPS, INITIAL VISIT: HCPCS | Performed by: INTERNAL MEDICINE

## 2020-04-23 PROBLEM — Z00.00 MEDICARE WELCOME EXAM: Status: ACTIVE | Noted: 2020-04-23

## 2020-04-29 ENCOUNTER — TELEPHONE (OUTPATIENT)
Dept: INTERNAL MEDICINE CLINIC | Facility: CLINIC | Age: 60
End: 2020-04-29

## 2020-05-04 ENCOUNTER — TELEPHONE (OUTPATIENT)
Dept: INTERNAL MEDICINE CLINIC | Facility: CLINIC | Age: 60
End: 2020-05-04

## 2020-05-05 ENCOUNTER — TELEPHONE (OUTPATIENT)
Dept: PAIN MEDICINE | Facility: MEDICAL CENTER | Age: 60
End: 2020-05-05

## 2020-05-05 ENCOUNTER — PATIENT OUTREACH (OUTPATIENT)
Dept: INTERNAL MEDICINE CLINIC | Facility: CLINIC | Age: 60
End: 2020-05-05

## 2020-05-06 ENCOUNTER — TELEPHONE (OUTPATIENT)
Dept: PULMONOLOGY | Facility: CLINIC | Age: 60
End: 2020-05-06

## 2020-05-12 ENCOUNTER — TELEPHONE (OUTPATIENT)
Dept: PAIN MEDICINE | Facility: MEDICAL CENTER | Age: 60
End: 2020-05-12

## 2020-05-12 DIAGNOSIS — J45.991 COUGH VARIANT ASTHMA: ICD-10-CM

## 2020-05-12 RX ORDER — BENZONATATE 100 MG/1
100 CAPSULE ORAL 3 TIMES DAILY PRN
Qty: 30 CAPSULE | Refills: 0 | Status: SHIPPED | OUTPATIENT
Start: 2020-05-12 | End: 2020-08-27 | Stop reason: ALTCHOICE

## 2020-05-19 ENCOUNTER — TELEMEDICINE (OUTPATIENT)
Dept: PAIN MEDICINE | Facility: MEDICAL CENTER | Age: 60
End: 2020-05-19
Payer: MEDICARE

## 2020-05-19 DIAGNOSIS — M47.816 LUMBAR SPONDYLOSIS: Primary | ICD-10-CM

## 2020-05-19 DIAGNOSIS — M54.50 CHRONIC LEFT-SIDED LOW BACK PAIN WITHOUT SCIATICA: ICD-10-CM

## 2020-05-19 DIAGNOSIS — G89.29 CHRONIC LEFT-SIDED LOW BACK PAIN WITHOUT SCIATICA: ICD-10-CM

## 2020-05-19 PROCEDURE — 99214 OFFICE O/P EST MOD 30 MIN: CPT | Performed by: PHYSICAL MEDICINE & REHABILITATION

## 2020-05-19 RX ORDER — TRAMADOL HYDROCHLORIDE 50 MG/1
50 TABLET ORAL
Qty: 30 TABLET | Refills: 0 | Status: SHIPPED | OUTPATIENT
Start: 2020-05-19 | End: 2020-06-18 | Stop reason: ALTCHOICE

## 2020-06-22 ENCOUNTER — PREP FOR PROCEDURE (OUTPATIENT)
Dept: GASTROENTEROLOGY | Facility: CLINIC | Age: 60
End: 2020-06-22

## 2020-06-22 ENCOUNTER — TELEMEDICINE (OUTPATIENT)
Dept: PAIN MEDICINE | Facility: MEDICAL CENTER | Age: 60
End: 2020-06-22
Payer: MEDICARE

## 2020-06-22 DIAGNOSIS — M54.50 CHRONIC BILATERAL LOW BACK PAIN WITHOUT SCIATICA: Primary | ICD-10-CM

## 2020-06-22 DIAGNOSIS — G89.29 CHRONIC BILATERAL LOW BACK PAIN WITHOUT SCIATICA: Primary | ICD-10-CM

## 2020-06-22 DIAGNOSIS — M46.1 SACROILIITIS (HCC): ICD-10-CM

## 2020-06-22 DIAGNOSIS — Z20.822 ENCOUNTER FOR LABORATORY TESTING FOR COVID-19 VIRUS: Primary | ICD-10-CM

## 2020-06-22 PROCEDURE — 99214 OFFICE O/P EST MOD 30 MIN: CPT | Performed by: NURSE PRACTITIONER

## 2020-06-23 ENCOUNTER — TELEPHONE (OUTPATIENT)
Dept: PAIN MEDICINE | Facility: MEDICAL CENTER | Age: 60
End: 2020-06-23

## 2020-06-24 ENCOUNTER — TELEPHONE (OUTPATIENT)
Dept: RADIOLOGY | Facility: MEDICAL CENTER | Age: 60
End: 2020-06-24

## 2020-07-01 ENCOUNTER — TELEPHONE (OUTPATIENT)
Dept: GASTROENTEROLOGY | Facility: CLINIC | Age: 60
End: 2020-07-01

## 2020-07-02 DIAGNOSIS — Z20.822 ENCOUNTER FOR LABORATORY TESTING FOR COVID-19 VIRUS: ICD-10-CM

## 2020-07-02 PROCEDURE — U0003 INFECTIOUS AGENT DETECTION BY NUCLEIC ACID (DNA OR RNA); SEVERE ACUTE RESPIRATORY SYNDROME CORONAVIRUS 2 (SARS-COV-2) (CORONAVIRUS DISEASE [COVID-19]), AMPLIFIED PROBE TECHNIQUE, MAKING USE OF HIGH THROUGHPUT TECHNOLOGIES AS DESCRIBED BY CMS-2020-01-R: HCPCS | Performed by: RADIOLOGY

## 2020-07-08 ENCOUNTER — HOSPITAL ENCOUNTER (OUTPATIENT)
Dept: GASTROENTEROLOGY | Facility: HOSPITAL | Age: 60
Setting detail: OUTPATIENT SURGERY
Discharge: HOME | End: 2020-07-08
Attending: INTERNAL MEDICINE

## 2020-07-09 ENCOUNTER — TELEPHONE (OUTPATIENT)
Dept: PAIN MEDICINE | Facility: MEDICAL CENTER | Age: 60
End: 2020-07-09

## 2020-07-09 LAB — SARS-COV-2 RNA SPEC QL NAA+PROBE: NOT DETECTED

## 2020-07-09 NOTE — TELEPHONE ENCOUNTER
Pt is returning nurses call     Pt c/b [phone]     Please leave detailed message about if pt needs 2 covid test or not and state on the message if pt needs to call back per pt request

## 2020-07-09 NOTE — TELEPHONE ENCOUNTER
RN attempted to reach pt  Detailed message left (ok per pt when she called per previous task) to tell pt 1 COVID test required due to non Aerosol type procedure per JE

## 2020-07-10 ENCOUNTER — HOSPITAL ENCOUNTER (OUTPATIENT)
Dept: RADIOLOGY | Facility: MEDICAL CENTER | Age: 60
Discharge: HOME | End: 2020-07-10
Attending: PHYSICAL MEDICINE & REHABILITATION | Admitting: PHYSICAL MEDICINE & REHABILITATION
Payer: MEDICARE

## 2020-07-10 VITALS
RESPIRATION RATE: 18 BRPM | OXYGEN SATURATION: 96 % | TEMPERATURE: 98 F | SYSTOLIC BLOOD PRESSURE: 135 MMHG | HEART RATE: 82 BPM | DIASTOLIC BLOOD PRESSURE: 83 MMHG

## 2020-07-10 DIAGNOSIS — M54.50 CHRONIC BILATERAL LOW BACK PAIN WITHOUT SCIATICA: ICD-10-CM

## 2020-07-10 DIAGNOSIS — M46.1 SACROILIITIS (HCC): ICD-10-CM

## 2020-07-10 DIAGNOSIS — G89.29 CHRONIC BILATERAL LOW BACK PAIN WITHOUT SCIATICA: ICD-10-CM

## 2020-07-10 PROCEDURE — 27096 INJECT SACROILIAC JOINT: CPT | Performed by: PHYSICAL MEDICINE & REHABILITATION

## 2020-07-10 RX ORDER — BUPIVACAINE HCL/PF 2.5 MG/ML
10 VIAL (ML) INJECTION ONCE
Status: COMPLETED | OUTPATIENT
Start: 2020-07-10 | End: 2020-07-10

## 2020-07-10 RX ORDER — METHYLPREDNISOLONE ACETATE 40 MG/ML
40 INJECTION, SUSPENSION INTRA-ARTICULAR; INTRALESIONAL; INTRAMUSCULAR; PARENTERAL; SOFT TISSUE ONCE
Status: COMPLETED | OUTPATIENT
Start: 2020-07-10 | End: 2020-07-10

## 2020-07-10 RX ADMIN — METHYLPREDNISOLONE ACETATE 40 MG: 40 INJECTION, SUSPENSION INTRA-ARTICULAR; INTRALESIONAL; INTRAMUSCULAR; PARENTERAL; SOFT TISSUE at 12:00

## 2020-07-10 RX ADMIN — Medication 1.5 ML: at 12:02

## 2020-07-10 RX ADMIN — IOHEXOL 0.5 ML: 300 INJECTION, SOLUTION INTRAVENOUS at 12:01

## 2020-07-10 NOTE — DISCHARGE INSTRUCTIONS
Steroid Joint Injection   WHAT YOU NEED TO KNOW:   A steroid joint injection is a procedure to inject steroid medicine into a joint  Steroid medicine decreases pain and inflammation  The injection may also contain an anesthetic (numbing medicine) to decrease pain  It may be done to treat conditions such as arthritis, gout, or carpal tunnel syndrome  The injections may be given in your knee, ankle, shoulder, elbow, wrist, ankle or sacroiliac joint  1  Do not apply heat to any area that is numb  If you have discomfort or soreness at the injection site, you may apply ice today, 20 minutes on and 20 minutes off  Tomorrow you may use ice or warm, moist heat  Do not apply ice or heat directly to the skin  2  You may have an increase or change in the discomfort for 36-48 hours after your treatment  Apply ice and continue with any pain medicine you have been prescribed  3  Do not do anything strenuous today  You may shower, but no tub baths or hot tubs today  You may resume your normal activities tomorrow, but do not overdo it  Resume normal activities slowly when you are feeling better  4  If you experience redness, drainage or swelling at the injection site, or if you develop a fever above 100 degrees, please call [facility] at [phone] or go to the Emergency Room  5  Continue to take all routine medicines prescribed by your primary care physician unless otherwise instructed by our staff  Most blood thinners should be started again according to your regularly scheduled dosing  If you have any questions, please give our office a call  If you have a problem specifically related to your procedure, please call our office at [phone]  Problems not related to your procedure should be directed to your primary care physician

## 2020-07-10 NOTE — H&P
History of Present Illness:  The patient is a 64 y o  female who presents with complaints of left low back pain    Patient Active Problem List   Diagnosis    Anosmia    Arthritis of lumbar spine    Chronic low back pain    Chronic pain disorder    Chronic venous insufficiency    Cough variant asthma    Depression    Hyperlipidemia    Lumbar postlaminectomy syndrome    Obesity, Class III, BMI 40-49 9 (morbid obesity) (HCC)    Primary osteoarthritis of left hip    Restless legs syndrome    Sleep disturbance    S/P right knee arthroscopy    Environmental allergies    Lumbar spondylosis    Lumbar radiculopathy    Prediabetes    Vitamin D deficiency    Dolan's esophagus with dysplasia    Gastroesophageal reflux disease    Laryngopharyngeal reflux (LPR)    Chronic cough    Vocal fold paresis, right    Dysphonia    Muscle tension dysphonia    Glottic insufficiency    Reflux laryngitis    Laryngeal edema    Allergic rhinitis due to [employer] house dust mite    Mild intermittent asthma without complication    Upper airway cough syndrome    Neck pain    Cervical spondylosis without myelopathy    Osteoarthritis of multiple joints    COVID-19 virus infection    [facility]       Past Medical History:   Diagnosis Date    Anesthesia     "sometimes low BP upon waking up"    Arthritis     Asthma     Back pain     Dolan's esophagus     Chronic pain disorder     Chronic venous insufficiency     Cough variant asthma     Environmental allergies     dust    GERD (gastroesophageal reflux disease)     Hiatal hernia     History of anemia     History of fusion of spine for scoliosis     "as a teenager"    History of transfusion     2001    Hyperlipidemia     Left lumbar radiculitis     Last Assessed: [id number]    Lumbar postlaminectomy syndrome     Migraines     Morbid obesity (HCC)     Motion sickness     Osteoarthritis     of left hip    Right knee pain     Seasonal allergies     Shortness of breath     Umbilical hernia     Uses brace     right knee    Uses crutches     one    Wears glasses        Past Surgical History:   Procedure Laterality Date    ARTHRODESIS      lumbar    ARTHRODESIS      Spinal Arthrodesis for Deformity; Last Assessed: [id number]    BACK SURGERY      lumbar fusion,with nydia/screw and cage implant    BACK SURGERY      surgery for scoliosis    BREAST CYST EXCISION      benign    COLONOSCOPY      COLONOSCOPY N/A 3/14/2019    Procedure: COLONOSCOPY;  Surgeon: Elias Maravilla MD;  [facility]; Service: Gastroenterology    ESOPHAGOGASTRODUODENOSCOPY N/A 3/14/2019    Procedure: ESOPHAGOGASTRODUODENOSCOPY (EGD) W RFA(BARRX); Surgeon: Elias Maravilla MD;  [facility]; Service: Gastroenterology    HERNIA REPAIR      umbilical hernia repair    PLANTAR FASCIA SURGERY Left     [place] COLONOSCOPY FLX DX W/COLLJ SPEC WHEN PFRMD N/A 2/20/2018    Procedure: COLONOSCOPY with polypectomy;  Surgeon: Subha Pearce MD;  [facility]; Service: General    [place] ESOPHAGOGASTRODUODENOSCOPY TRANSORAL DIAGNOSTIC N/A 5/24/2017    Procedure: ESOPHAGOGASTRODUODENOSCOPY (EGD); Surgeon: Tamar Ledbetter MD;  [facility]; Service: Gastroenterology    [place] ESOPHAGOGASTRODUODENOSCOPY TRANSORAL DIAGNOSTIC N/A 8/31/2017    Procedure: ESOPHAGOGASTRODUODENOSCOPY (EGD) W RFA;  Surgeon: Cara Diana MD;  Location:  [facility];   Service: Gastroenterology    [place] KNEE SCOPE,MED/LAT MENISECTOMY Right 4/4/2018    Procedure: ARTHROSCOPY KNEE PARTIAL MEDIAL MENISECTOMY , CHONDROPLASTY;  Surgeon: Chance Alexis DO;  [facility];  Service: Orthopedics    WISDOM TOOTH EXTRACTION           Current Outpatient Medications:     albuterol (2 5 mg/3 mL) 0 083 % nebulizer solution, VVN Q 4 H PRN, Disp: , Rfl: 3    azelastine (ASTELIN) 0 1 % nasal spray, 1 spray into each nostril 2 (two) times a day Use in each nostril as directed, Disp: 1 Bottle, Rfl: 6   benzonatate (TESSALON PERLES) 100 mg capsule, Take 1 capsule (100 mg total) by mouth 3 (three) times a day as needed for cough (Patient not taking: Reported on 6/22/2020), Disp: 30 capsule, Rfl: 0    Buprenorphine HCl (Belbuca) 75 MCG FILM, Apply 1 each to cheek 2 (two) times a day as needed (pain), Disp: 60 each, Rfl: 0    cetirizine (ZyrTEC) 10 mg tablet, Take 1 tablet by mouth daily  , Disp: , Rfl:     dexlansoprazole (DEXILANT) 60 MG capsule, Take 1 capsule (60 mg total) by mouth 2 (two) times a day, Disp: 60 capsule, Rfl: 0    DULoxetine (CYMBALTA) 60 mg delayed release capsule, Take 1 capsule (60 mg total) by mouth daily, Disp: 90 capsule, Rfl: 0    furosemide (LASIX) 20 mg tablet, TAKE 1 TABLET BY MOUTH DAILY, Disp: 90 tablet, Rfl: 5    montelukast (SINGULAIR) 10 mg tablet, Take 10 mg by mouth daily at bedtime, Disp: , Rfl:     omeprazole (PriLOSEC) 40 MG capsule, TAKE 1 CAPSULE BY MOUTH TWICE DAILY, Disp: 180 capsule, Rfl: 0    rOPINIRole (REQUIP) 2 mg tablet, One tablet at bedtime, Disp: 90 tablet, Rfl: 2    sodium chloride 0 9 % nebulizer solution, USE 3 ML VIA NEB 1-2 TIMES D PRN, Disp: , Rfl: 6    Current Facility-Administered Medications:     bupivacaine (PF) (MARCAINE) 0 25 % injection 10 mL, 10 mL, Intra-articular, Once, Leetta Dhillon, DO    iohexol (OMNIPAQUE) 300 mg/mL injection 50 mL, 50 mL, Intra-articular, Once, Leetta Dhillon, DO    Allergies   Allergen Reactions    Dust Mite Extract Shortness Of Breath    Latex Itching and Blisters    Other      seasonal    Sulfa Antibiotics Vomiting     Topical-blistering       Physical Exam:   Vitals:    07/10/20 1149   BP: 136/78   Pulse: 80   Resp: 18   Temp: 98 °F (36 7 °C)   SpO2: 99%     General: Awake, Alert, Oriented x 3, Mood and affect appropriate  Respiratory: Respirations even and unlabored  Cardiovascular: Peripheral pulses intact; no edema  Musculoskeletal Exam: left low back pain    ASA Score: 2    Patient/Chart Verification  Patient ID Verified: Verbal  Consents Confirmed: Procedural, To be obtained in the Pre-Procedure area  H&P( within 30 days) Verified: To be obtained in the Pre-Procedure area  Allergies Reviewed: Yes  Anticoag/NSAID held?: NA  Currently on antibiotics?: No  Pregnancy denied?: Yes    Assessment:   1  Chronic bilateral low back pain without sciatica    2   Sacroiliitis ([address] )        Plan: left SIJ injection

## 2020-07-17 ENCOUNTER — TELEPHONE (OUTPATIENT)
Dept: PAIN MEDICINE | Facility: CLINIC | Age: 60
End: 2020-07-17

## 2020-07-18 ENCOUNTER — HOSPITAL ENCOUNTER (OUTPATIENT)
Dept: MAMMOGRAPHY | Facility: MEDICAL CENTER | Age: 60
Discharge: HOME | End: 2020-07-18
Payer: MEDICARE

## 2020-07-18 VITALS — BODY MASS INDEX: 38.09 KG/M2 | HEIGHT: 63 IN | WEIGHT: 215 LBS

## 2020-07-18 DIAGNOSIS — Z12.31 ENCOUNTER FOR SCREENING MAMMOGRAM FOR MALIGNANT NEOPLASM OF BREAST: ICD-10-CM

## 2020-07-18 PROCEDURE — 77063 BREAST TOMOSYNTHESIS BI: CPT

## 2020-07-18 PROCEDURE — 77067 SCR MAMMO BI INCL CAD: CPT

## 2020-07-20 ENCOUNTER — OFFICE VISIT (OUTPATIENT)
Dept: PAIN MEDICINE | Facility: MEDICAL CENTER | Age: 60
End: 2020-07-20
Payer: MEDICARE

## 2020-07-20 ENCOUNTER — TELEPHONE (OUTPATIENT)
Dept: PAIN MEDICINE | Facility: MEDICAL CENTER | Age: 60
End: 2020-07-20

## 2020-07-20 VITALS
HEART RATE: 90 BPM | WEIGHT: 211 LBS | BODY MASS INDEX: 37.39 KG/M2 | HEIGHT: 63 IN | SYSTOLIC BLOOD PRESSURE: 144 MMHG | RESPIRATION RATE: 16 BRPM | DIASTOLIC BLOOD PRESSURE: 88 MMHG | TEMPERATURE: 98 F

## 2020-07-20 DIAGNOSIS — M47.816 LUMBAR SPONDYLOSIS: ICD-10-CM

## 2020-07-20 DIAGNOSIS — G89.4 CHRONIC PAIN DISORDER: ICD-10-CM

## 2020-07-20 DIAGNOSIS — G89.4 CHRONIC PAIN SYNDROME: Primary | ICD-10-CM

## 2020-07-20 DIAGNOSIS — Z79.891 LONG-TERM CURRENT USE OF OPIATE ANALGESIC: ICD-10-CM

## 2020-07-20 DIAGNOSIS — M54.16 LUMBAR RADICULOPATHY: ICD-10-CM

## 2020-07-20 DIAGNOSIS — M46.1 SACROILIITIS (HCC): ICD-10-CM

## 2020-07-20 DIAGNOSIS — M54.42 CHRONIC BILATERAL LOW BACK PAIN WITH LEFT-SIDED SCIATICA: ICD-10-CM

## 2020-07-20 DIAGNOSIS — F11.20 UNCOMPLICATED OPIOID DEPENDENCE (HCC): ICD-10-CM

## 2020-07-20 DIAGNOSIS — G89.29 CHRONIC BILATERAL LOW BACK PAIN WITH LEFT-SIDED SCIATICA: ICD-10-CM

## 2020-07-20 DIAGNOSIS — M54.50 CHRONIC BILATERAL LOW BACK PAIN WITHOUT SCIATICA: ICD-10-CM

## 2020-07-20 DIAGNOSIS — G89.29 CHRONIC BILATERAL LOW BACK PAIN WITHOUT SCIATICA: ICD-10-CM

## 2020-07-20 DIAGNOSIS — M96.1 LUMBAR POSTLAMINECTOMY SYNDROME: ICD-10-CM

## 2020-07-20 PROCEDURE — 99214 OFFICE O/P EST MOD 30 MIN: CPT | Performed by: NURSE PRACTITIONER

## 2020-07-20 PROCEDURE — 1036F TOBACCO NON-USER: CPT | Performed by: NURSE PRACTITIONER

## 2020-07-20 PROCEDURE — 80305 DRUG TEST PRSMV DIR OPT OBS: CPT | Performed by: NURSE PRACTITIONER

## 2020-07-20 PROCEDURE — 3008F BODY MASS INDEX DOCD: CPT | Performed by: NURSE PRACTITIONER

## 2020-07-20 NOTE — PROGRESS NOTES
Assessment:  1  Chronic pain syndrome    2  Long-term current use of opiate analgesic    3  Uncomplicated opioid dependence ([address] )    4  Lumbar radiculopathy    5  Lumbar spondylosis    6  Sacroiliitis ([address] )    7  Chronic bilateral low back pain with left-sided sciatica    8  Chronic pain disorder    9  Lumbar postlaminectomy syndrome    10  Chronic bilateral low back pain without sciatica        Plan:  Continue with Belbuca she was given a 3 month supply the medication today  While the patient was in the office today an opioid contract was thoroughly reviewed and signed by the patient  The patient was given adequate time to ask questions in regards to the contract and a signed copy was sent home for his/her records  Follow-up in 3 months for medication refills  [place] Pres[facility] Drug Monitoring Program report was reviewed and was appropriate     A urine drug screen was collected at today's office visit as part of our medication management protocol  The point of care testing results were appropriate for what was being prescribed  The specimen will be sent for confirmatory testing  The drug screen is medically necessary because the patient is either dependent on opioid medication or is being considered for opioid medication therapy and the results could impact ongoing or future treatment  The drug screen is to evaluate for the presences or absence of prescribed, non-prescribed, and/or illicit drugs/substances  Belbuca last taken 7/20 AM      History of Present Illness: The patient is a 64 y o  female last seen on June 22, 2020 via tele medicine who presents for a follow up office visit in regards to chronic pain secondary to chronic low back pain  The patient currently reports pain rated 4/10 this is intermittent and most bothersome at night  She describes the pain as dull, aching, and pressure-like      Patient is status post a left sacroiliac joint injection on July 10, 2020 with Dr Praneeth Badillo  She is getting good relief following this injection  She tells me that she only has to use the Belbuca at night since the injection  This medication provides her 80% relief without any side effects or issues  Pain Contract Signed: 7/20  Last Urine Drug Screen:7/20/20    I have personally reviewed and/or updated the patient's past medical history, past surgical history, family history, social history, current medications, allergies, and vital signs today  Review of Systems:    Review of Systems   Respiratory: Negative for shortness of breath  Cardiovascular: Negative for chest pain  Gastrointestinal: Negative for constipation, diarrhea, nausea and vomiting  Musculoskeletal: Positive for gait problem and joint swelling  Negative for arthralgias and myalgias  Skin: Negative for rash  Neurological: Negative for dizziness, seizures and weakness  All other systems reviewed and are negative  Past Medical History:   Diagnosis Date    Anesthesia     "sometimes low BP upon waking up"    Arthritis     Asthma     Back pain     Dolan's esophagus     Chronic pain disorder     Chronic venous insufficiency     Cough variant asthma     Environmental allergies     dust    GERD (gastroesophageal reflux disease)     Hiatal hernia     History of anemia     History of fusion of spine for scoliosis     "as a teenager"    History of transfusion     2001    Hyperlipidemia     Left lumbar radiculitis     Last Assessed: [id number]    Lumbar postlaminectomy syndrome     Migraines     Morbid obesity ([address] )     Motion sickness     Osteoarthritis     of left hip    Right knee pain     Seasonal allergies     Shortness of breath     Umbilical hernia     Uses brace     right knee    Uses crutches     one    Wears glasses        Past Surgical History:   Procedure Laterality Date    ARTHRODESIS      lumbar    ARTHRODESIS      Spinal Arthrodesis for Deformity;  Last Assessed: [id number]    BACK SURGERY      lumbar fusion,with nydia/screw and cage implant    BACK SURGERY      surgery for scoliosis    BREAST CYST EXCISION Right     benign    COLONOSCOPY      COLONOSCOPY N/A 3/14/2019    Procedure: COLONOSCOPY;  Surgeon: Leslie Sood MD;  [facility]; Service: Gastroenterology    ESOPHAGOGASTRODUODENOSCOPY N/A 3/14/2019    Procedure: ESOPHAGOGASTRODUODENOSCOPY (EGD) W RFA(BARRX); Surgeon: Leslie Sood MD;  [facility]; Service: Gastroenterology    HERNIA REPAIR      umbilical hernia repair    PLANTAR FASCIA SURGERY Left     IN COLONOSCOPY FLX DX W/COLLJ SPEC WHEN PFRMD N/A 2/20/2018    Procedure: COLONOSCOPY with polypectomy;  Surgeon: Enrico Booth MD;  Location: [facility]; Service: General    IN ESOPHAGOGASTRODUODENOSCOPY TRANSORAL DIAGNOSTIC N/A 5/24/2017    Procedure: ESOPHAGOGASTRODUODENOSCOPY (EGD); Surgeon: Nirav Nuñez MD;  Location: [facility]; Service: Gastroenterology    IN ESOPHAGOGASTRODUODENOSCOPY TRANSORAL DIAGNOSTIC N/A 8/31/2017    Procedure: ESOPHAGOGASTRODUODENOSCOPY (EGD) W RFA;  Surgeon: Lali Pratt MD;  [facility];   Service: Gastroenterology    IN KNEE SCOPE,MED/LAT MENISECTOMY Right 4/4/2018    Procedure: ARTHROSCOPY KNEE PARTIAL MEDIAL MENISECTOMY , CHONDROPLASTY;  Surgeon: Kia Bronson DO;  [facility];  Service: Orthopedics    WISDOM TOOTH EXTRACTION         Family History   Problem Relation Age of Onset    Lung cancer Mother     Cancer Mother     Alcohol abuse Father     Other Father         Cardiac Disorder    Depression Father     Hypertension Father     Heart attack Father     Breast cancer Maternal Grandmother     Lung cancer Maternal Grandmother     Diabetes type I Other     Leukemia Paternal Grandmother     Stroke Neg Hx        Social History     Occupational History    Not on file   Tobacco Use    Smoking status: Never Smoker    Smokeless tobacco: Never Used   Substance and Sexual Activity    Alcohol use: Yes     Frequency: Monthly or less     Comment: minimal    Drug use: No    Sexual activity: Not Currently     Partners: Male         Current Outpatient Medications:     albuterol (2 5 mg/3 mL) 0 083 % nebulizer solution, VVN Q 4 H PRN, Disp: , Rfl: 3    azelastine (ASTELIN) 0 1 % nasal spray, 1 spray into each nostril 2 (two) times a day Use in each nostril as directed, Disp: 1 Bottle, Rfl: 6    Buprenorphine HCl (Belbuca) 75 MCG FILM, Apply 1 each to cheek 2 (two) times a day as needed (pain), Disp: 60 each, Rfl: 2    cetirizine (ZyrTEC) 10 mg tablet, Take 1 tablet by mouth daily  , Disp: , Rfl:     dexlansoprazole (DEXILANT) 60 MG capsule, Take 1 capsule (60 mg total) by mouth 2 (two) times a day, Disp: 60 capsule, Rfl: 0    DULoxetine (CYMBALTA) 60 mg delayed release capsule, Take 1 capsule (60 mg total) by mouth daily, Disp: 90 capsule, Rfl: 0    furosemide (LASIX) 20 mg tablet, TAKE 1 TABLET BY MOUTH DAILY, Disp: 90 tablet, Rfl: 5    montelukast (SINGULAIR) 10 mg tablet, Take 10 mg by mouth daily at bedtime, Disp: , Rfl:     omeprazole (PriLOSEC) 40 MG capsule, TAKE 1 CAPSULE BY MOUTH TWICE DAILY, Disp: 180 capsule, Rfl: 0    rOPINIRole (REQUIP) 2 mg tablet, One tablet at bedtime, Disp: 90 tablet, Rfl: 2    sodium chloride 0 9 % nebulizer solution, USE 3 ML VIA NEB 1-2 TIMES D PRN, Disp: , Rfl: 6    benzonatate (TESSALON PERLES) 100 mg capsule, Take 1 capsule (100 mg total) by mouth 3 (three) times a day as needed for cough (Patient not taking: Reported on 6/22/2020), Disp: 30 capsule, Rfl: 0    Allergies   Allergen Reactions    Dust Mite Extract Shortness Of Breath    Latex Itching and Blisters    Other      seasonal    Sulfa Antibiotics Vomiting     Topical-blistering       Physical Exam:    /88   Pulse 90   Temp 98 °F (36 7 °C)   Resp 16   Ht 5' 3" (1 6 m)   Wt 95 7 kg (211 lb)   BMI 37 38 kg/m²     Constitutional:normal, well developed, well nourished, alert, in no distress and non-toxic and no overt pain behavior    Eyes:anicteric  HEENT:grossly intact  Neck:supple, symmetric, trachea midline and no masses   Pulmonary:even and unlabored  Cardiovascular:No edema or pitting edema present  Skin:Normal without rashes or lesions and well hydrated  Psychiatric:Mood and affect appropriate  Neurologic:Cranial Nerves II-XII grossly intact  Musculoskeletal:normal      Imaging  No orders to display         Orders Placed This Encounter   Procedures    MM ALL_Prescribed Meds and Special Instructions    MM DT_Alprazolam Definitive Test    MM DT_Amphetamine Definitive Test    MM DT_Aripiprazole Definitive Test    MM DT_Bath Salts Definitive Test    MM DT_Buprenorphine Definitive Test    MM DT_Butalbital Definitive Test    MM DT_Clonazepam Definitive Test    MM DT_Clozapine Definitive Test    MM DT_Cocaine Definitive Test    MM DT_Codeine Definitive Test    MM DT_Desipramine Definitive Test    MM DT_Dextromethorphan Definitive Test    MM Diazepam Definitive Test    MM DT_Ethyl Glucuronide/Ethyl Sulfate Definitive Test    MM DT_Fentanyl Definitive Test    MM DT_Heroin Definitive Test    MM DT_Hydrocodone Definitive Test    MM DT_Hydromorphone Definitive Test    MM DT_Kratom Definitive Test    MM DT_Levorphanol Definitive Test    MM Lorazepam Definitive Test    MM DT_MDMA Definitive Test    MM DT_Meperidine Definitive Test    MM DT_Methadone Definitive Test    MM DT_Methamphetamine Definitive Test    MM DT_Methylphenidate Definitive Test    MM DT_Morphine Definitive Test    MM DT_Olanzapine Definitive Test    MM DT_Oxazepam Definitive Test    MM DT_Oxycodone Definitive Test    MM DT_Oxymorphone Definitive Test    MM DT_Phenobarbital Definitive Test    MM DT_Phentermine Definitive Test    MM DT_Secobarbital Definitive Test    MM DT_Spice Definitive Test    MM DT_Tapentadol Definitive Test    MM DT_Temazapam Definitive Test    MM DT_THC Definitive Test    MM DT_Tramadol Definitive Test    MM DT_Perform Validity Testing

## 2020-07-20 NOTE — TELEPHONE ENCOUNTER
Patient  [phone]  ZEB Keith    Patient is calling in asking if she can have a virtual visit today instead of an in office visit  Her appt is today 7/20/2020 @ 3 pm allan/ Rich [facility]  Please follow up asap about her appt   She said that if she doesn't answer her phone please leave a message thank you

## 2020-07-20 NOTE — PATIENT INSTRUCTIONS
Opioid Pain Management   AMBULATORY CARE:   An opioid  is a type of medicine used to treat pain  Examples of opioids are oxycodone, morphine, fentanyl, or codeine  Take opioid medicines as directed, for the condition it is prescribed:  Common problems that may occur when you do not take opioid medicines as directed include the following:  · Health problems  may occur  You may have trouble breathing  You may also develop liver or kidney damage, or stomach bleeding  Any of these health problems can become life-threatening  · Opioid dependence  means your body needs the opioid medicine to keep it from going through withdrawal      · Opioid tolerance  means the opioid does not control pain as well as it used to  You need higher doses of the opioid to get pain relief  · Opioid addiction  means you are not able to control the use of the opioid medicine  You use it when you do not have pain  You crave the opioid medicine  Call 911 or have someone call 911 for any of the following:   · You are breathing slower than normal, or you have trouble breathing  · You cannot be awakened  · You have a seizure  Seek care immediately if:   · Your heart is beating slower than usual     · Your heart feels like it is jumping or fluttering  · You are so sleepy that you cannot stay awake  · You have severe muscle pain or weakness  · You see or hear things that are not real   Contact your healthcare provider if:   · You are too dizzy to stand up  · Your pain gets worse or you have new pain  · You cannot do your usual activities because of side effects from the opioid  · You are constipated or have abdominal pain  · You have questions or concerns about your condition or care  Opioid safety measures:   · Take your medicine as directed  Ask if you need more information on how to take your medicine correctly  Follow up with your healthcare provider regularly  You may need to have your dose adjusted   Do not use opioid medicine if you are pregnant or breastfeeding  · Give your healthcare provider a list of all your medicines  Include any over-the-counter medicines, vitamins, and herbs  It can be dangerous to take opioids with certain other medicines, such as antihistamines  · Keep opioid medicine in a safe place  Store your opioid medicine in a locked cabinet to keep it away from children and others  · Do not drink alcohol while you use opioids  Alcohol use with an opioid medicine can make you sleepy and slow your breathing rate  You may stop breathing completely  · Do not drive or operate heavy machinery after you take opioid medicine  Opioid medicine can make you drowsy and make it hard to concentrate  You may injure yourself or others if you drive or operate heavy machinery while taking your medicine  · Drink fluids and eat high-fiber foods  Fluids and fiber will help prevent constipation  Ask your healthcare provider what fluids are right for you and how much you should drink  Also ask for a list of foods that contain fiber  Follow up with your healthcare provider as directed: You may need to have your dose adjusted  You may be referred to a pain specialist  Write down your questions so you remember to ask them during your visits  © 2017 [employer] Information is for End User's use only and may not be sold, redistributed or otherwise used for commercial purposes  All illustrations and images included in CareNotes® are the copyrighted property of [employer]  or Keith Ureña  The above information is an  only  It is not intended as medical advice for individual conditions or treatments  Talk to your doctor, nurse or pharmacist before following any medical regimen to see if it is safe and effective for you

## 2020-07-22 LAB
6MAM UR QL CFM: NEGATIVE NG/ML
7AMINOCLONAZEPAM UR QL CFM: NEGATIVE NG/ML
A-OH ALPRAZ UR QL CFM: NEGATIVE NG/ML
ACCEPTABLE CREAT UR QL: NORMAL MG/DL
ACCEPTIBLE SP GR UR QL: NORMAL
AMPHET UR QL CFM: NEGATIVE NG/ML
AMPHET UR QL CFM: NEGATIVE NG/ML
BUPRENORPHINE UR QL CFM: ABNORMAL NG/ML
BUTALBITAL UR QL CFM: NEGATIVE NG/ML
BZE UR QL CFM: NEGATIVE NG/ML
CODEINE UR QL CFM: NEGATIVE NG/ML
DESIPRAMINE UR QL CFM: NEGATIVE NG/ML
EDDP UR QL CFM: NEGATIVE NG/ML
ETHYL GLUCURONIDE UR QL CFM: NEGATIVE NG/ML
ETHYL SULFATE UR QL SCN: NEGATIVE NG/ML
FENTANYL UR QL CFM: NEGATIVE NG/ML
GLIADIN IGG SER IA-ACNC: NEGATIVE NG/ML
GLUCOSE 30M P 50 G LAC PO SERPL-MCNC: NEGATIVE NG/ML
HYDROCODONE UR QL CFM: NEGATIVE NG/ML
HYDROCODONE UR QL CFM: NEGATIVE NG/ML
HYDROMORPHONE UR QL CFM: NEGATIVE NG/ML
LORAZEPAM UR QL CFM: NEGATIVE NG/ML
MDMA UR QL CFM: NEGATIVE NG/ML
ME-PHENIDATE UR QL CFM: NEGATIVE NG/ML
MEPERIDINE UR QL CFM: NEGATIVE NG/ML
MEPHEDRONE UR QL CFM: NEGATIVE NG/ML
METHADONE UR QL CFM: NEGATIVE NG/ML
METHAMPHET UR QL CFM: NEGATIVE NG/ML
MORPHINE UR QL CFM: NEGATIVE NG/ML
MORPHINE UR QL CFM: NEGATIVE NG/ML
NITRITE UR QL: NORMAL UG/ML
NORBUPRENORPHINE UR QL CFM: ABNORMAL NG/ML
NORDIAZEPAM UR QL CFM: NEGATIVE NG/ML
NORFENTANYL UR QL CFM: NEGATIVE NG/ML
NORHYDROCODONE UR QL CFM: NEGATIVE NG/ML
NORHYDROCODONE UR QL CFM: NEGATIVE NG/ML
NORMEPERIDINE UR QL CFM: NEGATIVE NG/ML
NOROXYCODONE UR QL CFM: NEGATIVE NG/ML
OLANZAPINE QUANTIFICATION: NEGATIVE NG/ML
OPC-3373 QUANTIFICATION: NEGATIVE
OXAZEPAM UR QL CFM: NEGATIVE NG/ML
OXYCODONE UR QL CFM: NEGATIVE NG/ML
OXYMORPHONE UR QL CFM: NEGATIVE NG/ML
OXYMORPHONE UR QL CFM: NEGATIVE NG/ML
PHENOBARB UR QL CFM: NEGATIVE NG/ML
RESULT ALL_PRESCRIBED MEDS AND SPECIAL INSTRUCTIONS: NORMAL
SECOBARBITAL UR QL CFM: NEGATIVE NG/ML
SL AMB 3-METHYL-FENTANYL QUANTIFICATION: NORMAL NG/ML
SL AMB 4-ANPP QUANTIFICATION: NORMAL NG/ML
SL AMB 4-FIBF QUANTIFICATION: NORMAL NG/ML
SL AMB 5F-ADB-M7 METABOLITE QUANTIFICATION: NEGATIVE NG/ML
SL AMB 7-OH-MITRAGYNINE (KRATOM ALKALOID) QUANTIFICATION: NEGATIVE NG/ML
SL AMB AB-FUBINACA-M3 METABOLITE QUANTIFICATION: NEGATIVE NG/ML
SL AMB ACETYL FENTANYL QUANTIFICATION: NORMAL NG/ML
SL AMB ACETYL NORFENTANYL QUANTIFICATION: NORMAL NG/ML
SL AMB ACRYL FENTANYL QUANTIFICATION: NORMAL NG/ML
SL AMB BATH SALTS: NEGATIVE NG/ML
SL AMB BUTRYL FENTANYL QUANTIFICATION: NORMAL NG/ML
SL AMB CARFENTANIL QUANTIFICATION: NORMAL NG/ML
SL AMB CLOZAPINE QUANTIFICATION: NEGATIVE NG/ML
SL AMB CTHC (MARIJUANA METABOLITE) QUANTIFICATION: NEGATIVE NG/ML
SL AMB CYCLOPROPYL FENTANYL QUANTIFICATION: NORMAL NG/ML
SL AMB DEXTROMETHORPHAN QUANTIFICATION: NEGATIVE NG/ML
SL AMB DEXTRORPHAN (DEXTROMETHORPHAN METABOLITE) QUANT: NEGATIVE NG/ML
SL AMB DEXTRORPHAN (DEXTROMETHORPHAN METABOLITE) QUANT: NEGATIVE NG/ML
SL AMB FURANYL FENTANYL QUANTIFICATION: NORMAL NG/ML
SL AMB JWH018 METABOLITE QUANTIFICATION: NEGATIVE NG/ML
SL AMB JWH073 METABOLITE QUANTIFICATION: NEGATIVE NG/ML
SL AMB MDMB-FUBINACA-M1 METABOLITE QUANTIFICATION: NEGATIVE NG/ML
SL AMB METHOXYACETYL FENTANYL QUANTIFICATION: NORMAL NG/ML
SL AMB METHYLONE QUANTIFICATION: NEGATIVE NG/ML
SL AMB N-DESMETHYL U-47700 QUANTIFICATION: NORMAL NG/ML
SL AMB N-DESMETHYL-TRAMADOL QUANTIFICATION: NEGATIVE NG/ML
SL AMB N-DESMETHYLCLOZAPINE QUANTIFICATION: NEGATIVE NG/ML
SL AMB PHENTERMINE QUANTIFICATION: NEGATIVE NG/ML
SL AMB RCS4 METABOLITE QUANTIFICATION: NEGATIVE NG/ML
SL AMB RITALINIC ACID QUANTIFICATION: NEGATIVE NG/ML
SL AMB U-47700 QUANTIFICATION: NORMAL NG/ML
SPECIMEN DRAWN SERPL: NEGATIVE NG/ML
SPECIMEN PH ACCEPTABLE UR: NORMAL
TAPENTADOL UR QL CFM: NEGATIVE NG/ML
TEMAZEPAM UR QL CFM: NEGATIVE NG/ML
TEMAZEPAM UR QL CFM: NEGATIVE NG/ML
TRAMADOL UR QL CFM: NEGATIVE NG/ML
URATE/CREAT 24H UR: NEGATIVE NG/ML

## 2020-07-29 DIAGNOSIS — F32.1 CURRENT MODERATE EPISODE OF MAJOR DEPRESSIVE DISORDER WITHOUT PRIOR EPISODE (HCC): ICD-10-CM

## 2020-07-29 RX ORDER — DULOXETIN HYDROCHLORIDE 60 MG/1
60 CAPSULE, DELAYED RELEASE ORAL DAILY
Qty: 90 CAPSULE | Refills: 0 | Status: SHIPPED | OUTPATIENT
Start: 2020-07-29 | End: 2020-08-27 | Stop reason: DRUGHIGH

## 2020-08-24 DIAGNOSIS — G25.81 RESTLESS LEG SYNDROME: ICD-10-CM

## 2020-08-26 RX ORDER — ROPINIROLE 2 MG/1
TABLET, FILM COATED ORAL
Qty: 90 TABLET | Refills: 2 | Status: SHIPPED | OUTPATIENT
Start: 2020-08-26 | End: 2021-05-10 | Stop reason: SDUPTHER

## 2020-08-27 ENCOUNTER — OFFICE VISIT (OUTPATIENT)
Dept: INTERNAL MEDICINE CLINIC | Facility: CLINIC | Age: 60
End: 2020-08-27
Payer: MEDICARE

## 2020-08-27 VITALS
TEMPERATURE: 98.2 F | DIASTOLIC BLOOD PRESSURE: 82 MMHG | OXYGEN SATURATION: 98 % | HEART RATE: 95 BPM | SYSTOLIC BLOOD PRESSURE: 142 MMHG | BODY MASS INDEX: 37.38 KG/M2 | HEIGHT: 63 IN

## 2020-08-27 DIAGNOSIS — J45.20 MILD INTERMITTENT ASTHMA WITHOUT COMPLICATION: ICD-10-CM

## 2020-08-27 DIAGNOSIS — G25.81 RESTLESS LEGS SYNDROME: ICD-10-CM

## 2020-08-27 DIAGNOSIS — K43.9 HERNIA OF ANTERIOR ABDOMINAL WALL: ICD-10-CM

## 2020-08-27 DIAGNOSIS — E78.01 FAMILIAL HYPERCHOLESTEROLEMIA: ICD-10-CM

## 2020-08-27 DIAGNOSIS — M47.816 LUMBAR SPONDYLOSIS: Primary | ICD-10-CM

## 2020-08-27 DIAGNOSIS — K21.9 GASTROESOPHAGEAL REFLUX DISEASE, ESOPHAGITIS PRESENCE NOT SPECIFIED: ICD-10-CM

## 2020-08-27 DIAGNOSIS — D17.1 LIPOMA OF TORSO: ICD-10-CM

## 2020-08-27 PROCEDURE — 99214 OFFICE O/P EST MOD 30 MIN: CPT | Performed by: INTERNAL MEDICINE

## 2020-08-27 PROCEDURE — 1036F TOBACCO NON-USER: CPT | Performed by: INTERNAL MEDICINE

## 2020-08-27 RX ORDER — DULOXETIN HYDROCHLORIDE 30 MG/1
30 CAPSULE, DELAYED RELEASE ORAL DAILY
Qty: 30 CAPSULE | Refills: 1 | Status: SHIPPED | OUTPATIENT
Start: 2020-08-27 | End: 2020-09-21

## 2020-08-27 NOTE — PROGRESS NOTES
[facility]      NAME: Nuvia Ramsay  AGE: 64 y o  SEX: female  : 1960   MRN: [id number]    DATE: 2020  TIME: 3:05 PM    Assessment and Plan   1  Lumbar spondylosis  The patient continues to have discomfort  She has been seeing pain management  She was started on Cymbalta quite a while ago for as a pain modulator but does not think that this has been helpful  Because of this, she is not taking tramadol because of potential drug interaction  She said when she had been taking tramadol she found that rather helpful  In light of this, we will begin to taper Cymbalta  The patient will continue follow-up with pain management  - DULoxetine (CYMBALTA) 30 mg delayed release capsule; Take 1 capsule (30 mg total) by mouth daily  Dispense: 30 capsule; Refill: 1    2  Lipoma of torso  The patient finds this bothersome and would like surgical evaluation   - Ambulatory referral to General Surgery; Future    3  Hernia of anterior abdominal wall  This appears to be recurrent  Surgical evaluation has been requested  - Ambulatory referral to General Surgery; Future    4  Familial hypercholesterolemia  Check lipid profile  - CBC  - Comprehensive metabolic panel  - Lipid panel    5  Gastroesophageal reflux disease, esophagitis presence not specified  Continue PPI therapy  6  Mild intermittent asthma without complication  Stable on current therapy  7  Restless legs syndrome  This has been an ongoing problem  She did do very well on gabapentin  She thinks that she is having some side effects from ropinirole  She said that her restless legs were much better when she was on oxycodone  She will discuss this with Dr Javid Campo            Return to office in:  3 months    Chief Complaint     Chief Complaint   Patient presents with    Follow-up    Immunizations     tdap and pneumo 23        History of Present Illness     The patient came to the office for evaluation of esophageal reflux with Dolan's esophagus, mild intermittent asthma, hypercholesterolemia, and restless leg syndrome  She says that she has been having ongoing pain in her back and hips  She sees Dr Ethan Wylie of pain management  She has difficulty sleeping on her left side because of joint discomfort  If she does not sleep on her left side, she has difficulty with reflux  She also has restless legs that is contributing to poor sleep  The patient has no chest pain  She has mild shortness of breath which she attributes to her weight  She did have COVID-19 pneumonia but she thinks that she is back to her usual baseline  She also notices a bulge in her anterior abdominal wall in thinks that she has a hernia  The following portions of the patient's history were reviewed and updated as appropriate: allergies, current medications, past family history, past medical history, past social history, past surgical history and problem list     Review of Systems   Review of Systems   Constitutional: Negative  HENT: Negative for congestion, ear pain, postnasal drip, rhinorrhea, sore throat and trouble swallowing  Eyes: Negative for pain, discharge, redness and visual disturbance  Respiratory: Negative for cough, shortness of breath and wheezing  Cardiovascular: Negative  Gastrointestinal: Negative  Endocrine: Negative  Genitourinary: Negative for difficulty urinating, dysuria, frequency, hematuria and urgency  Musculoskeletal: Positive for arthralgias and back pain  Negative for gait problem, joint swelling and myalgias  Skin: Negative for rash  Neurological: Negative for dizziness, speech difficulty, weakness, light-headedness, numbness and headaches  Hematological: Negative  Psychiatric/Behavioral: Negative for confusion, decreased concentration, dysphoric mood and sleep disturbance  The patient is not nervous/anxious          Active Problem List     Patient Active Problem List   Diagnosis    Arthritis of lumbar spine    Chronic low back pain    Chronic pain disorder    Chronic venous insufficiency    Cough variant asthma    Depression    Hyperlipidemia    Lumbar postlaminectomy syndrome    Obesity, Class III, BMI 40-49 9 (morbid obesity) (HCC)    Primary osteoarthritis of left hip    Restless legs syndrome    Sleep disturbance    Hernia of anterior abdominal wall    S/P right knee arthroscopy    Environmental allergies    Lumbar spondylosis    Lumbar radiculopathy    Prediabetes    Vitamin D deficiency    Dolan's esophagus with dysplasia    Gastroesophageal reflux disease    Laryngopharyngeal reflux (LPR)    Chronic cough    Vocal fold paresis, right    Dysphonia    Muscle tension dysphonia    Glottic insufficiency    Reflux laryngitis    Laryngeal edema    Allergic rhinitis due to [employer] house dust mite    Mild intermittent asthma without complication    Upper airway cough syndrome    Neck pain    Cervical spondylosis without myelopathy    Osteoarthritis of multiple joints    COVID-19 virus infection    Medicare welcome exam    Sacroiliitis (HCC)    Lipoma of torso       Objective   /82 (BP Location: Left arm, Patient Position: Sitting, Cuff Size: Large)   Pulse 95   Temp 98 2 °F (36 8 °C) (Tympanic)   Ht 5' 3" (1 6 m)   SpO2 98%   BMI 37 38 kg/m²     Physical Exam  Constitutional:       General: She is not in acute distress  Appearance: She is well-developed  She is obese  HENT:      Head: Normocephalic and atraumatic  Neck:      Musculoskeletal: Neck supple  Thyroid: No thyromegaly  Vascular: No JVD  Trachea: No tracheal deviation  Cardiovascular:      Rate and Rhythm: Normal rate and regular rhythm  Heart sounds: Normal heart sounds  No murmur  No friction rub  No gallop  Pulmonary:      Effort: Pulmonary effort is normal       Breath sounds: Normal breath sounds  No wheezing or rales  Chest:      Chest wall: No tenderness  Abdominal:      General: Bowel sounds are normal  There is no distension  Palpations: Abdomen is soft  There is no mass  Tenderness: There is no abdominal tenderness  There is no rebound  Hernia: A hernia is present  Comments: There was an incisional hernia just above her previous transverse incision   Musculoskeletal: Normal range of motion  General: No tenderness  Lymphadenopathy:      Cervical: No cervical adenopathy  Skin:     General: Skin is warm and dry  Neurological:      Mental Status: She is alert and oriented to person, place, and time  Psychiatric:         Mood and Affect: Mood normal          Behavior: Behavior normal          Thought Content:  Thought content normal          Judgment: Judgment normal          Pertinent Laboratory/Diagnostic Studies:  Office Visit on 07/20/2020   Component Date Value Ref Range Status    RESULT ALL_PRESC MEDS SP INSTRNS 36/24/1453 Not Applicable   Final    Alpha-Hydroxyalprazolam Quantifica* 07/20/2020 negative  20 ng/mL Final    Amphetamine Quantification 07/20/2020 negative  100 ng/mL Final    Aripiprazole Quantification 07/20/2020 negative  5 ng/mL Final    [id number] QUANTIFICATION 07/20/2020 negative  15 Final    BATH SALTS 07/20/2020 negative  3 ng/mL Final    Mephedrone Quantification 07/20/2020 negative  3 ng/mL Final    METHYLONE QUANTIFICATION 07/20/2020 negative  3 ng/mL Final    Buprenorphine Quantification 07/20/2020 negative-I* 5 ng/mL Final    Norbuprenorphine Quantification 07/20/2020 negative-I* 20 ng/mL Final    Butalbital Quantification 07/20/2020 negative  200 ng/mL Final    7-Amino-Clonazepam Quantification * 07/20/2020 negative  20 ng/mL Final    Clozapine Quantification 07/20/2020 negative  25 ng/mL Final    N-desmethylclozapine Quantification 07/20/2020 negative  25 ng/mL Final    Cocaine metabolite Quantification 07/20/2020 negative  50 ng/mL Final    Codeine Quantification 07/20/2020 negative 50 ng/mL Final    Morphine Quantification 07/20/2020 negative  50 ng/mL Final    Hydrocodone Quantification 07/20/2020 negative  50 ng/mL Final    Norhydrocodone Quantification 07/20/2020 negative  50 ng/mL Final    Hydromorphone Quantification 07/20/2020 negative  50 ng/mL Final    Desipramine Quantification 07/20/2020 negative  50 ng/mL Final    Dextromethorphan Quantification 07/20/2020 negative  50 ng/mL Final    Dextrorphan (Dextromethorphan meta* 07/20/2020 negative  50 ng/mL Final    Nordiazepam Quantification 07/20/2020 negative  40 ng/mL Final    Temazepam Quantification 07/20/2020 negative  50 ng/mL Final    Oxazepam Quantification 07/20/2020 negative  40 ng/mL Final    Ethyl Glucuronide Quantification 07/20/2020 negative  500 ng/mL Final    Ethyl Sulfate Quantification 07/20/2020 negative  500 ng/mL Final    Fentanyl Quantification 07/20/2020 negative  1 ng/mL Final    Norfentanyl Quantification 07/20/2020 negative  8 ng/mL Final    3-methyl-fentanyl Quantification 07/20/2020 Fen Neg  2 ng/mL Final    4-ANPP Quantification 07/20/2020 Fen Neg  2 ng/mL Final    4-FiBF Quantification 07/20/2020 Fen Neg  2 ng/mL Final    Acetyl fentanyl Quantification 07/20/2020 Fen Neg  2 ng/mL Final    Acetyl norfentanyl Quantification 07/20/2020 Fen Neg  5 ng/mL Final    Acryl fentanyl Quantification 07/20/2020 Fen Neg  1 ng/mL Final    Butryl fentanyl Quantification 07/20/2020 Fen Neg  1 ng/mL Final    Carfentanil Quantification 07/20/2020 Fen Neg  2 ng/mL Final    Cyclopropyl fentanyl Quantification 07/20/2020 Fen Neg  1 ng/mL Final    Furanyl fentanyl Quantification 07/20/2020 Fen Neg  2 ng/mL Final    Methoxyacetyl fentanyl Quantificat* 07/20/2020 Fen Neg  2 ng/mL Final    [id number] Quantification 07/20/2020 Fen Neg  2 ng/mL Final    N-desmethyl [id number] Quantification 07/20/2020 Fen Neg  2 ng/mL Final    6-MADHAVI (Heroin metabolite) Quantifi* 07/20/2020 negative  10 ng/mL Final    Hydrocodone Quantification 07/20/2020 negative  50 ng/mL Final    Norhydrocodone Quantification 07/20/2020 negative  50 ng/mL Final    Hydromorphone Quantification 07/20/2020 negative  50 ng/mL Final    Hydromorphone Quantification 07/20/2020 negative  50 ng/mL Final    Mitragynine (Kratom alkaloid) Suman* 07/20/2020 negative  1 ng/mL Final    9-IT-Rmbefxpyxqm (Kratom alkaloid)* 07/20/2020 negative  1 ng/mL Final    Dextrorphan (Dextromethorphan meta* 07/20/2020 negative  50 ng/mL Final    Lorazepam Quantification 07/20/2020 negative  40 ng/mL Final    MDMA Quantification 07/20/2020 negative  100 ng/mL Final    Meperidine Quantification 07/20/2020 negative  50 ng/mL Final    Normeperidine Quantification 07/20/2020 negative  50 ng/mL Final    Methadone Quantification 07/20/2020 negative  100 ng/mL Final    EDDP (Methadone metabolite) Quanti* 07/20/2020 negative  100 ng/mL Final    Amphetamine Quantification 07/20/2020 negative  100 ng/mL Final    Methamphetamine Quantification 07/20/2020 negative  100 ng/mL Final    Methylphenidate Quantification 07/20/2020 negative  50 ng/mL Final    RITALINIC ACID QUANTIFICATION 07/20/2020 negative  50 ng/mL Final    Morphine Quantification 07/20/2020 negative  50 ng/mL Final    Hydromorphone Quantification 07/20/2020 negative  50 ng/mL Final    OLANZAPINE QUANTIFICATION 07/20/2020 negative  25 ng/mL Final    Oxazepam Quantification 07/20/2020 negative  40 ng/mL Final    Oxycodone Quantification 07/20/2020 negative  50 ng/mL Final    Noroxycodone Quantification 07/20/2020 negative  50 ng/mL Final    Oxymorphone Quantification 07/20/2020 negative  50 ng/mL Final    Oxymorphone Quantification 07/20/2020 negative  50 ng/mL Final    Phenobarbital Quantification 07/20/2020 negative  200 ng/mL Final    PHENTERMINE QUANTIFICATION 07/20/2020 negative  50 ng/mL Final    Secobarbital Quantification 07/20/2020 negative  200 ng/mL Final    5F-ADB-M7 07/20/2020 negative  10 ng/mL Final    OI-XFCQGVPP-O2 METABOLITE QUANTIFI* 07/20/2020 negative  10 ng/mL Final    ERIW-OWAZWYOF-G0 METABOLITE QUANTI* 07/20/2020 negative  10 ng/mL Final    MDT605 metabolite Quantification 07/20/2020 negative  10 ng/mL Final    PGN923 metabolite Quantification 07/20/2020 negative  10 ng/mL Final    RCS4 METABOLITE QUANTIFICATION 07/20/2020 negative  10 ng/mL Final    XOG18/ METABOLITE QUANTIFICAT* 07/20/2020 negative  10 ng/mL Final    Tapentadol Quantification 07/20/2020 negative  50 ng/mL Final    Temazepam Quantification 07/20/2020 negative  50 ng/mL Final    Oxazepam Quantification 07/20/2020 negative  40 ng/mL Final    cTHC (Marijuana metabolite) Quanti* 07/20/2020 negative  15 ng/mL Final    Tramadol Quantification 07/20/2020 negative  100 ng/mL Final    O-desmethyl-tramadol Quantification 07/20/2020 negative  100 ng/mL Final    N-DESMETHYL-TRAMADOL QUANTIFICATION 07/20/2020 negative  100 ng/mL Final    CREATININE 07/20/2020 normal  >20 mg/dL mg/dL Final    OXIDANT 07/20/2020 normal  <200 ug/mL ug/mL Final    pH 07/20/2020 normal  4 5 - 9 5 Final    SPECIFIC GRAVITY URINE 07/20/2020 normal  1 003 - 1 035 Final   Orders Only on 07/02/2020   Component Date Value Ref Range Status    SARS-CoV-2  07/02/2020 Not Detected  Not Detected Final           Current Medications     Current Outpatient Medications:     albuterol (2 5 mg/3 mL) 0 083 % nebulizer solution, VVN Q 4 H PRN, Disp: , Rfl: 3    azelastine (ASTELIN) 0 1 % nasal spray, 1 spray into each nostril 2 (two) times a day Use in each nostril as directed, Disp: 1 Bottle, Rfl: 6    Buprenorphine HCl (Belbuca) 75 MCG FILM, Apply 1 each to cheek 2 (two) times a day as needed (pain), Disp: 60 each, Rfl: 2    cetirizine (ZyrTEC) 10 mg tablet, Take 1 tablet by mouth daily  , Disp: , Rfl:     dexlansoprazole (DEXILANT) 60 MG capsule, Take 1 capsule (60 mg total) by mouth 2 (two) times a day, Disp: 60 capsule, Rfl: 0    DULoxetine (CYMBALTA) 30 mg delayed release capsule, Take 1 capsule (30 mg total) by mouth daily, Disp: 30 capsule, Rfl: 1    furosemide (LASIX) 20 mg tablet, TAKE 1 TABLET BY MOUTH DAILY, Disp: 90 tablet, Rfl: 5    montelukast (SINGULAIR) 10 mg tablet, Take 10 mg by mouth daily at bedtime, Disp: , Rfl:     omeprazole (PriLOSEC) 40 MG capsule, TAKE 1 CAPSULE BY MOUTH TWICE DAILY, Disp: 180 capsule, Rfl: 0    rOPINIRole (REQUIP) 2 mg tablet, TAKE 1 TABLET BY MOUTH AT BEDTIME, Disp: 90 tablet, Rfl: 2    sodium chloride 0 9 % nebulizer solution, USE 3 ML VIA NEB 1-2 TIMES D PRN, Disp: , Rfl: 6      Marrian Cabot, MD

## 2020-09-01 ENCOUNTER — TELEPHONE (OUTPATIENT)
Dept: PAIN MEDICINE | Facility: MEDICAL CENTER | Age: 60
End: 2020-09-01

## 2020-09-01 NOTE — TELEPHONE ENCOUNTER
Patient stopped taking Buprenorphine HCl (Belbuca) 75 MCG FILM     It did not work, she is unable to sleep at night would like to see Joshua Garcia to figure out what other medication can be prescribed? She will be fine coming in to urinate in a cup she states      Please advise,     Thank you    [phone]

## 2020-09-01 NOTE — TELEPHONE ENCOUNTER
S/W SHARMILA about previous task  He stated to schedule SOVS     S/W pt   Scheduled pt for SOVS on 9/21 at 9:45 w/ SHARMILA  Pt requested SHARMILA appt

## 2020-09-15 ENCOUNTER — TELEPHONE (OUTPATIENT)
Dept: ADMINISTRATIVE | Facility: OTHER | Age: 60
End: 2020-09-15

## 2020-09-15 NOTE — TELEPHONE ENCOUNTER
Upon review of the In Basket request we were able to locate, review, and update the patient chart as requested for Immunization(s)  Any additional questions or concerns should be emailed to the Practice Liaisons via [email or web address] email, please do not reply via In Basket      Thank you  Alyssa Cano MA

## 2020-09-16 ENCOUNTER — APPOINTMENT (OUTPATIENT)
Dept: LAB | Facility: HOSPITAL | Age: 60
End: 2020-09-16
Attending: INTERNAL MEDICINE
Payer: MEDICARE

## 2020-09-16 LAB
ALBUMIN SERPL BCP-MCNC: 3.3 G/DL (ref 3.5–5)
ALP SERPL-CCNC: 77 U/L (ref 46–116)
ALT SERPL W P-5'-P-CCNC: 32 U/L (ref 12–78)
ANION GAP SERPL CALCULATED.3IONS-SCNC: 7 MMOL/L (ref 4–13)
AST SERPL W P-5'-P-CCNC: 20 U/L (ref 5–45)
BILIRUB SERPL-MCNC: 0.21 MG/DL (ref 0.2–1)
BUN SERPL-MCNC: 13 MG/DL (ref 5–25)
CALCIUM ALBUM COR SERPL-MCNC: 9.4 MG/DL (ref 8.3–10.1)
CALCIUM SERPL-MCNC: 8.8 MG/DL (ref 8.3–10.1)
CHLORIDE SERPL-SCNC: 105 MMOL/L (ref 100–108)
CHOLEST SERPL-MCNC: 230 MG/DL (ref 50–200)
CO2 SERPL-SCNC: 30 MMOL/L (ref 21–32)
CREAT SERPL-MCNC: 0.68 MG/DL (ref 0.6–1.3)
ERYTHROCYTE [DISTWIDTH] IN BLOOD BY AUTOMATED COUNT: 14.6 % (ref 11.6–15.1)
GFR SERPL CREATININE-BSD FRML MDRD: 97 ML/MIN/1.73SQ M
GLUCOSE P FAST SERPL-MCNC: 119 MG/DL (ref 65–99)
HCT VFR BLD AUTO: 38.7 % (ref 34.8–46.1)
HDLC SERPL-MCNC: 41 MG/DL
HGB BLD-MCNC: 11.3 G/DL (ref 11.5–15.4)
LDLC SERPL CALC-MCNC: 140 MG/DL (ref 0–100)
MCH RBC QN AUTO: 25.6 PG (ref 26.8–34.3)
MCHC RBC AUTO-ENTMCNC: 29.2 G/DL (ref 31.4–37.4)
MCV RBC AUTO: 88 FL (ref 82–98)
NONHDLC SERPL-MCNC: 189 MG/DL
PLATELET # BLD AUTO: 304 THOUSANDS/UL (ref 149–390)
PMV BLD AUTO: 9.1 FL (ref 8.9–12.7)
POTASSIUM SERPL-SCNC: 3.3 MMOL/L (ref 3.5–5.3)
PROT SERPL-MCNC: 7.1 G/DL (ref 6.4–8.2)
RBC # BLD AUTO: 4.41 MILLION/UL (ref 3.81–5.12)
SODIUM SERPL-SCNC: 142 MMOL/L (ref 136–145)
TRIGL SERPL-MCNC: 246 MG/DL
WBC # BLD AUTO: 7.94 THOUSAND/UL (ref 4.31–10.16)

## 2020-09-16 PROCEDURE — 80053 COMPREHEN METABOLIC PANEL: CPT | Performed by: INTERNAL MEDICINE

## 2020-09-16 PROCEDURE — 80061 LIPID PANEL: CPT | Performed by: INTERNAL MEDICINE

## 2020-09-16 PROCEDURE — 36415 COLL VENOUS BLD VENIPUNCTURE: CPT | Performed by: INTERNAL MEDICINE

## 2020-09-16 PROCEDURE — 85027 COMPLETE CBC AUTOMATED: CPT | Performed by: INTERNAL MEDICINE

## 2020-09-21 ENCOUNTER — OFFICE VISIT (OUTPATIENT)
Dept: PAIN MEDICINE | Facility: MEDICAL CENTER | Age: 60
End: 2020-09-21
Payer: MEDICARE

## 2020-09-21 VITALS
TEMPERATURE: 98 F | HEIGHT: 64 IN | BODY MASS INDEX: 36.22 KG/M2 | DIASTOLIC BLOOD PRESSURE: 81 MMHG | SYSTOLIC BLOOD PRESSURE: 137 MMHG | HEART RATE: 92 BPM

## 2020-09-21 DIAGNOSIS — M46.1 SACROILIITIS (HCC): Primary | ICD-10-CM

## 2020-09-21 DIAGNOSIS — M54.50 CHRONIC BILATERAL LOW BACK PAIN WITHOUT SCIATICA: ICD-10-CM

## 2020-09-21 DIAGNOSIS — G89.29 CHRONIC BILATERAL LOW BACK PAIN WITHOUT SCIATICA: ICD-10-CM

## 2020-09-21 PROCEDURE — 99214 OFFICE O/P EST MOD 30 MIN: CPT | Performed by: PHYSICAL MEDICINE & REHABILITATION

## 2020-09-21 RX ORDER — TRAMADOL HYDROCHLORIDE 50 MG/1
50 TABLET ORAL EVERY 8 HOURS PRN
Qty: 90 TABLET | Refills: 1 | Status: SHIPPED | OUTPATIENT
Start: 2020-09-21 | End: 2020-10-21 | Stop reason: ALTCHOICE

## 2020-09-21 NOTE — PROGRESS NOTES
Assessment:  1  Sacroiliitis ([address] )    2  Chronic bilateral low back pain without sciatica        Plan:  1  D/C Cymbalta  2  D/C Belbuca  3  Start Tramadol 50 mg q8 prn  4  Schedule for bilateral S1-3 lateral branch nerve blocks  Patient did very well initially with sacroiliac joint injections but the pain returned quickly and she did not receive adequate steroid response  She would likely benefit from radiofrequency ablation  5  Recommend against using oxycodone for restless leg syndrome, recommend the patient follow-up with her sleep specialist for assistance with managing this condition better  6  Follow-up with primary care physician regarding bilateral lower extremity swelling  7  Also discussed the importance of regular physical activity and exercise, she likely would benefit from aquatherapy program   Currently is not interested in doing this secondary to concerns related to [place]  I did review the [place] Prescription Drug Monitoring Program website today which was appropriate for the medications being prescribed  My impressions and treatment recommendations were discussed in detail with the patient who verbalized understanding and had no further questions  Discharge instructions were provided  I personally saw and examined the patient and I agree with the above discussed plan of care  Orders Placed This Encounter   Procedures    FL spine and pain procedure     Standing Status:   Future     Standing Expiration Date:   9/21/2021     Order Specific Question:   Reason for Exam:     Answer:   (B) S1-S3 LBB     Order Specific Question:   Anticoagulant hold needed?      Answer:   no     New Medications Ordered This Visit   Medications    traMADol (ULTRAM) 50 mg tablet     Sig: Take 1 tablet (50 mg total) by mouth every 8 (eight) hours as needed for moderate pain     Dispense:  90 tablet     Refill:  1       History of Present Illness:  Miracle Thomas is a 64 y o  female who presents for a follow up office visit in regards to bilateral lower back pain  She did do very well with sacroiliac joint injections in July but only had about a week's worth of improvement  Her pain has returned and is quite significant  She rates this as an 8/10 and is leading to significant functional deficits  She does have history of Dolan's esophagus and cannot take NSAID medications  She is not doing well with [employer] go nor Cymbalta  We will discontinue these medications in favor of returning to tramadol  She otherwise describes constant dull, aching, throbbing pain in her lower back  She is also having some pain about the right knee secondary to swelling  She is interested in transitioning medications at this time but is hopeful not to have to rely on chronic use of medications  I have personally reviewed and/or updated the patient's past medical history, past surgical history, family history, social history, current medications, allergies, and vital signs today  Review of Systems   Constitutional: Negative  HENT: Negative  Eyes: Negative  Respiratory: Negative  Cardiovascular: Negative  Gastrointestinal: Negative  Endocrine: Negative  Genitourinary: Negative  Musculoskeletal: Positive for back pain (Lower)  Skin: Negative  Allergic/Immunologic: Negative  Neurological: Negative  Hematological: Negative  Psychiatric/Behavioral: Negative          Patient Active Problem List   Diagnosis    Arthritis of lumbar spine    Chronic low back pain    Chronic pain disorder    Chronic venous insufficiency    Cough variant asthma    Depression    Hyperlipidemia    Lumbar postlaminectomy syndrome    Obesity, Class III, BMI 40-49 9 (morbid obesity) [facility] [address]    Primary osteoarthritis of left hip    Restless legs syndrome    Sleep disturbance    Hernia of anterior abdominal wall    S/P right knee arthroscopy    Environmental allergies    Lumbar spondylosis    Lumbar radiculopathy    Prediabetes    Vitamin D deficiency    Dolan's esophagus with dysplasia    Gastroesophageal reflux disease    Laryngopharyngeal reflux (LPR)    Chronic cough    Vocal fold paresis, right    Dysphonia    Muscle tension dysphonia    Glottic insufficiency    Reflux laryngitis    Laryngeal edema    Allergic rhinitis due to [employer] house dust mite    Mild intermittent asthma without complication    Upper airway cough syndrome    Neck pain    Cervical spondylosis without myelopathy    Osteoarthritis of multiple joints    COVID-19 virus infection    Medicare welcome exam    [address]ili[address] [address]    Lipoma of torso       Past Medical History:   Diagnosis Date    Anesthesia     "sometimes low BP upon waking up"    Arthritis     Asthma     Back pain     Dolan's esophagus     Chronic pain disorder     Chronic venous insufficiency     Cough variant asthma     Environmental allergies     dust    GERD (gastroesophageal reflux disease)     Hiatal hernia     History of anemia     History of fusion of spine for scoliosis     "as a teenager"    History of transfusion     2001    Hyperlipidemia     Left lumbar radiculitis     Last Assessed: [id number]    Lumbar postlaminectomy syndrome     Migraines     Morbid obesity ([facility] [address] )     Motion sickness     Osteoarthritis     of left hip    Right knee pain     Seasonal allergies     Shortness of breath     Umbilical hernia     Uses brace     right knee    Uses crutches     one    Wears glasses        Past Surgical History:   Procedure Laterality Date    ARTHRODESIS      lumbar    ARTHRODESIS      Spinal Arthrodesis for Deformity;  Last Assessed: [id number]    BACK SURGERY      lumbar fusion,with nydia/screw and cage implant    BACK SURGERY      surgery for scoliosis    BREAST CYST EXCISION Right     benign    COLONOSCOPY      COLONOSCOPY N/A 3/14/2019    Procedure: COLONOSCOPY;  Surgeon: Lorna Amaya MD;  [facility]; Service: Gastroenterology    ESOPHAGOGASTRODUODENOSCOPY N/A 3/14/2019    Procedure: ESOPHAGOGASTRODUODENOSCOPY (EGD) W RFA(BARRX); Surgeon: Alberto Medina MD;  [facility]; Service: Gastroenterology    HERNIA REPAIR      umbilical hernia repair    PLANTAR FASCIA SURGERY Left     SC COLONOSCOPY FLX DX W/COLLJ SPEC WHEN PFRMD N/A 2/20/2018    Procedure: COLONOSCOPY with polypectomy;  Surgeon: Chase Helton MD;  [facility]; Service: General    SC ESOPHAGOGASTRODUODENOSCOPY TRANSORAL DIAGNOSTIC N/A 5/24/2017    Procedure: ESOPHAGOGASTRODUODENOSCOPY (EGD); Surgeon: Lisbet Awan MD;  [facility]; Service: Gastroenterology    SC ESOPHAGOGASTRODUODENOSCOPY TRANSORAL DIAGNOSTIC N/A 8/31/2017    Procedure: ESOPHAGOGASTRODUODENOSCOPY (EGD) W RFA;  Surgeon: Lorna Pablo MD;  [facility];   Service: Gastroenterology    SC KNEE SCOPE,MED/LAT MENISECTOMY Right 4/4/2018    Procedure: ARTHROSCOPY KNEE PARTIAL MEDIAL MENISECTOMY , CHONDROPLASTY;  Surgeon: Cristina Perry DO;  [facility];  Service: Orthopedics    WISDOM TOOTH EXTRACTION         Family History   Problem Relation Age of Onset    Lung cancer Mother     Cancer Mother     Alcohol abuse Father     Other Father         Cardiac Disorder    Depression Father     Hypertension Father     Heart attack Father     Breast cancer Maternal Grandmother     Lung cancer Maternal Grandmother     Diabetes type I Other     Leukemia Paternal Grandmother     Stroke Neg Hx        Social History     Occupational History    Not on file   Tobacco Use    Smoking status: Never Smoker    Smokeless tobacco: Never Used   Substance and Sexual Activity    Alcohol use: Yes     Frequency: Monthly or less     Comment: minimal    Drug use: No    Sexual activity: Not Currently     Partners: Male       Current Outpatient Medications on File Prior to Visit   Medication Sig    albuterol (2 5 mg/3 mL) 0 083 % nebulizer solution VVN Q 4 H PRN    azelastine (ASTELIN) 0 1 % nasal spray 1 spray into each nostril 2 (two) times a day Use in each nostril as directed    cetirizine (ZyrTEC) 10 mg tablet Take 1 tablet by mouth daily      dexlansoprazole (DEXILANT) 60 MG capsule Take 1 capsule (60 mg total) by mouth 2 (two) times a day    furosemide (LASIX) 20 mg tablet TAKE 1 TABLET BY MOUTH DAILY    montelukast (SINGULAIR) 10 mg tablet Take 10 mg by mouth daily at bedtime    omeprazole (PriLOSEC) 40 MG capsule TAKE 1 CAPSULE BY MOUTH TWICE DAILY    rOPINIRole (REQUIP) 2 mg tablet TAKE 1 TABLET BY MOUTH AT BEDTIME    sodium chloride 0 9 % nebulizer solution USE 3 ML VIA NEB 1-2 TIMES D PRN    [DISCONTINUED] Buprenorphine HCl (Belbuca) 75 MCG FILM Apply 1 each to cheek 2 (two) times a day as needed (pain)    [DISCONTINUED] DULoxetine (CYMBALTA) 30 mg delayed release capsule Take 1 capsule (30 mg total) by mouth daily     No current facility-administered medications on file prior to visit  Allergies   Allergen Reactions    Dust Mite Extract Shortness Of Breath    Latex Itching and Blisters    Other      seasonal    Sulfa Antibiotics Vomiting     Topical-blistering       Physical Exam:    /81   Pulse 92   Temp 98 °F (36 7 °C)   Ht 5' 4" (1 626 m)   BMI 36 22 kg/m²     Constitutional:normal, well developed, well nourished, alert, in no distress and non-toxic and no overt pain behavior    Eyes:anicteric  HEENT:grossly intact  Neck:supple, symmetric, trachea midline and no masses   Pulmonary:even and unlabored  Cardiovascular:  Bilateral lower extremity edema present  Skin:Normal without rashes or lesions and well hydrated  Psychiatric:Mood and affect appropriate  Neurologic:Cranial Nerves II-XII grossly intact  Musculoskeletal:normal, except for significant pain in the sacroiliac region bilaterally    Imaging

## 2020-09-22 ENCOUNTER — CONSULT (OUTPATIENT)
Dept: SURGERY | Facility: CLINIC | Age: 60
End: 2020-09-22
Payer: MEDICARE

## 2020-09-22 VITALS
HEIGHT: 64 IN | RESPIRATION RATE: 16 BRPM | SYSTOLIC BLOOD PRESSURE: 120 MMHG | BODY MASS INDEX: 39.61 KG/M2 | TEMPERATURE: 97.4 F | DIASTOLIC BLOOD PRESSURE: 84 MMHG | WEIGHT: 232 LBS | HEART RATE: 80 BPM

## 2020-09-22 DIAGNOSIS — K42.9 RECURRENT UMBILICAL HERNIA: ICD-10-CM

## 2020-09-22 DIAGNOSIS — D17.1 LIPOMA OF TORSO: ICD-10-CM

## 2020-09-22 DIAGNOSIS — R22.2 SUBCUTANEOUS MASS OF BACK: Primary | ICD-10-CM

## 2020-09-22 DIAGNOSIS — K43.9 HERNIA OF ANTERIOR ABDOMINAL WALL: ICD-10-CM

## 2020-09-22 DIAGNOSIS — E66.01 OBESITY, CLASS III, BMI 40-49.9 (MORBID OBESITY) (HCC): ICD-10-CM

## 2020-09-22 PROCEDURE — 99204 OFFICE O/P NEW MOD 45 MIN: CPT | Performed by: SURGERY

## 2020-09-22 NOTE — ASSESSMENT & PLAN NOTE
There is evidence of a recurrence of the umbilical hernia at the superior edge likely of the old mesh  She did fixed in 2018 with a medium Ventralex patch  It is difficult to assess the actual size the fascial defect due to her body habitus  In addition she has some rectus diastasis  I will therefore check a CT scan without contrast to evaluate the abdominal wall anatomy in to evaluate the fascial defect in order to plan the best repair  Although I believe the best approach would be robotic approach due to her body habitus and the recurrent hernia    I will see her back after CT scan to make a plan

## 2020-09-22 NOTE — PROGRESS NOTES
Assessment/Plan:    Recurrent umbilical hernia   There is evidence of a recurrence of the umbilical hernia at the superior edge likely of the old mesh  She did fixed in 2018 with a medium Ventralex patch  It is difficult to assess the actual size the fascial defect due to her body habitus  In addition she has some rectus diastasis  I will therefore check a CT scan without contrast to evaluate the abdominal wall anatomy in to evaluate the fascial defect in order to plan the best repair  Although I believe the best approach would be robotic approach due to her body habitus and the recurrent hernia  I will see her back after CT scan to make a plan    Subcutaneous mass of back   On her right upper back on top of the scapula there is a large for 5 cm subcutaneous mass  I will check an ultrasound to evaluate the size and location and to ensure there is no other concerning characteristics  Diagnoses and all orders for this visit:    Subcutaneous mass of back  -     US superficial lump (non extremity); Future    Lipoma of torso  -     Ambulatory referral to General Surgery    Hernia of anterior abdominal wall  -     Ambulatory referral to General Surgery    Recurrent umbilical hernia  -     CT abdomen pelvis without contrast; Future          Subjective:      Patient ID: Audrey Galan is a 64 y o  female  Ms Vivian Holland   He is a 51-year-old female that is here for evaluation of a recurrent abdominal wall hernia  She has history of an umbilical hernia repair with mesh in 2018  Few months ago she noticed bulging above the incision and was referred here for evaluation  She does not have significant pain in this area but does notice the bulging and the mass  Addition she has a lump on her right back that she has been told by her dermatologist should be evaluated  She cannot feel the area but does not cause her significant discomfort    In addition she is concerned about the size the right breast as this increased in size significantly and it exacerbates her chronic back pain  The following portions of the patient's history were reviewed and updated as appropriate: allergies, current medications, past family history, past medical history, past social history, past surgical history and problem list     Review of Systems   Constitutional: Negative for appetite change, chills and fever  HENT: Negative for congestion and ear pain  Eyes: Negative for discharge and itching  Respiratory: Negative for chest tightness and shortness of breath  Cardiovascular: Negative for chest pain and palpitations  Gastrointestinal: Positive for abdominal pain  Negative for abdominal distention  Genitourinary: Negative for difficulty urinating and frequency  Musculoskeletal: Positive for arthralgias and back pain  Negative for gait problem  Skin: Negative for color change and rash  Neurological: Negative for dizziness and numbness  Psychiatric/Behavioral: Negative for agitation and confusion  Objective:      /84   Pulse 80   Temp (!) 97 4 °F (36 3 °C)   Resp 16   Ht 5' 4" (1 626 m)   Wt 105 kg (232 lb)   BMI 39 82 kg/m²          Physical Exam  Constitutional:       Appearance: She is well-developed  She is obese  HENT:      Head: Normocephalic and atraumatic  Eyes:      Conjunctiva/sclera: Conjunctivae normal       Pupils: Pupils are equal, round, and reactive to light  Neck:      Musculoskeletal: Neck supple  Trachea: Trachea normal    Cardiovascular:      Rate and Rhythm: Regular rhythm  Heart sounds: Normal heart sounds  Pulmonary:      Effort: Pulmonary effort is normal       Breath sounds: Normal breath sounds  Abdominal:      Palpations: Abdomen is soft  Hernia: A hernia is present  Comments: There is a recurrent incisional hernia just above the supraumbilical incision  Partial reducible    Difficult to feel the fascial defect due to body habitus  There is also rectus diastasis   Musculoskeletal: Normal range of motion  Skin:     General: Skin is warm and dry  Comments:  Right upper back on the scapula there is approximately a 4 x 3 x 2 cm smooth firm mobile subcutaneous mass   Neurological:      Mental Status: She is alert and oriented to person, place, and time  Psychiatric:         Behavior: Behavior normal  Behavior is cooperative

## 2020-10-02 ENCOUNTER — HOSPITAL ENCOUNTER (OUTPATIENT)
Dept: ULTRASOUND IMAGING | Facility: HOSPITAL | Age: 60
Discharge: HOME | End: 2020-10-02
Attending: SURGERY
Payer: MEDICARE

## 2020-10-02 ENCOUNTER — HOSPITAL ENCOUNTER (OUTPATIENT)
Dept: CT IMAGING | Facility: HOSPITAL | Age: 60
Discharge: HOME | End: 2020-10-02
Attending: SURGERY
Payer: MEDICARE

## 2020-10-02 DIAGNOSIS — R22.2 SUBCUTANEOUS MASS OF BACK: ICD-10-CM

## 2020-10-02 DIAGNOSIS — K42.9 RECURRENT UMBILICAL HERNIA: ICD-10-CM

## 2020-10-02 PROCEDURE — 74176 CT ABD & PELVIS W/O CONTRAST: CPT

## 2020-10-02 PROCEDURE — G1004 CDSM NDSC: HCPCS

## 2020-10-02 PROCEDURE — 76705 ECHO EXAM OF ABDOMEN: CPT

## 2020-10-07 ENCOUNTER — TELEMEDICINE (OUTPATIENT)
Dept: INTERNAL MEDICINE CLINIC | Facility: CLINIC | Age: 60
End: 2020-10-07
Payer: MEDICARE

## 2020-10-07 ENCOUNTER — TELEPHONE (OUTPATIENT)
Dept: PAIN MEDICINE | Facility: MEDICAL CENTER | Age: 60
End: 2020-10-07

## 2020-10-07 DIAGNOSIS — G89.29 CHRONIC BILATERAL LOW BACK PAIN WITH LEFT-SIDED SCIATICA: ICD-10-CM

## 2020-10-07 DIAGNOSIS — E87.6 HYPOKALEMIA: ICD-10-CM

## 2020-10-07 DIAGNOSIS — D64.9 ANEMIA, UNSPECIFIED TYPE: ICD-10-CM

## 2020-10-07 DIAGNOSIS — Z20.822 EXPOSURE TO COVID-19 VIRUS: Primary | ICD-10-CM

## 2020-10-07 DIAGNOSIS — M54.42 CHRONIC BILATERAL LOW BACK PAIN WITH LEFT-SIDED SCIATICA: ICD-10-CM

## 2020-10-07 PROCEDURE — 99213 OFFICE O/P EST LOW 20 MIN: CPT | Performed by: INTERNAL MEDICINE

## 2020-10-27 ENCOUNTER — OFFICE VISIT (OUTPATIENT)
Dept: SURGERY | Facility: CLINIC | Age: 60
End: 2020-10-27
Payer: MEDICARE

## 2020-10-27 VITALS
TEMPERATURE: 98 F | HEART RATE: 76 BPM | SYSTOLIC BLOOD PRESSURE: 130 MMHG | WEIGHT: 230.2 LBS | HEIGHT: 64 IN | DIASTOLIC BLOOD PRESSURE: 90 MMHG | RESPIRATION RATE: 16 BRPM | BODY MASS INDEX: 39.3 KG/M2

## 2020-10-27 DIAGNOSIS — E66.01 OBESITY, CLASS III, BMI 40-49.9 (MORBID OBESITY) (HCC): ICD-10-CM

## 2020-10-27 DIAGNOSIS — K42.9 RECURRENT UMBILICAL HERNIA: ICD-10-CM

## 2020-10-27 DIAGNOSIS — K43.2 RECURRENT INCISIONAL HERNIA: Primary | ICD-10-CM

## 2020-10-27 PROBLEM — R22.2 SUBCUTANEOUS MASS OF BACK: Status: RESOLVED | Noted: 2020-09-22 | Resolved: 2020-10-27

## 2020-10-27 PROCEDURE — 99213 OFFICE O/P EST LOW 20 MIN: CPT | Performed by: SURGERY

## 2020-11-12 ENCOUNTER — TELEPHONE (OUTPATIENT)
Dept: PAIN MEDICINE | Facility: MEDICAL CENTER | Age: 60
End: 2020-11-12

## 2020-12-01 DIAGNOSIS — K22.70 BARRETT'S ESOPHAGUS WITHOUT DYSPLASIA: ICD-10-CM

## 2020-12-01 DIAGNOSIS — K21.9 GASTROESOPHAGEAL REFLUX DISEASE WITHOUT ESOPHAGITIS: ICD-10-CM

## 2020-12-01 RX ORDER — DEXLANSOPRAZOLE 60 MG/1
60 CAPSULE, DELAYED RELEASE ORAL 2 TIMES DAILY
Qty: 60 CAPSULE | Refills: 2 | Status: SHIPPED | OUTPATIENT
Start: 2020-12-01 | End: 2021-10-29 | Stop reason: SDUPTHER

## 2020-12-02 ENCOUNTER — HOSPITAL ENCOUNTER (OUTPATIENT)
Dept: RADIOLOGY | Facility: MEDICAL CENTER | Age: 60
Discharge: HOME | End: 2020-12-02
Attending: PHYSICAL MEDICINE & REHABILITATION
Payer: MEDICARE

## 2020-12-02 ENCOUNTER — TELEPHONE (OUTPATIENT)
Dept: OTHER | Facility: OTHER | Age: 60
End: 2020-12-02

## 2020-12-02 VITALS
DIASTOLIC BLOOD PRESSURE: 81 MMHG | RESPIRATION RATE: 20 BRPM | SYSTOLIC BLOOD PRESSURE: 124 MMHG | OXYGEN SATURATION: 96 % | TEMPERATURE: 98.6 F | HEART RATE: 77 BPM

## 2020-12-02 DIAGNOSIS — G89.29 CHRONIC BILATERAL LOW BACK PAIN WITHOUT SCIATICA: ICD-10-CM

## 2020-12-02 DIAGNOSIS — M54.50 CHRONIC BILATERAL LOW BACK PAIN WITHOUT SCIATICA: ICD-10-CM

## 2020-12-02 DIAGNOSIS — M46.1 SACROILIITIS (HCC): ICD-10-CM

## 2020-12-02 PROCEDURE — 64451 NJX AA&/STRD NRV NRVTG SI JT: CPT | Performed by: PHYSICAL MEDICINE & REHABILITATION

## 2020-12-02 RX ADMIN — Medication 3 ML: at 13:15

## 2020-12-03 ENCOUNTER — TELEMEDICINE (OUTPATIENT)
Dept: INTERNAL MEDICINE CLINIC | Facility: CLINIC | Age: 60
End: 2020-12-03
Payer: MEDICARE

## 2020-12-03 ENCOUNTER — TELEPHONE (OUTPATIENT)
Dept: RADIOLOGY | Facility: MEDICAL CENTER | Age: 60
End: 2020-12-03

## 2020-12-03 DIAGNOSIS — K21.9 LARYNGOPHARYNGEAL REFLUX (LPR): ICD-10-CM

## 2020-12-03 DIAGNOSIS — G25.81 RESTLESS LEGS SYNDROME: ICD-10-CM

## 2020-12-03 DIAGNOSIS — E66.01 OBESITY, CLASS III, BMI 40-49.9 (MORBID OBESITY) (HCC): ICD-10-CM

## 2020-12-03 DIAGNOSIS — J45.20 MILD INTERMITTENT ASTHMA WITHOUT COMPLICATION: Primary | ICD-10-CM

## 2020-12-03 PROCEDURE — 99214 OFFICE O/P EST MOD 30 MIN: CPT | Performed by: INTERNAL MEDICINE

## 2020-12-04 ENCOUNTER — TELEPHONE (OUTPATIENT)
Dept: PAIN MEDICINE | Facility: MEDICAL CENTER | Age: 60
End: 2020-12-04

## 2020-12-10 ENCOUNTER — TELEPHONE (OUTPATIENT)
Dept: PAIN MEDICINE | Facility: MEDICAL CENTER | Age: 60
End: 2020-12-10

## 2020-12-15 DIAGNOSIS — F32.A DEPRESSION, UNSPECIFIED DEPRESSION TYPE: Primary | ICD-10-CM

## 2020-12-16 RX ORDER — DULOXETIN HYDROCHLORIDE 60 MG/1
60 CAPSULE, DELAYED RELEASE ORAL DAILY
Qty: 90 CAPSULE | Refills: 0 | Status: SHIPPED | OUTPATIENT
Start: 2020-12-16 | End: 2021-01-14

## 2020-12-21 ENCOUNTER — OFFICE VISIT (OUTPATIENT)
Dept: PAIN MEDICINE | Facility: MEDICAL CENTER | Age: 60
End: 2020-12-21
Payer: MEDICARE

## 2020-12-21 VITALS
DIASTOLIC BLOOD PRESSURE: 81 MMHG | HEIGHT: 64 IN | RESPIRATION RATE: 16 BRPM | SYSTOLIC BLOOD PRESSURE: 138 MMHG | BODY MASS INDEX: 37.73 KG/M2 | HEART RATE: 86 BPM | WEIGHT: 221 LBS | TEMPERATURE: 98.1 F

## 2020-12-21 DIAGNOSIS — M54.50 CHRONIC BILATERAL LOW BACK PAIN WITHOUT SCIATICA: ICD-10-CM

## 2020-12-21 DIAGNOSIS — Z79.891 LONG-TERM CURRENT USE OF OPIATE ANALGESIC: Primary | ICD-10-CM

## 2020-12-21 DIAGNOSIS — G89.29 CHRONIC BILATERAL LOW BACK PAIN WITHOUT SCIATICA: ICD-10-CM

## 2020-12-21 DIAGNOSIS — G89.4 CHRONIC PAIN SYNDROME: ICD-10-CM

## 2020-12-21 DIAGNOSIS — N62 LARGE BREASTS: ICD-10-CM

## 2020-12-21 DIAGNOSIS — F11.20 UNCOMPLICATED OPIOID DEPENDENCE (HCC): ICD-10-CM

## 2020-12-21 PROCEDURE — 80305 DRUG TEST PRSMV DIR OPT OBS: CPT | Performed by: PHYSICAL MEDICINE & REHABILITATION

## 2020-12-21 PROCEDURE — 99214 OFFICE O/P EST MOD 30 MIN: CPT | Performed by: PHYSICAL MEDICINE & REHABILITATION

## 2020-12-21 RX ORDER — TRAMADOL HYDROCHLORIDE 50 MG/1
TABLET ORAL
COMMUNITY
Start: 2020-11-12 | End: 2020-12-21 | Stop reason: SDUPTHER

## 2020-12-21 RX ORDER — TRAMADOL HYDROCHLORIDE 50 MG/1
50 TABLET ORAL EVERY 8 HOURS PRN
Qty: 90 TABLET | Refills: 2 | Status: SHIPPED | OUTPATIENT
Start: 2020-12-21 | End: 2021-04-19 | Stop reason: SDUPTHER

## 2020-12-23 ENCOUNTER — HOSPITAL ENCOUNTER (OUTPATIENT)
Dept: RADIOLOGY | Facility: MEDICAL CENTER | Age: 60
Discharge: HOME | End: 2020-12-23
Attending: PHYSICAL MEDICINE & REHABILITATION
Payer: MEDICARE

## 2020-12-23 ENCOUNTER — TELEPHONE (OUTPATIENT)
Dept: PAIN MEDICINE | Facility: MEDICAL CENTER | Age: 60
End: 2020-12-23

## 2020-12-23 VITALS
DIASTOLIC BLOOD PRESSURE: 79 MMHG | RESPIRATION RATE: 18 BRPM | TEMPERATURE: 96.3 F | HEART RATE: 73 BPM | SYSTOLIC BLOOD PRESSURE: 119 MMHG | OXYGEN SATURATION: 96 %

## 2020-12-23 DIAGNOSIS — M46.1 SACROILIITIS, NOT ELSEWHERE CLASSIFIED (HCC): ICD-10-CM

## 2020-12-23 DIAGNOSIS — M47.816 LUMBAR SPONDYLOSIS: ICD-10-CM

## 2020-12-23 PROCEDURE — 64625 RF ABLTJ NRV NRVTG SI JT: CPT | Performed by: PHYSICAL MEDICINE & REHABILITATION

## 2020-12-23 RX ORDER — LIDOCAINE HYDROCHLORIDE 10 MG/ML
5 INJECTION, SOLUTION EPIDURAL; INFILTRATION; INTRACAUDAL; PERINEURAL ONCE
Status: COMPLETED | OUTPATIENT
Start: 2020-12-23 | End: 2020-12-23

## 2020-12-23 RX ORDER — BUPIVACAINE HCL/PF 2.5 MG/ML
10 VIAL (ML) INJECTION ONCE
Status: DISCONTINUED | OUTPATIENT
Start: 2020-12-23 | End: 2020-12-27 | Stop reason: HOSPADM

## 2020-12-23 RX ORDER — BUPIVACAINE HCL/PF 2.5 MG/ML
10 VIAL (ML) INJECTION ONCE
Status: COMPLETED | OUTPATIENT
Start: 2020-12-23 | End: 2020-12-23

## 2020-12-23 RX ADMIN — LIDOCAINE HYDROCHLORIDE 5 ML: 10 INJECTION, SOLUTION EPIDURAL; INFILTRATION; INTRACAUDAL; PERINEURAL at 08:43

## 2020-12-23 RX ADMIN — Medication 5 ML: at 08:47

## 2020-12-23 RX ADMIN — LIDOCAINE HYDROCHLORIDE 5 ML: 10 INJECTION, SOLUTION EPIDURAL; INFILTRATION; INTRACAUDAL; PERINEURAL at 08:39

## 2020-12-26 LAB
6MAM UR QL CFM: NEGATIVE NG/ML
7AMINOCLONAZEPAM UR QL CFM: NEGATIVE NG/ML
A-OH ALPRAZ UR QL CFM: NEGATIVE NG/ML
AMPHET UR QL CFM: NEGATIVE NG/ML
AMPHET UR QL CFM: NEGATIVE NG/ML
BUPRENORPHINE UR QL CFM: NEGATIVE NG/ML
BUTALBITAL UR QL CFM: NEGATIVE NG/ML
BZE UR QL CFM: NEGATIVE NG/ML
CODEINE UR QL CFM: NEGATIVE NG/ML
DESIPRAMINE UR QL CFM: NEGATIVE NG/ML
DESIPRAMINE UR QL CFM: NEGATIVE NG/ML
EDDP UR QL CFM: NEGATIVE NG/ML
ETHYL GLUCURONIDE UR QL CFM: NEGATIVE NG/ML
ETHYL SULFATE UR QL SCN: NEGATIVE NG/ML
FENTANYL UR QL CFM: NEGATIVE NG/ML
GLIADIN IGG SER IA-ACNC: NEGATIVE NG/ML
GLUCOSE 30M P 50 G LAC PO SERPL-MCNC: NEGATIVE NG/ML
HYDROCODONE UR QL CFM: NEGATIVE NG/ML
HYDROCODONE UR QL CFM: NEGATIVE NG/ML
HYDROMORPHONE UR QL CFM: NEGATIVE NG/ML
IMIPRAMINE UR QL CFM: NEGATIVE NG/ML
LORAZEPAM UR QL CFM: NEGATIVE NG/ML
MDMA UR QL CFM: NEGATIVE NG/ML
ME-PHENIDATE UR QL CFM: NEGATIVE NG/ML
MEPERIDINE UR QL CFM: NEGATIVE NG/ML
METHADONE UR QL CFM: NEGATIVE NG/ML
METHAMPHET UR QL CFM: NEGATIVE NG/ML
MORPHINE UR QL CFM: NEGATIVE NG/ML
MORPHINE UR QL CFM: NEGATIVE NG/ML
NORBUPRENORPHINE UR QL CFM: NEGATIVE NG/ML
NORDIAZEPAM UR QL CFM: NEGATIVE NG/ML
NORFENTANYL UR QL CFM: NEGATIVE NG/ML
NORHYDROCODONE UR QL CFM: NEGATIVE NG/ML
NORHYDROCODONE UR QL CFM: NEGATIVE NG/ML
NORMEPERIDINE UR QL CFM: NEGATIVE NG/ML
NOROXYCODONE UR QL CFM: NEGATIVE NG/ML
OLANZAPINE QUANTIFICATION: NEGATIVE NG/ML
OPC-3373 QUANTIFICATION: NEGATIVE
OXAZEPAM UR QL CFM: NEGATIVE NG/ML
OXAZEPAM UR QL CFM: NEGATIVE NG/ML
OXYCODONE UR QL CFM: NEGATIVE NG/ML
OXYMORPHONE UR QL CFM: NEGATIVE NG/ML
OXYMORPHONE UR QL CFM: NEGATIVE NG/ML
PCP UR QL CFM: NEGATIVE NG/ML
PHENOBARB UR QL CFM: NEGATIVE NG/ML
RESULT ALL_PRESCRIBED MEDS AND SPECIAL INSTRUCTIONS: NORMAL
SECOBARBITAL UR QL CFM: NEGATIVE NG/ML
SL AMB 3-METHYL-FENTANYL QUANTIFICATION: NORMAL NG/ML
SL AMB 4-ANPP QUANTIFICATION: NORMAL NG/ML
SL AMB 4-FIBF QUANTIFICATION: NORMAL NG/ML
SL AMB 7-OH-MITRAGYNINE (KRATOM ALKALOID) QUANTIFICATION: NEGATIVE NG/ML
SL AMB ACETYL FENTANYL QUANTIFICATION: NORMAL NG/ML
SL AMB ACETYL NORFENTANYL QUANTIFICATION: NORMAL NG/ML
SL AMB ACRYL FENTANYL QUANTIFICATION: NORMAL NG/ML
SL AMB BUTRYL FENTANYL QUANTIFICATION: NORMAL NG/ML
SL AMB CARFENTANIL QUANTIFICATION: NORMAL NG/ML
SL AMB CLOZAPINE QUANTIFICATION: NEGATIVE NG/ML
SL AMB CTHC (MARIJUANA METABOLITE) QUANTIFICATION: NEGATIVE NG/ML
SL AMB CYCLOPROPYL FENTANYL QUANTIFICATION: NORMAL NG/ML
SL AMB DEXTROMETHORPHAN QUANTIFICATION: NEGATIVE NG/ML
SL AMB DEXTRORPHAN (DEXTROMETHORPHAN METABOLITE) QUANT: NEGATIVE NG/ML
SL AMB DEXTRORPHAN (DEXTROMETHORPHAN METABOLITE) QUANT: NEGATIVE NG/ML
SL AMB FURANYL FENTANYL QUANTIFICATION: NORMAL NG/ML
SL AMB HALOPERIDOL  QUANTIFICATION: NEGATIVE NG/ML
SL AMB HALOPERIDOL METABOLITE QUANTIFICATION: NEGATIVE NG/ML
SL AMB HYDROXYRISPERIDONE QUANTIFICATION: NEGATIVE NG/ML
SL AMB METHOXYACETYL FENTANYL QUANTIFICATION: NORMAL NG/ML
SL AMB N-DESMETHYL U-47700 QUANTIFICATION: NORMAL NG/ML
SL AMB N-DESMETHYL-TRAMADOL QUANTIFICATION: ABNORMAL NG/ML
SL AMB N-DESMETHYLCLOZAPINE QUANTIFICATION: NEGATIVE NG/ML
SL AMB NORQUETIAPINE QUANTIFICATION: NEGATIVE NG/ML
SL AMB PHENTERMINE QUANTIFICATION: NEGATIVE NG/ML
SL AMB QUETIAPINE QUANTIFICATION: NEGATIVE NG/ML
SL AMB RISPERIDONE QUANTIFICATION: NEGATIVE NG/ML
SL AMB RITALINIC ACID QUANTIFICATION: NEGATIVE NG/ML
SL AMB U-47700 QUANTIFICATION: NORMAL NG/ML
TAPENTADOL UR QL CFM: NEGATIVE NG/ML
TEMAZEPAM UR QL CFM: NEGATIVE NG/ML
TEMAZEPAM UR QL CFM: NEGATIVE NG/ML
TRAMADOL UR QL CFM: ABNORMAL NG/ML
URATE/CREAT 24H UR: ABNORMAL NG/ML

## 2020-12-31 RX ORDER — SODIUM CHLORIDE, SODIUM LACTATE, POTASSIUM CHLORIDE, CALCIUM CHLORIDE 600; 310; 30; 20 MG/100ML; MG/100ML; MG/100ML; MG/100ML
125 INJECTION, SOLUTION INTRAVENOUS CONTINUOUS
OUTPATIENT
Start: 2021-01-21

## 2021-01-05 ENCOUNTER — TELEPHONE (OUTPATIENT)
Dept: OTHER | Facility: OTHER | Age: 61
End: 2021-01-05

## 2021-01-06 ENCOUNTER — HOSPITAL ENCOUNTER (OUTPATIENT)
Dept: RADIOLOGY | Facility: MEDICAL CENTER | Age: 61
Discharge: HOME | End: 2021-01-06
Attending: PHYSICAL MEDICINE & REHABILITATION | Admitting: PHYSICAL MEDICINE & REHABILITATION
Payer: MEDICARE

## 2021-01-06 ENCOUNTER — TELEPHONE (OUTPATIENT)
Dept: PAIN MEDICINE | Facility: MEDICAL CENTER | Age: 61
End: 2021-01-06

## 2021-01-06 VITALS
RESPIRATION RATE: 18 BRPM | OXYGEN SATURATION: 94 % | SYSTOLIC BLOOD PRESSURE: 121 MMHG | TEMPERATURE: 97.4 F | HEART RATE: 68 BPM | DIASTOLIC BLOOD PRESSURE: 73 MMHG

## 2021-01-06 DIAGNOSIS — M46.1 SACROILIITIS, NOT ELSEWHERE CLASSIFIED (HCC): ICD-10-CM

## 2021-01-06 DIAGNOSIS — M47.816 LUMBAR SPONDYLOSIS: ICD-10-CM

## 2021-01-06 PROCEDURE — 64625 RF ABLTJ NRV NRVTG SI JT: CPT | Performed by: PHYSICAL MEDICINE & REHABILITATION

## 2021-01-06 RX ORDER — LIDOCAINE HYDROCHLORIDE 10 MG/ML
5 INJECTION, SOLUTION EPIDURAL; INFILTRATION; INTRACAUDAL; PERINEURAL ONCE
Status: COMPLETED | OUTPATIENT
Start: 2021-01-06 | End: 2021-01-06

## 2021-01-06 RX ORDER — BUPIVACAINE HCL/PF 2.5 MG/ML
10 VIAL (ML) INJECTION ONCE
Status: COMPLETED | OUTPATIENT
Start: 2021-01-06 | End: 2021-01-06

## 2021-01-06 RX ADMIN — Medication 5 ML: at 11:01

## 2021-01-06 RX ADMIN — Medication 4 ML: at 10:58

## 2021-01-06 RX ADMIN — LIDOCAINE HYDROCHLORIDE 5 ML: 10 INJECTION, SOLUTION EPIDURAL; INFILTRATION; INTRACAUDAL; PERINEURAL at 10:54

## 2021-01-06 NOTE — TELEPHONE ENCOUNTER
Nicole has a appointment for a procedure at 10 20am but the instructions were that I need to be dropped off but my ride just called stating she has been exposed to Covid  I am willing to keep my appointment if she can drive herself  Patient is requesting a call back in the morning

## 2021-01-06 NOTE — DISCHARGE INSTRUCTIONS

## 2021-01-06 NOTE — TELEPHONE ENCOUNTER
Rn s/w pt  Pt states she missed call due to sleeping off a headache  Per pt she lives 15 mins away  RN advised pt to come now for procedure as scheduled

## 2021-01-06 NOTE — H&P
History of Present Illness:  The patient is a 64 y o  female who presents with complaints of Right low back pain    Patient Active Problem List   Diagnosis    Arthritis of lumbar spine    Chronic low back pain    Chronic pain syndrome    Chronic venous insufficiency    Cough variant asthma    Depression    Hyperlipidemia    Lumbar postlaminectomy syndrome    Obesity, Class III, BMI 40-49 9 (morbid obesity) ([facility])    Primary osteoarthritis of left hip    Restless legs syndrome    Sleep disturbance    Hernia of anterior abdominal wall    S/P right knee arthroscopy    Environmental allergies    Lumbar spondylosis    Lumbar radiculopathy    Prediabetes    Vitamin D deficiency    Dolan's esophagus with dysplasia    Gastroesophageal reflux disease    Laryngopharyngeal reflux (LPR)    Chronic cough    Vocal fold paresis, right    Dysphonia    Muscle tension dysphonia    Glottic insufficiency    Reflux laryngitis    Laryngeal edema    Allergic rhinitis due to [employer] house dust mite    Mild intermittent asthma without complication    Upper airway cough syndrome    Neck pain    Cervical spondylosis without myelopathy    Osteoarthritis of multiple joints    COVID-19 virus infection    Medicare welcome exam    Sacroiliitis, not elsewhere classified ([facility] [address] )    Lipoma of torso    Recurrent umbilical hernia    Anemia    Hypokalemia    Large breasts    Long-term current use of opiate analgesic    Uncomplicated opioid dependence ([facility] [address] )       Past Medical History:   Diagnosis Date    Anesthesia     "sometimes low BP upon waking up"    Arthritis     Asthma     Back pain     Dolan's esophagus     Chronic pain disorder     Chronic venous insufficiency     Cough variant asthma     Environmental allergies     dust    GERD (gastroesophageal reflux disease)     Hiatal hernia     History of anemia     History of fusion of spine for scoliosis     "as a teenager"    History of transfusion     2001    Hyperlipidemia     Left lumbar radiculitis     Last Assessed: [id number]    Lumbar postlaminectomy syndrome     Migraines     Morbid obesity (HCC)     Motion sickness     Osteoarthritis     of left hip    Right knee pain     Seasonal allergies     Shortness of breath     Umbilical hernia     Uses brace     right knee    Uses crutches     one    Wears glasses        Past Surgical History:   Procedure Laterality Date    ARTHRODESIS      lumbar    ARTHRODESIS      Spinal Arthrodesis for Deformity; Last Assessed: [id number]    BACK SURGERY      lumbar fusion,with nydia/screw and cage implant    BACK SURGERY      surgery for scoliosis    BREAST CYST EXCISION Right     benign    COLONOSCOPY      COLONOSCOPY N/A 3/14/2019    Procedure: COLONOSCOPY;  Surgeon: Carmela Rosales MD;  [facility]; Service: Gastroenterology    ESOPHAGOGASTRODUODENOSCOPY N/A 3/14/2019    Procedure: ESOPHAGOGASTRODUODENOSCOPY (EGD) W RFA(BARRX); Surgeon: Carmela Rosales MD;  [facility]; Service: Gastroenterology    HERNIA REPAIR      umbilical hernia repair    ORTHOPEDIC SURGERY      PLANTAR FASCIA SURGERY Left     W[place] COLONOSCOPY FLX DX W/COLLJ SPEC WHEN PFRMD N/A 2/20/2018    Procedure: COLONOSCOPY with polypectomy;  Surgeon: Roya Richter MD;  [facility]; Service: General    WY ESOPHAGOGASTRODUODENOSCOPY TRANSORAL DIAGNOSTIC N/A 5/24/2017    Procedure: ESOPHAGOGASTRODUODENOSCOPY (EGD); Surgeon: Debo Salazar MD;  Location: [facility]; Service: Gastroenterology    WY ESOPHAGOGASTRODUODENOSCOPY TRANSORAL DIAGNOSTIC N/A 8/31/2017    Procedure: ESOPHAGOGASTRODUODENOSCOPY (EGD) W RFA;  Surgeon: Chriss Lam MD;  [facility];   Service: Gastroenterology    W[place] KNEE SCOPE,MED/LAT MENISECTOMY Right 4/4/2018    Procedure: ARTHROSCOPY KNEE PARTIAL MEDIAL MENISECTOMY , CHONDROPLASTY;  Surgeon: Marisol Mcfarland DO;  [facility];  Service: Orthopedics    ASAD TOOTH EXTRACTION           Current Outpatient Medications:     albuterol (2 5 mg/3 mL) 0 083 % nebulizer solution, VVN Q 4 H PRN, Disp: , Rfl: 3    azelastine (ASTELIN) 0 1 % nasal spray, 1 spray into each nostril 2 (two) times a day Use in each nostril as directed, Disp: 1 Bottle, Rfl: 6    cetirizine (ZyrTEC) 10 mg tablet, Take 1 tablet by mouth daily  , Disp: , Rfl:     dexlansoprazole (DEXILANT) 60 MG capsule, Take 1 capsule (60 mg total) by mouth 2 (two) times a day, Disp: 60 capsule, Rfl: 2    DULoxetine (CYMBALTA) 60 mg delayed release capsule, Take 1 capsule (60 mg total) by mouth daily, Disp: 90 capsule, Rfl: 0    furosemide (LASIX) 20 mg tablet, TAKE 1 TABLET BY MOUTH DAILY, Disp: 90 tablet, Rfl: 5    montelukast (SINGULAIR) 10 mg tablet, Take 10 mg by mouth daily at bedtime, Disp: , Rfl:     omeprazole (PriLOSEC) 40 MG capsule, TAKE 1 CAPSULE BY MOUTH TWICE DAILY (Patient not taking: Reported on 12/21/2020), Disp: 180 capsule, Rfl: 0    rOPINIRole (REQUIP) 2 mg tablet, TAKE 1 TABLET BY MOUTH AT BEDTIME, Disp: 90 tablet, Rfl: 2    sodium chloride 0 9 % nebulizer solution, USE 3 ML VIA NEB 1-2 TIMES D PRN, Disp: , Rfl: 6    traMADol (ULTRAM) 50 mg tablet, Take 1 tablet (50 mg total) by mouth every 8 (eight) hours as needed for moderate pain, Disp: 90 tablet, Rfl: 2    Current Facility-Administered Medications:     bupivacaine (PF) (MARCAINE) 0 25 % injection 10 mL, 10 mL, Perineural, Once, Isabel Jameson, DO    lidocaine (PF) (XYLOCAINE-MPF) 1 % injection 5 mL, 5 mL, Infiltration, Once, Isabel Jameson, DO    lidocaine (PF) (XYLOCAINE-MPF) 2 % injection 4 mL, 4 mL, Perineural, Once, Isabel Jameson, DO    Allergies   Allergen Reactions    Dust Mite Extract Shortness Of Breath    Latex Itching and Blisters    Other      seasonal    Sulfa Antibiotics Vomiting     Topical-blistering       Physical Exam:   Vitals:    01/06/21 1042   BP: 113/71   Pulse: 69   Resp: 20   Temp: (!) 97 4 °F (36 3 °C) SpO2: 95%     General: Awake, Alert, Oriented x 3, Mood and affect appropriate  Respiratory: Respirations even and unlabored  Cardiovascular: Peripheral pulses intact; no edema  Musculoskeletal Exam:  Right low back pain    ASA Score:  2  Patient/Chart Verification  Patient ID Verified: Verbal  Consents Confirmed: Procedural, To be obtained in the Pre-Procedure area  H&P( within 30 days) Verified: To be obtained in the Pre-Procedure area  Allergies Reviewed: Yes  Anticoag/NSAID held?: NA  Currently on antibiotics?: No  Pregnancy denied?: Yes    Assessment:   1  Sacroiliitis, not elsewhere classified ([facility] [address] )    2   Lumbar spondylosis        Plan: RT S1-3 RFA

## 2021-01-07 NOTE — TELEPHONE ENCOUNTER
RN s/w pt  Pt states " My RT side that was done yesterday feels fine  My left side which Dr Darron Hubbard already knows about that was done on 12/23 is relentless  I have tried ice/heat and I am taking 2 Tramadol instead of one when the pain is really bad  The Cymbalta does not help either  I am taking 2000 mg of Tylenol with no relief " RN advised pt that she needs to take the Tramadol as prescribed and advised as long as she does not have any liver concerns she can take ES Tylenol 1000 mg tid and not to exceed 3000 mg in 24 hours  RN advised pt to take the Tramadol and alternate with Tylenol and ice/heat  RN recommended pt get into a routine to see if it helps her  RN also reminded pt that it takes 2 weeks to notice a difference which she is at the 2 week gaudencio now and 4-6 weeks for  the full effect so she will need to give it some time  Pt denies s/s of infection and or sunburn like sensation  Next OV confirmed w/ pt for 2/8 with 1045 arrival time  Pt verbalized understanding and appreciative  Please advise- Agree with the plan or any additional recs?

## 2021-01-07 NOTE — TELEPHONE ENCOUNTER
Patient called returning the nurses call  Pain on the right side is fine , pain is still on the left side  Please be advise thank you        Please call patient back @ [phone]

## 2021-01-07 NOTE — TELEPHONE ENCOUNTER
Please give her a follow up visit with one of the NPs for the left sided pain she's experiencing  Ok to do it now

## 2021-01-14 ENCOUNTER — CONSULT (OUTPATIENT)
Dept: INTERNAL MEDICINE CLINIC | Facility: CLINIC | Age: 61
End: 2021-01-14
Payer: MEDICARE

## 2021-01-14 VITALS
HEIGHT: 64 IN | WEIGHT: 216.4 LBS | HEART RATE: 74 BPM | SYSTOLIC BLOOD PRESSURE: 121 MMHG | TEMPERATURE: 98.7 F | BODY MASS INDEX: 36.95 KG/M2 | DIASTOLIC BLOOD PRESSURE: 72 MMHG | RESPIRATION RATE: 12 BRPM

## 2021-01-14 DIAGNOSIS — K21.9 GASTROESOPHAGEAL REFLUX DISEASE, UNSPECIFIED WHETHER ESOPHAGITIS PRESENT: ICD-10-CM

## 2021-01-14 DIAGNOSIS — E78.01 FAMILIAL HYPERCHOLESTEROLEMIA: ICD-10-CM

## 2021-01-14 DIAGNOSIS — M15.9 OSTEOARTHRITIS OF MULTIPLE JOINTS, UNSPECIFIED OSTEOARTHRITIS TYPE: ICD-10-CM

## 2021-01-14 DIAGNOSIS — J45.20 MILD INTERMITTENT ASTHMA WITHOUT COMPLICATION: ICD-10-CM

## 2021-01-14 DIAGNOSIS — F32.A DEPRESSION, UNSPECIFIED DEPRESSION TYPE: ICD-10-CM

## 2021-01-14 DIAGNOSIS — Z23 ENCOUNTER FOR IMMUNIZATION: ICD-10-CM

## 2021-01-14 DIAGNOSIS — E66.01 CLASS 2 SEVERE OBESITY DUE TO EXCESS CALORIES WITH SERIOUS COMORBIDITY AND BODY MASS INDEX (BMI) OF 36.0 TO 36.9 IN ADULT (HCC): ICD-10-CM

## 2021-01-14 DIAGNOSIS — G25.81 RESTLESS LEGS SYNDROME: ICD-10-CM

## 2021-01-14 DIAGNOSIS — K43.2 RECURRENT INCISIONAL HERNIA: Primary | ICD-10-CM

## 2021-01-14 DIAGNOSIS — E55.9 VITAMIN D DEFICIENCY: ICD-10-CM

## 2021-01-14 DIAGNOSIS — R73.03 PREDIABETES: ICD-10-CM

## 2021-01-14 PROCEDURE — 90682 RIV4 VACC RECOMBINANT DNA IM: CPT | Performed by: INTERNAL MEDICINE

## 2021-01-14 PROCEDURE — 99214 OFFICE O/P EST MOD 30 MIN: CPT | Performed by: INTERNAL MEDICINE

## 2021-01-14 PROCEDURE — G0008 ADMIN INFLUENZA VIRUS VAC: HCPCS | Performed by: INTERNAL MEDICINE

## 2021-01-14 RX ORDER — DULOXETIN HYDROCHLORIDE 60 MG/1
60 CAPSULE, DELAYED RELEASE ORAL 2 TIMES DAILY
Qty: 180 CAPSULE | Refills: 0 | Status: SHIPPED | OUTPATIENT
Start: 2021-01-14 | End: 2021-04-19 | Stop reason: SDUPTHER

## 2021-01-18 ENCOUNTER — LAB (OUTPATIENT)
Dept: LAB | Facility: HOSPITAL | Age: 61
End: 2021-01-18
Attending: SURGERY
Payer: MEDICARE

## 2021-01-18 ENCOUNTER — HOSPITAL ENCOUNTER (OUTPATIENT)
Dept: NON INVASIVE DIAGNOSTICS | Facility: HOSPITAL | Age: 61
Discharge: HOME | End: 2021-01-18
Attending: SURGERY
Payer: MEDICARE

## 2021-01-18 ENCOUNTER — OFFICE VISIT (OUTPATIENT)
Dept: PAIN MEDICINE | Facility: MEDICAL CENTER | Age: 61
End: 2021-01-18
Payer: MEDICARE

## 2021-01-18 VITALS
DIASTOLIC BLOOD PRESSURE: 76 MMHG | HEIGHT: 64 IN | WEIGHT: 213 LBS | HEART RATE: 75 BPM | SYSTOLIC BLOOD PRESSURE: 115 MMHG | BODY MASS INDEX: 36.37 KG/M2

## 2021-01-18 DIAGNOSIS — K43.2 RECURRENT INCISIONAL HERNIA: ICD-10-CM

## 2021-01-18 DIAGNOSIS — M54.42 CHRONIC BILATERAL LOW BACK PAIN WITH LEFT-SIDED SCIATICA: ICD-10-CM

## 2021-01-18 DIAGNOSIS — M96.1 LUMBAR POSTLAMINECTOMY SYNDROME: ICD-10-CM

## 2021-01-18 DIAGNOSIS — G89.29 CHRONIC BILATERAL LOW BACK PAIN WITH LEFT-SIDED SCIATICA: ICD-10-CM

## 2021-01-18 DIAGNOSIS — G89.4 CHRONIC PAIN SYNDROME: ICD-10-CM

## 2021-01-18 DIAGNOSIS — M47.816 LUMBAR SPONDYLOSIS: ICD-10-CM

## 2021-01-18 DIAGNOSIS — M16.12 PRIMARY OSTEOARTHRITIS OF LEFT HIP: ICD-10-CM

## 2021-01-18 DIAGNOSIS — M46.1 SACROILIITIS, NOT ELSEWHERE CLASSIFIED (HCC): Primary | ICD-10-CM

## 2021-01-18 PROBLEM — E66.813 OBESITY, CLASS III, BMI 40-49.9 (MORBID OBESITY): Status: RESOLVED | Noted: 2017-08-11 | Resolved: 2021-01-18

## 2021-01-18 PROBLEM — E66.01 OBESITY, CLASS III, BMI 40-49.9 (MORBID OBESITY) (HCC): Status: RESOLVED | Noted: 2017-08-11 | Resolved: 2021-01-18

## 2021-01-18 PROBLEM — E66.812 CLASS 2 SEVERE OBESITY DUE TO EXCESS CALORIES WITH SERIOUS COMORBIDITY AND BODY MASS INDEX (BMI) OF 36.0 TO 36.9 IN ADULT (HCC): Status: ACTIVE | Noted: 2017-08-11

## 2021-01-18 LAB
ANION GAP SERPL CALCULATED.3IONS-SCNC: 7 MMOL/L (ref 4–13)
BASOPHILS # BLD AUTO: 0.07 THOUSANDS/ΜL (ref 0–0.1)
BASOPHILS NFR BLD AUTO: 1 % (ref 0–1)
BUN SERPL-MCNC: 17 MG/DL (ref 5–25)
CALCIUM SERPL-MCNC: 9.6 MG/DL (ref 8.3–10.1)
CHLORIDE SERPL-SCNC: 102 MMOL/L (ref 100–108)
CO2 SERPL-SCNC: 30 MMOL/L (ref 21–32)
CREAT SERPL-MCNC: 0.67 MG/DL (ref 0.6–1.3)
EOSINOPHIL # BLD AUTO: 0.11 THOUSAND/ΜL (ref 0–0.61)
EOSINOPHIL NFR BLD AUTO: 1 % (ref 0–6)
ERYTHROCYTE [DISTWIDTH] IN BLOOD BY AUTOMATED COUNT: 15.3 % (ref 11.6–15.1)
GFR SERPL CREATININE-BSD FRML MDRD: 97 ML/MIN/1.73SQ M
GLUCOSE SERPL-MCNC: 103 MG/DL (ref 65–140)
HCT VFR BLD AUTO: 42.2 % (ref 34.8–46.1)
HGB BLD-MCNC: 12.5 G/DL (ref 11.5–15.4)
IMM GRANULOCYTES # BLD AUTO: 0.03 THOUSAND/UL (ref 0–0.2)
IMM GRANULOCYTES NFR BLD AUTO: 0 % (ref 0–2)
LYMPHOCYTES # BLD AUTO: 2.39 THOUSANDS/ΜL (ref 0.6–4.47)
LYMPHOCYTES NFR BLD AUTO: 31 % (ref 14–44)
MCH RBC QN AUTO: 25.4 PG (ref 26.8–34.3)
MCHC RBC AUTO-ENTMCNC: 29.6 G/DL (ref 31.4–37.4)
MCV RBC AUTO: 86 FL (ref 82–98)
MONOCYTES # BLD AUTO: 0.44 THOUSAND/ΜL (ref 0.17–1.22)
MONOCYTES NFR BLD AUTO: 6 % (ref 4–12)
NEUTROPHILS # BLD AUTO: 4.63 THOUSANDS/ΜL (ref 1.85–7.62)
NEUTS SEG NFR BLD AUTO: 61 % (ref 43–75)
NRBC BLD AUTO-RTO: 0 /100 WBCS
PLATELET # BLD AUTO: 342 THOUSANDS/UL (ref 149–390)
PMV BLD AUTO: 10.2 FL (ref 8.9–12.7)
POTASSIUM SERPL-SCNC: 3.8 MMOL/L (ref 3.5–5.3)
RBC # BLD AUTO: 4.92 MILLION/UL (ref 3.81–5.12)
SODIUM SERPL-SCNC: 139 MMOL/L (ref 136–145)
WBC # BLD AUTO: 7.67 THOUSAND/UL (ref 4.31–10.16)

## 2021-01-18 PROCEDURE — 99213 OFFICE O/P EST LOW 20 MIN: CPT | Performed by: NURSE PRACTITIONER

## 2021-01-18 PROCEDURE — 36415 COLL VENOUS BLD VENIPUNCTURE: CPT

## 2021-01-18 PROCEDURE — 85025 COMPLETE CBC W/AUTO DIFF WBC: CPT

## 2021-01-18 PROCEDURE — 93005 ELECTROCARDIOGRAM TRACING: CPT

## 2021-01-18 PROCEDURE — 80048 BASIC METABOLIC PNL TOTAL CA: CPT

## 2021-01-18 RX ORDER — GABAPENTIN 300 MG/1
300 CAPSULE ORAL 3 TIMES DAILY
Qty: 90 CAPSULE | Refills: 1 | Status: SHIPPED | OUTPATIENT
Start: 2021-01-18 | End: 2021-08-23 | Stop reason: ALTCHOICE

## 2021-01-18 RX ORDER — METHOCARBAMOL 750 MG/1
750 TABLET, FILM COATED ORAL 2 TIMES DAILY PRN
Qty: 60 TABLET | Refills: 1 | Status: SHIPPED | OUTPATIENT
Start: 2021-01-18 | End: 2021-10-06 | Stop reason: SDUPTHER

## 2021-01-18 NOTE — PROGRESS NOTES
Dear Dr Fletcher Kessler,      At your kind request I evaluated Jeremiah Cummings in medical consultation in preparation for incisional herniorrhaphy   As you know, the patient is a 64 y o  female developed a bulging area above her umbilicus  This was found represent a recurrent incisional hernia  She has been scheduled for surgical repair  She has been having mild pain in the area  She has had no nausea, vomiting, or abnormality of bowel function  The patient's past medical history is positive for mild intermittent asthma  She has a history of hypercholesterolemia, esophageal reflux, and impaired fasting glucose  She has restless leg syndrome and osteoarthritis  She has a history of depression  She has no history of coronary artery disease, congestive heart failure, arrhythmias, stroke, peptic ulcer disease, or kidney disease  The patient's medications include: Albuterol by nebulization as needed  Astelin spray 1 spray each nostril twice daily as needed  Dexilant 60 mg twice daily  Duloxetine 60 mg twice daily  Furosemide 20 mg daily as needed  Magnesium 1 tablet daily  Montelukast 10 mg daily  Potassium 10 mEq  Ropinirole 2 mg at bedtime  Tramadol 50 mg 3 times daily as needed  Vitamin-D 1000 units daily    The patient is allergic to latex and sulfonamides    Family history is positive for hypertension in her father and lung cancer in her mother    Social history reveals that the patient is single  She does not smoke nor has she ever smoked  She uses no ethanol nor illicit drugs  Review of Systems:  Review of systems is taken in detail including 12 systems  In addition to those issues mentioned above, the patient has had ongoing musculoskeletal symptoms related to osteoarthritis  She has not been having any chest pain, shortness of breath, cough, wheezing, etcetera  She says that her mood is depressed at present  She has had 4 people that were close to her die in the past several months    Nevertheless, and she is able to eat and sleep pretty well  No other significant symptoms were elicited  The patient did have her influenza vaccine this year  Vitals:    01/14/21 1347   BP: 121/72   Pulse: 74   Resp: 12   Temp: 98 7 °F (37 1 °C)         Physical Exam:  The patient is a well-developed, obese woman who appears in no distress  Head is atraumatic and normocephalic  ENT examination is within normal limits  Eyes show the pupils to be equal, round, and reactive to light  Extraocular movements are intact  Neck is supple  Carotids are full without bruits  There is no lymphadenopathy or goiter  Lungs are clear to auscultation and percussion  There is no wheezing, rales, or rhonchi  Cardiac exam reveals a regular rhythm  There is no murmur, gallop, or rub  The abdomen is soft with active bowel sounds  There is a midline hernia above the umbilicus  There is no tenderness, organomegaly, or mass  Extremities showed no clubbing, cyanosis, or edema  There is no calf tenderness  Neurologic examination reveals the patient to be alert and oriented  No focal sign is noted  Pre-admission blood work and EKG are pending  Assessment:   1  Recurrent incisional hernia, for surgery  2  Mild intermittent asthma  3  Hypercholesterolemia  4  Pre diabetes  5  Vitamin-D deficiency  6  Gastroesophageal reflux  7  Depression  8  Osteoarthritis  9  Obesity  10  Restless leg syndrome     Recommendations: The patient appears to be stable for surgery despite her several chronic medical conditions  At present, these are adequately compensated  I asked the patient to use her nebulizer and Dexilant on the morning of surgery  She should resume her usual medications postoperatively when this is surgically appropriate  I do not think that any additional preoperative evaluation is needed except for what has been already planned  I will review the studies when they become available      Thank you for the opportunity of participating in [facility]  I hope that this information was helpful      Sincerely,  Simone Flanagan MD

## 2021-01-18 NOTE — PROGRESS NOTES
Assessment:  1  Sacroiliitis, not elsewhere classified ([facility] [address] )    2  Lumbar spondylosis    3  Primary osteoarthritis of left hip    4  Chronic pain syndrome    5  Chronic bilateral low back pain with left-sided sciatica    6  Lumbar postlaminectomy syndrome        Plan: At this time we will re-initiate gabapentin 300 mg 3 times daily she has taken in the past has given her relief  She was given instruction how to safely increase the dose  Will also provide her with a muscle relaxer that she can use up to 2 times daily she will have methocarbamol 750 mg  She was aware of the most common side effects which are drowsiness and dizziness  She is also aware that if this makes her too tired during the day she should just take 1 at bedtime  She can continue with her tramadol she does not require refill today  Patient to follow-up in 4 weeks for re-evaluation        My impressions and treatment recommendations were discussed in detail with the patient who verbalized understanding and had no further questions  Discharge instructions were provided  I personally saw and examined the patient and I agree with the above discussed plan of care  No orders of the defined types were placed in this encounter  New Medications Ordered This Visit   Medications    gabapentin (NEURONTIN) 300 mg capsule     Sig: Take 1 capsule (300 mg total) by mouth 3 (three) times a day     Dispense:  90 capsule     Refill:  1    methocarbamol (ROBAXIN) 750 mg tablet     Sig: Take 1 tablet (750 mg total) by mouth 2 (two) times a day as needed for muscle spasms     Dispense:  60 tablet     Refill:  1       History of Present Illness:  Joshua Adkins is a 64 y o  female who presents for a follow up office visit in regards to Back Pain (Lower)  The patients current symptoms include pain rated 7/10 this is constant most bothersome at night as it affects her sleeping and it is described as a deep ache in      She is status post bilateral S1-3 radiofrequency ablations with the left side being done on December 23, 2020 in the right side on January 6, 2021  She tells me that the left side recently started to improve she notes 40% relief the right side isn't causing her much pain however ever since she had the injection she is having a lot of groin pain  Patient is currently using tramadol 50 mg 1 tablet 3 times daily it does make her tired and she is still in a lot of pain despite taking the medication  I have personally reviewed and/or updated the patient's past medical history, past surgical history, family history, social history, current medications, allergies, and vital signs today  Review of Systems   Constitutional: Negative  HENT: Negative  Eyes: Negative  Respiratory: Negative  Cardiovascular: Negative  Gastrointestinal: Negative  Endocrine: Negative  Genitourinary: Negative  Musculoskeletal: Positive for back pain (Lower)  Skin: Negative  Allergic/Immunologic: Negative  Neurological: Negative  Hematological: Negative  Psychiatric/Behavioral: Negative          Patient Active Problem List   Diagnosis    Arthritis of lumbar spine    Chronic low back pain    Chronic pain syndrome    Chronic venous insufficiency    Cough variant asthma    Depression    Hyperlipidemia    Lumbar postlaminectomy syndrome    Class 2 severe obesity due to excess calories with serious comorbidity and body mass index (BMI) of 36 0 to 36 9 in adult [facility])    Primary osteoarthritis of left hip    Restless legs syndrome    Sleep disturbance    Hernia of anterior abdominal wall    S/P right knee arthroscopy    Environmental allergies    Lumbar spondylosis    Lumbar radiculopathy    Prediabetes    Vitamin D deficiency    Dolan's esophagus with dysplasia    Gastroesophageal reflux disease    Laryngopharyngeal reflux (LPR)    Chronic cough    Vocal fold paresis, right    Dysphonia    Muscle tension dysphonia    Glottic insufficiency    Reflux laryngitis    Laryngeal edema    Allergic rhinitis due to [employer] house dust mite    Mild intermittent asthma without complication    Upper airway cough syndrome    Neck pain    Cervical spondylosis without myelopathy    Osteoarthritis of multiple joints    COVID-19 virus infection    Sacroiliitis, not elsewhere classified ([facility] [address] )    Lipoma of torso    Recurrent umbilical hernia    Anemia    Hypokalemia    Large breasts    Long-term current use of opiate analgesic    Uncomplicated opioid dependence ([facility] [address] )       Past Medical History:   Diagnosis Date    Anemia     Anesthesia     "sometimes low BP upon waking up" pt states she stopped breathing 1 time during surgery    Arthritis     Asthma     Back pain     Dolan's esophagus     Chronic pain disorder     Chronic venous insufficiency     Cough variant asthma     Depression     Environmental allergies     dust    GERD (gastroesophageal reflux disease)     Hiatal hernia     History of anemia     History of fusion of spine for scoliosis     "as a teenager"    History of transfusion     1978 - no adverse reaction    Left lumbar radiculitis     Last Assessed: [id number]    Lumbar postlaminectomy syndrome     Migraines     Morbid obesity ([facility] [address] )     Motion sickness     Osteoarthritis     of left hip    Right knee pain     Seasonal allergies     Shortness of breath     Umbilical hernia     Uses brace     right knee    Wears glasses        Past Surgical History:   Procedure Laterality Date    ARTHRODESIS      lumbar    ARTHRODESIS      Spinal Arthrodesis for Deformity; Last Assessed: [id number]    BACK SURGERY      lumbar fusion,with nydia/screw and cage implant    BACK SURGERY      surgery for scoliosis    BREAST CYST EXCISION Right     benign    COLONOSCOPY      COLONOSCOPY N/A 3/14/2019    Procedure: COLONOSCOPY;  Surgeon: Lo Esposito MD;  [facility];   Service: Gastroenterology    ESOPHAGOGASTRODUODENOSCOPY N/A 3/14/2019    Procedure: ESOPHAGOGASTRODUODENOSCOPY (EGD) W RFA(BARRX); Surgeon: Vidya Mcfarland MD;  [facility]: [facility]; Service: Gastroenterology    HERNIA REPAIR      umbilical hernia repair    ORTHOPEDIC SURGERY      PLANTAR FASCIA SURGERY Left     AR COLONOSCOPY FLX DX W/COLLJ SPEC WHEN PFRMD N/A 2/20/2018    Procedure: COLONOSCOPY with polypectomy;  Surgeon: Jared Powell MD;  Location: [facility]; Service: General    AR ESOPHAGOGASTRODUODENOSCOPY TRANSORAL DIAGNOSTIC N/A 5/24/2017    Procedure: ESOPHAGOGASTRODUODENOSCOPY (EGD); Surgeon: Brooks Gresham MD;  Location: [facility]; Service: Gastroenterology    AR ESOPHAGOGASTRODUODENOSCOPY TRANSORAL DIAGNOSTIC N/A 8/31/2017    Procedure: ESOPHAGOGASTRODUODENOSCOPY (EGD) W RFA;  Surgeon: Diane Palacios MD;  Location:  GI LAB;   Service: Gastroenterology    AR KNEE SCOPE,MED/LAT MENISECTOMY Right 4/4/2018    Procedure: ARTHROSCOPY KNEE PARTIAL MEDIAL MENISECTOMY , CHONDROPLASTY;  Surgeon: Najma Sales DO;  [facility];  Service: Orthopedics    WISDOM TOOTH EXTRACTION         Family History   Problem Relation Age of Onset    Lung cancer Mother     Cancer Mother     Alcohol abuse Father     Other Father         Cardiac Disorder    Depression Father     Hypertension Father     Heart attack Father     Breast cancer Maternal Grandmother     Lung cancer Maternal Grandmother     Diabetes type I Other     No Known Problems Sister     No Known Problems Brother     No Known Problems Maternal Grandfather     Leukemia Paternal Grandmother     No Known Problems Paternal Grandfather     No Known Problems Sister     No Known Problems Maternal Aunt     No Known Problems Paternal Aunt     Stroke Neg Hx        Social History     Occupational History    Not on file   Tobacco Use    Smoking status: Never Smoker    Smokeless tobacco: Never Used   Substance and Sexual Activity    Alcohol use: Not Currently    Drug use: No    Sexual activity: Not on file       Current Outpatient Medications on File Prior to Visit   Medication Sig    albuterol (2 5 mg/3 mL) 0 083 % nebulizer solution VVN Q 4 H PRN    azelastine (ASTELIN) 0 1 % nasal spray 1 spray into each nostril 2 (two) times a day Use in each nostril as directed (Patient taking differently: 1 spray into each nostril as needed Use in each nostril as directed)    dexlansoprazole (DEXILANT) 60 MG capsule Take 1 capsule (60 mg total) by mouth 2 (two) times a day    DULoxetine (CYMBALTA) 60 mg delayed release capsule Take 1 capsule (60 mg total) by mouth 2 (two) times a day    furosemide (LASIX) 20 mg tablet TAKE 1 TABLET BY MOUTH DAILY (Patient taking differently: as needed )    MAGNESIUM PO Take 1 tablet by mouth daily    montelukast (SINGULAIR) 10 mg tablet Take 10 mg by mouth daily at bedtime    omeprazole (PriLOSEC) 40 MG capsule TAKE 1 CAPSULE BY MOUTH TWICE DAILY (Patient taking differently: as needed )    POTASSIUM PO Take 1 tablet by mouth daily    rOPINIRole (REQUIP) 2 mg tablet TAKE 1 TABLET BY MOUTH AT BEDTIME    sodium chloride 0 9 % nebulizer solution as needed     traMADol (ULTRAM) 50 mg tablet Take 1 tablet (50 mg total) by mouth every 8 (eight) hours as needed for moderate pain    VITAMIN D PO Take 1 capsule by mouth daily     No current facility-administered medications on file prior to visit  Allergies   Allergen Reactions    Dust Mite Extract Shortness Of Breath    Latex Itching and Blisters    Other      seasonal    Sulfa Antibiotics Vomiting     Topical-blistering       Physical Exam:    /76   Pulse 75   Ht 5' 4" (1 626 m)   Wt 96 6 kg (213 lb)   BMI 36 56 kg/m²     Constitutional:normal, well developed, well nourished, alert, in no distress and non-toxic and no overt pain behavior    Eyes:anicteric  HEENT:grossly intact  Neck:supple, symmetric, trachea midline and no masses   Pulmonary:even and unlabored  Cardiovascular:No edema or pitting edema present  Skin:Normal without rashes or lesions and well hydrated  Psychiatric:Mood and affect appropriate  Neurologic:Cranial Nerves II-XII grossly intact  Musculoskeletal:normal    Imaging

## 2021-01-19 ENCOUNTER — ANESTHESIA EVENT (OUTPATIENT)
Dept: PERIOP | Facility: HOSPITAL | Age: 61
End: 2021-01-19
Payer: MEDICARE

## 2021-01-19 LAB
ATRIAL RATE: 60 BPM
P AXIS: 47 DEGREES
PR INTERVAL: 128 MS
QRS AXIS: 39 DEGREES
QRSD INTERVAL: 84 MS
QT INTERVAL: 400 MS
QTC INTERVAL: 400 MS
T WAVE AXIS: 72 DEGREES
VENTRICULAR RATE: 60 BPM

## 2021-01-19 PROCEDURE — 93010 ELECTROCARDIOGRAM REPORT: CPT | Performed by: INTERNAL MEDICINE

## 2021-01-20 ENCOUNTER — TELEPHONE (OUTPATIENT)
Dept: PSYCHIATRY | Facility: CLINIC | Age: 61
End: 2021-01-20

## 2021-01-20 NOTE — QUICK NOTE
The patient's recent EKG was reviewed  Previous tracings were reviewed as well  Her most recent EKG was interpreted as showing an anteroseptal MI   considering the appearance of the EKG, and the patient's clinical status, I suspect that the abnormalities referenced are more likely to represent lead placement  The patient is suitable to proceed with surgery

## 2021-01-20 NOTE — TELEPHONE ENCOUNTER
Patient called and left a voice message that she would like to set up an apt can you please give her a call thank you

## 2021-01-21 ENCOUNTER — TELEPHONE (OUTPATIENT)
Dept: PSYCHIATRY | Facility: CLINIC | Age: 61
End: 2021-01-21

## 2021-01-21 ENCOUNTER — HOSPITAL ENCOUNTER (OUTPATIENT)
Facility: HOSPITAL | Age: 61
Setting detail: OUTPATIENT SURGERY
Discharge: HOME | End: 2021-01-21
Attending: SURGERY | Admitting: SURGERY
Payer: MEDICARE

## 2021-01-21 ENCOUNTER — ANESTHESIA (OUTPATIENT)
Dept: PERIOP | Facility: HOSPITAL | Age: 61
End: 2021-01-21
Payer: MEDICARE

## 2021-01-21 VITALS
HEIGHT: 64 IN | WEIGHT: 209.5 LBS | OXYGEN SATURATION: 96 % | DIASTOLIC BLOOD PRESSURE: 56 MMHG | RESPIRATION RATE: 18 BRPM | BODY MASS INDEX: 35.77 KG/M2 | TEMPERATURE: 98.5 F | HEART RATE: 68 BPM | SYSTOLIC BLOOD PRESSURE: 111 MMHG

## 2021-01-21 VITALS — HEART RATE: 125 BPM

## 2021-01-21 DIAGNOSIS — K43.2 RECURRENT INCISIONAL HERNIA: ICD-10-CM

## 2021-01-21 PROCEDURE — 88302 TISSUE EXAM BY PATHOLOGIST: CPT | Performed by: PATHOLOGY

## 2021-01-21 PROCEDURE — 99024 POSTOP FOLLOW-UP VISIT: CPT | Performed by: SURGERY

## 2021-01-21 PROCEDURE — 49656 PR LAP, RECURRENT INCISIONAL HERNIA REPAIR,REDUCIBLE: CPT | Performed by: SURGERY

## 2021-01-21 PROCEDURE — C1781 MESH (IMPLANTABLE): HCPCS | Performed by: SURGERY

## 2021-01-21 PROCEDURE — 49656 PR LAP, RECURRENT INCISIONAL HERNIA REPAIR,REDUCIBLE: CPT | Performed by: PHYSICIAN ASSISTANT

## 2021-01-21 DEVICE — SELF-GRIPPING POLYESTER MESH,POLYESTER WITH POLYLACTIC ACID GRIPS
Type: IMPLANTABLE DEVICE | Site: ABDOMEN | Status: FUNCTIONAL
Brand: PROGRIP

## 2021-01-21 RX ORDER — ONDANSETRON 2 MG/ML
4 INJECTION INTRAMUSCULAR; INTRAVENOUS ONCE AS NEEDED
Status: DISCONTINUED | OUTPATIENT
Start: 2021-01-21 | End: 2021-01-21 | Stop reason: HOSPADM

## 2021-01-21 RX ORDER — KETAMINE HCL IN NACL, ISO-OSM 100MG/10ML
SYRINGE (ML) INJECTION AS NEEDED
Status: DISCONTINUED | OUTPATIENT
Start: 2021-01-21 | End: 2021-01-21

## 2021-01-21 RX ORDER — OXYCODONE HYDROCHLORIDE AND ACETAMINOPHEN 5; 325 MG/1; MG/1
1 TABLET ORAL EVERY 4 HOURS PRN
Status: DISCONTINUED | OUTPATIENT
Start: 2021-01-21 | End: 2021-01-21 | Stop reason: HOSPADM

## 2021-01-21 RX ORDER — MIDAZOLAM HYDROCHLORIDE 2 MG/2ML
INJECTION, SOLUTION INTRAMUSCULAR; INTRAVENOUS AS NEEDED
Status: DISCONTINUED | OUTPATIENT
Start: 2021-01-21 | End: 2021-01-21

## 2021-01-21 RX ORDER — DEXAMETHASONE SODIUM PHOSPHATE 10 MG/ML
INJECTION, SOLUTION INTRAMUSCULAR; INTRAVENOUS AS NEEDED
Status: DISCONTINUED | OUTPATIENT
Start: 2021-01-21 | End: 2021-01-21

## 2021-01-21 RX ORDER — MAGNESIUM HYDROXIDE 1200 MG/15ML
LIQUID ORAL AS NEEDED
Status: DISCONTINUED | OUTPATIENT
Start: 2021-01-21 | End: 2021-01-21 | Stop reason: HOSPADM

## 2021-01-21 RX ORDER — CEFAZOLIN SODIUM 2 G/50ML
2000 SOLUTION INTRAVENOUS ONCE
Status: COMPLETED | OUTPATIENT
Start: 2021-01-21 | End: 2021-01-21

## 2021-01-21 RX ORDER — FENTANYL CITRATE 50 UG/ML
INJECTION, SOLUTION INTRAMUSCULAR; INTRAVENOUS AS NEEDED
Status: DISCONTINUED | OUTPATIENT
Start: 2021-01-21 | End: 2021-01-21

## 2021-01-21 RX ORDER — NEOSTIGMINE METHYLSULFATE 1 MG/ML
INJECTION INTRAVENOUS AS NEEDED
Status: DISCONTINUED | OUTPATIENT
Start: 2021-01-21 | End: 2021-01-21

## 2021-01-21 RX ORDER — ROCURONIUM BROMIDE 10 MG/ML
INJECTION, SOLUTION INTRAVENOUS AS NEEDED
Status: DISCONTINUED | OUTPATIENT
Start: 2021-01-21 | End: 2021-01-21

## 2021-01-21 RX ORDER — BUPIVACAINE HYDROCHLORIDE AND EPINEPHRINE 5; 5 MG/ML; UG/ML
INJECTION, SOLUTION EPIDURAL; INTRACAUDAL; PERINEURAL AS NEEDED
Status: DISCONTINUED | OUTPATIENT
Start: 2021-01-21 | End: 2021-01-21 | Stop reason: HOSPADM

## 2021-01-21 RX ORDER — OXYCODONE HYDROCHLORIDE AND ACETAMINOPHEN 5; 325 MG/1; MG/1
2 TABLET ORAL EVERY 6 HOURS PRN
Status: DISCONTINUED | OUTPATIENT
Start: 2021-01-21 | End: 2021-01-21 | Stop reason: HOSPADM

## 2021-01-21 RX ORDER — ONDANSETRON 2 MG/ML
INJECTION INTRAMUSCULAR; INTRAVENOUS AS NEEDED
Status: DISCONTINUED | OUTPATIENT
Start: 2021-01-21 | End: 2021-01-21

## 2021-01-21 RX ORDER — EPHEDRINE SULFATE 50 MG/ML
INJECTION INTRAVENOUS AS NEEDED
Status: DISCONTINUED | OUTPATIENT
Start: 2021-01-21 | End: 2021-01-21

## 2021-01-21 RX ORDER — OXYCODONE HYDROCHLORIDE 5 MG/1
5 TABLET ORAL EVERY 4 HOURS PRN
Qty: 30 TABLET | Refills: 0 | Status: SHIPPED | OUTPATIENT
Start: 2021-01-21 | End: 2021-01-31

## 2021-01-21 RX ORDER — GLYCOPYRROLATE 0.2 MG/ML
INJECTION INTRAMUSCULAR; INTRAVENOUS AS NEEDED
Status: DISCONTINUED | OUTPATIENT
Start: 2021-01-21 | End: 2021-01-21

## 2021-01-21 RX ORDER — DEXMEDETOMIDINE HYDROCHLORIDE 100 UG/ML
INJECTION, SOLUTION INTRAVENOUS AS NEEDED
Status: DISCONTINUED | OUTPATIENT
Start: 2021-01-21 | End: 2021-01-21

## 2021-01-21 RX ORDER — PROPOFOL 10 MG/ML
INJECTION, EMULSION INTRAVENOUS AS NEEDED
Status: DISCONTINUED | OUTPATIENT
Start: 2021-01-21 | End: 2021-01-21

## 2021-01-21 RX ORDER — FENTANYL CITRATE/PF 50 MCG/ML
50 SYRINGE (ML) INJECTION
Status: DISCONTINUED | OUTPATIENT
Start: 2021-01-21 | End: 2021-01-21 | Stop reason: HOSPADM

## 2021-01-21 RX ORDER — SODIUM CHLORIDE 9 MG/ML
125 INJECTION, SOLUTION INTRAVENOUS CONTINUOUS
Status: DISCONTINUED | OUTPATIENT
Start: 2021-01-21 | End: 2021-01-21 | Stop reason: HOSPADM

## 2021-01-21 RX ORDER — KETOROLAC TROMETHAMINE 30 MG/ML
INJECTION, SOLUTION INTRAMUSCULAR; INTRAVENOUS AS NEEDED
Status: DISCONTINUED | OUTPATIENT
Start: 2021-01-21 | End: 2021-01-21

## 2021-01-21 RX ADMIN — Medication 25 MG: at 11:28

## 2021-01-21 RX ADMIN — ROCURONIUM BROMIDE 50 MG: 10 INJECTION, SOLUTION INTRAVENOUS at 11:05

## 2021-01-21 RX ADMIN — FENTANYL CITRATE 50 MCG: 50 INJECTION INTRAMUSCULAR; INTRAVENOUS at 14:45

## 2021-01-21 RX ADMIN — SODIUM CHLORIDE 125 ML/HR: 0.9 INJECTION, SOLUTION INTRAVENOUS at 09:45

## 2021-01-21 RX ADMIN — SODIUM CHLORIDE 125 ML/HR: 0.9 INJECTION, SOLUTION INTRAVENOUS at 15:54

## 2021-01-21 RX ADMIN — PHENYLEPHRINE HYDROCHLORIDE 100 MCG: 10 INJECTION INTRAVENOUS at 11:21

## 2021-01-21 RX ADMIN — KETOROLAC TROMETHAMINE 30 MG: 30 INJECTION, SOLUTION INTRAMUSCULAR at 14:08

## 2021-01-21 RX ADMIN — DEXAMETHASONE SODIUM PHOSPHATE 4 MG: 10 INJECTION, SOLUTION INTRAMUSCULAR; INTRAVENOUS at 13:25

## 2021-01-21 RX ADMIN — EPHEDRINE SULFATE 5 MG: 50 INJECTION, SOLUTION INTRAVENOUS at 11:49

## 2021-01-21 RX ADMIN — OXYCODONE HYDROCHLORIDE AND ACETAMINOPHEN 1 TABLET: 5; 325 TABLET ORAL at 17:15

## 2021-01-21 RX ADMIN — FENTANYL CITRATE 50 MCG: 50 INJECTION INTRAMUSCULAR; INTRAVENOUS at 15:07

## 2021-01-21 RX ADMIN — ROCURONIUM BROMIDE 10 MG: 10 INJECTION, SOLUTION INTRAVENOUS at 13:23

## 2021-01-21 RX ADMIN — Medication 25 MG: at 13:33

## 2021-01-21 RX ADMIN — DEXMEDETOMIDINE HYDROCHLORIDE 20 MCG: 100 INJECTION, SOLUTION INTRAVENOUS at 11:05

## 2021-01-21 RX ADMIN — FENTANYL CITRATE 50 MCG: 50 INJECTION, SOLUTION INTRAMUSCULAR; INTRAVENOUS at 12:43

## 2021-01-21 RX ADMIN — EPHEDRINE SULFATE 5 MG: 50 INJECTION, SOLUTION INTRAVENOUS at 11:21

## 2021-01-21 RX ADMIN — GLYCOPYRROLATE 0.6 MG: 0.2 INJECTION, SOLUTION INTRAMUSCULAR; INTRAVENOUS at 14:22

## 2021-01-21 RX ADMIN — MIDAZOLAM 2 MG: 1 INJECTION INTRAMUSCULAR; INTRAVENOUS at 11:05

## 2021-01-21 RX ADMIN — PROPOFOL 200 MG: 10 INJECTION, EMULSION INTRAVENOUS at 11:05

## 2021-01-21 RX ADMIN — FENTANYL CITRATE 50 MCG: 50 INJECTION INTRAMUSCULAR; INTRAVENOUS at 14:54

## 2021-01-21 RX ADMIN — SODIUM CHLORIDE: 0.9 INJECTION, SOLUTION INTRAVENOUS at 14:09

## 2021-01-21 RX ADMIN — ROCURONIUM BROMIDE 20 MG: 10 INJECTION, SOLUTION INTRAVENOUS at 12:29

## 2021-01-21 RX ADMIN — ONDANSETRON 4 MG: 2 INJECTION INTRAMUSCULAR; INTRAVENOUS at 13:25

## 2021-01-21 RX ADMIN — FENTANYL CITRATE 50 MCG: 50 INJECTION, SOLUTION INTRAMUSCULAR; INTRAVENOUS at 11:05

## 2021-01-21 RX ADMIN — CEFAZOLIN SODIUM 2000 MG: 2 SOLUTION INTRAVENOUS at 11:00

## 2021-01-21 RX ADMIN — SODIUM CHLORIDE 125 ML/HR: 0.9 INJECTION, SOLUTION INTRAVENOUS at 10:56

## 2021-01-21 RX ADMIN — NEOSTIGMINE METHYLSULFATE 4 MG: 1 INJECTION INTRAVENOUS at 14:22

## 2021-01-21 NOTE — DISCHARGE INSTRUCTIONS
[facility]  Post-Operative Care Instructions  Dr James Schlatter MD, Ronda Munoz  [phone]    1  General: You will feel pulling sensations around the wound or funny aches and pains around the incisions  This is normal  Even minor surgery is a change in your body and this is your bodys way of reaction to it  If you have had abdominal surgery, it may help to support the incision with a small pillow or blanket for comfort when moving or coughing  2  Wound care:  Okay to shower  The glue will fall off over the next week or 2     3  Water: You may shower over the wound, unless there are drain tubes left in place  Do not bathe or use a pool or hot tub until cleared by the physician  4  Activity: You may go up and down stairs, walk as much as you are comfortable, but walk at least 3 times each day  If you have had abdominal surgery, do not lift anything heavier than 20 pounds for at least 4 weeks, unless cleared by the doctor  5  Diet: You may resume a regular diet  If you had a same-day surgery or overnight stay surgery, he may wish to eat lightly for a few days: soups, crackers, and sandwiches  You may resume a regular diet when ready  6  Medications: Resume all of your previous medications, unless told otherwise by the doctor  A good option for pain control is to start with Tylenol and ibuprofen and alternate taking them every 2 hours for 1-2 days  If this is not sufficient then substituted the Tylenol for the narcotic pain medicine prescribed  Insure that you do not take more than 4000 mg of Tylenol per day  You do not need to take the narcotic pain medications unless you are having significant pain and discomfort  7  Driving: You will need someone to drive you home on the day of surgery  Do not drive or make any important decisions while on narcotic pain medication or 24 hours and after anesthesia or sedation for surgery   Generally, you may drive when your off all narcotic pain medications  8  Upset Stomach: You may take Maalox, Tums, or similar items for an upset stomach  If your narcotic pain medication causes an upset stomach, do not take it on an empty stomach  Try taking it with at least some crackers or toast      9  Constipation: Patients often experienced constipation after surgery  You may take over-the-counter medication for this, such as Metamucil, Senokot, Dulcolax, milk of magnesia, etc  You may take a suppository unless you have had anorectal surgery such as a procedure on your hemorrhoids  If you experience significant nausea or vomiting after abdominal surgery, call the office before trying any of these medications  10  Call the office: If you are experiencing any of the following, fevers above 101 5°, significant nausea or vomiting, if the wound develops drainage and/or is excessive redness around the wound, or if you have significant diarrhea or other worsening symptoms  11  Pain: You may be given a prescription for pain  This will be given to the hospital, the day of surgery  12  Sexual Activity: You may resume sexual activity when you feel ready and comfortable and your incision is sealed and healed without apparent infection risk

## 2021-01-21 NOTE — ANESTHESIA PREPROCEDURE EVALUATION
Procedure:  REPAIR HERNIA INCISIONAL LAPAROSCOPIC W/ ROBOTICS (N/A Abdomen)    Relevant Problems   ANESTHESIA   (+) Motion sickness      CARDIO   (+) Hyperlipidemia      GI/HEPATIC   (+) Gastroesophageal reflux disease      HEMATOLOGY   (+) Anemia      MUSCULOSKELETAL   (+) Arthritis of lumbar spine   (+) Cervical spondylosis without myelopathy   (+) Chronic low back pain   (+) Lumbar spondylosis   (+) Osteoarthritis of multiple joints   (+) Primary osteoarthritis of left hip   (+) Sacroiliitis, not elsewhere classified (HCC)      NEURO/PSYCH   (+) Chronic pain syndrome   (+) Depression      PULMONARY   (+) Cough variant asthma   (+) Mild intermittent asthma without complication      Other   (+) Dolan's esophagus with dysplasia   (+) Dysphonia   (+) Glottic insufficiency   (+) Lumbar postlaminectomy syndrome   (+) Obesity (BMI 30-39 9)   (+) Vocal fold paresis, right        Physical Exam    Airway    Mallampati score: II  TM Distance: >3 FB  Neck ROM: full     Dental       Cardiovascular  Rhythm: regular, Rate: normal,     Pulmonary  Breath sounds clear to auscultation,     Other Findings        Anesthesia Plan  ASA Score- 2     Anesthesia Type- general with ASA Monitors  Additional Monitors:   Airway Plan: ETT  Plan Factors-Exercise tolerance (METS): <4 METS  Chart reviewed  Patient summary reviewed  Induction- intravenous  Postoperative Plan-     Informed Consent- Anesthetic plan and risks discussed with patient

## 2021-01-21 NOTE — OP NOTE
OPERATIVE REPORT  PATIENT NAME: Fara Bautista    :  1960  MRN: [id number]  Pt Location: [facility] ROOM 08    SURGERY DATE: 2021    Surgeon(s) and Role:     * Patsy Otero MD - Primary     * Hiral Tobar - Assisting     * Jann Norman MD - Assisting     * Buckner Leventhal, PA-C - Assisting    Preop Diagnosis:  Recurrent incisional hernia [K43 2]    Post-Op Diagnosis Codes:     * Recurrent incisional hernia [K43 2]    Procedure(s) (LRB):  REPAIR HERNIA INCISIONAL LAPAROSCOPIC W/ ROBOTICS, with mesh (N/A) RECURRENT    Specimen(s):  ID Type Source Tests Collected by Time Destination   1 : hernia sac Tissue Soft Tissue, Other TISSUE EXAM Patsy Otero MD 2021 1406        Estimated Blood Loss:   Minimal    Drains:  * No LDAs found *    Anesthesia Type:   General    Operative Indications:  Recurrent incisional hernia [K43 2]      Operative Findings:  RECURRENT INCISIONAL HERNIA AT THE SUPERIOR EDGE OF THE OLD MESH  OLD MESH HAD CONTRACTED AND CURLED  Complications:   None    Procedure and Technique:    The patient was seen in the Holding Room  The risks, benefits, complications, treatment options, and expected outcomes were discussed with the patient  The possibilities of reaction to medication, pulmonary aspiration, perforation of viscus, bleeding, recurrent infection, the need for additional procedures, failure to diagnose a condition, and creating a complication requiring transfusion or operation were discussed with the patient  The patient concurred with the proposed plan, giving informed consent  The site of surgery properly noted/marked  The patient was taken to Operating Room, identified as Fara Bautista  Staff confirmed patient name, , and procedure  A Time Out was held and the above information confirmed  The patient was placed supine  After the induction of anesthesia, a Valencia and oral gastric tube placed  They were prepped and draped in a sterile fashion   An additional timeout was performed  A left upper quadrant incision was made, dissection carried up to the fascia, and the abdomen entered under direct vision  A pneumoperitoneum created and the camera inserted  There was no intra-abdominal injury or bleeding  Additional trocars were placed with the camera being placed as far lateral as possible and the 2nd robotic arm over the ASIS  Any adhesions to the intra-abdominal wall were carefully lysed using a combination of blunt dissection, scissor dissection, and a harmonic scalpel or cautery where appropriate  Care was taken not to injure the bowel  The bowel was carefully inspected, and preserved  The peritoneum was incised in the line as close to the trocars as possible with scissors  The preperitoneal space was developed across the abdominal wall  Care was taken to take down the hernia sac as well  Once the dissection was complete there was enough space for placement of the mesh  The old mesh had been placed intraperitoneal and had very minimal adhesions to the abdominal wall and was kept in the same plane as the peritoneum and removed abdominal wall and some sutures were cut to use this as the covering of the new mesh  The defect was measured 3 cm  The fascia was closed, with a running strata Fix Prolene suture  An appropriate sized mesh was selected to fit the defect with at least 3-5 cm of overlap  The mesh was then rolled, placed into the larger port site, unrolled and reoriented intra-abdominally  A [id number]  Pro  mesh was placed and adhered to the abdominal wall  If necessary interrupted 3 Vicryl sutures were placed as stay sutures  Next the peritoneum was closed over the mesh using a 3 0 V lock suture  The abdomen was reinspected for hemostasis  There was no evidence of bleeding or bowel injury  The larger port site was closed using an 0 Vicrylon a suture passer and riojas closure device under direct vision  The pneumoperitoneum was released  All skin incisions were closed using 4-0 Monocryl subcuticular sutures  The stab incisions for the mesh sutures were closed using histocryl  All wounds were dressed with histocryl and dressings if needed  Abdominal binder was placed on the abdomen  Instrument, sponge, and needle counts were correct prior to closure and at the conclusion of the case  PA was medically necessary for the surgical safety of the case, including suturing, retraction, hemostasis  This text is generated with voice recognition software  There may be translation or grammatical, or syntax errors  If you have any questions, please contact the dictating provider          I was present for the entire procedure    Patient Disposition:  PACU     SIGNATURE: Sarika Gore MD  DATE: January 21, 2021  TIME: 2:11 PM

## 2021-01-25 ENCOUNTER — TELEPHONE (OUTPATIENT)
Dept: PSYCHIATRY | Facility: CLINIC | Age: 61
End: 2021-01-25

## 2021-01-25 NOTE — TELEPHONE ENCOUNTER
Patient called and would like to set up an apt she said a good time to call her today would be after 3:00 can you please give her a call thank you

## 2021-01-26 ENCOUNTER — TELEPHONE (OUTPATIENT)
Dept: PSYCHIATRY | Facility: CLINIC | Age: 61
End: 2021-01-26

## 2021-01-28 ENCOUNTER — TELEPHONE (OUTPATIENT)
Dept: PSYCHIATRY | Facility: CLINIC | Age: 61
End: 2021-01-28

## 2021-02-08 ENCOUNTER — OFFICE VISIT (OUTPATIENT)
Dept: PAIN MEDICINE | Facility: MEDICAL CENTER | Age: 61
End: 2021-02-08
Payer: MEDICARE

## 2021-02-08 VITALS
HEIGHT: 64 IN | SYSTOLIC BLOOD PRESSURE: 114 MMHG | DIASTOLIC BLOOD PRESSURE: 76 MMHG | WEIGHT: 205 LBS | TEMPERATURE: 97.6 F | HEART RATE: 69 BPM | BODY MASS INDEX: 35 KG/M2

## 2021-02-08 DIAGNOSIS — M54.50 CHRONIC BILATERAL LOW BACK PAIN WITHOUT SCIATICA: ICD-10-CM

## 2021-02-08 DIAGNOSIS — M46.1 SACROILIITIS, NOT ELSEWHERE CLASSIFIED (HCC): ICD-10-CM

## 2021-02-08 DIAGNOSIS — G89.29 CHRONIC BILATERAL LOW BACK PAIN WITHOUT SCIATICA: ICD-10-CM

## 2021-02-08 DIAGNOSIS — M47.816 LUMBAR SPONDYLOSIS: Primary | ICD-10-CM

## 2021-02-08 DIAGNOSIS — M96.1 LUMBAR POSTLAMINECTOMY SYNDROME: ICD-10-CM

## 2021-02-08 DIAGNOSIS — G89.4 CHRONIC PAIN SYNDROME: ICD-10-CM

## 2021-02-08 PROCEDURE — 99214 OFFICE O/P EST MOD 30 MIN: CPT | Performed by: NURSE PRACTITIONER

## 2021-02-08 NOTE — PROGRESS NOTES
Assessment  1  Lumbar spondylosis    2  Chronic bilateral low back pain without sciatica    3  Sacroiliitis, not elsewhere classified ([facility] [address] )    4  Chronic pain syndrome    5  Lumbar postlaminectomy syndrome        Plan    Continue with gabapentin, methocarbamol and tramadol  Tramadol was the only medication that needed refilled today  We will schedule the patient for a  bilateral L4-5 and L5-S1 under the level of her lumbar spinal fusion which is at L3 -4, facet medial branch blocks with intention of moving forward towards radiofrequency ablation if there is an appropriate diagnostic response  The initial blocks will be performed with 2% lidocaine and if an appropriate response is obtained upon review of the patient's pain diary, a confirmatory block will be scheduled with 0 5% bupivacaine  will await results of patient's pain diary  and follow-up after procedure  There are risks associated with opioid medications, including dependence, addiction and tolerance  The patient understands and agrees to use these medications only as prescribed  Potential side effects of the medications include, but are not limited to, constipation, drowsiness, addiction, impaired judgment and risk of fatal overdose if not taken as prescribed  The patient was warned against driving while taking sedation medications  Sharing medications is a felony  At this point in time, the patient is showing no signs of addiction, abuse, diversion or suicidal ideation  [place] Prescription Drug Monitoring Program report was reviewed and was appropriate     Complete risks and benefits including bleeding, infection, tissue reaction, nerve injury and allergic reaction were discussed  The approach was demonstrated using models and literature was provided  Verbal and written consent was obtained      My impressions and treatment recommendations were discussed in detail with the patient who verbalized understanding and had no further questions  Discharge instructions were provided  I personally saw and examined the patient and I agree with the above discussed plan of care  Orders Placed This Encounter   Procedures    FL spine and pain procedure     Standing Status:   Future     Standing Expiration Date:   2/8/2025     Order Specific Question:   Reason for Exam:     Answer:   B/L lumbar MBBs ( L4-5 and L5-S1) under the level of spinal fusion which is L3-4     Order Specific Question:   Is the patient pregnant? Answer:   No     Order Specific Question:   Anticoagulant hold needed? Answer:   none     No orders of the defined types were placed in this encounter  History of Present Illness    Vicky Pratt is a 64 y o  female  Who presents for follow-up related to her chronic low back pain  Today she rates her pain 6/10 this is constant and described as a soreness and a strong ache across her low back and wrapping around to her lower abdomen and groins  Patient continues with gabapentin 300 mg 3 times a day, methocarbamol 750 mg and tramadol 50 mg 1 tablet 3 times daily as needed  Her medication regimen provides 40% relief with fatigue as her only side effect  I have personally reviewed and/or updated the patient's past medical history, past surgical history, family history, social history, current medications, allergies, and vital signs today  Review of Systems   Respiratory: Negative for shortness of breath  Cardiovascular: Negative for chest pain  Gastrointestinal: Negative for constipation, diarrhea, nausea and vomiting  Musculoskeletal: Positive for back pain and gait problem  Negative for arthralgias, joint swelling and myalgias  Skin: Negative for rash  Neurological: Positive for numbness (left side of the back  )  Negative for dizziness, seizures and weakness  All other systems reviewed and are negative        Patient Active Problem List   Diagnosis    Arthritis of lumbar spine    Chronic low back pain    Chronic pain syndrome    Chronic venous insufficiency    Cough variant asthma    Depression    Hyperlipidemia    Lumbar postlaminectomy syndrome    Class 2 severe obesity due to excess calories with serious comorbidity and body mass index (BMI) of 36 0 to 36 9 in adult [facility])    Primary osteoarthritis of left hip    Restless legs syndrome    Sleep disturbance    Hernia of anterior abdominal wall    S/P right knee arthroscopy    Environmental allergies    Lumbar spondylosis    Lumbar radiculopathy    Prediabetes    Vitamin D deficiency    Dolan's esophagus with dysplasia    Gastroesophageal reflux disease    Laryngopharyngeal reflux (LPR)    Chronic cough    Vocal fold paresis, right    Dysphonia    Muscle tension dysphonia    Glottic insufficiency    Reflux laryngitis    Laryngeal edema    Allergic rhinitis due to [employer] dust mite    Mild intermittent asthma without complication    Upper airway cough syndrome    Neck pain    Cervical spondylosis without myelopathy    Osteoarthritis of multiple joints    COVID-19 virus infection    Sacroiliitis, not elsewhere classified ([address] )    Lipoma of torso    Recurrent umbilical hernia    Anemia    Hypokalemia    Large breasts    Long-term current use of opiate analgesic    Uncomplicated opioid dependence (HCC)    Motion sickness    Obesity (BMI 30-39  9)    Recurrent incisional hernia       Past Medical History:   Diagnosis Date    Anemia     Anesthesia     "sometimes low BP upon waking up" pt states she stopped breathing 1 time during surgery    Arthritis     Asthma     Back pain     Dolan's esophagus     Chronic pain disorder     Chronic venous insufficiency     Cough variant asthma     Depression     Environmental allergies     dust    GERD (gastroesophageal reflux disease)     Hiatal hernia     History of anemia     History of fusion of spine for scoliosis     "as a teenager"    History of transfusion     1978 - no adverse reaction    Left lumbar radiculitis     Last Assessed: [id number]    Lumbar postlaminectomy syndrome     Migraines     Morbid obesity (HCC)     Motion sickness     Osteoarthritis     of left hip    Right knee pain     Seasonal allergies     Shortness of breath     Umbilical hernia     Uses brace     right knee    Wears glasses        Past Surgical History:   Procedure Laterality Date    ARTHRODESIS      lumbar    ARTHRODESIS      Spinal Arthrodesis for Deformity; Last Assessed: [id number]    BACK SURGERY      lumbar fusion,with nydia/screw and cage implant    BACK SURGERY      surgery for scoliosis    BREAST CYST EXCISION Right     benign    COLONOSCOPY      COLONOSCOPY N/A 3/14/2019    Procedure: COLONOSCOPY;  Surgeon: Gabby Davies MD;  [facility]; Service: Gastroenterology    ESOPHAGOGASTRODUODENOSCOPY N/A 3/14/2019    Procedure: ESOPHAGOGASTRODUODENOSCOPY (EGD) W RFA(BARRX); Surgeon: Gabby Davies MD;  [facility]; Service: Gastroenterology    HERNIA REPAIR      umbilical hernia repair    ORTHOPEDIC SURGERY      PLANTAR FASCIA SURGERY Left     MT COLONOSCOPY FLX DX W/COLLJ SPEC WHEN PFRMD N/A 2/20/2018    Procedure: COLONOSCOPY with polypectomy;  Surgeon: Trina Barber MD;  [facility]; Service: General    MT ESOPHAGOGASTRODUODENOSCOPY TRANSORAL DIAGNOSTIC N/A 5/24/2017    Procedure: ESOPHAGOGASTRODUODENOSCOPY (EGD); Surgeon: Tania Rizvi MD;  Location: [facility]; Service: Gastroenterology    MT ESOPHAGOGASTRODUODENOSCOPY TRANSORAL DIAGNOSTIC N/A 8/31/2017    Procedure: ESOPHAGOGASTRODUODENOSCOPY (EGD) W RFA;  Surgeon: Natali Castellanos MD;  [facility];   Service: Gastroenterology    MT KNEE SCOPE,MED/LAT MENISECTOMY Right 4/4/2018    Procedure: ARTHROSCOPY KNEE PARTIAL MEDIAL MENISECTOMY , CHONDROPLASTY;  Surgeon: Carl Rodriguez DO;  [facility];  Service: Orthopedics    MT LAP, RECURRENT INCISIONAL HERNIA REPAIR,REDUCIBLE N/A 1/21/2021    Procedure: REPAIR HERNIA INCISIONAL LAPAROSCOPIC W/ ROBOTICS, with mesh;  Surgeon: Preeti Tong MD;  [facility];  Service: General    WISDOM TOOTH EXTRACTION         Family History   Problem Relation Age of Onset    Lung cancer Mother     Cancer Mother     Alcohol abuse Father     Other Father         Cardiac Disorder    Depression Father     Hypertension Father     Heart attack Father     Breast cancer Maternal Grandmother     Lung cancer Maternal Grandmother     Diabetes type I Other     No Known Problems Sister     No Known Problems Brother     No Known Problems Maternal Grandfather     Leukemia Paternal Grandmother     No Known Problems Paternal Grandfather     No Known Problems Sister     No Known Problems Maternal Aunt     No Known Problems Paternal Aunt     Stroke Neg Hx        Social History     Occupational History    Not on file   Tobacco Use    Smoking status: Never Smoker    Smokeless tobacco: Never Used   Substance and Sexual Activity    Alcohol use: Not Currently    Drug use: No    Sexual activity: Not on file       Current Outpatient Medications on File Prior to Visit   Medication Sig    albuterol (2 5 mg/3 mL) 0 083 % nebulizer solution VVN Q 4 H PRN    dexlansoprazole (DEXILANT) 60 MG capsule Take 1 capsule (60 mg total) by mouth 2 (two) times a day    DULoxetine (CYMBALTA) 60 mg delayed release capsule Take 1 capsule (60 mg total) by mouth 2 (two) times a day    furosemide (LASIX) 20 mg tablet TAKE 1 TABLET BY MOUTH DAILY (Patient taking differently: as needed )    gabapentin (NEURONTIN) 300 mg capsule Take 1 capsule (300 mg total) by mouth 3 (three) times a day    MAGNESIUM PO Take 1 tablet by mouth daily    methocarbamol (ROBAXIN) 750 mg tablet Take 1 tablet (750 mg total) by mouth 2 (two) times a day as needed for muscle spasms    omeprazole (PriLOSEC) 40 MG capsule TAKE 1 CAPSULE BY MOUTH TWICE DAILY (Patient taking differently: as needed )    POTASSIUM PO Take 1 tablet by mouth daily    rOPINIRole (REQUIP) 2 mg tablet TAKE 1 TABLET BY MOUTH AT BEDTIME    sodium chloride 0 9 % nebulizer solution as needed     traMADol (ULTRAM) 50 mg tablet Take 1 tablet (50 mg total) by mouth every 8 (eight) hours as needed for moderate pain    VITAMIN D PO Take 1 capsule by mouth daily    azelastine (ASTELIN) 0 1 % nasal spray 1 spray into each nostril 2 (two) times a day Use in each nostril as directed (Patient not taking: Reported on 2/8/2021)    montelukast (SINGULAIR) 10 mg tablet Take 10 mg by mouth daily at bedtime     No current facility-administered medications on file prior to visit  Allergies   Allergen Reactions    Dust Mite Extract Shortness Of Breath    Latex Itching and Blisters    Other      seasonal    Sulfa Antibiotics Vomiting     Topical-blistering       Physical Exam    /76   Pulse 69   Temp 97 6 °F (36 4 °C) (Temporal)   Ht 5' 4" (1 626 m)   Wt 93 kg (205 lb) Comment: does not want to know the weight  BMI 35 19 kg/m²     Constitutional: normal, well developed, well nourished, alert, in no distress and non-toxic and no overt pain behavior  Eyes: anicteric  HEENT: grossly intact  Neck: supple, symmetric, trachea midline and no masses   Pulmonary:even and unlabored  Cardiovascular:No edema or pitting edema present  Skin:Normal without rashes or lesions and well hydrated  Psychiatric:Mood and affect appropriate  Neurologic:Cranial Nerves II-XII grossly intact  Musculoskeletal:normal   Lumbar Spine Exam    Appearance:  Normal lordosis  Palpation/Tenderness:  left lumbar paraspinal tenderness  right lumbar paraspinal tenderness        Special Tests:  Left Straight Leg Test:  negative  Right Straight Leg Test:  negative  Left Robert's Maneuver:  negative  Right Robert's Maneuver:  negative       Facet loading maneuvers reproduce patient's pain complaints bilaterally        Imaging

## 2021-02-09 ENCOUNTER — OFFICE VISIT (OUTPATIENT)
Dept: SURGERY | Facility: CLINIC | Age: 61
End: 2021-02-09

## 2021-02-09 VITALS
RESPIRATION RATE: 16 BRPM | BODY MASS INDEX: 35.58 KG/M2 | HEART RATE: 75 BPM | WEIGHT: 208.4 LBS | SYSTOLIC BLOOD PRESSURE: 116 MMHG | TEMPERATURE: 98 F | HEIGHT: 64 IN | DIASTOLIC BLOOD PRESSURE: 88 MMHG

## 2021-02-09 DIAGNOSIS — Z98.890 POST-OPERATIVE STATE: Primary | ICD-10-CM

## 2021-02-09 PROCEDURE — 99024 POSTOP FOLLOW-UP VISIT: CPT | Performed by: PHYSICIAN ASSISTANT

## 2021-02-09 NOTE — PATIENT INSTRUCTIONS
Your recovering well  Palpable fullness beneath previous incision site likely related to inflammation and possible component of hematoma  You should continue to monitor this site  You may use Tylenol, ibuprofen, ice or heat to the area as needed for pain control  We will have you return in 4 weeks for re-evaluation of this site  If area  Remains without improvement will consider imaging at that time  If symptoms worsen  Or you have questions or concerns prior to your return visit please call the office  Continue to follow lifting and activity restrictions with restriction of 20 lb until 02/21/2021

## 2021-02-09 NOTE — PROGRESS NOTES
Assessment/Plan:   Cruzito Sheridan is a 64 y  o female who comes in today for postoperative check after   Robotic assisted laparoscopic repair of recurrent incisional hernia done on 1/21/21  Recurrent hernia occurred at superior edge of old mesh  Old mesh remained in place  New mesh was placed intraoperatively  Patient complains of significant pain postoperatively for the 1st few days  Which has gradually improved  Currently she rates her pain as a 2/3 on a scale of 10  She complains of a persistent lump at her mid abdomen that is tender  States this lump has been here prior to surgery and that she thought that was her hernia  Otherwise she is eating and having regular bowel movements  No issues with nausea or vomiting  No fevers or chills  She is following lifting activity restrictions  On exam patient's abdomen is soft  Incision sites are healing well  She does have a palpable mildly tender masslike fullness underneath her previous incisional scar  Measuring approximately 4 by 3 cm  There is no palpable fascial defect  This is likely related to postoperative inflammation with possible  Component of hematoma  At this point patient instructed to monitor tenderness and mid abdominal  Subcutaneous mass  Feel that this should improve as inflammation  Resolves  If there is an underlying component of hematoma this may take some time to fully resorb but should continue to improve  I have asked the patient to return in 4 weeks for re-evaluation  If this area remains unchanged will consider imaging at that time  If it improves and decreases in size will continue to monitor for resolution  Patient understands to call if symptoms at this site worsen  Otherwise she can continue diet as tolerated  She should use Tylenol, ibuprofen, ice or heat for pain control  She should continue to follow lifting and activity restrictions    She should refrain from lifting anything greater than 20 lb until 02/21/2021  All questions answered      HPI:  Louie Westbrook is a 64 y  o female who comes in today for postoperative check after recent   Robotic assisted laparoscopic repair of recurrent ventral hernia   As above  Currently  Patient is doing well  She continues with some mid abdominal discomfort  She did have significant abdominal pain postoperatively which has gradually improved  She complains of a mass like lump beneath her previous incision site  States this lump was there prior to surgery which she thought was her hernia  She is concerned that "lump" is still there  She is unsure if the lump is any larger or smaller than previously noticed  She otherwise denies any difficulty with diet or bowel movements    She is following lifting and activity restrictions    ROS:  General ROS: negative for - chills, fatigue, fever or night sweats, weight loss  Respiratory ROS: no cough, shortness of breath, or wheezing  Cardiovascular ROS: no chest pain or dyspnea on exertion  Abdomen ROS: as per HPI  Genito-Urinary ROS: no dysuria, trouble voiding, or hematuria  Musculoskeletal ROS: negative for - gait disturbance, joint pain or muscle pain  Neurological ROS: no TIA or stroke symptoms  Skin ROS: surgical incisions    ALLERGIES  Dust mite extract, Latex, Other, and Sulfa antibiotics    Current Outpatient Medications:     albuterol (2 5 mg/3 mL) 0 083 % nebulizer solution, VVN Q 4 H PRN, Disp: , Rfl: 3    azelastine (ASTELIN) 0 1 % nasal spray, 1 spray into each nostril 2 (two) times a day Use in each nostril as directed (Patient not taking: Reported on 2/8/2021), Disp: 1 Bottle, Rfl: 6    dexlansoprazole (DEXILANT) 60 MG capsule, Take 1 capsule (60 mg total) by mouth 2 (two) times a day, Disp: 60 capsule, Rfl: 2    DULoxetine (CYMBALTA) 60 mg delayed release capsule, Take 1 capsule (60 mg total) by mouth 2 (two) times a day, Disp: 180 capsule, Rfl: 0    furosemide (LASIX) 20 mg tablet, TAKE 1 TABLET BY MOUTH DAILY (Patient taking differently: as needed ), Disp: 90 tablet, Rfl: 5    gabapentin (NEURONTIN) 300 mg capsule, Take 1 capsule (300 mg total) by mouth 3 (three) times a day, Disp: 90 capsule, Rfl: 1    MAGNESIUM PO, Take 1 tablet by mouth daily, Disp: , Rfl:     methocarbamol (ROBAXIN) 750 mg tablet, Take 1 tablet (750 mg total) by mouth 2 (two) times a day as needed for muscle spasms, Disp: 60 tablet, Rfl: 1    montelukast (SINGULAIR) 10 mg tablet, Take 10 mg by mouth daily at bedtime, Disp: , Rfl:     omeprazole (PriLOSEC) 40 MG capsule, TAKE 1 CAPSULE BY MOUTH TWICE DAILY (Patient taking differently: as needed ), Disp: 180 capsule, Rfl: 0    POTASSIUM PO, Take 1 tablet by mouth daily, Disp: , Rfl:     rOPINIRole (REQUIP) 2 mg tablet, TAKE 1 TABLET BY MOUTH AT BEDTIME, Disp: 90 tablet, Rfl: 2    sodium chloride 0 9 % nebulizer solution, as needed , Disp: , Rfl: 6    traMADol (ULTRAM) 50 mg tablet, Take 1 tablet (50 mg total) by mouth every 8 (eight) hours as needed for moderate pain, Disp: 90 tablet, Rfl: 2    VITAMIN D PO, Take 1 capsule by mouth daily, Disp: , Rfl:   Past Medical History:   Diagnosis Date    Anemia     Anesthesia     "sometimes low BP upon waking up" pt states she stopped breathing 1 time during surgery    Arthritis     Asthma     Back pain     Dolan's esophagus     Chronic pain disorder     Chronic venous insufficiency     Cough variant asthma     Depression     Environmental allergies     dust    GERD (gastroesophageal reflux disease)     Hiatal hernia     History of anemia     History of fusion of spine for scoliosis     "as a teenager"    History of transfusion     1978 - no adverse reaction    Left lumbar radiculitis     Last Assessed: 25Jul2017    Lumbar postlaminectomy syndrome     Migraines     Morbid obesity (HCC)     Motion sickness     Osteoarthritis     of left hip    Right knee pain     Seasonal allergies     Shortness of breath  Umbilical hernia     Uses brace     right knee    Wears glasses      Past Surgical History:   Procedure Laterality Date    ARTHRODESIS      lumbar    ARTHRODESIS      Spinal Arthrodesis for Deformity; Last Assessed: [id number]    BACK SURGERY      lumbar fusion,with nydia/screw and cage implant    BACK SURGERY      surgery for scoliosis    BREAST CYST EXCISION Right     benign    COLONOSCOPY      COLONOSCOPY N/A 3/14/2019    Procedure: COLONOSCOPY;  Surgeon: Cory Carr MD;  [facility]; Service: Gastroenterology    ESOPHAGOGASTRODUODENOSCOPY N/A 3/14/2019    Procedure: ESOPHAGOGASTRODUODENOSCOPY (EGD) W RFA(BARRX); Surgeon: Cory Carr MD;  [facility]; Service: Gastroenterology    HERNIA REPAIR      umbilical hernia repair    ORTHOPEDIC SURGERY      PLANTAR FASCIA SURGERY Left     KS COLONOSCOPY FLX DX W/COLLJ SPEC WHEN PFRMD N/A 2/20/2018    Procedure: COLONOSCOPY with polypectomy;  Surgeon: Adam Quinn MD;  [facility]; Service: General    KS ESOPHAGOGASTRODUODENOSCOPY TRANSORAL DIAGNOSTIC N/A 5/24/2017    Procedure: ESOPHAGOGASTRODUODENOSCOPY (EGD); Surgeon: Brent Mcdonald MD;  [facility]; Service: Gastroenterology    KS ESOPHAGOGASTRODUODENOSCOPY TRANSORAL DIAGNOSTIC N/A 8/31/2017    Procedure: ESOPHAGOGASTRODUODENOSCOPY (EGD) W RFA;  Surgeon: Diana Coelho MD;  [facility];   Service: Gastroenterology    KS KNEE SCOPE,MED/LAT MENISECTOMY Right 4/4/2018    Procedure: ARTHROSCOPY KNEE PARTIAL MEDIAL MENISECTOMY , CHONDROPLASTY;  Surgeon: Cesar Arango DO;  [facility];  Service: Orthopedics    KS LAP, RECURRENT INCISIONAL HERNIA REPAIR,REDUCIBLE N/A 1/21/2021    Procedure: REPAIR HERNIA INCISIONAL LAPAROSCOPIC W/ ROBOTICS, with mesh;  Surgeon: Preeti Tong MD;  [facility];  Service: General    WISDOM TOOTH EXTRACTION       Family History   Problem Relation Age of Onset    Lung cancer Mother     Cancer Mother  Alcohol abuse Father     Other Father         Cardiac Disorder    Depression Father     Hypertension Father     Heart attack Father     Breast cancer Maternal Grandmother     Lung cancer Maternal Grandmother     Diabetes type I Other     No Known Problems Sister     No Known Problems Brother     No Known Problems Maternal Grandfather     Leukemia Paternal Grandmother     No Known Problems Paternal Grandfather     No Known Problems Sister     No Known Problems Maternal Aunt     No Known Problems Paternal Aunt     Stroke Neg Hx       reports that she has never smoked  She has never used smokeless tobacco  She reports previous alcohol use  She reports that she does not use drugs  PHYSICAL EXAM    Vitals:    02/09/21 1403   BP: 116/88   Pulse: 75   Resp: 16   Temp: 98 °F (36 7 °C)     General: normal, cooperative, no distress  Abdominal: soft, nondistended, active BS    There is a palpable masslike  Fullness below previous incision site  Areas fixed and mildly tender  No appreciated fluctuance     No palpable fascial defect  Incision: clean, dry, and intact and healing well    Katie Quezada PA-C

## 2021-02-10 ENCOUNTER — TELEPHONE (OUTPATIENT)
Dept: RADIOLOGY | Facility: MEDICAL CENTER | Age: 61
End: 2021-02-10

## 2021-02-10 NOTE — TELEPHONE ENCOUNTER
Patient unable to schedule on 2/8  Left message for patient to call me back DIRECTLY to schedule  Left my DIRECT phone # [phone] on message       bilateral lumbar MBB, L4-5 and L5-S1 (under the levels of spinal fusion which is L3-4

## 2021-02-10 NOTE — TELEPHONE ENCOUNTER
Returned call to patient and scheduled:     B/L lumbar MBBs ( L4-5 and L5-S1) under the level of spinal fusion which is L3-4    #1 on 2/17  #2 on 2/26 (tentative, pending pain diary)    Patient is scheduled for surgery on 3/12 and once she has healed and recovered to be able to safely and comfortably lie flat on her stomach for the RFAs, we will schedule the RFAs, pending MBB #2 response

## 2021-02-17 ENCOUNTER — HOSPITAL ENCOUNTER (OUTPATIENT)
Dept: RADIOLOGY | Facility: MEDICAL CENTER | Age: 61
Discharge: HOME | End: 2021-02-17
Attending: PHYSICAL MEDICINE & REHABILITATION | Admitting: PHYSICAL MEDICINE & REHABILITATION
Payer: MEDICARE

## 2021-02-17 VITALS
DIASTOLIC BLOOD PRESSURE: 75 MMHG | SYSTOLIC BLOOD PRESSURE: 110 MMHG | TEMPERATURE: 97.4 F | OXYGEN SATURATION: 97 % | RESPIRATION RATE: 20 BRPM | HEART RATE: 66 BPM

## 2021-02-17 DIAGNOSIS — M47.816 LUMBAR SPONDYLOSIS: ICD-10-CM

## 2021-02-17 DIAGNOSIS — G89.29 CHRONIC BILATERAL LOW BACK PAIN WITHOUT SCIATICA: ICD-10-CM

## 2021-02-17 DIAGNOSIS — M54.50 CHRONIC BILATERAL LOW BACK PAIN WITHOUT SCIATICA: ICD-10-CM

## 2021-02-17 PROCEDURE — 64493 INJ PARAVERT F JNT L/S 1 LEV: CPT | Performed by: PHYSICAL MEDICINE & REHABILITATION

## 2021-02-17 PROCEDURE — 64494 INJ PARAVERT F JNT L/S 2 LEV: CPT | Performed by: PHYSICAL MEDICINE & REHABILITATION

## 2021-02-17 RX ADMIN — Medication 2 ML: at 15:47

## 2021-02-17 NOTE — H&P
History of Present Illness: The patient is a 64 y o  female who presents with complaints of Bilateral lower back pain    Patient Active Problem List   Diagnosis    Arthritis of lumbar spine    Chronic low back pain    Chronic pain syndrome    Chronic venous insufficiency    Cough variant asthma    Depression    Hyperlipidemia    Lumbar postlaminectomy syndrome    Class 2 severe obesity due to excess calories with serious comorbidity and body mass index (BMI) of 36 0 to 36 9 in adult [facility])    Primary osteoarthritis of left hip    Restless legs syndrome    Sleep disturbance    Hernia of anterior abdominal wall    S/P right knee arthroscopy    Environmental allergies    Lumbar spondylosis    Lumbar radiculopathy    Prediabetes    Vitamin D deficiency    Dolan's esophagus with dysplasia    Gastroesophageal reflux disease    Laryngopharyngeal reflux (LPR)    Chronic cough    Vocal fold paresis, right    Dysphonia    Muscle tension dysphonia    Glottic insufficiency    Reflux laryngitis    Laryngeal edema    Allergic rhinitis due to [employer] house dust mite    Mild intermittent asthma without complication    Upper airway cough syndrome    Neck pain    Cervical spondylosis without myelopathy    Osteoarthritis of multiple joints    COVID-19 virus infection    Sacroiliitis, not elsewhere classified ([facility] [address] )    Lipoma of torso    Recurrent umbilical hernia    Anemia    Hypokalemia    Large breasts    Long-term current use of opiate analgesic    Uncomplicated opioid dependence (HCC)    Motion sickness    Obesity (BMI 30-39  9)    Recurrent incisional hernia       Past Medical History:   Diagnosis Date    Anemia     Anesthesia     "sometimes low BP upon waking up" pt states she stopped breathing 1 time during surgery    Arthritis     Asthma     Back pain     Dolan's esophagus     Chronic pain disorder     Chronic venous insufficiency     Cough variant asthma     Depression     Environmental allergies     dust    GERD (gastroesophageal reflux disease)     Hiatal hernia     History of anemia     History of fusion of spine for scoliosis     "as a teenager"    History of transfusion     1978 - no adverse reaction    Left lumbar radiculitis     Last Assessed: [id number]    Lumbar postlaminectomy syndrome     Migraines     Morbid obesity ([address] )     Motion sickness     Osteoarthritis     of left hip    Right knee pain     Seasonal allergies     Shortness of breath     Umbilical hernia     Uses brace     right knee    Wears glasses        Past Surgical History:   Procedure Laterality Date    ARTHRODESIS      lumbar    ARTHRODESIS      Spinal Arthrodesis for Deformity; Last Assessed: [id number]    BACK SURGERY      lumbar fusion,with nydia/screw and cage implant    BACK SURGERY      surgery for scoliosis    BREAST CYST EXCISION Right     benign    COLONOSCOPY      COLONOSCOPY N/A 3/14/2019    Procedure: COLONOSCOPY;  Surgeon: Princess Delgado MD;  [facility]; Service: Gastroenterology    ESOPHAGOGASTRODUODENOSCOPY N/A 3/14/2019    Procedure: ESOPHAGOGASTRODUODENOSCOPY (EGD) W RFA(BARRX); Surgeon: Princess Delgado MD;  [facility]; Service: Gastroenterology    HERNIA REPAIR      umbilical hernia repair    ORTHOPEDIC SURGERY      PLANTAR FASCIA SURGERY Left     [place] COLONOSCOPY FLX DX W/COLLJ SPEC WHEN PFRMD N/A 2/20/2018    Procedure: COLONOSCOPY with polypectomy;  Surgeon: Jovany Whitten MD;  [facility]; Service: General    TN ESOPHAGOGASTRODUODENOSCOPY TRANSORAL DIAGNOSTIC N/A 5/24/2017    Procedure: ESOPHAGOGASTRODUODENOSCOPY (EGD); Surgeon: Mirtha Muller MD;  Location: [facility]; Service: Gastroenterology    TN ESOPHAGOGASTRODUODENOSCOPY TRANSORAL DIAGNOSTIC N/A 8/31/2017    Procedure: ESOPHAGOGASTRODUODENOSCOPY (EGD) W RFA;  Surgeon: Maribell Diaz MD;  [facility];   Service: Gastroenterology    TN KNEE SCOPE,MED/LAT MENISECTOMY Right 4/4/2018    Procedure: ARTHROSCOPY KNEE PARTIAL MEDIAL MENISECTOMY , CHONDROPLASTY;  Surgeon: Aleksandra Collins DO;  [facility];  Service: Orthopedics    [place] LAP, RECURRENT INCISIONAL HERNIA REPAIR,REDUCIBLE N/A 1/21/2021    Procedure: REPAIR HERNIA INCISIONAL LAPAROSCOPIC W/ ROBOTICS, with mesh;  Surgeon: Emanuel Loera MD;  [facility];  Service: General    WISDOM TOOTH EXTRACTION           Current Outpatient Medications:     albuterol (2 5 mg/3 mL) 0 083 % nebulizer solution, VVN Q 4 H PRN, Disp: , Rfl: 3    azelastine (ASTELIN) 0 1 % nasal spray, 1 spray into each nostril 2 (two) times a day Use in each nostril as directed (Patient not taking: Reported on 2/8/2021), Disp: 1 Bottle, Rfl: 6    dexlansoprazole (DEXILANT) 60 MG capsule, Take 1 capsule (60 mg total) by mouth 2 (two) times a day, Disp: 60 capsule, Rfl: 2    DULoxetine (CYMBALTA) 60 mg delayed release capsule, Take 1 capsule (60 mg total) by mouth 2 (two) times a day, Disp: 180 capsule, Rfl: 0    furosemide (LASIX) 20 mg tablet, TAKE 1 TABLET BY MOUTH DAILY (Patient taking differently: as needed ), Disp: 90 tablet, Rfl: 5    gabapentin (NEURONTIN) 300 mg capsule, Take 1 capsule (300 mg total) by mouth 3 (three) times a day, Disp: 90 capsule, Rfl: 1    MAGNESIUM PO, Take 1 tablet by mouth daily, Disp: , Rfl:     methocarbamol (ROBAXIN) 750 mg tablet, Take 1 tablet (750 mg total) by mouth 2 (two) times a day as needed for muscle spasms, Disp: 60 tablet, Rfl: 1    montelukast (SINGULAIR) 10 mg tablet, Take 10 mg by mouth daily at bedtime, Disp: , Rfl:     omeprazole (PriLOSEC) 40 MG capsule, TAKE 1 CAPSULE BY MOUTH TWICE DAILY (Patient taking differently: as needed ), Disp: 180 capsule, Rfl: 0    POTASSIUM PO, Take 1 tablet by mouth daily, Disp: , Rfl:     rOPINIRole (REQUIP) 2 mg tablet, TAKE 1 TABLET BY MOUTH AT BEDTIME, Disp: 90 tablet, Rfl: 2    sodium chloride 0 9 % nebulizer solution, as needed , Disp: , Rfl: 6    traMADol (ULTRAM) 50 mg tablet, Take 1 tablet (50 mg total) by mouth every 8 (eight) hours as needed for moderate pain, Disp: 90 tablet, Rfl: 2    VITAMIN D PO, Take 1 capsule by mouth daily, Disp: , Rfl:   No current facility-administered medications for this encounter  Allergies   Allergen Reactions    Dust Mite Extract Shortness Of Breath    Latex Itching and Blisters    Other      seasonal    Sulfa Antibiotics Vomiting     Topical-blistering       Physical Exam:   Vitals:    02/17/21 1539   BP: 121/77   Pulse: 71   Resp: 20   Temp: (!) 97 4 °F (36 3 °C)   SpO2: 99%     General: Awake, Alert, Oriented x 3, Mood and affect appropriate  Respiratory: Respirations even and unlabored  Cardiovascular: Peripheral pulses intact; no edema  Musculoskeletal Exam:  Bilateral lower back pain    ASA Score:  2    Patient/Chart Verification  Patient ID Verified: Verbal  ID Band Applied: No  Consents Confirmed: Procedural  H&P( within 30 days) Verified: To be obtained in the Pre-Procedure area  Interval H&P(within 24 hr) Complete (required for Outpatients and Surgery Admit only): To be obtained in the Pre-Procedure area  Allergies Reviewed:  Yes  Anticoag/NSAID held?: NA  Currently on antibiotics?: No  Pregnancy denied?: NA    Assessment:  Lumbar spondylosis, chronic axial lower back pain    Plan: (B) L4 & L5 MBB#1

## 2021-02-17 NOTE — DISCHARGE INSTRUCTIONS

## 2021-02-19 NOTE — TELEPHONE ENCOUNTER
Spoke with patient  She had bilateral L4-5 and L5-S1 MBB #1 on 2/17  She has not returned her pain diary yet because she was not sure of how to accurately fill it out  She notes that she had been painting the weekend prior and may have had the pain from doing that activity with twisting and reaching instead of what truly is her back pain  Call to better her assess her prior pain and current pain and relief  Tentatively scheduled for MBB #2 on 2/26       (If she does not , she will call right back as her phone is not working properly and it may go right to [facility] )

## 2021-02-23 ENCOUNTER — CLINICAL SUPPORT (OUTPATIENT)
Dept: URGENT CARE | Facility: MEDICAL CENTER | Age: 61
End: 2021-02-23
Payer: MEDICARE

## 2021-02-23 ENCOUNTER — LAB (OUTPATIENT)
Dept: LAB | Facility: MEDICAL CENTER | Age: 61
End: 2021-02-23
Payer: MEDICARE

## 2021-02-23 ENCOUNTER — TRANSCRIBE ORDERS (OUTPATIENT)
Dept: ADMINISTRATIVE | Facility: HOSPITAL | Age: 61
End: 2021-02-23

## 2021-02-23 DIAGNOSIS — N64.4 MASTODYNIA: ICD-10-CM

## 2021-02-23 DIAGNOSIS — N62 HYPERTROPHY OF BREAST: ICD-10-CM

## 2021-02-23 DIAGNOSIS — N62 HYPERTROPHY OF BREAST: Primary | ICD-10-CM

## 2021-02-23 DIAGNOSIS — Z01.812 ENCOUNTER FOR PREPROCEDURAL LABORATORY EXAMINATION: ICD-10-CM

## 2021-02-23 LAB
ANION GAP SERPL CALCULATED.3IONS-SCNC: 6 MMOL/L (ref 4–13)
ATRIAL RATE: 56 BPM
BASOPHILS # BLD AUTO: 0.06 THOUSANDS/ΜL (ref 0–0.1)
BASOPHILS NFR BLD AUTO: 1 % (ref 0–1)
BUN SERPL-MCNC: 21 MG/DL (ref 5–25)
CALCIUM SERPL-MCNC: 10.2 MG/DL (ref 8.3–10.1)
CHLORIDE SERPL-SCNC: 106 MMOL/L (ref 100–108)
CO2 SERPL-SCNC: 28 MMOL/L (ref 21–32)
CREAT SERPL-MCNC: 0.68 MG/DL (ref 0.6–1.3)
EOSINOPHIL # BLD AUTO: 0.21 THOUSAND/ΜL (ref 0–0.61)
EOSINOPHIL NFR BLD AUTO: 3 % (ref 0–6)
ERYTHROCYTE [DISTWIDTH] IN BLOOD BY AUTOMATED COUNT: 15.9 % (ref 11.6–15.1)
GFR SERPL CREATININE-BSD FRML MDRD: 97 ML/MIN/1.73SQ M
GLUCOSE SERPL-MCNC: 100 MG/DL (ref 65–140)
HCT VFR BLD AUTO: 40.4 % (ref 34.8–46.1)
HGB BLD-MCNC: 12.3 G/DL (ref 11.5–15.4)
IMM GRANULOCYTES # BLD AUTO: 0.04 THOUSAND/UL (ref 0–0.2)
IMM GRANULOCYTES NFR BLD AUTO: 1 % (ref 0–2)
IRON SATN MFR SERPL: 12 %
IRON SERPL-MCNC: 41 UG/DL (ref 50–170)
LYMPHOCYTES # BLD AUTO: 2.59 THOUSANDS/ΜL (ref 0.6–4.47)
LYMPHOCYTES NFR BLD AUTO: 35 % (ref 14–44)
MCH RBC QN AUTO: 25.9 PG (ref 26.8–34.3)
MCHC RBC AUTO-ENTMCNC: 30.4 G/DL (ref 31.4–37.4)
MCV RBC AUTO: 85 FL (ref 82–98)
MONOCYTES # BLD AUTO: 0.43 THOUSAND/ΜL (ref 0.17–1.22)
MONOCYTES NFR BLD AUTO: 6 % (ref 4–12)
NEUTROPHILS # BLD AUTO: 4.09 THOUSANDS/ΜL (ref 1.85–7.62)
NEUTS SEG NFR BLD AUTO: 54 % (ref 43–75)
NRBC BLD AUTO-RTO: 0 /100 WBCS
P AXIS: 45 DEGREES
PLATELET # BLD AUTO: 267 THOUSANDS/UL (ref 149–390)
PMV BLD AUTO: 10.2 FL (ref 8.9–12.7)
POTASSIUM SERPL-SCNC: 3.7 MMOL/L (ref 3.5–5.3)
PR INTERVAL: 126 MS
QRS AXIS: 44 DEGREES
QRSD INTERVAL: 86 MS
QT INTERVAL: 442 MS
QTC INTERVAL: 426 MS
RBC # BLD AUTO: 4.74 MILLION/UL (ref 3.81–5.12)
SODIUM SERPL-SCNC: 140 MMOL/L (ref 136–145)
T WAVE AXIS: 68 DEGREES
TIBC SERPL-MCNC: 332 UG/DL (ref 250–450)
VENTRICULAR RATE: 56 BPM
WBC # BLD AUTO: 7.42 THOUSAND/UL (ref 4.31–10.16)

## 2021-02-23 PROCEDURE — 85025 COMPLETE CBC W/AUTO DIFF WBC: CPT

## 2021-02-23 PROCEDURE — 83550 IRON BINDING TEST: CPT | Performed by: INTERNAL MEDICINE

## 2021-02-23 PROCEDURE — 80048 BASIC METABOLIC PNL TOTAL CA: CPT | Performed by: INTERNAL MEDICINE

## 2021-02-23 PROCEDURE — 93005 ELECTROCARDIOGRAM TRACING: CPT

## 2021-02-23 PROCEDURE — 93010 ELECTROCARDIOGRAM REPORT: CPT | Performed by: INTERNAL MEDICINE

## 2021-02-23 PROCEDURE — 36415 COLL VENOUS BLD VENIPUNCTURE: CPT | Performed by: INTERNAL MEDICINE

## 2021-02-23 PROCEDURE — 83540 ASSAY OF IRON: CPT | Performed by: INTERNAL MEDICINE

## 2021-02-23 NOTE — TELEPHONE ENCOUNTER
S/w pt who states that she was having trouble completing her pain diary because she was painting for a few days before the procedure and her whole body felt "overwhelmed by pain " Pt states that she does feel like she had relief from the MBB it was just difficult to differentiate her lower back pain from all of the other pains she was experiencing  Pt states after the procedure, she had 50% pain relief for the remainder of the day  Pt wants to make sure it is still okay to come in for her MBB #2 on 2/26  Pt was advised that RN will send this information to [facility] and c/b w/ recs  Pt appreciative  Please advise  Thank you

## 2021-02-23 NOTE — TELEPHONE ENCOUNTER
BRENT    S/w pt and advised of JE's notation  Confirmed 2/26 procedure, arrival time 1130 am  Pt appreciative

## 2021-02-25 NOTE — TELEPHONE ENCOUNTER
Patient called stating she's scheduled for her procedure tomorrow & states she's currently on Prednisone steroid medication; she knows she isn't suppose to have antibiotics  She would like to discuss her status with a nurse  Please call her back at # [phone]  Can leave a detailed msg

## 2021-02-25 NOTE — TELEPHONE ENCOUNTER
RN s/w pt regarding previous  Per pt she was placed on high dose steroids for a possible viral "infection" that may have effected her hearing  Per pt the ENT fel that the high dose steroids would help the kearing loss  Per pt not an active infection  Pt also stated that the steroids are not helping the back pain that she is coming in to have the MBB for  RN advised that she should be able to come in for her scheduled MBB but will send to [address] to advise  --please advise thank you--  On high dose steroids for hearing loss that ENT feels was from a virus  Still can have MBB tomorrow 2/26?

## 2021-02-26 ENCOUNTER — HOSPITAL ENCOUNTER (OUTPATIENT)
Dept: RADIOLOGY | Facility: MEDICAL CENTER | Age: 61
Discharge: HOME | End: 2021-02-26
Attending: PHYSICAL MEDICINE & REHABILITATION | Admitting: PHYSICAL MEDICINE & REHABILITATION
Payer: MEDICARE

## 2021-02-26 VITALS
OXYGEN SATURATION: 99 % | TEMPERATURE: 97.5 F | HEART RATE: 53 BPM | SYSTOLIC BLOOD PRESSURE: 129 MMHG | DIASTOLIC BLOOD PRESSURE: 80 MMHG | RESPIRATION RATE: 20 BRPM

## 2021-02-26 DIAGNOSIS — M54.50 LOW BACK PAIN: ICD-10-CM

## 2021-02-26 DIAGNOSIS — M47.816 LUMBAR SPONDYLOSIS: ICD-10-CM

## 2021-02-26 PROCEDURE — 64493 INJ PARAVERT F JNT L/S 1 LEV: CPT | Performed by: PHYSICAL MEDICINE & REHABILITATION

## 2021-02-26 PROCEDURE — 64494 INJ PARAVERT F JNT L/S 2 LEV: CPT | Performed by: PHYSICAL MEDICINE & REHABILITATION

## 2021-02-26 RX ORDER — PREDNISONE 10 MG/1
TABLET ORAL
COMMUNITY
Start: 2021-02-22 | End: 2021-03-08

## 2021-02-26 RX ORDER — BUPIVACAINE HYDROCHLORIDE 5 MG/ML
10 INJECTION, SOLUTION EPIDURAL; INTRACAUDAL ONCE
Status: COMPLETED | OUTPATIENT
Start: 2021-02-26 | End: 2021-02-26

## 2021-02-26 RX ADMIN — BUPIVACAINE HYDROCHLORIDE 2 ML: 5 INJECTION, SOLUTION EPIDURAL; INTRACAUDAL at 11:49

## 2021-02-26 NOTE — H&P
History of Present Illness: The patient is a 64 y o  female who presents with complaints of   Bilateral lower back pain    Patient Active Problem List   Diagnosis    Arthritis of lumbar spine    Chronic low back pain    Chronic pain syndrome    Chronic venous insufficiency    Cough variant asthma    Depression    Hyperlipidemia    Lumbar postlaminectomy syndrome    Class 2 severe obesity due to excess calories with serious comorbidity and body mass index (BMI) of 36 0 to 36 9 in adult [facility])    Primary osteoarthritis of left hip    Restless legs syndrome    Sleep disturbance    Hernia of anterior abdominal wall    S/P right knee arthroscopy    Environmental allergies    Lumbar spondylosis    Lumbar radiculopathy    Prediabetes    Vitamin D deficiency    Dolan's esophagus with dysplasia    Gastroesophageal reflux disease    Laryngopharyngeal reflux (LPR)    Chronic cough    Vocal fold paresis, right    Dysphonia    Muscle tension dysphonia    Glottic insufficiency    Reflux laryngitis    Laryngeal edema    Allergic rhinitis due to [employer] house dust mite    Mild intermittent asthma without complication    Upper airway cough syndrome    Neck pain    Cervical spondylosis without myelopathy    Osteoarthritis of multiple joints    COVID-19 virus infection    Sacroiliitis, not elsewhere classified ([facility] [address] )    Lipoma of torso    Recurrent umbilical hernia    Anemia    Hypokalemia    Large breasts    Long-term current use of opiate analgesic    Uncomplicated opioid dependence (HCC)    Motion sickness    Obesity (BMI 30-39  9)    Recurrent incisional hernia       Past Medical History:   Diagnosis Date    Anemia     Anesthesia     "sometimes low BP upon waking up" pt states she stopped breathing 1 time during surgery    Arthritis     Asthma     Back pain     Dolan's esophagus     Chronic pain disorder     Chronic venous insufficiency     Cough variant asthma     Depression     Environmental allergies     dust    GERD (gastroesophageal reflux disease)     Hiatal hernia     History of anemia     History of fusion of spine for scoliosis     "as a teenager"    History of transfusion     1978 - no adverse reaction    Left lumbar radiculitis     Last Assessed: [id number]    Lumbar postlaminectomy syndrome     Migraines     Morbid obesity ([address] )     Motion sickness     Osteoarthritis     of left hip    Right knee pain     Seasonal allergies     Shortness of breath     Umbilical hernia     Uses brace     right knee    Wears glasses        Past Surgical History:   Procedure Laterality Date    ARTHRODESIS      lumbar    ARTHRODESIS      Spinal Arthrodesis for Deformity; Last Assessed: [id number]    BACK SURGERY      lumbar fusion,with nydia/screw and cage implant    BACK SURGERY      surgery for scoliosis    BREAST CYST EXCISION Right     benign    COLONOSCOPY      COLONOSCOPY N/A 3/14/2019    Procedure: COLONOSCOPY;  Surgeon: My Christian MD;  [facility]; Service: Gastroenterology    ESOPHAGOGASTRODUODENOSCOPY N/A 3/14/2019    Procedure: ESOPHAGOGASTRODUODENOSCOPY (EGD) W RFA(BARRX); Surgeon: My Christian MD;  [facility]; Service: Gastroenterology    HERNIA REPAIR      umbilical hernia repair    ORTHOPEDIC SURGERY      PLANTAR FASCIA SURGERY Left     [place] COLONOSCOPY FLX DX W/COLLJ SPEC WHEN PFRMD N/A 2/20/2018    Procedure: COLONOSCOPY with polypectomy;  Surgeon: Trever Dodge MD;  [facility]; Service: General    NJ ESOPHAGOGASTRODUODENOSCOPY TRANSORAL DIAGNOSTIC N/A 5/24/2017    Procedure: ESOPHAGOGASTRODUODENOSCOPY (EGD); Surgeon: Moshe Sherman MD;  Location: [facility]; Service: Gastroenterology    NJ ESOPHAGOGASTRODUODENOSCOPY TRANSORAL DIAGNOSTIC N/A 8/31/2017    Procedure: ESOPHAGOGASTRODUODENOSCOPY (EGD) W RFA;  Surgeon: Kailyn Chang MD;  [facility];   Service: Gastroenterology    NJ KNEE SCOPE,MED/LAT MENISECTOMY Right 4/4/2018    Procedure: ARTHROSCOPY KNEE PARTIAL MEDIAL MENISECTOMY , CHONDROPLASTY;  Surgeon: Sean Goode DO;  [facility];  Service: Orthopedics    [place] LAP, [facility] N/A 1/21/2021    Procedure: REPAIR HERNIA INCISIONAL LAPAROSCOPIC W/ ROBOTICS, with mesh;  Surgeon: Patrick Nair MD;  [facility];  Service: General    WISDOM TOOTH EXTRACTION           Current Outpatient Medications:     predniSONE 10 mg tablet, 60 mg PO daily for 5 days, 40 mg PO daily for 3 days, 20 mg PO daily for 3 days, 10 mg PO daily for 3 days   Take with food or milk , Disp: , Rfl:     albuterol (2 5 mg/3 mL) 0 083 % nebulizer solution, VVN Q 4 H PRN, Disp: , Rfl: 3    azelastine (ASTELIN) 0 1 % nasal spray, 1 spray into each nostril 2 (two) times a day Use in each nostril as directed (Patient not taking: Reported on 2/8/2021), Disp: 1 Bottle, Rfl: 6    dexlansoprazole (DEXILANT) 60 MG capsule, Take 1 capsule (60 mg total) by mouth 2 (two) times a day, Disp: 60 capsule, Rfl: 2    DULoxetine (CYMBALTA) 60 mg delayed release capsule, Take 1 capsule (60 mg total) by mouth 2 (two) times a day, Disp: 180 capsule, Rfl: 0    furosemide (LASIX) 20 mg tablet, TAKE 1 TABLET BY MOUTH DAILY (Patient taking differently: as needed ), Disp: 90 tablet, Rfl: 5    gabapentin (NEURONTIN) 300 mg capsule, Take 1 capsule (300 mg total) by mouth 3 (three) times a day, Disp: 90 capsule, Rfl: 1    MAGNESIUM PO, Take 1 tablet by mouth daily, Disp: , Rfl:     methocarbamol (ROBAXIN) 750 mg tablet, Take 1 tablet (750 mg total) by mouth 2 (two) times a day as needed for muscle spasms, Disp: 60 tablet, Rfl: 1    montelukast (SINGULAIR) 10 mg tablet, Take 10 mg by mouth daily at bedtime, Disp: , Rfl:     omeprazole (PriLOSEC) 40 MG capsule, TAKE 1 CAPSULE BY MOUTH TWICE DAILY (Patient taking differently: as needed ), Disp: 180 capsule, Rfl: 0    POTASSIUM PO, Take 1 tablet by mouth daily, Disp: , Rfl:     predniSONE 10 mg tablet, , Disp: , Rfl:     rOPINIRole (REQUIP) 2 mg tablet, TAKE 1 TABLET BY MOUTH AT BEDTIME, Disp: 90 tablet, Rfl: 2    sodium chloride 0 9 % nebulizer solution, as needed , Disp: , Rfl: 6    traMADol (ULTRAM) 50 mg tablet, Take 1 tablet (50 mg total) by mouth every 8 (eight) hours as needed for moderate pain, Disp: 90 tablet, Rfl: 2    VITAMIN D PO, Take 1 capsule by mouth daily, Disp: , Rfl:     Current Facility-Administered Medications:     bupivacaine (PF) (MARCAINE) 0 5 % injection 10 mL, 10 mL, Injection, Once, Fredo Rodriguez, DO    Allergies   Allergen Reactions    Dust Mite Extract Shortness Of Breath    Latex Itching and Blisters    Other      seasonal    Sulfa Antibiotics Vomiting     Topical-blistering       Physical Exam:   Vitals:    02/26/21 1136   BP: 129/75   Pulse: (!) 54   Resp: 20   Temp: 97 5 °F (36 4 °C)   SpO2: 98%     General: Awake, Alert, Oriented x 3, Mood and affect appropriate  Respiratory: Respirations even and unlabored  Cardiovascular: Peripheral pulses intact; no edema  Musculoskeletal Exam:  Bilateral lower back pain    ASA Score:  2  Patient/Chart Verification  Patient ID Verified: Verbal  ID Band Applied: No  Consents Confirmed: Procedural, To be obtained in the Pre-Procedure area  H&P( within 30 days) Verified: To be obtained in the Pre-Procedure area  Interval H&P(within 24 hr) Complete (required for Outpatients and Surgery Admit only): To be obtained in the Pre-Procedure area  Allergies Reviewed: Yes(latex)  Anticoag/NSAID held?: NA  Currently on antibiotics?: No    Assessment:   1  Lumbar spondylosis    2   Low back pain        Plan: tentative - ROMEO L4 & 5 MBB#2

## 2021-02-26 NOTE — DISCHARGE INSTRUCTIONS

## 2021-03-01 ENCOUNTER — TRANSCRIBE ORDERS (OUTPATIENT)
Dept: ADMINISTRATIVE | Facility: HOSPITAL | Age: 61
End: 2021-03-01

## 2021-03-02 ENCOUNTER — TRANSCRIBE ORDERS (OUTPATIENT)
Dept: ADMINISTRATIVE | Facility: HOSPITAL | Age: 61
End: 2021-03-02

## 2021-03-02 DIAGNOSIS — H91.90 HEARING LOSS: Primary | ICD-10-CM

## 2021-03-08 RX ORDER — FLUTICASONE PROPIONATE 50 MCG
1 SPRAY, SUSPENSION (ML) NASAL DAILY
COMMUNITY
End: 2021-08-23 | Stop reason: ALTCHOICE

## 2021-03-08 NOTE — PRE-PROCEDURE INSTRUCTIONS
Pre-Surgery Instructions:   Medication Instructions    albuterol (2 5 mg/3 mL) 0 083 % nebulizer solution use if needed    dexlansoprazole (DEXILANT) 60 MG capsule pt not taking right now waiting for authorization from Dr Ned Borja DULoxetine (CYMBALTA) 60 mg delayed release capsule take day of surgery    fluticasone (FLONASE) 50 mcg/act nasal spray use day of surgery    furosemide (LASIX) 20 mg tablet hold day of surgery    gabapentin (NEURONTIN) 300 mg capsule take day of surgery    MAGNESIUM PO hold from now till after surgery    methocarbamol (ROBAXIN) 750 mg tablet take day of surgery    omeprazole (PriLOSEC) 40 MG capsule take day of surgery    POTASSIUM PO hold from now till after surgery    rOPINIRole (REQUIP) 2 mg tablet takes at night    traMADol (ULTRAM) 50 mg tablet take day of surgery if needed    VITAMIN D PO holf from now till after surgery    My Surgical Experience    The following information was developed to assist you to prepare for your operation  What do I need to do before coming to the hospital?   Arrange for a responsible person to drive you to and from the hospital    Arrange care for your children at home  Children are not allowed in the recovery areas of the hospital   Plan to wear clothing that is easy to put on and take off  If you are having shoulder surgery, wear a shirt that buttons or zippers in the front  Bathing  o Shower the evening before and the morning of your surgery with an antibacterial soap  Please refer to the Pre Op Showering Instructions for Surgery Patients Sheet   o Remove nail polish and all body piercing jewelry  o Do not shave any body part for at least 24 hours before surgery-this includes face, arms, legs and upper body  Food  o Nothing to eat or drink after midnight the night before your surgery   This includes candy and chewing gum  o Exception: If your surgery is after 12:00pm (noon), you may have clear liquids such as 7-Up®, ginger ale, apple or cranberry juice, Jell-O®, water, or clear broth until 8:00 am  o Do not drink milk or juice with pulp on the morning before surgery  o Do not drink alcohol 24 hours before surgery  Medicine  o Follow instructions you received from your surgeon about which medicines you may take on the day of surgery  o If instructed to take medicine on the morning of surgery, take pills with just a small sip of water  Call your prescribing doctor for specific infroamtion on what to do if you take insulin    What should I bring to the hospital?    Bring:  Kisha Jimenez or a walker, if you have them, for foot or knee surgery   A list of the daily medicines, vitamins, minerals, herbals and nutritional supplements you take  Include the dosages of medicines and the time you take them each day   Glasses, dentures or hearing aids   Minimal clothing; you will be wearing hospital sleepwear   Photo ID; required to verify your identity   If you have a Living Will or Power of , bring a copy of the documents   If you have an ostomy, bring an extra pouch and any supplies you use    Do not bring   Medicines or inhalers   Money, valuables or jewelry    What other information should I know about the day of surgery?  Notify your surgeons if you develop a cold, sore throat, cough, fever, rash or any other illness   Report to the Ambulatory Surgical/Same Day Surgery Unit   You will be instructed to stop at Registration only if you have not been pre-registered   Inform your  fi they do not stay that they will be asked by the staff to leave a phone number where they can be reached   Be available to be reached before surgery   In the event the operating room schedule changes, you may be asked to come in earlier or later than expected    *It is important to tell your doctor and others involved in your health care if you are taking or have been taking any non-prescription drugs, vitamins, minerals, herbals or other nutritional supplements   Any of these may interact with some food or medicines and cause a reaction

## 2021-03-09 ENCOUNTER — TELEPHONE (OUTPATIENT)
Dept: RADIOLOGY | Facility: MEDICAL CENTER | Age: 61
End: 2021-03-09

## 2021-03-09 NOTE — TELEPHONE ENCOUNTER
Left message for patient to call me back DIRECTLY to review how we plan to schedule her RFA post her upcoming surgery

## 2021-03-09 NOTE — TELEPHONE ENCOUNTER
Patient left me a message  She has a post-op appt on 3/15 - she will check with surgeon at that time as to possible timing and then will call me back

## 2021-03-09 NOTE — TELEPHONE ENCOUNTER
Pain diary reviewed by Dr Alfonzo Juan; proceed with RFA and f/u; I will call and coordinate  bilateral L4-5 & L5-S1 RFA    ++ Note: patient will be having surgery on 3/12; as agreed when scheduling the MBBs, we will schedule for after she is recovered from her surgery and is cleared and able to comfortably lay flat on her stomach  I will touch base with patient  regarding this

## 2021-03-10 DIAGNOSIS — Z23 ENCOUNTER FOR IMMUNIZATION: ICD-10-CM

## 2021-03-11 ENCOUNTER — ANESTHESIA EVENT (OUTPATIENT)
Dept: PERIOP | Facility: HOSPITAL | Age: 61
End: 2021-03-11
Payer: MEDICARE

## 2021-03-11 NOTE — ANESTHESIA PREPROCEDURE EVALUATION
Procedure:  BREAST REDUCTION (Bilateral Breast)    Relevant Problems   ANESTHESIA   (+) Motion sickness      CARDIO   (+) Hyperlipidemia      GI/HEPATIC   (+) Gastroesophageal reflux disease      HEMATOLOGY   (+) Anemia      MUSCULOSKELETAL   (+) Arthritis of lumbar spine   (+) Cervical spondylosis without myelopathy   (+) Chronic low back pain   (+) Lumbar spondylosis   (+) Osteoarthritis of multiple joints   (+) Primary osteoarthritis of left hip   (+) Sacroiliitis, not elsewhere classified (HCC)      NEURO/PSYCH   (+) Chronic pain syndrome   (+) Depression      PULMONARY   (+) Cough variant asthma   (+) Mild intermittent asthma without complication      Other   (+) Class 2 severe obesity due to excess calories with serious comorbidity and body mass index (BMI) of 36 0 to 36 9 in adult [facility])        Physical Exam    Airway    Mallampati score: II  TM Distance: >3 FB  Neck ROM: full     Dental   No notable dental hx     Cardiovascular  Cardiovascular exam normal    Pulmonary  Pulmonary exam normal     Other Findings        Anesthesia Plan  ASA Score- 2     Anesthesia Type- general with ASA Monitors  Additional Monitors:   Airway Plan: ETT  Plan Factors-Exercise tolerance (METS): >4 METS  Chart reviewed  Patient is not a current smoker  Patient not instructed to abstain from smoking on day of procedure  Patient did not smoke on day of surgery  Induction- intravenous  Postoperative Plan-     Informed Consent- Anesthetic plan and risks discussed with patient  I personally reviewed this patient with the CRNA  Discussed and agreed on the Anesthesia Plan with the CRNA  Randee Torres

## 2021-03-12 ENCOUNTER — ANESTHESIA (OUTPATIENT)
Dept: PERIOP | Facility: HOSPITAL | Age: 61
End: 2021-03-12
Payer: MEDICARE

## 2021-03-12 ENCOUNTER — HOSPITAL ENCOUNTER (OUTPATIENT)
Facility: HOSPITAL | Age: 61
Setting detail: OUTPATIENT SURGERY
Discharge: HOME | End: 2021-03-12
Attending: SURGERY | Admitting: SURGERY
Payer: MEDICARE

## 2021-03-12 VITALS
OXYGEN SATURATION: 95 % | HEART RATE: 74 BPM | BODY MASS INDEX: 32.44 KG/M2 | WEIGHT: 190 LBS | DIASTOLIC BLOOD PRESSURE: 61 MMHG | SYSTOLIC BLOOD PRESSURE: 130 MMHG | TEMPERATURE: 97.9 F | HEIGHT: 64 IN | RESPIRATION RATE: 20 BRPM

## 2021-03-12 DIAGNOSIS — N64.4 MASTODYNIA: ICD-10-CM

## 2021-03-12 DIAGNOSIS — N62 HYPERTROPHY OF BREAST: ICD-10-CM

## 2021-03-12 PROCEDURE — 88305 TISSUE EXAM BY PATHOLOGIST: CPT | Performed by: PATHOLOGY

## 2021-03-12 RX ORDER — ROCURONIUM BROMIDE 10 MG/ML
INJECTION, SOLUTION INTRAVENOUS AS NEEDED
Status: DISCONTINUED | OUTPATIENT
Start: 2021-03-12 | End: 2021-03-12

## 2021-03-12 RX ORDER — FENTANYL CITRATE 50 UG/ML
INJECTION, SOLUTION INTRAMUSCULAR; INTRAVENOUS AS NEEDED
Status: DISCONTINUED | OUTPATIENT
Start: 2021-03-12 | End: 2021-03-12

## 2021-03-12 RX ORDER — HYDROCODONE BITARTRATE AND ACETAMINOPHEN 5; 325 MG/1; MG/1
2 TABLET ORAL EVERY 4 HOURS PRN
Status: DISCONTINUED | OUTPATIENT
Start: 2021-03-12 | End: 2021-03-12 | Stop reason: HOSPADM

## 2021-03-12 RX ORDER — DEXAMETHASONE SODIUM PHOSPHATE 10 MG/ML
INJECTION, SOLUTION INTRAMUSCULAR; INTRAVENOUS AS NEEDED
Status: DISCONTINUED | OUTPATIENT
Start: 2021-03-12 | End: 2021-03-12

## 2021-03-12 RX ORDER — FENTANYL CITRATE/PF 50 MCG/ML
25 SYRINGE (ML) INJECTION
Status: COMPLETED | OUTPATIENT
Start: 2021-03-12 | End: 2021-03-12

## 2021-03-12 RX ORDER — PROPOFOL 10 MG/ML
INJECTION, EMULSION INTRAVENOUS AS NEEDED
Status: DISCONTINUED | OUTPATIENT
Start: 2021-03-12 | End: 2021-03-12

## 2021-03-12 RX ORDER — SODIUM CHLORIDE, SODIUM LACTATE, POTASSIUM CHLORIDE, CALCIUM CHLORIDE 600; 310; 30; 20 MG/100ML; MG/100ML; MG/100ML; MG/100ML
INJECTION, SOLUTION INTRAVENOUS CONTINUOUS PRN
Status: DISCONTINUED | OUTPATIENT
Start: 2021-03-12 | End: 2021-03-12

## 2021-03-12 RX ORDER — BUPIVACAINE HYDROCHLORIDE AND EPINEPHRINE 2.5; 5 MG/ML; UG/ML
INJECTION, SOLUTION EPIDURAL; INFILTRATION; INTRACAUDAL; PERINEURAL AS NEEDED
Status: DISCONTINUED | OUTPATIENT
Start: 2021-03-12 | End: 2021-03-12 | Stop reason: HOSPADM

## 2021-03-12 RX ORDER — CEFAZOLIN SODIUM 2 G/50ML
2000 SOLUTION INTRAVENOUS ONCE
Status: COMPLETED | OUTPATIENT
Start: 2021-03-12 | End: 2021-03-12

## 2021-03-12 RX ORDER — NEOSTIGMINE METHYLSULFATE 1 MG/ML
INJECTION INTRAVENOUS AS NEEDED
Status: DISCONTINUED | OUTPATIENT
Start: 2021-03-12 | End: 2021-03-12

## 2021-03-12 RX ORDER — SUCCINYLCHOLINE/SOD CL,ISO/PF 100 MG/5ML
SYRINGE (ML) INTRAVENOUS AS NEEDED
Status: DISCONTINUED | OUTPATIENT
Start: 2021-03-12 | End: 2021-03-12

## 2021-03-12 RX ORDER — KETOROLAC TROMETHAMINE 30 MG/ML
30 INJECTION, SOLUTION INTRAMUSCULAR; INTRAVENOUS ONCE
Status: DISCONTINUED | OUTPATIENT
Start: 2021-03-12 | End: 2021-03-12 | Stop reason: HOSPADM

## 2021-03-12 RX ORDER — HYDROMORPHONE HCL 110MG/55ML
PATIENT CONTROLLED ANALGESIA SYRINGE INTRAVENOUS AS NEEDED
Status: DISCONTINUED | OUTPATIENT
Start: 2021-03-12 | End: 2021-03-12

## 2021-03-12 RX ORDER — SODIUM CHLORIDE, SODIUM LACTATE, POTASSIUM CHLORIDE, CALCIUM CHLORIDE 600; 310; 30; 20 MG/100ML; MG/100ML; MG/100ML; MG/100ML
125 INJECTION, SOLUTION INTRAVENOUS CONTINUOUS
Status: DISCONTINUED | OUTPATIENT
Start: 2021-03-12 | End: 2021-03-12 | Stop reason: HOSPADM

## 2021-03-12 RX ORDER — GLYCOPYRROLATE 0.2 MG/ML
INJECTION INTRAMUSCULAR; INTRAVENOUS AS NEEDED
Status: DISCONTINUED | OUTPATIENT
Start: 2021-03-12 | End: 2021-03-12

## 2021-03-12 RX ORDER — HYDROCODONE BITARTRATE AND ACETAMINOPHEN 5; 325 MG/1; MG/1
1 TABLET ORAL EVERY 4 HOURS PRN
Status: DISCONTINUED | OUTPATIENT
Start: 2021-03-12 | End: 2021-03-12 | Stop reason: HOSPADM

## 2021-03-12 RX ORDER — ONDANSETRON 2 MG/ML
4 INJECTION INTRAMUSCULAR; INTRAVENOUS ONCE AS NEEDED
Status: DISCONTINUED | OUTPATIENT
Start: 2021-03-12 | End: 2021-03-12 | Stop reason: HOSPADM

## 2021-03-12 RX ORDER — MAGNESIUM HYDROXIDE 1200 MG/15ML
LIQUID ORAL AS NEEDED
Status: DISCONTINUED | OUTPATIENT
Start: 2021-03-12 | End: 2021-03-12 | Stop reason: HOSPADM

## 2021-03-12 RX ORDER — ONDANSETRON 2 MG/ML
INJECTION INTRAMUSCULAR; INTRAVENOUS AS NEEDED
Status: DISCONTINUED | OUTPATIENT
Start: 2021-03-12 | End: 2021-03-12

## 2021-03-12 RX ORDER — MIDAZOLAM HYDROCHLORIDE 2 MG/2ML
INJECTION, SOLUTION INTRAMUSCULAR; INTRAVENOUS AS NEEDED
Status: DISCONTINUED | OUTPATIENT
Start: 2021-03-12 | End: 2021-03-12

## 2021-03-12 RX ORDER — LIDOCAINE HYDROCHLORIDE 10 MG/ML
INJECTION, SOLUTION EPIDURAL; INFILTRATION; INTRACAUDAL; PERINEURAL AS NEEDED
Status: DISCONTINUED | OUTPATIENT
Start: 2021-03-12 | End: 2021-03-12

## 2021-03-12 RX ORDER — EPHEDRINE SULFATE 50 MG/ML
INJECTION INTRAVENOUS AS NEEDED
Status: DISCONTINUED | OUTPATIENT
Start: 2021-03-12 | End: 2021-03-12

## 2021-03-12 RX ORDER — HYDROMORPHONE HCL/PF 1 MG/ML
0.2 SYRINGE (ML) INJECTION
Status: DISCONTINUED | OUTPATIENT
Start: 2021-03-12 | End: 2021-03-12 | Stop reason: HOSPADM

## 2021-03-12 RX ADMIN — HYDROCODONE BITARTRATE AND ACETAMINOPHEN 2 TABLET: 5; 325 TABLET ORAL at 12:47

## 2021-03-12 RX ADMIN — FENTANYL CITRATE 25 MCG: 50 INJECTION INTRAMUSCULAR; INTRAVENOUS at 11:39

## 2021-03-12 RX ADMIN — EPHEDRINE SULFATE 5 MG: 50 INJECTION, SOLUTION INTRAVENOUS at 09:48

## 2021-03-12 RX ADMIN — DEXAMETHASONE SODIUM PHOSPHATE 4 MG: 10 INJECTION, SOLUTION INTRAMUSCULAR; INTRAVENOUS at 09:20

## 2021-03-12 RX ADMIN — LIDOCAINE HYDROCHLORIDE 50 MG: 10 INJECTION, SOLUTION EPIDURAL; INFILTRATION; INTRACAUDAL; PERINEURAL at 08:50

## 2021-03-12 RX ADMIN — ROCURONIUM BROMIDE 10 MG: 10 INJECTION, SOLUTION INTRAVENOUS at 09:18

## 2021-03-12 RX ADMIN — GLYCOPYRROLATE 0.4 MG: 0.2 INJECTION, SOLUTION INTRAMUSCULAR; INTRAVENOUS at 11:05

## 2021-03-12 RX ADMIN — SODIUM CHLORIDE, SODIUM LACTATE, POTASSIUM CHLORIDE, AND CALCIUM CHLORIDE: .6; .31; .03; .02 INJECTION, SOLUTION INTRAVENOUS at 08:42

## 2021-03-12 RX ADMIN — HYDROMORPHONE HYDROCHLORIDE 0.2 MG: 2 INJECTION, SOLUTION INTRAMUSCULAR; INTRAVENOUS; SUBCUTANEOUS at 10:24

## 2021-03-12 RX ADMIN — ROCURONIUM BROMIDE 40 MG: 10 INJECTION, SOLUTION INTRAVENOUS at 08:50

## 2021-03-12 RX ADMIN — PROPOFOL 200 MG: 10 INJECTION, EMULSION INTRAVENOUS at 08:50

## 2021-03-12 RX ADMIN — HYDROMORPHONE HYDROCHLORIDE 0.2 MG: 2 INJECTION, SOLUTION INTRAMUSCULAR; INTRAVENOUS; SUBCUTANEOUS at 09:59

## 2021-03-12 RX ADMIN — EPHEDRINE SULFATE 10 MG: 50 INJECTION, SOLUTION INTRAVENOUS at 10:50

## 2021-03-12 RX ADMIN — EPHEDRINE SULFATE 10 MG: 50 INJECTION, SOLUTION INTRAVENOUS at 09:53

## 2021-03-12 RX ADMIN — FENTANYL CITRATE 50 MCG: 50 INJECTION INTRAMUSCULAR; INTRAVENOUS at 08:50

## 2021-03-12 RX ADMIN — FENTANYL CITRATE 50 MCG: 50 INJECTION INTRAMUSCULAR; INTRAVENOUS at 09:02

## 2021-03-12 RX ADMIN — FENTANYL CITRATE 25 MCG: 50 INJECTION INTRAMUSCULAR; INTRAVENOUS at 11:51

## 2021-03-12 RX ADMIN — NEOSTIGMINE METHYLSULFATE 2.5 MG: 1 INJECTION INTRAVENOUS at 11:05

## 2021-03-12 RX ADMIN — FENTANYL CITRATE 25 MCG: 50 INJECTION INTRAMUSCULAR; INTRAVENOUS at 11:29

## 2021-03-12 RX ADMIN — SODIUM CHLORIDE, SODIUM LACTATE, POTASSIUM CHLORIDE, AND CALCIUM CHLORIDE: .6; .31; .03; .02 INJECTION, SOLUTION INTRAVENOUS at 08:59

## 2021-03-12 RX ADMIN — HYDROMORPHONE HYDROCHLORIDE 0.6 MG: 2 INJECTION, SOLUTION INTRAMUSCULAR; INTRAVENOUS; SUBCUTANEOUS at 11:13

## 2021-03-12 RX ADMIN — MIDAZOLAM HYDROCHLORIDE 2 MG: 1 INJECTION, SOLUTION INTRAMUSCULAR; INTRAVENOUS at 08:46

## 2021-03-12 RX ADMIN — SODIUM CHLORIDE, SODIUM LACTATE, POTASSIUM CHLORIDE, AND CALCIUM CHLORIDE: .6; .31; .03; .02 INJECTION, SOLUTION INTRAVENOUS at 11:05

## 2021-03-12 RX ADMIN — CEFAZOLIN SODIUM 2000 MG: 2 SOLUTION INTRAVENOUS at 08:42

## 2021-03-12 RX ADMIN — FENTANYL CITRATE 25 MCG: 50 INJECTION INTRAMUSCULAR; INTRAVENOUS at 11:58

## 2021-03-12 RX ADMIN — ONDANSETRON HYDROCHLORIDE 4 MG: 2 INJECTION, SOLUTION INTRAMUSCULAR; INTRAVENOUS at 10:42

## 2021-03-12 NOTE — DISCHARGE SUMMARY
PLASTIC, RECONSTRUCTIVE, & HAND SURGERY   Discharge Summary  Date of Admission:   3/12/2021  Date of Discharge:   03/12/21  Attending:  Gisele Jones MD  Principal/Final Diagnosis:   Hypertrophy of breast [N62]  Mastodynia [N64 4]  Principal Procedure:   BREAST REDUCTION (Bilateral Breast)  Discharge Medications:  See after visit summary for reconciled discharge medications provided to patient and family  Reason for Admission:  Wiley Mckeon was electively admitted to undergo the above named procedure on 3/12/2021 as an outpatient  Hospital Course:  Patient underwent the above named procedure on the day of admission without complications  They were discharged home the same day  Disposition:  To home in care of self and family    Condition:  Good  Follow up:  Patient with follow up in the office with Dr Gisele Jones MD in 3 day(s) or as scheduled per his office  Gisele Jones MD  3/12/2021 7:13 AM

## 2021-03-12 NOTE — OP NOTE
OPERATIVE REPORT  PATIENT NAME: Shira Carrel    :  1960  MRN: [id number]  Pt Location:  OR ROOM 10    SURGERY DATE: 3/12/2021    Surgeon(s) and Role:     * Fara Olvera MD - Primary     * Asuncion Oliveros - Assisting    Preop Diagnosis:  Hypertrophy of breast [N62]  Mastodynia [N64 4]    Post-Op Diagnosis Codes:     * Hypertrophy of breast [N62]     * Mastodynia [N64 4]    Procedure(s) (LRB):  BREAST REDUCTION (Bilateral)    Specimen(s):  ID Type Source Tests Collected by Time Destination   1 : left breast tissue Tissue Breast, Left TISSUE EXAM Fara Olvera MD 3/12/2021 0910    2 : right breast tissue Tissue Breast, Right TISSUE EXAM Fara Olvera MD 3/12/2021 0910        Estimated Blood Loss:   Minimal    Drains:  Closed/Suction Drain Left Breast Bulb 15 Fr  (Active)   Number of days: 0       Closed/Suction Drain Right Breast Bulb 15 Fr  (Active)   Number of days: 0       Anesthesia Type:   General    Operative Indications:  Hypertrophy of breast [N62]  Mastodynia [N64 4]      Operative Findings:  Breast asymmetry    Complications:   None    Procedure and Technique:  The patient was properly identified placed in the operating room,    placed in the supine position on the bed, intubated by anesthesia and    prepped and draped in the sterile surgical fashion  We first started    by injecting 30 mL of 0 25% Marcaine with epinephrine into each    breast     We marked out the pedicle at 8 cm and circumscribed the nipple areolar    complex at a 38-mm cookie cutter  We then went ahead and    de-epithelialized the entire pedicle  I dissected the pedicle out down    to the chest wall with a #15 blade and electrocautery, excised out the    middle, medial and lateral portions of our keyhole pattern  Trimmed the superior flap for symmetry  At this point, we then went ahead and maintained meticulous hemostasis   We placed a 15-Colombian Brian drain into the defect and pulled it out    through a separate stab incision in each axilla  At this point, we    went ahead and closed the skin with deep 2-0 Vicryl suture, 3-0 nylon,    a running subcuticular 3-0 Monocryl stitch and a Dermabond dressing      The patient was then placed  in a surgical bra, awakened from surgery and taken to the recovery  room in stable condition         All counts were correct at the end of the procedure       I was present for the entire procedure    Patient Disposition:  PACU     SIGNATURE: Muriel Gaitan MD  DATE: March 12, 2021  TIME: 11:07 AM

## 2021-03-12 NOTE — DISCHARGE INSTRUCTIONS
[address], [address], [address], [facility], [address] [facility] /Y / [employer]  [facility] Insturctions for the Breast Reduction/Mastopexy Patient     Please call the office today to schedule a post operative appointment, and tell the office staff  that you doctor needs see you in our office on Monday  1  You may remove all dressing in the morning and shower, unless you have a bolster attached to your nipple area  If you have a bolster around the nipple region, you will be restricted to sponge bath until the bolster is removed  2  Do not bend, lift or strain for 2 weeks following surgery  3  Do not drive a car until instructed to do so  4  You should avoid lifting or extending your arms above shoulder level  5  You may shower unless otherwise instructed  6  When you arrive home you should remain relaxed  Short walks are permitted after the first week  You should not do any strenuous activities such as cleaning, exercising or shopping until instructed  7  If you have small children someone should help you with   8  You should make arrangements to be off from work at least two weeks following surgery  9  You will be given a prescription for a pain medicine  You can use extra strength Tylenol as a substitute  10  We have spent considerable time and effort to make your surgical experience as efficient and pleasant as possible  We would appreciate your suggestions regarding any area of your care, which you think, could be improved to make your experience more pleasant  If you have any questions, please call the office between 9:00 A  M  and 5:00 P  M       If a problem should arise after hours, the doctor can be reached through the answering service at [phone]

## 2021-03-12 NOTE — NURSING NOTE
Pt returned to [facility] awake,alert, IV infusing, taking po,Bilateral BRAYAN drains have a scant amount of bloody drainage

## 2021-03-12 NOTE — INTERVAL H&P NOTE
H&P reviewed  After examining the patient I find no changes in the patients condition since the H&P had been written      Vitals:    03/12/21 0643   BP: 130/75   Pulse: 77   Resp: 20   Temp: (!) 97 2 °F (36 2 °C)   SpO2: 96%

## 2021-03-12 NOTE — ANESTHESIA POSTPROCEDURE EVALUATION
Post-Op Assessment Note    CV Status:  Stable    Pain management: adequate     Mental Status:  Alert and awake   Hydration Status:  Euvolemic   PONV Controlled:  Controlled   Airway Patency:  Patent      Post Op Vitals Reviewed: Yes      Staff: CRNA         No complications documented      /66 (03/12/21 1118)    Temp (!) 97 4 °F (36 3 °C) (03/12/21 1118)    Pulse 92 (03/12/21 1118)   Resp 16 (03/12/21 1118)    SpO2 100 % (03/12/21 1118)

## 2021-04-07 ENCOUNTER — TELEPHONE (OUTPATIENT)
Dept: PAIN MEDICINE | Facility: MEDICAL CENTER | Age: 61
End: 2021-04-07

## 2021-04-08 DIAGNOSIS — D50.9 IRON DEFICIENCY ANEMIA, UNSPECIFIED IRON DEFICIENCY ANEMIA TYPE: Primary | ICD-10-CM

## 2021-04-09 ENCOUNTER — TELEPHONE (OUTPATIENT)
Dept: RADIOLOGY | Facility: MEDICAL CENTER | Age: 61
End: 2021-04-09

## 2021-04-09 NOTE — TELEPHONE ENCOUNTER
Patient called in to schedule her Bilateral RFA's  LT L4-5 and L5-S1 RFA on 4/14  RT L4-5 and L5-S1 RFA on 5/5  F/U with NP on 5/25  Patient states left side pain is at about a 8 5 and the right side is at 0 at the moment  Reviewed instructions: , NPO 1 hour prior; loose-fitting pants, if sick, fever, abx to call to reschedule; no vaccinations/immunizations for 2 weeks prior/after  Patient stated verbal understanding

## 2021-04-14 ENCOUNTER — HOSPITAL ENCOUNTER (OUTPATIENT)
Dept: RADIOLOGY | Facility: MEDICAL CENTER | Age: 61
Discharge: HOME | End: 2021-04-14
Attending: PHYSICAL MEDICINE & REHABILITATION
Payer: MEDICARE

## 2021-04-14 VITALS
HEART RATE: 71 BPM | TEMPERATURE: 98.3 F | SYSTOLIC BLOOD PRESSURE: 141 MMHG | OXYGEN SATURATION: 99 % | DIASTOLIC BLOOD PRESSURE: 89 MMHG | RESPIRATION RATE: 20 BRPM

## 2021-04-14 DIAGNOSIS — M54.50 LOW BACK PAIN: ICD-10-CM

## 2021-04-14 DIAGNOSIS — M47.816 LUMBAR SPONDYLOSIS: ICD-10-CM

## 2021-04-14 RX ORDER — BETAMETHASONE DIPROPIONATE 0.5 MG/G
CREAM TOPICAL
COMMUNITY
Start: 2021-04-05 | End: 2021-08-23 | Stop reason: ALTCHOICE

## 2021-04-14 RX ORDER — HYDROCODONE BITARTRATE AND ACETAMINOPHEN 5; 325 MG/1; MG/1
1 TABLET ORAL
COMMUNITY
Start: 2021-03-02 | End: 2021-08-23 | Stop reason: ALTCHOICE

## 2021-04-14 NOTE — PROGRESS NOTES
Pt requesting to reschedule the procedure due to having a headache  Pt states she nornally gets headaches but is unable to lay on the table to complete the procedure  Pt did take Ibuprofen prior  Procedure will be rescheduled

## 2021-04-15 ENCOUNTER — TELEPHONE (OUTPATIENT)
Dept: PAIN MEDICINE | Facility: MEDICAL CENTER | Age: 61
End: 2021-04-15

## 2021-04-15 NOTE — TELEPHONE ENCOUNTER
Pt contacted Call Center requested refill of their medication  Medication Name:  tramadol     Dosage of Med:  50 mg    Frequency of Med:  1 every 8 hrs    Remaining Medication:  0    Pharmacy and Location:    [address] [place] [address]    Pt  Preferred Callback Phone Number:  [phone]    Thank you      Pt ask for enough until her procedure

## 2021-04-15 NOTE — TELEPHONE ENCOUNTER
Arcadio Ge this was last ordered per Arcadio Ge on 12/21  Is this something that will be continues? Will make sovs for same if so

## 2021-04-19 ENCOUNTER — OFFICE VISIT (OUTPATIENT)
Dept: PAIN MEDICINE | Facility: MEDICAL CENTER | Age: 61
End: 2021-04-19
Payer: MEDICARE

## 2021-04-19 VITALS
WEIGHT: 182 LBS | HEART RATE: 76 BPM | HEIGHT: 64 IN | DIASTOLIC BLOOD PRESSURE: 90 MMHG | TEMPERATURE: 98.3 F | BODY MASS INDEX: 31.07 KG/M2 | SYSTOLIC BLOOD PRESSURE: 128 MMHG

## 2021-04-19 DIAGNOSIS — G89.4 CHRONIC PAIN SYNDROME: ICD-10-CM

## 2021-04-19 DIAGNOSIS — M54.50 CHRONIC BILATERAL LOW BACK PAIN WITHOUT SCIATICA: ICD-10-CM

## 2021-04-19 DIAGNOSIS — G89.29 CHRONIC BILATERAL LOW BACK PAIN WITHOUT SCIATICA: ICD-10-CM

## 2021-04-19 DIAGNOSIS — F32.A DEPRESSION, UNSPECIFIED DEPRESSION TYPE: ICD-10-CM

## 2021-04-19 DIAGNOSIS — Z79.891 LONG-TERM CURRENT USE OF OPIATE ANALGESIC: Primary | ICD-10-CM

## 2021-04-19 DIAGNOSIS — F11.20 UNCOMPLICATED OPIOID DEPENDENCE (HCC): ICD-10-CM

## 2021-04-19 PROCEDURE — 99214 OFFICE O/P EST MOD 30 MIN: CPT | Performed by: NURSE PRACTITIONER

## 2021-04-19 RX ORDER — TRAMADOL HYDROCHLORIDE 50 MG/1
50 TABLET ORAL EVERY 8 HOURS PRN
Qty: 90 TABLET | Refills: 0 | Status: SHIPPED | OUTPATIENT
Start: 2021-04-19 | End: 2021-06-03 | Stop reason: SDUPTHER

## 2021-04-19 RX ORDER — TURMERIC 400 MG
CAPSULE ORAL AS NEEDED
COMMUNITY
End: 2022-07-20

## 2021-04-19 NOTE — PROGRESS NOTES
Assessment:  1  Long-term current use of opiate analgesic    2  Chronic pain syndrome    3  Uncomplicated opioid dependence ([address] )    4  Chronic bilateral low back pain without sciatica        Plan:   continue with gabapentin and methocarbamol no refills required  Continue with tramadol this was refilled for 1 month with 0 refills  Patient tells me that hopefully after her radiofrequency ablations she should it need the tramadol so she  Does not want the normal 3 month supply  Following our visit the patient will go out to the  to get rescheduled for her bilateral L4-5 and L5-S1 radiofrequency ablations starting with the left side  Patient is requesting a script for aqua therapy I feel this is reasonable and I will provide her with 1 today  She can attend once or twice a week for 4-6 weeks for her axial low back pain       follow-up after procedures  If patient is still having groin pain after her bilateral radiofrequency ablations we will obtain x-rays of her hips  History of Present Illness: The patient is a 63 y o  female last seen on   February 8, 2021 who presents for a follow up office visit in regards to chronic pain secondary to  Lumbar spondylosis, chronic low back pain  Radiculopathy, and lumbar post-laminectomy syndrome  The patient currently reports  Axial low back pain rated 9/10 she describes her pain as a sore wrong off feeling and now she is having bilateral groin pain as well specially when walking  Patient was scheduled for  L4-5 and L5-S1 radiofrequency ablation on  April 14th, 2021, she had to cancel her procedure once she arrived to our procedure suite because she had a really bad headache and she was unable to lie flat to have the procedure completed  She is planning on rescheduling before she leaves the office today  Current pain medications includes:  Gabapentin, methocarbamol, and tramadol      The patient reports that this regimen is providing  45% pain relief  The patient is reporting no side effects from this pain medication regimen  patient did have a breast reduction on March 12, 2021 with Dr Cruzito Woods and she was given postop pain medication  tramadol last taken 4/16/21  Ran out      Pain Contract Signed:2/10/21  Last Urine Drug Screen:4/19/21    I have personally reviewed and/or updated the patient's past medical history, past surgical history, family history, social history, current medications, allergies, and vital signs today  Review of Systems:    Review of Systems   Respiratory: Negative for shortness of breath  Cardiovascular: Negative for chest pain  Gastrointestinal: Positive for constipation  Negative for diarrhea, nausea and vomiting  Musculoskeletal: Positive for back pain, gait problem, joint swelling (left knee) and myalgias  Negative for arthralgias  Skin: Negative for rash  Neurological: Negative for dizziness, seizures and weakness  Psychiatric/Behavioral: Positive for sleep disturbance  All other systems reviewed and are negative          Past Medical History:   Diagnosis Date    Anemia     Anesthesia     "sometimes low BP upon waking up" pt states she stopped breathing 1 time during surgery    Arthritis     Asthma     Back pain     Dolan's esophagus     Chronic pain disorder     Chronic venous insufficiency     Cough variant asthma     COVID-19 03/2020    Depression     Environmental allergies     dust    GERD (gastroesophageal reflux disease)     Hiatal hernia     History of anemia     History of fusion of spine for scoliosis     "as a teenager"    History of transfusion     1978 - no adverse reaction    Left lumbar radiculitis     Last Assessed: [id number]    Lumbar postlaminectomy syndrome     Migraines     Morbid obesity ([address] )     Motion sickness     Osteoarthritis     of left hip    Right knee pain     Seasonal allergies     Shortness of breath     Umbilical hernia  Uses brace     right knee    Wears glasses        Past Surgical History:   Procedure Laterality Date    ARTHRODESIS      lumbar    ARTHRODESIS      Spinal Arthrodesis for Deformity; Last Assessed: [id number]    BACK SURGERY      lumbar fusion,with nydia/screw and cage implant    BACK SURGERY      surgery for scoliosis    BREAST CYST EXCISION Right     benign    COLONOSCOPY      COLONOSCOPY N/A 3/14/2019    Procedure: COLONOSCOPY;  Surgeon: Kenneth Farrar MD;  [facility]; Service: Gastroenterology    ESOPHAGOGASTRODUODENOSCOPY N/A 3/14/2019    Procedure: ESOPHAGOGASTRODUODENOSCOPY (EGD) W RFA(BARRX); Surgeon: Kenneth Farrar MD;  [facility]; Service: Gastroenterology    HERNIA REPAIR      umbilical hernia repair x2    ORTHOPEDIC SURGERY      PLANTAR FASCIA SURGERY Left     ND BREAST REDUCTION Bilateral 3/12/2021    Procedure: BREAST REDUCTION;  Surgeon: Meggan Yeung MD;  Location: [address] [place] [address] MAIN OR;  Service: Plastics    ND COLONOSCOPY FLX DX W/COLLJ SPEC WHEN PFRMD N/A 2/20/2018    Procedure: COLONOSCOPY with polypectomy;  Surgeon: Jeannie Cohen MD;  [facility]; Service: General    ND ESOPHAGOGASTRODUODENOSCOPY TRANSORAL DIAGNOSTIC N/A 5/24/2017    Procedure: ESOPHAGOGASTRODUODENOSCOPY (EGD); Surgeon: Mariposa Bal MD;  [facility]; Service: Gastroenterology    ND ESOPHAGOGASTRODUODENOSCOPY TRANSORAL DIAGNOSTIC N/A 8/31/2017    Procedure: ESOPHAGOGASTRODUODENOSCOPY (EGD) W RFA;  Surgeon: Ha Davenport MD;  Location:  GI LAB;   Service: Gastroenterology    ND KNEE SCOPE,MED/LAT MENISECTOMY Right 4/4/2018    Procedure: ARTHROSCOPY KNEE PARTIAL MEDIAL MENISECTOMY , CHONDROPLASTY;  Surgeon: Kenneth Magdaleno DO;  [facility];  Service: Orthopedics    ND LAP, [facility] N/A 1/21/2021    Procedure: REPAIR HERNIA INCISIONAL LAPAROSCOPIC W/ ROBOTICS, with mesh;  Surgeon: Jordan Smith MD;  [facility];  Service: General    WISDOM TOOTH EXTRACTION         Family History   Problem Relation Age of Onset    Lung cancer Mother     Cancer Mother     Alcohol abuse Father     Other Father         Cardiac Disorder    Depression Father     Hypertension Father     Heart attack Father     Breast cancer Maternal Grandmother     Lung cancer Maternal Grandmother     Diabetes type I Other     No Known Problems Sister     No Known Problems Brother     No Known Problems Maternal Grandfather     Leukemia Paternal Grandmother     No Known Problems Paternal Grandfather     No Known Problems Sister     No Known Problems Maternal Aunt     No Known Problems Paternal Aunt     Stroke Neg Hx        Social History     Occupational History    Not on file   Tobacco Use    Smoking status: Never Smoker    Smokeless tobacco: Never Used   Substance and Sexual Activity    Alcohol use: Not Currently    Drug use: No    Sexual activity: Not on file         Current Outpatient Medications:     betamethasone dipropionate (DIPROSONE) 0 05 % cream, APPLY SMALL AMOUNT TOPICALLY TO RASH TWICE DAILY FOR 1 WEEK, Disp: , Rfl:     dexlansoprazole (DEXILANT) 60 MG capsule, Take 1 capsule (60 mg total) by mouth 2 (two) times a day, Disp: 60 capsule, Rfl: 2    DULoxetine (CYMBALTA) 60 mg delayed release capsule, Take 1 capsule (60 mg total) by mouth 2 (two) times a day, Disp: 180 capsule, Rfl: 0    fluticasone (FLONASE) 50 mcg/act nasal spray, 1 spray into each nostril daily, Disp: , Rfl:     gabapentin (NEURONTIN) 300 mg capsule, Take 1 capsule (300 mg total) by mouth 3 (three) times a day, Disp: 90 capsule, Rfl: 1    HYDROcodone-acetaminophen (NORCO) 5-325 mg per tablet, Take 1 tablet by mouth every 4 to 6 hours as needed for pain, Disp: , Rfl:     MAGNESIUM PO, Take 1 tablet by mouth daily, Disp: , Rfl:     methocarbamol (ROBAXIN) 750 mg tablet, Take 1 tablet (750 mg total) by mouth 2 (two) times a day as needed for muscle spasms, Disp: 60 tablet, Rfl: 1    omeprazole (PriLOSEC) 40 MG capsule, TAKE 1 CAPSULE BY MOUTH TWICE DAILY (Patient taking differently: as needed ), Disp: 180 capsule, Rfl: 0    POTASSIUM PO, Take 1 tablet by mouth daily, Disp: , Rfl:     rOPINIRole (REQUIP) 2 mg tablet, TAKE 1 TABLET BY MOUTH AT BEDTIME, Disp: 90 tablet, Rfl: 2    traMADol (ULTRAM) 50 mg tablet, Take 1 tablet (50 mg total) by mouth every 8 (eight) hours as needed for moderate pain, Disp: 90 tablet, Rfl: 0    Turmeric 400 MG CAPS, Take by mouth, Disp: , Rfl:     VITAMIN D PO, Take 1 capsule by mouth daily, Disp: , Rfl:     albuterol (2 5 mg/3 mL) 0 083 % nebulizer solution, VVN Q 4 H PRN, Disp: , Rfl: 3    furosemide (LASIX) 20 mg tablet, TAKE 1 TABLET BY MOUTH DAILY (Patient not taking: No sig reported), Disp: 90 tablet, Rfl: 5    Allergies   Allergen Reactions    Dust Mite Extract Shortness Of Breath    Latex Itching and Blisters    Other Other (See Comments)     Seasonal AND cock roaches    Sulfa Antibiotics Vomiting     Topical-blistering       Physical Exam:    /90   Pulse 76   Temp 98 3 °F (36 8 °C)   Ht 5' 4" (1 626 m)   Wt 82 6 kg (182 lb)   BMI 31 24 kg/m²     Constitutional:normal, well developed, well nourished, alert, in no distress and non-toxic and no overt pain behavior    Eyes:anicteric  HEENT:grossly intact  Neck:supple, symmetric, trachea midline and no masses   Pulmonary:even and unlabored  Cardiovascular:No edema or pitting edema present  Skin:Normal without rashes or lesions and well hydrated  Psychiatric:Mood and affect appropriate  Neurologic:Cranial Nerves II-XII grossly intact  Musculoskeletal:normal      Imaging  No orders to display         Orders Placed This Encounter   Procedures    MM ALL_Prescribed Meds and Special Instructions    MM DT_Alprazolam Definitive Test    MM DT_Amphetamine Definitive Test    MM DT_Aripiprazole Definitive Test    MM DT_Bath Salts Definitive Test    MM DT_Buprenorphine Definitive Test    MM DT_Butalbital Definitive Test    MM DT_Clonazepam Definitive Test    MM DT_Clozapine Definitive Test    MM DT_Cocaine Definitive Test    MM DT_Codeine Definitive Test    MM DT_Desipramine Definitive Test    MM DT_Dextromethorphan Definitive Test    MM Diazepam Definitive Test    MM DT_Ethyl Glucuronide/Ethyl Sulfate Definitive Test    MM DT_Fentanyl Definitive Test    MM DT_Heroin Definitive Test    MM DT_Hydrocodone Definitive Test    MM DT_Hydromorphone Definitive Test    MM DT_Kratom Definitive Test    MM DT_Levorphanol Definitive Test    MM Lorazepam Definitive Test    MM DT_MDMA Definitive Test    MM DT_Meperidine Definitive Test    MM DT_Methadone Definitive Test    MM DT_Methamphetamine Definitive Test    MM DT_Methylphenidate Definitive Test    MM DT_Morphine Definitive Test    MM DT_Olanzapine Definitive Test    MM DT_Oxazepam Definitive Test    MM DT_Oxycodone Definitive Test    MM DT_Oxymorphone Definitive Test    MM DT_Phenobarbital Definitive Test    MM DT_Phentermine Definitive Test    MM DT_Secobarbital Definitive Test    MM DT_Spice Definitive Test    MM DT_Tapentadol Definitive Test    MM DT_Temazapam Definitive Test    MM DT_THC Definitive Test    MM DT_Tramadol Definitive Test    MM DT_Validity Specific    MM DT_Validity pH    MM DT_Validity Creatinine    MM DT_Validity Oxidant    Ambulatory referral to Physical Therapy

## 2021-04-20 ENCOUNTER — OFFICE VISIT (OUTPATIENT)
Dept: INTERNAL MEDICINE CLINIC | Facility: CLINIC | Age: 61
End: 2021-04-20
Payer: MEDICARE

## 2021-04-20 ENCOUNTER — APPOINTMENT (OUTPATIENT)
Dept: LAB | Facility: HOSPITAL | Age: 61
End: 2021-04-20
Attending: INTERNAL MEDICINE
Payer: MEDICARE

## 2021-04-20 VITALS
RESPIRATION RATE: 18 BRPM | BODY MASS INDEX: 31.55 KG/M2 | HEART RATE: 67 BPM | TEMPERATURE: 97.8 F | HEIGHT: 64 IN | WEIGHT: 184.8 LBS | DIASTOLIC BLOOD PRESSURE: 66 MMHG | SYSTOLIC BLOOD PRESSURE: 114 MMHG | OXYGEN SATURATION: 97 %

## 2021-04-20 DIAGNOSIS — K21.9 GASTROESOPHAGEAL REFLUX DISEASE, UNSPECIFIED WHETHER ESOPHAGITIS PRESENT: ICD-10-CM

## 2021-04-20 DIAGNOSIS — E55.9 VITAMIN D DEFICIENCY: ICD-10-CM

## 2021-04-20 DIAGNOSIS — E66.9 OBESITY (BMI 30-39.9): ICD-10-CM

## 2021-04-20 DIAGNOSIS — J45.20 MILD INTERMITTENT ASTHMA WITHOUT COMPLICATION: Primary | ICD-10-CM

## 2021-04-20 DIAGNOSIS — I87.2 CHRONIC VENOUS INSUFFICIENCY: ICD-10-CM

## 2021-04-20 DIAGNOSIS — F33.9 DEPRESSION, RECURRENT (HCC): ICD-10-CM

## 2021-04-20 DIAGNOSIS — J45.991 COUGH VARIANT ASTHMA: ICD-10-CM

## 2021-04-20 DIAGNOSIS — F11.20 UNCOMPLICATED OPIOID DEPENDENCE (HCC): ICD-10-CM

## 2021-04-20 DIAGNOSIS — G25.81 RESTLESS LEGS SYNDROME: ICD-10-CM

## 2021-04-20 PROBLEM — E66.09 CLASS 1 OBESITY DUE TO EXCESS CALORIES WITH SERIOUS COMORBIDITY AND BODY MASS INDEX (BMI) OF 31.0 TO 31.9 IN ADULT: Status: ACTIVE | Noted: 2021-04-20

## 2021-04-20 PROBLEM — E66.811 CLASS 1 OBESITY DUE TO EXCESS CALORIES WITH SERIOUS COMORBIDITY AND BODY MASS INDEX (BMI) OF 31.0 TO 31.9 IN ADULT: Status: ACTIVE | Noted: 2021-04-20

## 2021-04-20 PROBLEM — E66.812 CLASS 2 SEVERE OBESITY DUE TO EXCESS CALORIES WITH SERIOUS COMORBIDITY AND BODY MASS INDEX (BMI) OF 36.0 TO 36.9 IN ADULT (HCC): Status: RESOLVED | Noted: 2017-08-11 | Resolved: 2021-04-20

## 2021-04-20 PROBLEM — E66.01 CLASS 2 SEVERE OBESITY DUE TO EXCESS CALORIES WITH SERIOUS COMORBIDITY AND BODY MASS INDEX (BMI) OF 36.0 TO 36.9 IN ADULT (HCC): Status: RESOLVED | Noted: 2017-08-11 | Resolved: 2021-04-20

## 2021-04-20 LAB
ERYTHROCYTE [DISTWIDTH] IN BLOOD BY AUTOMATED COUNT: 16.2 % (ref 11.6–15.1)
HCT VFR BLD AUTO: 42.5 % (ref 34.8–46.1)
HGB BLD-MCNC: 13 G/DL (ref 11.5–15.4)
IRON SATN MFR SERPL: 15 %
IRON SERPL-MCNC: 54 UG/DL (ref 50–170)
MCH RBC QN AUTO: 26.2 PG (ref 26.8–34.3)
MCHC RBC AUTO-ENTMCNC: 30.6 G/DL (ref 31.4–37.4)
MCV RBC AUTO: 86 FL (ref 82–98)
PLATELET # BLD AUTO: 263 THOUSANDS/UL (ref 149–390)
PMV BLD AUTO: 10.5 FL (ref 8.9–12.7)
RBC # BLD AUTO: 4.97 MILLION/UL (ref 3.81–5.12)
TIBC SERPL-MCNC: 354 UG/DL (ref 250–450)
WBC # BLD AUTO: 6.91 THOUSAND/UL (ref 4.31–10.16)

## 2021-04-20 PROCEDURE — 83550 IRON BINDING TEST: CPT | Performed by: INTERNAL MEDICINE

## 2021-04-20 PROCEDURE — 83540 ASSAY OF IRON: CPT | Performed by: INTERNAL MEDICINE

## 2021-04-20 PROCEDURE — 36415 COLL VENOUS BLD VENIPUNCTURE: CPT | Performed by: INTERNAL MEDICINE

## 2021-04-20 PROCEDURE — 85027 COMPLETE CBC AUTOMATED: CPT | Performed by: INTERNAL MEDICINE

## 2021-04-20 PROCEDURE — 99214 OFFICE O/P EST MOD 30 MIN: CPT | Performed by: INTERNAL MEDICINE

## 2021-04-20 RX ORDER — DULOXETIN HYDROCHLORIDE 60 MG/1
60 CAPSULE, DELAYED RELEASE ORAL 2 TIMES DAILY
Qty: 180 CAPSULE | Refills: 0 | Status: SHIPPED | OUTPATIENT
Start: 2021-04-20 | End: 2021-08-23

## 2021-04-20 RX ORDER — PRAMIPEXOLE DIHYDROCHLORIDE 0.25 MG/1
0.5 TABLET ORAL
Qty: 60 TABLET | Refills: 1 | Status: SHIPPED | OUTPATIENT
Start: 2021-04-20 | End: 2021-06-01 | Stop reason: SDUPTHER

## 2021-04-21 LAB
6MAM UR QL CFM: NEGATIVE NG/ML
7AMINOCLONAZEPAM UR QL CFM: NEGATIVE NG/ML
A-OH ALPRAZ UR QL CFM: NEGATIVE NG/ML
ACCEPTABLE CREAT UR QL: NORMAL MG/DL
ACCEPTIBLE SP GR UR QL: NORMAL
AMPHET UR QL CFM: NEGATIVE NG/ML
AMPHET UR QL CFM: NEGATIVE NG/ML
BUPRENORPHINE UR QL CFM: NEGATIVE NG/ML
BUTALBITAL UR QL CFM: NEGATIVE NG/ML
BZE UR QL CFM: NEGATIVE NG/ML
CODEINE UR QL CFM: NEGATIVE NG/ML
DESIPRAMINE UR QL CFM: NEGATIVE NG/ML
EDDP UR QL CFM: NEGATIVE NG/ML
ETHYL GLUCURONIDE UR QL CFM: NEGATIVE NG/ML
ETHYL SULFATE UR QL SCN: NEGATIVE NG/ML
FENTANYL UR QL CFM: NEGATIVE NG/ML
GLIADIN IGG SER IA-ACNC: NEGATIVE NG/ML
GLUCOSE 30M P 50 G LAC PO SERPL-MCNC: NEGATIVE NG/ML
HYDROCODONE UR QL CFM: ABNORMAL NG/ML
HYDROCODONE UR QL CFM: ABNORMAL NG/ML
HYDROMORPHONE UR QL CFM: ABNORMAL NG/ML
LORAZEPAM UR QL CFM: NEGATIVE NG/ML
MDMA UR QL CFM: NEGATIVE NG/ML
ME-PHENIDATE UR QL CFM: NEGATIVE NG/ML
MEPERIDINE UR QL CFM: NEGATIVE NG/ML
MEPHEDRONE UR QL CFM: NEGATIVE NG/ML
METHADONE UR QL CFM: NEGATIVE NG/ML
METHAMPHET UR QL CFM: NEGATIVE NG/ML
MORPHINE UR QL CFM: NEGATIVE NG/ML
MORPHINE UR QL CFM: NEGATIVE NG/ML
NITRITE UR QL: NORMAL UG/ML
NORBUPRENORPHINE UR QL CFM: NEGATIVE NG/ML
NORDIAZEPAM UR QL CFM: NEGATIVE NG/ML
NORFENTANYL UR QL CFM: NEGATIVE NG/ML
NORHYDROCODONE UR QL CFM: ABNORMAL NG/ML
NORHYDROCODONE UR QL CFM: ABNORMAL NG/ML
NORMEPERIDINE UR QL CFM: NEGATIVE NG/ML
NOROXYCODONE UR QL CFM: NEGATIVE NG/ML
OLANZAPINE QUANTIFICATION: NEGATIVE NG/ML
OPC-3373 QUANTIFICATION: NEGATIVE
OXAZEPAM UR QL CFM: NEGATIVE NG/ML
OXYCODONE UR QL CFM: NEGATIVE NG/ML
OXYMORPHONE UR QL CFM: NEGATIVE NG/ML
OXYMORPHONE UR QL CFM: NEGATIVE NG/ML
PHENOBARB UR QL CFM: NEGATIVE NG/ML
RESULT ALL_PRESCRIBED MEDS AND SPECIAL INSTRUCTIONS: NORMAL
SECOBARBITAL UR QL CFM: NEGATIVE NG/ML
SL AMB 3-METHYL-FENTANYL QUANTIFICATION: NORMAL NG/ML
SL AMB 4-ANPP QUANTIFICATION: NORMAL NG/ML
SL AMB 4-FIBF QUANTIFICATION: NORMAL NG/ML
SL AMB 5F-ADB-M7 METABOLITE QUANTIFICATION: NEGATIVE NG/ML
SL AMB 7-OH-MITRAGYNINE (KRATOM ALKALOID) QUANTIFICATION: NEGATIVE NG/ML
SL AMB AB-FUBINACA-M3 METABOLITE QUANTIFICATION: NEGATIVE NG/ML
SL AMB ACETYL FENTANYL QUANTIFICATION: NORMAL NG/ML
SL AMB ACETYL NORFENTANYL QUANTIFICATION: NORMAL NG/ML
SL AMB ACRYL FENTANYL QUANTIFICATION: NORMAL NG/ML
SL AMB BATH SALTS: NEGATIVE NG/ML
SL AMB BUTRYL FENTANYL QUANTIFICATION: NORMAL NG/ML
SL AMB CARFENTANIL QUANTIFICATION: NORMAL NG/ML
SL AMB CLOZAPINE QUANTIFICATION: NEGATIVE NG/ML
SL AMB CTHC (MARIJUANA METABOLITE) QUANTIFICATION: NEGATIVE NG/ML
SL AMB CYCLOPROPYL FENTANYL QUANTIFICATION: NORMAL NG/ML
SL AMB DEXTROMETHORPHAN QUANTIFICATION: NEGATIVE NG/ML
SL AMB DEXTRORPHAN (DEXTROMETHORPHAN METABOLITE) QUANT: NEGATIVE NG/ML
SL AMB DEXTRORPHAN (DEXTROMETHORPHAN METABOLITE) QUANT: NEGATIVE NG/ML
SL AMB FURANYL FENTANYL QUANTIFICATION: NORMAL NG/ML
SL AMB JWH018 METABOLITE QUANTIFICATION: NEGATIVE NG/ML
SL AMB JWH073 METABOLITE QUANTIFICATION: NEGATIVE NG/ML
SL AMB MDMB-FUBINACA-M1 METABOLITE QUANTIFICATION: NEGATIVE NG/ML
SL AMB METHOXYACETYL FENTANYL QUANTIFICATION: NORMAL NG/ML
SL AMB METHYLONE QUANTIFICATION: NEGATIVE NG/ML
SL AMB N-DESMETHYL U-47700 QUANTIFICATION: NORMAL NG/ML
SL AMB N-DESMETHYL-TRAMADOL QUANTIFICATION: ABNORMAL NG/ML
SL AMB N-DESMETHYLCLOZAPINE QUANTIFICATION: NEGATIVE NG/ML
SL AMB PHENTERMINE QUANTIFICATION: NEGATIVE NG/ML
SL AMB RCS4 METABOLITE QUANTIFICATION: NEGATIVE NG/ML
SL AMB RITALINIC ACID QUANTIFICATION: NEGATIVE NG/ML
SL AMB U-47700 QUANTIFICATION: NORMAL NG/ML
SPECIMEN DRAWN SERPL: NEGATIVE NG/ML
SPECIMEN PH ACCEPTABLE UR: NORMAL
TAPENTADOL UR QL CFM: NEGATIVE NG/ML
TEMAZEPAM UR QL CFM: NEGATIVE NG/ML
TEMAZEPAM UR QL CFM: NEGATIVE NG/ML
TRAMADOL UR QL CFM: ABNORMAL NG/ML
URATE/CREAT 24H UR: ABNORMAL NG/ML

## 2021-04-21 NOTE — PROGRESS NOTES
[facility] [place]      NAME: Kev Solorzano  AGE: 63 y o  SEX: female  : 1960   MRN: [id number]    DATE: 2021  TIME: 7:44 AM    Assessment and Plan   1  Mild intermittent asthma without complication  Stable on current therapy  2  Chronic venous insufficiency    This is relatively mild  No specific intervention is needed  3  Restless legs syndrome    The patient is not getting good relief with ropinirole  She changed pramipexole  Recent iron studies were normal   - pramipexole (MIRAPEX) 0 25 mg tablet; Take 2 tablets (0 5 mg total) by mouth daily at bedtime  Dispense: 60 tablet; Refill: 1    4  Vitamin D deficiency    On replacement  5  Uncomplicated opioid dependence ([address] )    Followed by pain management  6  Obesity (BMI 30-39  9)    The patient has been losing weight steadily  She will continue her current diet and exercise regimen  7  Depression, recurrent (HCC)    Stable on duloxetine  8  Gastroesophageal reflux disease, unspecified whether esophagitis present    Well controlled on current PPI therapy  The patient is doing rather well overall  She is having ongoing difficulty with restless legs  We will gradually titrate pramipexole  If she does not improve, re-evaluation by sleep medicine or neurology might be helpful  The patient did receive COVID-19 vaccination  Return to office in: Three months  Chief Complaint     Chief Complaint   Patient presents with    Follow-up     3 month        History of Present Illness       The patient returned to the office for re-evaluation of mild intermittent asthma, hyperlipidemia, restless leg syndrome, esophageal reflux, depression, and osteoarthritis with chronic back pain  Since her last visit she has been having difficulty with restless legs  She does get relief with ropinirole but it makes her nauseous  She is not able to titrate the dose because of this    She is not sleeping well because of restless legs  She feels fatigued  She has otherwise been doing well  She underwent herniorrhaphy without difficulty  She underwent breast reduction and is very satisfied with the result  She has been watching her diet gradually losing weight  She has had no chest pain, shortness of breath, cough, wheezing, etcetera  With the exception of ropinirole, she is having no problems with her medications  The following portions of the patient's history were reviewed and updated as appropriate: allergies, current medications, past family history, past medical history, past social history, past surgical history and problem list     Review of Systems   Review of Systems   Constitutional: Negative  HENT: Negative for congestion, ear pain, postnasal drip, rhinorrhea, sore throat and trouble swallowing  Eyes: Negative for pain, discharge, redness and visual disturbance  Respiratory: Negative for cough, shortness of breath and wheezing  Cardiovascular: Negative  Gastrointestinal: Negative  Endocrine: Negative  Genitourinary: Negative for difficulty urinating, dysuria, frequency, hematuria and urgency  Musculoskeletal: Positive for back pain  Negative for arthralgias, gait problem, joint swelling and myalgias  Skin: Negative for rash  Neurological: Negative for dizziness, speech difficulty, weakness, light-headedness, numbness and headaches  Hematological: Negative  Psychiatric/Behavioral: Negative for confusion, decreased concentration, dysphoric mood and sleep disturbance  The patient is not nervous/anxious          Active Problem List     Patient Active Problem List   Diagnosis    Arthritis of lumbar spine    Chronic low back pain    Chronic pain syndrome    Chronic venous insufficiency    Cough variant asthma    Depression, recurrent (HCC)    Hyperlipidemia    Lumbar postlaminectomy syndrome    Primary osteoarthritis of left hip    Restless legs syndrome    Sleep disturbance  Hernia of anterior abdominal wall    S/P right knee arthroscopy    Environmental allergies    Lumbar spondylosis    Lumbar radiculopathy    Prediabetes    Vitamin D deficiency    Dolan's esophagus with dysplasia    Gastroesophageal reflux disease    Laryngopharyngeal reflux (LPR)    Chronic cough    Vocal fold paresis, right    Dysphonia    Muscle tension dysphonia    Glottic insufficiency    Reflux laryngitis    Laryngeal edema    Allergic rhinitis due to [employer] house dust mite    Mild intermittent asthma without complication    Upper airway cough syndrome    Neck pain    Cervical spondylosis without myelopathy    Osteoarthritis of multiple joints    COVID-19 virus infection    Sacroiliitis, not elsewhere classified ([facility] [address] )    Lipoma of torso    Recurrent umbilical hernia    Anemia    Hypokalemia    Macromastia    Long-term current use of opiate analgesic    Uncomplicated opioid dependence (HCC)    Motion sickness    Obesity (BMI 30-39  9)    Recurrent incisional hernia    Class 1 obesity due to excess calories with serious comorbidity and body mass index (BMI) of 31 0 to 31 9 in adult       Objective   /66 (BP Location: Left arm, Patient Position: Sitting, Cuff Size: Standard)   Pulse 67   Temp 97 8 °F (36 6 °C)   Resp 18   Ht 5' 4" (1 626 m)   Wt 83 8 kg (184 lb 12 8 oz)   SpO2 97%   BMI 31 72 kg/m²     Physical Exam  Constitutional:       General: She is not in acute distress  Appearance: She is well-developed  HENT:      Head: Normocephalic and atraumatic  Neck:      Musculoskeletal: Neck supple  Thyroid: No thyromegaly  Vascular: No JVD  Trachea: No tracheal deviation  Cardiovascular:      Rate and Rhythm: Normal rate and regular rhythm  Heart sounds: Normal heart sounds  No murmur  No friction rub  No gallop  Pulmonary:      Effort: Pulmonary effort is normal       Breath sounds: Normal breath sounds  No wheezing or rales  Chest:      Chest wall: No tenderness  Abdominal:      General: Bowel sounds are normal  There is no distension  Palpations: Abdomen is soft  There is no mass  Tenderness: There is no abdominal tenderness  There is no rebound  Musculoskeletal: Normal range of motion  General: No tenderness  Lymphadenopathy:      Cervical: No cervical adenopathy  Skin:     General: Skin is warm and dry  Neurological:      Mental Status: She is alert and oriented to person, place, and time  Psychiatric:         Mood and Affect: Mood normal          Behavior: Behavior normal          Thought Content:  Thought content normal          Judgment: Judgment normal          Pertinent Laboratory/Diagnostic Studies:  Orders Only on 04/08/2021   Component Date Value Ref Range Status    WBC 04/20/2021 6 91  4 31 - 10 16 Thousand/uL Final    RBC 04/20/2021 4 97  3 81 - 5 12 Million/uL Final    Hemoglobin 04/20/2021 13 0  11 5 - 15 4 g/dL Final    Hematocrit 04/20/2021 42 5  34 8 - 46 1 % Final    MCV 04/20/2021 86  82 - 98 fL Final    MCH 04/20/2021 26 2* 26 8 - 34 3 pg Final    MCHC 04/20/2021 30 6* 31 4 - 37 4 g/dL Final    RDW 04/20/2021 16 2* 11 6 - 15 1 % Final    Platelets 82/31/0092 263  149 - 390 Thousands/uL Final    MPV 04/20/2021 10 5  8 9 - 12 7 fL Final    Iron Saturation 04/20/2021 15  % Final    TIBC 04/20/2021 354  250 - 450 ug/dL Final    Iron 04/20/2021 54  50 - 170 ug/dL Final   Admission on 03/12/2021, Discharged on 03/12/2021   Component Date Value Ref Range Status    Case Report 03/12/2021    Final                    Value:Surgical Pathology Report                         Case: [id number]                                   Authorizing Provider:  Qamar Bynum MD           Collected:           03/12/2021 0910              Ordering Location:     [facility] Received:            03/12/2021 1341                                     Heart Operating Room Pathologist:           Jon Rodrigues MD                                                         Specimens:   A) - Breast, Left, left breast tissue                                                               B) - Breast, Right, right breast tissue                                                    Final Diagnosis 03/12/2021    Final                    Value: This result contains rich text formatting which cannot be displayed here   Additional Information 03/12/2021    Final                    Value: This result contains rich text formatting which cannot be displayed here  Art Selvin Description 03/12/2021    Final                    Value: This result contains rich text formatting which cannot be displayed here     Clinical Support on 02/23/2021   Component Date Value Ref Range Status    Ventricular Rate 02/23/2021 56  BPM Final    Atrial Rate 02/23/2021 56  BPM Final    TX Interval 02/23/2021 126  ms Final    QRSD Interval 02/23/2021 86  ms Final    QT Interval 02/23/2021 442  ms Final    QTC Interval 02/23/2021 426  ms Final    P Axis 02/23/2021 45  degrees Final    QRS Axis 02/23/2021 44  degrees Final    T Wave Axis 02/23/2021 68  degrees Final   Lab on 02/23/2021   Component Date Value Ref Range Status    WBC 02/23/2021 7 42  4 31 - 10 16 Thousand/uL Final    RBC 02/23/2021 4 74  3 81 - 5 12 Million/uL Final    Hemoglobin 02/23/2021 12 3  11 5 - 15 4 g/dL Final    Hematocrit 02/23/2021 40 4  34 8 - 46 1 % Final    MCV 02/23/2021 85  82 - 98 fL Final    MCH 02/23/2021 25 9* 26 8 - 34 3 pg Final    MCHC 02/23/2021 30 4* 31 4 - 37 4 g/dL Final    RDW 02/23/2021 15 9* 11 6 - 15 1 % Final    MPV 02/23/2021 10 2  8 9 - 12 7 fL Final    Platelets 56/21/3681 267  149 - 390 Thousands/uL Final    nRBC 02/23/2021 0  /100 WBCs Final    Neutrophils Relative 02/23/2021 54  43 - 75 % Final    Immat GRANS % 02/23/2021 1  0 - 2 % Final    Lymphocytes Relative 02/23/2021 35  14 - 44 % Final    Monocytes Relative 02/23/2021 6  4 - 12 % Final    Eosinophils Relative 02/23/2021 3  0 - 6 % Final    Basophils Relative 02/23/2021 1  0 - 1 % Final    Neutrophils Absolute 02/23/2021 4 09  1 85 - 7 62 Thousands/µL Final    Immature Grans Absolute 02/23/2021 0 04  0 00 - 0 20 Thousand/uL Final    Lymphocytes Absolute 02/23/2021 2 59  0 60 - 4 47 Thousands/µL Final    Monocytes Absolute 02/23/2021 0 43  0 17 - 1 22 Thousand/µL Final    Eosinophils Absolute 02/23/2021 0 21  0 00 - 0 61 Thousand/µL Final    Basophils Absolute 02/23/2021 0 06  0 00 - 0 10 Thousands/µL Final   Admission on 01/21/2021, Discharged on 01/21/2021   Component Date Value Ref Range Status    Case Report 01/21/2021    Final                    Value:Surgical Pathology Report                         Case: [id number]                                   Authorizing Provider:  Teja Muller MD         Collected:           01/21/2021 1406              Ordering Location:     [facility]        Received:            01/21/2021 [place] [address] Operating Room                                                     Pathologist:           Ibeth Garza DO                                                     Specimen:    hernia sac                                                                                 Final Diagnosis 01/21/2021    Final                    Value: This result contains rich text formatting which cannot be displayed here   Additional Information 01/21/2021    Final                    Value: This result contains rich text formatting which cannot be displayed here  Morelia Chandra Description 01/21/2021    Final                    Value: This result contains rich text formatting which cannot be displayed here             Current Medications     Current Outpatient Medications:     dexlansoprazole (DEXILANT) 60 MG capsule, Take 1 capsule (60 mg total) by mouth 2 (two) times a day, Disp: 60 capsule, Rfl: 2    DULoxetine (CYMBALTA) 60 mg delayed release capsule, Take 1 capsule (60 mg total) by mouth 2 (two) times a day, Disp: 180 capsule, Rfl: 0    fluticasone (FLONASE) 50 mcg/act nasal spray, 1 spray into each nostril daily, Disp: , Rfl:     gabapentin (NEURONTIN) 300 mg capsule, Take 1 capsule (300 mg total) by mouth 3 (three) times a day, Disp: 90 capsule, Rfl: 1    MAGNESIUM PO, Take 1 tablet by mouth daily, Disp: , Rfl:     methocarbamol (ROBAXIN) 750 mg tablet, Take 1 tablet (750 mg total) by mouth 2 (two) times a day as needed for muscle spasms, Disp: 60 tablet, Rfl: 1    omeprazole (PriLOSEC) 40 MG capsule, TAKE 1 CAPSULE BY MOUTH TWICE DAILY (Patient taking differently: as needed ), Disp: 180 capsule, Rfl: 0    POTASSIUM PO, Take 1 tablet by mouth daily, Disp: , Rfl:     rOPINIRole (REQUIP) 2 mg tablet, TAKE 1 TABLET BY MOUTH AT BEDTIME, Disp: 90 tablet, Rfl: 2    traMADol (ULTRAM) 50 mg tablet, Take 1 tablet (50 mg total) by mouth every 8 (eight) hours as needed for moderate pain, Disp: 90 tablet, Rfl: 0    Turmeric 400 MG CAPS, Take by mouth, Disp: , Rfl:     VITAMIN D PO, Take 1 capsule by mouth daily, Disp: , Rfl:     albuterol (2 5 mg/3 mL) 0 083 % nebulizer solution, VVN Q 4 H PRN, Disp: , Rfl: 3    betamethasone dipropionate (DIPROSONE) 0 05 % cream, APPLY SMALL AMOUNT TOPICALLY TO RASH TWICE DAILY FOR 1 WEEK, Disp: , Rfl:     furosemide (LASIX) 20 mg tablet, TAKE 1 TABLET BY MOUTH DAILY (Patient not taking: No sig reported), Disp: 90 tablet, Rfl: 5    HYDROcodone-acetaminophen (NORCO) 5-325 mg per tablet, Take 1 tablet by mouth every 4 to 6 hours as needed for pain, Disp: , Rfl:     pramipexole (MIRAPEX) 0 25 mg tablet, Take 2 tablets (0 5 mg total) by mouth daily at bedtime, Disp: 60 tablet, Rfl: 1      [place] Even, MD

## 2021-05-05 ENCOUNTER — HOSPITAL ENCOUNTER (OUTPATIENT)
Dept: RADIOLOGY | Facility: MEDICAL CENTER | Age: 61
Discharge: HOME | End: 2021-05-05
Attending: PHYSICAL MEDICINE & REHABILITATION | Admitting: PHYSICAL MEDICINE & REHABILITATION
Payer: MEDICARE

## 2021-05-05 ENCOUNTER — TELEPHONE (OUTPATIENT)
Dept: PAIN MEDICINE | Facility: MEDICAL CENTER | Age: 61
End: 2021-05-05

## 2021-05-05 VITALS
OXYGEN SATURATION: 97 % | TEMPERATURE: 98.4 F | DIASTOLIC BLOOD PRESSURE: 81 MMHG | RESPIRATION RATE: 20 BRPM | SYSTOLIC BLOOD PRESSURE: 126 MMHG | HEART RATE: 67 BPM

## 2021-05-05 DIAGNOSIS — M47.816 LUMBAR SPONDYLOSIS: ICD-10-CM

## 2021-05-05 DIAGNOSIS — M54.50 LOW BACK PAIN: ICD-10-CM

## 2021-05-05 PROCEDURE — 64635 DESTROY LUMB/SAC FACET JNT: CPT | Performed by: PHYSICAL MEDICINE & REHABILITATION

## 2021-05-05 PROCEDURE — 64636 DESTROY L/S FACET JNT ADDL: CPT | Performed by: PHYSICAL MEDICINE & REHABILITATION

## 2021-05-05 RX ORDER — BUPIVACAINE HCL/PF 2.5 MG/ML
10 VIAL (ML) INJECTION ONCE
Status: COMPLETED | OUTPATIENT
Start: 2021-05-05 | End: 2021-05-05

## 2021-05-05 RX ADMIN — Medication 5 ML: at 11:23

## 2021-05-05 RX ADMIN — Medication 5 ML: at 11:18

## 2021-05-05 NOTE — H&P
History of Present Illness: The patient is a 63 y o  female who presents with complaints of left low back pain    Patient Active Problem List   Diagnosis    Arthritis of lumbar spine    Chronic low back pain    Chronic pain syndrome    Chronic venous insufficiency    Cough variant asthma    Depression, recurrent (HCC)    Hyperlipidemia    Lumbar postlaminectomy syndrome    Primary osteoarthritis of left hip    Restless legs syndrome    Sleep disturbance    Hernia of anterior abdominal wall    S/P right knee arthroscopy    Environmental allergies    Lumbar spondylosis    Lumbar radiculopathy    Prediabetes    Vitamin D deficiency    Dolan's esophagus with dysplasia    Gastroesophageal reflux disease    Laryngopharyngeal reflux (LPR)    Chronic cough    Vocal fold paresis, right    Dysphonia    Muscle tension dysphonia    Glottic insufficiency    Reflux laryngitis    Laryngeal edema    Allergic rhinitis due to [employer] house dust mite    Mild intermittent asthma without complication    Upper airway cough syndrome    Neck pain    Cervical spondylosis without myelopathy    Osteoarthritis of multiple joints    COVID-19 virus infection    Sacroiliitis, not elsewhere classified ([address] )    Lipoma of torso    Recurrent umbilical hernia    Anemia    Hypokalemia    Macromastia    Long-term current use of opiate analgesic    Uncomplicated opioid dependence (HCC)    Motion sickness    Obesity (BMI 30-39  9)    Recurrent incisional hernia    Class 1 obesity due to excess calories with serious comorbidity and body mass index (BMI) of 31 0 to 31 9 in adult       Past Medical History:   Diagnosis Date    Anemia     Anesthesia     "sometimes low BP upon waking up" pt states she stopped breathing 1 time during surgery    Arthritis     Asthma     Back pain     Dolan's esophagus     Chronic pain disorder     Chronic venous insufficiency     Cough variant asthma     COVID-19 03/2020    Depression     Environmental allergies     dust    GERD (gastroesophageal reflux disease)     Hiatal hernia     History of anemia     History of fusion of spine for scoliosis     "as a teenager"    History of transfusion     1978 - no adverse reaction    Left lumbar radiculitis     Last Assessed: [id number]    Lumbar postlaminectomy syndrome     Migraines     Morbid obesity ([address] )     Motion sickness     Osteoarthritis     of left hip    Right knee pain     Seasonal allergies     Shortness of breath     Umbilical hernia     Uses brace     right knee    Wears glasses        Past Surgical History:   Procedure Laterality Date    ARTHRODESIS      lumbar    ARTHRODESIS      Spinal Arthrodesis for Deformity; Last Assessed: [id number]    BACK SURGERY      lumbar fusion,with nydia/screw and cage implant    BACK SURGERY      surgery for scoliosis    BREAST CYST EXCISION Right     benign    COLONOSCOPY      COLONOSCOPY N/A 3/14/2019    Procedure: COLONOSCOPY;  Surgeon: Johnathan Vega MD;  [facility]: [facility]; Service: Gastroenterology    ESOPHAGOGASTRODUODENOSCOPY N/A 3/14/2019    Procedure: ESOPHAGOGASTRODUODENOSCOPY (EGD) W RFA(BARRX); Surgeon: Johnathan Vega MD;  Location:  GI LAB; Service: Gastroenterology    HERNIA REPAIR      umbilical hernia repair x2    ORTHOPEDIC SURGERY      PLANTAR FASCIA SURGERY Left     DE BREAST REDUCTION Bilateral 3/12/2021    Procedure: BREAST REDUCTION;  Surgeon: Lindsay Grant MD;  Location: [address] [place] [address] OR;  Service: Plastics    DE COLONOSCOPY FLX DX W/COLLJ SPEC WHEN PFRMD N/A 2/20/2018    Procedure: COLONOSCOPY with polypectomy;  Surgeon: Zain Franco MD;  [facility]; Service: General    DE ESOPHAGOGASTRODUODENOSCOPY TRANSORAL DIAGNOSTIC N/A 5/24/2017    Procedure: ESOPHAGOGASTRODUODENOSCOPY (EGD); Surgeon: Owen Stearns MD;  Location: [facility];   Service: Gastroenterology    DE ESOPHAGOGASTRODUODENOSCOPY TRANSORAL DIAGNOSTIC N/A 8/31/2017    Procedure: ESOPHAGOGASTRODUODENOSCOPY (EGD) W RFA;  Surgeon: Cameron Marie MD;  [facility];   Service: Gastroenterology    AZ KNEE SCOPE,MED/LAT MENISECTOMY Right 4/4/2018    Procedure: ARTHROSCOPY KNEE PARTIAL MEDIAL MENISECTOMY , CHONDROPLASTY;  Surgeon: Lorna Lowe DO;  [facility];  Service: Orthopedics    AZ LAP, RECURRENT INCISIONAL HERNIA REPAIR,REDUCIBLE N/A 1/21/2021    Procedure: REPAIR HERNIA INCISIONAL LAPAROSCOPIC W/ ROBOTICS, with mesh;  Surgeon: Sudheer Johnston MD;  [facility];  Service: General    WISDOM TOOTH EXTRACTION           Current Outpatient Medications:     albuterol (2 5 mg/3 mL) 0 083 % nebulizer solution, VVN Q 4 H PRN, Disp: , Rfl: 3    betamethasone dipropionate (DIPROSONE) 0 05 % cream, APPLY SMALL AMOUNT TOPICALLY TO RASH TWICE DAILY FOR 1 WEEK, Disp: , Rfl:     dexlansoprazole (DEXILANT) 60 MG capsule, Take 1 capsule (60 mg total) by mouth 2 (two) times a day, Disp: 60 capsule, Rfl: 2    DULoxetine (CYMBALTA) 60 mg delayed release capsule, Take 1 capsule (60 mg total) by mouth 2 (two) times a day, Disp: 180 capsule, Rfl: 0    fluticasone (FLONASE) 50 mcg/act nasal spray, 1 spray into each nostril daily, Disp: , Rfl:     furosemide (LASIX) 20 mg tablet, TAKE 1 TABLET BY MOUTH DAILY (Patient not taking: No sig reported), Disp: 90 tablet, Rfl: 5    gabapentin (NEURONTIN) 300 mg capsule, Take 1 capsule (300 mg total) by mouth 3 (three) times a day, Disp: 90 capsule, Rfl: 1    HYDROcodone-acetaminophen (NORCO) 5-325 mg per tablet, Take 1 tablet by mouth every 4 to 6 hours as needed for pain, Disp: , Rfl:     MAGNESIUM PO, Take 1 tablet by mouth daily, Disp: , Rfl:     methocarbamol (ROBAXIN) 750 mg tablet, Take 1 tablet (750 mg total) by mouth 2 (two) times a day as needed for muscle spasms, Disp: 60 tablet, Rfl: 1    omeprazole (PriLOSEC) 40 MG capsule, TAKE 1 CAPSULE BY MOUTH TWICE DAILY (Patient taking differently: as needed ), Disp: 180 capsule, Rfl: 0    POTASSIUM PO, Take 1 tablet by mouth daily, Disp: , Rfl:     pramipexole (MIRAPEX) 0 25 mg tablet, Take 2 tablets (0 5 mg total) by mouth daily at bedtime, Disp: 60 tablet, Rfl: 1    rOPINIRole (REQUIP) 2 mg tablet, TAKE 1 TABLET BY MOUTH AT BEDTIME, Disp: 90 tablet, Rfl: 2    traMADol (ULTRAM) 50 mg tablet, Take 1 tablet (50 mg total) by mouth every 8 (eight) hours as needed for moderate pain, Disp: 90 tablet, Rfl: 0    Turmeric 400 MG CAPS, Take by mouth, Disp: , Rfl:     VITAMIN D PO, Take 1 capsule by mouth daily, Disp: , Rfl:     Current Facility-Administered Medications:     bupivacaine (PF) (MARCAINE) 0 25 % injection 10 mL, 10 mL, Perineural, Once, Marrie Purchase, DO    lidocaine (PF) (XYLOCAINE-MPF) 2 % injection 5 mL, 5 mL, Perineural, Once, Marrie Purchase, DO    Allergies   Allergen Reactions    Dust Mite Extract Shortness Of Breath    Latex Itching and Blisters    Other Other (See Comments)     Seasonal AND cock roaches    Sulfa Antibiotics Vomiting     Topical-blistering       Physical Exam:   Vitals:    05/05/21 1100   BP: 119/81   Pulse: 66   Resp: 16   Temp: 98 4 °F (36 9 °C)   SpO2: 100%     General: Awake, Alert, Oriented x 3, Mood and affect appropriate  Respiratory: Respirations even and unlabored  Cardiovascular: Peripheral pulses intact; no edema  Musculoskeletal Exam: left low back pain    ASA Score: 2    Patient/Chart Verification  Patient ID Verified: Verbal  ID Band Applied: No  Consents Confirmed: Procedural  H&P( within 30 days) Verified: To be obtained in the Pre-Procedure area  Interval H&P(within 24 hr) Complete (required for Outpatients and Surgery Admit only):  To be obtained in the Pre-Procedure area  Allergies Reviewed: Yes(no allergy to contrast  allergic to latex )  Anticoag/NSAID held?: NA  Currently on antibiotics?: No  Pregnancy denied?: Yes  Beta Blocker given : N/A  Does Patient Have a Prosthetic Device/Implant: Yes(cage, nydia, and pedicle screws in back)    Assessment:   1  Lumbar spondylosis    2   Low back pain        Plan: LT L4 & L5 medial branch RFA

## 2021-05-05 NOTE — DISCHARGE INSTRUCTIONS

## 2021-05-06 NOTE — TELEPHONE ENCOUNTER
Patient called in stating that pain level 5/10    50% improvement for now- still having pain in toes   Thank you      [phone]

## 2021-05-07 NOTE — TELEPHONE ENCOUNTER
Patient states she has  30 % of improvement & pain level 9/10   Please be advise thank you      Patient call back # [phone]

## 2021-05-10 DIAGNOSIS — G25.81 RESTLESS LEG SYNDROME: ICD-10-CM

## 2021-05-10 RX ORDER — ROPINIROLE 2 MG/1
TABLET, FILM COATED ORAL
Qty: 90 TABLET | Refills: 0 | Status: SHIPPED | OUTPATIENT
Start: 2021-05-10 | End: 2021-08-23 | Stop reason: ALTCHOICE

## 2021-05-17 NOTE — TELEPHONE ENCOUNTER
FYI-    RN s/w pt  Pt states to cancel her RT RFA on 5/19  Per pt she is not having pain currently on the RT side and she would like to wait until she has some relief on the LT side  Pt had her LT RFA done on 5/5  Pt reminded it takes 2 weeks to notice a difference and 4-6 weeks for the full effect  Pt verbalized understanding and will call back to reschedule

## 2021-05-17 NOTE — TELEPHONE ENCOUNTER
Pt called in to et Dr Brea Julien know that she is having pain in the upper part of her back and wishes not to have the procedure on [id number]  Please be advise thank you        Please call patient back @ [phone]

## 2021-05-17 NOTE — TELEPHONE ENCOUNTER
RN attempted to reach pt  VMMLOM with CB# and [place]  NOTE- Please see previous task, pt is requesting to cancel her procedure for 5/19/20

## 2021-05-18 ENCOUNTER — OFFICE VISIT (OUTPATIENT)
Dept: INTERNAL MEDICINE CLINIC | Facility: CLINIC | Age: 61
End: 2021-05-18
Payer: MEDICARE

## 2021-05-18 VITALS
OXYGEN SATURATION: 99 % | DIASTOLIC BLOOD PRESSURE: 77 MMHG | TEMPERATURE: 96.2 F | SYSTOLIC BLOOD PRESSURE: 131 MMHG | HEART RATE: 87 BPM | WEIGHT: 176.4 LBS | HEIGHT: 64 IN | BODY MASS INDEX: 30.11 KG/M2

## 2021-05-18 DIAGNOSIS — M10.9 PODAGRA: Primary | ICD-10-CM

## 2021-05-18 DIAGNOSIS — K21.9 GASTROESOPHAGEAL REFLUX DISEASE, UNSPECIFIED WHETHER ESOPHAGITIS PRESENT: ICD-10-CM

## 2021-05-18 PROCEDURE — 99213 OFFICE O/P EST LOW 20 MIN: CPT | Performed by: INTERNAL MEDICINE

## 2021-05-22 PROBLEM — U07.1 COVID-19 VIRUS INFECTION: Status: RESOLVED | Noted: 2020-03-31 | Resolved: 2021-05-22

## 2021-05-22 NOTE — PROGRESS NOTES
[facility] [place]      NAME: Selina Velez  AGE: 63 y o  SEX: female  : 1960   MRN: [id number]    DATE: 2021  TIME: 6:06 AM    Assessment and Plan   1  Podagra  This may be related to recent change in footwear  It could also represent gout  The patient will treat this conservatively for the moment with low-dose ibuprofen and ice  If her symptoms do not resolve additional evaluation may be needed  2  Gastroesophageal reflux disease, unspecified whether esophagitis present    Currently stable on PPI therapy  This does limit the patient's vitals  Return to office in:   As scheduled    Chief Complaint     Chief Complaint   Patient presents with    Foot Pain     LEFT FOOT PAIN       History of Present Illness      the patient came to the office on an emergency basis today because of pain in the left foot  This started 2 days ago  She says she recently got a new pair of shoes in preparation for going to a wedding  The pain started the next day after she wore these  She has had no other trauma  There is tenderness in the 1st left MTP but no swelling or redness  She has no problems with her other joints  She has had no fever rash  She has otherwise been doing well  The following portions of the patient's history were reviewed and updated as appropriate: allergies, current medications, past family history, past medical history, past social history, past surgical history and problem list     Review of Systems   Review of Systems   Constitutional: Negative  HENT: Negative for congestion, ear pain, postnasal drip, rhinorrhea, sore throat and trouble swallowing  Eyes: Negative for pain, discharge, redness and visual disturbance  Respiratory: Negative for cough, shortness of breath and wheezing  Cardiovascular: Negative  Gastrointestinal: Negative  Endocrine: Negative      Genitourinary: Negative for difficulty urinating, dysuria, frequency, hematuria and urgency  Musculoskeletal: Positive for arthralgias  Negative for gait problem, joint swelling and myalgias  Skin: Negative for rash  Neurological: Negative for dizziness, speech difficulty, weakness, light-headedness, numbness and headaches  Hematological: Negative  Psychiatric/Behavioral: Negative for confusion, decreased concentration, dysphoric mood and sleep disturbance  The patient is not nervous/anxious  Active Problem List     Patient Active Problem List   Diagnosis    Arthritis of lumbar spine    Chronic low back pain    Chronic pain syndrome    Chronic venous insufficiency    Cough variant asthma    Depression, recurrent (HCC)    Hyperlipidemia    Lumbar postlaminectomy syndrome    Primary osteoarthritis of left hip    Restless legs syndrome    Sleep disturbance    Hernia of anterior abdominal wall    S/P right knee arthroscopy    Environmental allergies    Lumbar spondylosis    Lumbar radiculopathy    Prediabetes    Vitamin D deficiency    Dolan's esophagus with dysplasia    Gastroesophageal reflux disease    Laryngopharyngeal reflux (LPR)    Chronic cough    Vocal fold paresis, right    Dysphonia    Muscle tension dysphonia    Glottic insufficiency    Reflux laryngitis    Laryngeal edema    Allergic rhinitis due to [employer] house dust mite    Mild intermittent asthma without complication    Upper airway cough syndrome    Neck pain    Cervical spondylosis without myelopathy    Osteoarthritis of multiple joints    Sacroiliitis, not elsewhere classified (HCC)    Lipoma of torso    Recurrent umbilical hernia    Anemia    Hypokalemia    Macromastia    Long-term current use of opiate analgesic    Uncomplicated opioid dependence (HCC)    Motion sickness    Obesity (BMI 30-39  9)    Recurrent incisional hernia    Class 1 obesity due to excess calories with serious comorbidity and body mass index (BMI) of 31 0 to 31 9 in adult    Podagra Objective   /77 (BP Location: Left arm, Patient Position: Sitting, Cuff Size: Standard)   Pulse 87   Temp (!) 96 2 °F (35 7 °C) (Skin)   Ht 5' 4" (1 626 m)   Wt 80 kg (176 lb 6 4 oz)   SpO2 99%   BMI 30 28 kg/m²     Physical Exam  Constitutional:       General: She is not in acute distress  Appearance: She is well-developed  HENT:      Head: Normocephalic and atraumatic  Neck:      Musculoskeletal: Neck supple  Thyroid: No thyromegaly  Vascular: No JVD  Trachea: No tracheal deviation  Cardiovascular:      Rate and Rhythm: Normal rate and regular rhythm  Heart sounds: Normal heart sounds  No murmur  No friction rub  No gallop  Pulmonary:      Effort: Pulmonary effort is normal       Breath sounds: Normal breath sounds  No wheezing or rales  Chest:      Chest wall: No tenderness  Abdominal:      General: Bowel sounds are normal  There is no distension  Palpations: Abdomen is soft  There is no mass  Tenderness: There is no abdominal tenderness  There is no rebound  Musculoskeletal: Normal range of motion  Comments: There is tenderness over the 1st left MTP joint  There is no redness or swelling  Lymphadenopathy:      Cervical: No cervical adenopathy  Skin:     General: Skin is warm and dry  Neurological:      Mental Status: She is alert and oriented to person, place, and time           Pertinent Laboratory/Diagnostic Studies:  Office Visit on 04/19/2021   Component Date Value Ref Range Status    RESULT ALL_PRESC MEDS SP INSTRNS 26/48/7485 Not Applicable   Final    Alpha-Hydroxyalprazolam Quantifica* 04/19/2021 negative  20 ng/mL Final    Amphetamine Quantification 04/19/2021 negative  100 ng/mL Final    Aripiprazole Quantification 04/19/2021 negative  5 ng/mL Final    [id number] QUANTIFICATION 04/19/2021 negative  15 Final    BATH SALTS 04/19/2021 negative  3 ng/mL Final    Mephedrone Quantification 04/19/2021 negative  3 ng/mL Final    METHYLONE QUANTIFICATION 04/19/2021 negative  3 ng/mL Final    Buprenorphine Quantification 04/19/2021 negative  5 ng/mL Final    Norbuprenorphine Quantification 04/19/2021 negative  20 ng/mL Final    Butalbital Quantification 04/19/2021 negative  200 ng/mL Final    7-Amino-Clonazepam Quantification * 04/19/2021 negative  20 ng/mL Final    Clozapine Quantification 04/19/2021 negative  25 ng/mL Final    N-desmethylclozapine Quantification 04/19/2021 negative  25 ng/mL Final    Cocaine metabolite Quantification 04/19/2021 negative  50 ng/mL Final    Codeine Quantification 04/19/2021 negative  50 ng/mL Final    Morphine Quantification 04/19/2021 negative  50 ng/mL Final    Hydrocodone Quantification 04/19/2021 negative-I* 50 ng/mL Final    Norhydrocodone Quantification 04/19/2021 negative-I* 50 ng/mL Final    Hydromorphone Quantification 04/19/2021 negative-I* 50 ng/mL Final    Desipramine Quantification 04/19/2021 negative  50 ng/mL Final    Dextromethorphan Quantification 04/19/2021 negative  50 ng/mL Final    Dextrorphan (Dextromethorphan meta* 04/19/2021 negative  50 ng/mL Final    Nordiazepam Quantification 04/19/2021 negative  40 ng/mL Final    Temazepam Quantification 04/19/2021 negative  50 ng/mL Final    Oxazepam Quantification 04/19/2021 negative  40 ng/mL Final    Ethyl Glucuronide Quantification 04/19/2021 negative  500 ng/mL Final    Ethyl Sulfate Quantification 04/19/2021 negative  500 ng/mL Final    Fentanyl Quantification 04/19/2021 negative  1 ng/mL Final    Norfentanyl Quantification 04/19/2021 negative  8 ng/mL Final    3-methyl-fentanyl Quantification 04/19/2021 Fen Neg  2 ng/mL Final    4-ANPP Quantification 04/19/2021 Fen Neg  2 ng/mL Final    4-FiBF Quantification 04/19/2021 Fen Neg  2 ng/mL Final    Acetyl fentanyl Quantification 04/19/2021 Fen Neg  2 ng/mL Final    Acetyl norfentanyl Quantification 04/19/2021 Fen Neg  5 ng/mL Final    Acryl fentanyl Quantification 04/19/2021 Fen Neg  1 ng/mL Final    Butryl fentanyl Quantification 04/19/2021 Fen Neg  1 ng/mL Final    Carfentanil Quantification 04/19/2021 Fen Neg  2 ng/mL Final    Cyclopropyl fentanyl Quantification 04/19/2021 Fen Neg  1 ng/mL Final    Furanyl fentanyl Quantification 04/19/2021 Fen Neg  2 ng/mL Final    Methoxyacetyl fentanyl Quantificat* 04/19/2021 Fen Neg  2 ng/mL Final    [id number] Quantification 04/19/2021 Fen Neg  2 ng/mL Final    N-desmethyl [id number] Quantification 04/19/2021 Fen Neg  2 ng/mL Final    6-MADHAVI (Heroin metabolite) Quantifi* 04/19/2021 negative  10 ng/mL Final    Hydrocodone Quantification 04/19/2021 negative-I* 50 ng/mL Final    Norhydrocodone Quantification 04/19/2021 negative-I* 50 ng/mL Final    Hydromorphone Quantification 04/19/2021 negative-I* 50 ng/mL Final    Hydromorphone Quantification 04/19/2021 negative-I* 50 ng/mL Final    Mitragynine (Kratom alkaloid) Suman* 04/19/2021 negative  1 ng/mL Final    2-HS-Ixgmlburevg (Kratom alkaloid)* 04/19/2021 negative  1 ng/mL Final    Dextrorphan (Dextromethorphan meta* 04/19/2021 negative  50 ng/mL Final    Lorazepam Quantification 04/19/2021 negative  40 ng/mL Final    MDMA Quantification 04/19/2021 negative  100 ng/mL Final    Meperidine Quantification 04/19/2021 negative  50 ng/mL Final    Normeperidine Quantification 04/19/2021 negative  50 ng/mL Final    Methadone Quantification 04/19/2021 negative  100 ng/mL Final    EDDP (Methadone metabolite) Quanti* 04/19/2021 negative  100 ng/mL Final    Amphetamine Quantification 04/19/2021 negative  100 ng/mL Final    Methamphetamine Quantification 04/19/2021 negative  100 ng/mL Final    Methylphenidate Quantification 04/19/2021 negative  50 ng/mL Final    RITALINIC ACID QUANTIFICATION 04/19/2021 negative  50 ng/mL Final    Morphine Quantification 04/19/2021 negative  50 ng/mL Final    Hydromorphone Quantification 04/19/2021 negative-I* 50 ng/mL Final    OLANZAPINE QUANTIFICATION 04/19/2021 negative  25 ng/mL Final    Oxazepam Quantification 04/19/2021 negative  40 ng/mL Final    Oxycodone Quantification 04/19/2021 negative  50 ng/mL Final    Noroxycodone Quantification 04/19/2021 negative  50 ng/mL Final    Oxymorphone Quantification 04/19/2021 negative  50 ng/mL Final    Oxymorphone Quantification 04/19/2021 negative  50 ng/mL Final    Phenobarbital Quantification 04/19/2021 negative  200 ng/mL Final    PHENTERMINE QUANTIFICATION 04/19/2021 negative  50 ng/mL Final    Secobarbital Quantification 04/19/2021 negative  200 ng/mL Final    5F-ADB-M7 04/19/2021 negative  10 ng/mL Final    NF-YZANFUTQ-Z1 METABOLITE QUANTIFI* 04/19/2021 negative  10 ng/mL Final    XBQB-NMEQNFAR-M1 METABOLITE QUANTI* 04/19/2021 negative  10 ng/mL Final    WSS422 metabolite Quantification 04/19/2021 negative  10 ng/mL Final    VNR771 metabolite Quantification 04/19/2021 negative  10 ng/mL Final    RCS4 METABOLITE QUANTIFICATION 04/19/2021 negative  10 ng/mL Final    BQQ90/ METABOLITE QUANTIFICAT* 04/19/2021 negative  10 ng/mL Final    Tapentadol Quantification 04/19/2021 negative  50 ng/mL Final    Temazepam Quantification 04/19/2021 negative  50 ng/mL Final    Oxazepam Quantification 04/19/2021 negative  40 ng/mL Final    cTHC (Marijuana metabolite) Quanti* 04/19/2021 negative  15 ng/mL Final    Tramadol Quantification 04/19/2021 negative-I* 100 ng/mL Final    O-desmethyl-tramadol Quantification 04/19/2021 negative-I* 100 ng/mL Final    N-DESMETHYL-TRAMADOL QUANTIFICATION 04/19/2021 negative-I* 100 ng/mL Final    SPECIFIC GRAVITY URINE 04/19/2021 normal-1 006  1 003 - 1 035 Final    pH 04/19/2021 normal-5 8  4 5 - 9 5 Final    CREATININE 04/19/2021 normal-24 4  >20 mg/dL mg/dL Final    OXIDANT 04/19/2021 normal-0  <200 ug/mL ug/mL Final   Orders Only on 04/08/2021   Component Date Value Ref Range Status    WBC 04/20/2021 6 91  4 31 - 10 16 Thousand/uL Final    RBC 04/20/2021 4 97  3 81 - 5 12 Million/uL Final    Hemoglobin 04/20/2021 13 0  11 5 - 15 4 g/dL Final    Hematocrit 04/20/2021 42 5  34 8 - 46 1 % Final    MCV 04/20/2021 86  82 - 98 fL Final    MCH 04/20/2021 26 2* 26 8 - 34 3 pg Final    MCHC 04/20/2021 30 6* 31 4 - 37 4 g/dL Final    RDW 04/20/2021 16 2* 11 6 - 15 1 % Final    Platelets 90/78/1077 263  149 - 390 Thousands/uL Final    MPV 04/20/2021 10 5  8 9 - 12 7 fL Final    Iron Saturation 04/20/2021 15  % Final    TIBC 04/20/2021 354  250 - 450 ug/dL Final    Iron 04/20/2021 54  50 - 170 ug/dL Final   Admission on 03/12/2021, Discharged on 03/12/2021   Component Date Value Ref Range Status    Case Report 03/12/2021    Final                    Value:Surgical Pathology Report                         Case: [id number]                                   Authorizing Provider:  Bert Lewis MD           Collected:           03/12/2021 0910              Ordering Location:     [facility] Received:            03/12/2021 1341                                     [facility]                                                         Pathologist:           Romel Mims MD                                                         Specimens:   A) - Breast, Left, left breast tissue                                                               B) - Breast, Right, right breast tissue                                                    Final Diagnosis 03/12/2021    Final                    Value: This result contains rich text formatting which cannot be displayed here   Additional Information 03/12/2021    Final                    Value: This result contains rich text formatting which cannot be displayed here  Yumiko Anderson 03/12/2021    Final                    Value: This result contains rich text formatting which cannot be displayed here     Clinical Support on 02/23/2021   Component Date Value Ref Range Status    Ventricular Rate 02/23/2021 56  BPM Final    Atrial Rate 02/23/2021 56  BPM Final    AL Interval 02/23/2021 126  ms Final    QRSD Interval 02/23/2021 86  ms Final    QT Interval 02/23/2021 442  ms Final    QTC Interval 02/23/2021 426  ms Final    P Axis 02/23/2021 45  degrees Final    QRS Axis 02/23/2021 44  degrees Final    T Wave Axis 02/23/2021 68  degrees Final   Lab on 02/23/2021   Component Date Value Ref Range Status    WBC 02/23/2021 7 42  4 31 - 10 16 Thousand/uL Final    RBC 02/23/2021 4 74  3 81 - 5 12 Million/uL Final    Hemoglobin 02/23/2021 12 3  11 5 - 15 4 g/dL Final    Hematocrit 02/23/2021 40 4  34 8 - 46 1 % Final    MCV 02/23/2021 85  82 - 98 fL Final    MCH 02/23/2021 25 9* 26 8 - 34 3 pg Final    MCHC 02/23/2021 30 4* 31 4 - 37 4 g/dL Final    RDW 02/23/2021 15 9* 11 6 - 15 1 % Final    MPV 02/23/2021 10 2  8 9 - 12 7 fL Final    Platelets 82/86/3321 267  149 - 390 Thousands/uL Final    nRBC 02/23/2021 0  /100 WBCs Final    Neutrophils Relative 02/23/2021 54  43 - 75 % Final    Immat GRANS % 02/23/2021 1  0 - 2 % Final    Lymphocytes Relative 02/23/2021 35  14 - 44 % Final    Monocytes Relative 02/23/2021 6  4 - 12 % Final    Eosinophils Relative 02/23/2021 3  0 - 6 % Final    Basophils Relative 02/23/2021 1  0 - 1 % Final    Neutrophils Absolute 02/23/2021 4 09  1 85 - 7 62 Thousands/µL Final    Immature Grans Absolute 02/23/2021 0 04  0 00 - 0 20 Thousand/uL Final    Lymphocytes Absolute 02/23/2021 2 59  0 60 - 4 47 Thousands/µL Final    Monocytes Absolute 02/23/2021 0 43  0 17 - 1 22 Thousand/µL Final    Eosinophils Absolute 02/23/2021 0 21  0 00 - 0 61 Thousand/µL Final    Basophils Absolute 02/23/2021 0 06  0 00 - 0 10 Thousands/µL Final           Current Medications     Current Outpatient Medications:     albuterol (2 5 mg/3 mL) 0 083 % nebulizer solution, VVN Q 4 H PRN, Disp: , Rfl: 3    betamethasone dipropionate (DIPROSONE) 0 05 % cream, APPLY SMALL AMOUNT TOPICALLY TO RASH TWICE DAILY FOR 1 WEEK, Disp: , Rfl:     dexlansoprazole (DEXILANT) 60 MG capsule, Take 1 capsule (60 mg total) by mouth 2 (two) times a day, Disp: 60 capsule, Rfl: 2    DULoxetine (CYMBALTA) 60 mg delayed release capsule, Take 1 capsule (60 mg total) by mouth 2 (two) times a day, Disp: 180 capsule, Rfl: 0    fluticasone (FLONASE) 50 mcg/act nasal spray, 1 spray into each nostril daily, Disp: , Rfl:     gabapentin (NEURONTIN) 300 mg capsule, Take 1 capsule (300 mg total) by mouth 3 (three) times a day, Disp: 90 capsule, Rfl: 1    HYDROcodone-acetaminophen (NORCO) 5-325 mg per tablet, Take 1 tablet by mouth every 4 to 6 hours as needed for pain, Disp: , Rfl:     MAGNESIUM PO, Take 1 tablet by mouth daily, Disp: , Rfl:     methocarbamol (ROBAXIN) 750 mg tablet, Take 1 tablet (750 mg total) by mouth 2 (two) times a day as needed for muscle spasms, Disp: 60 tablet, Rfl: 1    omeprazole (PriLOSEC) 40 MG capsule, TAKE 1 CAPSULE BY MOUTH TWICE DAILY (Patient taking differently: as needed ), Disp: 180 capsule, Rfl: 0    POTASSIUM PO, Take 1 tablet by mouth daily, Disp: , Rfl:     pramipexole (MIRAPEX) 0 25 mg tablet, Take 2 tablets (0 5 mg total) by mouth daily at bedtime, Disp: 60 tablet, Rfl: 1    rOPINIRole (REQUIP) 2 mg tablet, TAKE 1 TABLET BY MOUTH AT BEDTIME, Disp: 90 tablet, Rfl: 0    traMADol (ULTRAM) 50 mg tablet, Take 1 tablet (50 mg total) by mouth every 8 (eight) hours as needed for moderate pain, Disp: 90 tablet, Rfl: 0    Turmeric 400 MG CAPS, Take by mouth, Disp: , Rfl:     VITAMIN D PO, Take 1 capsule by mouth daily, Disp: , Rfl:     furosemide (LASIX) 20 mg tablet, TAKE 1 TABLET BY MOUTH DAILY (Patient not taking: No sig reported), Disp: 90 tablet, Rfl: 5      Aleida Watts MD

## 2021-06-01 DIAGNOSIS — G25.81 RESTLESS LEGS SYNDROME: ICD-10-CM

## 2021-06-01 RX ORDER — PRAMIPEXOLE DIHYDROCHLORIDE 0.25 MG/1
0.5 TABLET ORAL
Qty: 180 TABLET | Refills: 0 | Status: SHIPPED | OUTPATIENT
Start: 2021-06-01 | End: 2021-10-06

## 2021-06-03 ENCOUNTER — TELEPHONE (OUTPATIENT)
Dept: PAIN MEDICINE | Facility: MEDICAL CENTER | Age: 61
End: 2021-06-03

## 2021-06-03 DIAGNOSIS — M54.50 CHRONIC BILATERAL LOW BACK PAIN WITHOUT SCIATICA: ICD-10-CM

## 2021-06-03 DIAGNOSIS — G89.29 CHRONIC BILATERAL LOW BACK PAIN WITHOUT SCIATICA: ICD-10-CM

## 2021-06-03 DIAGNOSIS — G89.4 CHRONIC PAIN SYNDROME: ICD-10-CM

## 2021-06-03 RX ORDER — TRAMADOL HYDROCHLORIDE 50 MG/1
50 TABLET ORAL EVERY 8 HOURS PRN
Qty: 90 TABLET | Refills: 0 | Status: SHIPPED | OUTPATIENT
Start: 2021-06-03 | End: 2021-06-14 | Stop reason: SDUPTHER

## 2021-06-03 NOTE — TELEPHONE ENCOUNTER
Patient states she's still experiencing chronic low left side back pain which radiates to her left leg  She would like to know if she's ready or able to consider acupuncture or what is her next plan of care   Please adviseifrah    Call back# [phone]

## 2021-06-03 NOTE — TELEPHONE ENCOUNTER
Pt contacted Call Center requested refill of their medication  Medication Name: Tramadol      Dosage of Med: 50 mg      Frequency of Med: Take 1 tablet (50 mg total) by mouth every 8 (eight) hours as needed for moderate pain      Remaining Medication: 0      Pharmacy and Location: [facility] on file        Pt  Preferred Callback Phone Number: [phone]     Thank you

## 2021-06-03 NOTE — TELEPHONE ENCOUNTER
--Pt of AO-  Rn s/w pt regarding previous  Per pt she had her left sided lumbar RFA on 5/5 and when she came to see AO on 4/19 she was offered the 3 month supply of tramadol as noted in AO's sovs but the pt was optimistic regarding the upcoming RFA and only wanted a one month script  Per pt she is still very bothersome in left low back that radiates to buttock and down left leg and lately feeling pin pricks in left toes  Per pt hard to get comfortable to even sleep  Pt has been alternating tramadol with tylenol arthritis due to constipating issues with the tramadol which is why the one month supply has lasted this long  Per pt she stopped taking the gabapentin due to taking so many other medications and just feeling she needed a break, and the methocarbamol 750 mg bid she ends up feeling groggy the next day  RN advised that AO is not in the office until Monday and tramadol needs an sovs to be filled  Per pt "even though she offered me a three month supply but I opted for one due to being optimistic?"    RN advised that she would send this to [address] and call back with his recs and suggestions for same  RN also advised to re start the gabapentin and to titrate back up to the 300 mg TID RN reviewed titration schedule with pt and that should help with that radiating pain pt aware of importance of staying on the 300 mg tid and obtaining a steady level in her system  RN also suggested breaking the methocarbamol in half and that the 750 mg are scored tablets   -Pt will try same and wait for recs and suggestions regarding tramadol refill-    --please advise thank you--  Re start gabapentin  Can break methocarbamol in half  Tramadol refill?

## 2021-06-14 ENCOUNTER — OFFICE VISIT (OUTPATIENT)
Dept: PAIN MEDICINE | Facility: MEDICAL CENTER | Age: 61
End: 2021-06-14
Payer: MEDICARE

## 2021-06-14 VITALS
HEART RATE: 65 BPM | WEIGHT: 175 LBS | DIASTOLIC BLOOD PRESSURE: 83 MMHG | TEMPERATURE: 98 F | BODY MASS INDEX: 29.88 KG/M2 | HEIGHT: 64 IN | SYSTOLIC BLOOD PRESSURE: 131 MMHG

## 2021-06-14 DIAGNOSIS — M46.1 SACROILIITIS, NOT ELSEWHERE CLASSIFIED (HCC): ICD-10-CM

## 2021-06-14 DIAGNOSIS — G89.29 CHRONIC BILATERAL LOW BACK PAIN WITHOUT SCIATICA: ICD-10-CM

## 2021-06-14 DIAGNOSIS — M54.42 CHRONIC BILATERAL LOW BACK PAIN WITH LEFT-SIDED SCIATICA: ICD-10-CM

## 2021-06-14 DIAGNOSIS — M54.50 CHRONIC BILATERAL LOW BACK PAIN WITHOUT SCIATICA: ICD-10-CM

## 2021-06-14 DIAGNOSIS — M96.1 LUMBAR POSTLAMINECTOMY SYNDROME: ICD-10-CM

## 2021-06-14 DIAGNOSIS — G89.4 CHRONIC PAIN SYNDROME: ICD-10-CM

## 2021-06-14 DIAGNOSIS — M47.816 LUMBAR SPONDYLOSIS: ICD-10-CM

## 2021-06-14 DIAGNOSIS — G89.29 CHRONIC BILATERAL LOW BACK PAIN WITH LEFT-SIDED SCIATICA: ICD-10-CM

## 2021-06-14 DIAGNOSIS — Z01.812 PRE-PROCEDURAL LABORATORY EXAMINATION: ICD-10-CM

## 2021-06-14 DIAGNOSIS — M54.16 LUMBAR RADICULOPATHY: Primary | ICD-10-CM

## 2021-06-14 PROCEDURE — 99214 OFFICE O/P EST MOD 30 MIN: CPT | Performed by: NURSE PRACTITIONER

## 2021-06-14 RX ORDER — TRAMADOL HYDROCHLORIDE 50 MG/1
100 TABLET ORAL 2 TIMES DAILY PRN
Qty: 120 TABLET | Refills: 1 | Status: SHIPPED | OUTPATIENT
Start: 2021-06-14 | End: 2021-09-03 | Stop reason: SDUPTHER

## 2021-06-14 NOTE — PATIENT INSTRUCTIONS
Opioid Safety   AMBULATORY CARE:   Opioid safety  includes the correct use, storage, and disposal of opioids  Examples of opioid medicines to treat pain include oxycodone, morphine, fentanyl, and codeine  Call your local emergency number (911 in the [address]), or have someone else call if:   · You have a seizure  · You cannot be woken  · You have trouble staying awake and your breathing is slow or shallow  · Your speech is slurred, or you are confused  · You are dizzy or stumble when you walk  Call your doctor, or have someone close to you call if:   · You are extremely drowsy, or you have trouble staying awake or speaking  · You have pale or clammy skin  · You have blue fingernails or lips  · Your heartbeat is slower than normal     · You cannot stop vomiting  · You have questions or concerns about your condition or care  Use opioids safely:   · Take prescribed opioids exactly as directed  Opioids come with directions based on the kind of opioid and how it is given  Do not take more than the recommended amount, or for longer than needed  · Do not give opioids to others or take opioids that belong to someone else  Misuse of opioids can lead to an addiction or overdose  · Do not mix opioids with other medicines or alcohol  The combination can cause an overdose, or lead to a coma  · Do not drive or operate heavy machinery after you take the opioid  Your provider or pharmacist can tell you how long to wait after a dose before you do these activities  · Talk to your healthcare provider if you have any side effects  He or she can help you prevent or relieve side effects  Side effects include nausea, sleepiness, itching, and trouble thinking clearly  Manage constipation:  Constipation is the most common side effect of opioid medicine  Constipation is when you have hard, dry bowel movements, or you go longer than usual between bowel movements   Tell your healthcare provider about all changes in your bowel movements while you are taking opioids  He or she may recommend laxative medicine to help you have a bowel movement  He or she may also change the kind of opioid you are taking, or change when you take it  The following are more ways you can prevent or relieve constipation:  · Drink liquids as directed  You may need to drink extra liquids to help soften and move your bowels  Ask how much liquid to drink each day and which liquids are best for you  · Eat high-fiber foods  This may help decrease constipation by adding bulk to your bowel movements  High-fiber foods include fruits, vegetables, whole-grain breads and cereals, and beans  Your healthcare provider or dietitian can help you create a high-fiber meal plan  Your provider may also recommend a fiber supplement if you cannot get enough fiber from food  · Exercise regularly  Regular physical activity can help stimulate your intestines  Walking is a good exercise to prevent or relieve constipation  Ask which exercises are best for you  · Schedule a time each day to have a bowel movement  This may help train your body to have regular bowel movements  Bend forward while you are on the toilet to help move the bowel movement out  Sit on the toilet for at least 10 minutes, even if you do not have a bowel movement  Store opioids safely:   · Store opioids where others cannot easily get them  Keep them in a locked cabinet or secure area  Do not  keep them in a purse or other bag you carry with you  A person may be looking for something else and find the opioids  · Make sure opioids are stored out of the reach of children  A child can easily overdose on opioids  Opioids may look like candy to a small child  The best way to dispose of opioids: The laws vary by country and area   In the [place], the best way is to return the opioids through a take-back program  This program is offered by the [employer] ([address])  The following are options for using the program:  · Take the opioids to a JAMES collection site  The site is often a law enforcement center  Call your local law enforcement center for scheduled take-back days in your area  You will be given information on where to go if the collection site is in a different location  · Take the opioids to an approved pharmacy or hospital   A pharmacy or hospital may be set up as a collection site  You will need to ask if it is a JAMES collection site if you were not directed there  A pharmacy or doctor's office may not be able to take back opioids unless it is a JAMES site  · Use a mail-back system  This means you are given containers to put the opioids into  You will then mail them in the containers  · Use a take-back drop box  This is a place to leave the opioids at any time  People and animals will not be able to get into the box  Your local law enforcement agency can tell you where to find a drop box in your area  Other safe ways to dispose of opioids: The medicine may come with disposal instructions  The instructions may vary depending on the brand of medicine you are using  Instructions may come in a Medication Guide, but not every medicine has one  You may instead get instructions from your pharmacy or doctor  Follow instructions carefully  The following are general guidelines to follow:  · Find out if you can flush the opioid  Some opioids can be flushed down the toilet or poured into the sink  You will need to contact authorities in your area to see if this is an option for you  The FDA also offers a list of medicines that are safe to flush down the toilet  You can check the list if you cannot get the information for your local area  · Ask your waste management company about rules for putting opioids in the trash  The company will be able to give you specific directions   Scratch out personal information on the original medicine label so it cannot be read  Then put it in the trash  Do not label the trash or put any information on it about the opioids  It should look like regular household trash so no one is tempted to look for the opioids  Keep the trash out of the reach of children and animals  Always make sure trash is secure  · Talk to officials if you live in a facility  If you live in a nursing home or assisted living center, talk to an official  The person will know the rules for your area  Other ways to manage pain:   · Ask your healthcare provider about non-opioid medicines to control pain  Nonprescription medicines include NSAIDs (such as ibuprofen) and acetaminophen  Prescription medicines include muscle relaxers, antidepressants, and steroids  · Pain may be managed without any medicines  Some ways to relieve pain include massage, aromatherapy, or meditation  Physical or occupational therapy may also help  For more information:   · Drug Enforcement Administration  [facility] [address]  Phone: 4- 484 - [phone]  Web Address: [facility]/drug_disposal/    · [address]  and Drug Administration  [place]  [place] [address] [facility]  Phone: 2- 724 - [phone]  Web Address: http://[employer]/  Follow up with your doctor or pain specialist as directed: You may need to have your dose adjusted  Your doctor or pain specialist can also help you find ways to manage pain without opioids  Write down your questions so you remember to ask them during your visits  © Copyright [address] [place] [address] Drive Information is for End User's use only and may not be sold, redistributed or otherwise used for commercial purposes  All illustrations and images included in CareNotes® are the copyrighted property of [employer]  or [facility] [employer]   The above information is an  only  It is not intended as medical advice for individual conditions or treatments   Talk to your doctor, nurse or pharmacist before following any medical regimen to see if it is safe and effective for you

## 2021-06-14 NOTE — PROGRESS NOTES
Assessment  1  Lumbar radiculopathy    2  Lumbar spondylosis    3  Sacroiliitis, not elsewhere classified ([facility] [address] )    4  Chronic bilateral low back pain with left-sided sciatica    5  Lumbar postlaminectomy syndrome    6  Chronic pain syndrome    7  Chronic bilateral low back pain without sciatica    8  Pre-procedural laboratory examination        Plan    Continue with tramadol slightly increase her dose to 2 tablets twice daily as needed as she tells me she is currently taking 2 tablets daily because when she takes 1 it does not help at all  She tells me that all she has and for the rest of the day and her pain continues to worsen currently  This will be a temporary adjustment  continue with gabapentin and methocarbamol no other refills needed today  The patient is experiencing worsening left low back and leg pain that is slightly different than before as this is traveling to her foot with a lot of nerve pain symptoms in her foot and toes on the left side  She also feels that the left side is weaker and tends to give out and she is tripping and causing her to fall at times  I will update her lumbar spine MRI with and without contrast as she has a history of lumbar spine surgery in the past   Prior to the MRI we will check her kidney function with a BUN and creatinine  I did explain to the patient that once we get the results will call her and discuss if any interventional treatments would be beneficial for her pain symptoms  We also did briefly discuss the option of a spinal cord stimulator for her pain symptoms  However I would like her to see Neurosurgery prior to the trial if this is something she would like to pursue  Will call the patient with results of her lumbar spine MRI and discuss the next step in her treatment plan  There are risks associated with opioid medications, including dependence, addiction and tolerance   The patient understands and agrees to use these medications only as prescribed  Potential side effects of the medications include, but are not limited to, constipation, drowsiness, addiction, impaired judgment and risk of fatal overdose if not taken as prescribed  The patient was warned against driving while taking sedation medications  Sharing medications is a felony  At this point in time, the patient is showing no signs of addiction, abuse, diversion or suicidal ideation  Sascha Oropeza was reviewed today and was appropriate    My impressions and treatment recommendations were discussed in detail with the patient who verbalized understanding and had no further questions  Discharge instructions were provided  I personally saw and examined the patient and I agree with the above discussed plan of care  Orders Placed This Encounter   Procedures    MRI lumbar spine with and without contrast     Standing Status:   Future     Standing Expiration Date:   6/14/2025     Scheduling Instructions: There is no preparation for this test  Please leave your jewelry and valuables at home, wedding rings are the exception  Magnetic nail polish must be removed prior to arrival for your test  Please bring your insurance cards, a form of photo ID and a list of your medications with you  Arrive 15 minutes prior to your appointment time in order to register  Please bring any prior CT or MRI studies of this area that were not performed at a [facility]  To schedule this appointment, please contact Central Scheduling at [phone]  Prior to your appointment, please make sure you complete the MRI Screening Form when you e-Check in for your appointment  This will be available starting 7 days before your appointment in [address]  You may receive an e-mail with an activation code if you do not have a [employer] account  If you do not have access to a device, we will complete your screening at your appointment       Order Specific Question:   Is the patient pregnant? Answer:   No     Order Specific Question:   What is the patient's sedation requirement? Answer:   No Sedation     Order Specific Question:   [facility]     Answer:   Immediate     Order Specific Question:   Is order priority selected as STAT? Answer:   No     Order Specific Question:   Reason for Exam (FREE TEXT)     Answer:   low back and left leg pain    BUN     Standing Status:   Future     Standing Expiration Date:   6/14/2022    Creatinine, serum     Standing Status:   Future     Standing Expiration Date:   6/14/2022     New Medications Ordered This Visit   Medications    traMADol (ULTRAM) 50 mg tablet     Sig: Take 2 tablets (100 mg total) by mouth 2 (two) times a day as needed for moderate pain     Dispense:  120 tablet     Refill:  1       History of Present Illness    Irving Manley is a 63 y o  female  Presents to the office with worsening left low back and leg pain that travels to the foot  Today she rates her pain 5/10 she has constant pain throughout the entirety of the day and she describes this as a deep throbbing sometimes sharp pain in her left low back and leg she also has been feeling  Like fire crackers are going off in her toes on the left foot which is a new symptom for her  She is status post a left L4 and 5 radiofrequency ablation on May 5, 2021 and she has not notice much relief from this procedure  She cancel her right-sided procedure which was scheduled for May 19th 2021 because the right side isn't bothering her right now  Patient is using tramadol 50 mg 2 tablets daily along with gabapentin 300 mg twice a day and she has methocarbamol that she can use twice a day  She feels that her medications are only providing 10% relief right now  Without side effects or issues      I have personally reviewed and/or updated the patient's past medical history, past surgical history, family history, social history, current medications, allergies, and vital signs today  Review of Systems   Respiratory: Negative for shortness of breath  Cardiovascular: Negative for chest pain  Gastrointestinal: Negative for constipation, diarrhea, nausea and vomiting  Musculoskeletal: Positive for back pain and gait problem  Negative for arthralgias, joint swelling and myalgias  Skin: Negative for rash  Neurological: Negative for dizziness, seizures and weakness  All other systems reviewed and are negative  Patient Active Problem List   Diagnosis    Arthritis of lumbar spine    Chronic low back pain    Chronic pain syndrome    Chronic venous insufficiency    Cough variant asthma    Depression, recurrent (HCC)    Hyperlipidemia    Lumbar postlaminectomy syndrome    Primary osteoarthritis of left hip    Restless legs syndrome    Sleep disturbance    Hernia of anterior abdominal wall    S/P right knee arthroscopy    Environmental allergies    Lumbar spondylosis    Lumbar radiculopathy    Prediabetes    Vitamin D deficiency    Dolan's esophagus with dysplasia    Gastroesophageal reflux disease    Laryngopharyngeal reflux (LPR)    Chronic cough    Vocal fold paresis, right    Dysphonia    Muscle tension dysphonia    Glottic insufficiency    Reflux laryngitis    Laryngeal edema    Allergic rhinitis due to [employer] house dust mite    Mild intermittent asthma without complication    Upper airway cough syndrome    Neck pain    Cervical spondylosis without myelopathy    Osteoarthritis of multiple joints    Sacroiliitis, not elsewhere classified ([address] )    Lipoma of torso    Recurrent umbilical hernia    Anemia    Hypokalemia    Macromastia    Long-term current use of opiate analgesic    Uncomplicated opioid dependence (HCC)    Motion sickness    Obesity (BMI 30-39  9)    Recurrent incisional hernia    Class 1 obesity due to excess calories with serious comorbidity and body mass index (BMI) of 31 0 to 31 9 in adult    Podagra       Past Medical History:   Diagnosis Date    Anemia     Anesthesia     "sometimes low BP upon waking up" pt states she stopped breathing 1 time during surgery    Arthritis     Asthma     Back pain     Dolan's esophagus     Chronic pain disorder     Chronic venous insufficiency     Cough variant asthma     COVID-19 03/2020    Depression     Environmental allergies     dust    GERD (gastroesophageal reflux disease)     Hiatal hernia     History of anemia     History of fusion of spine for scoliosis     "as a teenager"    History of transfusion     1978 - no adverse reaction    Left lumbar radiculitis     Last Assessed: [id number]    Lumbar postlaminectomy syndrome     Migraines     Morbid obesity ([address] )     Motion sickness     Osteoarthritis     of left hip    Right knee pain     Seasonal allergies     Shortness of breath     Umbilical hernia     Uses brace     right knee    Wears glasses        Past Surgical History:   Procedure Laterality Date    ARTHRODESIS      lumbar    ARTHRODESIS      Spinal Arthrodesis for Deformity; Last Assessed: [id number]    BACK SURGERY      lumbar fusion,with nydia/screw and cage implant    BACK SURGERY      surgery for scoliosis    BREAST CYST EXCISION Right     benign    COLONOSCOPY      COLONOSCOPY N/A 3/14/2019    Procedure: COLONOSCOPY;  Surgeon: Lorna Amaya MD;  [facility]; Service: Gastroenterology    ESOPHAGOGASTRODUODENOSCOPY N/A 3/14/2019    Procedure: ESOPHAGOGASTRODUODENOSCOPY (EGD) W RFA(BARRX); Surgeon: Lorna Amaya MD;  [facility];   Service: Gastroenterology    HERNIA REPAIR      umbilical hernia repair x2    ORTHOPEDIC SURGERY      PLANTAR FASCIA SURGERY Left     CO BREAST REDUCTION Bilateral 3/12/2021    Procedure: BREAST REDUCTION;  Surgeon: Frances Avalos MD;  [facility] MA[facility];  Service: Plastics    CO COLONOSCOPY FLX DX W/COLLJ SPEC WHEN PFRMD N/A 2/20/2018    Procedure: COLONOSCOPY with polypectomy;  Surgeon: Lara Prado MD;  [facility]; Service: General    ND ESOPHAGOGASTRODUODENOSCOPY TRANSORAL DIAGNOSTIC N/A 5/24/2017    Procedure: ESOPHAGOGASTRODUODENOSCOPY (EGD); Surgeon: Dennise Vance MD;  Location: [facility]; Service: Gastroenterology    ND ESOPHAGOGASTRODUODENOSCOPY TRANSORAL DIAGNOSTIC N/A 8/31/2017    Procedure: ESOPHAGOGASTRODUODENOSCOPY (EGD) W RFA;  Surgeon: Jeannie Torres MD;  Location:  GI LAB;   Service: Gastroenterology    ND KNEE SCOPE,MED/LAT MENISECTOMY Right 4/4/2018    Procedure: ARTHROSCOPY KNEE PARTIAL MEDIAL MENISECTOMY , CHONDROPLASTY;  Surgeon: Anita Méndez DO;  [facility];  Service: Orthopedics    ND LAP, RECURRENT INCISIONAL HERNIA REPAIR,REDUCIBLE N/A 1/21/2021    Procedure: REPAIR HERNIA INCISIONAL LAPAROSCOPIC W/ ROBOTICS, with mesh;  Surgeon: Frances Williamson MD;  [facility];  Service: General    WISDOM TOOTH EXTRACTION         Family History   Problem Relation Age of Onset    Lung cancer Mother     Cancer Mother     Alcohol abuse Father     Other Father         Cardiac Disorder    Depression Father     Hypertension Father     Heart attack Father     Breast cancer Maternal Grandmother     Lung cancer Maternal Grandmother     Diabetes type I Other     No Known Problems Sister     No Known Problems Brother     No Known Problems Maternal Grandfather     Leukemia Paternal Grandmother     No Known Problems Paternal Grandfather     No Known Problems Sister     No Known Problems Maternal Aunt     No Known Problems Paternal Aunt     Stroke Neg Hx        Social History     Occupational History    Not on file   Tobacco Use    Smoking status: Never Smoker    Smokeless tobacco: Never Used   Vaping Use    Vaping Use: Never used   Substance and Sexual Activity    Alcohol use: Not Currently    Drug use: No    Sexual activity: Not on file       Current Outpatient Medications on File Prior to Visit Medication Sig    albuterol (2 5 mg/3 mL) 0 083 % nebulizer solution VVN Q 4 H PRN    betamethasone dipropionate (DIPROSONE) 0 05 % cream APPLY SMALL AMOUNT TOPICALLY TO RASH TWICE DAILY FOR 1 WEEK    dexlansoprazole (DEXILANT) 60 MG capsule Take 1 capsule (60 mg total) by mouth 2 (two) times a day    DULoxetine (CYMBALTA) 60 mg delayed release capsule Take 1 capsule (60 mg total) by mouth 2 (two) times a day    fluticasone (FLONASE) 50 mcg/act nasal spray 1 spray into each nostril daily    gabapentin (NEURONTIN) 300 mg capsule Take 1 capsule (300 mg total) by mouth 3 (three) times a day    HYDROcodone-acetaminophen (NORCO) 5-325 mg per tablet Take 1 tablet by mouth every 4 to 6 hours as needed for pain    MAGNESIUM PO Take 1 tablet by mouth daily    methocarbamol (ROBAXIN) 750 mg tablet Take 1 tablet (750 mg total) by mouth 2 (two) times a day as needed for muscle spasms    omeprazole (PriLOSEC) 40 MG capsule TAKE 1 CAPSULE BY MOUTH TWICE DAILY (Patient taking differently: as needed )    POTASSIUM PO Take 1 tablet by mouth daily    pramipexole (MIRAPEX) 0 25 mg tablet Take 2 tablets (0 5 mg total) by mouth daily at bedtime    rOPINIRole (REQUIP) 2 mg tablet TAKE 1 TABLET BY MOUTH AT BEDTIME    Turmeric 400 MG CAPS Take by mouth    VITAMIN D PO Take 1 capsule by mouth daily    [DISCONTINUED] traMADol (ULTRAM) 50 mg tablet Take 1 tablet (50 mg total) by mouth every 8 (eight) hours as needed for moderate pain    furosemide (LASIX) 20 mg tablet TAKE 1 TABLET BY MOUTH DAILY (Patient not taking: No sig reported)     No current facility-administered medications on file prior to visit         Allergies   Allergen Reactions    Dust Mite Extract Shortness Of Breath    Latex Itching and Blisters    Other Other (See Comments)     Seasonal AND cock roaches    Sulfa Antibiotics Vomiting     Topical-blistering       Physical Exam    /83   Pulse 65   Temp 98 °F (36 7 °C)   Ht 5' 4" (1 626 m)   Wt 79 4 kg (175 lb)   BMI 30 04 kg/m²     Constitutional: normal, well developed, well nourished, alert, in no distress and non-toxic and no overt pain behavior    Eyes: anicteric  HEENT: grossly intact  Neck: supple, symmetric, trachea midline and no masses   Pulmonary:even and unlabored  Cardiovascular:No edema or pitting edema present  Skin:Normal without rashes or lesions and well hydrated  Psychiatric:Mood and affect appropriate  Neurologic:Cranial Nerves II-XII grossly intact  Musculoskeletal:normal    Imaging

## 2021-06-17 ENCOUNTER — APPOINTMENT (OUTPATIENT)
Dept: LAB | Facility: HOSPITAL | Age: 61
End: 2021-06-17
Payer: MEDICARE

## 2021-06-17 DIAGNOSIS — Z01.812 PRE-PROCEDURAL LABORATORY EXAMINATION: ICD-10-CM

## 2021-06-17 LAB
BUN SERPL-MCNC: 11 MG/DL (ref 5–25)
CREAT SERPL-MCNC: 0.68 MG/DL (ref 0.6–1.3)
GFR SERPL CREATININE-BSD FRML MDRD: 96 ML/MIN/1.73SQ M

## 2021-06-17 PROCEDURE — 84520 ASSAY OF UREA NITROGEN: CPT

## 2021-06-17 PROCEDURE — 36415 COLL VENOUS BLD VENIPUNCTURE: CPT

## 2021-06-17 PROCEDURE — 82565 ASSAY OF CREATININE: CPT

## 2021-06-26 ENCOUNTER — HOSPITAL ENCOUNTER (OUTPATIENT)
Dept: MRI IMAGING | Facility: HOSPITAL | Age: 61
Discharge: HOME | End: 2021-06-26
Payer: MEDICARE

## 2021-06-26 DIAGNOSIS — M54.16 LUMBAR RADICULOPATHY: ICD-10-CM

## 2021-06-26 DIAGNOSIS — M96.1 LUMBAR POSTLAMINECTOMY SYNDROME: ICD-10-CM

## 2021-06-26 PROCEDURE — G1004 CDSM NDSC: HCPCS

## 2021-06-26 PROCEDURE — 72158 MRI LUMBAR SPINE W/O & W/DYE: CPT

## 2021-06-26 PROCEDURE — A9585 GADOBUTROL INJECTION: HCPCS | Performed by: NURSE PRACTITIONER

## 2021-06-26 RX ADMIN — GADOBUTROL 8 ML: 604.72 INJECTION INTRAVENOUS at 15:46

## 2021-07-09 ENCOUNTER — TELEPHONE (OUTPATIENT)
Dept: PAIN MEDICINE | Facility: MEDICAL CENTER | Age: 61
End: 2021-07-09

## 2021-07-13 NOTE — TELEPHONE ENCOUNTER
Answered under results notes
COVID disclaimer/ screening completed  Because of possible immunosuppression due to steroid, pt is aware that he should practice more strict social distancing, masking, handwashing for 2-3 weeks following procedure  Reviewed check in process with pt- will enter building no earlier than arrival time given, agreed to wear mask for duration of appt,  will wait in car  Pt aware will need a , wear loose comfortable clothes,call to reschedule if become ill, have a fever or start on antibiotics, and may eat a light breakfast one hour before  Pt scheduled for 8/3 at 9:30 arrival time for a 9:40 appt  Pt denied use of prescription blood thinning medications  Pt appreciative of call and will schedule her f/u at time of procedure due to not knowing dates of vacation yet  Carlton GREEN to place appt in EPIC 
Patient   [phone]  Cory Arango    Patient is calling in about her MRI results please follow up    Pt also needs a refill of Tramadol 50 mg, 2xs day and she is out       [facility] [address] [place] [address], [address] [place] [address]
Please reach back out to patient
RN attempted to reach pt  VMMLOM with CB# and OH 
[facility]    Please review with patient and offer a left L4-5 and L5-S1 TFESI       IMPRESSION:       Scoliosis as described above  Stable postoperative change at L3-4 with anterior and posterior fusion  There is adequate posterior decompression at this level without canal stenosis or foraminal narrowing  L4-5 degenerative disc disease and facet degenerative change with a new small synovial cyst arising medial to the right facet joint    Moderate canal stenosis and stable bilateral foraminal narrowing, left greater than right
S/w pt and advised pt to contact her pharmacy she should have refill available for Tramadol  Pt states she saw her MRI results and would like to speak to AO to see where to go from here  RN offered to schedule an OV, pt is requesting to speak to AO over the phone  Pt was advised that AO will not return to the office until Monday          Please advise-
cardiac catheterization

## 2021-07-23 ENCOUNTER — TELEPHONE (OUTPATIENT)
Dept: PAIN MEDICINE | Facility: MEDICAL CENTER | Age: 61
End: 2021-07-23

## 2021-07-23 NOTE — TELEPHONE ENCOUNTER
Pt called in asking for a call if theres any cnl prior to her 8/3 procedure- thank you        [phone]

## 2021-08-03 ENCOUNTER — TELEPHONE (OUTPATIENT)
Dept: PAIN MEDICINE | Facility: MEDICAL CENTER | Age: 61
End: 2021-08-03

## 2021-08-03 ENCOUNTER — HOSPITAL ENCOUNTER (OUTPATIENT)
Dept: RADIOLOGY | Facility: MEDICAL CENTER | Age: 61
Discharge: HOME | End: 2021-08-03
Attending: PHYSICAL MEDICINE & REHABILITATION | Admitting: PHYSICAL MEDICINE & REHABILITATION
Payer: MEDICARE

## 2021-08-03 VITALS
SYSTOLIC BLOOD PRESSURE: 125 MMHG | TEMPERATURE: 98.2 F | OXYGEN SATURATION: 97 % | DIASTOLIC BLOOD PRESSURE: 83 MMHG | RESPIRATION RATE: 18 BRPM | HEART RATE: 64 BPM

## 2021-08-03 DIAGNOSIS — M96.1 POST LAMINECTOMY SYNDROME: ICD-10-CM

## 2021-08-03 DIAGNOSIS — M54.16 LUMBAR RADICULOPATHY: ICD-10-CM

## 2021-08-03 PROCEDURE — 64483 NJX AA&/STRD TFRM EPI L/S 1: CPT | Performed by: PHYSICAL MEDICINE & REHABILITATION

## 2021-08-03 PROCEDURE — 64484 NJX AA&/STRD TFRM EPI L/S EA: CPT | Performed by: PHYSICAL MEDICINE & REHABILITATION

## 2021-08-03 RX ORDER — PAPAVERINE HCL 150 MG
20 CAPSULE, EXTENDED RELEASE ORAL ONCE
Status: COMPLETED | OUTPATIENT
Start: 2021-08-03 | End: 2021-08-03

## 2021-08-03 RX ADMIN — IOHEXOL 2 ML: 300 INJECTION, SOLUTION INTRAVENOUS at 09:50

## 2021-08-03 RX ADMIN — DEXAMETHASONE SODIUM PHOSPHATE 15 MG: 10 INJECTION, SOLUTION INTRAMUSCULAR; INTRAVENOUS at 09:50

## 2021-08-03 NOTE — DISCHARGE INSTRUCTIONS
Epidural Steroid Injection   WHAT YOU NEED TO KNOW:   An epidural steroid injection (ANDREI) is a procedure to inject steroid medicine into the epidural space  The epidural space is between your spinal cord and vertebrae  Steroids reduce inflammation and fluid buildup in your spine that may be causing pain  You may be given pain medicine along with the steroids  ACTIVITY  · Do not drive or operate machinery today  · No strenuous activity today - bending, lifting, etc   · You may resume normal activites starting tomorrow - start slowly and as tolerated  · You may shower today, but no tub baths or hot tubs  · You may have numbness for several hours from the local anesthetic  Please use caution and common sense, especially with weight-bearing activities  CARE OF THE INJECTION SITE  · If you have soreness or pain, apply ice to the area today (20 minutes on/20 minutes off)  · Starting tomorrow, you may use warm, moist heat or ice if needed  · You may have an increase or change in your discomfort for 36-48 hours after your treatment  · Apply ice and continue with any pain medication you have been prescribed  · Notify [facility] if you have any of the following: redness, drainage, swelling, headache, stiff neck or fever above 100°F     SPECIAL INSTRUCTIONS  · Our office will contact you in approximately 7 days for a progress report  MEDICATIONS  · Continue to take all routine medications  · Our office may have instructed you to hold some medications  As no general anesthesia was used in today's procedure, you should not experience any side effects related to anesthesia  If you have a problem specifically related to your procedure, please call our office at [phone]  Problems not related to your procedure should be directed to your primary care physician

## 2021-08-03 NOTE — TELEPHONE ENCOUNTER
RN attempted to reach pt regarding previous  Left a detailed VMMOM as per GISELA on chart stating that the pharmacist at [facility] said there is a refill left on her script and that she may request it by 8/6  Pt is going away on 8/7  C/b number provided for any further questions or concerns

## 2021-08-03 NOTE — TELEPHONE ENCOUNTER
Pt contacted Call Center requested refill of their medication  Medication Name: traMADol (ULTRAM          Dosage of Med: 50 mg       Frequency of Med: 2x a day      Remaining Medication: not sure       Pharmacy and Location:    [facility], [address]    Pt is going away on Saturday so she needs the refill before she leaves   She will run out while she is away

## 2021-08-10 ENCOUNTER — TELEPHONE (OUTPATIENT)
Dept: PAIN MEDICINE | Facility: CLINIC | Age: 61
End: 2021-08-10

## 2021-08-10 NOTE — TELEPHONE ENCOUNTER
1st attempt  Lm to cb with % improvement and pain level.      Your current Orthopaedic problem we are working together to treat is pain. Medications that were prescribed for you today are:  Meloxicam     PLEASE CONTACT OFFICE BEFORE STOPPING ANY MEDICATION PRESCRIBED BY DR. Reddy Calzada. PROCEDURE:  It is recommended that you be scheduled for the following procedure: medial branch block. Please follow the specific instructions provided by the surgery scheduler for the testing, physician evaluation, pre-operative education, and other preparatory requirements of the procedure to help assure a safe and successful result. It is recommended you schedule a follow-up appointment with Sis Gonzalez DO, surgery scheduling will assist you to schedule a follow up appointment within 2-3 weeks of your procedure. Office hours are 8:00 am to 5:00 pm Monday through Friday. If it is urgent that you speak with someone outside of these hours, our [facility] will be able to assist you. We do highly recommend [facility], if you do not already have this. You can request access via the internet or by simply talking with a  at any of the clinics. [email or web address]. Thank you for choosing [facility] as your Pain Management provider! If you have any questions or concerns prior to your next office visit, please call our Pain Line: [phone].

## 2021-08-12 NOTE — TELEPHONE ENCOUNTER
Called and spoke to pt she stated she is feeling better but not where she wants to be. I did advise her that these injections can take 2 weeks for the full effect. She reports 50% improvement and 0 pain level.

## 2021-08-23 ENCOUNTER — CONSULT (OUTPATIENT)
Dept: INTERNAL MEDICINE CLINIC | Facility: CLINIC | Age: 61
End: 2021-08-23
Payer: MEDICARE

## 2021-08-23 VITALS
TEMPERATURE: 96.9 F | RESPIRATION RATE: 18 BRPM | DIASTOLIC BLOOD PRESSURE: 70 MMHG | HEART RATE: 65 BPM | BODY MASS INDEX: 29.19 KG/M2 | WEIGHT: 171 LBS | SYSTOLIC BLOOD PRESSURE: 124 MMHG | HEIGHT: 64 IN

## 2021-08-23 DIAGNOSIS — G89.4 CHRONIC PAIN SYNDROME: ICD-10-CM

## 2021-08-23 DIAGNOSIS — J45.20 MILD INTERMITTENT ASTHMA WITHOUT COMPLICATION: ICD-10-CM

## 2021-08-23 DIAGNOSIS — E55.9 VITAMIN D DEFICIENCY: ICD-10-CM

## 2021-08-23 DIAGNOSIS — K21.9 GASTROESOPHAGEAL REFLUX DISEASE, UNSPECIFIED WHETHER ESOPHAGITIS PRESENT: Primary | ICD-10-CM

## 2021-08-23 DIAGNOSIS — F32.A DEPRESSION, UNSPECIFIED DEPRESSION TYPE: ICD-10-CM

## 2021-08-23 DIAGNOSIS — F33.9 DEPRESSION, RECURRENT (HCC): ICD-10-CM

## 2021-08-23 DIAGNOSIS — E78.01 FAMILIAL HYPERCHOLESTEROLEMIA: ICD-10-CM

## 2021-08-23 DIAGNOSIS — K22.719 BARRETT'S ESOPHAGUS WITH DYSPLASIA: ICD-10-CM

## 2021-08-23 DIAGNOSIS — M96.1 LUMBAR POSTLAMINECTOMY SYNDROME: ICD-10-CM

## 2021-08-23 PROCEDURE — 99213 OFFICE O/P EST LOW 20 MIN: CPT | Performed by: INTERNAL MEDICINE

## 2021-08-27 RX ORDER — DULOXETIN HYDROCHLORIDE 60 MG/1
60 CAPSULE, DELAYED RELEASE ORAL DAILY
Qty: 180 CAPSULE | Refills: 0
Start: 2021-08-27

## 2021-08-30 NOTE — PROGRESS NOTES
Dear Dr Emilia Coombs,      At your kind request I evaluated Britt Smith in medical consultation in preparation for resection of a fibroma from the left buccal mucosa  As you know, the patient is a 63 y o  female who has developed a lump on the left buccal mucosa over the past several months  This has become problematic because she sometimes bites it when she chews  She has elected to undergo resection  She has been feeling well in general   She denies any recent chest pain, shortness of breath, palpitations, dizziness, cough, wheezing, or sputum production  The patient's past medical history is positive for mild intermittent asthma  She has esophageal reflux which has been complicated by Dolan's esophagus  She has a history of hyperlipidemia  She has a history of vitamin-D deficiency  She has depression  She has post laminectomy syndrome with chronic low back pain  She has a history of COVID-19 but fortunately has made an excellent recovery  The patient has no history of hypertension, coronary artery disease, cardiac arrhythmia, congestive heart failure, stroke, peptic ulcer disease, or kidney disease  The patient's medications at the time of admission include: Albuterol by nebulization 4 times daily as needed   Albuterol meter dosed inhaler 2 puffs every 4 hours as needed  Dexilant 60 mg twice daily   Duloxetine 60 mg daily   Magnesium 1 tablet daily   Methocarbamol 750 mg twice daily as needed   Potassium 10 mEq daily   Pramipexole 0 5 mg at bedtime   Tramadol 100 mg twice daily as needed  Turmeric 400 mg daily   Vitamin-D 1000 units daily    Allergies   Allergen Reactions    Dust Mite Extract Shortness Of Breath    Latex Itching and Blisters    Other Other (See Comments)     Seasonal AND cock roaches    Sulfa Antibiotics Vomiting     Topical-blistering       Family history is positive for hypertension in her father  Mother had lung cancer      Social history reveals the patient lives alone   She does not smoke nor has she ever smoked  She does not imbibe ethanol nor does she use illicit drugs  Review of Systems:  Review of systems is taken in detail including 12 systems  In addition to those issues mentioned above, the patient does have chronic musculoskeletal pain particularly in her low back  She has been seeing pain management  Fortunately, this has been reasonably stable of late  No other significant information was elicited  Vitals:    08/23/21 1527   BP: 124/70   Pulse: 65   Resp: 18   Temp: (!) 96 9 °F (36 1 °C)         Physical Exam:    The patient is a well-developed, well-nourished woman who appears in no distress  Head is atraumatic and normocephalic  ENT examination was remarkable for a small fibroma on the left buccal mucosa  Eyes revealed the pupils to be equal, round, and reactive to light  Extraocular movements were intact  Neck was supple  Carotids were full without bruits  There was no lymphadenopathy or goiter  Lungs are clear to auscultation percussion  There is no wheezing, rales, or rhonchi  Cardiac exam revealed regular rhythm  I heard no murmur, gallop, or rub  The abdomen is soft with active bowel sounds  There is no mass, tenderness, or organomegaly  Extremities showed no clubbing, cyanosis, or edema  No calf tenderness was present  Neurologic examination revealed the patient to be alert and oriented to person place and time  No focal or lateralizing sign was noted  Assessment:   1  Fibroma, left buccal mucosa, for resection  2  Mild intermittent asthma, stable  3  Hypercholesterolemia  4  Esophageal reflux with Dolan's esophagus   5  Vitamin-D deficiency  6  Post-laminectomy syndrome with chronic low back pain   7  History of depression   8  History of COVID-19, recovered     Recommendations: The patient appears stable for surgery as proposed  Her chronic medical issues are well controlled    She needs no additional preoperative evaluation or special medical preparation  I think that her risk of complication is low  Thank you for the opportunity of participating in [facility]  I hope this information was helpful      Sincerely,  Mike Trujillo MD

## 2021-09-03 ENCOUNTER — OFFICE VISIT (OUTPATIENT)
Dept: PAIN MEDICINE | Facility: MEDICAL CENTER | Age: 61
End: 2021-09-03
Payer: MEDICARE

## 2021-09-03 VITALS
HEART RATE: 67 BPM | SYSTOLIC BLOOD PRESSURE: 111 MMHG | DIASTOLIC BLOOD PRESSURE: 73 MMHG | HEIGHT: 64 IN | WEIGHT: 172 LBS | BODY MASS INDEX: 29.37 KG/M2

## 2021-09-03 DIAGNOSIS — M54.50 CHRONIC BILATERAL LOW BACK PAIN WITHOUT SCIATICA: ICD-10-CM

## 2021-09-03 DIAGNOSIS — G89.4 CHRONIC PAIN SYNDROME: ICD-10-CM

## 2021-09-03 DIAGNOSIS — Z79.891 LONG-TERM CURRENT USE OF OPIATE ANALGESIC: ICD-10-CM

## 2021-09-03 DIAGNOSIS — G89.29 CHRONIC BILATERAL LOW BACK PAIN WITHOUT SCIATICA: ICD-10-CM

## 2021-09-03 DIAGNOSIS — M54.16 LUMBAR RADICULOPATHY: ICD-10-CM

## 2021-09-03 DIAGNOSIS — M47.816 LUMBAR SPONDYLOSIS: ICD-10-CM

## 2021-09-03 DIAGNOSIS — M71.38 SYNOVIAL CYST OF LUMBAR SPINE: ICD-10-CM

## 2021-09-03 DIAGNOSIS — F11.20 UNCOMPLICATED OPIOID DEPENDENCE (HCC): Primary | ICD-10-CM

## 2021-09-03 PROCEDURE — 80305 DRUG TEST PRSMV DIR OPT OBS: CPT | Performed by: NURSE PRACTITIONER

## 2021-09-03 PROCEDURE — 99214 OFFICE O/P EST MOD 30 MIN: CPT | Performed by: NURSE PRACTITIONER

## 2021-09-03 RX ORDER — TRAMADOL HYDROCHLORIDE 50 MG/1
100 TABLET ORAL 2 TIMES DAILY PRN
Qty: 120 TABLET | Refills: 1 | Status: SHIPPED | OUTPATIENT
Start: 2021-09-04 | End: 2021-10-06 | Stop reason: SDUPTHER

## 2021-09-03 RX ORDER — TRAMADOL HYDROCHLORIDE 50 MG/1
100 TABLET ORAL 2 TIMES DAILY PRN
Qty: 120 TABLET | Refills: 1 | Status: SHIPPED | OUTPATIENT
Start: 2021-09-04 | End: 2021-09-03 | Stop reason: SDUPTHER

## 2021-09-03 NOTE — PROGRESS NOTES
Detailed VMMLOM as per pt's request that pt's tramadol was sent to [facility]   Pt has c/b number for any questions or concerns

## 2021-09-03 NOTE — PROGRESS NOTES
Assessment:  1  Uncomplicated opioid dependence ([facility] [address] )    2  Chronic pain syndrome    3  Long-term current use of opiate analgesic    4  Lumbar radiculopathy    5  Lumbar spondylosis    6  Synovial cyst of lumbar spine    7  Chronic bilateral low back pain without sciatica        Plan:  1  Repeat left L4-L5, L5-S1 transforaminal epidural steroid injection  Patient states she had 100% relief from this injection with a pain score of 0/10 that lasted 10 days before the pain started to return  Hopefully repeating this injection will give her longer lasting relief    2  Referral to Neurosurgery, Dr Lee Gutierrez, for synovial cyst at L4-L5  3  Refill tramadol  Patient states that she does have some constipation from this medication  Refills were sent to the pharmacy on file  [place] Prescription Drug Monitoring Program report was reviewed and was appropriate     A urine drug screen was collected at today's office visit as part of our medication management protocol  The point of care testing results were appropriate for what was being prescribed  The specimen will be sent for confirmatory testing  The drug screen is medically necessary because the patient is either dependent on opioid medication or is being considered for opioid medication therapy and the results could impact ongoing or future treatment  The drug screen is to evaluate for the presences or absence of prescribed, non-prescribed, and/or illicit drugs/substances  There are risks associated with opioid medications, including dependence, addiction and tolerance  The patient understands and agrees to use these medications only as prescribed  Potential side effects of the medications include, but are not limited to, constipation, drowsiness, addiction, impaired judgment and risk of fatal overdose if not taken as prescribed  The patient was warned against driving while taking sedation medications  Sharing medications is a felony   At this point in time, the patient is showing no signs of addiction, abuse, diversion or suicidal ideation  Complete risks and benefits including bleeding, infection, tissue reaction, nerve injury and allergic reaction were discussed  The approach was demonstrated using models and literature was provided  Verbal and written consent was obtained  The patient will follow-up in 4 weeks for  reevaluation after injection  The patient was advised to contact the office should their symptoms worsen in the interim  The patient was agreeable and verbalized an understanding  History of Present Illness: The patient is a 63 y o  female last seen on 06/14/2021 who presents for a follow up office visit in regards to chronic pain secondary to lumbar spondylosis with radiculopathy  The patient currently reports a pain score of 9/10 that she states varies  She states she is never without some level of pain  She describes the quality as pressure-like, shooting with pins and needles  She states that her toes have shooting pain in them  She states that she obtained 100% relief from the 1st injection, however it lasted only 10 days  Current pain medications includes: tramadol  The patient reports that this regimen is providing 30 % pain relief  The patient is reporting no side effects, constipation from this pain medication regimen  Pain Contract Signed: 2/8/2021  Last Urine Drug Screen: 9/3/2021  Tramadol last taken 9/2/2021 in the PM    I have personally reviewed and/or updated the patient's past medical history, past surgical history, family history, social history, current medications, allergies, and vital signs today  Review of Systems:    Review of Systems   Respiratory: Negative for shortness of breath  Cardiovascular: Negative for chest pain  Gastrointestinal: Positive for constipation  Negative for diarrhea, nausea and vomiting  Musculoskeletal: Positive for arthralgias, back pain and gait problem  Negative for joint swelling and myalgias  Skin: Negative for rash  Neurological: Negative for dizziness, seizures and weakness  All other systems reviewed and are negative  Past Medical History:   Diagnosis Date    Anemia     Anesthesia     "sometimes low BP upon waking up" pt states she stopped breathing 1 time during surgery    Arthritis     Asthma     Back pain     Dolan's esophagus     Chronic pain disorder     Chronic venous insufficiency     Cough variant asthma     COVID-19 03/2020    Depression     Environmental allergies     dust    GERD (gastroesophageal reflux disease)     Hiatal hernia     History of anemia     History of fusion of spine for scoliosis     "as a teenager"    History of transfusion     1978 - no adverse reaction    Left lumbar radiculitis     Last Assessed: [id number]    Lumbar postlaminectomy syndrome     Migraines     Morbid obesity ([address] )     Motion sickness     Osteoarthritis     of left hip    Right knee pain     Seasonal allergies     Shortness of breath     Umbilical hernia     Uses brace     right knee    Wears glasses        Past Surgical History:   Procedure Laterality Date    ARTHRODESIS      lumbar    ARTHRODESIS      Spinal Arthrodesis for Deformity; Last Assessed: [id number]    BACK SURGERY      lumbar fusion,with nydia/screw and cage implant    BACK SURGERY      surgery for scoliosis    BREAST CYST EXCISION Right     benign    COLONOSCOPY      COLONOSCOPY N/A 3/14/2019    Procedure: COLONOSCOPY;  Surgeon: Mariusz Dolan MD;  [facility]; Service: Gastroenterology    ESOPHAGOGASTRODUODENOSCOPY N/A 3/14/2019    Procedure: ESOPHAGOGASTRODUODENOSCOPY (EGD) W RFA(BARRX); Surgeon: Mariusz Dolan MD;  [facility];   Service: Gastroenterology    HERNIA REPAIR      umbilical hernia repair x2    ORTHOPEDIC SURGERY      PLANTAR FASCIA SURGERY Left     LA BREAST REDUCTION Bilateral 3/12/2021    Procedure: BREAST REDUCTION;  Surgeon: Amanda Berry MD;  Location: [facility] MA[facility] OR;  Service: Plastics    WV COLONOSCOPY FLX DX W/COLLJ SPEC WHEN PFRMD N/A 2/20/2018    Procedure: COLONOSCOPY with polypectomy;  Surgeon: Annabelel Childress MD;  Location: [facility]; Service: General    WV ESOPHAGOGASTRODUODENOSCOPY TRANSORAL DIAGNOSTIC N/A 5/24/2017    Procedure: ESOPHAGOGASTRODUODENOSCOPY (EGD); Surgeon: Lila Villegas MD;  Location: [facility]; Service: Gastroenterology    WV ESOPHAGOGASTRODUODENOSCOPY TRANSORAL DIAGNOSTIC N/A 8/31/2017    Procedure: ESOPHAGOGASTRODUODENOSCOPY (EGD) W RFA;  Surgeon: Kinjal Wilson MD;  Location:  GI LAB;   Service: Gastroenterology    WV KNEE SCOPE,MED/LAT MENISECTOMY Right 4/4/2018    Procedure: ARTHROSCOPY KNEE PARTIAL MEDIAL MENISECTOMY , CHONDROPLASTY;  Surgeon: Khushbu Lopez DO;  [facility];  Service: Orthopedics    WV LAP, RECURRENT INCISIONAL HERNIA REPAIR,REDUCIBLE N/A 1/21/2021    Procedure: REPAIR HERNIA INCISIONAL LAPAROSCOPIC W/ ROBOTICS, with mesh;  Surgeon: Teresa Gong MD;  [facility];  Service: General    WISDOM TOOTH EXTRACTION         Family History   Problem Relation Age of Onset    Lung cancer Mother     Cancer Mother     Alcohol abuse Father     Other Father         Cardiac Disorder    Depression Father     Hypertension Father     Heart attack Father     Breast cancer Maternal Grandmother     Lung cancer Maternal Grandmother     Diabetes type I Other     No Known Problems Sister     No Known Problems Brother     No Known Problems Maternal Grandfather     Leukemia Paternal Grandmother     No Known Problems Paternal Grandfather     No Known Problems Sister     No Known Problems Maternal Aunt     No Known Problems Paternal Aunt     Stroke Neg Hx        Social History     Occupational History    Not on file   Tobacco Use    Smoking status: Never Smoker    Smokeless tobacco: Never Used   Vaping Use    Vaping Use: Never used Substance and Sexual Activity    Alcohol use: Not Currently    Drug use: No    Sexual activity: Not on file         Current Outpatient Medications:     albuterol (2 5 mg/3 mL) 0 083 % nebulizer solution, VVN Q 4 H PRN, Disp: , Rfl: 3    dexlansoprazole (DEXILANT) 60 MG capsule, Take 1 capsule (60 mg total) by mouth 2 (two) times a day, Disp: 60 capsule, Rfl: 2    DULoxetine (CYMBALTA) 60 mg delayed release capsule, Take 1 capsule (60 mg total) by mouth daily, Disp: 180 capsule, Rfl: 0    MAGNESIUM PO, Take 1 tablet by mouth daily, Disp: , Rfl:     methocarbamol (ROBAXIN) 750 mg tablet, Take 1 tablet (750 mg total) by mouth 2 (two) times a day as needed for muscle spasms, Disp: 60 tablet, Rfl: 1    POTASSIUM PO, Take 1 tablet by mouth daily, Disp: , Rfl:     pramipexole (MIRAPEX) 0 25 mg tablet, Take 2 tablets (0 5 mg total) by mouth daily at bedtime, Disp: 180 tablet, Rfl: 0    Turmeric 400 MG CAPS, Take by mouth, Disp: , Rfl:     VITAMIN D PO, Take 1 capsule by mouth daily, Disp: , Rfl:     [START ON 9/4/2021] traMADol (ULTRAM) 50 mg tablet, Take 2 tablets (100 mg total) by mouth 2 (two) times a day as needed for moderate pain, Disp: 120 tablet, Rfl: 1    Allergies   Allergen Reactions    Dust Mite Extract Shortness Of Breath    Latex Itching and Blisters    Other Other (See Comments)     Seasonal AND cock roaches    Sulfa Antibiotics Vomiting     Topical-blistering       Physical Exam:    /73   Pulse 67   Ht 5' 4" (1 626 m)   Wt 78 kg (172 lb) Comment: pt doesn't want to know  BMI 29 52 kg/m²     Constitutional:overweight  Eyes:anicteric  HEENT:grossly intact  Neck:supple, symmetric, trachea midline and no masses   Pulmonary:even and unlabored  Cardiovascular:No edema or pitting edema present  Skin:Normal without rashes or lesions and well hydrated  Psychiatric:Mood and affect appropriate  Neurologic:Cranial Nerves II-XII grossly intact  Musculoskeletal:antalgic, no pain or tenderness with palpation over lower back and sacroiliac joints        Imaging  FL spine and pain procedure    (Results Pending)         Orders Placed This Encounter   Procedures    FL spine and pain procedure    MM ALL_Prescribed Meds and Special Instructions    MM DT_Alprazolam Definitive Test    MM DT_Amphetamine Definitive Test    MM DT_Aripiprazole Definitive Test    MM DT_Buprenorphine Definitive Test    MM DT_Butalbital Definitive Test    MM DT_Clonazepam Definitive Test    MM DT_Clozapine Definitive Test    MM DT_Cocaine Definitive Test    MM DT_Codeine Definitive Test    MM DT_Desipramine Definitive Test    MM DT_Dextromethorphan Definitive Test    MM Diazepam Definitive Test    MM DT_Ethyl Glucuronide/Ethyl Sulfate Definitive Test    MM DT_Fentanyl Definitive Test    MM DT_Haloperidol Definitive Test    MM DT_Heroin Definitive Test    MM DT_Hydrocodone Definitive Test    MM DT_Hydromorphone Definitive Test    MM DT_Imipramine Definitive Test    MM DT_Kratom Definitive Test    MM DT_Levorphanol Definitive Test    MM DT_MDMA Definitive Test    MM DT_Meperidine Definitive Test    MM DT_Methadone Definitive Test    MM DT_Methamphetamine Definitive Test    MM DT_Methylphenidate Definitive Test    MM DT_Morphine Definitive Test    MM Lorazepam Definitive Test    MM DT_Olanzapine Definitive Test    MM DT_Oxycodone Definitive Test    MM DT_Oxymorphone Definitive Test    MM DT_Phencyclidine Definitive Test    MM DT_Phenobarbital Definitive Test    MM DT_Phentermine Definitive Test    MM DT_Quetiapine Definitive Test    MM DT_Risperidone Definitive Test    MM DT_Secobarbital Definitive Test    MM DT_Tapentadol Definitive Test    MM DT_Temazapam Definitive Test    MM DT_THC Definitive Test    MM DT_Tramadol Definitive Test    Ambulatory referral to Neurosurgery

## 2021-09-07 ENCOUNTER — HOSPITAL ENCOUNTER (OUTPATIENT)
Dept: RADIOLOGY | Facility: MEDICAL CENTER | Age: 61
Discharge: HOME | End: 2021-09-07
Attending: PHYSICAL MEDICINE & REHABILITATION | Admitting: PHYSICAL MEDICINE & REHABILITATION
Payer: MEDICARE

## 2021-09-07 VITALS
HEART RATE: 60 BPM | DIASTOLIC BLOOD PRESSURE: 64 MMHG | SYSTOLIC BLOOD PRESSURE: 119 MMHG | TEMPERATURE: 98.2 F | OXYGEN SATURATION: 97 % | RESPIRATION RATE: 18 BRPM

## 2021-09-07 DIAGNOSIS — M54.16 LUMBAR RADICULOPATHY: ICD-10-CM

## 2021-09-07 DIAGNOSIS — G89.4 CHRONIC PAIN SYNDROME: ICD-10-CM

## 2021-09-07 DIAGNOSIS — M47.816 LUMBAR SPONDYLOSIS: ICD-10-CM

## 2021-09-07 PROCEDURE — 64484 NJX AA&/STRD TFRM EPI L/S EA: CPT | Performed by: PHYSICAL MEDICINE & REHABILITATION

## 2021-09-07 PROCEDURE — 64483 NJX AA&/STRD TFRM EPI L/S 1: CPT | Performed by: PHYSICAL MEDICINE & REHABILITATION

## 2021-09-07 RX ORDER — PAPAVERINE HCL 150 MG
20 CAPSULE, EXTENDED RELEASE ORAL ONCE
Status: COMPLETED | OUTPATIENT
Start: 2021-09-07 | End: 2021-09-07

## 2021-09-07 RX ADMIN — DEXAMETHASONE SODIUM PHOSPHATE 15 MG: 10 INJECTION, SOLUTION INTRAMUSCULAR; INTRAVENOUS at 09:33

## 2021-09-07 RX ADMIN — IOHEXOL 2 ML: 300 INJECTION, SOLUTION INTRAVENOUS at 09:33

## 2021-09-07 NOTE — DISCHARGE INSTRUCTIONS
Epidural Steroid Injection   WHAT YOU NEED TO KNOW:   An epidural steroid injection (ANDREI) is a procedure to inject steroid medicine into the epidural space  The epidural space is between your spinal cord and vertebrae  Steroids reduce inflammation and fluid buildup in your spine that may be causing pain  You may be given pain medicine along with the steroids  ACTIVITY  · Do not drive or operate machinery today  · No strenuous activity today - bending, lifting, etc   · You may resume normal activites starting tomorrow - start slowly and as tolerated  · You may shower today, but no tub baths or hot tubs  · You may have numbness for several hours from the local anesthetic  Please use caution and common sense, especially with weight-bearing activities  CARE OF THE INJECTION SITE  · If you have soreness or pain, apply ice to the area today (20 minutes on/20 minutes off)  · Starting tomorrow, you may use warm, moist heat or ice if needed  · You may have an increase or change in your discomfort for 36-48 hours after your treatment  · Apply ice and continue with any pain medication you have been prescribed  · Notify [facility] if you have any of the following: redness, drainage, swelling, headache, stiff neck or fever above 100°F     SPECIAL INSTRUCTIONS  · Our office will contact you in approximately 7 days for a progress report  MEDICATIONS  · Continue to take all routine medications  · Our office may have instructed you to hold some medications  As no general anesthesia was used in today's procedure, you should not experience any side effects related to anesthesia  If you have a problem specifically related to your procedure, please call our office at [phone]  Problems not related to your procedure should be directed to your primary care physician

## 2021-09-07 NOTE — H&P
History of Present Illness:  The patient is a 63 y o  female who presents with complaints of left back and leg pain    Patient Active Problem List   Diagnosis    Arthritis of lumbar spine    Chronic low back pain    Chronic pain syndrome    Chronic venous insufficiency    Depression, recurrent ([facility] [address] )    Hyperlipidemia    Lumbar postlaminectomy syndrome    Primary osteoarthritis of left hip    Restless legs syndrome    Sleep disturbance    Hernia of anterior abdominal wall    S/P right knee arthroscopy    Environmental allergies    Lumbar spondylosis    Lumbar radiculopathy    Prediabetes    Vitamin D deficiency    Dolan's esophagus with dysplasia    Gastroesophageal reflux disease    Laryngopharyngeal reflux (LPR)    Chronic cough    Vocal fold paresis, right    Dysphonia    Muscle tension dysphonia    Glottic insufficiency    Reflux laryngitis    Laryngeal edema    Allergic rhinitis due to [employer] dust mite    Mild intermittent asthma without complication    Upper airway cough syndrome    Neck pain    Cervical spondylosis without myelopathy    Osteoarthritis of multiple joints    Sacroiliitis, not elsewhere classified ([address] )    Lipoma of torso    Recurrent umbilical hernia    Anemia    Hypokalemia    Macromastia    Long-term current use of opiate analgesic    Uncomplicated opioid dependence ([facility] [address] )    Motion sickness    Recurrent incisional hernia    Podagra       Past Medical History:   Diagnosis Date    Anemia     Anesthesia     "sometimes low BP upon waking up" pt states she stopped breathing 1 time during surgery    Arthritis     Asthma     Back pain     Dolan's esophagus     Chronic pain disorder     Chronic venous insufficiency     Cough variant asthma     COVID-19 03/2020    Depression     Environmental allergies     dust    GERD (gastroesophageal reflux disease)     Hiatal hernia     History of anemia     History of fusion of spine for scoliosis     "as a teenager"    History of transfusion     1978 - no adverse reaction    Left lumbar radiculitis     Last Assessed: [id number]    Lumbar postlaminectomy syndrome     Migraines     Morbid obesity (HCC)     Motion sickness     Osteoarthritis     of left hip    Right knee pain     Seasonal allergies     Shortness of breath     Umbilical hernia     Uses brace     right knee    Wears glasses        Past Surgical History:   Procedure Laterality Date    ARTHRODESIS      lumbar    ARTHRODESIS      Spinal Arthrodesis for Deformity; Last Assessed: [id number]    BACK SURGERY      lumbar fusion,with nydia/screw and cage implant    BACK SURGERY      surgery for scoliosis    BREAST CYST EXCISION Right     benign    COLONOSCOPY      COLONOSCOPY N/A 3/14/2019    Procedure: COLONOSCOPY;  Surgeon: Agnes El MD;  [facility]; Service: Gastroenterology    ESOPHAGOGASTRODUODENOSCOPY N/A 3/14/2019    Procedure: ESOPHAGOGASTRODUODENOSCOPY (EGD) W RFA(BARRX); Surgeon: Agnes El MD;  [facility]; Service: Gastroenterology    HERNIA REPAIR      umbilical hernia repair x2    ORTHOPEDIC SURGERY      PLANTAR FASCIA SURGERY Left     OH BREAST REDUCTION Bilateral 3/12/2021    Procedure: BREAST REDUCTION;  Surgeon: Tyler Oneil MD;  Location: [address] [place] [address];  Service: Plastics    OH COLONOSCOPY FLX DX W/COLLJ SPEC WHEN PFRMD N/A 2/20/2018    Procedure: COLONOSCOPY with polypectomy;  Surgeon: Hany Shelby MD;  [facility]; Service: General    OH ESOPHAGOGASTRODUODENOSCOPY TRANSORAL DIAGNOSTIC N/A 5/24/2017    Procedure: ESOPHAGOGASTRODUODENOSCOPY (EGD); Surgeon: Gregory Li MD;  [facility]: [facility]; Service: Gastroenterology    OH ESOPHAGOGASTRODUODENOSCOPY TRANSORAL DIAGNOSTIC N/A 8/31/2017    Procedure: ESOPHAGOGASTRODUODENOSCOPY (EGD) W RFA;  Surgeon: Mitesh Jacobs MD;  [facility];   Service: Gastroenterology    OH KNEE SCOPE,MED/LAT MENISECTOMY Right 4/4/2018    Procedure: ARTHROSCOPY KNEE PARTIAL MEDIAL MENISECTOMY , CHONDROPLASTY;  Surgeon: Jarek Robles DO;  [facility];  Service: Orthopedics    PA LAP, RECURRENT INCISIONAL HERNIA REPAIR,REDUCIBLE N/A 1/21/2021    Procedure: REPAIR HERNIA INCISIONAL LAPAROSCOPIC W/ ROBOTICS, with mesh;  Surgeon: Emily Canas MD;  [facility];  Service: General    WISDOM TOOTH EXTRACTION           Current Outpatient Medications:     albuterol (2 5 mg/3 mL) 0 083 % nebulizer solution, VVN Q 4 H PRN, Disp: , Rfl: 3    dexlansoprazole (DEXILANT) 60 MG capsule, Take 1 capsule (60 mg total) by mouth 2 (two) times a day, Disp: 60 capsule, Rfl: 2    DULoxetine (CYMBALTA) 60 mg delayed release capsule, Take 1 capsule (60 mg total) by mouth daily, Disp: 180 capsule, Rfl: 0    MAGNESIUM PO, Take 1 tablet by mouth daily, Disp: , Rfl:     methocarbamol (ROBAXIN) 750 mg tablet, Take 1 tablet (750 mg total) by mouth 2 (two) times a day as needed for muscle spasms, Disp: 60 tablet, Rfl: 1    POTASSIUM PO, Take 1 tablet by mouth daily, Disp: , Rfl:     pramipexole (MIRAPEX) 0 25 mg tablet, Take 2 tablets (0 5 mg total) by mouth daily at bedtime, Disp: 180 tablet, Rfl: 0    traMADol (ULTRAM) 50 mg tablet, Take 2 tablets (100 mg total) by mouth 2 (two) times a day as needed for moderate pain, Disp: 120 tablet, Rfl: 1    Turmeric 400 MG CAPS, Take by mouth, Disp: , Rfl:     VITAMIN D PO, Take 1 capsule by mouth daily, Disp: , Rfl:     Current Facility-Administered Medications:     dexamethasone (PF) (DECADRON) injection 20 mg, 20 mg, Epidural, Once, Rosaleen Coup, DO    iohexol (OMNIPAQUE) 300 mg/mL injection 50 mL, 50 mL, Epidural, Once, Rosaleen Coup, DO    Allergies   Allergen Reactions    Dust Mite Extract Shortness Of Breath    Latex Itching and Blisters    Other Other (See Comments)     Seasonal AND cock roaches    Sulfa Antibiotics Vomiting     Topical-blistering       Physical Exam:   Vitals: 09/07/21 0917   BP: 111/75   Pulse: 65   Resp: 18   Temp: 98 2 °F (36 8 °C)   SpO2: 98%     General: Awake, Alert, Oriented x 3, Mood and affect appropriate  Respiratory: Respirations even and unlabored  Cardiovascular: Peripheral pulses intact; no edema  Musculoskeletal Exam: left back and leg pain    ASA Score: 2    Patient/Chart Verification  Patient ID Verified: Verbal  ID Band Applied: No  Consents Confirmed: Procedural, To be obtained in the Pre-Procedure area  H&P( within 30 days) Verified: To be obtained in the Pre-Procedure area  Interval H&P(within 24 hr) Complete (required for Outpatients and Surgery Admit only): To be obtained in the Pre-Procedure area  Allergies Reviewed: Yes  Anticoag/NSAID held?: NA  Currently on antibiotics?: No  Pregnancy denied?: NA    Assessment:   1  Chronic pain syndrome    2  Lumbar radiculopathy    3   Lumbar spondylosis        Plan: Fluoroscopy guided left L4-L5, L5-S1 TFESI

## 2021-09-14 ENCOUNTER — TELEPHONE (OUTPATIENT)
Dept: PAIN MEDICINE | Facility: CLINIC | Age: 61
End: 2021-09-14

## 2021-10-06 ENCOUNTER — OFFICE VISIT (OUTPATIENT)
Dept: PAIN MEDICINE | Facility: MEDICAL CENTER | Age: 61
End: 2021-10-06
Payer: MEDICARE

## 2021-10-06 VITALS
HEART RATE: 68 BPM | DIASTOLIC BLOOD PRESSURE: 72 MMHG | SYSTOLIC BLOOD PRESSURE: 122 MMHG | WEIGHT: 172 LBS | HEIGHT: 64 IN | TEMPERATURE: 98.4 F | BODY MASS INDEX: 29.37 KG/M2

## 2021-10-06 DIAGNOSIS — G89.29 CHRONIC BILATERAL LOW BACK PAIN WITHOUT SCIATICA: ICD-10-CM

## 2021-10-06 DIAGNOSIS — M47.816 LUMBAR SPONDYLOSIS: ICD-10-CM

## 2021-10-06 DIAGNOSIS — M46.1 SACROILIITIS, NOT ELSEWHERE CLASSIFIED (HCC): ICD-10-CM

## 2021-10-06 DIAGNOSIS — G89.4 CHRONIC PAIN SYNDROME: ICD-10-CM

## 2021-10-06 DIAGNOSIS — G25.81 RESTLESS LEGS SYNDROME: ICD-10-CM

## 2021-10-06 DIAGNOSIS — M54.50 CHRONIC BILATERAL LOW BACK PAIN WITHOUT SCIATICA: ICD-10-CM

## 2021-10-06 PROCEDURE — 99214 OFFICE O/P EST MOD 30 MIN: CPT | Performed by: NURSE PRACTITIONER

## 2021-10-06 RX ORDER — MOMETASONE FUROATE 50 UG/1
2 SPRAY, METERED NASAL DAILY
COMMUNITY
Start: 2021-09-14 | End: 2022-01-11 | Stop reason: ALTCHOICE

## 2021-10-06 RX ORDER — TRAMADOL HYDROCHLORIDE 50 MG/1
100 TABLET ORAL 2 TIMES DAILY PRN
Qty: 120 TABLET | Refills: 1 | Status: SHIPPED | OUTPATIENT
Start: 2021-10-06 | End: 2021-12-08 | Stop reason: SDUPTHER

## 2021-10-06 RX ORDER — PRAMIPEXOLE DIHYDROCHLORIDE 0.25 MG/1
TABLET ORAL
Qty: 180 TABLET | Refills: 0 | Status: SHIPPED | OUTPATIENT
Start: 2021-10-06 | End: 2022-01-10

## 2021-10-06 RX ORDER — METHOCARBAMOL 750 MG/1
750 TABLET, FILM COATED ORAL 2 TIMES DAILY PRN
Qty: 60 TABLET | Refills: 1 | Status: SHIPPED | OUTPATIENT
Start: 2021-10-06 | End: 2022-01-10 | Stop reason: SDUPTHER

## 2021-10-29 ENCOUNTER — TELEPHONE (OUTPATIENT)
Dept: INTERNAL MEDICINE CLINIC | Facility: CLINIC | Age: 61
End: 2021-10-29

## 2021-10-29 DIAGNOSIS — K22.70 BARRETT'S ESOPHAGUS WITHOUT DYSPLASIA: ICD-10-CM

## 2021-10-29 DIAGNOSIS — K21.9 GASTROESOPHAGEAL REFLUX DISEASE WITHOUT ESOPHAGITIS: ICD-10-CM

## 2021-10-29 RX ORDER — DEXLANSOPRAZOLE 60 MG/1
60 CAPSULE, DELAYED RELEASE ORAL 2 TIMES DAILY
Qty: 60 CAPSULE | Refills: 0 | Status: SHIPPED | OUTPATIENT
Start: 2021-10-29 | End: 2022-01-11 | Stop reason: SDUPTHER

## 2021-11-09 NOTE — PRE-PROCEDURE INSTRUCTIONS
Pre-Surgery Instructions:   Medication Instructions    albuterol (2 5 mg/3 mL) 0 083 % nebulizer solution Instructed patient per Anesthesia Guidelines   azelastine (ASTELIN) 0 1 % nasal spray Instructed patient per Anesthesia Guidelines   dexlansoprazole (DEXILANT) 60 MG capsule Instructed patient per Anesthesia Guidelines   DULoxetine (CYMBALTA) 60 mg delayed release capsule Instructed patient per Anesthesia Guidelines   furosemide (LASIX) 20 mg tablet Instructed patient per Anesthesia Guidelines   MAGNESIUM PO Instructed patient per Anesthesia Guidelines   montelukast (SINGULAIR) 10 mg tablet Instructed patient per Anesthesia Guidelines   omeprazole (PriLOSEC) 40 MG capsule Instructed patient per Anesthesia Guidelines   POTASSIUM PO Instructed patient per Anesthesia Guidelines   rOPINIRole (REQUIP) 2 mg tablet Instructed patient per Anesthesia Guidelines   sodium chloride 0 9 % nebulizer solution Instructed patient per Anesthesia Guidelines   traMADol (ULTRAM) 50 mg tablet Instructed patient per Anesthesia Guidelines   VITAMIN D PO Instructed patient per Anesthesia Guidelines  Pt instructed to take dexilant and cymbalta the morning of surgery with a small sip of water and use inhaler if needed  [facility] preop instructions reviewed with pt  Pt has surgical soap 
Patient tolerated procedure well.

## 2021-12-08 ENCOUNTER — TELEPHONE (OUTPATIENT)
Dept: INTERNAL MEDICINE CLINIC | Facility: CLINIC | Age: 61
End: 2021-12-08

## 2021-12-09 ENCOUNTER — OFFICE VISIT (OUTPATIENT)
Dept: INTERNAL MEDICINE CLINIC | Facility: CLINIC | Age: 61
End: 2021-12-09
Payer: MEDICARE

## 2021-12-09 VITALS
HEIGHT: 64 IN | HEART RATE: 72 BPM | TEMPERATURE: 97.5 F | DIASTOLIC BLOOD PRESSURE: 76 MMHG | WEIGHT: 185.4 LBS | BODY MASS INDEX: 31.65 KG/M2 | SYSTOLIC BLOOD PRESSURE: 115 MMHG

## 2021-12-09 DIAGNOSIS — R06.02 SHORTNESS OF BREATH: Primary | ICD-10-CM

## 2021-12-09 DIAGNOSIS — R60.9 PERIPHERAL EDEMA: ICD-10-CM

## 2021-12-09 PROCEDURE — 99214 OFFICE O/P EST MOD 30 MIN: CPT | Performed by: INTERNAL MEDICINE

## 2021-12-13 PROCEDURE — 93000 ELECTROCARDIOGRAM COMPLETE: CPT | Performed by: INTERNAL MEDICINE

## 2021-12-23 ENCOUNTER — OFFICE VISIT (OUTPATIENT)
Dept: URGENT CARE | Age: 61
End: 2021-12-23
Payer: MEDICARE

## 2021-12-23 VITALS
TEMPERATURE: 98 F | HEART RATE: 78 BPM | RESPIRATION RATE: 20 BRPM | WEIGHT: 185 LBS | BODY MASS INDEX: 31.58 KG/M2 | OXYGEN SATURATION: 100 % | SYSTOLIC BLOOD PRESSURE: 142 MMHG | DIASTOLIC BLOOD PRESSURE: 78 MMHG | HEIGHT: 64 IN

## 2021-12-23 DIAGNOSIS — T23.191A SUPERFICIAL BURN OF MULTIPLE SITES OF RIGHT HAND, INITIAL ENCOUNTER: Primary | ICD-10-CM

## 2021-12-23 PROCEDURE — 99213 OFFICE O/P EST LOW 20 MIN: CPT

## 2021-12-23 PROCEDURE — G0463 HOSPITAL OUTPT CLINIC VISIT: HCPCS

## 2021-12-24 NOTE — ANESTHESIA POSTPROCEDURE EVALUATION
OCCUPATIONAL THERAPY TREATMENT NOTE - INPATIENT    Room Number: 430/430-A               Problem List  Principal Problem:    Lumbar stenosis with neurogenic claudication      OCCUPATIONAL THERAPY ASSESSMENT     PPE worn during session: gloves, droplet  mask Post-Op Assessment Note    CV Status:  Stable  Pain Score: 0    Pain management: adequate     Mental Status:  Alert and awake   Hydration Status:  Euvolemic   PONV Controlled:  Controlled   Airway Patency:  Patent   Post Op Vitals Reviewed: Yes      Staff: Anesthesiologist, CRNA           /59 (11/14/19 1514)    Temp     Pulse 94 (11/14/19 1514)   Resp 18 (11/14/19 1514)    SpO2 98 % (11/14/19 1514) lower body clothing?: A Little  -   Bathing (including washing, rinsing, drying)?: A Little  -   Toileting, which includes using toilet, bedpan or urinal? : None  -   Putting on and taking off regular upper body clothing?: None  -   Taking care of personal

## 2022-01-08 DIAGNOSIS — G25.81 RESTLESS LEGS SYNDROME: ICD-10-CM

## 2022-01-10 ENCOUNTER — OFFICE VISIT (OUTPATIENT)
Dept: PAIN MEDICINE | Facility: MEDICAL CENTER | Age: 62
End: 2022-01-10
Payer: COMMERCIAL

## 2022-01-10 VITALS
HEART RATE: 71 BPM | DIASTOLIC BLOOD PRESSURE: 85 MMHG | HEIGHT: 64 IN | BODY MASS INDEX: 31.76 KG/M2 | SYSTOLIC BLOOD PRESSURE: 124 MMHG | TEMPERATURE: 96.8 F

## 2022-01-10 DIAGNOSIS — F11.20 UNCOMPLICATED OPIOID DEPENDENCE (HCC): ICD-10-CM

## 2022-01-10 DIAGNOSIS — G89.4 CHRONIC PAIN SYNDROME: ICD-10-CM

## 2022-01-10 DIAGNOSIS — M47.816 LUMBAR SPONDYLOSIS: ICD-10-CM

## 2022-01-10 DIAGNOSIS — Z79.891 LONG-TERM CURRENT USE OF OPIATE ANALGESIC: ICD-10-CM

## 2022-01-10 DIAGNOSIS — M54.16 LUMBAR RADICULOPATHY: ICD-10-CM

## 2022-01-10 DIAGNOSIS — M54.50 CHRONIC BILATERAL LOW BACK PAIN WITHOUT SCIATICA: ICD-10-CM

## 2022-01-10 DIAGNOSIS — M46.1 SACROILIITIS, NOT ELSEWHERE CLASSIFIED (HCC): ICD-10-CM

## 2022-01-10 DIAGNOSIS — G89.29 CHRONIC BILATERAL LOW BACK PAIN WITHOUT SCIATICA: ICD-10-CM

## 2022-01-10 PROCEDURE — 99214 OFFICE O/P EST MOD 30 MIN: CPT | Performed by: NURSE PRACTITIONER

## 2022-01-10 PROCEDURE — 80305 DRUG TEST PRSMV DIR OPT OBS: CPT | Performed by: NURSE PRACTITIONER

## 2022-01-10 RX ORDER — TRAMADOL HYDROCHLORIDE 50 MG/1
100 TABLET ORAL 2 TIMES DAILY PRN
Qty: 120 TABLET | Refills: 2 | Status: SHIPPED | OUTPATIENT
Start: 2022-01-10 | End: 2022-04-08 | Stop reason: DRUGHIGH

## 2022-01-10 RX ORDER — METHOCARBAMOL 750 MG/1
750 TABLET, FILM COATED ORAL 2 TIMES DAILY PRN
Qty: 60 TABLET | Refills: 2 | Status: SHIPPED | OUTPATIENT
Start: 2022-01-10 | End: 2022-04-08 | Stop reason: ALTCHOICE

## 2022-01-10 RX ORDER — PRAMIPEXOLE DIHYDROCHLORIDE 0.25 MG/1
TABLET ORAL
Qty: 180 TABLET | Refills: 0 | Status: SHIPPED | OUTPATIENT
Start: 2022-01-10 | End: 2022-02-24 | Stop reason: SDUPTHER

## 2022-01-10 RX ORDER — GABAPENTIN 300 MG/1
600 CAPSULE ORAL
Qty: 60 CAPSULE | Refills: 2 | Status: SHIPPED | OUTPATIENT
Start: 2022-01-10 | End: 2022-07-20

## 2022-01-10 NOTE — PROGRESS NOTES
Pain Medicine Follow-Up Note    Assessment:  1  Lumbar radiculopathy    2  Sacroiliitis, not elsewhere classified ([facility] [address] )    3  Lumbar spondylosis    4  Chronic pain syndrome    5  Chronic bilateral low back pain without sciatica        Plan:    Continue with tramadol as prescribed this was refilled today for the next 3 months  Continue methocarbamol this was also refilled  Initiate gabapentin 300 mg working up to 2 tablets at bedtime to try and decrease the radiating pain symptoms she is experiencing into her groin  Patient was encouraged to use heat or ice to her upper back on the right side as well as over-the-counter icy Hot with lidocaine patches that she can leave in place for 12 hours then remove for 12 hours  While the patient was in the office today an opioid contract was thoroughly reviewed and signed by the patient  The patient was given adequate time to ask questions in regards to the contract and a signed copy was sent home for his/her records  Patient will follow-up in 12 weeks for medication refills  No orders of the defined types were placed in this encounter  New Medications Ordered This Visit   Medications    methocarbamol (ROBAXIN) 750 mg tablet     Sig: Take 1 tablet (750 mg total) by mouth 2 (two) times a day as needed for muscle spasms     Dispense:  60 tablet     Refill:  2    traMADol (ULTRAM) 50 mg tablet     Sig: Take 2 tablets (100 mg total) by mouth 2 (two) times a day as needed for moderate pain     Dispense:  120 tablet     Refill:  2    gabapentin (NEURONTIN) 300 mg capsule     Sig: Take 2 capsules (600 mg total) by mouth daily at bedtime     Dispense:  60 capsule     Refill:  2       My impressions and treatment recommendations were discussed in detail with the patient who verbalized understanding and had no further questions            [place] Prescription Drug Monitoring Program report was reviewed and was appropriate     A urine drug screen was collected at today's office visit as part of our medication management protocol  The point of care testing results were appropriate for what was being prescribed  The specimen will be sent for confirmatory testing  The drug screen is medically necessary because the patient is either dependent on opioid medication or is being considered for opioid medication therapy and the results could impact ongoing or future treatment  The drug screen is to evaluate for the presences or absence of prescribed, non-prescribed, and/or illicit drugs/substances  There are risks associated with opioid medications, including dependence, addiction and tolerance  The patient understands and agrees to use these medications only as prescribed  Potential side effects of the medications include, but are not limited to, constipation, drowsiness, addiction, impaired judgment and risk of fatal overdose if not taken as prescribed  The patient was warned against driving while taking sedation medications  Sharing medications is a felony  At this point in time, the patient is showing no signs of addiction, abuse, diversion or suicidal ideation  History of Present Illness:    Augustus Durham is a 63 y o  female who presents for follow-up related to her chronic low back pain symptoms  Today she rates her pain 8/10 this is described as dull, aching, sharp, throbbing, and shooting  She feels that her symptoms are worsening and she is becoming less functional   She has also been experiencing some right so I did upper back pain around her right shoulder blade, this has been bothering her for the past few months  She continues to take tramadol 50 mg, 2 tablets twice daily  She does have a prescription for methocarbamol however she tells me she has not been taking this  Her medication provides 50% relief with constipation as her only side effect      Patient has been seeing another physician Dr Chirag Reynolds for her synovial cyst   She did have this aspirated prior to her last office visit with me and since then he has also been providing her with steroid injections that have given her about a week or 2 of relief  They also have provided her with a brace she is wearing that during today's office visit  Patient tells me she has tried the spinal cord stimulator in the past and due to scar tissue she could not get the leads placed  Pain Contract Signed:  1/10/2022  Last Urine Drug Screen:  1/10/2022    Other than as stated above, the patient denies any interval changes in medications, medical condition, mental condition, symptoms, or allergies since the last office visit  Review of Systems:    Review of Systems   Constitutional: Negative for chills, fatigue and unexpected weight change  HENT: Negative for ear pain, mouth sores, nosebleeds, sinus pain and sore throat  Eyes: Negative for pain and visual disturbance  Respiratory: Negative for choking and wheezing  Cardiovascular: Negative for chest pain and palpitations  Gastrointestinal: Positive for constipation  Negative for abdominal pain and nausea  Endocrine: Negative for cold intolerance and heat intolerance  Genitourinary: Negative for decreased urine volume, enuresis and hematuria  Musculoskeletal: Positive for arthralgias and gait problem  Negative for joint swelling and myalgias  Skin: Negative for rash  Allergic/Immunologic: Negative for environmental allergies  Neurological: Negative for dizziness, weakness and numbness  Hematological: Does not bruise/bleed easily  Psychiatric/Behavioral: Positive for confusion  Negative for behavioral problems and self-injury  The patient is not hyperactive            Patient Active Problem List   Diagnosis    Arthritis of lumbar spine    Chronic low back pain    Chronic pain syndrome    Chronic venous insufficiency    Depression, recurrent ([address] )    Hyperlipidemia    Lumbar postlaminectomy syndrome    Primary osteoarthritis of left hip    Restless legs syndrome    Sleep disturbance    Hernia of anterior abdominal wall    S/P right knee arthroscopy    Environmental allergies    Lumbar spondylosis    Lumbar radiculopathy    Prediabetes    Vitamin D deficiency    Dolan's esophagus with dysplasia    Gastroesophageal reflux disease    Laryngopharyngeal reflux (LPR)    Chronic cough    Vocal fold paresis, right    Dysphonia    Muscle tension dysphonia    Glottic insufficiency    Reflux laryngitis    Laryngeal edema    Allergic rhinitis due to [employer] house dust mite    Mild intermittent asthma without complication    Upper airway cough syndrome    Neck pain    Cervical spondylosis without myelopathy    Osteoarthritis of multiple joints    Sacroiliitis, not elsewhere classified ([address]    Lipoma of torso    Recurrent umbilical hernia    Anemia    Hypokalemia    Macromastia    Long-term current use of opiate analgesic    Uncomplicated opioid dependence ([address]    Motion sickness    Recurrent incisional hernia    Podagra       Past Medical History:   Diagnosis Date    Anemia     Anesthesia     "sometimes low BP upon waking up" pt states she stopped breathing 1 time during surgery    Arthritis     Asthma     Back pain     Dolan's esophagus     Chronic pain disorder     Chronic venous insufficiency     Cough variant asthma     COVID-19 03/2020    Depression     Environmental allergies     dust    GERD (gastroesophageal reflux disease)     Hiatal hernia     History of anemia     History of fusion of spine for scoliosis     "as a teenager"    History of transfusion     1978 - no adverse reaction    Left lumbar radiculitis     Last Assessed: [id number]    Lumbar postlaminectomy syndrome     Migraines     Morbid obesity ([address] )     Motion sickness     Osteoarthritis     of left hip    Right knee pain     Seasonal allergies     Shortness of breath     Umbilical hernia     Uses brace     right knee    Wears glasses        Past Surgical History:   Procedure Laterality Date    ARTHRODESIS      lumbar    ARTHRODESIS      Spinal Arthrodesis for Deformity; Last Assessed: [id number]    BACK SURGERY      lumbar fusion,with nydia/screw and cage implant    BACK SURGERY      surgery for scoliosis    BREAST CYST EXCISION Right     benign    COLONOSCOPY      COLONOSCOPY N/A 3/14/2019    Procedure: COLONOSCOPY;  Surgeon: Lexy Martinez MD;  [facility]; Service: Gastroenterology    ESOPHAGOGASTRODUODENOSCOPY N/A 3/14/2019    Procedure: ESOPHAGOGASTRODUODENOSCOPY (EGD) W RFA(BARRX); Surgeon: Lexy Martinez MD;  [facility]; Service: Gastroenterology    HERNIA REPAIR      umbilical hernia repair x2    ORTHOPEDIC SURGERY      PLANTAR FASCIA SURGERY Left     ID BREAST REDUCTION Bilateral 3/12/2021    Procedure: BREAST REDUCTION;  Surgeon: Kaye Kay MD;  Location: [address] [place] [address] MAIN OR;  Service: Plastics    ID COLONOSCOPY FLX DX W/COLLJ SPEC WHEN PFRMD N/A 2/20/2018    Procedure: COLONOSCOPY with polypectomy;  Surgeon: Ron Treviño MD;  [facility]; Service: General    ID ESOPHAGOGASTRODUODENOSCOPY TRANSORAL DIAGNOSTIC N/A 5/24/2017    Procedure: ESOPHAGOGASTRODUODENOSCOPY (EGD); Surgeon: Xavier Jiang MD;  Location: [facility]; Service: Gastroenterology    ID ESOPHAGOGASTRODUODENOSCOPY TRANSORAL DIAGNOSTIC N/A 8/31/2017    Procedure: ESOPHAGOGASTRODUODENOSCOPY (EGD) W RFA;  Surgeon: Kiran Robison MD;  Location:  GI LAB;   Service: Gastroenterology    ID KNEE SCOPE,MED/LAT MENISECTOMY Right 4/4/2018    Procedure: ARTHROSCOPY KNEE PARTIAL MEDIAL MENISECTOMY , CHONDROPLASTY;  Surgeon: Myra Eaton DO;  [facility];  Service: Orthopedics    ID LAP, [facility] N/A 1/21/2021    Procedure: REPAIR HERNIA INCISIONAL LAPAROSCOPIC W/ ROBOTICS, with mesh;  Surgeon: Clementine Thorne MD;  [facility];  Service: General    [address] [place] [address] EXTRACTION         Family History   Problem Relation Age of Onset    Lung cancer Mother     Cancer Mother     Alcohol abuse Father     Other Father         Cardiac Disorder    Depression Father     Hypertension Father     Heart attack Father     Breast cancer Maternal Grandmother     Lung cancer Maternal Grandmother     Diabetes type I Other     No Known Problems Sister     No Known Problems Brother     No Known Problems Maternal Grandfather     Leukemia Paternal Grandmother     No Known Problems Paternal Grandfather     No Known Problems Sister     No Known Problems Maternal Aunt     No Known Problems Paternal Aunt     Stroke Neg Hx        Social History     Occupational History    Not on file   Tobacco Use    Smoking status: Never Smoker    Smokeless tobacco: Never Used   Vaping Use    Vaping Use: Never used   Substance and Sexual Activity    Alcohol use: Not Currently    Drug use: No    Sexual activity: Not Currently         Current Outpatient Medications:     dexlansoprazole (DEXILANT) 60 MG capsule, Take 1 capsule (60 mg total) by mouth 2 (two) times a day, Disp: 60 capsule, Rfl: 0    DULoxetine (CYMBALTA) 60 mg delayed release capsule, Take 1 capsule (60 mg total) by mouth daily, Disp: 180 capsule, Rfl: 0    MAGNESIUM PO, Take 1 tablet by mouth daily, Disp: , Rfl:     methocarbamol (ROBAXIN) 750 mg tablet, Take 1 tablet (750 mg total) by mouth 2 (two) times a day as needed for muscle spasms, Disp: 60 tablet, Rfl: 2    mometasone (NASONEX) 50 mcg/act nasal spray, 2 sprays into each nostril daily, Disp: , Rfl:     POTASSIUM PO, Take 1 tablet by mouth daily, Disp: , Rfl:     pramipexole (MIRAPEX) 0 25 mg tablet, TAKE 2 TABLETS(0 5 MG) BY MOUTH DAILY AT BEDTIME, Disp: 180 tablet, Rfl: 0    Turmeric 400 MG CAPS, Take by mouth, Disp: , Rfl:     VITAMIN D PO, Take 1 capsule by mouth daily, Disp: , Rfl:     albuterol (2 5 mg/3 mL) 0 083 % nebulizer solution, VVN Q 4 H PRN (Patient not taking: Reported on 10/6/2021), Disp: , Rfl: 3    gabapentin (NEURONTIN) 300 mg capsule, Take 2 capsules (600 mg total) by mouth daily at bedtime, Disp: 60 capsule, Rfl: 2    traMADol (ULTRAM) 50 mg tablet, Take 2 tablets (100 mg total) by mouth 2 (two) times a day as needed for moderate pain, Disp: 120 tablet, Rfl: 2    Allergies   Allergen Reactions    Dust Mite Extract Shortness Of Breath    Latex Itching and Blisters    Other Other (See Comments)     Seasonal AND cock roaches    Sulfa Antibiotics Vomiting     Topical-blistering       Physical Exam:    /85   Pulse 71   Temp (!) 96 8 °F (36 °C)   Ht 5' 4" (1 626 m) Comment: verbally  BMI 31 76 kg/m²     Constitutional:normal, well developed, well nourished, alert, in no distress and non-toxic and no overt pain behavior  Eyes:anicteric  HEENT:grossly intact  Neck:supple, symmetric, trachea midline and no masses   Pulmonary:even and unlabored  Cardiovascular:No edema or pitting edema present  Skin:Normal without rashes or lesions and well hydrated  Psychiatric:Mood and affect appropriate  Neurologic:Cranial Nerves II-XII grossly intact  Musculoskeletal:normal      Imaging  No orders to display         No orders of the defined types were placed in this encounter

## 2022-01-10 NOTE — PATIENT INSTRUCTIONS
Opioid Safety   AMBULATORY CARE:   Opioid safety  includes the correct use, storage, and disposal of opioids  Examples of opioid medicines to treat pain include oxycodone, morphine, fentanyl, and codeine  Call your local emergency number (911 in the [address]), or have someone else call if:   · You have a seizure  · You cannot be woken  · You have trouble staying awake and your breathing is slow or shallow  · Your speech is slurred, or you are confused  · You are dizzy or stumble when you walk  Call your doctor, or have someone close to you call if:   · You are extremely drowsy, or you have trouble staying awake or speaking  · You have pale or clammy skin  · You have blue fingernails or lips  · Your heartbeat is slower than normal     · You cannot stop vomiting  · You have questions or concerns about your condition or care  Use opioids safely:   · Take prescribed opioids exactly as directed  Opioids come with directions based on the kind of opioid and how it is given  Do not take more than the recommended amount, or for longer than needed  · Do not give opioids to others or take opioids that belong to someone else  Misuse of opioids can lead to an addiction or overdose  · Do not mix opioids with other medicines or alcohol  The combination can cause an overdose, or lead to a coma  · Do not drive or operate heavy machinery after you take the opioid  Your provider or pharmacist can tell you how long to wait after a dose before you do these activities  · Talk to your healthcare provider if you have any side effects  He or she can help you prevent or relieve side effects  Side effects include nausea, sleepiness, itching, and trouble thinking clearly  Manage constipation:  Constipation is the most common side effect of opioid medicine  Constipation is when you have hard, dry bowel movements, or you go longer than usual between bowel movements   Tell your healthcare provider about all changes in your bowel movements while you are taking opioids  He or she may recommend laxative medicine to help you have a bowel movement  He or she may also change the kind of opioid you are taking, or change when you take it  The following are more ways you can prevent or relieve constipation:  · Drink liquids as directed  You may need to drink extra liquids to help soften and move your bowels  Ask how much liquid to drink each day and which liquids are best for you  · Eat high-fiber foods  This may help decrease constipation by adding bulk to your bowel movements  High-fiber foods include fruits, vegetables, whole-grain breads and cereals, and beans  Your healthcare provider or dietitian can help you create a high-fiber meal plan  Your provider may also recommend a fiber supplement if you cannot get enough fiber from food  · Exercise regularly  Regular physical activity can help stimulate your intestines  Walking is a good exercise to prevent or relieve constipation  Ask which exercises are best for you  · Schedule a time each day to have a bowel movement  This may help train your body to have regular bowel movements  Bend forward while you are on the toilet to help move the bowel movement out  Sit on the toilet for at least 10 minutes, even if you do not have a bowel movement  Store opioids safely:   · Store opioids where others cannot easily get them  Keep them in a locked cabinet or secure area  Do not  keep them in a purse or other bag you carry with you  A person may be looking for something else and find the opioids  · Make sure opioids are stored out of the reach of children  A child can easily overdose on opioids  Opioids may look like candy to a small child  The best way to dispose of opioids: The laws vary by country and area   In the [place], the best way is to return the opioids through a take-back program  This program is offered by the [employer] ([address])  The following are options for using the program:  · Take the opioids to a JAMES collection site  The site is often a law enforcement center  Call your local law enforcement center for scheduled take-back days in your area  You will be given information on where to go if the collection site is in a different location  · Take the opioids to an approved pharmacy or hospital   A pharmacy or hospital may be set up as a collection site  You will need to ask if it is a JAMES collection site if you were not directed there  A pharmacy or doctor's office may not be able to take back opioids unless it is a JAMES site  · Use a mail-back system  This means you are given containers to put the opioids into  You will then mail them in the containers  · Use a take-back drop box  This is a place to leave the opioids at any time  People and animals will not be able to get into the box  Your local law enforcement agency can tell you where to find a drop box in your area  Other safe ways to dispose of opioids: The medicine may come with disposal instructions  The instructions may vary depending on the brand of medicine you are using  Instructions may come in a Medication Guide, but not every medicine has one  You may instead get instructions from your pharmacy or doctor  Follow instructions carefully  The following are general guidelines to follow:  · Find out if you can flush the opioid  Some opioids can be flushed down the toilet or poured into the sink  You will need to contact authorities in your area to see if this is an option for you  The FDA also offers a list of medicines that are safe to flush down the toilet  You can check the list if you cannot get the information for your local area  · Ask your waste management company about rules for putting opioids in the trash  The company will be able to give you specific directions   Scratch out personal information on the original medicine label so it cannot be read  Then put it in the trash  Do not label the trash or put any information on it about the opioids  It should look like regular household trash so no one is tempted to look for the opioids  Keep the trash out of the reach of children and animals  Always make sure trash is secure  · Talk to officials if you live in a facility  If you live in a nursing home or assisted living center, talk to an official  The person will know the rules for your area  Other ways to manage pain:   · Ask your healthcare provider about non-opioid medicines to control pain  Nonprescription medicines include NSAIDs (such as ibuprofen) and acetaminophen  Prescription medicines include muscle relaxers, antidepressants, and steroids  · Pain may be managed without any medicines  Some ways to relieve pain include massage, aromatherapy, or meditation  Physical or occupational therapy may also help  For more information:   · Drug Enforcement Administration  [facility] [facility] [address]  Phone: 1- 579 - 225-6921  Web Address: [facility]/drug_disposal/    · [address]  and Drug Administration  [facility] , [address] [place] [address]  Phone: 5- 684 - 959-7307  Web Address: http://[employer]/    Follow up with your doctor or pain specialist as directed: You may need to have your dose adjusted  Your doctor or pain specialist can also help you find ways to manage pain without opioids  Write down your questions so you remember to ask them during your visits  © Copyright [employer] 2021 Information is for End User's use only and may not be sold, redistributed or otherwise used for commercial purposes  All illustrations and images included in CareNotes® are the copyrighted property of A D A M [employer] Inc  or [employer]  The above information is an  only  It is not intended as medical advice for individual conditions or treatments   Talk to your doctor, nurse or pharmacist before following any medical regimen to see if it is safe and effective for you

## 2022-01-11 ENCOUNTER — OFFICE VISIT (OUTPATIENT)
Dept: INTERNAL MEDICINE CLINIC | Facility: CLINIC | Age: 62
End: 2022-01-11
Payer: COMMERCIAL

## 2022-01-11 VITALS — SYSTOLIC BLOOD PRESSURE: 130 MMHG | TEMPERATURE: 97.3 F | HEART RATE: 76 BPM | DIASTOLIC BLOOD PRESSURE: 83 MMHG

## 2022-01-11 DIAGNOSIS — F11.20 UNCOMPLICATED OPIOID DEPENDENCE (HCC): ICD-10-CM

## 2022-01-11 DIAGNOSIS — K21.9 GASTROESOPHAGEAL REFLUX DISEASE WITHOUT ESOPHAGITIS: ICD-10-CM

## 2022-01-11 DIAGNOSIS — J45.20 MILD INTERMITTENT ASTHMA WITHOUT COMPLICATION: ICD-10-CM

## 2022-01-11 DIAGNOSIS — F33.9 DEPRESSION, RECURRENT (HCC): ICD-10-CM

## 2022-01-11 DIAGNOSIS — R73.03 PREDIABETES: ICD-10-CM

## 2022-01-11 DIAGNOSIS — K21.9 GASTROESOPHAGEAL REFLUX DISEASE, UNSPECIFIED WHETHER ESOPHAGITIS PRESENT: ICD-10-CM

## 2022-01-11 DIAGNOSIS — I87.2 CHRONIC VENOUS INSUFFICIENCY: ICD-10-CM

## 2022-01-11 DIAGNOSIS — E78.01 FAMILIAL HYPERCHOLESTEROLEMIA: Primary | ICD-10-CM

## 2022-01-11 DIAGNOSIS — G25.81 RESTLESS LEGS SYNDROME: ICD-10-CM

## 2022-01-11 DIAGNOSIS — E55.9 VITAMIN D DEFICIENCY: ICD-10-CM

## 2022-01-11 DIAGNOSIS — K22.70 BARRETT'S ESOPHAGUS WITHOUT DYSPLASIA: ICD-10-CM

## 2022-01-11 PROCEDURE — 99214 OFFICE O/P EST MOD 30 MIN: CPT | Performed by: INTERNAL MEDICINE

## 2022-01-11 RX ORDER — DEXLANSOPRAZOLE 60 MG/1
60 CAPSULE, DELAYED RELEASE ORAL 2 TIMES DAILY
Qty: 180 CAPSULE | Refills: 1 | Status: SHIPPED | OUTPATIENT
Start: 2022-01-11 | End: 2022-01-14

## 2022-01-11 NOTE — PROGRESS NOTES
[facility] [place]      NAME: Silvestre Solorzano  AGE: 63 y o  SEX: female  : 1960   MRN: [id number]    DATE: 2022  TIME: 4:11 PM    Assessment and Plan   1  Familial hypercholesterolemia    Continue diet  Check lipid profile   - TSH, 3rd generation  - Lipid panel    2  Gastroesophageal reflux disease    Continue Dexilant indefinitely because of Dolan's esophagus  The patient intends to contact GI in the near future to update her screening EGD  3  Mild intermittent asthma without complication    Currently stable off of medication  4  Chronic venous insufficiency    The patient will be getting an echocardiogram   I do not believe that her peripheral edema  Represents any element of congestive heart failure  I believe that this is more related to venous insufficiency  She will consider compression stockings  5  Vitamin D deficiency    Not currently on replacement  Check vitamin-D level  - Vitamin D 25 hydroxy    6  Uncomplicated opioid dependence ([facility] [address] )    Followed by Dr Sally Forbes  7  Prediabetes    Stable on diet  - Comprehensive metabolic panel    8  Depression, recurrent (HCC)    Stable on duloxetine  9  Restless legs syndrome    This has been more problematic of late  Iron deficiency must be excluded  - Iron Saturation %  - CBC and differential      The patient will continue her current medications for now  She will update her lab work in the immediate future  If she has iron deficiency, iron infusions will be needed as she has been intolerant to oral iron in the past   She will get some thigh-high compression stockings  Echocardiogram will be scheduled  I suggested rheumatology evaluation if she wants to pursue the idea  Of Jv-Danlos syndrome        Return to office in:   3 to 4 months    Chief Complaint     Chief Complaint   Patient presents with    Follow-up     due for hep c and HIV screen    Immunizations     due for flu, pneumo, and covid vaccines History of Present Illness      the patient returned to the office for re-evaluation of restless leg syndrome, esophageal reflux with Dolan's esophagus, hyperlipidemia, depression, and peripheral edema  She has had increasing difficulty with restless legs  She is on pramipexole and gabapentin  She also gets some relief with tramadol  She has had a long history of intermittent iron deficiency  She was last tested in April of 2021  Her hemoglobin was normal but her iron was borderline  She has had peripheral edema  Echocardiogram has been requested  She has had no PND or orthopnea  She does have a history of venous insufficiency  She said that as result of COVID she has lost hearing and is seeing Dr Michaela Allen  She is concerned that she might have Jv-Danlos syndrome  She is having some pain in her right shoulder at times  This is worse with certain movements and feels like a muscular issue  She has significant knee arthritis and has seen ortho in the past   Knee replacement has been discussed  The following portions of the patient's history were reviewed and updated as appropriate: allergies, current medications, past family history, past medical history, past social history, past surgical history and problem list     Review of Systems   Review of Systems   Constitutional: Negative  HENT: Negative for congestion, ear pain, postnasal drip, rhinorrhea, sore throat and trouble swallowing  Eyes: Negative for pain, discharge, redness and visual disturbance  Respiratory: Negative for cough, shortness of breath and wheezing  Cardiovascular: Positive for leg swelling  Negative for chest pain and palpitations  Gastrointestinal: Negative  Endocrine: Negative  Genitourinary: Negative for difficulty urinating, dysuria, frequency, hematuria and urgency  Musculoskeletal: Positive for arthralgias and back pain  Negative for gait problem, joint swelling and myalgias     Skin: Negative for rash    Neurological: Negative for dizziness, speech difficulty, weakness, light-headedness, numbness and headaches  Hematological: Negative  Psychiatric/Behavioral: Negative for confusion, decreased concentration, dysphoric mood and sleep disturbance  The patient is not nervous/anxious  Active Problem List     Patient Active Problem List   Diagnosis    Arthritis of lumbar spine    Chronic low back pain    Chronic pain syndrome    Chronic venous insufficiency    Depression, recurrent (HCC)    Hyperlipidemia    Lumbar postlaminectomy syndrome    Primary osteoarthritis of left hip    Restless legs syndrome    Sleep disturbance    Hernia of anterior abdominal wall    S/P right knee arthroscopy    Environmental allergies    Lumbar spondylosis    Lumbar radiculopathy    Prediabetes    Vitamin D deficiency    Dolan's esophagus with dysplasia    Gastroesophageal reflux disease    Laryngopharyngeal reflux (LPR)    Chronic cough    Vocal fold paresis, right    Dysphonia    Muscle tension dysphonia    Glottic insufficiency    Reflux laryngitis    Laryngeal edema    Allergic rhinitis due to American house dust mite    Mild intermittent asthma without complication    Upper airway cough syndrome    Neck pain    Cervical spondylosis without myelopathy    Osteoarthritis of multiple joints    Sacroiliitis, not elsewhere classified (HCC)    Lipoma of torso    Recurrent umbilical hernia    Anemia    Hypokalemia    Macromastia    Long-term current use of opiate analgesic    Uncomplicated opioid dependence (HCC)    Motion sickness    Recurrent incisional hernia    Podagra       Objective   /83 (BP Location: Left arm, Patient Position: Sitting, Cuff Size: Large)   Pulse 76   Temp (!) 97 3 °F (36 3 °C) (Tympanic)     Physical Exam  Constitutional:       General: She is not in acute distress  Appearance: She is well-developed     HENT:      Head: Normocephalic and atraumatic  Neck:      Thyroid: No thyromegaly  Vascular: No JVD  Trachea: No tracheal deviation  Cardiovascular:      Rate and Rhythm: Normal rate and regular rhythm  Heart sounds: Normal heart sounds  No murmur heard  No friction rub  No gallop  Pulmonary:      Effort: Pulmonary effort is normal       Breath sounds: Normal breath sounds  No wheezing or rales  Chest:      Chest wall: No tenderness  Abdominal:      General: Bowel sounds are normal  There is no distension  Palpations: Abdomen is soft  There is no mass  Tenderness: There is no abdominal tenderness  There is no rebound  Musculoskeletal:         General: No tenderness  Normal range of motion  Cervical back: Neck supple  Lymphadenopathy:      Cervical: No cervical adenopathy  Skin:     General: Skin is warm and dry  Neurological:      Mental Status: She is alert and oriented to person, place, and time  Psychiatric:         Mood and Affect: Mood normal          Behavior: Behavior normal          Thought Content: Thought content normal          Judgment: Judgment normal          Pertinent Laboratory/Diagnostic Studies:  No visits with results within 3 Month(s) from this visit     Latest known visit with results is:   Appointment on 06/17/2021   Component Date Value Ref Range Status    BUN 06/17/2021 11  5 - 25 mg/dL Final    Creatinine 06/17/2021 0 68  0 60 - 1 30 mg/dL Final    eGFR 06/17/2021 96  ml/min/1 73sq m Final           Current Medications     Current Outpatient Medications:     dexlansoprazole (DEXILANT) 60 MG capsule, Take 1 capsule (60 mg total) by mouth 2 (two) times a day, Disp: 180 capsule, Rfl: 1    DULoxetine (CYMBALTA) 60 mg delayed release capsule, Take 1 capsule (60 mg total) by mouth daily, Disp: 180 capsule, Rfl: 0    gabapentin (NEURONTIN) 300 mg capsule, Take 2 capsules (600 mg total) by mouth daily at bedtime, Disp: 60 capsule, Rfl: 2    MAGNESIUM PO, Take 1 tablet by mouth daily, Disp: , Rfl:     methocarbamol (ROBAXIN) 750 mg tablet, Take 1 tablet (750 mg total) by mouth 2 (two) times a day as needed for muscle spasms, Disp: 60 tablet, Rfl: 2    POTASSIUM PO, Take 1 tablet by mouth daily, Disp: , Rfl:     pramipexole (MIRAPEX) 0 25 mg tablet, TAKE 2 TABLETS(0 5 MG) BY MOUTH DAILY AT BEDTIME, Disp: 180 tablet, Rfl: 0    traMADol (ULTRAM) 50 mg tablet, Take 2 tablets (100 mg total) by mouth 2 (two) times a day as needed for moderate pain, Disp: 120 tablet, Rfl: 2    Turmeric 400 MG CAPS, Take by mouth, Disp: , Rfl:     VITAMIN D PO, Take 1 capsule by mouth daily, Disp: , Rfl:       Margarette Persaud MD

## 2022-01-13 LAB
6MAM UR QL CFM: NEGATIVE NG/ML
7AMINOCLONAZEPAM UR QL CFM: NEGATIVE NG/ML
ACCEPTABLE CREAT UR QL: NORMAL MG/DL
ACCEPTIBLE SP GR UR QL: NORMAL
AMITRIP UR QL CFM: NEGATIVE NG/ML
AMPHET UR QL CFM: NEGATIVE NG/ML
AMPHET UR QL CFM: NEGATIVE NG/ML
BUPRENORPHINE UR QL CFM: NEGATIVE NG/ML
BZE UR QL CFM: NEGATIVE NG/ML
EDDP UR QL CFM: NEGATIVE NG/ML
ETHYL GLUCURONIDE UR QL CFM: NEGATIVE NG/ML
ETHYL SULFATE UR QL SCN: NEGATIVE NG/ML
FENTANYL UR QL CFM: NEGATIVE NG/ML
GLIADIN IGG SER IA-ACNC: NEGATIVE NG/ML
HYDROCODONE UR QL CFM: NEGATIVE NG/ML
HYDROMORPHONE UR QL CFM: NEGATIVE NG/ML
LORAZEPAM UR QL CFM: NEGATIVE NG/ML
METHADONE UR QL CFM: NEGATIVE NG/ML
METHAMPHET UR QL CFM: NEGATIVE NG/ML
MORPHINE UR QL CFM: NEGATIVE NG/ML
NITRITE UR QL: NORMAL UG/ML
NORBUPRENORPHINE UR QL CFM: NEGATIVE NG/ML
NORDIAZEPAM UR QL CFM: NEGATIVE NG/ML
NORFENTANYL UR QL CFM: NEGATIVE NG/ML
NORHYDROCODONE UR QL CFM: NEGATIVE NG/ML
NOROXYCODONE UR QL CFM: NEGATIVE NG/ML
NORTRIP UR QL CFM: NEGATIVE NG/ML
OXAZEPAM UR QL CFM: NEGATIVE NG/ML
OXYCODONE UR QL CFM: NEGATIVE NG/ML
OXYMORPHONE UR QL CFM: NEGATIVE NG/ML
OXYMORPHONE UR QL CFM: NEGATIVE NG/ML
RESULT ALL_PRESCRIBED MEDS AND SPECIAL INSTRUCTIONS: NORMAL
SL AMB 3-METHYL-FENTANYL QUANTIFICATION: NORMAL NG/ML
SL AMB 4-ANPP QUANTIFICATION: NORMAL NG/ML
SL AMB 4-FIBF QUANTIFICATION: NORMAL NG/ML
SL AMB 7-OH-MITRAGYNINE (KRATOM ALKALOID) QUANTIFICATION: NEGATIVE NG/ML
SL AMB ACETYL FENTANYL QUANTIFICATION: NORMAL NG/ML
SL AMB ACETYL NORFENTANYL QUANTIFICATION: NORMAL NG/ML
SL AMB ACRYL FENTANYL QUANTIFICATION: NORMAL NG/ML
SL AMB BUTRYL FENTANYL QUANTIFICATION: NORMAL NG/ML
SL AMB CARFENTANIL QUANTIFICATION: NORMAL NG/ML
SL AMB CTHC (MARIJUANA METABOLITE) QUANTIFICATION: NEGATIVE NG/ML
SL AMB CYCLOPROPYL FENTANYL QUANTIFICATION: NORMAL NG/ML
SL AMB FURANYL FENTANYL QUANTIFICATION: NORMAL NG/ML
SL AMB METHOXYACETYL FENTANYL QUANTIFICATION: NORMAL NG/ML
SL AMB N-DESMETHYL U-47700 QUANTIFICATION: NORMAL NG/ML
SL AMB N-DESMETHYL-TRAMADOL QUANTIFICATION: ABNORMAL NG/ML
SL AMB PHENTERMINE QUANTIFICATION: NEGATIVE NG/ML
SL AMB U-47700 QUANTIFICATION: NORMAL NG/ML
SPECIMEN PH ACCEPTABLE UR: NORMAL
TAPENTADOL UR QL CFM: NEGATIVE NG/ML
TEMAZEPAM UR QL CFM: NEGATIVE NG/ML
TEMAZEPAM UR QL CFM: NEGATIVE NG/ML
TRAMADOL UR QL CFM: ABNORMAL NG/ML
URATE/CREAT 24H UR: ABNORMAL NG/ML

## 2022-01-14 ENCOUNTER — TELEPHONE (OUTPATIENT)
Dept: INTERNAL MEDICINE CLINIC | Facility: CLINIC | Age: 62
End: 2022-01-14

## 2022-01-14 DIAGNOSIS — K21.9 GASTROESOPHAGEAL REFLUX DISEASE, UNSPECIFIED WHETHER ESOPHAGITIS PRESENT: ICD-10-CM

## 2022-01-14 DIAGNOSIS — K22.719 BARRETT'S ESOPHAGUS WITH DYSPLASIA: Primary | ICD-10-CM

## 2022-01-14 RX ORDER — OMEPRAZOLE 40 MG/1
40 CAPSULE, DELAYED RELEASE ORAL DAILY
Qty: 30 CAPSULE | Refills: 3 | Status: SHIPPED | OUTPATIENT
Start: 2022-01-14 | End: 2022-01-19 | Stop reason: SDUPTHER

## 2022-01-14 NOTE — TELEPHONE ENCOUNTER
Patient called stating that Elois Kidney is too expensive asking if she may use Omeprazole which would be cheaper    As per Dr Bert Nieves, okay to change medication to Omeprazole 40mg daily    Script will be sent to pharmacy

## 2022-01-19 DIAGNOSIS — K22.719 BARRETT'S ESOPHAGUS WITH DYSPLASIA: ICD-10-CM

## 2022-01-19 DIAGNOSIS — K21.9 GASTROESOPHAGEAL REFLUX DISEASE, UNSPECIFIED WHETHER ESOPHAGITIS PRESENT: ICD-10-CM

## 2022-01-19 RX ORDER — OMEPRAZOLE 40 MG/1
40 CAPSULE, DELAYED RELEASE ORAL DAILY
Qty: 90 CAPSULE | Refills: 0 | Status: SHIPPED | OUTPATIENT
Start: 2022-01-19 | End: 2022-07-20

## 2022-01-24 ENCOUNTER — OFFICE VISIT (OUTPATIENT)
Dept: PAIN MEDICINE | Facility: MEDICAL CENTER | Age: 62
End: 2022-01-24
Payer: COMMERCIAL

## 2022-01-24 VITALS
BODY MASS INDEX: 31.58 KG/M2 | HEART RATE: 80 BPM | SYSTOLIC BLOOD PRESSURE: 114 MMHG | TEMPERATURE: 98 F | DIASTOLIC BLOOD PRESSURE: 78 MMHG | WEIGHT: 185 LBS | HEIGHT: 64 IN | OXYGEN SATURATION: 97 %

## 2022-01-24 DIAGNOSIS — M96.1 LUMBAR POSTLAMINECTOMY SYNDROME: ICD-10-CM

## 2022-01-24 DIAGNOSIS — M47.816 LUMBAR SPONDYLOSIS: Primary | ICD-10-CM

## 2022-01-24 DIAGNOSIS — M46.1 SACROILIITIS, NOT ELSEWHERE CLASSIFIED (HCC): ICD-10-CM

## 2022-01-24 DIAGNOSIS — S46.819A STRAIN OF TRAPEZIUS MUSCLE, UNSPECIFIED LATERALITY, INITIAL ENCOUNTER: ICD-10-CM
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
## 2022-01-24 PROCEDURE — 99213 OFFICE O/P EST LOW 20 MIN: CPT | Performed by: NURSE PRACTITIONER

## 2022-01-24 PROCEDURE — 3008F BODY MASS INDEX DOCD: CPT | Performed by: INTERNAL MEDICINE
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
## 2022-02-24 DIAGNOSIS — G25.81 RESTLESS LEGS SYNDROME: ICD-10-CM

## 2022-02-24 RX ORDER — PRAMIPEXOLE DIHYDROCHLORIDE 0.25 MG/1
0.5 TABLET ORAL
Qty: 180 TABLET | Refills: 0 | Status: SHIPPED | OUTPATIENT
Start: 2022-02-24 | End: 2022-05-02 | Stop reason: SDUPTHER

## 2022-03-31 ENCOUNTER — OFFICE VISIT (OUTPATIENT)
Dept: INTERNAL MEDICINE CLINIC | Facility: CLINIC | Age: 62
End: 2022-03-31
Payer: COMMERCIAL

## 2022-03-31 VITALS
HEIGHT: 64 IN | DIASTOLIC BLOOD PRESSURE: 82 MMHG | BODY MASS INDEX: 31.76 KG/M2 | HEART RATE: 94 BPM | SYSTOLIC BLOOD PRESSURE: 118 MMHG | TEMPERATURE: 98 F | OXYGEN SATURATION: 98 %

## 2022-03-31 DIAGNOSIS — M54.2 NECK PAIN: Primary | ICD-10-CM

## 2022-03-31 DIAGNOSIS — L29.9 PRURITUS: ICD-10-CM

## 2022-03-31 DIAGNOSIS — M62.81 MUSCLE WEAKNESS OF LEFT UPPER EXTREMITY: ICD-10-CM

## 2022-03-31 PROCEDURE — 99214 OFFICE O/P EST MOD 30 MIN: CPT | Performed by: INTERNAL MEDICINE
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
## 2022-04-07 ENCOUNTER — TELEPHONE (OUTPATIENT)
Dept: PAIN MEDICINE | Facility: CLINIC | Age: 62
End: 2022-04-07

## 2022-04-07 NOTE — TELEPHONE ENCOUNTER
S/w pt and advised refills for Tramadol require a face to face visit  Pt scheduled for an ov with [facility] on 4/8 with an 11 am arrival time

## 2022-04-07 NOTE — TELEPHONE ENCOUNTER
Pt contacted Call Center requested refill of their medication          Medication Name: traMADol (ULTRAM    Dosage of Med:50 mg tablet     Frequency of Med: 2xs a day       Remaining Medication: about a month       Pharmacy and Location:  [id number] [address]

## 2022-04-08 ENCOUNTER — OFFICE VISIT (OUTPATIENT)
Dept: PAIN MEDICINE | Facility: MEDICAL CENTER | Age: 62
End: 2022-04-08
Payer: COMMERCIAL

## 2022-04-08 VITALS
TEMPERATURE: 97.9 F | OXYGEN SATURATION: 97 % | DIASTOLIC BLOOD PRESSURE: 78 MMHG | HEART RATE: 75 BPM | SYSTOLIC BLOOD PRESSURE: 112 MMHG

## 2022-04-08 DIAGNOSIS — Z79.891 LONG-TERM CURRENT USE OF OPIATE ANALGESIC: Primary | ICD-10-CM

## 2022-04-08 DIAGNOSIS — F11.20 UNCOMPLICATED OPIOID DEPENDENCE (HCC): ICD-10-CM

## 2022-04-08 DIAGNOSIS — M79.18 MYOFASCIAL PAIN SYNDROME: ICD-10-CM

## 2022-04-08 DIAGNOSIS — G89.4 CHRONIC PAIN SYNDROME: ICD-10-CM

## 2022-04-08 PROCEDURE — 80305 DRUG TEST PRSMV DIR OPT OBS: CPT | Performed by: NURSE PRACTITIONER

## 2022-04-08 PROCEDURE — 99214 OFFICE O/P EST MOD 30 MIN: CPT | Performed by: NURSE PRACTITIONER

## 2022-04-08 RX ORDER — METHOCARBAMOL 500 MG/1
500 TABLET, FILM COATED ORAL 3 TIMES DAILY
Qty: 120 TABLET | Refills: 2 | Status: SHIPPED | OUTPATIENT
Start: 2022-04-08 | End: 2022-07-20 | Stop reason: SDUPTHER

## 2022-04-08 RX ORDER — TRAMADOL HYDROCHLORIDE 100 MG/1
50 TABLET, COATED ORAL EVERY 8 HOURS PRN
Qty: 90 TABLET | Refills: 2 | Status: SHIPPED | OUTPATIENT
Start: 2022-04-08 | End: 2022-04-13 | Stop reason: SDUPTHER

## 2022-04-08 NOTE — PROGRESS NOTES
Assessment:  1  Long-term current use of opiate analgesic    2  Chronic pain syndrome    3  Uncomplicated opioid dependence ([address] )    4  Myofascial pain syndrome        Plan:  1  Increase tramadol from 100 mg twice a day to 100 mg 3 times a day  Patient states that she does have some constipation from this that is controlled with increased water consumption  2  Decrease methocarbamol 750 mg to 500 mg 3 times a day  Patient states that 750 mg causes too much drowsiness for her to take more than at bedtime  She may take 1000 mg at bedtime if 500 is ineffective  3  Stop gabapentin 600 mg  Patient has bilateral lower extremity pitting edema that she correlates to worsening since starting gabapentin  She was provided with a titration dose to decrease by 300 mg daily for the next week and then 300 mg every other day for week and then stop  I also advised her to double check with her pharmacist to see if there was a quicker way to wean off  [place] Prescription Drug Monitoring Program report was reviewed and was appropriate     A urine drug screen was collected at today's office visit as part of our medication management protocol  The point of care testing results were appropriate for what was being prescribed  The specimen will be sent for confirmatory testing  The drug screen is medically necessary because the patient is either dependent on opioid medication or is being considered for opioid medication therapy and the results could impact ongoing or future treatment  The drug screen is to evaluate for the presences or absence of prescribed, non-prescribed, and/or illicit drugs/substances  There are risks associated with opioid medications, including dependence, addiction and tolerance  The patient understands and agrees to use these medications only as prescribed   Potential side effects of the medications include, but are not limited to, constipation, drowsiness, addiction, impaired judgment and risk of fatal overdose if not taken as prescribed  The patient was warned against driving while taking sedation medications  Sharing medications is a felony  At this point in time, the patient is showing no signs of addiction, abuse, diversion or suicidal ideation  The patient will follow-up in 12 weeks for medication prescription refill and reevaluation  The patient was advised to contact the office should their symptoms worsen in the interim  The patient was agreeable and verbalized an understanding  History of Present Illness: The patient is a 63 y o  female last seen on 01/24/2022 who presents for a follow up office visit in regards to chronic pain secondary to lumbar post-laminectomy syndrome  The patient currently reports a pain score of 9/10 that is constant with a quality that is burning, throbbing, pressure-like and shooting  She states that the pain is in her low back primarily on the left side  She states that it has been worse because she is packing up her house for a moved to [place]  She tells me that taking the tramadol twice a day is no longer effective in helping her pain symptoms  Current pain medications includes:  Tramadol, methocarbamol and gabapentin    The patient reports that this regimen is providing 35-40 % pain relief  The patient is reporting constipation from this pain medication regimen  Tramadol last taken 04/07 PM  Pain Contract Signed: 01/10/2022  Last Urine Drug Screen: 04/08/2022    I have personally reviewed and/or updated the patient's past medical history, past surgical history, family history, social history, current medications, allergies, and vital signs today  Review of Systems:    Review of Systems   Respiratory: Negative for shortness of breath  Cardiovascular: Positive for leg swelling  Negative for chest pain  Gastrointestinal: Positive for constipation  Negative for diarrhea, nausea and vomiting     Musculoskeletal: Positive for back pain, gait problem, joint swelling and myalgias  Negative for arthralgias  Skin: Negative for rash  Neurological: Positive for weakness  Negative for dizziness and seizures  All other systems reviewed and are negative  Past Medical History:   Diagnosis Date    Anemia     Anesthesia     "sometimes low BP upon waking up" pt states she stopped breathing 1 time during surgery    Arthritis     Asthma     Back pain     Back pain     Dolan's esophagus     Chronic pain disorder     Chronic venous insufficiency     Cough variant asthma     COVID-19 03/2020    Depression     Environmental allergies     dust    GERD (gastroesophageal reflux disease)     Hiatal hernia     History of anemia     History of fusion of spine for scoliosis     "as a teenager"    History of transfusion     1978 - no adverse reaction    Left lumbar radiculitis     Last Assessed: [id number]    Lumbar postlaminectomy syndrome     Migraines     Morbid obesity ([address] )     Motion sickness     Neck pain     Osteoarthritis     of left hip    Right knee pain     Seasonal allergies     Shortness of breath     Umbilical hernia     Uses brace     right knee    Wears glasses        Past Surgical History:   Procedure Laterality Date    ARTHRODESIS      lumbar    ARTHRODESIS      Spinal Arthrodesis for Deformity; Last Assessed: [id number]    BACK SURGERY      lumbar fusion,with nydia/screw and cage implant    BACK SURGERY      surgery for scoliosis    BREAST CYST EXCISION Right     benign    COLONOSCOPY      COLONOSCOPY N/A 3/14/2019    Procedure: COLONOSCOPY;  Surgeon: Marc Moreira MD;  [facility]; Service: Gastroenterology    ESOPHAGOGASTRODUODENOSCOPY N/A 3/14/2019    Procedure: ESOPHAGOGASTRODUODENOSCOPY (EGD) W RFA(BARRX); Surgeon: Marc Moreira MD;  [facility];   Service: Gastroenterology    HERNIA REPAIR      umbilical hernia repair x2    ORTHOPEDIC SURGERY      PLANTAR FASCIA SURGERY Left     TN BREAST REDUCTION Bilateral 3/12/2021    Procedure: BREAST REDUCTION;  Surgeon: Grayson Presley MD;  Location: [address] [place] [address] MAIN OR;  Service: Plastics    TN COLONOSCOPY FLX DX W/COLLJ SPEC WHEN PFRMD N/A 2/20/2018    Procedure: COLONOSCOPY with polypectomy;  Surgeon: Ky Whiting MD;  [facility]; Service: General    TN ESOPHAGOGASTRODUODENOSCOPY TRANSORAL DIAGNOSTIC N/A 5/24/2017    Procedure: ESOPHAGOGASTRODUODENOSCOPY (EGD); Surgeon: Fouzia Vincent MD;  [facility]: [facility]; Service: Gastroenterology    TN ESOPHAGOGASTRODUODENOSCOPY TRANSORAL DIAGNOSTIC N/A 8/31/2017    Procedure: ESOPHAGOGASTRODUODENOSCOPY (EGD) W RFA;  Surgeon: Natividad Zapata MD;  Location:  GI LAB;   Service: Gastroenterology    TN KNEE SCOPE,MED/LAT MENISECTOMY Right 4/4/2018    Procedure: ARTHROSCOPY KNEE PARTIAL MEDIAL MENISECTOMY , CHONDROPLASTY;  Surgeon: Sean Goode DO;  [facility];  Service: Orthopedics    TN LAP, RECURRENT INCISIONAL HERNIA REPAIR,REDUCIBLE N/A 1/21/2021    Procedure: REPAIR HERNIA INCISIONAL LAPAROSCOPIC W/ ROBOTICS, with mesh;  Surgeon: Patrick Nair MD;  [facility];  Service: General    WISDOM TOOTH EXTRACTION         Family History   Problem Relation Age of Onset    Lung cancer Mother     Cancer Mother     Alcohol abuse Father     Other Father         Cardiac Disorder    Depression Father     Hypertension Father     Heart attack Father     Breast cancer Maternal Grandmother     Lung cancer Maternal Grandmother     Diabetes type I Other     No Known Problems Sister     No Known Problems Brother     No Known Problems Maternal Grandfather     Leukemia Paternal Grandmother     No Known Problems Paternal Grandfather     No Known Problems Sister     No Known Problems Maternal Aunt     No Known Problems Paternal Aunt     Stroke Neg Hx        Social History     Occupational History    Not on file   Tobacco Use    Smoking status: Never Smoker    Smokeless tobacco: Never Used   Vaping Use    Vaping Use: Never used   Substance and Sexual Activity    Alcohol use: Not Currently    Drug use: No    Sexual activity: Not Currently         Current Outpatient Medications:     DULoxetine (CYMBALTA) 60 mg delayed release capsule, Take 1 capsule (60 mg total) by mouth daily, Disp: 180 capsule, Rfl: 0    gabapentin (NEURONTIN) 300 mg capsule, Take 2 capsules (600 mg total) by mouth daily at bedtime, Disp: 60 capsule, Rfl: 2    MAGNESIUM PO, Take 1 tablet by mouth daily, Disp: , Rfl:     POTASSIUM PO, Take 1 tablet by mouth daily, Disp: , Rfl:     Turmeric 400 MG CAPS, Take by mouth, Disp: , Rfl:     VITAMIN D PO, Take 1 capsule by mouth daily, Disp: , Rfl:     methocarbamol (ROBAXIN) 500 mg tablet, Take 1 tablet (500 mg total) by mouth 3 (three) times a day May take 2 tablets (1000 mg), Disp: 120 tablet, Rfl: 2    omeprazole (PriLOSEC) 40 MG capsule, Take 1 capsule (40 mg total) by mouth daily PLEASE VOID Rx SENT 1 14 22 FOR 30 DAY SUPPLY (Patient not taking: Reported on 1/24/2022 ), Disp: 90 capsule, Rfl: 0    pramipexole (MIRAPEX) 0 25 mg tablet, Take 2 tablets (0 5 mg total) by mouth daily at bedtime (Patient not taking: Reported on 3/31/2022 ), Disp: 180 tablet, Rfl: 0    traMADol 100 MG TABS, Take 50 mg by mouth every 8 (eight) hours as needed for moderate pain, Disp: 90 tablet, Rfl: 2    Allergies   Allergen Reactions    Dust Mite Extract Shortness Of Breath    Latex Itching and Blisters    Other Other (See Comments)     Seasonal AND cock roaches    Sulfa Antibiotics Vomiting     Topical-blistering       Physical Exam:    /78   Pulse 75   Temp 97 9 °F (36 6 °C)   SpO2 97%     Constitutional:normal, well developed, well nourished, alert, in no distress and non-toxic and no overt pain behavior   and overweight  Eyes:anicteric  HEENT:grossly intact  Neck:supple, symmetric, trachea midline and no masses   Pulmonary:even and unlabored  Cardiovascular:No edema or pitting edema present  Skin:Normal without rashes or lesions and well hydrated  Psychiatric:Mood and affect appropriate  Neurologic:Cranial Nerves II-XII grossly intact  Musculoskeletal:antalgic      Imaging  No orders to display         Orders Placed This Encounter   Procedures    MM OF_Tapentadol Definitive Test    MM ALL_Prescribed Meds and Special Instructions    MM DT_Alprazolam Definitive Test    MM DT_Amphetamine Definitive Test    MM DT_Aripiprazole Definitive Test    MM DT_Bath Salts Definitive Test    MM DT_Buprenorphine Definitive Test    MM DT_Butalbital Definitive Test    MM DT_Clonazepam Definitive Test    MM DT_Clozapine Definitive Test    MM DT_Cocaine Definitive Test    MM DT_Codeine Definitive Test    MM DT_Desipramine Definitive Test    MM DT_Dextromethorphan Definitive Test    MM Diazepam Definitive Test    MM DT_Ethyl Glucuronide/Ethyl Sulfate Definitive Test    MM DT_Fentanyl Definitive Test    MM DT_Heroin Definitive Test    MM DT_Hydrocodone Definitive Test    MM DT_Hydromorphone Definitive Test    MM DT_Kratom Definitive Test    MM DT_Levorphanol Definitive Test    MM Lorazepam Definitive Test    MM DT_MDMA Definitive Test    MM DT_Meperidine Definitive Test    MM DT_Methadone Definitive Test    MM DT_Methamphetamine Definitive Test    MM DT_Methylphenidate Definitive Test    MM DT_Morphine Definitive Test    MM DT_Olanzapine Definitive Test    MM DT_Oxazepam Definitive Test    MM DT_Oxycodone Definitive Test    MM DT_Oxymorphone Definitive Test    MM DT_Phenobarbital Definitive Test    MM DT_Phentermine Definitive Test    MM DT_Secobarbital Definitive Test    MM DT_Spice Definitive Test    MM DT_Tapentadol Definitive Test    MM DT_Temazapam Definitive Test    MM DT_THC Definitive Test    MM DT_Tramadol Definitive Test    MM DT_Validity pH    MM DT_Validity Oxidant    MM DT_Validity Creatinine    MM DT_Validity Specific

## 2022-04-12 LAB
6MAM UR QL CFM: NEGATIVE NG/ML
7AMINOCLONAZEPAM UR QL CFM: NEGATIVE NG/ML
A-OH ALPRAZ UR QL CFM: NEGATIVE NG/ML
ACCEPTABLE CREAT UR QL: NORMAL MG/DL
ACCEPTIBLE SP GR UR QL: NORMAL
AMPHET UR QL CFM: NEGATIVE NG/ML
AMPHET UR QL CFM: NEGATIVE NG/ML
BUPRENORPHINE UR QL CFM: NEGATIVE NG/ML
BUTALBITAL UR QL CFM: NEGATIVE NG/ML
BZE UR QL CFM: NEGATIVE NG/ML
CODEINE UR QL CFM: NEGATIVE NG/ML
DESIPRAMINE UR QL CFM: NEGATIVE NG/ML
EDDP UR QL CFM: NEGATIVE NG/ML
ETHYL GLUCURONIDE UR QL CFM: NEGATIVE NG/ML
ETHYL SULFATE UR QL SCN: NEGATIVE NG/ML
EUTYLONE UR QL: NEGATIVE NG/ML
FENTANYL UR QL CFM: NEGATIVE NG/ML
GLIADIN IGG SER IA-ACNC: NEGATIVE NG/ML
GLUCOSE 30M P 50 G LAC PO SERPL-MCNC: NEGATIVE NG/ML
HYDROCODONE UR QL CFM: NEGATIVE NG/ML
HYDROCODONE UR QL CFM: NEGATIVE NG/ML
HYDROMORPHONE UR QL CFM: NEGATIVE NG/ML
LORAZEPAM UR QL CFM: NEGATIVE NG/ML
MDMA UR QL CFM: NEGATIVE NG/ML
ME-PHENIDATE UR QL CFM: NEGATIVE NG/ML
MEPERIDINE UR QL CFM: NEGATIVE NG/ML
METHADONE UR QL CFM: NEGATIVE NG/ML
METHAMPHET UR QL CFM: NEGATIVE NG/ML
MORPHINE UR QL CFM: NEGATIVE NG/ML
MORPHINE UR QL CFM: NEGATIVE NG/ML
NITRITE UR QL: NORMAL UG/ML
NORBUPRENORPHINE UR QL CFM: NEGATIVE NG/ML
NORDIAZEPAM UR QL CFM: NEGATIVE NG/ML
NORFENTANYL UR QL CFM: NEGATIVE NG/ML
NORHYDROCODONE UR QL CFM: NEGATIVE NG/ML
NORHYDROCODONE UR QL CFM: NEGATIVE NG/ML
NORMEPERIDINE UR QL CFM: NEGATIVE NG/ML
NOROXYCODONE UR QL CFM: NEGATIVE NG/ML
OLANZAPINE QUANTIFICATION: NEGATIVE NG/ML
OPC-3373 QUANTIFICATION: NEGATIVE
OXAZEPAM UR QL CFM: NEGATIVE NG/ML
OXYCODONE UR QL CFM: NEGATIVE NG/ML
OXYMORPHONE UR QL CFM: NEGATIVE NG/ML
OXYMORPHONE UR QL CFM: NEGATIVE NG/ML
PHENOBARB UR QL CFM: NEGATIVE NG/ML
RESULT ALL_PRESCRIBED MEDS AND SPECIAL INSTRUCTIONS: NORMAL
SECOBARBITAL UR QL CFM: NEGATIVE NG/ML
SL AMB 3-METHYL-FENTANYL QUANTIFICATION: NORMAL NG/ML
SL AMB 4-ANPP QUANTIFICATION: NORMAL NG/ML
SL AMB 4-FIBF QUANTIFICATION: NORMAL NG/ML
SL AMB 5F-ADB-M7 METABOLITE QUANTIFICATION: NEGATIVE NG/ML
SL AMB 7-OH-MITRAGYNINE (KRATOM ALKALOID) QUANTIFICATION: NEGATIVE NG/ML
SL AMB AB-FUBINACA-M3 METABOLITE QUANTIFICATION: NEGATIVE NG/ML
SL AMB ACETYL FENTANYL QUANTIFICATION: NORMAL NG/ML
SL AMB ACETYL NORFENTANYL QUANTIFICATION: NORMAL NG/ML
SL AMB ACRYL FENTANYL QUANTIFICATION: NORMAL NG/ML
SL AMB BUTRYL FENTANYL QUANTIFICATION: NORMAL NG/ML
SL AMB CARFENTANIL QUANTIFICATION: NORMAL NG/ML
SL AMB CLOZAPINE QUANTIFICATION: NEGATIVE NG/ML
SL AMB CTHC (MARIJUANA METABOLITE) QUANTIFICATION: NEGATIVE NG/ML
SL AMB CYCLOPROPYL FENTANYL QUANTIFICATION: NORMAL NG/ML
SL AMB DEXTROMETHORPHAN QUANTIFICATION: NEGATIVE NG/ML
SL AMB DEXTRORPHAN (DEXTROMETHORPHAN METABOLITE) QUANT: NEGATIVE NG/ML
SL AMB DEXTRORPHAN (DEXTROMETHORPHAN METABOLITE) QUANT: NEGATIVE NG/ML
SL AMB FURANYL FENTANYL QUANTIFICATION: NORMAL NG/ML
SL AMB JWH018 METABOLITE QUANTIFICATION: NEGATIVE NG/ML
SL AMB JWH073 METABOLITE QUANTIFICATION: NEGATIVE NG/ML
SL AMB MDMB-FUBINACA-M1 METABOLITE QUANTIFICATION: NEGATIVE NG/ML
SL AMB METHOXYACETYL FENTANYL QUANTIFICATION: NORMAL NG/ML
SL AMB METHYLONE QUANTIFICATION: NEGATIVE NG/ML
SL AMB N-DESMETHYL U-47700 QUANTIFICATION: NORMAL NG/ML
SL AMB N-DESMETHYL-TRAMADOL QUANTIFICATION: NORMAL NG/ML
SL AMB N-DESMETHYLCLOZAPINE QUANTIFICATION: NEGATIVE NG/ML
SL AMB PHENTERMINE QUANTIFICATION: NEGATIVE NG/ML
SL AMB RCS4 METABOLITE QUANTIFICATION: NEGATIVE NG/ML
SL AMB RITALINIC ACID QUANTIFICATION: NEGATIVE NG/ML
SL AMB U-47700 QUANTIFICATION: NORMAL NG/ML
SPECIMEN DRAWN SERPL: NEGATIVE NG/ML
SPECIMEN PH ACCEPTABLE UR: NORMAL
TAPENTADOL UR QL CFM: NEGATIVE NG/ML
TEMAZEPAM UR QL CFM: NEGATIVE NG/ML
TEMAZEPAM UR QL CFM: NEGATIVE NG/ML
TRAMADOL UR QL CFM: NORMAL NG/ML
URATE/CREAT 24H UR: NORMAL NG/ML

## 2022-04-13 DIAGNOSIS — G89.4 CHRONIC PAIN SYNDROME: ICD-10-CM

## 2022-04-13 RX ORDER — TRAMADOL HYDROCHLORIDE 100 MG/1
100 TABLET, COATED ORAL EVERY 8 HOURS PRN
Qty: 90 TABLET | Refills: 2 | Status: SHIPPED | OUTPATIENT
Start: 2022-04-13 | End: 2022-07-20 | Stop reason: SDUPTHER

## 2022-04-21 ENCOUNTER — APPOINTMENT (OUTPATIENT)
Dept: LAB | Facility: HOSPITAL | Age: 62
End: 2022-04-21
Attending: INTERNAL MEDICINE
Payer: COMMERCIAL

## 2022-04-21 ENCOUNTER — HOSPITAL ENCOUNTER (OUTPATIENT)
Dept: MRI IMAGING | Facility: HOSPITAL | Age: 62
Discharge: HOME | End: 2022-04-21
Attending: INTERNAL MEDICINE
Payer: COMMERCIAL

## 2022-04-21 ENCOUNTER — TELEPHONE (OUTPATIENT)
Dept: PAIN MEDICINE | Facility: MEDICAL CENTER | Age: 62
End: 2022-04-21

## 2022-04-21 DIAGNOSIS — G95.89 SPINAL CORD MASS (HCC): Primary | ICD-10-CM

## 2022-04-21 DIAGNOSIS — M54.2 NECK PAIN: ICD-10-CM

## 2022-04-21 DIAGNOSIS — M62.81 MUSCLE WEAKNESS OF LEFT UPPER EXTREMITY: ICD-10-CM

## 2022-04-21 LAB
25(OH)D3 SERPL-MCNC: 38.2 NG/ML (ref 30–100)
ALBUMIN SERPL BCP-MCNC: 3.4 G/DL (ref 3.5–5)
ALP SERPL-CCNC: 76 U/L (ref 46–116)
ALT SERPL W P-5'-P-CCNC: 24 U/L (ref 12–78)
ANION GAP SERPL CALCULATED.3IONS-SCNC: 8 MMOL/L (ref 4–13)
AST SERPL W P-5'-P-CCNC: 19 U/L (ref 5–45)
BASOPHILS # BLD AUTO: 0.05 THOUSANDS/ΜL (ref 0–0.1)
BASOPHILS NFR BLD AUTO: 1 % (ref 0–1)
BILIRUB SERPL-MCNC: 0.22 MG/DL (ref 0.2–1)
BUN SERPL-MCNC: 14 MG/DL (ref 5–25)
CALCIUM ALBUM COR SERPL-MCNC: 9.4 MG/DL (ref 8.3–10.1)
CALCIUM SERPL-MCNC: 8.9 MG/DL (ref 8.3–10.1)
CHLORIDE SERPL-SCNC: 105 MMOL/L (ref 100–108)
CHOLEST SERPL-MCNC: 228 MG/DL
CO2 SERPL-SCNC: 29 MMOL/L (ref 21–32)
CREAT SERPL-MCNC: 0.72 MG/DL (ref 0.6–1.3)
EOSINOPHIL # BLD AUTO: 0.12 THOUSAND/ΜL (ref 0–0.61)
EOSINOPHIL NFR BLD AUTO: 2 % (ref 0–6)
ERYTHROCYTE [DISTWIDTH] IN BLOOD BY AUTOMATED COUNT: 14 % (ref 11.6–15.1)
GFR SERPL CREATININE-BSD FRML MDRD: 91 ML/MIN/1.73SQ M
GLUCOSE P FAST SERPL-MCNC: 103 MG/DL (ref 65–99)
HCT VFR BLD AUTO: 40.1 % (ref 34.8–46.1)
HDLC SERPL-MCNC: 62 MG/DL
HGB BLD-MCNC: 12.1 G/DL (ref 11.5–15.4)
IMM GRANULOCYTES # BLD AUTO: 0.04 THOUSAND/UL (ref 0–0.2)
IMM GRANULOCYTES NFR BLD AUTO: 1 % (ref 0–2)
IRON SATN MFR SERPL: 10 % (ref 15–50)
IRON SERPL-MCNC: 37 UG/DL (ref 50–170)
LDLC SERPL CALC-MCNC: 150 MG/DL (ref 0–100)
LYMPHOCYTES # BLD AUTO: 2.2 THOUSANDS/ΜL (ref 0.6–4.47)
LYMPHOCYTES NFR BLD AUTO: 29 % (ref 14–44)
MCH RBC QN AUTO: 26.5 PG (ref 26.8–34.3)
MCHC RBC AUTO-ENTMCNC: 30.2 G/DL (ref 31.4–37.4)
MCV RBC AUTO: 88 FL (ref 82–98)
MONOCYTES # BLD AUTO: 0.51 THOUSAND/ΜL (ref 0.17–1.22)
MONOCYTES NFR BLD AUTO: 7 % (ref 4–12)
NEUTROPHILS # BLD AUTO: 4.61 THOUSANDS/ΜL (ref 1.85–7.62)
NEUTS SEG NFR BLD AUTO: 60 % (ref 43–75)
NONHDLC SERPL-MCNC: 166 MG/DL
NRBC BLD AUTO-RTO: 0 /100 WBCS
PLATELET # BLD AUTO: 269 THOUSANDS/UL (ref 149–390)
PMV BLD AUTO: 9.2 FL (ref 8.9–12.7)
POTASSIUM SERPL-SCNC: 4 MMOL/L (ref 3.5–5.3)
PROT SERPL-MCNC: 6.8 G/DL (ref 6.4–8.2)
RBC # BLD AUTO: 4.56 MILLION/UL (ref 3.81–5.12)
SODIUM SERPL-SCNC: 142 MMOL/L (ref 136–145)
TIBC SERPL-MCNC: 359 UG/DL (ref 250–450)
TRIGL SERPL-MCNC: 78 MG/DL
TSH SERPL DL<=0.05 MIU/L-ACNC: 3.37 UIU/ML (ref 0.45–4.5)
WBC # BLD AUTO: 7.53 THOUSAND/UL (ref 4.31–10.16)

## 2022-04-21 PROCEDURE — 36415 COLL VENOUS BLD VENIPUNCTURE: CPT | Performed by: INTERNAL MEDICINE

## 2022-04-21 PROCEDURE — G1004 CDSM NDSC: HCPCS

## 2022-04-21 PROCEDURE — 83550 IRON BINDING TEST: CPT | Performed by: INTERNAL MEDICINE

## 2022-04-21 PROCEDURE — 80053 COMPREHEN METABOLIC PANEL: CPT | Performed by: INTERNAL MEDICINE

## 2022-04-21 PROCEDURE — 84443 ASSAY THYROID STIM HORMONE: CPT | Performed by: INTERNAL MEDICINE

## 2022-04-21 PROCEDURE — 72141 MRI NECK SPINE W/O DYE: CPT

## 2022-04-21 PROCEDURE — 82306 VITAMIN D 25 HYDROXY: CPT | Performed by: INTERNAL MEDICINE

## 2022-04-21 PROCEDURE — 80061 LIPID PANEL: CPT | Performed by: INTERNAL MEDICINE

## 2022-04-21 PROCEDURE — 83540 ASSAY OF IRON: CPT | Performed by: INTERNAL MEDICINE

## 2022-04-21 PROCEDURE — 85025 COMPLETE CBC W/AUTO DIFF WBC: CPT | Performed by: INTERNAL MEDICINE

## 2022-04-21 NOTE — TELEPHONE ENCOUNTER
Patient had MRI today should would like you to review please and call her back they found something and she wants another set of eyes

## 2022-04-21 NOTE — PROGRESS NOTES
Spoke with Oilton Last over the phone  We reviewed her cervical spine MRI results  There is evidence of mild cervical degenerative spondylosis, most evident at C5-C6 with mild canal and bilateral foraminal stenosis  Findings possibly related to her left upper extremity symptoms  Incidentally, there is a finding T3 lesion compressing and displacing the cord anteriorly - possible meningioma verses nerve sheath tumor, meningioma favored    I do not feel this lesion is contributing to her current symptoms    Will obtain dedicated MRI of the thoracic spine with and without contrast per Radiology recommendation    She is scheduled for EMG which unfortunately cannot be done until October    She is to follow-up with her pain management provider in regards to her left arm discomfort    All questions answered

## 2022-05-02 DIAGNOSIS — G25.81 RESTLESS LEGS SYNDROME: ICD-10-CM

## 2022-05-02 RX ORDER — PRAMIPEXOLE DIHYDROCHLORIDE 0.25 MG/1
0.5 TABLET ORAL
Qty: 180 TABLET | Refills: 0 | Status: SHIPPED | OUTPATIENT
Start: 2022-05-02 | End: 2022-07-26 | Stop reason: SDUPTHER

## 2022-05-05 ENCOUNTER — OFFICE VISIT (OUTPATIENT)
Dept: PAIN MEDICINE | Facility: MEDICAL CENTER | Age: 62
End: 2022-05-05
Payer: COMMERCIAL

## 2022-05-05 VITALS
SYSTOLIC BLOOD PRESSURE: 118 MMHG | OXYGEN SATURATION: 99 % | HEART RATE: 72 BPM | DIASTOLIC BLOOD PRESSURE: 80 MMHG | BODY MASS INDEX: 31.76 KG/M2 | HEIGHT: 64 IN

## 2022-05-05 DIAGNOSIS — S56.912A FOREARM STRAIN, LEFT, INITIAL ENCOUNTER: Primary | ICD-10-CM

## 2022-05-05 PROCEDURE — 99213 OFFICE O/P EST LOW 20 MIN: CPT | Performed by: PHYSICAL MEDICINE & REHABILITATION
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
## 2022-05-09 ENCOUNTER — TELEPHONE (OUTPATIENT)
Dept: OBGYN CLINIC | Facility: HOSPITAL | Age: 62
End: 2022-05-09

## 2022-05-09 NOTE — TELEPHONE ENCOUNTER
EMAIL SENT TO SME:    Hello,  Please advise if the following patient can be forced onto the schedule:  Patient:  Sherly Fraire  :  [birth year]  MRN:   [id number]  INSURANCE:  medicare  Call back #:  [phone]  Reason for appointment:  Patient being referred for lt forearm  Patient is stating that this started with her forearm itching & she scratched it but it caused bumps under the skin  Patient went to derm & they thought it sounded like something with her neck/nerve  Patient went to [facility] & they determined that it is not that  Patient states now she has lumps under the skin & they hurt  Patient states that she has weakness in that arm  Patient had an mri & has an emg scheduled for October but she was told to get in sooner  Requested doctor/location:  [facility]  Patient states that this is too complicated to see anyone but the doctor  Thank you in advance!

## 2022-05-16 ENCOUNTER — HOSPITAL ENCOUNTER (OUTPATIENT)
Dept: MRI IMAGING | Facility: HOSPITAL | Age: 62
Discharge: HOME | End: 2022-05-16
Attending: INTERNAL MEDICINE
Payer: COMMERCIAL

## 2022-05-16 DIAGNOSIS — G95.89 SPINAL CORD MASS (HCC): ICD-10-CM

## 2022-05-16 PROCEDURE — A9585 GADOBUTROL INJECTION: HCPCS | Performed by: INTERNAL MEDICINE

## 2022-05-16 PROCEDURE — 72157 MRI CHEST SPINE W/O & W/DYE: CPT

## 2022-05-16 PROCEDURE — G1004 CDSM NDSC: HCPCS

## 2022-05-16 RX ADMIN — GADOBUTROL 8 ML: 604.72 INJECTION INTRAVENOUS at 08:57

## 2022-05-18 ENCOUNTER — TELEPHONE (OUTPATIENT)
Dept: PAIN MEDICINE | Facility: MEDICAL CENTER | Age: 62
End: 2022-05-18

## 2022-05-18 DIAGNOSIS — D32.1 SPINAL MENINGIOMA (HCC): Primary | ICD-10-CM

## 2022-05-18 DIAGNOSIS — G95.20 SPINAL CORD COMPRESSION (HCC): ICD-10-CM

## 2022-05-18 NOTE — TELEPHONE ENCOUNTER
Patient states her MRI results are in & she would like for Dr Marina Jerry or nurse to contact her with clarification   Please reach out to her at # [phone]

## 2022-05-31 ENCOUNTER — CONSULT (OUTPATIENT)
Dept: NEUROSURGERY | Facility: CLINIC | Age: 62
End: 2022-05-31
Payer: COMMERCIAL

## 2022-05-31 VITALS
WEIGHT: 199.8 LBS | TEMPERATURE: 98.7 F | HEIGHT: 64 IN | HEART RATE: 71 BPM | SYSTOLIC BLOOD PRESSURE: 135 MMHG | DIASTOLIC BLOOD PRESSURE: 83 MMHG | RESPIRATION RATE: 16 BRPM | BODY MASS INDEX: 34.11 KG/M2

## 2022-05-31 DIAGNOSIS — G95.20 SPINAL CORD COMPRESSION (HCC): ICD-10-CM

## 2022-05-31 DIAGNOSIS — D32.1 SPINAL MENINGIOMA (HCC): ICD-10-CM

## 2022-05-31 DIAGNOSIS — G90.519 CRPS (COMPLEX REGIONAL PAIN SYNDROME), UPPER LIMB: Primary | ICD-10-CM

## 2022-05-31 PROCEDURE — 99204 OFFICE O/P NEW MOD 45 MIN: CPT | Performed by: NEUROLOGICAL SURGERY

## 2022-05-31 PROCEDURE — 3008F BODY MASS INDEX DOCD: CPT | Performed by: PHYSICAL MEDICINE & REHABILITATION
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
## 2022-06-14 ENCOUNTER — HOSPITAL ENCOUNTER (OUTPATIENT)
Dept: NON INVASIVE DIAGNOSTICS | Facility: HOSPITAL | Age: 62
Discharge: HOME | End: 2022-06-14
Attending: INTERNAL MEDICINE
Payer: COMMERCIAL

## 2022-06-14 VITALS
BODY MASS INDEX: 33.97 KG/M2 | DIASTOLIC BLOOD PRESSURE: 83 MMHG | WEIGHT: 199 LBS | SYSTOLIC BLOOD PRESSURE: 135 MMHG | HEART RATE: 74 BPM | HEIGHT: 64 IN

## 2022-06-14 DIAGNOSIS — R06.02 SHORTNESS OF BREATH: ICD-10-CM

## 2022-06-14 DIAGNOSIS — R60.9 PERIPHERAL EDEMA: ICD-10-CM

## 2022-06-14 LAB
AORTIC ROOT: 2.9 CM
AORTIC VALVE MEAN VELOCITY: 8.4 M/S
APICAL FOUR CHAMBER EJECTION FRACTION: 61 %
AV AREA BY CONTINUOUS VTI: 1.9 CM2
AV AREA PEAK VELOCITY: 1.8 CM2
AV LVOT MEAN GRADIENT: 2 MMHG
AV LVOT PEAK GRADIENT: 3 MMHG
AV MEAN GRADIENT: 3 MMHG
AV PEAK GRADIENT: 5 MMHG
AV VALVE AREA: 1.89 CM2
AV VELOCITY RATIO: 0.79
DOP CALC AO PEAK VEL: 1.16 M/S
DOP CALC AO VTI: 28.32 CM
DOP CALC LVOT AREA: 2.27 CM2
DOP CALC LVOT DIAMETER: 1.7 CM
DOP CALC LVOT PEAK VEL VTI: 23.56 CM
DOP CALC LVOT PEAK VEL: 0.92 M/S
DOP CALC LVOT STROKE INDEX: 26.5 ML/M2
DOP CALC LVOT STROKE VOLUME: 53.45
E WAVE DECELERATION TIME: 226 MS
FRACTIONAL SHORTENING: 25 (ref 28–44)
INTERVENTRICULAR SEPTUM IN DIASTOLE (PARASTERNAL SHORT AXIS VIEW): 1.1 CM
INTERVENTRICULAR SEPTUM: 1.1 CM (ref 0.6–1.1)
LAAS-AP2: 15.7 CM2
LAAS-AP4: 17.6 CM2
LEFT ATRIUM SIZE: 3.3 CM
LEFT INTERNAL DIMENSION IN SYSTOLE: 2.7 CM (ref 2.1–4)
LEFT VENTRICULAR INTERNAL DIMENSION IN DIASTOLE: 3.6 CM (ref 3.5–6)
LEFT VENTRICULAR POSTERIOR WALL IN END DIASTOLE: 0.8 CM
LEFT VENTRICULAR STROKE VOLUME: 28 ML
LVSV (TEICH): 28 ML
MV E'TISSUE VEL-LAT: 15 CM/S
MV E'TISSUE VEL-SEP: 10 CM/S
MV PEAK A VEL: 0.89 M/S
MV PEAK E VEL: 91 CM/S
MV STENOSIS PRESSURE HALF TIME: 65 MS
MV VALVE AREA P 1/2 METHOD: 3.38
RIGHT ATRIAL 2D VOLUME: 25 ML
RIGHT ATRIUM AREA SYSTOLE A4C: 11.4 CM2
RIGHT VENTRICLE ID DIMENSION: 3.3 CM
SL CV LEFT ATRIUM LENGTH A2C: 5.3 CM
SL CV LV EF: 55
SL CV PED ECHO LEFT VENTRICLE DIASTOLIC VOLUME (MOD BIPLANE) 2D: 54 ML
SL CV PED ECHO LEFT VENTRICLE SYSTOLIC VOLUME (MOD BIPLANE) 2D: 26 ML

## 2022-06-14 PROCEDURE — 93306 TTE W/DOPPLER COMPLETE: CPT | Performed by: INTERNAL MEDICINE

## 2022-06-14 PROCEDURE — 93306 TTE W/DOPPLER COMPLETE: CPT

## 2022-06-16 ENCOUNTER — TELEPHONE (OUTPATIENT)
Dept: INTERNAL MEDICINE CLINIC | Facility: CLINIC | Age: 62
End: 2022-06-16

## 2022-06-16 NOTE — TELEPHONE ENCOUNTER
Patient is feeling very tired and is questioning her last Iron level if it was low enough that she would need another infusion done

## 2022-07-15 ENCOUNTER — TELEPHONE (OUTPATIENT)
Dept: INTERNAL MEDICINE CLINIC | Facility: CLINIC | Age: 62
End: 2022-07-15

## 2022-07-15 ENCOUNTER — TELEPHONE (OUTPATIENT)
Dept: PAIN MEDICINE | Facility: CLINIC | Age: 62
End: 2022-07-15

## 2022-07-15 NOTE — TELEPHONE ENCOUNTER
Refill request  Tramadol HCL 100MG  Take 100 mg by mouth every 8 (eight) hours as needed (Chronic pain)  Remainin pills  [id number] [place], [address]       She is going to be away the first week of august, there are no appointments before she runs out. Please advise. OK to leave a detailed msg.     Call # [phone]

## 2022-07-18 ENCOUNTER — RA CDI HCC (OUTPATIENT)
Dept: OTHER | Facility: HOSPITAL | Age: 62
End: 2022-07-18

## 2022-07-18 NOTE — PROGRESS NOTES
[facility] 75  coding opportunities       Chart reviewed, no opportunity found:   [address]        Patients Insurance     Medicare Insurance: [employer] Advantage

## 2022-07-19 ENCOUNTER — OFFICE VISIT (OUTPATIENT)
Dept: INTERNAL MEDICINE CLINIC | Facility: CLINIC | Age: 62
End: 2022-07-19
Payer: COMMERCIAL

## 2022-07-19 VITALS
HEART RATE: 78 BPM | RESPIRATION RATE: 18 BRPM | SYSTOLIC BLOOD PRESSURE: 124 MMHG | OXYGEN SATURATION: 98 % | DIASTOLIC BLOOD PRESSURE: 88 MMHG | BODY MASS INDEX: 34.71 KG/M2 | TEMPERATURE: 98 F | WEIGHT: 203.3 LBS | HEIGHT: 64 IN

## 2022-07-19 DIAGNOSIS — R53.83 FATIGUE, UNSPECIFIED TYPE: ICD-10-CM

## 2022-07-19 DIAGNOSIS — E61.1 IRON DEFICIENCY: Primary | ICD-10-CM

## 2022-07-19 DIAGNOSIS — G25.81 RESTLESS LEGS SYNDROME: ICD-10-CM

## 2022-07-19 PROCEDURE — 3725F SCREEN DEPRESSION PERFORMED: CPT | Performed by: INTERNAL MEDICINE

## 2022-07-19 PROCEDURE — 99214 OFFICE O/P EST MOD 30 MIN: CPT | Performed by: INTERNAL MEDICINE

## 2022-07-19 RX ORDER — SODIUM CHLORIDE 9 MG/ML
20 INJECTION, SOLUTION INTRAVENOUS ONCE
Status: CANCELLED | OUTPATIENT
Start: 2022-07-19

## 2022-07-19 NOTE — PROGRESS NOTES
INTERNAL MEDICINE OFFICE VISIT  [address] [facility] Internal Medicine- [place]    NAME: Bernadette Query  AGE: 63 y o  SEX: female    DATE OF ENCOUNTER: 7/19/2022    Assessment and Plan     1  Iron deficiency  - Iron Panel (Includes Ferritin, Iron Sat%, Iron, and TIBC); Future  - CBC and Platelet; Future    2  Fatigue, unspecified type    3  Restless legs syndrome      Patient has been having severe fatigue, severe RLS symptoms  She recently sold her house and is planning to relocate within the next few months, possibly to the [place]  She recently had iron studies drawn and thinks her fatigue may be related to iron deficiency  She did not have any evidence of anemia on her CBC    She states she has a longstanding history of iron deficiency  Cause of this is unclear  She denies any history of malabsorption of conditions, celiac disease, bypass surgery  She does not donate blood routinely  Denies any blood in her stool or dark/tarry stools    We will refer her for iron infusion today  Recheck CBC and iron panel 2-4 weeks after completion of her infusions    She inquired about me temporarily filling her tramadol script until she moved to the [place]  I am okay with handling this script in the short term prior to her move, but I explained I should discuss 1st with her pain management provider and I will try to send her a message                 Orders Placed This Encounter   Procedures    CBC and Platelet       Chief Complaint     Chief Complaint   Patient presents with    Fatigue     Would like to discuss iron transfusions       History of Present Illness     Here today for same-day appointment to discuss fatigue   History of lumbar radiculopathy, spinal scoliosis status post corrective procedure, allergic rhinitis, chronic venous insufficiency, mild intermittent asthma, restless leg syndrome, depression, GERD, probable thoracic spinal cord meningioma, possible CRPS of the left upper extremity       The following portions of the patient's history were reviewed and updated as appropriate: allergies, current medications, past family history, past medical history, past social history, past surgical history and problem list     Review of Systems     10 point ROS negative except per HPI    Active Problem List     Patient Active Problem List   Diagnosis    Arthritis of lumbar spine    Chronic low back pain    Chronic pain syndrome    Chronic venous insufficiency    Depression, recurrent ([facility] [address] [facility] )    Hyperlipidemia    Lumbar postlaminectomy syndrome    Primary osteoarthritis of left hip    Restless legs syndrome    Sleep disturbance    Hernia of anterior abdominal wall    S/P right knee arthroscopy    Environmental allergies    Lumbar spondylosis    Lumbar radiculopathy    Prediabetes    Vitamin D deficiency    Dolan's esophagus with dysplasia    Gastroesophageal reflux disease    Laryngopharyngeal reflux (LPR)    Chronic cough    Vocal fold paresis, right    Dysphonia    Muscle tension dysphonia    Glottic insufficiency    Reflux laryngitis    Laryngeal edema    Allergic rhinitis due to [employer] house dust mite    Mild intermittent asthma without complication    Upper airway cough syndrome    Neck pain    Cervical spondylosis without myelopathy    Osteoarthritis of multiple joints    Sacroiliitis, not elsewhere classified ([facility] )    Lipoma of torso    Recurrent umbilical hernia    Anemia    Hypokalemia    Macromastia    Long-term current use of opiate analgesic    Uncomplicated opioid dependence (HCC)    Motion sickness    Recurrent incisional hernia    Podagra    Iron deficiency       Objective     /88 (BP Location: Left arm, Patient Position: Sitting, Cuff Size: Large)   Pulse 78   Temp 98 °F (36 7 °C)   Resp 18   Ht 5' 4" (1 626 m)   Wt 92 2 kg (203 lb 4 8 oz)   SpO2 98%   BMI 34 90 kg/m²     Physical Exam  Constitutional:       Appearance: Normal appearance   She is not ill-appearing  HENT:      Head: Normocephalic and atraumatic  Eyes:      General: No scleral icterus  Right eye: No discharge  Left eye: No discharge  Cardiovascular:      Rate and Rhythm: Normal rate and regular rhythm  Heart sounds: No murmur heard  No friction rub  Pulmonary:      Effort: Pulmonary effort is normal       Breath sounds: Normal breath sounds  No wheezing or rales  Abdominal:      General: Abdomen is flat  There is no distension  Palpations: Abdomen is soft  Tenderness: There is no abdominal tenderness  Musculoskeletal:         General: No swelling or tenderness  Skin:     General: Skin is warm and dry  Findings: No erythema  Neurological:      Mental Status: She is alert  Mental status is at baseline  Motor: No weakness  Psychiatric:         Mood and Affect: Mood normal          Behavior: Behavior normal          Pertinent Laboratory/Diagnostic Studies:  No results found      Images and diagnostics reviewed     Current Medications     Current Outpatient Medications:     Dexlansoprazole (DEXILANT PO), Take by mouth daily in the early morning, Disp: , Rfl:     DULoxetine (CYMBALTA) 60 mg delayed release capsule, Take 1 capsule (60 mg total) by mouth daily, Disp: 180 capsule, Rfl: 0    MAGNESIUM PO, Take 1 tablet by mouth daily, Disp: , Rfl:     methocarbamol (ROBAXIN) 500 mg tablet, Take 1 tablet (500 mg total) by mouth 3 (three) times a day May take 2 tablets (1000 mg), Disp: 120 tablet, Rfl: 2    omeprazole (PriLOSEC) 40 MG capsule, Take 1 capsule (40 mg total) by mouth daily PLEASE VOID Rx SENT 1 14 22 FOR 30 DAY SUPPLY (Patient taking differently: Take 40 mg by mouth if needed PLEASE VOID Rx SENT 1 14 22 FOR 30 DAY SUPPLY), Disp: 90 capsule, Rfl: 0    traMADol HCl 100 MG TABS, Take 100 mg by mouth every 8 (eight) hours as needed (Chronic pain), Disp: 90 tablet, Rfl: 2    VITAMIN D PO, Take 1 capsule by mouth daily, Disp: , Rfl:   gabapentin (NEURONTIN) 300 mg capsule, Take 2 capsules (600 mg total) by mouth daily at bedtime (Patient not taking: No sig reported), Disp: 60 capsule, Rfl: 2    POTASSIUM PO, Take 1 tablet by mouth if needed (Patient not taking: Reported on 7/19/2022), Disp: , Rfl:     pramipexole (MIRAPEX) 0 25 mg tablet, Take 2 tablets (0 5 mg total) by mouth daily at bedtime (Patient not taking: Reported on 7/19/2022), Disp: 180 tablet, Rfl: 0    Turmeric 400 MG CAPS, Take by mouth if needed (Patient not taking: Reported on 7/19/2022), Disp: , Rfl:     Health Maintenance     Health Maintenance   Topic Date Due    Hepatitis C Screening  Never done    COVID-19 Vaccine (1) Never done    HIV Screening  Never done    Pneumococcal Vaccine: Pediatrics (0 to 5 Years) and At-Risk Patients (6 to 59 Years) (2 - PPSV23 or PCV20) 06/21/2019    Medicare Annual Wellness Visit (AWV)  04/22/2021    BMI: Followup Plan  12/03/2021    Breast Cancer Screening: Mammogram  07/18/2022    Cervical Cancer Screening  07/19/2022    Influenza Vaccine (1) 09/01/2022    BMI: Adult  07/19/2023    Depression Remission PHQ  07/19/2023    Colorectal Cancer Screening  03/14/2029    HIB Vaccine  Aged Out    Hepatitis B Vaccine  Aged Out    IPV Vaccine  Aged Out    Hepatitis A Vaccine  Aged Out    Meningococcal ACWY Vaccine  Aged Out    HPV Vaccine  Aged Out     Immunization History   Administered Date(s) Administered    Influenza, recombinant, quadrivalent,injectable, preservative free 11/05/2018, 01/14/2021    Pneumococcal Conjugate 13-Valent 06/21/2018    Td (adult), Unspecified 09/11/2020    Td (adult), adsorbed 09/11/2020    Tuberculin Skin Test-PPD Intradermal 07/23/2007       ERIN Mohamud  Internal Medicine [place] [address] [place] [address]  [place], [address] [facility]  Office: [phone]

## 2022-07-19 NOTE — TELEPHONE ENCOUNTER
Left a detailed message as per release of health information, advising pt the same. Confirmed appt. C/B # provided for any questions.

## 2022-07-19 NOTE — TELEPHONE ENCOUNTER
PT WIFE CALLED TO CANCEL APPT FOR TODAY 2/19/20 @ 0900, DUE TO FLU-LIKE SYMPTOMS  She has an appoitnment tomorrow with Aidan and will receive her refill at that visit.

## 2022-07-20 ENCOUNTER — OFFICE VISIT (OUTPATIENT)
Dept: PAIN MEDICINE | Facility: MEDICAL CENTER | Age: 62
End: 2022-07-20
Payer: COMMERCIAL

## 2022-07-20 VITALS
TEMPERATURE: 97.8 F | OXYGEN SATURATION: 98 % | HEART RATE: 83 BPM | SYSTOLIC BLOOD PRESSURE: 128 MMHG | DIASTOLIC BLOOD PRESSURE: 74 MMHG

## 2022-07-20 DIAGNOSIS — M54.42 CHRONIC BILATERAL LOW BACK PAIN WITH LEFT-SIDED SCIATICA: ICD-10-CM

## 2022-07-20 DIAGNOSIS — Z79.891 LONG-TERM CURRENT USE OF OPIATE ANALGESIC: ICD-10-CM

## 2022-07-20 DIAGNOSIS — M47.816 LUMBAR SPONDYLOSIS: ICD-10-CM

## 2022-07-20 DIAGNOSIS — M79.18 MYOFASCIAL PAIN SYNDROME: ICD-10-CM

## 2022-07-20 DIAGNOSIS — F11.20 UNCOMPLICATED OPIOID DEPENDENCE (HCC): ICD-10-CM

## 2022-07-20 DIAGNOSIS — M54.16 LUMBAR RADICULOPATHY: ICD-10-CM

## 2022-07-20 DIAGNOSIS — M96.1 LUMBAR POSTLAMINECTOMY SYNDROME: ICD-10-CM

## 2022-07-20 DIAGNOSIS — G89.29 CHRONIC BILATERAL LOW BACK PAIN WITH LEFT-SIDED SCIATICA: ICD-10-CM

## 2022-07-20 DIAGNOSIS — G89.4 CHRONIC PAIN SYNDROME: Primary | ICD-10-CM

## 2022-07-20 PROCEDURE — 99214 OFFICE O/P EST MOD 30 MIN: CPT | Performed by: NURSE PRACTITIONER

## 2022-07-20 PROCEDURE — 80305 DRUG TEST PRSMV DIR OPT OBS: CPT | Performed by: NURSE PRACTITIONER

## 2022-07-20 RX ORDER — GABAPENTIN 300 MG/1
600 CAPSULE ORAL
Qty: 60 CAPSULE | Refills: 2 | Status: CANCELLED | OUTPATIENT
Start: 2022-07-20

## 2022-07-20 RX ORDER — PREGABALIN 75 MG/1
CAPSULE ORAL
Qty: 90 CAPSULE | Refills: 2 | Status: SHIPPED | OUTPATIENT
Start: 2022-07-20

## 2022-07-20 RX ORDER — TRAMADOL HYDROCHLORIDE 100 MG/1
100 TABLET, COATED ORAL EVERY 8 HOURS PRN
Qty: 90 TABLET | Refills: 2 | Status: SHIPPED | OUTPATIENT
Start: 2022-07-20

## 2022-07-20 RX ORDER — METHOCARBAMOL 500 MG/1
500 TABLET, FILM COATED ORAL 3 TIMES DAILY
Qty: 120 TABLET | Refills: 2 | Status: SHIPPED | OUTPATIENT
Start: 2022-07-20

## 2022-07-20 NOTE — PROGRESS NOTES
Assessment:  1  Chronic pain syndrome    2  Chronic bilateral low back pain with left-sided sciatica    3  Lumbar radiculopathy    4  Myofascial pain syndrome    5  Lumbar postlaminectomy syndrome    6  Lumbar spondylosis    7  Uncomplicated opioid dependence ([address] )    8  Long-term current use of opiate analgesic        Plan:  While the patient was in the office today, I did have a thorough conversation with the patient regarding their chronic pain syndrome, symptoms, medication regimen, and treatment plan  I did have a thorough conversation with the patient regarding her medication management and explained to her that overall I do feel that there is definitely significant neuropathic component of her pain and since she has tried and failed gabapentin and it was not the cause of her edema we could try different neuron membrane stabilizers such as Lyrica to see if that would be helpful and provide better overall with the fall for chronic pain symptoms  I advised the patient that we would start her on 75 mg and slowly work her up to t i d  dosing over the next few weeks and see how she does  I discussed with the patient the type of medication it is, how it works, and that it requires a titration process that is specific to each individual  I reviewed with the patient that it may take 3-4 weeks for the medication's effects to be noticed and that it should never be abruptly stopped  Possible side effects include but are not limited to; vertigo, lethargy, nausea, and edema of the extremities  Advised the patient to call our office if they experience any side effects  The patient verbalized an understanding       With regards to her tramadol and methocarbamol, I did discuss with the patient essentially has tried and failed every other alternative medication management and treatment options including surgery, I feel it is reasonable and appropriate with her underlying etiology to continue the p r n  tramadol and methocarbamol as prescribed  The patient was agreeable and verbalized an understanding  I did discuss overall in the future if it is easier for her and her primary care provider is willing they could take over prescribing the tramadol and methocarbamol  The patient was agreeable and verbalized an understanding  [place] Prescription Drug Monitoring Program report was reviewed and was appropriate     A urine drug screen was collected at today's office visit as part of our medication management protocol  The point of care testing results were appropriate for what was being prescribed  The specimen will be sent for confirmatory testing  The drug screen is medically necessary because the patient is either dependent on opioid medication or is being considered for opioid medication therapy and the results could impact ongoing or future treatment  The drug screen is to evaluate for the presences or absence of prescribed, non-prescribed, and/or illicit drugs/substances  There are risks associated with opioid medications, including dependence, addiction and tolerance  The patient understands and agrees to use these medications only as prescribed  Potential side effects of the medications include, but are not limited to, constipation, drowsiness, addiction, impaired judgment and risk of fatal overdose if not taken as prescribed  The patient was warned against driving while taking sedation medications  Sharing medications is a felony  At this point in time, the patient is showing no signs of addiction, abuse, diversion or suicidal ideation  The patient will follow-up in 12 weeks for medication prescription refill and reevaluation  The patient was advised to contact the office should their symptoms worsen in the interim  The patient was agreeable and verbalized an understanding  History of Present Illness:     The patient is a 63 y o  female last seen on 5/5/22 who presents for a follow up office visit in regards to chronic pain syndrome, as the patient's pain has been ongoing for greater than a year, secondary to lumbar post-laminectomy syndrome spondylosis radiculopathy and myofascial pain  The patient currently reports that since her last office visit she did discontinue the gabapentin 600 mg at bedtime as they thought it may be contributing to her lower extremity edema  However, when she discontinued the gabapentin she still had the same edema and realized that the gabapentin was not providing any relief or difference in her pain symptoms  The patient presents today for a regular medication follow-up visit and reports that she has recently sold her house and is living with friends and is trying to move down to the [place] but is in the process of looking for a home  The patient wanted to know that if now or in the meantime because of the difficulty getting an appointment with our office if her primary care provider was willing to prescribe her tramadol and methocarbamol would that be okay with our office  The patient presents today for regular medication follow-up visit  Current pain medications includes:  Tramadol 100 mg t i d  p r n  for pain and methocarbamol 500 mg q i d  p r n  for pain and spasm  The patient reports that this regimen is providing 50% pain relief  The patient is reporting constipation from this pain medication regimen  Tramadol last taken 7/21 PM    Pain Contract Signed: 01/10/22  Last Urine Drug Screen: 07/20/22    I have personally reviewed and/or updated the patient's past medical history, past surgical history, family history, social history, current medications, allergies, and vital signs today  Review of Systems:    Review of Systems   Respiratory: Positive for shortness of breath  Cardiovascular: Positive for leg swelling  Negative for chest pain  Gastrointestinal: Positive for constipation  Negative for diarrhea, nausea and vomiting  Musculoskeletal: Positive for gait problem, joint swelling and myalgias  Negative for arthralgias  Skin: Positive for rash  Neurological: Positive for weakness  Negative for dizziness and seizures  Psychiatric/Behavioral: Positive for decreased concentration  All other systems reviewed and are negative  Past Medical History:   Diagnosis Date    Anemia     Anesthesia     "sometimes low BP upon waking up" pt states she stopped breathing 1 time during surgery    Arthritis     Asthma     Back pain     Back pain     Dolan's esophagus     Chronic pain disorder     Chronic venous insufficiency     Cough variant asthma     COVID-19 03/2020    Depression     Environmental allergies     dust    GERD (gastroesophageal reflux disease)     Hiatal hernia     History of anemia     History of fusion of spine for scoliosis     "as a teenager"    History of transfusion     1978 - no adverse reaction    Left lumbar radiculitis     Last Assessed: [id number]    Lumbar postlaminectomy syndrome     Migraines     Morbid obesity ([address] )     Motion sickness     Neck pain     Osteoarthritis     of left hip    Right knee pain     Seasonal allergies     Shortness of breath     Umbilical hernia     Uses brace     right knee    Wears glasses        Past Surgical History:   Procedure Laterality Date    ARTHRODESIS      lumbar    ARTHRODESIS      Spinal Arthrodesis for Deformity; Last Assessed: [id number]    BACK SURGERY      lumbar fusion,with nydia/screw and cage implant    BACK SURGERY      surgery for scoliosis    BREAST CYST EXCISION Right     benign    COLONOSCOPY      COLONOSCOPY N/A 3/14/2019    Procedure: COLONOSCOPY;  Surgeon: Jeyson Stearns MD;  [facility]; Service: Gastroenterology    ESOPHAGOGASTRODUODENOSCOPY N/A 3/14/2019    Procedure: ESOPHAGOGASTRODUODENOSCOPY (EGD) W RFA(BARRX); Surgeon: Jeyson Stearns MD;  [facility];   Service: Gastroenterology    HERNIA REPAIR      umbilical hernia repair x2    ORTHOPEDIC SURGERY      PLANTAR FASCIA SURGERY Left     CA BREAST REDUCTION Bilateral 3/12/2021    Procedure: BREAST REDUCTION;  Surgeon: Bert Lewis MD;  [facility];  Service: Plastics    CA COLONOSCOPY FLX DX W/COLLJ SPEC WHEN PFRMD N/A 2/20/2018    Procedure: COLONOSCOPY with polypectomy;  Surgeon: Thomas Blackwell MD;  [facility]; Service: General    CA ESOPHAGOGASTRODUODENOSCOPY TRANSORAL DIAGNOSTIC N/A 5/24/2017    Procedure: ESOPHAGOGASTRODUODENOSCOPY (EGD); Surgeon: Adriana Espinal MD;  [facility]; Service: Gastroenterology    CA ESOPHAGOGASTRODUODENOSCOPY TRANSORAL DIAGNOSTIC N/A 8/31/2017    Procedure: ESOPHAGOGASTRODUODENOSCOPY (EGD) W RFA;  Surgeon: Ladonna Zacarias MD;  Location:  GI LAB;   Service: Gastroenterology    CA KNEE SCOPE,MED/LAT MENISECTOMY Right 4/4/2018    Procedure: ARTHROSCOPY KNEE PARTIAL MEDIAL MENISECTOMY , CHONDROPLASTY;  Surgeon: Janey Baxter DO;  [facility];  Service: Orthopedics    CA LAP, RECURRENT INCISIONAL HERNIA REPAIR,REDUCIBLE N/A 1/21/2021    Procedure: REPAIR HERNIA INCISIONAL LAPAROSCOPIC W/ ROBOTICS, with mesh;  Surgeon: Teressa Ramirez MD;  [facility];  Service: General    WISDOM TOOTH EXTRACTION         Family History   Problem Relation Age of Onset    Lung cancer Mother     Cancer Mother     Alcohol abuse Father     Other Father         Cardiac Disorder    Depression Father     Hypertension Father     Heart attack Father     Breast cancer Maternal Grandmother     Lung cancer Maternal Grandmother     Diabetes type I Other     No Known Problems Sister     No Known Problems Brother     No Known Problems Maternal Grandfather     Leukemia Paternal Grandmother     No Known Problems Paternal Grandfather     No Known Problems Sister     No Known Problems Maternal Aunt     No Known Problems Paternal Aunt     Stroke Neg Hx        Social History Occupational History    Not on file   Tobacco Use    Smoking status: Never Smoker    Smokeless tobacco: Never Used   Vaping Use    Vaping Use: Never used   Substance and Sexual Activity    Alcohol use: Not Currently    Drug use: No    Sexual activity: Not Currently         Current Outpatient Medications:     Dexlansoprazole (DEXILANT PO), Take by mouth daily in the early morning, Disp: , Rfl:     DULoxetine (CYMBALTA) 60 mg delayed release capsule, Take 1 capsule (60 mg total) by mouth daily, Disp: 180 capsule, Rfl: 0    methocarbamol (ROBAXIN) 500 mg tablet, Take 1 tablet (500 mg total) by mouth 3 (three) times a day May take 2 tablets (1000 mg), Disp: 120 tablet, Rfl: 2    pramipexole (MIRAPEX) 0 25 mg tablet, Take 2 tablets (0 5 mg total) by mouth daily at bedtime, Disp: 180 tablet, Rfl: 0    pregabalin (LYRICA) 75 mg capsule, Take 1 PO HS x 1 week, then 2 PO HS x 1 week, then 1 PO in AM and 2 PO HS , Disp: 90 capsule, Rfl: 2    traMADol HCl 100 MG TABS, Take 100 mg by mouth every 8 (eight) hours as needed (Chronic pain), Disp: 90 tablet, Rfl: 2    VITAMIN D PO, Take 1 capsule by mouth daily, Disp: , Rfl:     Allergies   Allergen Reactions    Dust Mite Extract Shortness Of Breath    Latex Itching and Blisters    Other Other (See Comments)     Seasonal AND cock roaches    Sulfa Antibiotics Vomiting     Topical-blistering       Physical Exam:    /74   Pulse 83   Temp 97 8 °F (36 6 °C)   SpO2 98%     Constitutional:normal, well developed, well nourished, alert, in no distress and non-toxic and no overt pain behavior   and overweight  Eyes:anicteric  HEENT:grossly intact  Neck:supple, symmetric, trachea midline and no masses   Pulmonary:even and unlabored  Cardiovascular:No edema or pitting edema present  Skin:Normal without rashes or lesions and well hydrated  Psychiatric:Mood and affect appropriate  Neurologic:Cranial Nerves II-XII grossly intact  Musculoskeletal:The patient's gait is slightly antalgic, limping at times, but steady without the use of any assistive devices        Imaging  No orders to display         Orders Placed This Encounter   Procedures    MM ALL_Prescribed Meds and Special Instructions    MM DT_Alprazolam Definitive Test    MM DT_Amphetamine Definitive Test    MM DT_Aripiprazole Definitive Test    MM DT_Bath Salts Definitive Test    MM DT_Buprenorphine Definitive Test    MM DT_Butalbital Definitive Test    MM DT_Clonazepam Definitive Test    MM DT_Clozapine Definitive Test    MM DT_Cocaine Definitive Test    MM DT_Codeine Definitive Test    MM DT_Desipramine Definitive Test    MM DT_Dextromethorphan Definitive Test    MM Diazepam Definitive Test    MM DT_Ethyl Glucuronide/Ethyl Sulfate Definitive Test    MM DT_Fentanyl Definitive Test    MM DT_Heroin Definitive Test    MM DT_Hydrocodone Definitive Test    MM DT_Hydromorphone Definitive Test    MM DT_Kratom Definitive Test    MM DT_Levorphanol Definitive Test    MM Lorazepam Definitive Test    MM DT_MDMA Definitive Test    MM DT_Meperidine Definitive Test    MM DT_Methadone Definitive Test    MM DT_Methamphetamine Definitive Test    MM DT_Methylphenidate Definitive Test    MM DT_Morphine Definitive Test    MM DT_Olanzapine Definitive Test    MM DT_Oxazepam Definitive Test    MM DT_Oxycodone Definitive Test    MM DT_Oxymorphone Definitive Test    MM DT_Phenobarbital Definitive Test    MM DT_Phentermine Definitive Test    MM DT_Secobarbital Definitive Test    MM DT_Spice Definitive Test    MM DT_Tapentadol Definitive Test    MM DT_Temazapam Definitive Test    MM DT_THC Definitive Test    MM DT_Tramadol Definitive Test    MM DT_Validity Specific    MM DT_Validity pH    MM DT_Validity Oxidant    MM DT_Validity Creatinine             Imaging      MRI cervical spine wo contrast: Result Notes     Mau Coulter DO   4/24/2022  8:45 PM EDT         Results were discussed personally with Vginesh Hensley over the phone on 4/21  Please see separate encounter note from that day              Study Result    Narrative & Impression   MRI CERVICAL SPINE WITHOUT CONTRAST     INDICATION: M54 2: Cervicalgia  M62 81: Muscle weakness (generalized)      COMPARISON:  Report for outside MRI of the cervical spine dated 4/29/2019      TECHNIQUE:  Sagittal T1, sagittal T2, sagittal inversion recovery, axial T2, axial  2D merge     IMAGE QUALITY:  Diagnostic     FINDINGS:     ALIGNMENT:  Normal alignment of the cervical spine  No cervical compression fracture  Mild chronic superior endplate compression T3  No subluxation  No scoliosis      MARROW SIGNAL:  Normal marrow signal is identified within the visualized bony structures  No discrete marrow lesion      CERVICAL AND VISUALIZED THORACIC CORD:    There is an intradural extra medullary mass in the posterior left aspect of the canal at T3 measuring 1 2 x 1 3 x 1 4 cm  There is compression of the cord but is displaced anteriorly to the right  No abnormal cord signal  Differential is meningioma   versus nerve sheath tumor  Lesion is is partially T1/T2 hypointense suggesting calcification which favors meningioma       Normal signal within the visualized cord      PREVERTEBRAL AND PARASPINAL SOFT TISSUES:  Normal      VISUALIZED POSTERIOR FOSSA:  The visualized posterior fossa demonstrates no abnormal signal      CERVICAL DISC SPACES:     C2-C3:  No disc herniation, canal or foraminal stenosis      C3-C4:  No disc herniation, canal or foraminal stenosis      C4-C5: Small disc osteophyte complex, mild facet and uncovertebral hypertrophy  Mild canal, mild right and mild-to-moderate left foraminal stenosis       C5-C6:  Disc disc osteophyte complex asymmetric to the right  Uncovertebral hypertrophy  Mild canal stenosis, moderate severe foraminal stenosis, right worse than left       C6-C7:  Shallow central protrusion that mildly indents the ventral thecal sac   No significant canal or foraminal stenosis      C7-T1:  Normal      UPPER THORACIC DISC SPACES:  T3 as above  Otherwise unremarkable      IMPRESSION:     Mild cervical degenerative spondylosis most pronounced at C5-C6 with mild canal and bilateral foraminal stenosis       Intradural extramedullary lesion at T3 that compresses and displaces the cord anteriorly and to the right  No cord edema  Differential is meningioma versus nerve sheath tumor, meningioma favored  This was not mentioned on previous MRI of the cervical spine however T3 may not have been included  Suggest comparison with prior outside imaging  Dedicated MRI of the thoracic   spine with and without contrast is recommended       The study was marked in [facility] for notification and follow-up      Workstation performed: [id number]            Study Result    Narrative & Impression   MRI THORACIC SPINE WITH AND WITHOUT CONTRAST     INDICATION: G95 89: Other specified diseases of spinal cord      COMPARISON:  None      TECHNIQUE:  Sagittal T1, sagittal T2, sagittal inversion recovery, axial T2,  axial 2D MERGE  Sagittal and axial T1 postcontrast      IV Contrast:  8 mL of Gadobutrol injection (SINGLE-DOSE)      IMAGE QUALITY:  Diagnostic      FINDINGS:     ALIGNMENT:  There is S-shaped scoliosis of the thoracic spine most prominent towards the right within the lower thoracic spine  No thoracic subluxation  There is a mild compression deformity of the T3 superior endplate which appears chronic      MARROW SIGNAL:  At T6 and T7 vertebral bodies both demonstrate heterogeneous marrow signal with fairly well-circumscribed areas of increased signal on T2 and inversion recovery imaging, which has the appearance of atypical hemangiomas and endplate marrow   degenerative change      THORACIC CORD:  There is severe cord compression noted at the level of the T3 vertebral body as a result of an intradural/extramedullary mass    This mass measures approximately 1 2 cm in maximum dimension arising from the posterior aspect of the thecal   sac and displacing the cord anteriorly and towards the right, demonstrating homogeneous enhancement  This is most suggestive of a meningioma  Despite the significant mass effect upon the cord, there does not appear to be significant cord edema      PREVERTEBRAL AND PARASPINAL SOFT TISSUES:   Normal      THORACIC DEGENERATIVE CHANGE:  No thoracic disc herniation  Normal disc height and signal   There is multilevel mid thoracic endplate and posterior element hypertrophic change resulting in multilevel right foraminal narrowing      POSTCONTRAST:  Homogeneous enhancement of the intradural extra medullary mass within the upper thoracic canal at the level of T3      IMPRESSION:     Homogeneously enhancing intradural/extramedullary mass at the level of T3 arising from the posterior aspect of the dura, compressing and displacing the upper thoracic cord consistent with meningioma  Despite the significant displacement of the cord,   there does not appear to be significant cord edema      Moderate scoliosis    Mild mid thoracic degenerative change resulting in right-sided foraminal narrowing as a result of endplate and posterior element hypertrophic change            Workstation performed: [id number]

## 2022-07-20 NOTE — PATIENT INSTRUCTIONS
Pregabalin (By mouth)   Pregabalin (pre-GA-ba-steff)  Treats nerve and muscle pain, including fibromyalgia  Also treats partial-onset seizures  Brand Name(s): Lyrica, Lyrica CR   There may be other brand names for this medicine  When This Medicine Should Not Be Used: This medicine is not right for everyone  Do not use it if you had an allergic reaction to pregabalin  How to Use This Medicine:   Capsule, Liquid, Long Acting Tablet  Take your medicine as directed  Your dose may need to be changed several times to find what works best for you  Extended-release tablet: Swallow the extended-release tablet whole  Do not crush, break, or chew it  Take it after an evening meal   Oral liquid: Measure the oral liquid medicine with a marked measuring spoon, oral syringe, or medicine cup  This medicine should come with a Medication Guide  Ask your pharmacist for a copy if you do not have one  Missed dose: Take a dose as soon as you remember  If it is almost time for your next dose, wait until then and take a regular dose  Do not take extra medicine to make up for a missed dose  If you miss a dose of the extended-release tablet after your evening meal, take it before bedtime after a snack  If you miss the dose before bedtime, take it after your morning meal  If you do not take the dose the following morning, then take the next dose at your regular time after your evening meal  Do not take 2 doses at the same time  Store the medicine in a closed container at room temperature, away from heat, moisture, and direct light  Drugs and Foods to Avoid:   Ask your doctor or pharmacist before using any other medicine, including over-the-counter medicines, vitamins, and herbal products  Some medicines can affect how pregabalin works   Tell your doctor if you are using any of the following:   ACE inhibitor (including benazepril, enalapril, lisinopril, quinapril, ramipril)  Oral diabetes medicine (including metformin, pioglitazone, rosiglitazone)  Do not drink alcohol while you are using this medicine  Tell your doctor if you use anything else that makes you sleepy  Some examples are allergy medicine, narcotic pain medicine, and alcohol  Tell your doctor if you are also using oxycodone, lorazepam, or zolpidem  Warnings While Using This Medicine:   Tell your doctor if you are pregnant or breastfeeding, or if you have kidney disease, heart failure, heart rhythm problems, lung or breathing problems, a bleeding disorder, diabetes, sores or skin problems, or a low blood platelet count  Tell your doctor if you have a history of angioedema (severe swelling), alcohol or drug abuse, depression, or other mood problems  This medicine may cause the following problems:   Angioedema (severe swelling), which may be life-threatening  Changes in mood or behavior, including suicidal thoughts or behavior  Respiratory depression (serious breathing problem that can be life-threatening), when used with narcotic pain medicines  Peripheral edema (swelling of your hands, ankles, feet, or lower legs)  Increased risk for cancer and bleeding  Serious muscle problems  Heart rhythm changes  This medicine may make you dizzy or drowsy  It may also cause blurry or double vision  Do not drive or do anything else that could be dangerous until you know how this medicine affects you  Do not stop using this medicine suddenly  Your doctor will need to slowly decrease your dose before you stop it completely  Your doctor will do lab tests at regular visits to check on the effects of this medicine  Keep all appointments  Keep all medicine out of the reach of children  Never share your medicine with anyone  Possible Side Effects While Using This Medicine:   Call your doctor right away if you notice any of these side effects:   Allergic reaction: Itching or hives, swelling in your face or hands, swelling or tingling in your mouth or throat, chest tightness, trouble breathing  Blistering, peeling, red skin rash  Blue lips, fingernails, or skin, trouble breathing, chest pain  Blurry or double vision  Fever, chills, cough, sore throat, body aches  Muscle pain, tenderness, or weakness, general feeling of illness  Rapid weight gain, swelling in your hands, ankles, or feet  Severe dizziness or drowsiness  Sudden mood changes, unusual moods or behavior, including extreme happiness or depression, thoughts or attempts of killing oneself  Swelling in your throat, head, or neck  Uneven heartbeat  Unusual bleeding, bruising, or weakness  If you notice these less serious side effects, talk with your doctor:   Confusion, trouble concentrating  Constipation  Dry mouth  If you notice other side effects that you think are caused by this medicine, tell your doctor  Call your doctor for medical advice about side effects  You may report side effects to FDA at 1-691-FDA-0870    © Copyright [address] 2022 Information is for End User's use only and may not be sold, redistributed or otherwise used for commercial purposes  The above information is an  only  It is not intended as medical advice for individual conditions or treatments  Talk to your doctor, nurse or pharmacist before following any medical regimen to see if it is safe and effective for you  Opioid Safety   AMBULATORY CARE:   Opioid safety  includes the correct use, storage, and disposal of opioids  Examples of opioid medicines to treat pain include oxycodone, morphine, fentanyl, and codeine  Call your local emergency number (911 in the [address] [facility]), or have someone else call if:   You have a seizure  You cannot be woken  You have trouble staying awake and your breathing is slow or shallow  Your speech is slurred, or you are confused  You are dizzy or stumble when you walk      Call your doctor, or have someone close to you call if:   You are extremely drowsy, or you have trouble staying awake or speaking  You have pale or clammy skin  You have blue fingernails or lips  Your heartbeat is slower than normal     You cannot stop vomiting  You have questions or concerns about your condition or care  Use opioids safely:   Take prescribed opioids exactly as directed  Opioids come with directions based on the kind of opioid and how it is given  Do not take more than the recommended amount, or for longer than needed  Do not give opioids to others or take opioids that belong to someone else  Misuse of opioids can lead to an addiction or overdose  Do not mix opioids with other medicines or alcohol  The combination can cause an overdose, or lead to a coma  Do not drive or operate heavy machinery after you take the opioid  Your provider or pharmacist can tell you how long to wait after a dose before you do these activities  Talk to your healthcare provider if you have any side effects  He or she can help you prevent or relieve side effects  Side effects include nausea, sleepiness, itching, and trouble thinking clearly  Manage constipation:  Constipation is the most common side effect of opioid medicine  Constipation is when you have hard, dry bowel movements, or you go longer than usual between bowel movements  Tell your healthcare provider about all changes in your bowel movements while you are taking opioids  He or she may recommend laxative medicine to help you have a bowel movement  He or she may also change the kind of opioid you are taking, or change when you take it  The following are more ways you can prevent or relieve constipation:  Drink liquids as directed  You may need to drink extra liquids to help soften and move your bowels  Ask how much liquid to drink each day and which liquids are best for you  Eat high-fiber foods  This may help decrease constipation by adding bulk to your bowel movements   High-fiber foods include fruits, vegetables, whole-grain breads and cereals, and beans  Your healthcare provider or dietitian can help you create a high-fiber meal plan  Your provider may also recommend a fiber supplement if you cannot get enough fiber from food  Exercise regularly  Regular physical activity can help stimulate your intestines  Walking is a good exercise to prevent or relieve constipation  Ask which exercises are best for you  Schedule a time each day to have a bowel movement  This may help train your body to have regular bowel movements  Bend forward while you are on the toilet to help move the bowel movement out  Sit on the toilet for at least 10 minutes, even if you do not have a bowel movement  Store opioids safely:   Store opioids where others cannot easily get them  Keep them in a locked cabinet or secure area  Do not  keep them in a purse or other bag you carry with you  A person may be looking for something else and find the opioids  Make sure opioids are stored out of the reach of children  A child can easily overdose on opioids  Opioids may look like candy to a small child  The best way to dispose of opioids: The laws vary by country and area  In the [place], the best way is to return the opioids through a take-back program  This program is offered by the [employer]  The following are options for using the program:  Take the opioids to a JAMES collection site  The site is often a law enforcement center  Call your local law enforcement center for scheduled take-back days in your area  You will be given information on where to go if the collection site is in a different location  Take the opioids to an approved pharmacy or hospital   A pharmacy or hospital may be set up as a collection site  You will need to ask if it is a JAMES collection site if you were not directed there  A pharmacy or doctor's office may not be able to take back opioids unless it is a JAMES site  Use a mail-back system    This means you are given containers to put the opioids into  You will then mail them in the containers  Use a take-back drop box  This is a place to leave the opioids at any time  People and animals will not be able to get into the box  Your local law enforcement agency can tell you where to find a drop box in your area  Other safe ways to dispose of opioids: The medicine may come with disposal instructions  The instructions may vary depending on the brand of medicine you are using  Instructions may come in a Medication Guide, but not every medicine has one  You may instead get instructions from your pharmacy or doctor  Follow instructions carefully  The following are general guidelines to follow:  Find out if you can flush the opioid  Some opioids can be flushed down the toilet or poured into the sink  You will need to contact authorities in your area to see if this is an option for you  The FDA also offers a list of medicines that are safe to flush down the toilet  You can check the list if you cannot get the information for your local area  Ask your waste management company about rules for putting opioids in the trash  The company will be able to give you specific directions  Scratch out personal information on the original medicine label so it cannot be read  Then put it in the trash  Do not label the trash or put any information on it about the opioids  It should look like regular household trash so no one is tempted to look for the opioids  Keep the trash out of the reach of children and animals  Always make sure trash is secure  Talk to officials if you live in a facility  If you live in a nursing home or assisted living center, talk to an official  The person will know the rules for your area  Other ways to manage pain:   Ask your healthcare provider about non-opioid medicines to control pain  Nonprescription medicines include NSAIDs (such as ibuprofen) and acetaminophen   Prescription medicines include muscle relaxers, antidepressants, and steroids  Pain may be managed without any medicines  Some ways to relieve pain include massage, aromatherapy, or meditation  Physical or occupational therapy may also help  For more information:   Drug Enforcement Administration  [facility] [address]  Phone: 7- [phone] - [phone]  Web Address: [facility]/drug_disposal/    [address]  and Drug Administration  [place] , [address]  Phone: 6- [phone] - [phone]  Web Address: http://[employer]/    Follow up with your doctor or pain specialist as directed: You may need to have your dose adjusted  Your doctor or pain specialist can also help you find ways to manage pain without opioids  Write down your questions so you remember to ask them during your visits  © Copyright [employer] 2022 Information is for End User's use only and may not be sold, redistributed or otherwise used for commercial purposes  All illustrations and images included in CareNotes® are the copyrighted property of [employer]  or [facility] Khalida Downs   The above information is an  only  It is not intended as medical advice for individual conditions or treatments  Talk to your doctor, nurse or pharmacist before following any medical regimen to see if it is safe and effective for you

## 2022-07-25 ENCOUNTER — TELEPHONE (OUTPATIENT)
Dept: INTERNAL MEDICINE CLINIC | Facility: CLINIC | Age: 62
End: 2022-07-25

## 2022-07-25 DIAGNOSIS — Z71.84 TRAVEL ADVICE ENCOUNTER: Primary | ICD-10-CM

## 2022-07-25 NOTE — TELEPHONE ENCOUNTER
Patient calling this afternoon and stated, " I have an upcoming trip to [facility] [place] in January and I neglected to ask Dr Cristin Figueroa about what meds and immunizations I am required to have done before then    Could he give me a call at his convenience?"      [phone]

## 2022-07-25 NOTE — TELEPHONE ENCOUNTER
Called patient and made her aware of the referral Dr Mindy Felix placed in the system to [facility] [facility]

## 2022-07-26 ENCOUNTER — TELEPHONE (OUTPATIENT)
Dept: PAIN MEDICINE | Facility: CLINIC | Age: 62
End: 2022-07-26

## 2022-07-26 DIAGNOSIS — G25.81 RESTLESS LEGS SYNDROME: ICD-10-CM

## 2022-07-26 RX ORDER — PRAMIPEXOLE DIHYDROCHLORIDE 0.25 MG/1
0.5 TABLET ORAL
Qty: 180 TABLET | Refills: 0 | Status: SHIPPED | OUTPATIENT
Start: 2022-07-26

## 2022-07-26 NOTE — TELEPHONE ENCOUNTER
Pt contacted Call Center requested refill of their medication  Medication Name:methocarbamol (ROBAXIN          Dosage of Med: 500 mg       Frequency of Med:Take 1 tablet (500 mg total) by mouth 3 (three) times a day May take 2 tablets (1000 mg)      Remaining Medication:0       Pharmacy and Location:  [facility] [address] [place] [address], [address] [place] [address] SC[place] [address]  [address] [id number]   Phone:  [phone]        Pt  Preferred Callback Phone Number: [phone]      Thank you

## 2022-07-28 DIAGNOSIS — K22.719 BARRETT'S ESOPHAGUS WITH DYSPLASIA: Primary | ICD-10-CM

## 2022-07-28 RX ORDER — DEXLANSOPRAZOLE 60 MG/1
60 CAPSULE, DELAYED RELEASE ORAL
Qty: 30 CAPSULE | Refills: 1 | Status: SHIPPED | OUTPATIENT
Start: 2022-07-28

## 2022-08-05 ENCOUNTER — TELEPHONE (OUTPATIENT)
Dept: INTERNAL MEDICINE CLINIC | Facility: CLINIC | Age: 62
End: 2022-08-05

## 2022-08-05 NOTE — TELEPHONE ENCOUNTER
Iron infusion prior authorization  Approved for  6 month from  8/5/22 to 2/15/2023   ref # [id number]

## 2022-08-08 ENCOUNTER — HOSPITAL ENCOUNTER (OUTPATIENT)
Dept: INFUSION CENTER | Facility: CLINIC | Age: 62
Discharge: HOME | End: 2022-08-08
Payer: COMMERCIAL

## 2022-08-08 VITALS
DIASTOLIC BLOOD PRESSURE: 82 MMHG | TEMPERATURE: 97.6 F | SYSTOLIC BLOOD PRESSURE: 132 MMHG | HEART RATE: 78 BPM | RESPIRATION RATE: 16 BRPM

## 2022-08-08 DIAGNOSIS — E61.1 IRON DEFICIENCY: Primary | ICD-10-CM

## 2022-08-08 PROCEDURE — 96365 THER/PROPH/DIAG IV INF INIT: CPT

## 2022-08-08 RX ORDER — SODIUM CHLORIDE 9 MG/ML
20 INJECTION, SOLUTION INTRAVENOUS ONCE
Status: COMPLETED | OUTPATIENT
Start: 2022-08-08 | End: 2022-08-08

## 2022-08-08 RX ORDER — SODIUM CHLORIDE 9 MG/ML
20 INJECTION, SOLUTION INTRAVENOUS ONCE
Status: CANCELLED | OUTPATIENT
Start: 2022-08-15

## 2022-08-08 RX ADMIN — SODIUM CHLORIDE 20 ML/HR: 0.9 INJECTION, SOLUTION INTRAVENOUS at 11:55

## 2022-08-08 RX ADMIN — FERUMOXYTOL 510 MG: 510 INJECTION INTRAVENOUS at 12:05

## 2022-08-15 ENCOUNTER — HOSPITAL ENCOUNTER (OUTPATIENT)
Dept: INFUSION CENTER | Facility: CLINIC | Age: 62
Discharge: HOME | End: 2022-08-15
Payer: COMMERCIAL

## 2022-08-15 VITALS
SYSTOLIC BLOOD PRESSURE: 123 MMHG | HEART RATE: 72 BPM | RESPIRATION RATE: 16 BRPM | TEMPERATURE: 96.5 F | DIASTOLIC BLOOD PRESSURE: 77 MMHG

## 2022-08-15 DIAGNOSIS — E61.1 IRON DEFICIENCY: Primary | ICD-10-CM

## 2022-08-15 PROCEDURE — 96365 THER/PROPH/DIAG IV INF INIT: CPT

## 2022-08-15 RX ORDER — SODIUM CHLORIDE 9 MG/ML
20 INJECTION, SOLUTION INTRAVENOUS ONCE
Status: CANCELLED | OUTPATIENT
Start: 2022-08-15

## 2022-08-15 RX ORDER — SODIUM CHLORIDE 9 MG/ML
20 INJECTION, SOLUTION INTRAVENOUS ONCE
Status: COMPLETED | OUTPATIENT
Start: 2022-08-15 | End: 2022-08-15

## 2022-08-15 RX ADMIN — FERUMOXYTOL 510 MG: 510 INJECTION INTRAVENOUS at 09:38

## 2022-08-15 RX ADMIN — SODIUM CHLORIDE 20 ML/HR: 9 INJECTION, SOLUTION INTRAVENOUS at 09:17

## 2022-08-15 NOTE — PROGRESS NOTES
Pt arrived to unit without complaint  Pt tolerated Feraheme without incident  AVS declined, not future appts at this time  Pt left unit in stable condition

## 2022-08-18 ENCOUNTER — TELEPHONE (OUTPATIENT)
Dept: PAIN MEDICINE | Facility: MEDICAL CENTER | Age: 62
End: 2022-08-18

## 2022-08-18 NOTE — TELEPHONE ENCOUNTER
Patient called stating if [facility] can take care of [facility] cys or if it would be taking care of by Ortho and to please provide info if Ortho can take care of it. Patient states to please leave a detail message if she does not  the phone.    Please Advise    Patient can be reached at [phone]. TY

## 2022-08-25 ENCOUNTER — HOSPITAL ENCOUNTER (OUTPATIENT)
Dept: NEUROLOGY | Facility: CLINIC | Age: 62
End: 2022-08-25
Payer: COMMERCIAL

## 2022-08-25 DIAGNOSIS — M54.2 NECK PAIN: ICD-10-CM

## 2022-08-25 DIAGNOSIS — M62.81 MUSCLE WEAKNESS OF LEFT UPPER EXTREMITY: ICD-10-CM

## 2022-08-25 PROCEDURE — 95886 MUSC TEST DONE W/N TEST COMP: CPT | Performed by: PSYCHIATRY & NEUROLOGY

## 2022-08-25 PROCEDURE — 95910 NRV CNDJ TEST 7-8 STUDIES: CPT | Performed by: PSYCHIATRY & NEUROLOGY

## 2022-08-26 DIAGNOSIS — F32.A DEPRESSION, UNSPECIFIED DEPRESSION TYPE: ICD-10-CM

## 2022-08-26 RX ORDER — DULOXETIN HYDROCHLORIDE 60 MG/1
60 CAPSULE, DELAYED RELEASE ORAL DAILY
Qty: 180 CAPSULE | Refills: 0 | Status: SHIPPED | OUTPATIENT
Start: 2022-08-26

## 2022-09-07 ENCOUNTER — TELEPHONE (OUTPATIENT)
Dept: INTERNAL MEDICINE CLINIC | Facility: CLINIC | Age: 62
End: 2022-09-07

## 2022-09-30 NOTE — TELEPHONE ENCOUNTER
09/30/22 2:39 PM     Thank you for your request  Your request has been received, reviewed, and the patient chart updated  The PCP has successfully been removed with a patient attribution note  This message will now be completed      Thank you  Audelia Malik

## 2022-10-24 NOTE — PROGRESS NOTES
Daily Note     Today's date: 2018  Patient name: Tommy Campbell  : 1960  MRN: [id number]  Referring provider: Gary Barraza DO  Dx:   Encounter Diagnosis     ICD-10-CM    1  S/P right knee arthroscopy Z98 890    2  Right knee pain, unspecified chronicity M25 561                   Subjective: Pt comes to therapy reporting soreness in right knee, attributed to being active this morning running errands  States she has been trying to negotiate stairs in reciprocal pattern  Objective: See treatment diary below      Precautions: asthma, GERD, h/o lumbar fusion    Daily Treatment Diary     Manual            PROM R K' & H' perf perf perf ISIDORO                           Exercise Diary            bike 5' 5' 5'                       Mini squats 15 2x10 3x10          TB Side stepping 2 laps otb 4 laps otb 5 laps          SLS 30"x3  30"x3          Step ups/downs nv 1R 2x10 ea 1R 2x10                       Sit to stands nv nv                        Seated H IR/ER 10 ea 2x10 ea 3x10 ea          SLR - 4-way 2x10 2x10           Bridges w/TB 2x10 (tb-nv) 2x10 3 x10          Clamshells w/TB nv 2x10 3x10                                                                                                         Modalities            CP PRN 10'  10'                                        Assessment: Tolerated treatment well  Patient exhibited good technique with therapeutic exercises and would benefit from continued PT      Plan: Progress treatment as tolerated 
21-Oct-2022

## 2022-11-04 ENCOUNTER — TELEPHONE (OUTPATIENT)
Dept: INTERNAL MEDICINE CLINIC | Facility: CLINIC | Age: 62
End: 2022-11-04

## 2022-11-04 ENCOUNTER — TELEPHONE (OUTPATIENT)
Dept: PAIN MEDICINE | Facility: MEDICAL CENTER | Age: 62
End: 2022-11-04

## 2022-11-04 DIAGNOSIS — G25.81 RESTLESS LEGS SYNDROME: ICD-10-CM

## 2022-11-04 RX ORDER — PRAMIPEXOLE DIHYDROCHLORIDE 0.25 MG/1
0.5 TABLET ORAL
Qty: 180 TABLET | Refills: 0 | Status: SHIPPED | OUTPATIENT
Start: 2022-11-04

## 2022-11-04 NOTE — TELEPHONE ENCOUNTER
RN attempted to reach pt regarding previous  Left a detailed VMMOM as per GISELA on chart with call back number office hours and location  provided if any questions or concerns

## 2022-11-04 NOTE — TELEPHONE ENCOUNTER
Pt contacted Call Center requested refill of their medication  Medication Name: traMADol HCl    Dosage of Med: 100 MG TABS     Frequency of Med: 3xs a day or as needed      Remaining Medication: a few left      Pharmacy and Location:   [address]   [address] [facility], [facility], [address], [address]   Phone: [phone]    Pt is requesting one last refill from Dr Avani Cagle  She said that it is very hard for her to find a PM Dr right now  However she is looking  She is having pain now due to the move, it has taken a lot out of her

## 2022-11-04 NOTE — TELEPHONE ENCOUNTER
Patient calling this afternoon and stated, " I moved to [place] recently and can not get in with a new Dr until January    I need Dr Skinner Press to send a script to the [facility] for my pramipexole 0 25 mg as I am going to be out "

## 2023-01-16 ENCOUNTER — TELEPHONE (OUTPATIENT)
Dept: PSYCHIATRY | Facility: CLINIC | Age: 63
End: 2023-01-16

## 2023-02-02 NOTE — ASSESSMENT & PLAN NOTE
On her right upper back on top of the scapula there is a large for 5 cm subcutaneous mass  I will check an ultrasound to evaluate the size and location and to ensure there is no other concerning characteristics  Render Post-Care In The Note: no

## 2023-02-10 DIAGNOSIS — G25.81 RESTLESS LEGS SYNDROME: ICD-10-CM

## 2023-02-10 RX ORDER — PRAMIPEXOLE DIHYDROCHLORIDE 0.25 MG/1
TABLET ORAL
Qty: 180 TABLET | Refills: 0 | Status: SHIPPED | OUTPATIENT
Start: 2023-02-10

## 2023-02-20 ENCOUNTER — TELEPHONE (OUTPATIENT)
Dept: OTHER | Facility: OTHER | Age: 63
End: 2023-02-20

## 2023-02-20 NOTE — TELEPHONE ENCOUNTER
Patient called [facility], stated that she moved to [place]  She would come to [facility] to have a hernia surgery  She would like to ask Dr Yesika Ruggiero if surgeon Dr Stephanie Concepcion would be recommended for the patient

## 2023-05-08 ENCOUNTER — TELEPHONE (OUTPATIENT)
Dept: PAIN MEDICINE | Facility: MEDICAL CENTER | Age: 63
End: 2023-05-08

## 2023-05-08 NOTE — TELEPHONE ENCOUNTER
Caller: Rama Camp (PT)    Doctor: Dr Amy Talbot    Reason for call: Medical Records request to be sent to the address on file   Pt would that request be done as soon as possible         Call back#: [phone]

## 2023-05-09 NOTE — TELEPHONE ENCOUNTER
Caller: Kim Pope    Doctor: Dr Ndaeem Lentz     Reason for call: Patient calling stating she missed call from office but do not see any notation        Call [phone]

## 2023-05-09 NOTE — TELEPHONE ENCOUNTER
Caller: Jaquan Bautista    Doctor: Ariel Sneed    Reason for call: patient is in a lot of pain, can you please email her the GISELA form     [email or web address]   Please provide email where she can send right back please  Patient lives in [place] unable to come into office  I told her other option could be going to [facility] and signing record release there and they can fax it to us         Call back#: [phone]

## 2023-05-09 NOTE — TELEPHONE ENCOUNTER
Left message for patient that we need her to fill out and sign medical release of information in order to release her records

## 2023-09-21 NOTE — PROGRESS NOTES
PT Re-Evaluation     Today's date: 2018  Patient name: Eddie Camacho  : 1960  MRN: [id number]  Referring provider: Charlie Eric  Dx:   Encounter Diagnosis     ICD-10-CM    1  S/P right knee arthroscopy [id number]                   Assessment  Impairments: activity intolerance, impaired balance, impaired physical strength and pain with function    Assessment details: Eddie Camacho has been treated in outpatient physical therapy over the past 6 weeks for diagnosis/complaints of S/P right knee arthroscopy  (primary encounter diagnosis)  Pt demonstrates increased range of motion, improved strength, decreased pain, and increased activity tolerance  Pt has achieved goals and maximal benefit from skilled physical therapy care at present time  Pt appropriate for one more visit to establish effective home program, then to be discharged to [facility]  Thank you for the opportunity to share in this patient's care  Understanding of Dx/Px/POC: good   Prognosis: good    Goals  - Patient will report pain reduction by at least 2 levels with activity within 4 weeks  - Patient will tolerate ambulation greater than 2 blocks by discharge  - Patient will be independent with HEP  - Patient will maintain neutral knee position without hyperextension during functional activity by discharge    Plan  Patient would benefit from: skilled physical therapy  Planned modality interventions: cryotherapy  Planned therapy interventions: neuromuscular re-education, manual therapy, balance, gait training, home exercise program and strengthening  Treatment plan discussed with: patient  Plan details: Discharge to [facility] next visit  Subjective Evaluation    History of Present Illness  Onset date: Long history  Date of surgery: May  Mechanism of injury: Pt reports improvements in ability to perform IADLs, however, continues to note decrease in endurance  States she feels she cannot walk as far as she would like due to cardiovascular fatigue  Notes she had steroid injection this morning at orthopedic's office, stating she was advised to "take it ease" at therapy today  Pt reports she feels she is independent in HEP and is ready for discharge     Pain  No pain reported  Current pain ratin  At best pain ratin  At worst pain rating: 3  Aggravating factors: walking    Patient Goals  Patient goals for therapy: increased strength, independence with ADLs/IADLs and decreased pain          Objective     Palpation     Additional Palpation Details  No TTP through the right knee in supported rest position, no palpable scar tissue/adhesions at incision sites - remains      Active Range of Motion     Additional Active Range of Motion Details  Active and passive knee ROM WNL, minimal discomfort at end range flexion  Slight genu recurvatum in standing - remains    Strength/Myotome Testing     Left Knee   Flexion: 4+  Extension: 4+    Right Knee   Flexion: 4+  Extension: 4+    Additional Strength Details  Ankle strength WNL  Hip flexion 4+/5  Decreased hip control, valgus knee position noted during functional squat    General Comments     Knee Comments  Gait: Slight circumduction of RLE during swing, decreased gait speed  Balance: unable to maintain SLS for greater than 5 seconds - able to hold 30 seconds at RE       Flowsheet Rows      Most Recent Value   PT/OT G-Codes   Current Score  46   Projected Score  50   Assessment Type  Re-evaluation   G code set  Other PT/OT Primary   Other PT Primary Current Status ()  CK   Other PT Primary Goal Status ()  CK        Precautions: Spinal fusion T7-L3, Abdominal hernia repair 18, previous knee surgery     Daily Treatment Diary    No prone TE, limit end range forward flexion exercise  Exercise Diary   6/26  6/28    7/5  7/10 7/12   7/17  7/24 7/31    Bike  5'  5 min  5 min  5 min  5 min  5 mins  5 mins 8'   8'   TM     5 min 5 min 5' 2 0 mph 5' 2 2 mph 10'   2 3 mph np               TKE with TB to neutral ext  Peach 2x10   Peach 2x10  peach  2x10  otb 2x10  otb 5"x20  otb 5"x20  otb 5"x20 GTB 5"x20 No TB 2x10    Heel tap  20x ea  20x ea  20 ea  20 ea  20 ea  20 ea  20 ea 20x ea  D/C    Wall squat    attempted, pn  np          -     Standing hip ext/abd  2x10 ea  2x10 ea  2x10 ea  2x15 ea  2x15 ea  2# 2x10  2# 2x10 2# 2x10  0# 2x10    Sl clamshells with TB  OTB 2x10  OTB 2x10  otb 20x  otb 20x ea  otb 20x  otb 20x  otb 20x OTB  20x ea No TB 20x    Hip sliders with neutral trunk  2x10 ea flx/abd  pn            -     SLS  20"x3 ea  20"x3 ea  20"x3  20"x3ea  30"x2  30"x2  30"x2  30"x2 ea  30"x2 ea   Sidestepping  3 laps  peach  2 laps  peach 3 laps  peach 3 laps  peach 3 laps  ptb 3 laps  ptb 3 laps OTB   3 laps No tb 3 laps     Mini squats   3" x 15  3"x20  3'x30  3"x30  3"x30  3"x30 3"x30  3"2x10                                                                                               Modalities   6 26  6/28  7/3  7/5  7/10  7/12  7/17 7/24 7/31    CP prn  10'  10 mins  10 min  8 mins  10 min  10 min   10'   10' Birth Control Pills Pregnancy And Lactation Text: This medication should be avoided if pregnant and for the first 30 days post-partum.

## 2023-12-19 ENCOUNTER — HOSPITAL ENCOUNTER (INPATIENT)
Facility: HOSPITAL | Age: 63
LOS: 3 days | Discharge: HOME | DRG: 854 | End: 2023-12-22
Attending: EMERGENCY MEDICINE | Admitting: SURGERY
Payer: COMMERCIAL

## 2023-12-19 ENCOUNTER — ANESTHESIA EVENT (INPATIENT)
Dept: PERIOP | Facility: HOSPITAL | Age: 63
DRG: 854 | End: 2023-12-19
Payer: COMMERCIAL

## 2023-12-19 ENCOUNTER — APPOINTMENT (EMERGENCY)
Dept: ULTRASOUND IMAGING | Facility: HOSPITAL | Age: 63
DRG: 854 | End: 2023-12-19
Payer: COMMERCIAL

## 2023-12-19 ENCOUNTER — APPOINTMENT (INPATIENT)
Dept: RADIOLOGY | Facility: HOSPITAL | Age: 63
DRG: 854 | End: 2023-12-19
Payer: COMMERCIAL

## 2023-12-19 DIAGNOSIS — K81.0 ACUTE CHOLECYSTITIS: ICD-10-CM

## 2023-12-19 DIAGNOSIS — K80.00 ACUTE CHOLECYSTITIS DUE TO BILIARY CALCULUS: Primary | ICD-10-CM

## 2023-12-19 LAB
ALBUMIN SERPL BCP-MCNC: 4.3 G/DL (ref 3.5–5)
ALP SERPL-CCNC: 69 U/L (ref 34–104)
ALT SERPL W P-5'-P-CCNC: 26 U/L (ref 7–52)
ANION GAP SERPL CALCULATED.3IONS-SCNC: 7 MMOL/L
AST SERPL W P-5'-P-CCNC: 15 U/L (ref 13–39)
BASOPHILS # BLD AUTO: 0.04 THOUSANDS/ÂΜL (ref 0–0.1)
BASOPHILS NFR BLD AUTO: 0 % (ref 0–1)
BILIRUB SERPL-MCNC: 0.87 MG/DL (ref 0.2–1)
BUN SERPL-MCNC: 10 MG/DL (ref 5–25)
CALCIUM SERPL-MCNC: 9.5 MG/DL (ref 8.4–10.2)
CHLORIDE SERPL-SCNC: 101 MMOL/L (ref 96–108)
CO2 SERPL-SCNC: 29 MMOL/L (ref 21–32)
CREAT SERPL-MCNC: 0.61 MG/DL (ref 0.6–1.3)
EOSINOPHIL # BLD AUTO: 0.05 THOUSAND/ÂΜL (ref 0–0.61)
EOSINOPHIL NFR BLD AUTO: 0 % (ref 0–6)
ERYTHROCYTE [DISTWIDTH] IN BLOOD BY AUTOMATED COUNT: 14.7 % (ref 11.6–15.1)
GFR SERPL CREATININE-BSD FRML MDRD: 98 ML/MIN/1.73SQ M
GLUCOSE SERPL-MCNC: 111 MG/DL (ref 65–140)
HCT VFR BLD AUTO: 40.5 % (ref 34.8–46.1)
HGB BLD-MCNC: 12.8 G/DL (ref 11.5–15.4)
IMM GRANULOCYTES # BLD AUTO: 0.06 THOUSAND/UL (ref 0–0.2)
IMM GRANULOCYTES NFR BLD AUTO: 1 % (ref 0–2)
LIPASE SERPL-CCNC: <6 U/L (ref 11–82)
LYMPHOCYTES # BLD AUTO: 1.85 THOUSANDS/ÂΜL (ref 0.6–4.47)
LYMPHOCYTES NFR BLD AUTO: 15 % (ref 14–44)
MCH RBC QN AUTO: 27.4 PG (ref 26.8–34.3)
MCHC RBC AUTO-ENTMCNC: 31.6 G/DL (ref 31.4–37.4)
MCV RBC AUTO: 87 FL (ref 82–98)
MONOCYTES # BLD AUTO: 0.73 THOUSAND/ÂΜL (ref 0.17–1.22)
MONOCYTES NFR BLD AUTO: 6 % (ref 4–12)
NEUTROPHILS # BLD AUTO: 9.96 THOUSANDS/ÂΜL (ref 1.85–7.62)
NEUTS SEG NFR BLD AUTO: 78 % (ref 43–75)
NRBC BLD AUTO-RTO: 0 /100 WBCS
PLATELET # BLD AUTO: 263 THOUSANDS/UL (ref 149–390)
PLATELET # BLD AUTO: 288 THOUSANDS/UL (ref 149–390)
PMV BLD AUTO: 9.2 FL (ref 8.9–12.7)
PMV BLD AUTO: 9.2 FL (ref 8.9–12.7)
POTASSIUM SERPL-SCNC: 3.5 MMOL/L (ref 3.5–5.3)
PROT SERPL-MCNC: 7.2 G/DL (ref 6.4–8.4)
RBC # BLD AUTO: 4.67 MILLION/UL (ref 3.81–5.12)
SODIUM SERPL-SCNC: 137 MMOL/L (ref 135–147)
WBC # BLD AUTO: 12.69 THOUSAND/UL (ref 4.31–10.16)

## 2023-12-19 PROCEDURE — 96367 TX/PROPH/DG ADDL SEQ IV INF: CPT

## 2023-12-19 PROCEDURE — 85025 COMPLETE CBC W/AUTO DIFF WBC: CPT | Performed by: EMERGENCY MEDICINE

## 2023-12-19 PROCEDURE — NC001 PR NO CHARGE: Performed by: EMERGENCY MEDICINE

## 2023-12-19 PROCEDURE — 71045 X-RAY EXAM CHEST 1 VIEW: CPT

## 2023-12-19 PROCEDURE — 83690 ASSAY OF LIPASE: CPT | Performed by: EMERGENCY MEDICINE

## 2023-12-19 PROCEDURE — 99222 1ST HOSP IP/OBS MODERATE 55: CPT

## 2023-12-19 PROCEDURE — 80053 COMPREHEN METABOLIC PANEL: CPT | Performed by: EMERGENCY MEDICINE

## 2023-12-19 PROCEDURE — 76705 ECHO EXAM OF ABDOMEN: CPT

## 2023-12-19 PROCEDURE — 96361 HYDRATE IV INFUSION ADD-ON: CPT

## 2023-12-19 PROCEDURE — 99284 EMERGENCY DEPT VISIT MOD MDM: CPT

## 2023-12-19 PROCEDURE — 36415 COLL VENOUS BLD VENIPUNCTURE: CPT | Performed by: EMERGENCY MEDICINE

## 2023-12-19 PROCEDURE — 96365 THER/PROPH/DIAG IV INF INIT: CPT

## 2023-12-19 PROCEDURE — 85049 AUTOMATED PLATELET COUNT: CPT

## 2023-12-19 PROCEDURE — 99285 EMERGENCY DEPT VISIT HI MDM: CPT | Performed by: EMERGENCY MEDICINE

## 2023-12-19 PROCEDURE — 96375 TX/PRO/DX INJ NEW DRUG ADDON: CPT

## 2023-12-19 RX ORDER — KETOROLAC TROMETHAMINE 30 MG/ML
30 INJECTION, SOLUTION INTRAMUSCULAR; INTRAVENOUS ONCE
Status: COMPLETED | OUTPATIENT
Start: 2023-12-19 | End: 2023-12-19

## 2023-12-19 RX ORDER — OXYCODONE HYDROCHLORIDE 10 MG/1
10 TABLET ORAL EVERY 4 HOURS PRN
Status: DISCONTINUED | OUTPATIENT
Start: 2023-12-19 | End: 2023-12-22 | Stop reason: HOSPADM

## 2023-12-19 RX ORDER — CEFTRIAXONE 2 G/50ML
2000 INJECTION, SOLUTION INTRAVENOUS EVERY 24 HOURS
Status: DISCONTINUED | OUTPATIENT
Start: 2023-12-20 | End: 2023-12-19

## 2023-12-19 RX ORDER — ACETAMINOPHEN 325 MG/1
650 TABLET ORAL EVERY 6 HOURS PRN
Status: DISCONTINUED | OUTPATIENT
Start: 2023-12-19 | End: 2023-12-20

## 2023-12-19 RX ORDER — METRONIDAZOLE 500 MG/100ML
500 INJECTION, SOLUTION INTRAVENOUS EVERY 8 HOURS
Status: DISCONTINUED | OUTPATIENT
Start: 2023-12-19 | End: 2023-12-21

## 2023-12-19 RX ORDER — ONDANSETRON 2 MG/ML
4 INJECTION INTRAMUSCULAR; INTRAVENOUS ONCE
Status: COMPLETED | OUTPATIENT
Start: 2023-12-19 | End: 2023-12-19

## 2023-12-19 RX ORDER — ONDANSETRON 2 MG/ML
4 INJECTION INTRAMUSCULAR; INTRAVENOUS EVERY 6 HOURS PRN
Status: DISCONTINUED | OUTPATIENT
Start: 2023-12-19 | End: 2023-12-20

## 2023-12-19 RX ORDER — OXYCODONE HYDROCHLORIDE 5 MG/1
5 TABLET ORAL EVERY 4 HOURS PRN
Status: DISCONTINUED | OUTPATIENT
Start: 2023-12-19 | End: 2023-12-22 | Stop reason: HOSPADM

## 2023-12-19 RX ORDER — METRONIDAZOLE 500 MG/100ML
500 INJECTION, SOLUTION INTRAVENOUS EVERY 8 HOURS
Status: DISCONTINUED | OUTPATIENT
Start: 2023-12-19 | End: 2023-12-19

## 2023-12-19 RX ORDER — HEPARIN SODIUM 5000 [USP'U]/ML
5000 INJECTION, SOLUTION INTRAVENOUS; SUBCUTANEOUS EVERY 8 HOURS SCHEDULED
Status: DISCONTINUED | OUTPATIENT
Start: 2023-12-19 | End: 2023-12-22 | Stop reason: HOSPADM

## 2023-12-19 RX ORDER — CEFAZOLIN SODIUM 2 G/50ML
2000 SOLUTION INTRAVENOUS EVERY 8 HOURS
Status: DISCONTINUED | OUTPATIENT
Start: 2023-12-19 | End: 2023-12-22

## 2023-12-19 RX ORDER — HYDROMORPHONE HCL IN WATER/PF 6 MG/30 ML
0.2 PATIENT CONTROLLED ANALGESIA SYRINGE INTRAVENOUS
Status: DISCONTINUED | OUTPATIENT
Start: 2023-12-19 | End: 2023-12-20

## 2023-12-19 RX ORDER — GABAPENTIN 300 MG/1
300 CAPSULE ORAL 3 TIMES DAILY
COMMUNITY

## 2023-12-19 RX ORDER — SODIUM CHLORIDE, SODIUM GLUCONATE, SODIUM ACETATE, POTASSIUM CHLORIDE, MAGNESIUM CHLORIDE, SODIUM PHOSPHATE, DIBASIC, AND POTASSIUM PHOSPHATE .53; .5; .37; .037; .03; .012; .00082 G/100ML; G/100ML; G/100ML; G/100ML; G/100ML; G/100ML; G/100ML
100 INJECTION, SOLUTION INTRAVENOUS CONTINUOUS
Status: DISCONTINUED | OUTPATIENT
Start: 2023-12-20 | End: 2023-12-21

## 2023-12-19 RX ORDER — SODIUM CHLORIDE, SODIUM LACTATE, POTASSIUM CHLORIDE, CALCIUM CHLORIDE 600; 310; 30; 20 MG/100ML; MG/100ML; MG/100ML; MG/100ML
125 INJECTION, SOLUTION INTRAVENOUS CONTINUOUS
Status: DISPENSED | OUTPATIENT
Start: 2023-12-19 | End: 2023-12-19

## 2023-12-19 RX ADMIN — HYDROMORPHONE HYDROCHLORIDE 0.2 MG: 0.2 INJECTION, SOLUTION INTRAMUSCULAR; INTRAVENOUS; SUBCUTANEOUS at 21:20

## 2023-12-19 RX ADMIN — SODIUM CHLORIDE 1000 ML: 0.9 INJECTION, SOLUTION INTRAVENOUS at 13:45

## 2023-12-19 RX ADMIN — ONDANSETRON 4 MG: 2 INJECTION INTRAMUSCULAR; INTRAVENOUS at 13:44

## 2023-12-19 RX ADMIN — METRONIDAZOLE 500 MG: 500 INJECTION, SOLUTION INTRAVENOUS at 17:08

## 2023-12-19 RX ADMIN — KETOROLAC TROMETHAMINE 30 MG: 30 INJECTION, SOLUTION INTRAMUSCULAR; INTRAVENOUS at 13:44

## 2023-12-19 RX ADMIN — SODIUM CHLORIDE, SODIUM LACTATE, POTASSIUM CHLORIDE, AND CALCIUM CHLORIDE 125 ML/HR: .6; .31; .03; .02 INJECTION, SOLUTION INTRAVENOUS at 17:11

## 2023-12-19 RX ADMIN — OXYCODONE HYDROCHLORIDE 5 MG: 5 TABLET ORAL at 19:48

## 2023-12-19 RX ADMIN — ONDANSETRON 4 MG: 2 INJECTION INTRAMUSCULAR; INTRAVENOUS at 19:49

## 2023-12-19 RX ADMIN — CEFAZOLIN SODIUM 2000 MG: 2 SOLUTION INTRAVENOUS at 16:42

## 2023-12-19 RX ADMIN — HEPARIN SODIUM 5000 UNITS: 5000 INJECTION INTRAVENOUS; SUBCUTANEOUS at 21:19

## 2023-12-19 NOTE — ED PROVIDER NOTES
Received sign out from prior team. Reviewed plan of care with them and will accept care of patient. Will follow up on labs and/or radiology, as well as dispo patient.    Labs Reviewed   CBC AND DIFFERENTIAL - Abnormal       Result Value Ref Range Status    WBC 12.69 (*) 4.31 - 10.16 Thousand/uL Final    RBC 4.67  3.81 - 5.12 Million/uL Final    Hemoglobin 12.8  11.5 - 15.4 g/dL Final    Hematocrit 40.5  34.8 - 46.1 % Final    MCV 87  82 - 98 fL Final    MCH 27.4  26.8 - 34.3 pg Final    MCHC 31.6  31.4 - 37.4 g/dL Final    RDW 14.7  11.6 - 15.1 % Final    MPV 9.2  8.9 - 12.7 fL Final    Platelets 288  149 - 390 Thousands/uL Final    nRBC 0  /100 WBCs Final    Neutrophils Relative 78 (*) 43 - 75 % Final    Immat GRANS % 1  0 - 2 % Final    Lymphocytes Relative 15  14 - 44 % Final    Monocytes Relative 6  4 - 12 % Final    Eosinophils Relative 0  0 - 6 % Final    Basophils Relative 0  0 - 1 % Final    Neutrophils Absolute 9.96 (*) 1.85 - 7.62 Thousands/µL Final    Immature Grans Absolute 0.06  0.00 - 0.20 Thousand/uL Final    Lymphocytes Absolute 1.85  0.60 - 4.47 Thousands/µL Final    Monocytes Absolute 0.73  0.17 - 1.22 Thousand/µL Final    Eosinophils Absolute 0.05  0.00 - 0.61 Thousand/µL Final    Basophils Absolute 0.04  0.00 - 0.10 Thousands/µL Final   LIPASE - Abnormal    Lipase <6 (*) 11 - 82 u/L Final   PLATELET COUNT - Normal    Platelets 263  149 - 390 Thousands/uL Final    MPV 9.2  8.9 - 12.7 fL Final   COMPREHENSIVE METABOLIC PANEL    Sodium 137  135 - 147 mmol/L Final    Potassium 3.5  3.5 - 5.3 mmol/L Final    Chloride 101  96 - 108 mmol/L Final    CO2 29  21 - 32 mmol/L Final    ANION GAP 7  mmol/L Final    BUN 10  5 - 25 mg/dL Final    Creatinine 0.61  0.60 - 1.30 mg/dL Final    Comment: Standardized to IDMS reference method    Glucose 111  65 - 140 mg/dL Final    Comment: If the patient is fasting, the ADA then defines impaired fasting glucose as > 100 mg/dL and diabetes as > or equal to 123 mg/dL.     Calcium 9.5  8.4 - 10.2 mg/dL Final    AST 15  13 - 39 U/L Final    ALT 26  7 - 52 U/L Final    Comment: Specimen collection should occur prior to Sulfasalazine administration due to the potential for falsely depressed results.     Alkaline Phosphatase 69  34 - 104 U/L Final    Total Protein 7.2  6.4 - 8.4 g/dL Final    Albumin 4.3  3.5 - 5.0 g/dL Final    Total Bilirubin 0.87  0.20 - 1.00 mg/dL Final    Comment: Use of this assay is not recommended for patients undergoing treatment with eltrombopag due to the potential for falsely elevated results.  N-acetyl-p-benzoquinone imine (metabolite of Acetaminophen) will generate erroneously low results in samples for patients that have taken an overdose of Acetaminophen.    eGFR 98  ml/min/1.73sq m Final    Narrative:     [employer] guidelines for Chronic Kidney Disease (CKD):     Stage 1 with normal or high GFR (GFR > 90 mL/min/1.73 square meters)    Stage 2 Mild CKD (GFR = 60-89 mL/min/1.73 square meters)    Stage 3A Moderate CKD (GFR = 45-59 mL/min/1.73 square meters)    Stage 3B Moderate CKD (GFR = 30-44 mL/min/1.73 square meters)    Stage 4 Severe CKD (GFR = 15-29 mL/min/1.73 square meters)    Stage 5 End Stage CKD (GFR <15 mL/min/1.73 square meters)  Note: GFR calculation is accurate only with a steady state creatinine     US right upper quadrant   Final Result      Distended gallbladder with extensive cholelithiasis and mild gallbladder wall and trace pericholecystic fluid with a nonmobile gallbladder neck calculus suspicious for acute cholecystitis      A sonographic Bunch sign could not be assessed as the patient was given pain medications.      The study was marked in EPIC for immediate notification.      Workstation performed: [id number]         XR chest portable    (Results Pending)     Patient was seen by surgery at 4:34 antibiotics and IVF ordered.      Resident will see the patient when he is out of the OR.    Time reflects when  diagnosis was documented in both MDM as applicable and the Disposition within this note       Time User Action Codes Description Comment    12/19/2023  3:34 PM Shayla Strauss Add [K81.0] Acute cholecystitis           ED Disposition       ED Disposition   Admit    Condition   Stable    Date/Time   Tue Dec 19, 2023  3:33 PM    Comment   Case was discussed with  and the patient's admission status was agreed to be  to the service of   .               Follow-up Information    None            Esha Vasquez DO  12/19/23 [id number]

## 2023-12-19 NOTE — LETTER
[facility]  [address] [facility] [address]  Dept: [phone]    December 22, 2023     Patient: Hayley Rios   YOB: 1960   Date of Visit: 12/19/2023       To Whom it May Concern:    Hayley Rios is under my professional care. She was seen in the hospital from 12/19/2023 to 12/22/23.     If you have any questions or concerns, please don't hesitate to call.         Sincerely,          Ilan Monge PA-C

## 2023-12-19 NOTE — ED PROVIDER NOTES
"History  Chief Complaint   Patient presents with    Abdominal Pain     Patient concerned about her gallbladder, reports abdominal pain, vomiting.      63y F here for evaluation of abd pain, n/v.  known gallbladder dysfunction - previous w/u over the fall in home state of [place] - was told she was going to need to have her gallbladder removed..  Spends time in [place] frequently and here until early January.  Reports was eating snacks/party foods last night and started overnight w/ RUQ pain n/v that continued all night.  This am, persistent upper abd pain, chills, nausea, vomiting. Denies diarrhea.  No sick contacts, no suspect foods.  Feels like similar episodes involving her gallbladder.       History provided by:  Patient   used: No        Prior to Admission Medications   Prescriptions Last Dose Informant Patient Reported? Taking?   DULoxetine (CYMBALTA) 60 mg delayed release capsule 12/18/2023  No Yes   Sig: Take 1 capsule (60 mg total) by mouth daily   VITAMIN D PO Past Month Self Yes Yes   Sig: Take 1 capsule by mouth daily   dexlansoprazole (Dexilant) 60 MG capsule 12/18/2023  No Yes   Sig: Take 1 capsule (60 mg total) by mouth daily in the early morning   gabapentin (NEURONTIN) 300 mg capsule Past Week  Yes Yes   Sig: Take 300 mg by mouth 3 (three) times a day   pramipexole (MIRAPEX) 0.25 mg tablet 12/18/2023  No Yes   Sig: TAKE 2 TABLETS BY MOUTH DAILY AT BEDTIME   traMADol HCl 100 MG TABS Past Week  No Yes   Sig: Take 100 mg by mouth every 8 (eight) hours as needed (Chronic pain)      Facility-Administered Medications: None       Past Medical History:   Diagnosis Date    Anemia     Anesthesia     \"sometimes low BP upon waking up\" pt states she stopped breathing 1 time during surgery    Arthritis     Asthma     Back pain     Back pain     Dolan's esophagus     Chronic pain disorder     Chronic venous insufficiency     Cough variant asthma     COVID-19 03/2020    Depression     Environmental " "allergies     dust    GERD (gastroesophageal reflux disease)     Hiatal hernia     History of anemia     History of fusion of spine for scoliosis     \"as a teenager\"    History of transfusion     1978 - no adverse reaction    Left lumbar radiculitis     Last Assessed: [id number]    Lumbar postlaminectomy syndrome     Migraines     Morbid obesity (HCC)     Motion sickness     Neck pain     Osteoarthritis     of left hip    Right knee pain     Seasonal allergies     Shortness of breath     Umbilical hernia     Uses brace     right knee    Wears glasses        Past Surgical History:   Procedure Laterality Date    ARTHRODESIS      lumbar    ARTHRODESIS      Spinal Arthrodesis for Deformity; Last Assessed: 16Mar2017    BACK SURGERY      lumbar fusion,with nydia/screw and cage implant    BACK SURGERY      surgery for scoliosis    BREAST CYST EXCISION Right     benign    COLONOSCOPY      COLONOSCOPY N/A 3/14/2019    Procedure: COLONOSCOPY;  Surgeon: Van Dyson MD;  Location: [facility];  Service: Gastroenterology    ESOPHAGOGASTRODUODENOSCOPY N/A 3/14/2019    Procedure: ESOPHAGOGASTRODUODENOSCOPY (EGD) W RFA(BARRX);  Surgeon: Van Dyson MD;  Location: [facility];  Service: Gastroenterology    HERNIA REPAIR      umbilical hernia repair x2    ORTHOPEDIC SURGERY      PLANTAR FASCIA SURGERY Left     GA ARTHRS KNE SURG W/MENISCECTOMY MED/LAT W/SHVG Right 4/4/2018    Procedure: ARTHROSCOPY KNEE PARTIAL MEDIAL MENISECTOMY , CHONDROPLASTY;  Surgeon: Rocio Roe DO;  Location: [facility];  Service: Orthopedics    GA BREAST REDUCTION Bilateral 3/12/2021    Procedure: BREAST REDUCTION;  Surgeon: Cortez Sandoval MD;  Location:  [facility] OR;  Service: Plastics    GA COLONOSCOPY FLX DX W/COLLJ SPEC WHEN PFRMD N/A 2/20/2018    Procedure: COLONOSCOPY with polypectomy;  Surgeon: Apryl Garcia MD;  Location: [facility];  Service: General    GA ESOPHAGOGASTRODUODENOSCOPY TRANSORAL DIAGNOSTIC N/A 5/24/2017    Procedure: " ESOPHAGOGASTRODUODENOSCOPY (EGD);  Surgeon: Marcello Street MD;  Location: [facility];  Service: Gastroenterology    LA ESOPHAGOGASTRODUODENOSCOPY TRANSORAL DIAGNOSTIC N/A 8/31/2017    Procedure: ESOPHAGOGASTRODUODENOSCOPY (EGD) W RFA;  Surgeon: Macho Aguayo MD;  Location:  GI LAB;  Service: Gastroenterology    LA LAPS RPR RECURRENT INCISIONAL HERNIA REDUCIBLE N/A 1/21/2021    Procedure: REPAIR HERNIA INCISIONAL LAPAROSCOPIC W/ ROBOTICS, with mesh;  Surgeon: Errol Bermudez MD;  Location: [facility] OR;  Service: General    WISDOM TOOTH EXTRACTION         Family History   Problem Relation Age of Onset    Lung cancer Mother     Cancer Mother     Alcohol abuse Father     Other Father         Cardiac Disorder    Depression Father     Hypertension Father     Heart attack Father     Breast cancer Maternal Grandmother     Lung cancer Maternal Grandmother     Diabetes type I Other     No Known Problems Sister     No Known Problems Brother     No Known Problems Maternal Grandfather     Leukemia Paternal Grandmother     No Known Problems Paternal Grandfather     No Known Problems Sister     No Known Problems Maternal Aunt     No Known Problems Paternal Aunt     Stroke Neg Hx      I have reviewed and agree with the history as documented.    E-Cigarette/Vaping    E-Cigarette Use Never User      E-Cigarette/Vaping Substances    Nicotine No     THC No     CBD No     Flavoring No     Other No     Unknown No      Social History     Tobacco Use    Smoking status: Never    Smokeless tobacco: Never   Vaping Use    Vaping status: Never Used   Substance Use Topics    Alcohol use: Not Currently    Drug use: No       Review of Systems   All other systems reviewed and are negative.      Physical Exam  Physical Exam  Vitals and nursing note reviewed.   Constitutional:       Appearance: Normal appearance.   HENT:      Nose: Nose normal.   Eyes:      General: No scleral icterus.     Conjunctiva/sclera: Conjunctivae normal.   Cardiovascular:       Rate and Rhythm: Normal rate.   Pulmonary:      Effort: Pulmonary effort is normal.   Abdominal:      General: Bowel sounds are normal. There is no distension.      Palpations: Abdomen is soft.      Tenderness: There is abdominal tenderness in the right upper quadrant and epigastric area. There is guarding. There is no rebound. Positive signs include Bunch's sign.   Musculoskeletal:         General: No tenderness.      Cervical back: Normal range of motion.   Skin:     General: Skin is warm.      Coloration: Skin is not jaundiced.   Neurological:      General: No focal deficit present.      Mental Status: She is alert.   Psychiatric:         Mood and Affect: Mood normal.         Vital Signs  ED Triage Vitals   Temperature Pulse Respirations Blood Pressure SpO2   12/19/23 1209 12/19/23 1209 12/19/23 1209 12/19/23 1209 12/19/23 1209   97.9 °F (36.6 °C) 100 19 121/73 98 %      Temp Source Heart Rate Source Patient Position - Orthostatic VS BP Location FiO2 (%)   12/19/23 1209 12/19/23 1209 12/19/23 1458 12/19/23 1209 --   Temporal Monitor Sitting Right arm       Pain Score       12/19/23 1212       6           Vitals:    12/19/23 2026 12/19/23 2241 12/20/23 0249 12/20/23 0813   BP: 111/64 95/59 102/57 108/65   Pulse: 75 76 79 85   Patient Position - Orthostatic VS: Lying Lying Lying Lying         Visual Acuity      ED Medications  Medications   lactated ringers infusion (0 mL/hr Intravenous Stopped 12/19/23 2101)   ceFAZolin (ANCEF) IVPB (premix in dextrose) 2,000 mg 50 mL (0 mg Intravenous Stopped 12/20/23 0909)   metroNIDAZOLE (FLAGYL) IVPB (premix) 500 mg 100 mL (500 mg Intravenous New Bag 12/20/23 0905)   multi-electrolyte (PLASMALYTE-A/ISOLYTE-S PH 7.4) IV solution (100 mL/hr Intravenous New Bag 12/20/23 0026)   ondansetron (ZOFRAN) injection 4 mg (4 mg Intravenous Given 12/19/23 1949)   heparin (porcine) subcutaneous injection 5,000 Units (5,000 Units Subcutaneous Not Given 12/20/23 6977)   acetaminophen  (TYLENOL) tablet 650 mg (has no administration in time range)   HYDROmorphone HCl (DILAUDID) injection 0.2 mg (0.2 mg Intravenous Given 12/19/23 2120)   oxyCODONE (ROXICODONE) immediate release tablet 10 mg (10 mg Oral Given 12/20/23 0032)   oxyCODONE (ROXICODONE) IR tablet 5 mg (5 mg Oral Given 12/19/23 1948)   pantoprazole (PROTONIX) EC tablet 40 mg (40 mg Oral Given 12/20/23 0729)   DULoxetine (CYMBALTA) delayed release capsule 60 mg (60 mg Oral Not Given 12/20/23 0822)   gabapentin (NEURONTIN) capsule 300 mg (300 mg Oral Not Given 12/20/23 0822)   pramipexole (MIRAPEX) tablet 0.5 mg (has no administration in time range)   traMADol (ULTRAM) tablet 100 mg (has no administration in time range)   ibuprofen (MOTRIN) tablet 400 mg (400 mg Oral Given 12/20/23 0822)   ketorolac (TORADOL) injection 30 mg (30 mg Intravenous Given 12/19/23 1344)   ondansetron (ZOFRAN) injection 4 mg (4 mg Intravenous Given 12/19/23 1344)   sodium chloride 0.9 % bolus 1,000 mL (0 mL Intravenous Stopped 12/19/23 1700)       Diagnostic Studies  Results Reviewed       Procedure Component Value Units Date/Time    Basic metabolic panel [[id number]] Collected: 12/20/23 0607    Lab Status: Final result Specimen: Blood from Arm, Right Updated: 12/20/23 0642     Sodium 136 mmol/L      Potassium 3.5 mmol/L      Chloride 104 mmol/L      CO2 26 mmol/L      ANION GAP 6 mmol/L      BUN 12 mg/dL      Creatinine 0.66 mg/dL      Glucose 102 mg/dL      Calcium 8.5 mg/dL      eGFR 95 ml/min/1.73sq m     Narrative:      National Kidney Disease Foundation guidelines for Chronic Kidney Disease (CKD):     Stage 1 with normal or high GFR (GFR > 90 mL/min/1.73 square meters)    Stage 2 Mild CKD (GFR = 60-89 mL/min/1.73 square meters)    Stage 3A Moderate CKD (GFR = 45-59 mL/min/1.73 square meters)    Stage 3B Moderate CKD (GFR = 30-44 mL/min/1.73 square meters)    Stage 4 Severe CKD (GFR = 15-29 mL/min/1.73 square meters)    Stage 5 End Stage CKD (GFR <15  mL/min/1.73 square meters)  Note: GFR calculation is accurate only with a steady state creatinine    Magnesium [[id number]]  (Normal) Collected: 12/20/23 0607    Lab Status: Final result Specimen: Blood from Arm, Right Updated: 12/20/23 0642     Magnesium 2.2 mg/dL     Phosphorus [[id number]]  (Normal) Collected: 12/20/23 0607    Lab Status: Final result Specimen: Blood from Arm, Right Updated: 12/20/23 0642     Phosphorus 3.2 mg/dL     CBC and differential [[id number]]  (Abnormal) Collected: 12/20/23 0607    Lab Status: Final result Specimen: Blood from Arm, Right Updated: 12/20/23 0622     WBC 10.09 Thousand/uL      RBC 3.84 Million/uL      Hemoglobin 10.6 g/dL      Hematocrit 34.7 %      MCV 90 fL      MCH 27.6 pg      MCHC 30.5 g/dL      RDW 15.1 %      MPV 9.8 fL      Platelets 213 Thousands/uL      nRBC 0 /100 WBCs      Neutrophils Relative 72 %      Immat GRANS % 1 %      Lymphocytes Relative 19 %      Monocytes Relative 7 %      Eosinophils Relative 1 %      Basophils Relative 0 %      Neutrophils Absolute 7.24 Thousands/µL      Immature Grans Absolute 0.07 Thousand/uL      Lymphocytes Absolute 1.93 Thousands/µL      Monocytes Absolute 0.73 Thousand/µL      Eosinophils Absolute 0.08 Thousand/µL      Basophils Absolute 0.04 Thousands/µL     Platelet count [[id number]]  (Normal) Collected: 12/19/23 1944    Lab Status: Final result Specimen: Blood from Arm, Right Updated: 12/19/23 1951     Platelets 263 Thousands/uL      MPV 9.2 fL     Comprehensive metabolic panel [[id number]] Collected: 12/19/23 1316    Lab Status: Final result Specimen: Blood from Arm, Right Updated: 12/19/23 1410     Sodium 137 mmol/L      Potassium 3.5 mmol/L      Chloride 101 mmol/L      CO2 29 mmol/L      ANION GAP 7 mmol/L      BUN 10 mg/dL      Creatinine 0.61 mg/dL      Glucose 111 mg/dL      Calcium 9.5 mg/dL      AST 15 U/L      ALT 26 U/L      Alkaline Phosphatase 69 U/L      Total Protein 7.2 g/dL      Albumin 4.3 g/dL      Total  Bilirubin 0.87 mg/dL      eGFR 98 ml/min/1.73sq m     Narrative:      National Kidney Disease Foundation guidelines for Chronic Kidney Disease (CKD):     Stage 1 with normal or high GFR (GFR > 90 mL/min/1.73 square meters)    Stage 2 Mild CKD (GFR = 60-89 mL/min/1.73 square meters)    Stage 3A Moderate CKD (GFR = 45-59 mL/min/1.73 square meters)    Stage 3B Moderate CKD (GFR = 30-44 mL/min/1.73 square meters)    Stage 4 Severe CKD (GFR = 15-29 mL/min/1.73 square meters)    Stage 5 End Stage CKD (GFR <15 mL/min/1.73 square meters)  Note: GFR calculation is accurate only with a steady state creatinine    Lipase [[id number]]  (Abnormal) Collected: 12/19/23 1316    Lab Status: Final result Specimen: Blood from Arm, Right Updated: 12/19/23 1410     Lipase <6 u/L     CBC and differential [[id number]]  (Abnormal) Collected: 12/19/23 1316    Lab Status: Final result Specimen: Blood from Arm, Right Updated: 12/19/23 1321     WBC 12.69 Thousand/uL      RBC 4.67 Million/uL      Hemoglobin 12.8 g/dL      Hematocrit 40.5 %      MCV 87 fL      MCH 27.4 pg      MCHC 31.6 g/dL      RDW 14.7 %      MPV 9.2 fL      Platelets 288 Thousands/uL      nRBC 0 /100 WBCs      Neutrophils Relative 78 %      Immat GRANS % 1 %      Lymphocytes Relative 15 %      Monocytes Relative 6 %      Eosinophils Relative 0 %      Basophils Relative 0 %      Neutrophils Absolute 9.96 Thousands/µL      Immature Grans Absolute 0.06 Thousand/uL      Lymphocytes Absolute 1.85 Thousands/µL      Monocytes Absolute 0.73 Thousand/µL      Eosinophils Absolute 0.05 Thousand/µL      Basophils Absolute 0.04 Thousands/µL                    XR chest portable   Final Result by Francois Nguyen MD (12/20 0816)      No acute cardiopulmonary disease.      Findings are stable            Workstation performed: [id number]          right upper quadrant   Final Result by Antolin Renae MD (12/19 1519)      Distended gallbladder with extensive cholelithiasis and  mild gallbladder wall and trace pericholecystic fluid with a nonmobile gallbladder neck calculus suspicious for acute cholecystitis      A sonographic Bunch sign could not be assessed as the patient was given pain medications.      The study was marked in EPIC for immediate notification.      Workstation performed: [id number]                    Procedures  Procedures         ED Course  ED Course as of 12/20/23 1215   Tue Dec 19, 2023   1530 TT sent to general surgery to discuss                               SBIRT 20yo+      Flowsheet Row Most Recent Value   Initial Alcohol Screen: US AUDIT-C     1. How often do you have a drink containing alcohol? 0 Filed at: 12/19/2023 1212   2. How many drinks containing alcohol do you have on a typical day you are drinking?  0 Filed at: 12/19/2023 1212   3a. Male UNDER 65: How often do you have five or more drinks on one occasion? 0 Filed at: 12/19/2023 1212   3b. FEMALE Any Age, or MALE 65+: How often do you have 4 or more drinks on one occassion? 0 Filed at: 12/19/2023 1212   Audit-C Score 0 Filed at: 12/19/2023 1212   RUTH: How many times in the past year have you...    Used an illegal drug or used a prescription medication for non-medical reasons? Never Filed at: 12/19/2023 1212                      Medical Decision Making  Will get labs to r/o acute life threatening metabolic abnl, pancreatitis, cholecystitis, significant leukocytosis or anemia.  Given hx of known gallbladder dysfunction and positive bunch's on exam, will get US RUQ to r/o acute cholecystitis, choledocholithiasis, pancreatitis.  Will give fluids, pain meds, nausea meds and re-evaluat      Problems Addressed:  Acute cholecystitis: acute illness or injury that poses a threat to life or bodily functions    Amount and/or Complexity of Data Reviewed  Labs: ordered.  Radiology: ordered.  Discussion of management or test interpretation with external provider(s): D/w General Surgery    Risk  Prescription drug  management.  Decision regarding hospitalization.             Disposition  Final diagnoses:   Acute cholecystitis     Time reflects when diagnosis was documented in both MDM as applicable and the Disposition within this note       Time User Action Codes Description Comment    12/19/2023  3:34 PM Shayla Strauss Add [K81.0] Acute cholecystitis           ED Disposition       ED Disposition   Admit    Condition   Stable    Date/Time   Tue Dec 19, 2023 8713    Comment   Case was discussed with  and the patient's admission status was agreed to be  to the service of   .               Follow-up Information    None         Current Discharge Medication List        CONTINUE these medications which have NOT CHANGED    Details   dexlansoprazole (Dexilant) 60 MG capsule Take 1 capsule (60 mg total) by mouth daily in the early morning  Qty: 30 capsule, Refills: 1    Associated Diagnoses: Dolan's esophagus with dysplasia      DULoxetine (CYMBALTA) 60 mg delayed release capsule Take 1 capsule (60 mg total) by mouth daily  Qty: 180 capsule, Refills: 0    Associated Diagnoses: Depression, unspecified depression type      gabapentin (NEURONTIN) 300 mg capsule Take 300 mg by mouth 3 (three) times a day      pramipexole (MIRAPEX) 0.25 mg tablet TAKE 2 TABLETS BY MOUTH DAILY AT BEDTIME  Qty: 180 tablet, Refills: 0    Associated Diagnoses: Restless legs syndrome      traMADol HCl 100 MG TABS Take 100 mg by mouth every 8 (eight) hours as needed (Chronic pain)  Qty: 90 tablet, Refills: 2    Associated Diagnoses: Chronic pain syndrome      VITAMIN D PO Take 1 capsule by mouth daily             No discharge procedures on file.    PDMP Review         Value Time User    PDMP Reviewed  Yes 7/20/2022  6:43 AM ROGE Garrett            ED Provider  Electronically Signed by             Shayla Strauss DO  12/1960

## 2023-12-20 ENCOUNTER — ANESTHESIA (INPATIENT)
Dept: PERIOP | Facility: HOSPITAL | Age: 63
DRG: 854 | End: 2023-12-20
Payer: COMMERCIAL

## 2023-12-20 PROBLEM — K80.00 ACUTE CHOLECYSTITIS DUE TO BILIARY CALCULUS: Status: ACTIVE | Noted: 2023-12-20

## 2023-12-20 PROBLEM — R13.10 DYSPHAGIA: Status: ACTIVE | Noted: 2023-12-20

## 2023-12-20 LAB
ANION GAP SERPL CALCULATED.3IONS-SCNC: 6 MMOL/L
ATRIAL RATE: 84 BPM
ATRIAL RATE: 86 BPM
BASOPHILS # BLD AUTO: 0.04 THOUSANDS/ÂΜL (ref 0–0.1)
BASOPHILS NFR BLD AUTO: 0 % (ref 0–1)
BUN SERPL-MCNC: 12 MG/DL (ref 5–25)
CALCIUM SERPL-MCNC: 8.5 MG/DL (ref 8.4–10.2)
CHLORIDE SERPL-SCNC: 104 MMOL/L (ref 96–108)
CO2 SERPL-SCNC: 26 MMOL/L (ref 21–32)
CREAT SERPL-MCNC: 0.66 MG/DL (ref 0.6–1.3)
EOSINOPHIL # BLD AUTO: 0.08 THOUSAND/ÂΜL (ref 0–0.61)
EOSINOPHIL NFR BLD AUTO: 1 % (ref 0–6)
ERYTHROCYTE [DISTWIDTH] IN BLOOD BY AUTOMATED COUNT: 15.1 % (ref 11.6–15.1)
GFR SERPL CREATININE-BSD FRML MDRD: 95 ML/MIN/1.73SQ M
GLUCOSE SERPL-MCNC: 102 MG/DL (ref 65–140)
HCT VFR BLD AUTO: 34.7 % (ref 34.8–46.1)
HGB BLD-MCNC: 10.6 G/DL (ref 11.5–15.4)
IMM GRANULOCYTES # BLD AUTO: 0.07 THOUSAND/UL (ref 0–0.2)
IMM GRANULOCYTES NFR BLD AUTO: 1 % (ref 0–2)
LYMPHOCYTES # BLD AUTO: 1.93 THOUSANDS/ÂΜL (ref 0.6–4.47)
LYMPHOCYTES NFR BLD AUTO: 19 % (ref 14–44)
MAGNESIUM SERPL-MCNC: 2.2 MG/DL (ref 1.9–2.7)
MCH RBC QN AUTO: 27.6 PG (ref 26.8–34.3)
MCHC RBC AUTO-ENTMCNC: 30.5 G/DL (ref 31.4–37.4)
MCV RBC AUTO: 90 FL (ref 82–98)
MONOCYTES # BLD AUTO: 0.73 THOUSAND/ÂΜL (ref 0.17–1.22)
MONOCYTES NFR BLD AUTO: 7 % (ref 4–12)
NEUTROPHILS # BLD AUTO: 7.24 THOUSANDS/ÂΜL (ref 1.85–7.62)
NEUTS SEG NFR BLD AUTO: 72 % (ref 43–75)
NRBC BLD AUTO-RTO: 0 /100 WBCS
P AXIS: 37 DEGREES
P AXIS: 37 DEGREES
PHOSPHATE SERPL-MCNC: 3.2 MG/DL (ref 2.3–4.1)
PLATELET # BLD AUTO: 213 THOUSANDS/UL (ref 149–390)
PMV BLD AUTO: 9.8 FL (ref 8.9–12.7)
POTASSIUM SERPL-SCNC: 3.5 MMOL/L (ref 3.5–5.3)
PR INTERVAL: 120 MS
PR INTERVAL: 136 MS
QRS AXIS: 41 DEGREES
QRS AXIS: 45 DEGREES
QRSD INTERVAL: 80 MS
QRSD INTERVAL: 80 MS
QT INTERVAL: 350 MS
QT INTERVAL: 364 MS
QTC INTERVAL: 413 MS
QTC INTERVAL: 435 MS
RBC # BLD AUTO: 3.84 MILLION/UL (ref 3.81–5.12)
SODIUM SERPL-SCNC: 136 MMOL/L (ref 135–147)
T WAVE AXIS: 70 DEGREES
T WAVE AXIS: 72 DEGREES
VENTRICULAR RATE: 84 BPM
VENTRICULAR RATE: 86 BPM
WBC # BLD AUTO: 10.09 THOUSAND/UL (ref 4.31–10.16)

## 2023-12-20 PROCEDURE — 0FT44ZZ RESECTION OF GALLBLADDER, PERCUTANEOUS ENDOSCOPIC APPROACH: ICD-10-PCS | Performed by: SURGERY

## 2023-12-20 PROCEDURE — 93005 ELECTROCARDIOGRAM TRACING: CPT

## 2023-12-20 PROCEDURE — 85025 COMPLETE CBC W/AUTO DIFF WBC: CPT

## 2023-12-20 PROCEDURE — 84100 ASSAY OF PHOSPHORUS: CPT

## 2023-12-20 PROCEDURE — 47562 LAPAROSCOPIC CHOLECYSTECTOMY: CPT | Performed by: SURGERY

## 2023-12-20 PROCEDURE — 80048 BASIC METABOLIC PNL TOTAL CA: CPT

## 2023-12-20 PROCEDURE — 88304 TISSUE EXAM BY PATHOLOGIST: CPT | Performed by: PATHOLOGY

## 2023-12-20 PROCEDURE — 44180 LAP ENTEROLYSIS: CPT | Performed by: SURGERY

## 2023-12-20 PROCEDURE — 99233 SBSQ HOSP IP/OBS HIGH 50: CPT | Performed by: SURGERY

## 2023-12-20 PROCEDURE — 0DNU4ZZ RELEASE OMENTUM, PERCUTANEOUS ENDOSCOPIC APPROACH: ICD-10-PCS | Performed by: SURGERY

## 2023-12-20 PROCEDURE — 83735 ASSAY OF MAGNESIUM: CPT

## 2023-12-20 RX ORDER — BUPIVACAINE HYDROCHLORIDE 5 MG/ML
INJECTION, SOLUTION EPIDURAL; INTRACAUDAL AS NEEDED
Status: DISCONTINUED | OUTPATIENT
Start: 2023-12-20 | End: 2023-12-20 | Stop reason: HOSPADM

## 2023-12-20 RX ORDER — EPHEDRINE SULFATE 50 MG/ML
INJECTION INTRAVENOUS AS NEEDED
Status: DISCONTINUED | OUTPATIENT
Start: 2023-12-20 | End: 2023-12-20

## 2023-12-20 RX ORDER — FENTANYL CITRATE/PF 50 MCG/ML
50 SYRINGE (ML) INJECTION
Status: DISCONTINUED | OUTPATIENT
Start: 2023-12-20 | End: 2023-12-20 | Stop reason: HOSPADM

## 2023-12-20 RX ORDER — GABAPENTIN 300 MG/1
300 CAPSULE ORAL 3 TIMES DAILY
Status: DISCONTINUED | OUTPATIENT
Start: 2023-12-20 | End: 2023-12-22 | Stop reason: HOSPADM

## 2023-12-20 RX ORDER — IBUPROFEN 400 MG/1
400 TABLET ORAL EVERY 6 HOURS PRN
Status: DISCONTINUED | OUTPATIENT
Start: 2023-12-20 | End: 2023-12-20

## 2023-12-20 RX ORDER — ONDANSETRON 2 MG/ML
4 INJECTION INTRAMUSCULAR; INTRAVENOUS EVERY 4 HOURS PRN
Status: DISCONTINUED | OUTPATIENT
Start: 2023-12-20 | End: 2023-12-22 | Stop reason: HOSPADM

## 2023-12-20 RX ORDER — SODIUM CHLORIDE, SODIUM LACTATE, POTASSIUM CHLORIDE, CALCIUM CHLORIDE 600; 310; 30; 20 MG/100ML; MG/100ML; MG/100ML; MG/100ML
75 INJECTION, SOLUTION INTRAVENOUS CONTINUOUS
Status: DISCONTINUED | OUTPATIENT
Start: 2023-12-20 | End: 2023-12-21

## 2023-12-20 RX ORDER — LIDOCAINE HCL/PF 100 MG/5ML
SYRINGE (ML) INJECTION AS NEEDED
Status: DISCONTINUED | OUTPATIENT
Start: 2023-12-20 | End: 2023-12-20

## 2023-12-20 RX ORDER — ONDANSETRON 2 MG/ML
4 INJECTION INTRAMUSCULAR; INTRAVENOUS ONCE AS NEEDED
Status: COMPLETED | OUTPATIENT
Start: 2023-12-20 | End: 2023-12-20

## 2023-12-20 RX ORDER — ALBUTEROL SULFATE 90 UG/1
AEROSOL, METERED RESPIRATORY (INHALATION) AS NEEDED
Status: DISCONTINUED | OUTPATIENT
Start: 2023-12-20 | End: 2023-12-20

## 2023-12-20 RX ORDER — MAGNESIUM HYDROXIDE 1200 MG/15ML
LIQUID ORAL AS NEEDED
Status: DISCONTINUED | OUTPATIENT
Start: 2023-12-20 | End: 2023-12-20 | Stop reason: HOSPADM

## 2023-12-20 RX ORDER — ONDANSETRON 2 MG/ML
INJECTION INTRAMUSCULAR; INTRAVENOUS AS NEEDED
Status: DISCONTINUED | OUTPATIENT
Start: 2023-12-20 | End: 2023-12-20

## 2023-12-20 RX ORDER — PRAMIPEXOLE DIHYDROCHLORIDE 0.5 MG/1
0.5 TABLET ORAL
Status: DISCONTINUED | OUTPATIENT
Start: 2023-12-20 | End: 2023-12-22 | Stop reason: HOSPADM

## 2023-12-20 RX ORDER — TRAMADOL HYDROCHLORIDE 50 MG/1
100 TABLET ORAL EVERY 8 HOURS PRN
Status: DISCONTINUED | OUTPATIENT
Start: 2023-12-20 | End: 2023-12-20

## 2023-12-20 RX ORDER — PANTOPRAZOLE SODIUM 40 MG/1
40 TABLET, DELAYED RELEASE ORAL
Status: DISCONTINUED | OUTPATIENT
Start: 2023-12-20 | End: 2023-12-22 | Stop reason: HOSPADM

## 2023-12-20 RX ORDER — DULOXETIN HYDROCHLORIDE 60 MG/1
60 CAPSULE, DELAYED RELEASE ORAL DAILY
Status: DISCONTINUED | OUTPATIENT
Start: 2023-12-20 | End: 2023-12-22 | Stop reason: HOSPADM

## 2023-12-20 RX ORDER — HYDROMORPHONE HCL/PF 1 MG/ML
0.5 SYRINGE (ML) INJECTION
Status: DISCONTINUED | OUTPATIENT
Start: 2023-12-20 | End: 2023-12-22 | Stop reason: HOSPADM

## 2023-12-20 RX ORDER — MIDAZOLAM HYDROCHLORIDE 2 MG/2ML
INJECTION, SOLUTION INTRAMUSCULAR; INTRAVENOUS AS NEEDED
Status: DISCONTINUED | OUTPATIENT
Start: 2023-12-20 | End: 2023-12-20

## 2023-12-20 RX ORDER — VECURONIUM BROMIDE 1 MG/ML
INJECTION, POWDER, LYOPHILIZED, FOR SOLUTION INTRAVENOUS AS NEEDED
Status: DISCONTINUED | OUTPATIENT
Start: 2023-12-20 | End: 2023-12-20

## 2023-12-20 RX ORDER — FENTANYL CITRATE 50 UG/ML
INJECTION, SOLUTION INTRAMUSCULAR; INTRAVENOUS AS NEEDED
Status: DISCONTINUED | OUTPATIENT
Start: 2023-12-20 | End: 2023-12-20

## 2023-12-20 RX ORDER — PROPOFOL 10 MG/ML
INJECTION, EMULSION INTRAVENOUS AS NEEDED
Status: DISCONTINUED | OUTPATIENT
Start: 2023-12-20 | End: 2023-12-20

## 2023-12-20 RX ORDER — DEXAMETHASONE SODIUM PHOSPHATE 10 MG/ML
INJECTION, SOLUTION INTRAMUSCULAR; INTRAVENOUS AS NEEDED
Status: DISCONTINUED | OUTPATIENT
Start: 2023-12-20 | End: 2023-12-20

## 2023-12-20 RX ORDER — HYDROMORPHONE HCL/PF 1 MG/ML
0.5 SYRINGE (ML) INJECTION
Status: DISCONTINUED | OUTPATIENT
Start: 2023-12-20 | End: 2023-12-20 | Stop reason: HOSPADM

## 2023-12-20 RX ORDER — ACETAMINOPHEN 325 MG/1
975 TABLET ORAL EVERY 6 HOURS PRN
Status: DISCONTINUED | OUTPATIENT
Start: 2023-12-20 | End: 2023-12-21

## 2023-12-20 RX ADMIN — PROPOFOL 200 MG: 10 INJECTION, EMULSION INTRAVENOUS at 15:50

## 2023-12-20 RX ADMIN — EPHEDRINE SULFATE 5 MG: 50 INJECTION, SOLUTION INTRAVENOUS at 18:02

## 2023-12-20 RX ADMIN — METRONIDAZOLE 500 MG: 500 INJECTION, SOLUTION INTRAVENOUS at 09:05

## 2023-12-20 RX ADMIN — ALBUTEROL SULFATE 4 PUFF: 90 AEROSOL, METERED RESPIRATORY (INHALATION) at 19:23

## 2023-12-20 RX ADMIN — EPHEDRINE SULFATE 7.5 MG: 50 INJECTION, SOLUTION INTRAVENOUS at 17:55

## 2023-12-20 RX ADMIN — ONDANSETRON 4 MG: 2 INJECTION INTRAMUSCULAR; INTRAVENOUS at 20:27

## 2023-12-20 RX ADMIN — SODIUM CHLORIDE, SODIUM LACTATE, POTASSIUM CHLORIDE, AND CALCIUM CHLORIDE 1000 ML: .6; .31; .03; .02 INJECTION, SOLUTION INTRAVENOUS at 21:18

## 2023-12-20 RX ADMIN — CEFAZOLIN SODIUM 2000 MG: 2 SOLUTION INTRAVENOUS at 19:42

## 2023-12-20 RX ADMIN — MIDAZOLAM 2 MG: 1 INJECTION INTRAMUSCULAR; INTRAVENOUS at 15:45

## 2023-12-20 RX ADMIN — CEFAZOLIN SODIUM 2000 MG: 2 SOLUTION INTRAVENOUS at 23:45

## 2023-12-20 RX ADMIN — SODIUM CHLORIDE, SODIUM LACTATE, POTASSIUM CHLORIDE, AND CALCIUM CHLORIDE: .6; .31; .03; .02 INJECTION, SOLUTION INTRAVENOUS at 17:02

## 2023-12-20 RX ADMIN — IBUPROFEN 400 MG: 400 TABLET, FILM COATED ORAL at 08:22

## 2023-12-20 RX ADMIN — PANTOPRAZOLE SODIUM 40 MG: 40 TABLET, DELAYED RELEASE ORAL at 07:29

## 2023-12-20 RX ADMIN — FENTANYL CITRATE 50 MCG: 50 INJECTION INTRAMUSCULAR; INTRAVENOUS at 17:50

## 2023-12-20 RX ADMIN — DEXAMETHASONE SODIUM PHOSPHATE 10 MG: 10 INJECTION INTRAMUSCULAR; INTRAVENOUS at 15:58

## 2023-12-20 RX ADMIN — OXYCODONE HYDROCHLORIDE 10 MG: 10 TABLET ORAL at 00:32

## 2023-12-20 RX ADMIN — CEFAZOLIN SODIUM 2000 MG: 2 SOLUTION INTRAVENOUS at 15:45

## 2023-12-20 RX ADMIN — LIDOCAINE HYDROCHLORIDE 100 MG: 20 INJECTION INTRAVENOUS at 15:50

## 2023-12-20 RX ADMIN — VECURONIUM BROMIDE 7 MG: 1 INJECTION, POWDER, LYOPHILIZED, FOR SOLUTION INTRAVENOUS at 15:50

## 2023-12-20 RX ADMIN — ALBUTEROL SULFATE 4 PUFF: 90 AEROSOL, METERED RESPIRATORY (INHALATION) at 18:01

## 2023-12-20 RX ADMIN — FENTANYL CITRATE 50 MCG: 50 INJECTION INTRAMUSCULAR; INTRAVENOUS at 15:50

## 2023-12-20 RX ADMIN — FENTANYL CITRATE 50 MCG: 50 INJECTION INTRAMUSCULAR; INTRAVENOUS at 17:01

## 2023-12-20 RX ADMIN — CEFAZOLIN SODIUM 2000 MG: 2 SOLUTION INTRAVENOUS at 00:18

## 2023-12-20 RX ADMIN — VECURONIUM BROMIDE 2 MG: 1 INJECTION, POWDER, LYOPHILIZED, FOR SOLUTION INTRAVENOUS at 19:18

## 2023-12-20 RX ADMIN — CEFAZOLIN SODIUM 2000 MG: 2 SOLUTION INTRAVENOUS at 07:35

## 2023-12-20 RX ADMIN — FENTANYL CITRATE 50 MCG: 0.05 INJECTION, SOLUTION INTRAMUSCULAR; INTRAVENOUS at 21:32

## 2023-12-20 RX ADMIN — PRAMIPEXOLE DIHYDROCHLORIDE 0.5 MG: 0.5 TABLET ORAL at 22:27

## 2023-12-20 RX ADMIN — ALBUTEROL SULFATE 4 PUFF: 90 AEROSOL, METERED RESPIRATORY (INHALATION) at 20:43

## 2023-12-20 RX ADMIN — ONDANSETRON 4 MG: 2 INJECTION INTRAMUSCULAR; INTRAVENOUS at 21:08

## 2023-12-20 RX ADMIN — SODIUM CHLORIDE, SODIUM LACTATE, POTASSIUM CHLORIDE, AND CALCIUM CHLORIDE 125 ML/HR: .6; .31; .03; .02 INJECTION, SOLUTION INTRAVENOUS at 23:44

## 2023-12-20 RX ADMIN — VECURONIUM BROMIDE 2 MG: 1 INJECTION, POWDER, LYOPHILIZED, FOR SOLUTION INTRAVENOUS at 16:19

## 2023-12-20 RX ADMIN — EPHEDRINE SULFATE 5 MG: 50 INJECTION, SOLUTION INTRAVENOUS at 18:31

## 2023-12-20 RX ADMIN — SODIUM CHLORIDE, SODIUM LACTATE, POTASSIUM CHLORIDE, AND CALCIUM CHLORIDE 125 ML/HR: .6; .31; .03; .02 INJECTION, SOLUTION INTRAVENOUS at 15:28

## 2023-12-20 RX ADMIN — METRONIDAZOLE 500 MG: 500 INJECTION, SOLUTION INTRAVENOUS at 01:24

## 2023-12-20 RX ADMIN — SODIUM CHLORIDE, SODIUM GLUCONATE, SODIUM ACETATE, POTASSIUM CHLORIDE, MAGNESIUM CHLORIDE, SODIUM PHOSPHATE, DIBASIC, AND POTASSIUM PHOSPHATE 100 ML/HR: .53; .5; .37; .037; .03; .012; .00082 INJECTION, SOLUTION INTRAVENOUS at 00:26

## 2023-12-20 RX ADMIN — FENTANYL CITRATE 50 MCG: 50 INJECTION INTRAMUSCULAR; INTRAVENOUS at 18:26

## 2023-12-20 RX ADMIN — VECURONIUM BROMIDE 1 MG: 1 INJECTION, POWDER, LYOPHILIZED, FOR SOLUTION INTRAVENOUS at 17:46

## 2023-12-20 RX ADMIN — SUGAMMADEX 200 MG: 100 INJECTION, SOLUTION INTRAVENOUS at 20:27

## 2023-12-20 RX ADMIN — FENTANYL CITRATE 50 MCG: 0.05 INJECTION, SOLUTION INTRAMUSCULAR; INTRAVENOUS at 21:10

## 2023-12-20 RX ADMIN — HEPARIN SODIUM 5000 UNITS: 5000 INJECTION INTRAVENOUS; SUBCUTANEOUS at 22:26

## 2023-12-20 RX ADMIN — VECURONIUM BROMIDE 2 MG: 1 INJECTION, POWDER, LYOPHILIZED, FOR SOLUTION INTRAVENOUS at 18:24

## 2023-12-20 RX ADMIN — EPHEDRINE SULFATE 7.5 MG: 50 INJECTION, SOLUTION INTRAVENOUS at 16:07

## 2023-12-20 NOTE — ANESTHESIA POSTPROCEDURE EVALUATION
Post-Op Assessment Note    CV Status:  Stable    Pain management: adequate       Mental Status:  Alert and awake   Hydration Status:  Euvolemic   PONV Controlled:  Controlled   Airway Patency:  Patent     Post Op Vitals Reviewed: Yes      Staff: Anesthesiologist           /65 (BP Location: Right arm)   Pulse 85   Temp 97.7 °F (36.5 °C) (Temporal)   Resp 20   Wt 91.5 kg (201 lb 11.5 oz)   SpO2 95%   BMI 34.63 kg/m²      BP      Temp      Pulse     Resp      SpO2

## 2023-12-20 NOTE — ANESTHESIA PREPROCEDURE EVALUATION
Procedure:  CHOLECYSTECTOMY LAPAROSCOPIC,  POSSIBLE VENTRAL HERNIA REPAIR (Abdomen)    Relevant Problems   ANESTHESIA   (+) Motion sickness      CARDIO   (+) Hyperlipidemia      GI/HEPATIC   (+) Gastroesophageal reflux disease      HEMATOLOGY   (+) Anemia      MUSCULOSKELETAL   (+) Arthritis of lumbar spine   (+) Cervical spondylosis without myelopathy   (+) Chronic low back pain   (+) Lumbar spondylosis   (+) Myofascial pain syndrome   (+) Osteoarthritis of multiple joints   (+) Podagra   (+) Primary osteoarthritis of left hip   (+) Sacroiliitis, not elsewhere classified (HCC)      NEURO/PSYCH   (+) Chronic low back pain   (+) Chronic pain syndrome   (+) Depression, recurrent (HCC)   (+) Muscle weakness of left upper extremity   (+) Myofascial pain syndrome      PULMONARY   (+) Mild intermittent asthma without complication      Other   (+) Restless legs syndrome (RLS)        Physical Exam    Airway    Mallampati score: II  TM Distance: >3 FB  Neck ROM: full     Dental   No notable dental hx     Cardiovascular  Rhythm: regular, Rate: normal, Cardiovascular exam normal    Pulmonary  Pulmonary exam normal Breath sounds clear to auscultation    Other Findings  post-pubertal.      Anesthesia Plan  ASA Score- 2     Anesthesia Type- general with ASA Monitors.         Additional Monitors:     Airway Plan:            Plan Factors-    Chart reviewed.   Existing labs reviewed. Patient summary reviewed.                  Induction- intravenous.    Postoperative Plan- Plan for postoperative opioid use. Planned trial extubation    Informed Consent- Anesthetic plan and risks discussed with patient.  I personally reviewed this patient with the CRNA. Discussed and agreed on the Anesthesia Plan with the CRNA..

## 2023-12-20 NOTE — PLAN OF CARE
Problem: PAIN - ADULT  Goal: Verbalizes/displays adequate comfort level or baseline comfort level  Description: Interventions:  - Encourage patient to monitor pain and request assistance  - Assess pain using appropriate pain scale  - Administer analgesics based on type and severity of pain and evaluate response  - Implement non-pharmacological measures as appropriate and evaluate response  - Consider cultural and social influences on pain and pain management  - Notify physician/advanced practitioner if interventions unsuccessful or patient reports new pain  Outcome: Progressing     Problem: INFECTION - ADULT  Goal: Absence or prevention of progression during hospitalization  Description: INTERVENTIONS:  - Assess and monitor for signs and symptoms of infection  - Monitor lab/diagnostic results  - Monitor all insertion sites, i.e. indwelling lines, tubes, and drains  - Monitor endotracheal if appropriate and nasal secretions for changes in amount and color  - [place] appropriate cooling/warming therapies per order  - Administer medications as ordered  - Instruct and encourage patient and family to use good hand hygiene technique  - Identify and instruct in appropriate isolation precautions for identified infection/condition  Outcome: Progressing     Problem: Knowledge Deficit  Goal: Patient/family/caregiver demonstrates understanding of disease process, treatment plan, medications, and discharge instructions  Description: Complete learning assessment and assess knowledge base.  Interventions:  - Provide teaching at level of understanding  - Provide teaching via preferred learning methods  Outcome: Progressing

## 2023-12-20 NOTE — H&P
Acute Care Surgery  History and Physical  Hayley Rios 63 y.o. female MRN: [id number]  Unit/Bed#: E2 -02 Encounter: [id number]    Assessment:  63-year-old female presents with abdominal pain.  Surgery consulted for acute cholecystitis. Afebrile, hemodynamically stable. RUQ US shows signs of acute cholecystitis, extensive cholelithiasis and a nonmobile gallbladder neck calculus.     Vital signs stable  WBC 12.69  Hb 12.8  Plt 288  Cr 0.61    12/19 RUQ US: Distended gallbladder with extensive cholelithiasis and mild gallbladder wall and trace pericholecystic fluid with a nonmobile gallbladder neck calculus suspicious for acute cholecystitis. A sonographic Bunch sign could not be assessed as the patient was given pain medications.    Plan:  -Admit to Surgery  -NPO  -IV antibiotics  -Plan for Laparoscopic cholecystitis  -Pain/ nausea control prn      History of Present Illness   HPI:  Hayley Rios is a 63 y.o. female who presents with abdominal pain.  Patient reports that she lived in [place] prior to moving up here.  Patient has known history of gallstones.  Patient did not have abdominal pain until recently. Last night patient had intense abdominal pain for the first time with nonbloody emesis.  Patient describes the pain as a pressure, not sharp, but also radiates to her back.  Patient reports she has a history of loose bowel movements but no change in frequency at this time.  Patient reports chills and sweating last night.  Patient has no appetite, ate only a few pretzel sticks at 8 AM this morning. Patient denied chest pain or shortness of breath.  Patient has a history of recurrent hernia repair with mesh.  Patient is allergic to sulfa and latex.  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 Negative for fever.   Respiratory:  Negative for shortness of breath.    Cardiovascular:  Negative for chest pain.   Gastrointestinal:   "Positive for abdominal pain, diarrhea, nausea and vomiting.   Musculoskeletal:  Positive for back pain.       Historical Information   Past Medical History:   Diagnosis Date    Anemia     Anesthesia     \"sometimes low BP upon waking up\" pt states she stopped breathing 1 time during surgery    Arthritis     Asthma     Back pain     Back pain     Dolan's esophagus     Chronic pain disorder     Chronic venous insufficiency     Cough variant asthma     COVID-19 03/2020    Depression     Environmental allergies     dust    GERD (gastroesophageal reflux disease)     Hiatal hernia     History of anemia     History of fusion of spine for scoliosis     \"as a teenager\"    History of transfusion     1978 - no adverse reaction    Left lumbar radiculitis     Last Assessed: [id number]    Lumbar postlaminectomy syndrome     Migraines     Morbid obesity (HCC)     Motion sickness     Neck pain     Osteoarthritis     of left hip    Right knee pain     Seasonal allergies     Shortness of breath     Umbilical hernia     Uses brace     right knee    Wears glasses      Past Surgical History:   Procedure Laterality Date    ARTHRODESIS      lumbar    ARTHRODESIS      Spinal Arthrodesis for Deformity; Last Assessed: 16Mar2017    BACK SURGERY      lumbar fusion,with nydia/screw and cage implant    BACK SURGERY      surgery for scoliosis    BREAST CYST EXCISION Right     benign    COLONOSCOPY      COLONOSCOPY N/A 3/14/2019    Procedure: COLONOSCOPY;  Surgeon: Van Dyson MD;  Location: [facility];  Service: Gastroenterology    ESOPHAGOGASTRODUODENOSCOPY N/A 3/14/2019    Procedure: ESOPHAGOGASTRODUODENOSCOPY (EGD) W RFA(BARRX);  Surgeon: Van Dyson MD;  Location: [facility];  Service: Gastroenterology    HERNIA REPAIR      umbilical hernia repair x2    ORTHOPEDIC SURGERY      PLANTAR FASCIA SURGERY Left     WY ARTHRS KNE SURG W/MENISCECTOMY MED/LAT W/SHVG Right 4/4/2018    Procedure: ARTHROSCOPY KNEE PARTIAL MEDIAL MENISECTOMY , " CHONDROPLASTY;  Surgeon: Rocio Roe DO;  Location: [facility];  Service: Orthopedics    VT BREAST REDUCTION Bilateral 3/12/2021    Procedure: BREAST REDUCTION;  Surgeon: Cortez Sandoval MD;  Location:  MAIN OR;  Service: Plastics    VT COLONOSCOPY FLX DX W/COLLJ SPEC WHEN PFRMD N/A 2/20/2018    Procedure: COLONOSCOPY with polypectomy;  Surgeon: Apryl Garcia MD;  [facility];  Service: General    VT ESOPHAGOGASTRODUODENOSCOPY TRANSORAL DIAGNOSTIC N/A 5/24/2017    Procedure: ESOPHAGOGASTRODUODENOSCOPY (EGD);  Surgeon: Marcello Street MD;  Location: [facility];  Service: Gastroenterology    VT ESOPHAGOGASTRODUODENOSCOPY TRANSORAL DIAGNOSTIC N/A 8/31/2017    Procedure: ESOPHAGOGASTRODUODENOSCOPY (EGD) W RFA;  Surgeon: Macho Aguayo MD;  Location:  GI LAB;  Service: Gastroenterology    VT LAPS RPR RECURRENT INCISIONAL HERNIA REDUCIBLE N/A 1/21/2021    Procedure: REPAIR HERNIA INCISIONAL LAPAROSCOPIC W/ ROBOTICS, with mesh;  Surgeon: Errol Bermudez MD;  [facility];  Service: General    WISDOM TOOTH EXTRACTION       Social History   Social History     Substance and Sexual Activity   Alcohol Use Not Currently     Social History     Substance and Sexual Activity   Drug Use No     Social History     Tobacco Use   Smoking Status Never   Smokeless Tobacco Never     Family History: non-contributory    Meds/Allergies   all medications and allergies reviewed  Allergies   Allergen Reactions    Dust Mite Extract Shortness Of Breath    Latex Itching and Blisters    Other Other (See Comments)     Seasonal AND cock roaches    Sulfa Antibiotics Vomiting     Topical-blistering       Objective   First Vitals:   Blood Pressure: 121/73 (12/19/23 1209)  Pulse: 100 (12/19/23 1209)  Temperature: 97.9 °F (36.6 °C) (12/19/23 1209)  Temp Source: Temporal (12/19/23 1209)  Respirations: 19 (12/19/23 1209)  Weight - Scale: 91.5 kg (201 lb 11.5 oz) (12/19/23 1209)  SpO2: 98 % (12/19/23 1209)    Current Vitals:   Blood  Pressure: 111/64 (12/19/23 2026)  Pulse: 75 (12/19/23 2026)  Temperature: 97.8 °F (36.6 °C) (12/19/23 2026)  Temp Source: Temporal (12/19/23 2026)  Respirations: 18 (12/19/23 2026)  Weight - Scale: 91.5 kg (201 lb 11.5 oz) (12/19/23 1209)  SpO2: 95 % (12/19/23 2026)    No intake or output data in the 24 hours ending 12/19/23 2123    Invasive Devices       Peripheral Intravenous Line  Duration             Peripheral IV 12/19/23 Left Antecubital <1 day    Peripheral IV 12/19/23 Right Antecubital <1 day                    Physical Exam:  General: No acute distress  Neuro: Awake, Alert and Oriented x 3  HEENT:  Normocephalic, atraumatic, moist mucous membranes  CV: Regular rate and rhythm  Lungs: Normal work of breathing, no increased respiratory effort  Abdomen: Soft, nondistended, minimal tenderness in epigastrium with deep palpation, no rebound or guarding, no peritonitis  Extremities: No edema, clubbing or cyanosis  Skin: Warm, dry    Lab Results: I have personally reviewed pertinent lab results.  , CBC:   Lab Results   Component Value Date    WBC 12.69 (H) 12/19/2023    HGB 12.8 12/19/2023    HCT 40.5 12/19/2023    MCV 87 12/19/2023     12/19/2023    RBC 4.67 12/19/2023    MCH 27.4 12/19/2023    MCHC 31.6 12/19/2023    RDW 14.7 12/19/2023    MPV 9.2 12/19/2023    NRBC 0 12/19/2023   , CMP:   Lab Results   Component Value Date    SODIUM 137 12/19/2023    K 3.5 12/19/2023     12/19/2023    CO2 29 12/19/2023    BUN 10 12/19/2023    CREATININE 0.61 12/19/2023    CALCIUM 9.5 12/19/2023    AST 15 12/19/2023    ALT 26 12/19/2023    ALKPHOS 69 12/19/2023    EGFR 98 12/19/2023     Imaging: I have personally reviewed pertinent reports.    EKG, Pathology, and Other Studies: I have personally reviewed pertinent reports.      Code Status: Level 1 - Full Code  Advance Directive and Living Will:      Power of :    POLST:      Counseling / Coordination of Care  Total floor / unit time spent today 25 minutes.  This involved direct patient contact where I performed a full history and physical, reviewed previous records, and reviewed laboratory and other diagnostic studies. Greater than 50% of total time was spent with the patient and / or family counseling and / or coordination of care.    Daisy Mccabe MD  General Surgery PGY-1  12/19/2023

## 2023-12-20 NOTE — PROGRESS NOTES
General Surgery  Progress Note   Hayley Rios 63 y.o. female MRN: [id number]  Unit/Bed#: E2 -02 Encounter: [id number]    Assessment:  62 yo female admitted for acute cholecystitis. Afebrile, Hemodynamically stable.     AVSS on room air    WBC 10.09 from 12.69  Hgb 10.6 from 12.8  Plts 213 from 263  Cr 0.66 from 0.61    Plan:  -NPO  -OR for laparoscopic cholecystitis   -Pain/ nausea control PRN  -OOB/ ambulation  -Incentive Spirometry        Subjective/Objective     Subjective:   Patient seen and examined at bedside, in no acute distress. No acute events overnight.  Patient reports pain was a little bit when yesterday however manageable.  Patient denies nausea vomiting fevers chills chest pain or shortness of breath.  Patient is not passing of flatus.    Objective:   Vitals:Blood pressure 102/57, pulse 79, temperature (!) 97.2 °F (36.2 °C), temperature source Temporal, resp. rate 20, weight 91.5 kg (201 lb 11.5 oz), SpO2 97%.  Temp (24hrs), Av.4 °F (36.3 °C), Min:96.7 °F (35.9 °C), Max:97.9 °F (36.6 °C)      I/O       None            Invasive Devices       Peripheral Intravenous Line  Duration             Peripheral IV 23 Left Antecubital <1 day    Peripheral IV 23 Right Antecubital <1 day                    Physical Exam:  General: No acute distress  Neuro: Awake, Alert and Oriented x 3  HEENT:  Normocephalic, atraumatic, moist mucous membranes  CV: Regular rate and rhythm  Lungs: Normal work of breathing, no increased respiratory effort  Abdomen: Soft, minimally distended, minimal tenderness in epigastrium with deep palpation, no rebound or guarding, no peritonitis   Extremities: No edema, clubbing or cyanosis  Skin: Warm, dry    Lab Results   Component Value Date    WBC 10.09 2023    HGB 10.6 (L) 2023    HCT 34.7 (L) 2023    MCV 90 2023     2023      Lab Results   Component Value Date    SODIUM 136 2023    K 3.5 2023     2023     CO2 26 12/20/2023    AGAP 6 12/20/2023    BUN 12 12/20/2023    CREATININE 0.66 12/20/2023    GLUC 102 12/20/2023    GLUF 103 (H) 04/21/2022    CALCIUM 8.5 12/20/2023    AST 15 12/19/2023    ALT 26 12/19/2023    ALKPHOS 69 12/19/2023    TP 7.2 12/19/2023    TBILI 0.87 12/19/2023    EGFR 95 12/20/2023       VTE Prophylaxis: Heparin      Daisy Mccabe MD  12/20/2023  7:19 AM

## 2023-12-20 NOTE — UTILIZATION REVIEW
Initial Clinical Review    Admission: Date/Time/Statement:   Admission Orders (From admission, onward)       Ordered        12/19/23 1923  Inpatient Admission  Once                          Orders Placed This Encounter   Procedures    Inpatient Admission     Standing Status:   Standing     Number of Occurrences:   1     Order Specific Question:   Level of Care     Answer:   Med Surg [16]     Order Specific Question:   Estimated length of stay     Answer:   Not Applicable     ED Arrival Information       Expected   -    Arrival   12/19/2023 12:03    Acuity   Urgent              Means of arrival   Walk-In    Escorted by   Family Member    Service   Surgery-General    Admission type   Emergency              Arrival complaint   Medical Problem             Chief Complaint   Patient presents with    Abdominal Pain     Patient concerned about her gallbladder, reports abdominal pain, vomiting.        Initial Presentation: 63 y.o. female who presents with abdominal pain. Patient has known history of gallstones. Patient did not have abdominal pain until recently. Last night patient had intense abdominal pain for the first time with nonbloody emesis. Patient describes the pain as a pressure, not sharp, but also radiates to her back. Patient reports she has a history of loose bowel movements but no change in frequency at this time. Patient reports chills and sweating last night. Patient has no appetite, ate only a few pretzel sticks at 8 AM this morning. Patient has a history of recurrent hernia repair with mesh. Plan: Inpatient admission for evaluation and treatment of acute cholecystitis: NPO, IV antibiotics, plan for laparoscopic cholecystectomy, pain and nausea control.     Date: 12/20   Day 2:     Surgery: NPO, OR for laparoscopic cholecystectomy, pain and nausea control.     Date: 12/21    Day 3: Has surpassed a 2nd midnight with active treatments and services, which include laparoscopic cholecystectomy with BRAYAN drain and  IV fluids.      ED Triage Vitals   Temperature Pulse Respirations Blood Pressure SpO2   12/19/23 1209 12/19/23 1209 12/19/23 1209 12/19/23 1209 12/19/23 1209   97.9 °F (36.6 °C) 100 19 121/73 98 %      Temp Source Heart Rate Source Patient Position - Orthostatic VS BP Location FiO2 (%)   12/19/23 1209 12/19/23 1209 12/19/23 1458 12/19/23 1209 --   Temporal Monitor Sitting Right arm       Pain Score       12/19/23 1212       6          Wt Readings from Last 1 Encounters:   12/19/23 91.5 kg (201 lb 11.5 oz)     Additional Vital Signs:     Date/Time Temp Pulse Resp BP MAP (mmHg) SpO2 O2 Device   12/20/23 0813 97.7 °F (36.5 °C) 85 20 108/65 70 95 % None (Room air)   12/20/23 0249 97.2 °F (36.2 °C) Abnormal  79 20 102/57 -- 97 % None (Room air)   12/19/23 2241 96.7 °F (35.9 °C) Abnormal  76 18 95/59 64 Abnormal  98 % None (Room air)   12/19/23 2026 97.8 °F (36.6 °C) 75 18 111/64 69 95 % None (Room air)   12/19/23 1838 -- 88 18 98/53 -- 96 % None (Room air)   12/19/23 1640 -- 86 18 110/56 -- 98 % None (Room air)   12/19/23 1458 -- 95 17 115/58 -- 97 % None (Room air)   12/19/23 1310 -- -- -- -- -- -- None (Room air)     Pertinent Labs/Diagnostic Test Results:   XR chest portable   Final Result by Francois Nguyen MD (12/20 0816)      No acute cardiopulmonary disease.      Findings are stable            Workstation performed: [id number]         US right upper quadrant   Final Result by Antolin Renae MD (12/19 1519)      Distended gallbladder with extensive cholelithiasis and mild gallbladder wall and trace pericholecystic fluid with a nonmobile gallbladder neck calculus suspicious for acute cholecystitis      A sonographic Bunch sign could not be assessed as the patient was given pain medications.      The study was marked in EPIC for immediate notification.      Workstation performed: [id number]           12/20 EKG:  Normal sinus rhythm  Nonspecific T wave abnormality  Abnormal ECG  When compared with  ECG of 23-FEB-2021 16:22,  Vent. rate has increased BY  30 BPM  T wave inversion now evident in Anterior leads      Results from last 7 days   Lab Units 12/20/23  0607 12/19/23  1944 12/19/23  1316   WBC Thousand/uL 10.09  --  12.69*   HEMOGLOBIN g/dL 10.6*  --  12.8   HEMATOCRIT % 34.7*  --  40.5   PLATELETS Thousands/uL 213 263 288   NEUTROS ABS Thousands/µL 7.24  --  9.96*         Results from last 7 days   Lab Units 12/20/23  0607 12/19/23  1316   SODIUM mmol/L 136 137   POTASSIUM mmol/L 3.5 3.5   CHLORIDE mmol/L 104 101   CO2 mmol/L 26 29   ANION GAP mmol/L 6 7   BUN mg/dL 12 10   CREATININE mg/dL 0.66 0.61   EGFR ml/min/1.73sq m 95 98   CALCIUM mg/dL 8.5 9.5   MAGNESIUM mg/dL 2.2  --    PHOSPHORUS mg/dL 3.2  --      Results from last 7 days   Lab Units 12/19/23  1316   AST U/L 15   ALT U/L 26   ALK PHOS U/L 69   TOTAL PROTEIN g/dL 7.2   ALBUMIN g/dL 4.3   TOTAL BILIRUBIN mg/dL 0.87         Results from last 7 days   Lab Units 12/20/23  0607 12/19/23  1316   GLUCOSE RANDOM mg/dL 102 111           Results from last 7 days   Lab Units 12/19/23  1316   LIPASE u/L <6*         ED Treatment:   Medication Administration from 12/19/2023 1203 to 12/19/2023 2017         Date/Time Order Dose Route Action     12/19/2023 1344 EST ketorolac (TORADOL) injection 30 mg 30 mg Intravenous Given     12/19/2023 1344 EST ondansetron (ZOFRAN) injection 4 mg 4 mg Intravenous Given     12/19/2023 1345 EST sodium chloride 0.9 % bolus 1,000 mL 1,000 mL Intravenous New Bag     12/19/2023 1711 EST lactated ringers infusion 125 mL/hr Intravenous New Bag     12/19/2023 1642 EST ceFAZolin (ANCEF) IVPB (premix in dextrose) 2,000 mg 50 mL 2,000 mg Intravenous New Bag     12/19/2023 1708 EST metroNIDAZOLE (FLAGYL) IVPB (premix) 500 mg 100 mL 500 mg Intravenous New Bag     12/19/2023 1949 EST ondansetron (ZOFRAN) injection 4 mg 4 mg Intravenous Given     12/19/2023 1948 EST oxyCODONE (ROXICODONE) IR tablet 5 mg 5 mg Oral Given          Past  "Medical History:   Diagnosis Date    Anemia     Anesthesia     \"sometimes low BP upon waking up\" pt states she stopped breathing 1 time during surgery    Arthritis     Asthma     Back pain     Back pain     Dolan's esophagus     Chronic pain disorder     Chronic venous insufficiency     Cough variant asthma     COVID-19 03/2020    Depression     Environmental allergies     dust    GERD (gastroesophageal reflux disease)     Hiatal hernia     History of anemia     History of fusion of spine for scoliosis     \"as a teenager\"    History of transfusion     1978 - no adverse reaction    Left lumbar radiculitis     Last Assessed: [id number]    Lumbar postlaminectomy syndrome     Migraines     Morbid obesity (HCC)     Motion sickness     Neck pain     Osteoarthritis     of left hip    Right knee pain     Seasonal allergies     Shortness of breath     Umbilical hernia     Uses brace     right knee    Wears glasses      Present on Admission:  **None**      Admitting Diagnosis: Acute cholecystitis [K81.0]  Abdominal pain [R10.9]  Age/Sex: 63 y.o. female  Admission Orders:  Scheduled Medications:  cefazolin, 2,000 mg, Intravenous, Q8H  DULoxetine, 60 mg, Oral, Daily  gabapentin, 300 mg, Oral, TID  heparin (porcine), 5,000 Units, Subcutaneous, Q8H EVGENY  metroNIDAZOLE, 500 mg, Intravenous, Q8H  pantoprazole, 40 mg, Oral, BID AC  pramipexole, 0.5 mg, Oral, HS      Continuous IV Infusions:  multi-electrolyte, 100 mL/hr, Intravenous, Continuous      PRN Meds:  acetaminophen, 650 mg, Oral, Q6H PRN  HYDROmorphone, 0.2 mg, Intravenous, Q3H PRN  ibuprofen, 400 mg, Oral, Q6H PRN  ondansetron, 4 mg, Intravenous, Q6H PRN  oxyCODONE, 10 mg, Oral, Q4H PRN  oxyCODONE, 5 mg, Oral, Q4H PRN  traMADol, 100 mg, Oral, Q8H PRN        None    Network Utilization Review Department  ATTENTION: Please call with any questions or concerns to [phone] and carefully listen to the prompts so that you are directed to the right person. All voicemails are " confidential.   For Discharge needs, contact Care Management DC Support Team at [phone] opt. 2  Send all requests for admission clinical reviews, approved or denied determinations and any other requests to dedicated fax number below belonging to the campus where the patient is receiving treatment. List of dedicated fax numbers for the Facilities:  FACILITY NAME UR FAX NUMBER   ADMISSION DENIALS (Administrative/Medical Necessity) [phone]   DISCHARGE SUPPORT TEAM (NETWORK) [phone]   PARENT CHILD HEALTH (Maternity/NICU/Pediatrics) [phone]   [facility] [phone]   [facility] [phone]   [facility] [phone]   [facility] [phone]   [facility] [phone]   [facility] [phone]   [facility] [phone]   [facility] [phone]   [facility] [phone]   Critical access hospital [phone]   [facility] [phone]

## 2023-12-21 LAB
ALBUMIN SERPL BCP-MCNC: 3.4 G/DL (ref 3.5–5)
ALP SERPL-CCNC: 71 U/L (ref 34–104)
ALT SERPL W P-5'-P-CCNC: 58 U/L (ref 7–52)
ANION GAP SERPL CALCULATED.3IONS-SCNC: 7 MMOL/L
AST SERPL W P-5'-P-CCNC: 85 U/L (ref 13–39)
BASOPHILS # BLD AUTO: 0.03 THOUSANDS/ÂΜL (ref 0–0.1)
BASOPHILS NFR BLD AUTO: 0 % (ref 0–1)
BILIRUB SERPL-MCNC: 0.41 MG/DL (ref 0.2–1)
BUN SERPL-MCNC: 9 MG/DL (ref 5–25)
CALCIUM ALBUM COR SERPL-MCNC: 9.4 MG/DL (ref 8.3–10.1)
CALCIUM SERPL-MCNC: 8.9 MG/DL (ref 8.4–10.2)
CHLORIDE SERPL-SCNC: 105 MMOL/L (ref 96–108)
CO2 SERPL-SCNC: 28 MMOL/L (ref 21–32)
CREAT SERPL-MCNC: 0.71 MG/DL (ref 0.6–1.3)
EOSINOPHIL # BLD AUTO: 0.01 THOUSAND/ÂΜL (ref 0–0.61)
EOSINOPHIL NFR BLD AUTO: 0 % (ref 0–6)
ERYTHROCYTE [DISTWIDTH] IN BLOOD BY AUTOMATED COUNT: 14.8 % (ref 11.6–15.1)
GFR SERPL CREATININE-BSD FRML MDRD: 92 ML/MIN/1.73SQ M
GLUCOSE SERPL-MCNC: 131 MG/DL (ref 65–140)
GLUCOSE SERPL-MCNC: 131 MG/DL (ref 65–140)
GLUCOSE SERPL-MCNC: 148 MG/DL (ref 65–140)
HCT VFR BLD AUTO: 32.4 % (ref 34.8–46.1)
HGB BLD-MCNC: 10 G/DL (ref 11.5–15.4)
IMM GRANULOCYTES # BLD AUTO: 0.06 THOUSAND/UL (ref 0–0.2)
IMM GRANULOCYTES NFR BLD AUTO: 1 % (ref 0–2)
LYMPHOCYTES # BLD AUTO: 1.17 THOUSANDS/ÂΜL (ref 0.6–4.47)
LYMPHOCYTES NFR BLD AUTO: 11 % (ref 14–44)
MCH RBC QN AUTO: 27.6 PG (ref 26.8–34.3)
MCHC RBC AUTO-ENTMCNC: 30.9 G/DL (ref 31.4–37.4)
MCV RBC AUTO: 90 FL (ref 82–98)
MONOCYTES # BLD AUTO: 0.58 THOUSAND/ÂΜL (ref 0.17–1.22)
MONOCYTES NFR BLD AUTO: 5 % (ref 4–12)
NEUTROPHILS # BLD AUTO: 8.89 THOUSANDS/ÂΜL (ref 1.85–7.62)
NEUTS SEG NFR BLD AUTO: 83 % (ref 43–75)
NRBC BLD AUTO-RTO: 0 /100 WBCS
PLATELET # BLD AUTO: 296 THOUSANDS/UL (ref 149–390)
PMV BLD AUTO: 9.8 FL (ref 8.9–12.7)
POTASSIUM SERPL-SCNC: 3.2 MMOL/L (ref 3.5–5.3)
PROT SERPL-MCNC: 6.2 G/DL (ref 6.4–8.4)
RBC # BLD AUTO: 3.62 MILLION/UL (ref 3.81–5.12)
SODIUM SERPL-SCNC: 140 MMOL/L (ref 135–147)
WBC # BLD AUTO: 10.74 THOUSAND/UL (ref 4.31–10.16)

## 2023-12-21 PROCEDURE — 82948 REAGENT STRIP/BLOOD GLUCOSE: CPT

## 2023-12-21 PROCEDURE — 99024 POSTOP FOLLOW-UP VISIT: CPT | Performed by: SURGERY

## 2023-12-21 PROCEDURE — 80053 COMPREHEN METABOLIC PANEL: CPT | Performed by: SURGERY

## 2023-12-21 PROCEDURE — 85025 COMPLETE CBC W/AUTO DIFF WBC: CPT | Performed by: SURGERY

## 2023-12-21 RX ORDER — FAMOTIDINE 20 MG/1
40 TABLET, FILM COATED ORAL 2 TIMES DAILY
Status: DISCONTINUED | OUTPATIENT
Start: 2023-12-21 | End: 2023-12-22 | Stop reason: HOSPADM

## 2023-12-21 RX ORDER — CALCIUM CARBONATE 500 MG/1
1000 TABLET, CHEWABLE ORAL 3 TIMES DAILY PRN
Status: DISCONTINUED | OUTPATIENT
Start: 2023-12-21 | End: 2023-12-21

## 2023-12-21 RX ORDER — METRONIDAZOLE 500 MG/1
500 TABLET ORAL EVERY 8 HOURS SCHEDULED
Status: DISCONTINUED | OUTPATIENT
Start: 2023-12-21 | End: 2023-12-22

## 2023-12-21 RX ORDER — METHOCARBAMOL 750 MG/1
750 TABLET, FILM COATED ORAL EVERY 6 HOURS SCHEDULED
Status: DISCONTINUED | OUTPATIENT
Start: 2023-12-21 | End: 2023-12-22 | Stop reason: HOSPADM

## 2023-12-21 RX ORDER — ACETAMINOPHEN 325 MG/1
975 TABLET ORAL EVERY 6 HOURS SCHEDULED
Status: DISCONTINUED | OUTPATIENT
Start: 2023-12-21 | End: 2023-12-22 | Stop reason: HOSPADM

## 2023-12-21 RX ORDER — POTASSIUM CHLORIDE 20 MEQ/1
40 TABLET, EXTENDED RELEASE ORAL
Qty: 4 TABLET | Refills: 0 | Status: COMPLETED | OUTPATIENT
Start: 2023-12-21 | End: 2023-12-21

## 2023-12-21 RX ORDER — CALCIUM CARBONATE 500 MG/1
2000 TABLET, CHEWABLE ORAL 3 TIMES DAILY PRN
Status: DISCONTINUED | OUTPATIENT
Start: 2023-12-21 | End: 2023-12-22 | Stop reason: HOSPADM

## 2023-12-21 RX ADMIN — METHOCARBAMOL 750 MG: 750 TABLET ORAL at 12:13

## 2023-12-21 RX ADMIN — METRONIDAZOLE 500 MG: 500 TABLET ORAL at 22:09

## 2023-12-21 RX ADMIN — HEPARIN SODIUM 5000 UNITS: 5000 INJECTION INTRAVENOUS; SUBCUTANEOUS at 14:46

## 2023-12-21 RX ADMIN — ACETAMINOPHEN 325MG 975 MG: 325 TABLET ORAL at 12:13

## 2023-12-21 RX ADMIN — METRONIDAZOLE 500 MG: 500 INJECTION, SOLUTION INTRAVENOUS at 10:05

## 2023-12-21 RX ADMIN — HEPARIN SODIUM 5000 UNITS: 5000 INJECTION INTRAVENOUS; SUBCUTANEOUS at 05:17

## 2023-12-21 RX ADMIN — PANTOPRAZOLE SODIUM 40 MG: 40 TABLET, DELAYED RELEASE ORAL at 16:54

## 2023-12-21 RX ADMIN — ACETAMINOPHEN 325MG 975 MG: 325 TABLET ORAL at 17:03

## 2023-12-21 RX ADMIN — POTASSIUM CHLORIDE 40 MEQ: 1500 TABLET, EXTENDED RELEASE ORAL at 17:56

## 2023-12-21 RX ADMIN — FAMOTIDINE 40 MG: 20 TABLET ORAL at 08:48

## 2023-12-21 RX ADMIN — FAMOTIDINE 40 MG: 20 TABLET ORAL at 17:02

## 2023-12-21 RX ADMIN — CALCIUM CARBONATE 1000 MG: 500 TABLET, CHEWABLE ORAL at 05:17

## 2023-12-21 RX ADMIN — METRONIDAZOLE 500 MG: 500 TABLET ORAL at 12:13

## 2023-12-21 RX ADMIN — CEFAZOLIN SODIUM 2000 MG: 2 SOLUTION INTRAVENOUS at 08:34

## 2023-12-21 RX ADMIN — METHOCARBAMOL 750 MG: 750 TABLET ORAL at 17:02

## 2023-12-21 RX ADMIN — POTASSIUM CHLORIDE 40 MEQ: 1500 TABLET, EXTENDED RELEASE ORAL at 22:09

## 2023-12-21 RX ADMIN — PANTOPRAZOLE SODIUM 40 MG: 40 TABLET, DELAYED RELEASE ORAL at 05:21

## 2023-12-21 RX ADMIN — ACETAMINOPHEN 325MG 975 MG: 325 TABLET ORAL at 06:34

## 2023-12-21 RX ADMIN — METHOCARBAMOL 750 MG: 750 TABLET ORAL at 06:34

## 2023-12-21 RX ADMIN — DULOXETINE HYDROCHLORIDE 60 MG: 60 CAPSULE, DELAYED RELEASE ORAL at 08:47

## 2023-12-21 RX ADMIN — HEPARIN SODIUM 5000 UNITS: 5000 INJECTION INTRAVENOUS; SUBCUTANEOUS at 22:09

## 2023-12-21 RX ADMIN — CEFAZOLIN SODIUM 2000 MG: 2 SOLUTION INTRAVENOUS at 16:54

## 2023-12-21 RX ADMIN — PRAMIPEXOLE DIHYDROCHLORIDE 0.5 MG: 0.5 TABLET ORAL at 22:09

## 2023-12-21 RX ADMIN — METRONIDAZOLE 500 MG: 500 INJECTION, SOLUTION INTRAVENOUS at 01:25

## 2023-12-21 NOTE — ANESTHESIA POSTPROCEDURE EVALUATION
Post-Op Assessment Note    CV Status:  Stable    Pain management: adequate       Mental Status:  Alert and awake   Hydration Status:  Euvolemic   PONV Controlled:  Controlled   Airway Patency:  Patent     Post Op Vitals Reviewed: Yes      Staff: Anesthesiologist               BP      Temp      Pulse     Resp      SpO2

## 2023-12-21 NOTE — PLAN OF CARE
Problem: PAIN - ADULT  Goal: Verbalizes/displays adequate comfort level or baseline comfort level  Description: Interventions:  - Encourage patient to monitor pain and request assistance  - Assess pain using appropriate pain scale  - Administer analgesics based on type and severity of pain and evaluate response  - Implement non-pharmacological measures as appropriate and evaluate response  - Consider cultural and social influences on pain and pain management  - Notify physician/advanced practitioner if interventions unsuccessful or patient reports new pain  Outcome: Progressing     Problem: INFECTION - ADULT  Goal: Absence or prevention of progression during hospitalization  Description: INTERVENTIONS:  - Assess and monitor for signs and symptoms of infection  - Monitor lab/diagnostic results  - Monitor all insertion sites, i.e. indwelling lines, tubes, and drains  - Monitor endotracheal if appropriate and nasal secretions for changes in amount and color  - [place] appropriate cooling/warming therapies per order  - Administer medications as ordered  - Instruct and encourage patient and family to use good hand hygiene technique  - Identify and instruct in appropriate isolation precautions for identified infection/condition  Outcome: Progressing     Problem: INFECTION - ADULT  Goal: Absence of fever/infection during neutropenic period  Description: INTERVENTIONS:  - Monitor WBC    Outcome: Progressing     Problem: SAFETY ADULT  Goal: Maintains/Returns to pre admission functional level  Description: INTERVENTIONS:  - Perform AM-PAC 6 Click Basic Mobility/ Daily Activity assessment daily.  - Set and communicate daily mobility goal to care team and patient/family/caregiver.   - Collaborate with rehabilitation services on mobility goals if consulted  - Perform Range of Motion 3 times a day.  - Reposition patient every 2 hours.  - Dangle patient 3 times a day  - Stand patient 3 times a day  - Ambulate patient 3 times a  day  - Out of bed to chair 3 times a day   - Out of bed for meals 3 times a day  - Out of bed for toileting  - Record patient progress and toleration of activity level   Outcome: Progressing

## 2023-12-21 NOTE — PROGRESS NOTES
The metronidazole has been converted to Oral per [facility] IV-to-PO Auto-Conversion Protocol for Adults as approved by the Pharmacy and Therapeutics Committee. The patient met all eligible criteria:  1) Age = 18 years old   2) Received at least one dose of the IV form   3) Receiving at least one other scheduled oral/enteral medication   4) Tolerating an oral/enteral diet   and did not have any exclusions:   1) Critical care patient   2) Active GI bleed (IF assessing H2RAs or PPIs)   3) Continuous tube feeding (IF assessing cipro, doxycycline, levofloxacin, minocycline, rifampin, or voriconazole)   4) Receiving PO vancomycin (IF assessing metronidazole)   5) Persistent nausea and/or vomiting   6) Ileus or gastrointestinal obstruction   7) Carrington/nasogastric tube set for continuous suction   8) Specific order not to automatically convert to PO (in the order's comments or if discussed in the most recent Infectious Disease or primary team's progress notes).

## 2023-12-21 NOTE — QUICK NOTE
Sepsis POA due to acute cholecystitis a/e/b WBC 12.69 and  treated with IV Ancef, IV Flagyl, IVF, lab monitoring and VS monitoring

## 2023-12-21 NOTE — PLAN OF CARE
Problem: PAIN - ADULT  Goal: Verbalizes/displays adequate comfort level or baseline comfort level  Description: Interventions:  - Encourage patient to monitor pain and request assistance  - Assess pain using appropriate pain scale  - Administer analgesics based on type and severity of pain and evaluate response  - Implement non-pharmacological measures as appropriate and evaluate response  - Consider cultural and social influences on pain and pain management  - Notify physician/advanced practitioner if interventions unsuccessful or patient reports new pain  Outcome: Progressing     Problem: SAFETY ADULT  Goal: Maintain or return to baseline ADL function  Description: INTERVENTIONS:  -  Assess patient's ability to carry out ADLs; assess patient's baseline for ADL function and identify physical deficits which impact ability to perform ADLs (bathing, care of mouth/teeth, toileting, grooming, dressing, etc.)  - Assess/evaluate cause of self-care deficits   - Assess range of motion  - Assess patient's mobility; develop plan if impaired  - Assess patient's need for assistive devices and provide as appropriate  - Encourage maximum independence but intervene and supervise when necessary  - Involve family in performance of ADLs  - Assess for home care needs following discharge   - Consider OT consult to assist with ADL evaluation and planning for discharge  - Provide patient education as appropriate  Outcome: Progressing     Problem: INFECTION - ADULT  Goal: Absence or prevention of progression during hospitalization  Description: INTERVENTIONS:  - Assess and monitor for signs and symptoms of infection  - Monitor lab/diagnostic results  - Monitor all insertion sites, i.e. indwelling lines, tubes, and drains  - Monitor endotracheal if appropriate and nasal secretions for changes in amount and color  - [place] appropriate cooling/warming therapies per order  - Administer medications as ordered  - Instruct and encourage  patient and family to use good hand hygiene technique  - Identify and instruct in appropriate isolation precautions for identified infection/condition  Outcome: Progressing     Problem: Knowledge Deficit  Goal: Patient/family/caregiver demonstrates understanding of disease process, treatment plan, medications, and discharge instructions  Description: Complete learning assessment and assess knowledge base.  Interventions:  - Provide teaching at level of understanding  - Provide teaching via preferred learning methods  Outcome: Progressing     Problem: DISCHARGE PLANNING  Goal: Discharge to home or other facility with appropriate resources  Description: INTERVENTIONS:  - Identify barriers to discharge w/patient and caregiver  - Arrange for needed discharge resources and transportation as appropriate  - Identify discharge learning needs (meds, wound care, etc.)  - Arrange for interpretive services to assist at discharge as needed  - Refer to Case Management Department for coordinating discharge planning if the patient needs post-hospital services based on physician/advanced practitioner order or complex needs related to functional status, cognitive ability, or social support system  Outcome: Progressing

## 2023-12-21 NOTE — PROGRESS NOTES
"Progress Note - General Surgery  : [facility] Resident on [facility]    Hayley Rios 63 y.o. female MRN: [id number]  Unit/Bed#: E2 -02 Encounter: [id number]      Assessment:  63 y.o. female POD 1 s/p subtotal fenestrating lap twyla for acute cholecystitis + Jessica      Plan:  Regular lo fat diet  IVF to 75  Monitor BRAYAN output  Add pepcid  Scheduled multimodal analgesia  Continue IV abx  DVT ppx        Subjective: Sore but tolerated crackers      Objective:     Physical Exam:  GEN: NAD   Ab: Soft, appropriately mildly tender/ND, CDI, BRAYAN SS  Lung: Normal effort on RA  CV: RRR   Extrem: No CCE   Neuro: A+Ox3       I/O         12/19 0701  12/20 0700 12/20 0701  12/21 0700    I.V. (mL/kg)  2900 (31.7)    IV Piggyback  100    Total Intake(mL/kg)  3000 (32.8)    Urine (mL/kg/hr)  1100 (0.5)    Drains  30    Blood  600    Total Output  1730    Net  +1270                  Lab, Imaging and other studies: I have personally reviewed pertinent reports.  , CBC with diff: No results found for: \"WBC\", \"HGB\", \"HCT\", \"MCV\", \"PLT\", \"ADJUSTEDWBC\", \"RBC\", \"MCH\", \"MCHC\", \"RDW\", \"MPV\", \"NRBC\", BMP/CMP: No results found for: \"SODIUM\", \"K\", \"CL\", \"CO2\", \"ANIONGAP\", \"BUN\", \"CREATININE\", \"GLUCOSE\", \"CALCIUM\", \"AST\", \"ALT\", \"ALKPHOS\", \"PROT\", \"BILITOT\", \"EGFR\"      VTE Pharmacologic Prophylaxis: Heparin      Miguel Mcdonough MD  12/21/2023 6:28 AM          "

## 2023-12-21 NOTE — OP NOTE
OPERATIVE REPORT  PATIENT NAME: Hayley Rios    :  1960  MRN: [id number]  Pt Location: [facility] OR ROOM 06    SURGERY DATE: 2023    Surgeons and Role:     * Chelle Butler MD - Primary     * Miguel Mcdonough MD - Assisting    Preop Diagnosis:  Acute cholecystitis [K81.0]    Post-Op Diagnosis Codes:     * Acute cholecystitis [K81.0]    Procedure(s):  FENESTRATED SUBTOTAL CHOLECYSTECTOMY LAPAROSCOPIC. EXTENSIVE LYSIS OF ADHESIONS.    Specimen(s):  ID Type Source Tests Collected by Time Destination   1 :  Tissue Gallbladder TISSUE EXAM Chelle Butler MD 2023 1621        Estimated Blood Loss:   600 mL    Drains:  Closed/Suction Drain Right Abdomen Bulb 19 Fr. (Active)   Site Description Leaking at site 23 0011   Dressing Status Open to air 23 0011   Drainage Appearance Bloody 23 0011   Status To bulb suction 23 0011   Output (mL) 20 mL 23 0011   Number of days: 1       [REMOVED] Closed/Suction Drain Left Breast Bulb 15 Fr. (Removed)   Number of days: 1012       [REMOVED] Closed/Suction Drain Right Breast Bulb 15 Fr. (Removed)   Number of days: 1012       Anesthesia Type:   General    Operative Indications:  Acute cholecystitis [K81.0]      Operative Findings:  Extensive intra-abdominal adhesions with small bowel omentum and colon adherent to intra-abdominal mesh.  2 separate intra-abdominal meshes identified and 2 small left lower quadrant ventral hernias  by a fascial bridge with no contents.  Severe acute cholecystitis with large stone burden, no safe critical view identified    Complications:   None    Procedure and Technique:  Patient was identified in the preoperative holding area and taken back to the operating room she positioned supine on the operating table.  General anesthesia was induced and she was prepped and draped in standard sterile surgical fashion.  Preoperative timeout was conducted confirming correct patient, procedure and that all  necessary equipment and personnel were functioning and present in the operating room.  Incision was made at Baca's point in the left upper quadrant with a scalpel and a Veress needle was inserted into the abdomen.  Abdomen was insufflated to 15 mmHg.  Veress needle was then removed and a 5 mm port was then placed.  Laparoscope was inserted through the port.  Intra-abdominal contents were inspected and there was no trauma from port or needle placement.  She was found to have significant intra-abdominal adhesions with multiple loops of small bowel here to the anterior abdominal wall and intra-abdominal mesh.  The mesh covered large portion of the anterior abdominal wall.  A 5 mm port was then placed in the left lower quadrant in location without adhesions under direct visualization.  Another 5 mm port was then placed in the right lower quadrant in similar fashion.  Using blunt dissection we performed extensive enterolysis and lysis of omental adhesions, carefully taking down loops of small bowel colon and omentum.  Lysis of adhesions took 2 hours.  When majority of adhesions or taken down we were able to identify 2 separate meshes from previous hernia repairs as well as a left lower quadrant hernia  by a fascial bridge creating 2 small hernia defects.  There were no contents in the hernia defects.  When adhesions were lysed we were able to place a 5 mm port at the umbilicus and an 11 mm port in the epigastric region under direct visualization.  Attention was then turned to the right upper quadrant where the gallbladder was identified.  This was grasped with an atraumatic grasper and elevated cephalad above the liver.  She had very large inflamed thickened gallbladder which was difficult to grasp but also contain large stones.  The gallbladder had multiple adhesions to omentum and to the duodenum which were carefully taken down with blunt dissection.  There are also several occasions to the liver which were  taken down with electrocautery.  Visualization of the gallbladder neck was limited by large size of the gallbladder and the significant surrounding inflammation.  There was a tubular structure entering the gallbladder above the neck that was likely the artery but this was curling around another tubular structure that was not directly entering the gallbladder and could have been the common bile duct.  Given the significant inflammation we proceeded with a dome down approach using electrocautery to dissect the gallbladder from the liver bed.  We able to mobilize the gallbladder to just above the neck where the tubular structures entered.  Safe dissection could not be performed to obtain a critical view.  There was some bleeding from the liver bed which was controlled with electrocautery and with hemostatic agents including Surgicel with good result.  The 11 mm port was then upsized to a 12 mm port and an Endo MANDO 60 with a blue load was used to transect the gallbladder above the neck.  Gallbladder was then dissected free fully from the liver bed and placed into an Endo Catch bag and removed from the abdomen.  The staple line of the gallbladder was then opened with electrocautery and 2 large gallstones were removed from the remnant gallbladder.  These were placed into a separate Endo Catch bag and removed from the abdomen.  Electrocautery was used to further excise remnant gallbladder leaving a small remnant open.  The remnant gallbladder was thoroughly irrigated and there was no further gallstones identified.  The gallbladder wall was significantly thickened and was unable to be reapproximated using hemoclips.  The tubular structures that had been previous identified remained intact and there was no attempted further dissection given the significant inflammation.  Attention was then turned to the liver bed and cautery and Surgicel were used for hemostasis with good result.  A 19 Indonesian drain was then placed adjacent  to the fenestrated subtotal cholecystectomy site.  The drain was secured to the abdominal wall with a 3-0 nylon suture.  The abdomen was then thoroughly irrigated and suctioned.  The sites of small bowel adhesiolysis were examined and were intact without any obvious serosal tears.  Attention was then returned to the right upper quadrant and there was no bilious drainage identified or bleeding from the liver bed.  The ports were then removed and the abdomen was allowed to desufflate.  The 12 mm port site fascia was then closed with a running 0 Vicryl suture.  Local anesthetic was injected into each of the port sites and the skin was then closed with 4-0 Monocryl suture in subcuticular fashion.  Exofin glue was then applied.  She was then awoken from general anesthesia and taken to the postoperative care unit in good condition.  All counts are correct at the end of the case.   I was present for the entire procedure.    Patient Disposition:  PACU  and hemodynamically stable        SIGNATURE: Chelle Butler MD  DATE: December 21, 2023  TIME: 9:34 AM

## 2023-12-21 NOTE — UTILIZATION REVIEW
NOTIFICATION OF INPATIENT ADMISSION   AUTHORIZATION REQUEST   SERVICING FACILITY:   [place] [address]  Tax ID: [id number]  NPI: [id number] ATTENDING PROVIDER:  Attending Name and NPI#: Apryl Desouza Md [[id number]]  Address: [address] [place] [address]  Phone: [phone]     ADMISSION INFORMATION:  Place of Service: Inpatient [facility]  Place of Service Code: 21  Inpatient Admission Date/Time: 12/19/23  7:23 PM  Discharge Date/Time: No discharge date for patient encounter.  Admitting Diagnosis Code/Description:  Acute cholecystitis [K81.0]  Abdominal pain [R10.9]     UTILIZATION REVIEW CONTACT:  Alexandra Rondon, Utilization   Network Utilization Review Department  Phone: [phone]  Fax [phone]  Email: [email or web address]  Contact for approvals/pending authorizations, clinical reviews, and discharge.     PHYSICIAN ADVISORY SERVICES:  Medical Necessity Denial & Rvxn-oj-Hbud Review  Phone: [phone]  Fax: [phone]  Email: [email or web address]     DISCHARGE SUPPORT TEAM:  For Patients Discharge Needs & Updates  Phone: [phone] opt. 2 Fax: [phone]  Email: [email or web address]

## 2023-12-21 NOTE — CASE MANAGEMENT
Case Management Discharge Planning Note    Patient name Hayley Rios  Location East 2 /E2 -* MRN [id number]  : 1960 Date 2023       Current Admission Date: 2023  Current Admission Diagnosis:Acute cholecystitis due to biliary calculus  Patient Active Problem List    Diagnosis Date Noted    Acute cholecystitis due to biliary calculus 2023    Muscle weakness of left upper extremity     Myofascial pain syndrome 2022    Iron deficiency 2022    Podagra 2021    Motion sickness     Recurrent incisional hernia     Macromastia 2020    Long-term current use of opiate analgesic 2020    Uncomplicated opioid dependence (HCC) 2020    Anemia 10/07/2020    Hypokalemia 10/07/2020    Recurrent umbilical hernia 2020    Lipoma of torso 2020    Sacroiliitis, not elsewhere classified (HCC)     Osteoarthritis of multiple joints 02/10/2020    Neck pain     Cervical spondylosis without myelopathy     Upper airway cough syndrome 2019    Chronic cough 2019    Vocal fold paresis, right 2019    Dysphonia 2019    Muscle tension dysphonia 2019    Glottic insufficiency 2019    Reflux laryngitis 2019    Laryngeal edema 2019    Allergic rhinitis due to [employer] house dust mite 2019    Mild intermittent asthma without complication 2019    Laryngopharyngeal reflux (LPR) 2019    Dolan's esophagus with dysplasia 2019    Gastroesophageal reflux disease 2019    Prediabetes 2018    Vitamin D deficiency 2018    Lumbar radiculopathy 10/01/2018    Lumbar spondylosis     Environmental allergies 2018    S/P right knee arthroscopy 2018    Hernia of anterior abdominal wall 2018    Sleep disturbance 2017    Hyperlipidemia 2017    Primary osteoarthritis of left hip 2017    Restless legs syndrome (RLS) 2017    Chronic venous insufficiency  06/02/2017    Chronic low back pain 04/11/2017    Lumbar postlaminectomy syndrome 04/11/2017    Arthritis of lumbar spine 03/16/2017    Chronic pain syndrome 03/16/2017    Depression, recurrent (HCC) 03/16/2017      LOS (days): 2  Geometric Mean LOS (GMLOS) (days): 2.3  Days to GMLOS:0.5     OBJECTIVE:  Risk of Unplanned Readmission Score: 18.71         Current admission status: Inpatient   Preferred Pharmacy:   [id number] - [place], [place] - [address]  [address] [facility] [address]  Phone: [phone] Fax: [phone]    [facility] #[id number] - [place], [place] - [address]  [address] [place] [address] [address]  [facility] [address]  Phone: [phone] Fax: [phone]    Primary Care Provider: No primary care provider on file.    Primary Insurance: [employer]  Secondary Insurance:     DISCHARGE DETAILS:    Discharge planning discussed with:: Patient  Freedom of Choice: Yes  Comments - Freedom of Choice: Pt would like home health SN for drain care. Referrals will be placed. Pt is getting the address of her friend where she will be staying  CM contacted family/caregiver?: No- see comments (declined need)  Were Treatment Team discharge recommendations reviewed with patient/caregiver?: Yes  Did patient/caregiver verbalize understanding of patient care needs?: Yes  Were patient/caregiver advised of the risks associated with not following Treatment Team discharge recommendations?: Yes         Requested Home Health Care         Is the patient interested in HHC at discharge?: Yes  Home Health Discipline requested:: Nursing  Home Health Agency Name:: Other  Home Health Follow-Up Provider:: Referring Provider  Home Health Services Needed:: Other (comment) (Drain care)  Homebound Criteria Met:: Immunosuppressed  Supporting Clincal Findings:: Limited Endurance    DME Referral Provided  Referral made for DME?: No    Other Referral/Resources/Interventions  Provided:  Interventions: [facility]  Referral Comments: Referrals will be placed via aidin once address of where pt will be staying is obtained         Treatment Team Recommendation: Home with Home Health Care  Discharge Destination Plan:: Home with Home Health Care                                IMM Given (Date):: 12/21/23  IMM Given to:: Patient

## 2023-12-21 NOTE — RESTORATIVE TECHNICIAN NOTE
Restorative Technician Note      Patient Name: Hayley Rios     Restorative Tech Visit Date: 12/21/23  Note Type: Mobility  Patient Position Upon Consult: Supine  Activity Performed: Ambulated  Patient Position at End of Consult: Supine; All needs within reach

## 2023-12-22 VITALS
WEIGHT: 201.72 LBS | OXYGEN SATURATION: 96 % | RESPIRATION RATE: 16 BRPM | DIASTOLIC BLOOD PRESSURE: 56 MMHG | HEART RATE: 80 BPM | TEMPERATURE: 97.6 F | SYSTOLIC BLOOD PRESSURE: 109 MMHG | BODY MASS INDEX: 34.63 KG/M2

## 2023-12-22 PROBLEM — K80.00 ACUTE CHOLECYSTITIS DUE TO BILIARY CALCULUS: Status: RESOLVED | Noted: 2023-12-20 | Resolved: 2023-12-22

## 2023-12-22 LAB
GLUCOSE SERPL-MCNC: 104 MG/DL (ref 65–140)
GLUCOSE SERPL-MCNC: 125 MG/DL (ref 65–140)

## 2023-12-22 PROCEDURE — 82948 REAGENT STRIP/BLOOD GLUCOSE: CPT

## 2023-12-22 PROCEDURE — 99024 POSTOP FOLLOW-UP VISIT: CPT | Performed by: PHYSICIAN ASSISTANT

## 2023-12-22 PROCEDURE — 99024 POSTOP FOLLOW-UP VISIT: CPT | Performed by: SURGERY

## 2023-12-22 RX ORDER — OXYCODONE HYDROCHLORIDE 5 MG/1
5 TABLET ORAL EVERY 4 HOURS PRN
Qty: 6 TABLET | Refills: 0 | Status: SHIPPED | OUTPATIENT
Start: 2023-12-22 | End: 2023-12-25

## 2023-12-22 RX ORDER — ONDANSETRON 4 MG/1
4 TABLET, ORALLY DISINTEGRATING ORAL EVERY 6 HOURS PRN
Qty: 10 TABLET | Refills: 0 | Status: SHIPPED | OUTPATIENT
Start: 2023-12-22

## 2023-12-22 RX ADMIN — ACETAMINOPHEN 325MG 975 MG: 325 TABLET ORAL at 05:17

## 2023-12-22 RX ADMIN — CEFAZOLIN SODIUM 2000 MG: 2 SOLUTION INTRAVENOUS at 00:26

## 2023-12-22 RX ADMIN — ACETAMINOPHEN 325MG 975 MG: 325 TABLET ORAL at 00:25

## 2023-12-22 RX ADMIN — METHOCARBAMOL 750 MG: 750 TABLET ORAL at 14:00

## 2023-12-22 RX ADMIN — METHOCARBAMOL 750 MG: 750 TABLET ORAL at 05:17

## 2023-12-22 RX ADMIN — ONDANSETRON 4 MG: 2 INJECTION INTRAMUSCULAR; INTRAVENOUS at 12:27

## 2023-12-22 RX ADMIN — HEPARIN SODIUM 5000 UNITS: 5000 INJECTION INTRAVENOUS; SUBCUTANEOUS at 14:00

## 2023-12-22 RX ADMIN — METRONIDAZOLE 500 MG: 500 TABLET ORAL at 05:17

## 2023-12-22 RX ADMIN — ACETAMINOPHEN 325MG 975 MG: 325 TABLET ORAL at 14:00

## 2023-12-22 RX ADMIN — FAMOTIDINE 40 MG: 20 TABLET ORAL at 08:54

## 2023-12-22 RX ADMIN — METHOCARBAMOL 750 MG: 750 TABLET ORAL at 00:26

## 2023-12-22 RX ADMIN — GABAPENTIN 300 MG: 300 CAPSULE ORAL at 08:54

## 2023-12-22 RX ADMIN — PANTOPRAZOLE SODIUM 40 MG: 40 TABLET, DELAYED RELEASE ORAL at 08:54

## 2023-12-22 RX ADMIN — HEPARIN SODIUM 5000 UNITS: 5000 INJECTION INTRAVENOUS; SUBCUTANEOUS at 05:17

## 2023-12-22 RX ADMIN — DULOXETINE HYDROCHLORIDE 60 MG: 60 CAPSULE, DELAYED RELEASE ORAL at 08:54

## 2023-12-22 NOTE — DISCHARGE INSTR - AVS FIRST PAGE
Chelle Butler MD  [facility]  [address], [address]  [place] [address]  Phone: [phone]  Fax: [phone]    Surgical Discharge Instructions        1. General: You will feel pulling sensations around the wound or funny aches and pains around the incisions. You may have some bruising or mild redness. This is normal. Even minor surgery is a change in your body and this is your body’s reaction to it. If you have had abdominal surgery, it may help to support the incision with a small pillow or blanket for comfort when moving or coughing. There may be some hardness surrounding your incision which is your body's normal reaction to surgery. This typically softens up over time. A heating pad or ice pack can also be beneficial in the post-op period.      2. Wound care: Make sure to remove the overlying dressing/bandage in about 24 hours, unless instructed otherwise. You usually don't have to redress the wound after 24-48 hours, unless for comfort. Keep the incision clean and dry. Let air get to it. If this Steri-Strips fall off, just keep the wound clean. If there is surgical glue in place this will usually start to soften in around 2 weeks and will eventually dissolve or fall off with gentle scrubbing in the shower.     3. Water: You may shower over the wound, unless there are drain tubes left in place. Do not bathe or use a pool or hot tub until cleared by the physician. Do not swim in a lake or ocean until cleared by your physician. You may shower right over the staples or Steri-Strips and packing dry when you are done.     4. Activity: You may go up and down stairs, walk as much as you are comfortable, but walk at least 3 times each day. If you have had abdominal surgery, do not lift anything heavier than 15 pounds for at least 2-4 weeks, unless cleared by the doctor. Notes and paperwork for your job are typically filled out at your post-op appointment but if there is  a time constraint please call the office for assistance if this is needed prior to your post-op check.     5. Diet: You may resume a regular diet. If you had a same-day surgery or overnight stay surgery, you may wish to eat lightly for a few days: soups, crackers, and sandwiches. You may resume a regular diet when ready.      6. Medications: Resume all of your previous medications, unless told otherwise by the doctor. Ibuprofen (Advil, Motrin, etc.) is usually ok unless specifically discouraged by your surgeon. 400-600 mg of ibuprofen every 6 hours for post-surgical pain is an acceptable regimen with a maximum dosage of 2400 mg per day. Avoid ibuprofen if you are taking aspirin. If you have a bleeding disorder or have stomach issues, talk to your surgeon prior to use. Tylenol is ok, unless you are taking any narcotic pain medication containing Tylenol (such as Percocet, Darvocet, Vicodin, or anything containing acetaminophen). Be cautious taking additional Tylenol if you're taking these medications as there is a maximum dosage of 4,000 mg of Tylenol per day. You do not need to take the narcotic pain medications unless you are having significant pain and discomfort.     7. Driving: You will need someone to drive you home on the day of surgery. Do not drive or make any important decisions while on narcotic pain medication or 24 hours and after anesthesia or sedation for surgery. Generally, you may drive when you are off all narcotic pain medications.     8. Upset Stomach: You may take Maalox, Tums, or similar items for an upset stomach. If your narcotic pain medication causes an upset stomach, do not take it on an empty stomach. Try taking it with at least some crackers or toast.      9. Constipation: Patients often experience constipation after surgery due to anesthesia and pain medication. This is especially common after abdominal surgery. You may take over-the-counter medication for this, such as Metamucil,  Senokot, Dulcolax, milk of magnesia, etc. You may take a suppository unless you have had anorectal surgery such as a procedure on your hemorrhoids. If you experience significant nausea or vomiting after abdominal surgery, call the office before trying any of these medications.     10. Pain: You may be given a prescription for pain. This will be prescribed the day of surgery and sent to your pharmacy of choice. Take the pain medication as prescribed, only if you need it. Narcotic pain medications (such as anything containing hydrocodone or oxycodone) have many side effects such as nausea/vomiting, constipation, dizziness and respiratory depression. Do not drive when taking the pain medication. Do not take more than prescribed in the 4-6 hour time period.     11. Sexual Activity: You may resume sexual activity when you feel ready and comfortable and your incision is sealed and healed without apparent infection risk.     12. Urination: If you haven't urinated in 6 hours and are unable to urinate please call the office. If you are having pain and can not urinate you need to be evaluated by a physician and should go to the emergency room.      Call the office: If you are experiencing any of the following, fevers above 101.5°, significant nausea or vomiting, if the wound develops drainage and/or has excessive redness around the wound, or if you have significant diarrhea or other worsening symptoms.

## 2023-12-22 NOTE — DISCHARGE INSTRUCTIONS
Tarik-Martin Drain Care   WHAT YOU NEED TO KNOW:   A Tarik-Martin (BRAYAN) drain is used to remove fluids that build up in an area of your body after surgery. The BRAYAN drain is a bulb shaped device connected to a tube. One end of the tube is placed inside you during surgery. The other end comes out through a small cut in your skin. The bulb is connected to this end. You may have a stitch to hold the tube in place.   DISCHARGE INSTRUCTIONS:   Return to the emergency department if:   Your BRAYAN drain breaks or comes out.     You have cloudy yellow or brown drainage from your BRAYAN drain site, or the drainage smells bad.    Contact your healthcare provider if:   You drain less than 30 milliliters (2 tablespoons) in 24 hours. This may mean your drain can be removed.    You suddenly stop draining fluid or think your BRAYAN drain is blocked.    You have a fever higher than 101.5°F (38.6°C).     You have increased pain, redness, or swelling around the drain site.     You have questions about your BRAYAN drain care.    How a Tarik-Martin drain works:  The BRAYAN drain removes fluids by creating suction in the tube. The bulb is squeezed flat and connected to the tube that sticks out of your body. The bulb expands as it fills with fluid.   How to change the bandage around your Tarik-Martin drain:  If you have a bandage, change it once a day. You may need to change your bandage more than once a day if it gets completely wet.  Wash your hands with soap and water.     Loosen the tape and gently remove the old bandage. Throw the old bandage into a plastic trash bag.     Use soap and water or saline solution to clean your BRAYAN drain site. Dip a cotton swab or gauze pad in the solution and gently clean your skin.     Pat the area dry.     Place a new bandage on your BRAYAN drain site and secure it to your skin with surgical tape.     Wash your hands.    How to empty the Tarik-Martin drain:  Empty the bulb when it is half full or every 8 to 12 hours.  Wash  your hands with soap and water.     Remove the plug from the bulb.     Pour the fluid into a measuring cup.     Clean the plug with an alcohol swab or a cotton ball dipped in rubbing alcohol.     Squeeze the bulb flat and put the plug back in. The bulb should stay flat until it starts to fill with fluid again.     Measure the amount of fluid you pour out. Write down how much fluid you empty from the BRAYAN drain and the date and time you collected it.    Flush the fluid down the toilet. Wash your hands.    Clear clogged tubing:  Use the following steps to clear your Tarik-Martin tubing:  Hold the tubing between your thumb and first finger at the place closest to your skin. This hand will prevent the tube from being pulled out of your skin.     Use your other thumb and first finger to slide the clog down the tubing toward the bulb. You may have to repeat the sliding until the tubing is unclogged.    Tarik-Martin drain removal:  The amount of fluid that you drain will decrease as your wound heals. The BRAYAN drain usually is removed when less than 30 milliliters (2 tablespoons) is collected in 24 hours. Ask your healthcare provider when and how your BRAYAN drain will be removed.  Follow up with your doctor as directed:  Write down your questions so you remember to ask them during your visits.  © Copyright [employer] 2023 Information is for End User's use only and may not be sold, redistributed or otherwise used for commercial purposes.  The above information is an  only. It is not intended as medical advice for individual conditions or treatments. Talk to your doctor, nurse or pharmacist before following any medical regimen to see if it is safe and effective for you.

## 2023-12-22 NOTE — PROGRESS NOTES
Progress Note - General Surgery  : [facility]    Hayley Leeso 63 y.o. female MRN: [id number]  Unit/Bed#: E2 -02 Encounter: [id number]      Assessment:  63 y.o. female POD  s/p subtotal fenestrating lap twyla for acute cholecystitis + Jessica      Plan:  Regular diet  IVF off  Monitor BRAYAN output  Antacids  Scheduled multimodal analgesia  Likely stop IV abx  Anticipate d/c home today   Declines VNA  DVT ppx        Subjective: Sore but tolerated crackers      Objective:     Physical Exam:  GEN: NAD   Ab: Soft, appropriately mildly tender/ND, CDI, BRAYAN SS  Lung: Normal effort on RA  CV: RRR   Extrem: No CCE   Neuro: A+Ox3       I/O         12/19 0701 12/20 0700 12/20 0701 12/21 0700    I.V. (mL/kg)  2900 (31.7)    IV Piggyback  100    Total Intake(mL/kg)  3000 (32.8)    Urine (mL/kg/hr)  1100 (0.5)    Drains  30    Blood  600    Total Output  1730    Net  +1270                  Lab, Imaging and other studies: I have personally reviewed pertinent reports.  , CBC with diff:   Lab Results   Component Value Date    WBC 10.74 (H) 12/21/2023    HGB 10.0 (L) 12/21/2023    HCT 32.4 (L) 12/21/2023    MCV 90 12/21/2023     12/21/2023    RBC 3.62 (L) 12/21/2023    MCH 27.6 12/21/2023    MCHC 30.9 (L) 12/21/2023    RDW 14.8 12/21/2023    MPV 9.8 12/21/2023    NRBC 0 12/21/2023   , BMP/CMP:   Lab Results   Component Value Date    SODIUM 140 12/21/2023    K 3.2 (L) 12/21/2023     12/21/2023    CO2 28 12/21/2023    BUN 9 12/21/2023    CREATININE 0.71 12/21/2023    CALCIUM 8.9 12/21/2023    AST 85 (H) 12/21/2023    ALT 58 (H) 12/21/2023    ALKPHOS 71 12/21/2023    EGFR 92 12/21/2023         VTE Pharmacologic Prophylaxis: Heparin      Miguel Mcdonough MD  12/22/2023 6:20 AM

## 2023-12-22 NOTE — CASE MANAGEMENT
Case Management Discharge Planning Note    Patient name Hayley Rios  Location [facility] /E2 -* MRN [id number]  : 1960 Date 2023       Current Admission Date: 2023  Current Admission Diagnosis:Acute cholecystitis due to biliary calculus   Patient Active Problem List    Diagnosis Date Noted    Acute cholecystitis due to biliary calculus 2023    Muscle weakness of left upper extremity     Myofascial pain syndrome 2022    Iron deficiency 2022    Podagra 2021    Motion sickness     Recurrent incisional hernia     Macromastia 2020    Long-term current use of opiate analgesic 2020    Uncomplicated opioid dependence (HCC) 2020    Anemia 10/07/2020    Hypokalemia 10/07/2020    Recurrent umbilical hernia 2020    Lipoma of torso 2020    Sacroiliitis, not elsewhere classified (HCC)     Osteoarthritis of multiple joints 02/10/2020    Neck pain     Cervical spondylosis without myelopathy     Upper airway cough syndrome 2019    Chronic cough 2019    Vocal fold paresis, right 2019    Dysphonia 2019    Muscle tension dysphonia 2019    Glottic insufficiency 2019    Reflux laryngitis 2019    Laryngeal edema 2019    Allergic rhinitis due to [employer] dust mite 2019    Mild intermittent asthma without complication 2019    Laryngopharyngeal reflux (LPR) 2019    Dolan's esophagus with dysplasia 2019    Gastroesophageal reflux disease 2019    Prediabetes 2018    Vitamin D deficiency 2018    Lumbar radiculopathy 10/01/2018    Lumbar spondylosis     Environmental allergies 2018    S/P right knee arthroscopy 2018    Hernia of anterior abdominal wall 2018    Sleep disturbance 2017    Hyperlipidemia 2017    Primary osteoarthritis of left hip 2017    Restless legs syndrome (RLS) 2017    Chronic venous insufficiency  06/02/2017    Chronic low back pain 04/11/2017    Lumbar postlaminectomy syndrome 04/11/2017    Arthritis of lumbar spine 03/16/2017    Chronic pain syndrome 03/16/2017    Depression, recurrent (HCC) 03/16/2017      LOS (days): 3  Geometric Mean LOS (GMLOS) (days): 2.3  Days to GMLOS:-0.3     OBJECTIVE:  Risk of Unplanned Readmission Score: 18.74         Current admission status: Inpatient   Preferred Pharmacy:   [id number] - [place], [place] - [address] [facility]  [address] [facility] [place] [address]  Phone: [phone] Fax: [phone]    [facility] #[id number] - [facility] [address] [place] [address]  Phone: [phone] Fax: [phone]    Primary Care Provider: No primary care provider on file.    Primary Insurance: [employer]  Secondary Insurance:     DISCHARGE DETAILS:    Discharge planning discussed with:: Patient                           Requested Home Health Care         Is the patient interested in HHC at discharge?: No (no longer interested in home health)                   Treatment Team Recommendation: Home  Discharge Destination Plan:: Home

## 2023-12-22 NOTE — PLAN OF CARE

## 2023-12-22 NOTE — PLAN OF CARE

## 2023-12-22 NOTE — DISCHARGE SUMMARY
"Discharge Summary   Hayley Rios 63 y.o. female MRN: [id number]  Unit/Bed#: E2 -02 Encounter: [id number]    Admission Date: 12/19/2023     Discharge Date:12/22/23    Attending:Apryl Garcia MD     Consultants: None     Admitting Diagnosis:   Acute cholecystitis [K81.0]  Abdominal pain [R10.9]    Principle/ Secondary Diagnosis:  Past Medical History:   Diagnosis Date    Anemia     Anesthesia     \"sometimes low BP upon waking up\" pt states she stopped breathing 1 time during surgery    Arthritis     Asthma     Back pain     Back pain     Dolan's esophagus     Chronic pain disorder     Chronic venous insufficiency     Cough variant asthma     COVID-19 03/2020    Depression     Environmental allergies     dust    GERD (gastroesophageal reflux disease)     Hiatal hernia     History of anemia     History of fusion of spine for scoliosis     \"as a teenager\"    History of transfusion     1978 - no adverse reaction    Left lumbar radiculitis     Last Assessed: 25Jul2017    Lumbar postlaminectomy syndrome     Migraines     Morbid obesity (HCC)     Motion sickness     Neck pain     Osteoarthritis     of left hip    Right knee pain     Seasonal allergies     Shortness of breath     Umbilical hernia     Uses brace     right knee    Wears glasses      Past Surgical History:   Procedure Laterality Date    ARTHRODESIS      lumbar    ARTHRODESIS      Spinal Arthrodesis for Deformity; Last Assessed: 16Mar2017    BACK SURGERY      lumbar fusion,with nydia/screw and cage implant    BACK SURGERY      surgery for scoliosis    BREAST CYST EXCISION Right     benign    CHOLECYSTECTOMY LAPAROSCOPIC N/A 12/20/2023    Procedure: FENESTRATED SUBTOTAL CHOLECYSTECTOMY LAPAROSCOPIC, EXTENSIVE LYSIS OF ADHESIONS.;  Surgeon: Chelle Butler MD;  [facility];  Service: General    COLONOSCOPY      COLONOSCOPY N/A 3/14/2019    Procedure: COLONOSCOPY;  Surgeon: Van Dyson MD;  Location: [facility];  Service: Gastroenterology    " ESOPHAGOGASTRODUODENOSCOPY N/A 3/14/2019    Procedure: ESOPHAGOGASTRODUODENOSCOPY (EGD) W RFA(BARRX);  Surgeon: Van Dyson MD;  [facility];  Service: Gastroenterology    HERNIA REPAIR      umbilical hernia repair x2    ORTHOPEDIC SURGERY      PLANTAR FASCIA SURGERY Left     WI ARTHRS KNE SURG W/MENISCECTOMY MED/LAT W/SHVG Right 4/4/2018    Procedure: ARTHROSCOPY KNEE PARTIAL MEDIAL MENISECTOMY , CHONDROPLASTY;  Surgeon: Rocio Roe DO;  [facility];  Service: Orthopedics    WI BREAST REDUCTION Bilateral 3/12/2021    Procedure: BREAST REDUCTION;  Surgeon: Cortez Sandoval MD;  Location:  MAIN OR;  Service: Plastics    WI COLONOSCOPY FLX DX W/COLLJ SPEC WHEN PFRMD N/A 2/20/2018    Procedure: COLONOSCOPY with polypectomy;  Surgeon: Apryl Garcia MD;  [facility];  Service: General    WI ESOPHAGOGASTRODUODENOSCOPY TRANSORAL DIAGNOSTIC N/A 5/24/2017    Procedure: ESOPHAGOGASTRODUODENOSCOPY (EGD);  Surgeon: Marcello Street MD;  Location: [facility];  Service: Gastroenterology    WI ESOPHAGOGASTRODUODENOSCOPY TRANSORAL DIAGNOSTIC N/A 8/31/2017    Procedure: ESOPHAGOGASTRODUODENOSCOPY (EGD) W RFA;  Surgeon: Macho Aguayo MD;  Location:  GI LAB;  Service: Gastroenterology    WI LAPS RPR RECURRENT INCISIONAL HERNIA REDUCIBLE N/A 1/21/2021    Procedure: REPAIR HERNIA INCISIONAL LAPAROSCOPIC W/ ROBOTICS, with mesh;  Surgeon: Errol Bermudez MD;  [facility];  Service: General    WISDOM TOOTH EXTRACTION         Procedures Performed:     Procedure(s):  FENESTRATED SUBTOTAL CHOLECYSTECTOMY LAPAROSCOPIC, EXTENSIVE LYSIS OF ADHESIONS.    Laboratory data at discharge:   Results from last 7 days   Lab Units 12/21/23  0938 12/20/23  0607 12/19/23  1944 12/19/23  1316   WBC Thousand/uL 10.74* 10.09  --  12.69*   HEMOGLOBIN g/dL 10.0* 10.6*  --  12.8   HEMATOCRIT % 32.4* 34.7*  --  40.5   PLATELETS Thousands/uL 296 213 263 288     Results from last 7 days   Lab Units 12/21/23  0938  12/20/23  0607 12/19/23  1316   POTASSIUM mmol/L 3.2* 3.5 3.5   CHLORIDE mmol/L 105 104 101   CO2 mmol/L 28 26 29   BUN mg/dL 9 12 10   CREATININE mg/dL 0.71 0.66 0.61   CALCIUM mg/dL 8.9 8.5 9.5               Discharge instructions/Information to patient and family:   See after visit summary for information provided to patient and family.     Discharge Medications:  See after visit summary for reconciled discharge medications provided to patient and family.      HPI:   Hayley Rios is a 64 y/o female with chronic pain, OA, GERD, umbilical hernia repair, reducible LLQ recurrent hernia, presented to the ED with RUQ abdominal pain radiating to her back the night prior to presentation after eating snacks/party foods. She had one episode of non-bloody emesis prior to presentation. She reports similar previous episodes and was diagnosed with gallstones/told her gallbladder needs to be removed in her home state of [place]. She is here visiting [place] and spends time here frequently. Her RUQ ultrasound in the ED showed acute cholecystitis. She was admitted to surgical services with plan for cholecystectomy the following day on IVF and IV antibiotics ancef/flagyl.    Hospital Course:  Patient underwent subtotal laparoscopic cholecystectomy with extensive lysis of adhesions and BRAYAN drain placement 12/20/2023.  Further details can be found in the operative report.  She was extubated in the   OR and transferred to the PACU in stable condition for close vital sign monitoring and pain control. She was then transferred to the medical surgical unit for postoperative care. Her diet was gradually advanced and she is tolerating regular diet on discharge. Her antibiotics were discontinued 48h after source control of infection. She will follow up in the office in 1 week for drain removal.     Prior to discharge, the patient was given instructions for outpatient care and follow-up.  The patient has been instructed to call w/ any questions,  changes, or concerns for the medical condition.    For further details of the hospitalization, please refer to the medical record.    Condition at Discharge: good     Provisions for Follow-Up Care:  See after visit summary for information related to follow-up care and any pertinent home health orders.      Disposition: Home    Planned Readmission: No    Discharge Statement   I spent 20 minutes discharging the patient. This time was spent on the day of discharge. I had direct contact with the patient on the day of discharge. Additional documentation is required if more than 30 minutes were spent on discharge.     Ilan Monge PA-C  12/22/2023      This text is generated with voice recognition software. There may be translation, syntax,  or grammatical errors. If you have any questions, please contact the dictating provider.

## 2023-12-23 ENCOUNTER — NURSE TRIAGE (OUTPATIENT)
Dept: OTHER | Facility: OTHER | Age: 63
End: 2023-12-23

## 2023-12-23 NOTE — TELEPHONE ENCOUNTER
"Regarding: post op surgery  ----- Message from Aimee Elise sent at 12/23/2023  3:26 PM EST -----  Pt called back and is concerned about the amount of drainage she does not have. Was wondering the wait time.    ----- Message from Savita Moyer sent at 12/23/2023 11:55 AM EST -----  \"I had surgery on Thursday for Acute cholecystitis, and I was told to keep track of the drainage. But I do not see it draining. Should I be concerned?\"    "

## 2023-12-27 ENCOUNTER — OFFICE VISIT (OUTPATIENT)
Dept: SURGERY | Facility: CLINIC | Age: 63
End: 2023-12-27

## 2023-12-27 VITALS
HEIGHT: 64 IN | BODY MASS INDEX: 34.31 KG/M2 | OXYGEN SATURATION: 97 % | TEMPERATURE: 99.2 F | HEART RATE: 90 BPM | WEIGHT: 201 LBS

## 2023-12-27 DIAGNOSIS — K80.00 ACUTE CHOLECYSTITIS DUE TO BILIARY CALCULUS: Primary | ICD-10-CM

## 2023-12-27 DIAGNOSIS — K81.0 ACUTE CHOLECYSTITIS: ICD-10-CM

## 2023-12-27 PROCEDURE — 88304 TISSUE EXAM BY PATHOLOGIST: CPT | Performed by: PATHOLOGY

## 2023-12-27 PROCEDURE — 99024 POSTOP FOLLOW-UP VISIT: CPT | Performed by: SPECIALIST

## 2023-12-27 RX ORDER — SIMVASTATIN 20 MG
TABLET ORAL
COMMUNITY

## 2023-12-27 RX ORDER — OXYCODONE HYDROCHLORIDE AND ACETAMINOPHEN 5; 325 MG/1; MG/1
1 TABLET ORAL EVERY 6 HOURS PRN
Qty: 20 TABLET | Refills: 0 | Status: SHIPPED | OUTPATIENT
Start: 2023-12-27

## 2023-12-27 NOTE — PROGRESS NOTES
"Hayley presents today for follow-up visit status post laparoscopic subtotal cholecystectomy for severe acute calculus cholecystitis.  This was performed by Dr. Butler and a drain was placed at that time.    Hayley presents today for follow-up visit for possible drain removal.  Nothing has drained through the drain almost since it was placed.  She originally said that some yellowish serous drainage drained around the drain.  Within the direct old dried blood but other than that nothing.    She is tolerating her diet.  Her bowels are not moving great.    Physical exam middle-aged white female awake alert no distress    Abdomen there are multiple relatively new laparoscopic incisions noted and also an old midline incision from a previous laparotomy.  She appears to have a left lower quadrant abdominal wall hernia.  There is a drain exiting from the right upper quadrant.  Dried blood is noted within the tubing and the drain itself.  The drain is milked down toward the bulb and no additional drainage is noted.  At that point the suture is cut and the drain is removed pathology.  A dressing is applied.  She tolerates it well.    Impression: Status post subtotal cholecystectomy with drain placement for severe acute and chronic calculus cholecystitis.    Plan: She had a follow-up appointment scheduled for next week and at least at this point we can cancel it.  She is told to take some milk of magnesia in regards to her \"constipation \".  If she has any problems she of course to call the office.    It was a pleasure to meet her.  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

## 2024-01-02 ENCOUNTER — OFFICE VISIT (OUTPATIENT)
Dept: SURGERY | Facility: CLINIC | Age: 64
End: 2024-01-02

## 2024-01-02 VITALS
WEIGHT: 201 LBS | HEIGHT: 64 IN | TEMPERATURE: 99.2 F | HEART RATE: 85 BPM | OXYGEN SATURATION: 98 % | BODY MASS INDEX: 34.31 KG/M2

## 2024-01-02 DIAGNOSIS — B36.9 FUNGAL DERMATITIS: Primary | ICD-10-CM

## 2024-01-02 DIAGNOSIS — Z98.890 POST-OPERATIVE STATE: ICD-10-CM

## 2024-01-02 PROCEDURE — 99024 POSTOP FOLLOW-UP VISIT: CPT | Performed by: SURGERY

## 2024-01-02 RX ORDER — TRAMADOL HYDROCHLORIDE 50 MG/1
50 TABLET ORAL EVERY 6 HOURS PRN
COMMUNITY
Start: 2023-12-04

## 2024-01-02 RX ORDER — NYSTATIN 100000 [USP'U]/G
POWDER TOPICAL 3 TIMES DAILY
Qty: 15 G | Refills: 0 | Status: SHIPPED | OUTPATIENT
Start: 2024-01-02 | End: 2024-01-09

## 2024-01-02 RX ORDER — ONDANSETRON HYDROCHLORIDE 8 MG/1
TABLET, FILM COATED ORAL
COMMUNITY
Start: 2023-11-18

## 2024-01-02 RX ORDER — PANTOPRAZOLE SODIUM 40 MG/1
40 TABLET, DELAYED RELEASE ORAL DAILY
COMMUNITY
Start: 2023-11-21

## 2024-01-02 RX ORDER — GABAPENTIN 600 MG/1
TABLET ORAL
COMMUNITY
Start: 2023-11-18

## 2024-01-02 NOTE — PROGRESS NOTES
Assessment/Plan:  Status post cholecystectomy with extensive lysis of adhesions.  She is doing well, discussed findings in the operating room and the recovery period.  She has the left lower quadrant hernia, findings in the OR with no contents there was a loop of small intestine that was adherent to the edge of the fascial defect but nothing within the hernia itself.  This was not covered by mesh from a previous procedure.  She desires to have this repaired and would be willing to return to [facility] to have this fixed.  Discussed plan for the procedure given the extensive adhesions in her abdomen I would like to avoid any further laparoscopic procedure and we will plan on fixing this open with mesh that would be extraperitoneal if possible.  Will have her return in around 6 weeks to discuss surgery and schedule.    No problem-specific Assessment & Plan notes found for this encounter.       Diagnoses and all orders for this visit:    Fungal dermatitis  -     nystatin (MYCOSTATIN) powder; Apply topically 3 (three) times a day for 7 days    Post-operative state    Other orders  -     ondansetron (ZOFRAN) 8 mg tablet  -     pantoprazole (PROTONIX) 40 mg tablet; Take 40 mg by mouth daily  -     gabapentin (NEURONTIN) 600 MG tablet  -     traMADol (ULTRAM) 50 mg tablet; Take 50 mg by mouth every 6 (six) hours as needed (Patient not taking: Reported on 1/2/2024)          Subjective:      Patient ID: Hayley Rios is a 63 y.o. female.    She presents for postop status post laparoscopic subtotal cholecystectomy with extensive lysis of adhesions.  Drain was removed last week as there was no drainage.  Drainage that had been present after the surgery was serous, never bilious or bloody.  She had a sharp type pain in the right posterior ribs of her mid back that has gotten slightly better.  No nausea or vomiting.  Had an episode of loose stools after eating something that did not agree with her but otherwise  "has not had persistent loose stools.  She developed a rash on her abdomen a few days ago.        The following portions of the patient's history were reviewed and updated as appropriate: allergies, current medications, past family history, past medical history, past social history, past surgical history, and problem list.    Review of Systems   Gastrointestinal:  Positive for diarrhea. Negative for abdominal pain.   All other systems reviewed and are negative.        Objective:      Pulse 85   Temp 99.2 °F (37.3 °C) (Tympanic)   Ht 5' 4\" (1.626 m)   Wt 91.2 kg (201 lb)   SpO2 98%   BMI 34.50 kg/m²          Physical Exam  Vitals reviewed.   Constitutional:       Appearance: Normal appearance. She is obese.   Abdominal:       Neurological:      Mental Status: She is alert.           "

## 2024-02-14 ENCOUNTER — OFFICE VISIT (OUTPATIENT)
Dept: SURGERY | Facility: CLINIC | Age: 64
End: 2024-02-14
Payer: COMMERCIAL

## 2024-02-14 VITALS
HEIGHT: 64 IN | WEIGHT: 201 LBS | HEART RATE: 90 BPM | BODY MASS INDEX: 34.31 KG/M2 | OXYGEN SATURATION: 98 % | TEMPERATURE: 97 F

## 2024-02-14 DIAGNOSIS — K43.9 VENTRAL HERNIA WITHOUT OBSTRUCTION OR GANGRENE: Primary | ICD-10-CM

## 2024-02-14 PROCEDURE — 99214 OFFICE O/P EST MOD 30 MIN: CPT | Performed by: SURGERY

## 2024-02-14 RX ORDER — HYDROXYZINE HYDROCHLORIDE 25 MG/1
25 TABLET, FILM COATED ORAL EVERY 8 HOURS PRN
Status: ON HOLD | COMMUNITY
Start: 2024-01-25 | End: 2024-02-19

## 2024-02-14 RX ORDER — LORATADINE 10 MG/1
10 TABLET ORAL DAILY
Status: ON HOLD | COMMUNITY
Start: 2024-01-25 | End: 2024-02-19

## 2024-02-14 RX ORDER — FAMOTIDINE 40 MG/1
40 TABLET, FILM COATED ORAL
Status: ON HOLD | COMMUNITY
Start: 2024-01-25 | End: 2024-02-19

## 2024-02-15 RX ORDER — HEPARIN SODIUM 5000 [USP'U]/ML
5000 INJECTION, SOLUTION INTRAVENOUS; SUBCUTANEOUS ONCE
Status: CANCELLED | OUTPATIENT
Start: 2024-02-19 | End: 2024-02-15

## 2024-02-15 RX ORDER — OMEPRAZOLE 20 MG/1
20 TABLET, DELAYED RELEASE ORAL DAILY
COMMUNITY

## 2024-02-15 RX ORDER — CEFAZOLIN SODIUM 2 G/50ML
2000 SOLUTION INTRAVENOUS ONCE
Status: CANCELLED | OUTPATIENT
Start: 2024-02-19 | End: 2024-02-15

## 2024-02-15 NOTE — PROGRESS NOTES
Assessment/Plan:  3 cm left lower quadrant ventral hernia, not at site of previous incision so unlikely incisional hernia or recurrence, there is no mesh in this area from prior hernia repairs.  Given the significant intra-abdominal adhesions present during cholecystectomy and multiple meshes placed both open and laparoscopically recommended an open ventral hernia repair with mesh.  Will attempt to place mesh preperitoneal if possible.  Risks of procedure were explained including bleeding, infection, poor wound healing, damage to surrounding structures and recurrence.  Informed consent was obtained.  Will schedule for early next week, discussed expected activity restrictions and postop follow-up.    No problem-specific Assessment & Plan notes found for this encounter.       Diagnoses and all orders for this visit:    Ventral hernia without obstruction or gangrene    Other orders  -     hydrOXYzine HCL (ATARAX) 25 mg tablet; Take 25 mg by mouth every 8 (eight) hours as needed  -     loratadine (CLARITIN) 10 mg tablet; Take 10 mg by mouth daily  -     famotidine (PEPCID) 40 MG tablet; Take 40 mg by mouth daily at bedtime  -     ceFAZolin (ANCEF) 2,000 mg in dextrose 5 % 100 mL IVPB  -     heparin (porcine) subcutaneous injection 5,000 Units  -     Apply SCD or Foot pumps; Standing          Subjective:      Patient ID: Hayley Rios is a 63 y.o. female.    She presents for preop and evaluation of her left lower quadrant abdominal wall hernia.  She has been doing well status post laparoscopic fenestrated cholecystectomy and extensive lysis of adhesions, denies any nausea vomiting constipation loose stools or abdominal pain.  She feels the left lower quadrant hernia has gotten bigger.  Last CT abdomen pelvis report was March 2023 which was prior to her midline abdominal wall hernia repair and does not comment on the presence of the left lower quadrant hernia.  Intraoperatively she was found to have a small abdominal wall  hernia with no contents though there was a loop of small intestine that was adherent to the abdominal wall at the rim of the defect.  There were no other adhesions in this region.     A chart review was performed and previous primary care visit notes were reviewed.  All applicable imaging studies were reviewed and images were reviewed personally.  All applicable laboratory studies were reviewed personally.  Care everywhere review was performed if  available and all pertinent notes were reviewed.      The following portions of the patient's history were reviewed and updated as appropriate: She  has a past medical history of Anemia, Anesthesia, Arthritis, Asthma, Back pain, Back pain, Dolan's esophagus, Chronic pain disorder, Chronic venous insufficiency, Cough variant asthma, COVID-19 (03/2020), Depression, Environmental allergies, GERD (gastroesophageal reflux disease), Hiatal hernia, History of anemia, History of fusion of spine for scoliosis, History of transfusion, Left lumbar radiculitis, Lumbar postlaminectomy syndrome, Migraines, Morbid obesity (HCC), Motion sickness, Neck pain, Osteoarthritis, Right knee pain, Seasonal allergies, Shortness of breath, Umbilical hernia, Uses brace, and Wears glasses.  She   Patient Active Problem List    Diagnosis Date Noted    Muscle weakness of left upper extremity     Myofascial pain syndrome 07/20/2022    Iron deficiency 07/19/2022    Podagra 05/18/2021    Motion sickness     Recurrent incisional hernia     Macromastia 12/21/2020    Long-term current use of opiate analgesic 12/21/2020    Uncomplicated opioid dependence (HCC) 12/21/2020    Anemia 10/07/2020    Hypokalemia 10/07/2020    Recurrent umbilical hernia 09/22/2020    Lipoma of torso 08/27/2020    Sacroiliitis, not elsewhere classified (HCC)     Osteoarthritis of multiple joints 02/10/2020    Neck pain     Cervical spondylosis without myelopathy     Upper airway cough syndrome 12/03/2019    Chronic cough 11/03/2019     Vocal fold paresis, right 11/03/2019    Dysphonia 11/03/2019    Muscle tension dysphonia 11/03/2019    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    Reflux laryngitis 11/03/2019    Laryngeal edema 11/03/2019    Allergic rhinitis due to [employer] house dust mite 11/03/2019    Mild intermittent asthma without complication 11/03/2019    Laryngopharyngeal reflux (LPR) 08/23/2019    Dolan's esophagus with dysplasia 02/12/2019    Gastroesophageal reflux disease 02/12/2019    Prediabetes 12/12/2018    Vitamin D deficiency 12/12/2018    Lumbar radiculopathy 10/01/2018    Lumbar spondylosis     Environmental allergies 06/21/2018    S/P right knee arthroscopy 05/14/2018    Hernia of anterior abdominal wall 02/05/2018    Sleep disturbance 11/16/2017    Hyperlipidemia 08/11/2017    Primary osteoarthritis of left hip 07/26/2017    Restless legs syndrome (RLS) 07/11/2017    Chronic venous insufficiency 06/02/2017    Chronic low back pain 04/11/2017    Lumbar postlaminectomy syndrome 04/11/2017    Arthritis of lumbar spine 03/16/2017    Chronic pain syndrome 03/16/2017    Depression, recurrent (HCC) 03/16/2017     She  has a past surgical history that includes Arthrodesis; pr esophagogastroduodenoscopy transoral diagnostic (N/A, 5/24/2017); pr esophagogastroduodenoscopy transoral diagnostic (N/A, 8/31/2017); Arthrodesis; pr colonoscopy flx dx w/collj spec when pfrmd (N/A, 2/20/2018); [place] tooth extraction; Plantar fascia surgery (Left); Colonoscopy; pr arthrs kne surg w/meniscectomy med/lat w/shvg (Right, 4/4/2018); Back surgery; Back surgery; Esophagogastroduodenoscopy (N/A, 3/14/2019); Colonoscopy (N/A, 3/14/2019); Breast cyst excision (Right); orthopedic surgery; pr laps rpr recurrent incisional hernia reducible (N/A, 1/21/2021); Hernia repair; pr breast reduction (Bilateral, 3/12/2021); and CHOLECYSTECTOMY LAPAROSCOPIC (N/A, 12/20/2023).  Her family history includes Alcohol abuse in her father; Breast cancer in her maternal  grandmother; Cancer in her mother; Depression in her father; Diabetes type I in her other; Heart attack in her father; Hypertension in her father; Leukemia in her paternal grandmother; Lung cancer in her maternal grandmother and mother; No Known Problems in her brother, maternal aunt, maternal grandfather, paternal aunt, paternal grandfather, sister, and sister; Other in her father.  She  reports that she has never smoked. She has never used smokeless tobacco. She reports that she does not currently use alcohol. She reports that she does not use drugs.  Current Outpatient Medications   Medication Sig Dispense Refill    dexlansoprazole (Dexilant) 60 MG capsule Take 1 capsule (60 mg total) by mouth daily in the early morning 30 capsule 1    DULoxetine (CYMBALTA) 60 mg delayed release capsule Take 1 capsule (60 mg total) by mouth daily 180 capsule 0    famotidine (PEPCID) 40 MG tablet Take 40 mg by mouth daily at bedtime      hydrOXYzine HCL (ATARAX) 25 mg tablet Take 25 mg by mouth every 8 (eight) hours as needed      loratadine (CLARITIN) 10 mg tablet Take 10 mg by mouth daily      nystatin (MYCOSTATIN) powder Apply topically 3 (three) times a day for 7 days 15 g 0    pantoprazole (PROTONIX) 40 mg tablet Take 40 mg by mouth daily      simvastatin (ZOCOR) 20 mg tablet simvastatin Take (oral) No date recorded tablet No frequency recorded oral No set duration recorded No set duration amount recorded active 20 mg      VITAMIN D PO Take 1 capsule by mouth daily      gabapentin (NEURONTIN) 300 mg capsule Take 300 mg by mouth 3 (three) times a day (Patient not taking: Reported on 12/27/2023)      gabapentin (NEURONTIN) 600 MG tablet  (Patient not taking: Reported on 2/14/2024)      ondansetron (Zofran ODT) 4 mg disintegrating tablet Take 1 tablet (4 mg total) by mouth every 6 (six) hours as needed for nausea or vomiting for up to 10 doses (Patient not taking: Reported on 1/2/2024) 10 tablet 0    ondansetron (ZOFRAN) 8 mg  "tablet  (Patient not taking: Reported on 2/14/2024)      oxyCODONE-acetaminophen (Percocet) 5-325 mg per tablet Take 1 tablet by mouth every 6 (six) hours as needed for moderate pain Max Daily Amount: 4 tablets (Patient not taking: Reported on 2/14/2024) 20 tablet 0    pramipexole (MIRAPEX) 0.25 mg tablet TAKE 2 TABLETS BY MOUTH DAILY AT BEDTIME (Patient not taking: Reported on 2/14/2024) 180 tablet 0    traMADol (ULTRAM) 50 mg tablet Take 50 mg by mouth every 6 (six) hours as needed (Patient not taking: Reported on 1/2/2024)      traMADol HCl 100 MG TABS Take 100 mg by mouth every 8 (eight) hours as needed (Chronic pain) (Patient not taking: Reported on 12/27/2023) 90 tablet 2     No current facility-administered medications for this visit.     She is allergic to dust mite extract, latex, other, and sulfa antibiotics..    Review of Systems   Gastrointestinal:  Positive for abdominal distention. Negative for abdominal pain, constipation, diarrhea, nausea and vomiting.   All other systems reviewed and are negative.        Objective:      Pulse 90   Temp (!) 97 °F (36.1 °C) (Tympanic)   Ht 5' 4\" (1.626 m)   Wt 91.2 kg (201 lb)   SpO2 98%   BMI 34.50 kg/m²          Physical Exam  Vitals reviewed.   Constitutional:       General: She is not in acute distress.     Appearance: Normal appearance. She is obese.   HENT:      Head: Normocephalic and atraumatic.      Nose: Nose normal.      Mouth/Throat:      Mouth: Mucous membranes are moist.      Pharynx: Oropharynx is clear.   Eyes:      Extraocular Movements: Extraocular movements intact.      Conjunctiva/sclera: Conjunctivae normal.      Pupils: Pupils are equal, round, and reactive to light.   Cardiovascular:      Rate and Rhythm: Normal rate and regular rhythm.      Heart sounds: Normal heart sounds.   Pulmonary:      Effort: Pulmonary effort is normal. No respiratory distress.      Breath sounds: Normal breath sounds.   Abdominal:      General: Abdomen is flat.    "   Palpations: Abdomen is soft.      Tenderness: There is no abdominal tenderness. There is no guarding or rebound.      Hernia: A hernia is present.       Musculoskeletal:         General: No swelling or tenderness. Normal range of motion.      Cervical back: Normal range of motion and neck supple.   Skin:     General: Skin is warm and dry.   Neurological:      General: No focal deficit present.      Mental Status: She is alert and oriented to person, place, and time.

## 2024-02-15 NOTE — H&P (VIEW-ONLY)
Assessment/Plan:  3 cm left lower quadrant ventral hernia, not at site of previous incision so unlikely incisional hernia or recurrence, there is no mesh in this area from prior hernia repairs.  Given the significant intra-abdominal adhesions present during cholecystectomy and multiple meshes placed both open and laparoscopically recommended an open ventral hernia repair with mesh.  Will attempt to place mesh preperitoneal if possible.  Risks of procedure were explained including bleeding, infection, poor wound healing, damage to surrounding structures and recurrence.  Informed consent was obtained.  Will schedule for early next week, discussed expected activity restrictions and postop follow-up.    No problem-specific Assessment & Plan notes found for this encounter.       Diagnoses and all orders for this visit:    Ventral hernia without obstruction or gangrene    Other orders  -     hydrOXYzine HCL (ATARAX) 25 mg tablet; Take 25 mg by mouth every 8 (eight) hours as needed  -     loratadine (CLARITIN) 10 mg tablet; Take 10 mg by mouth daily  -     famotidine (PEPCID) 40 MG tablet; Take 40 mg by mouth daily at bedtime  -     ceFAZolin (ANCEF) 2,000 mg in dextrose 5 % 100 mL IVPB  -     heparin (porcine) subcutaneous injection 5,000 Units  -     Apply SCD or Foot pumps; Standing          Subjective:      Patient ID: Hayley Rios is a 63 y.o. female.    She presents for preop and evaluation of her left lower quadrant abdominal wall hernia.  She has been doing well status post laparoscopic fenestrated cholecystectomy and extensive lysis of adhesions, denies any nausea vomiting constipation loose stools or abdominal pain.  She feels the left lower quadrant hernia has gotten bigger.  Last CT abdomen pelvis report was March 2023 which was prior to her midline abdominal wall hernia repair and does not comment on the presence of the left lower quadrant hernia.  Intraoperatively she was found to have a small abdominal wall  hernia with no contents though there was a loop of small intestine that was adherent to the abdominal wall at the rim of the defect.  There were no other adhesions in this region.     A chart review was performed and previous primary care visit notes were reviewed.  All applicable imaging studies were reviewed and images were reviewed personally.  All applicable laboratory studies were reviewed personally.  Care everywhere review was performed if  available and all pertinent notes were reviewed.      The following portions of the patient's history were reviewed and updated as appropriate: She  has a past medical history of Anemia, Anesthesia, Arthritis, Asthma, Back pain, Back pain, Dolan's esophagus, Chronic pain disorder, Chronic venous insufficiency, Cough variant asthma, COVID-19 (03/2020), Depression, Environmental allergies, GERD (gastroesophageal reflux disease), Hiatal hernia, History of anemia, History of fusion of spine for scoliosis, History of transfusion, Left lumbar radiculitis, Lumbar postlaminectomy syndrome, Migraines, Morbid obesity (HCC), Motion sickness, Neck pain, Osteoarthritis, Right knee pain, Seasonal allergies, Shortness of breath, Umbilical hernia, Uses brace, and Wears glasses.  She   Patient Active Problem List    Diagnosis Date Noted    Muscle weakness of left upper extremity     Myofascial pain syndrome 07/20/2022    Iron deficiency 07/19/2022    Podagra 05/18/2021    Motion sickness     Recurrent incisional hernia     Macromastia 12/21/2020    Long-term current use of opiate analgesic 12/21/2020    Uncomplicated opioid dependence (HCC) 12/21/2020    Anemia 10/07/2020    Hypokalemia 10/07/2020    Recurrent umbilical hernia 09/22/2020    Lipoma of torso 08/27/2020    Sacroiliitis, not elsewhere classified (HCC)     Osteoarthritis of multiple joints 02/10/2020    Neck pain     Cervical spondylosis without myelopathy     Upper airway cough syndrome 12/03/2019    Chronic cough 11/03/2019     Vocal fold paresis, right 11/03/2019    Dysphonia 11/03/2019    Muscle tension dysphonia 11/03/2019    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    Reflux laryngitis 11/03/2019    Laryngeal edema 11/03/2019    Allergic rhinitis due to [employer] house dust mite 11/03/2019    Mild intermittent asthma without complication 11/03/2019    Laryngopharyngeal reflux (LPR) 08/23/2019    Dolan's esophagus with dysplasia 02/12/2019    Gastroesophageal reflux disease 02/12/2019    Prediabetes 12/12/2018    Vitamin D deficiency 12/12/2018    Lumbar radiculopathy 10/01/2018    Lumbar spondylosis     Environmental allergies 06/21/2018    S/P right knee arthroscopy 05/14/2018    Hernia of anterior abdominal wall 02/05/2018    Sleep disturbance 11/16/2017    Hyperlipidemia 08/11/2017    Primary osteoarthritis of left hip 07/26/2017    Restless legs syndrome (RLS) 07/11/2017    Chronic venous insufficiency 06/02/2017    Chronic low back pain 04/11/2017    Lumbar postlaminectomy syndrome 04/11/2017    Arthritis of lumbar spine 03/16/2017    Chronic pain syndrome 03/16/2017    Depression, recurrent (HCC) 03/16/2017     She  has a past surgical history that includes Arthrodesis; pr esophagogastroduodenoscopy transoral diagnostic (N/A, 5/24/2017); pr esophagogastroduodenoscopy transoral diagnostic (N/A, 8/31/2017); Arthrodesis; pr colonoscopy flx dx w/collj spec when pfrmd (N/A, 2/20/2018); [place] tooth extraction; Plantar fascia surgery (Left); Colonoscopy; pr arthrs kne surg w/meniscectomy med/lat w/shvg (Right, 4/4/2018); Back surgery; Back surgery; Esophagogastroduodenoscopy (N/A, 3/14/2019); Colonoscopy (N/A, 3/14/2019); Breast cyst excision (Right); orthopedic surgery; pr laps rpr recurrent incisional hernia reducible (N/A, 1/21/2021); Hernia repair; pr breast reduction (Bilateral, 3/12/2021); and CHOLECYSTECTOMY LAPAROSCOPIC (N/A, 12/20/2023).  Her family history includes Alcohol abuse in her father; Breast cancer in her maternal  grandmother; Cancer in her mother; Depression in her father; Diabetes type I in her other; Heart attack in her father; Hypertension in her father; Leukemia in her paternal grandmother; Lung cancer in her maternal grandmother and mother; No Known Problems in her brother, maternal aunt, maternal grandfather, paternal aunt, paternal grandfather, sister, and sister; Other in her father.  She  reports that she has never smoked. She has never used smokeless tobacco. She reports that she does not currently use alcohol. She reports that she does not use drugs.  Current Outpatient Medications   Medication Sig Dispense Refill    dexlansoprazole (Dexilant) 60 MG capsule Take 1 capsule (60 mg total) by mouth daily in the early morning 30 capsule 1    DULoxetine (CYMBALTA) 60 mg delayed release capsule Take 1 capsule (60 mg total) by mouth daily 180 capsule 0    famotidine (PEPCID) 40 MG tablet Take 40 mg by mouth daily at bedtime      hydrOXYzine HCL (ATARAX) 25 mg tablet Take 25 mg by mouth every 8 (eight) hours as needed      loratadine (CLARITIN) 10 mg tablet Take 10 mg by mouth daily      nystatin (MYCOSTATIN) powder Apply topically 3 (three) times a day for 7 days 15 g 0    pantoprazole (PROTONIX) 40 mg tablet Take 40 mg by mouth daily      simvastatin (ZOCOR) 20 mg tablet simvastatin Take (oral) No date recorded tablet No frequency recorded oral No set duration recorded No set duration amount recorded active 20 mg      VITAMIN D PO Take 1 capsule by mouth daily      gabapentin (NEURONTIN) 300 mg capsule Take 300 mg by mouth 3 (three) times a day (Patient not taking: Reported on 12/27/2023)      gabapentin (NEURONTIN) 600 MG tablet  (Patient not taking: Reported on 2/14/2024)      ondansetron (Zofran ODT) 4 mg disintegrating tablet Take 1 tablet (4 mg total) by mouth every 6 (six) hours as needed for nausea or vomiting for up to 10 doses (Patient not taking: Reported on 1/2/2024) 10 tablet 0    ondansetron (ZOFRAN) 8 mg  "tablet  (Patient not taking: Reported on 2/14/2024)      oxyCODONE-acetaminophen (Percocet) 5-325 mg per tablet Take 1 tablet by mouth every 6 (six) hours as needed for moderate pain Max Daily Amount: 4 tablets (Patient not taking: Reported on 2/14/2024) 20 tablet 0    pramipexole (MIRAPEX) 0.25 mg tablet TAKE 2 TABLETS BY MOUTH DAILY AT BEDTIME (Patient not taking: Reported on 2/14/2024) 180 tablet 0    traMADol (ULTRAM) 50 mg tablet Take 50 mg by mouth every 6 (six) hours as needed (Patient not taking: Reported on 1/2/2024)      traMADol HCl 100 MG TABS Take 100 mg by mouth every 8 (eight) hours as needed (Chronic pain) (Patient not taking: Reported on 12/27/2023) 90 tablet 2     No current facility-administered medications for this visit.     She is allergic to dust mite extract, latex, other, and sulfa antibiotics..    Review of Systems   Gastrointestinal:  Positive for abdominal distention. Negative for abdominal pain, constipation, diarrhea, nausea and vomiting.   All other systems reviewed and are negative.        Objective:      Pulse 90   Temp (!) 97 °F (36.1 °C) (Tympanic)   Ht 5' 4\" (1.626 m)   Wt 91.2 kg (201 lb)   SpO2 98%   BMI 34.50 kg/m²          Physical Exam  Vitals reviewed.   Constitutional:       General: She is not in acute distress.     Appearance: Normal appearance. She is obese.   HENT:      Head: Normocephalic and atraumatic.      Nose: Nose normal.      Mouth/Throat:      Mouth: Mucous membranes are moist.      Pharynx: Oropharynx is clear.   Eyes:      Extraocular Movements: Extraocular movements intact.      Conjunctiva/sclera: Conjunctivae normal.      Pupils: Pupils are equal, round, and reactive to light.   Cardiovascular:      Rate and Rhythm: Normal rate and regular rhythm.      Heart sounds: Normal heart sounds.   Pulmonary:      Effort: Pulmonary effort is normal. No respiratory distress.      Breath sounds: Normal breath sounds.   Abdominal:      General: Abdomen is flat.    "   Palpations: Abdomen is soft.      Tenderness: There is no abdominal tenderness. There is no guarding or rebound.      Hernia: A hernia is present.       Musculoskeletal:         General: No swelling or tenderness. Normal range of motion.      Cervical back: Normal range of motion and neck supple.   Skin:     General: Skin is warm and dry.   Neurological:      General: No focal deficit present.      Mental Status: She is alert and oriented to person, place, and time.

## 2024-02-15 NOTE — PRE-PROCEDURE INSTRUCTIONS
Pre-Surgery Instructions:   Medication Instructions    DULoxetine (CYMBALTA) 60 mg delayed release capsule Take day of surgery.    nystatin (MYCOSTATIN) powder Hold day of surgery.    omeprazole (PriLOSEC OTC) 20 MG tablet Take day of surgery.    pramipexole (MIRAPEX) 0.25 mg tablet Take night before surgery    simvastatin (ZOCOR) 20 mg tablet Take day of surgery.    VITAMIN D PO Stop taking 4 days prior to surgery.   Medication instructions for day surgery reviewed. Please use only a sip of water to take your instructed medications. Avoid all over the counter vitamins, supplements and NSAIDS for one week prior to surgery per anesthesia guidelines. Tylenol is ok to take as needed.     You will receive a call one business day prior to surgery with an arrival time and hospital directions. If your surgery is scheduled on a Monday, the hospital will be calling you on the Friday prior to your surgery. If you have not heard from anyone by 8pm, please call the hospital supervisor through the hospital  at [phone]. ([place] [phone] or [place] [phone]).    Do not eat or drink anything after midnight the night before your surgery, including candy, mints, lifesavers, or chewing gum. Do not drink alcohol 24hrs before your surgery. Try not to smoke at least 24hrs before your surgery.       Follow the pre surgery showering instructions as listed in the “My Surgical Experience Booklet” or otherwise provided by your surgeon's office. Do not use a blade to shave the surgical area 1 week before surgery. It is okay to use a clean electric clippers up to 24 hours before surgery. Do not apply any lotions, creams, including makeup, cologne, deodorant, or perfumes after showering on the day of your surgery. Do not use dry shampoo, hair spray, hair gel, or any type of hair products.     No contact lenses, eye make-up, or artificial eyelashes. Remove nail polish, including gel polish, and any artificial, gel, or  acrylic nails if possible. Remove all jewelry including rings and body piercing jewelry.     Wear causal clothing that is easy to take on and off. Consider your type of surgery.    Keep any valuables, jewelry, piercings at home. Please bring any specially ordered equipment (sling, braces) if indicated.    Arrange for a responsible person to drive you to and from the hospital on the day of your surgery. Visitor Guidelines discussed.     Call the surgeon's office with any new illnesses, exposures, or additional questions prior to surgery.    Please reference your “My Surgical Experience Booklet” for additional information to prepare for your upcoming surgery.

## 2024-02-19 ENCOUNTER — ANESTHESIA (OUTPATIENT)
Dept: PERIOP | Facility: HOSPITAL | Age: 64
End: 2024-02-19
Payer: COMMERCIAL

## 2024-02-19 ENCOUNTER — ANESTHESIA EVENT (OUTPATIENT)
Dept: PERIOP | Facility: HOSPITAL | Age: 64
End: 2024-02-19
Payer: COMMERCIAL

## 2024-02-19 ENCOUNTER — HOSPITAL ENCOUNTER (OUTPATIENT)
Facility: HOSPITAL | Age: 64
Setting detail: OUTPATIENT SURGERY
Discharge: HOME | End: 2024-02-19
Attending: SURGERY | Admitting: SURGERY
Payer: COMMERCIAL

## 2024-02-19 VITALS
DIASTOLIC BLOOD PRESSURE: 67 MMHG | BODY MASS INDEX: 34.31 KG/M2 | SYSTOLIC BLOOD PRESSURE: 146 MMHG | HEIGHT: 64 IN | HEART RATE: 78 BPM | RESPIRATION RATE: 18 BRPM | OXYGEN SATURATION: 94 % | WEIGHT: 201 LBS | TEMPERATURE: 98.3 F

## 2024-02-19 DIAGNOSIS — K43.9 VENTRAL HERNIA WITHOUT OBSTRUCTION OR GANGRENE: Primary | ICD-10-CM

## 2024-02-19 PROCEDURE — 49593 RPR AA HRN 1ST 3-10 RDC: CPT | Performed by: PHYSICIAN ASSISTANT

## 2024-02-19 PROCEDURE — C1781 MESH (IMPLANTABLE): HCPCS | Performed by: SURGERY

## 2024-02-19 PROCEDURE — 49593 RPR AA HRN 1ST 3-10 RDC: CPT | Performed by: SURGERY

## 2024-02-19 DEVICE — VENTRALEX ST HERNIA PATCH
Type: IMPLANTABLE DEVICE | Site: ABDOMEN | Status: FUNCTIONAL
Brand: VENTRALEX ST HERNIA PATCH

## 2024-02-19 RX ORDER — ONDANSETRON 2 MG/ML
INJECTION INTRAMUSCULAR; INTRAVENOUS AS NEEDED
Status: DISCONTINUED | OUTPATIENT
Start: 2024-02-19 | End: 2024-02-19

## 2024-02-19 RX ORDER — HYDROMORPHONE HCL IN WATER/PF 6 MG/30 ML
0.2 PATIENT CONTROLLED ANALGESIA SYRINGE INTRAVENOUS
Status: DISCONTINUED | OUTPATIENT
Start: 2024-02-19 | End: 2024-02-19 | Stop reason: HOSPADM

## 2024-02-19 RX ORDER — HEPARIN SODIUM 5000 [USP'U]/ML
5000 INJECTION, SOLUTION INTRAVENOUS; SUBCUTANEOUS ONCE
Status: COMPLETED | OUTPATIENT
Start: 2024-02-19 | End: 2024-02-19

## 2024-02-19 RX ORDER — ROCURONIUM BROMIDE 10 MG/ML
INJECTION, SOLUTION INTRAVENOUS AS NEEDED
Status: DISCONTINUED | OUTPATIENT
Start: 2024-02-19 | End: 2024-02-19

## 2024-02-19 RX ORDER — FENTANYL CITRATE/PF 50 MCG/ML
25 SYRINGE (ML) INJECTION
Status: DISCONTINUED | OUTPATIENT
Start: 2024-02-19 | End: 2024-02-19 | Stop reason: HOSPADM

## 2024-02-19 RX ORDER — CEFAZOLIN SODIUM 2 G/50ML
2000 SOLUTION INTRAVENOUS ONCE
Status: COMPLETED | OUTPATIENT
Start: 2024-02-19 | End: 2024-02-19

## 2024-02-19 RX ORDER — ONDANSETRON 2 MG/ML
4 INJECTION INTRAMUSCULAR; INTRAVENOUS ONCE AS NEEDED
Status: DISCONTINUED | OUTPATIENT
Start: 2024-02-19 | End: 2024-02-19 | Stop reason: HOSPADM

## 2024-02-19 RX ORDER — ACETAMINOPHEN 325 MG/1
650 TABLET ORAL EVERY 6 HOURS PRN
Status: DISCONTINUED | OUTPATIENT
Start: 2024-02-19 | End: 2024-02-19 | Stop reason: HOSPADM

## 2024-02-19 RX ORDER — OXYCODONE HYDROCHLORIDE 5 MG/1
10 TABLET ORAL EVERY 4 HOURS PRN
Status: DISCONTINUED | OUTPATIENT
Start: 2024-02-19 | End: 2024-02-19 | Stop reason: HOSPADM

## 2024-02-19 RX ORDER — ONDANSETRON 2 MG/ML
4 INJECTION INTRAMUSCULAR; INTRAVENOUS EVERY 4 HOURS PRN
Status: DISCONTINUED | OUTPATIENT
Start: 2024-02-19 | End: 2024-02-19 | Stop reason: HOSPADM

## 2024-02-19 RX ORDER — SODIUM CHLORIDE, SODIUM LACTATE, POTASSIUM CHLORIDE, CALCIUM CHLORIDE 600; 310; 30; 20 MG/100ML; MG/100ML; MG/100ML; MG/100ML
125 INJECTION, SOLUTION INTRAVENOUS CONTINUOUS
Status: DISCONTINUED | OUTPATIENT
Start: 2024-02-19 | End: 2024-02-19 | Stop reason: HOSPADM

## 2024-02-19 RX ORDER — KETOROLAC TROMETHAMINE 30 MG/ML
INJECTION, SOLUTION INTRAMUSCULAR; INTRAVENOUS AS NEEDED
Status: DISCONTINUED | OUTPATIENT
Start: 2024-02-19 | End: 2024-02-19

## 2024-02-19 RX ORDER — MIDAZOLAM HYDROCHLORIDE 2 MG/2ML
INJECTION, SOLUTION INTRAMUSCULAR; INTRAVENOUS AS NEEDED
Status: DISCONTINUED | OUTPATIENT
Start: 2024-02-19 | End: 2024-02-19

## 2024-02-19 RX ORDER — MAGNESIUM HYDROXIDE 1200 MG/15ML
LIQUID ORAL AS NEEDED
Status: DISCONTINUED | OUTPATIENT
Start: 2024-02-19 | End: 2024-02-19 | Stop reason: HOSPADM

## 2024-02-19 RX ORDER — OXYCODONE HYDROCHLORIDE 5 MG/1
5 TABLET ORAL EVERY 6 HOURS PRN
Qty: 12 TABLET | Refills: 0 | Status: SHIPPED | OUTPATIENT
Start: 2024-02-19 | End: 2024-02-29

## 2024-02-19 RX ORDER — DEXAMETHASONE SODIUM PHOSPHATE 10 MG/ML
INJECTION, SOLUTION INTRAMUSCULAR; INTRAVENOUS AS NEEDED
Status: DISCONTINUED | OUTPATIENT
Start: 2024-02-19 | End: 2024-02-19

## 2024-02-19 RX ORDER — PROPOFOL 10 MG/ML
INJECTION, EMULSION INTRAVENOUS AS NEEDED
Status: DISCONTINUED | OUTPATIENT
Start: 2024-02-19 | End: 2024-02-19

## 2024-02-19 RX ORDER — OXYCODONE HYDROCHLORIDE 10 MG/1
5 TABLET ORAL EVERY 4 HOURS PRN
Status: DISCONTINUED | OUTPATIENT
Start: 2024-02-19 | End: 2024-02-19 | Stop reason: HOSPADM

## 2024-02-19 RX ORDER — BUPIVACAINE HYDROCHLORIDE 5 MG/ML
INJECTION, SOLUTION EPIDURAL; INTRACAUDAL AS NEEDED
Status: DISCONTINUED | OUTPATIENT
Start: 2024-02-19 | End: 2024-02-19 | Stop reason: HOSPADM

## 2024-02-19 RX ORDER — FENTANYL CITRATE 50 UG/ML
INJECTION, SOLUTION INTRAMUSCULAR; INTRAVENOUS AS NEEDED
Status: DISCONTINUED | OUTPATIENT
Start: 2024-02-19 | End: 2024-02-19

## 2024-02-19 RX ADMIN — FENTANYL CITRATE 25 MCG: 50 INJECTION, SOLUTION INTRAMUSCULAR; INTRAVENOUS at 13:50

## 2024-02-19 RX ADMIN — FENTANYL CITRATE 50 MCG: 50 INJECTION, SOLUTION INTRAMUSCULAR; INTRAVENOUS at 11:56

## 2024-02-19 RX ADMIN — OXYCODONE HYDROCHLORIDE 5 MG: 10 TABLET ORAL at 14:44

## 2024-02-19 RX ADMIN — PROPOFOL 180 MG: 10 INJECTION, EMULSION INTRAVENOUS at 11:56

## 2024-02-19 RX ADMIN — ROCURONIUM BROMIDE 20 MG: 10 INJECTION, SOLUTION INTRAVENOUS at 13:03

## 2024-02-19 RX ADMIN — ONDANSETRON 4 MG: 2 INJECTION INTRAMUSCULAR; INTRAVENOUS at 12:02

## 2024-02-19 RX ADMIN — HEPARIN SODIUM 5000 UNITS: 5000 INJECTION INTRAVENOUS; SUBCUTANEOUS at 11:24

## 2024-02-19 RX ADMIN — DEXAMETHASONE SODIUM PHOSPHATE 10 MG: 10 INJECTION, SOLUTION INTRAMUSCULAR; INTRAVENOUS at 12:02

## 2024-02-19 RX ADMIN — FENTANYL CITRATE 50 MCG: 50 INJECTION, SOLUTION INTRAMUSCULAR; INTRAVENOUS at 13:19

## 2024-02-19 RX ADMIN — SODIUM CHLORIDE, SODIUM LACTATE, POTASSIUM CHLORIDE, AND CALCIUM CHLORIDE: .6; .31; .03; .02 INJECTION, SOLUTION INTRAVENOUS at 11:38

## 2024-02-19 RX ADMIN — MIDAZOLAM 2 MG: 1 INJECTION INTRAMUSCULAR; INTRAVENOUS at 11:50

## 2024-02-19 RX ADMIN — ROCURONIUM BROMIDE 40 MG: 10 INJECTION, SOLUTION INTRAVENOUS at 11:56

## 2024-02-19 RX ADMIN — SODIUM CHLORIDE, SODIUM LACTATE, POTASSIUM CHLORIDE, AND CALCIUM CHLORIDE: .6; .31; .03; .02 INJECTION, SOLUTION INTRAVENOUS at 13:25

## 2024-02-19 RX ADMIN — SODIUM CHLORIDE, SODIUM LACTATE, POTASSIUM CHLORIDE, AND CALCIUM CHLORIDE: .6; .31; .03; .02 INJECTION, SOLUTION INTRAVENOUS at 12:33

## 2024-02-19 RX ADMIN — CEFAZOLIN SODIUM 2000 MG: 2 SOLUTION INTRAVENOUS at 11:52

## 2024-02-19 RX ADMIN — FENTANYL CITRATE 25 MCG: 50 INJECTION, SOLUTION INTRAMUSCULAR; INTRAVENOUS at 13:46

## 2024-02-19 RX ADMIN — ACETAMINOPHEN 650 MG: 325 TABLET, FILM COATED ORAL at 15:37

## 2024-02-19 RX ADMIN — KETOROLAC TROMETHAMINE 30 MG: 30 INJECTION, SOLUTION INTRAMUSCULAR; INTRAVENOUS at 13:29

## 2024-02-19 NOTE — ANESTHESIA PROCEDURE NOTES
Anesthesia Notable Event    Date/Time: 2/19/2024 1:29 PM    Patient location during procedure: OR procedure room  Performed by: Nella Santizo MD  Authorized by: Nella Santizo MD    Anesthesia notable event Other: other  Patient with significant amount of bile suctioned prior to extubation. Open incisional hernia repair was preformed supine, no insufflation, no trendelenburg. Patient with significant history of glottic insufficiency, laryngeal edema, reflux laryngitis, and right vocal fold paresis, no major abnormalities, erythema, or edema noted on intubation. Strong caution advised with use of LMA in the future. Recommend ETT for any general anesthesia to prevent any pulmonary complications. Recommended patient follow up with ENT. Note given to the patient.

## 2024-02-19 NOTE — ANESTHESIA POSTPROCEDURE EVALUATION
Post-Op Assessment Note    CV Status:  Stable  Pain Score: 0    Pain management: adequate       Mental Status:  Alert and awake   Hydration Status:  Euvolemic   PONV Controlled:  Controlled   Airway Patency:  Patent     Post Op Vitals Reviewed: Yes    No anethesia notable event occurred.    Staff: CRNA               BP (P) 123/60 (02/19/24 1335)    Temp (P) 98.9 °F (37.2 °C) (02/19/24 1335)    Pulse (P) 83 (02/19/24 1335)   Resp (P) 16 (02/19/24 1335)    SpO2 (P) 93 % (02/19/24 1335)

## 2024-02-19 NOTE — NURSING NOTE
Awake and tolerating po well. Ambulated to bathroom and voided easily. Discharge instructions given and friend to be driving home. Pain controlled

## 2024-02-19 NOTE — DISCHARGE INSTR - AVS FIRST PAGE
[facility] Discharge Instructions      1. General: You will feel pulling sensations around the wound or funny aches and pains around the incisions. You may have some bruising or mild redness. This is normal. Even minor surgery is a change in your body and this is your body’s reaction to it. If you have had abdominal surgery, it may help to support the incision with a small pillow or blanket for comfort when moving or coughing. There may be some hardness surrounding your incision which is your body's normal reaction to surgery. This typically softens up over time. You may also experience itching as the incision heals. A heating pad or ice pack can also be beneficial in the post-op period.     2. Wound care: Make sure to remove the overlying dressing/bandage in about 24 hours, unless instructed otherwise. You usually don't have to redress the wound after 24-48 hours, unless for comfort. Keep the incision clean and dry. Let air get to it. If this Steri-Strips fall off, just keep the wound clean. If there is surgical glue in place this will usually start to soften in around 2 weeks and will eventually dissolve or fall off with gentle scrubbing in the shower.    3. Water: You may shower over the wound, unless there are drain tubes left in place. Do not bathe or use a pool or hot tub until cleared by the physician. Do not swim in a lake or ocean until cleared by your physician. You may shower right over the staples or Steri-Strips and pat dry when you are done.    4. Activity: You may go up and down stairs, walk as much as you are comfortable, but walk at least 3 times each day. If you have had abdominal surgery, do not lift anything heavier than 10-15 pounds for at least 2 weeks, unless cleared by the doctor. Notes and paperwork for your job are typically filled out at your post-op appointment but if there is a time constraint please call the office for assistance if this is needed prior to your post-op  check.    5. Diet: You may resume a regular diet. If you had a same-day surgery or overnight stay surgery, you may wish to eat lightly for a few days: soups, crackers, and sandwiches. You may resume a regular diet when ready. If you have had gallbladder surgery, you should remain on a low fat diet for 2 weeks or at least until your post op appointment.     6. Medications: Resume all of your previous medications, unless told otherwise by the doctor. Ibuprofen (Advil, Motrin, etc.) is usually ok unless specifically discouraged by your surgeon. 400-600 mg of ibuprofen every 6 hours for post-surgical pain is an acceptable regimen with a maximum dosage of 2400 mg per day. Avoid ibuprofen if you are taking aspirin. If you have a bleeding disorder or have stomach issues, talk to your surgeon prior to use. Tylenol is ok, unless you are taking any narcotic pain medication containing Tylenol (such as Percocet, Darvocet, Vicodin, or anything containing acetaminophen). Be cautious taking additional Tylenol if you're taking these medications as there is a maximum dosage of 4,000 mg of Tylenol per day. You do not need to take the narcotic pain medications unless you are having significant pain and discomfort.    7. Driving: You will need someone to drive you home on the day of surgery. Do not drive or make any important decisions while on narcotic pain medication or 24 hours and after anesthesia or sedation for surgery. Generally, you may drive when you are off all narcotic pain medications.    8. Upset Stomach: You may take Maalox, Tums, or similar items for an upset stomach. If your narcotic pain medication causes an upset stomach, do not take it on an empty stomach. Try taking it with at least some crackers or toast.     9. Constipation: Patients often experience constipation after surgery due to anesthesia and pain medication. This is especially common after abdominal surgery. You may take over-the-counter medication for  this, such as Metamucil, Senokot, Dulcolax, milk of magnesia, etc. You may take a suppository unless you have had anorectal surgery such as a procedure on your hemorrhoids. If you experience significant nausea or vomiting after abdominal surgery, call the office before trying any of these medications.    10. Pain: You may be given a prescription for pain. This will be prescribed the day of surgery and sent to your pharmacy of choice. Take the pain medication as prescribed, only if you need it. Narcotic pain medications (such as anything containing hydrocodone or oxycodone) have many side effects such as nausea/vomiting, constipation, dizziness and respiratory depression. Do not drive when taking the pain medication. Do not take more than prescribed in the 4-6 hour time period.    11. Sexual Activity: You may resume sexual activity when you feel ready and comfortable and your incision is sealed and healed without apparent infection risk.    12. Urination: If you haven't urinated in 6 hours and are unable to urinate please call the office. If you are having pain and can not urinate you need to be evaluated by a physician and should go to the emergency room.     Call the office: If you are experiencing any of the following, fevers above 101.5°, significant nausea or vomiting, if the wound develops drainage and/or has excessive redness around the wound, or if you have significant diarrhea or other worsening symptoms.

## 2024-02-19 NOTE — OP NOTE
OPERATIVE REPORT  PATIENT NAME: Hayley Rios    :  1960  MRN: [id number]  Pt Location:  OR ROOM 08    SURGERY DATE: 2024    Surgeons and Role:     * Chelle Butler MD - Primary     * Marlyn Caba PA-C - Assisting    Preop Diagnosis:  Ventral hernia [K43.9]    Post-Op Diagnosis Codes:     * Ventral hernia [K43.9]    Procedure(s):  VENTRAL HERNIA REPAIR 3CM WITH MESH    Specimen(s):  * No specimens in log *    Estimated Blood Loss:   Minimal    Drains:  Closed/Suction Drain Right Abdomen Bulb 19 Fr. (Active)   Number of days: 61       Anesthesia Type:   General    Operative Indications:  Ventral hernia [K43.9]      Operative Findings:  4 cm ventral hernia    Complications:   None    Procedure and Technique:  Patient was identified in the preoperative holding area and taken back to the operating room she positioned supine on the operating table.  General anesthesia was induced and she was prepped and draped in standard sterile surgical fashion.  Preoperative timeout was conducted confirming the correct patient, procedure and that all necessary equipment and personnel are functioning present the operating room.  Abdominal wall was palpated and the hernia defect was identified.  Small transverse incision was created over the hernia defect with a scalpel.  Electrocautery was used to dissect the subcutaneous tissue.  Hemostat was then used to bluntly dissect through subcutaneous fat down to the hernia sac.  Hernia sac was identified and bluntly dissected circumferentially down to the fascial defect.  Fascial defect measured approximately 4 cm but the surrounding fascia was relatively weak.  The hernia sac was entered sharply with Metzenbaum scissors and the sac trimmed with electrocautery.  Attempts was made to create a plane preperitoneal for possible mesh placement in the preperitoneal space however there was significant scar medially and superiorly and a plane was unable to be developed.  We then  decided to place intraperitoneal mesh.  The peritoneum was palpated circumferentially and there was a loop of small bowel that had 1 thick adhesion to the anterior abdominal wall this was divided with Metzenbaum scissors.  Otherwise there were no intra-abdominal lesions palpated.  Her intra-abdominal mesh that was midline was palpated and very well incorporated though ruffled to some extent inferiorly.  The surrounding anterior aspect of the fascia was cleared to provide a landing zone for the mesh and to mobilize the fascia for fascial closure.  A medium Ventralex ST mesh was then selected and placed within the abdominal wall defect.  The bone suture in U-stitch fashion was used to secure the mesh to the abdominal wall circumferentially.  The mesh repair was palpated and there were no defects identified.  Care was taken to ensure the small bowel was protected.  The fascial defect was then closed over the mesh with running 0 Ethibond suture.  Deep subcutaneous tissue was reapproximated with multiple interrupted 3-0 Vicryl suture.  Skin was then closed with running 4-0 Monocryl suture in subcuticular fashion.  Local anesthetic was injected.  Surgical glue was then applied.  Abdominal binder was then positioned.  She was awoken from anesthesia and taken out to the postoperative care unit in good condition.  All counts are correct at the end the case.   I was present for the entire procedure., A qualified resident physician was not available., and A physician assistant was required during the procedure for retraction, tissue handling, dissection and suturing.    Patient Disposition:  PACU  and hemodynamically stable        SIGNATURE: Chelle Butler MD  DATE: February 19, 2024  TIME: 1:50 PM

## 2024-02-19 NOTE — ANESTHESIA PREPROCEDURE EVALUATION
Procedure:  VENTRAL HERNIA REPAIR 3CM WITH MESH (Abdomen)    Relevant Problems   ANESTHESIA   (+) Motion sickness      CARDIO   (+) Hyperlipidemia      GI/HEPATIC   (+) Gastroesophageal reflux disease      HEMATOLOGY   (+) Anemia      MUSCULOSKELETAL   (+) Arthritis of lumbar spine   (+) Cervical spondylosis without myelopathy   (+) Chronic low back pain   (+) Lumbar spondylosis   (+) Myofascial pain syndrome   (+) Osteoarthritis of multiple joints   (+) Podagra   (+) Primary osteoarthritis of left hip   (+) Sacroiliitis, not elsewhere classified (HCC)      NEURO/PSYCH   (+) Chronic low back pain   (+) Chronic pain syndrome   (+) Depression, recurrent (HCC)   (+) Muscle weakness of left upper extremity   (+) Myofascial pain syndrome      PULMONARY   (+) Mild intermittent asthma without complication      Respiratory   (+) Allergic rhinitis due to [employer] house dust mite   (+) Glottic insufficiency   (+) Laryngeal edema   (+) Reflux laryngitis   (+) Vocal fold paresis, right      Other   (+) Long-term current use of opiate analgesic   (+) Prediabetes   (+) Recurrent incisional hernia   (+) Uncomplicated opioid dependence (HCC)       Latest Reference Range & Units 12/21/23 09:38   Sodium 135 - 147 mmol/L 140   Potassium 3.5 - 5.3 mmol/L 3.2 (L)   Chloride 96 - 108 mmol/L 105   Carbon Dioxide 21 - 32 mmol/L 28   ANION GAP mmol/L 7   BUN 5 - 25 mg/dL 9   Creatinine 0.60 - 1.30 mg/dL 0.71   GLUCOSE 65 - 140 mg/dL 131   Calcium 8.4 - 10.2 mg/dL 8.9   CORRECTED CALCIUM 8.3 - 10.1 mg/dL 9.4   AST 13 - 39 U/L 85 (H)   ALT 7 - 52 U/L 58 (H)   ALK PHOS 34 - 104 U/L 71   Total Protein 6.4 - 8.4 g/dL 6.2 (L)   Albumin 3.5 - 5.0 g/dL 3.4 (L)   Total Bilirubin 0.20 - 1.00 mg/dL 0.41   GFR, Calculated ml/min/1.73sq m 92   (L): Data is abnormally low  (H): Data is abnormally high     Latest Reference Range & Units 12/21/23 09:38   WBC 4.31 - 10.16 Thousand/uL 10.74 (H)   RBC 3.81 - 5.12 Million/uL 3.62 (L)   Hemoglobin 11.5 - 15.4  g/dL 10.0 (L)   Hematocrit 34.8 - 46.1 % 32.4 (L)   MCV 82 - 98 fL 90   MCH 26.8 - 34.3 pg 27.6   MCHC 31.4 - 37.4 g/dL 30.9 (L)   RDW 11.6 - 15.1 % 14.8   Platelet Count 149 - 390 Thousands/uL 296   MPV 8.9 - 12.7 fL 9.8   nRBC /100 WBCs 0   (H): Data is abnormally high  (L): Data is abnormally low  Physical Exam    Airway    Mallampati score: II  TM Distance: >3 FB  Neck ROM: full     Dental   No notable dental hx     Cardiovascular      Pulmonary      Other Findings  post-pubertal.      Anesthesia Plan  ASA Score- 3     Anesthesia Type- general with ASA Monitors.         Additional Monitors:     Airway Plan: ETT.           Plan Factors-Exercise tolerance (METS): >4 METS.    Chart reviewed.    Patient summary reviewed.    Patient is not a current smoker.  Patient did not smoke on day of surgery.            Induction- intravenous.    Postoperative Plan- Plan for postoperative opioid use. Planned trial extubation    Informed Consent- Anesthetic plan and risks discussed with patient.  I personally reviewed this patient with the CRNA. Discussed and agreed on the Anesthesia Plan with the CRNA..

## 2024-02-21 PROBLEM — R05.8 UPPER AIRWAY COUGH SYNDROME: Status: RESOLVED | Noted: 2019-12-03 | Resolved: 2024-02-21

## 2024-02-29 ENCOUNTER — NURSE TRIAGE (OUTPATIENT)
Age: 64
End: 2024-02-29

## 2024-02-29 NOTE — TELEPHONE ENCOUNTER
"Patient called in s/p ventral hernia repair with Dr Butler on 2/19/24.   States she has a rash on her abdomen and arms that is very itchy and had similar rash after gallbladder surgery in December 2023.    States that she is looking for a cream or medication that can help alleviate the symptoms until she can return to see her PCP in [place]. Does not have PCP locally. Has first post op visit scheduled for 3/6/2024.    Advised she could use urgent care but she was hesitant to be in waiting room with other sick patients.    Please advise patient if she should be seen sooner or a prescription can be called in.     Patient tried to submit images to [employer] but her account is locked and she was unable to access.    Patient uses [facility] in [facility]   [phone]    # [phone]    Reason for Disposition   Applying cream or ointment and it causes severe itch, burning, or pain    Answer Assessment - Initial Assessment Questions  1. APPEARANCE of RASH: \"Describe the rash.\"       Red spots varying sizes  2. LOCATION: \"Where is the rash located?\"       Upper abdomen and arms  3. NUMBER: \"How many spots are there?\"       Too many to count  4. SIZE: \"How big are the spots?\" (Inches, centimeters or compare to size of a coin)       Varying sizes small  5. ONSET: \"When did the rash start?\"       Gradually progresses  6. ITCHING: \"Does the rash itch?\" If Yes, ask: \"How bad is the itch?\"  (Scale 1-10; or mild, moderate, severe)      Severe itching, like mosquito bites  7. PAIN: \"Does the rash hurt?\" If Yes, ask: \"How bad is the pain?\"  (Scale 1-10; or mild, moderate, severe)      Feels like mosquito stings  8. OTHER SYMPTOMS: \"Do you have any other symptoms?\" (e.g., fever)      SOB with exertion recently, stated had recent low hemoglobin    Protocols used: Rash or Redness - Localized-ADULT-[place]    "

## 2024-03-06 ENCOUNTER — OFFICE VISIT (OUTPATIENT)
Dept: SURGERY | Facility: CLINIC | Age: 64
End: 2024-03-06

## 2024-03-06 VITALS
HEIGHT: 64 IN | HEART RATE: 102 BPM | OXYGEN SATURATION: 98 % | WEIGHT: 201 LBS | TEMPERATURE: 97 F | BODY MASS INDEX: 34.31 KG/M2

## 2024-03-06 DIAGNOSIS — Z98.890 POST-OPERATIVE STATE: ICD-10-CM

## 2024-03-06 DIAGNOSIS — L29.9 ITCHING: Primary | ICD-10-CM

## 2024-03-06 DIAGNOSIS — K43.9 VENTRAL HERNIA WITHOUT OBSTRUCTION OR GANGRENE: ICD-10-CM

## 2024-03-06 PROCEDURE — 99024 POSTOP FOLLOW-UP VISIT: CPT | Performed by: SURGERY

## 2024-03-06 RX ORDER — TRIAMCINOLONE ACETONIDE 1 MG/G
OINTMENT TOPICAL
COMMUNITY
Start: 2024-03-06

## 2024-03-06 RX ORDER — CLOTRIMAZOLE AND BETAMETHASONE DIPROPIONATE 10; .64 MG/G; MG/G
CREAM TOPICAL 2 TIMES DAILY
COMMUNITY
Start: 2024-02-29 | End: 2024-03-07

## 2024-03-06 RX ORDER — FLUCONAZOLE 150 MG/1
TABLET ORAL
COMMUNITY
Start: 2024-02-29

## 2024-03-06 RX ORDER — HYDROXYZINE HYDROCHLORIDE 25 MG/1
25 TABLET, FILM COATED ORAL EVERY 6 HOURS PRN
Qty: 30 TABLET | Refills: 0 | Status: SHIPPED | OUTPATIENT
Start: 2024-03-06

## 2024-03-06 NOTE — PROGRESS NOTES
Assessment/Plan:  Status post ventral hernia repair with mesh, appears that she has a recurrence lateral to the repair which is palpable when standing.  Reviewed the operative findings with her and while her fascia was of poor quality there was no obvious hernia defect lateral to the one that was repaired.  Hernia was repaired with mesh secured to the fascia and with the fascia closed itself with continuous suture.  She is asymptomatic as far as this recurrence so I recommended that she wait a few weeks to allow for mesh incorporation and reduction of postsurgical inflammation.  She would like to have surgery on her foot soon and is going to see a podiatrist where she lives in [place].  I recommended that she follow-up with a hernia or abdominal wall specialist in [place] for another opinion.  I can certainly have her follow-up with me in about 3 months if she does not find a surgeon in her area.  Will send a prescription for Atarax to the pharmacy for her for her rash and itching and advised her to buy an abdominal binder to wear during the day.  Her op reports it and printed for her to use in consultation with another surgeon.  She will be in touch as far as her decision making regarding the hernia repair  No problem-specific Assessment & Plan notes found for this encounter.       Diagnoses and all orders for this visit:    Itching  -     hydrOXYzine HCL (ATARAX) 25 mg tablet; Take 1 tablet (25 mg total) by mouth every 6 (six) hours as needed for itching    Post-operative state    Ventral hernia without obstruction or gangrene    Other orders  -     clotrimazole-betamethasone (LOTRISONE) 1-0.05 % cream; Apply topically 2 (two) times a day  -     fluconazole (DIFLUCAN) 150 mg tablet; TAKE 1 TABLET BY MOUTH FOR ONE DOSE THEN 2ND DOSE 1 WEEK LATER  -     triamcinolone (KENALOG) 0.1 % ointment          Subjective:      Patient ID: Hayley Rios is a 64 y.o. female.    She presents for postop status post  "ventral hernia repair with mesh on 2/19.  She has some discomfort in this area but no significant pain.  She has been dealing with a rash since December with some acute worsening after the surgery of the suzan-incisional region but also has intermittent rash on her arms and beneath her pannus.  She went to an urgent care and was prescribed a cream and antifungal.  She said initially after the surgery she noticed that there was a bulge in the similar region that she had before.  She denies any obstructive symptoms or any pain from this bulge itself.        The following portions of the patient's history were reviewed and updated as appropriate: allergies, current medications, past family history, past medical history, past social history, past surgical history, and problem list.    Review of Systems   Gastrointestinal:  Positive for abdominal distention.   Skin:  Positive for rash.   All other systems reviewed and are negative.        Objective:      Pulse 102   Temp (!) 97 °F (36.1 °C) (Tympanic)   Ht 5' 4\" (1.626 m)   Wt 91.2 kg (201 lb)   SpO2 98%   BMI 34.50 kg/m²          Physical Exam  Vitals reviewed.   Constitutional:       General: She is not in acute distress.     Appearance: Normal appearance. She is obese. She is not toxic-appearing.   HENT:      Nose: Nose normal.      Mouth/Throat:      Mouth: Mucous membranes are moist.      Pharynx: Oropharynx is clear.   Eyes:      Extraocular Movements: Extraocular movements intact.      Conjunctiva/sclera: Conjunctivae normal.      Pupils: Pupils are equal, round, and reactive to light.   Cardiovascular:      Rate and Rhythm: Normal rate and regular rhythm.      Heart sounds: Normal heart sounds.   Pulmonary:      Effort: Pulmonary effort is normal. No respiratory distress.      Breath sounds: Normal breath sounds.   Abdominal:      General: Abdomen is flat. There is no distension.      Palpations: Abdomen is soft.      Tenderness: There is no abdominal " tenderness. There is no guarding or rebound.      Hernia: A hernia is present.          Comments: Lateral to the hernia repair there is a hernia containing bowel that is reducible, defect not palpable supine but when standing likely there is a small defect lateral to the surgery site where bowel is herniating either next to the mesh or through the mesh repair.  The hernia is reducible easily and is not tender   Musculoskeletal:         General: No swelling or tenderness. Normal range of motion.      Cervical back: Normal range of motion and neck supple.   Skin:     General: Skin is warm and dry.      Findings: Erythema present.   Neurological:      General: No focal deficit present.      Mental Status: She is alert and oriented to person, place, and time.

## 2024-03-25 ENCOUNTER — TELEPHONE (OUTPATIENT)
Age: 64
End: 2024-03-25

## 2024-03-25 NOTE — TELEPHONE ENCOUNTER
Patient of Dr Butler calling to discuss some medical records.  Patient was advised to seek out a complicated hernia specialist.     Patient has had multiple abdominal surgeries in past year.   Surgery March 2023 in [place] notes a hernia repair with abdominal wall reconstruction.    Patient would like to speak about her surgeries with Dr Butler and discuss how to move forward if possible.  Please advise    #[phone]

## 2024-04-03 ENCOUNTER — TELEPHONE (OUTPATIENT)
Age: 64
End: 2024-04-03

## 2024-04-03 ENCOUNTER — TELEPHONE (OUTPATIENT)
Dept: SURGERY | Facility: CLINIC | Age: 64
End: 2024-04-03

## 2024-04-03 NOTE — TELEPHONE ENCOUNTER
Pt's friend called; pt has been trying to obtain post op reports for gallbladder surgery in December 2023, and hernia surgery in February 2024. Please fax to Dr. Kranthi Coleman fax # [phone]; phone number [phone]     Pt also wants hard copies mailed if possible.

## 2024-04-03 NOTE — TELEPHONE ENCOUNTER
I spoke with Hayley and I will fax the records to Dr. Coleman fax# [phone] and mail her a copy.

## 2024-04-12 ENCOUNTER — TELEPHONE (OUTPATIENT)
Age: 64
End: 2024-04-12

## 2024-04-12 NOTE — TELEPHONE ENCOUNTER
Patient of . Last seen in the office on 3/6/24 for ventral hernia repair done 2/19/24. Noted to have a another hernia. She has returned to [place]. Will be seeing at specialist there. Is asking if you can order a CT scan ? Want's to have done prior to seeing specialist.

## 2024-04-16 DIAGNOSIS — K43.9 VENTRAL HERNIA WITHOUT OBSTRUCTION OR GANGRENE: Primary | ICD-10-CM

## 2024-08-13 ENCOUNTER — TELEPHONE (OUTPATIENT)
Age: 64
End: 2024-08-13

## 2024-08-13 NOTE — TELEPHONE ENCOUNTER
Patient called in to inform the office that she is going to be seeing Dr. Vang in [place], [place] for her entire abdominal wall reconstructive repair. She wanted to express her thanks to Dr. Butler as she saved her life. She is hoping to pass this message on to Dr. Suggs as well and to see if he is able to pass the message on to Dr. Butler to inform her that she is very appreciative of everything that both Dr. Suggs and Luke did for her. She says that if anyone needs to talk to her if reach out to call her at [phone].

## 2024-12-29 PROCEDURE — 99285 EMERGENCY DEPT VISIT HI MDM: CPT

## 2024-12-30 ENCOUNTER — APPOINTMENT (EMERGENCY)
Dept: CT IMAGING | Facility: HOSPITAL | Age: 64
DRG: 055 | End: 2024-12-30
Payer: COMMERCIAL

## 2024-12-30 ENCOUNTER — APPOINTMENT (OUTPATIENT)
Dept: MRI IMAGING | Facility: HOSPITAL | Age: 64
DRG: 055 | End: 2024-12-30
Payer: COMMERCIAL

## 2024-12-30 ENCOUNTER — APPOINTMENT (OUTPATIENT)
Dept: RADIOLOGY | Facility: HOSPITAL | Age: 64
DRG: 055 | End: 2024-12-30
Payer: COMMERCIAL

## 2024-12-30 ENCOUNTER — HOSPITAL ENCOUNTER (INPATIENT)
Facility: HOSPITAL | Age: 64
LOS: 1 days | DRG: 055 | End: 2024-12-31
Attending: EMERGENCY MEDICINE | Admitting: INTERNAL MEDICINE
Payer: COMMERCIAL

## 2024-12-30 DIAGNOSIS — M48.061 NEUROFORAMINAL STENOSIS OF LUMBAR SPINE: ICD-10-CM

## 2024-12-30 DIAGNOSIS — R29.898 LEFT LEG WEAKNESS: ICD-10-CM

## 2024-12-30 DIAGNOSIS — R26.2 AMBULATORY DYSFUNCTION: Primary | ICD-10-CM

## 2024-12-30 PROBLEM — I89.0 LYMPHEDEMA: Status: ACTIVE | Noted: 2024-12-30

## 2024-12-30 PROBLEM — E66.09 CLASS 1 OBESITY DUE TO EXCESS CALORIES WITHOUT SERIOUS COMORBIDITY WITH BODY MASS INDEX (BMI) OF 34.0 TO 34.9 IN ADULT: Status: ACTIVE | Noted: 2024-12-30

## 2024-12-30 PROBLEM — E66.811 CLASS 1 OBESITY DUE TO EXCESS CALORIES WITHOUT SERIOUS COMORBIDITY WITH BODY MASS INDEX (BMI) OF 34.0 TO 34.9 IN ADULT: Status: ACTIVE | Noted: 2024-12-30

## 2024-12-30 LAB
ALBUMIN SERPL BCG-MCNC: 4.4 G/DL (ref 3.5–5)
ALP SERPL-CCNC: 68 U/L (ref 34–104)
ALT SERPL W P-5'-P-CCNC: 12 U/L (ref 7–52)
ANION GAP SERPL CALCULATED.3IONS-SCNC: 8 MMOL/L (ref 4–13)
AST SERPL W P-5'-P-CCNC: 14 U/L (ref 13–39)
ATRIAL RATE: 64 BPM
BASOPHILS # BLD AUTO: 0.05 THOUSANDS/ΜL (ref 0–0.1)
BASOPHILS NFR BLD AUTO: 1 % (ref 0–1)
BILIRUB SERPL-MCNC: 0.38 MG/DL (ref 0.2–1)
BNP SERPL-MCNC: 24 PG/ML (ref 0–100)
BUN SERPL-MCNC: 17 MG/DL (ref 5–25)
CALCIUM SERPL-MCNC: 9.3 MG/DL (ref 8.4–10.2)
CARDIAC TROPONIN I PNL SERPL HS: <2 NG/L (ref ?–50)
CHLORIDE SERPL-SCNC: 104 MMOL/L (ref 96–108)
CK SERPL-CCNC: 89 U/L (ref 26–192)
CO2 SERPL-SCNC: 27 MMOL/L (ref 21–32)
CREAT SERPL-MCNC: 0.61 MG/DL (ref 0.6–1.3)
EOSINOPHIL # BLD AUTO: 0.22 THOUSAND/ΜL (ref 0–0.61)
EOSINOPHIL NFR BLD AUTO: 3 % (ref 0–6)
ERYTHROCYTE [DISTWIDTH] IN BLOOD BY AUTOMATED COUNT: 15.1 % (ref 11.6–15.1)
FERRITIN SERPL-MCNC: 9 NG/ML (ref 11–307)
GFR SERPL CREATININE-BSD FRML MDRD: 97 ML/MIN/1.73SQ M
GLUCOSE SERPL-MCNC: 112 MG/DL (ref 65–140)
HCT VFR BLD AUTO: 38.2 % (ref 34.8–46.1)
HGB BLD-MCNC: 11.5 G/DL (ref 11.5–15.4)
IMM GRANULOCYTES # BLD AUTO: 0.03 THOUSAND/UL (ref 0–0.2)
IMM GRANULOCYTES NFR BLD AUTO: 0 % (ref 0–2)
IRON SATN MFR SERPL: 10 % (ref 15–50)
IRON SERPL-MCNC: 40 UG/DL (ref 50–212)
LYMPHOCYTES # BLD AUTO: 2.27 THOUSANDS/ΜL (ref 0.6–4.47)
LYMPHOCYTES NFR BLD AUTO: 31 % (ref 14–44)
MAGNESIUM SERPL-MCNC: 2.2 MG/DL (ref 1.9–2.7)
MCH RBC QN AUTO: 26 PG (ref 26.8–34.3)
MCHC RBC AUTO-ENTMCNC: 30.1 G/DL (ref 31.4–37.4)
MCV RBC AUTO: 86 FL (ref 82–98)
MONOCYTES # BLD AUTO: 0.37 THOUSAND/ΜL (ref 0.17–1.22)
MONOCYTES NFR BLD AUTO: 5 % (ref 4–12)
NEUTROPHILS # BLD AUTO: 4.42 THOUSANDS/ΜL (ref 1.85–7.62)
NEUTS SEG NFR BLD AUTO: 60 % (ref 43–75)
NRBC BLD AUTO-RTO: 0 /100 WBCS
P AXIS: 31 DEGREES
PLATELET # BLD AUTO: 300 THOUSANDS/UL (ref 149–390)
PMV BLD AUTO: 9.4 FL (ref 8.9–12.7)
POTASSIUM SERPL-SCNC: 3.5 MMOL/L (ref 3.5–5.3)
PR INTERVAL: 122 MS
PROT SERPL-MCNC: 7.3 G/DL (ref 6.4–8.4)
QRS AXIS: 33 DEGREES
QRSD INTERVAL: 88 MS
QT INTERVAL: 434 MS
QTC INTERVAL: 447 MS
RBC # BLD AUTO: 4.42 MILLION/UL (ref 3.81–5.12)
SODIUM SERPL-SCNC: 139 MMOL/L (ref 135–147)
T WAVE AXIS: 33 DEGREES
TIBC SERPL-MCNC: 396.2 UG/DL (ref 250–450)
TRANSFERRIN SERPL-MCNC: 283 MG/DL (ref 203–362)
TSH SERPL DL<=0.05 MIU/L-ACNC: 4.38 UIU/ML (ref 0.45–4.5)
UIBC SERPL-MCNC: 356 UG/DL (ref 155–355)
VENTRICULAR RATE: 64 BPM
WBC # BLD AUTO: 7.36 THOUSAND/UL (ref 4.31–10.16)

## 2024-12-30 PROCEDURE — 96365 THER/PROPH/DIAG IV INF INIT: CPT

## 2024-12-30 PROCEDURE — 93005 ELECTROCARDIOGRAM TRACING: CPT

## 2024-12-30 PROCEDURE — 99222 1ST HOSP IP/OBS MODERATE 55: CPT | Performed by: INTERNAL MEDICINE

## 2024-12-30 PROCEDURE — 84443 ASSAY THYROID STIM HORMONE: CPT | Performed by: EMERGENCY MEDICINE

## 2024-12-30 PROCEDURE — 72157 MRI CHEST SPINE W/O & W/DYE: CPT

## 2024-12-30 PROCEDURE — 36415 COLL VENOUS BLD VENIPUNCTURE: CPT | Performed by: EMERGENCY MEDICINE

## 2024-12-30 PROCEDURE — 83540 ASSAY OF IRON: CPT | Performed by: INTERNAL MEDICINE

## 2024-12-30 PROCEDURE — 83735 ASSAY OF MAGNESIUM: CPT | Performed by: EMERGENCY MEDICINE

## 2024-12-30 PROCEDURE — 80053 COMPREHEN METABOLIC PANEL: CPT | Performed by: EMERGENCY MEDICINE

## 2024-12-30 PROCEDURE — 85025 COMPLETE CBC W/AUTO DIFF WBC: CPT | Performed by: EMERGENCY MEDICINE

## 2024-12-30 PROCEDURE — 72131 CT LUMBAR SPINE W/O DYE: CPT

## 2024-12-30 PROCEDURE — 70450 CT HEAD/BRAIN W/O DYE: CPT

## 2024-12-30 PROCEDURE — 72110 X-RAY EXAM L-2 SPINE 4/>VWS: CPT

## 2024-12-30 PROCEDURE — 82550 ASSAY OF CK (CPK): CPT | Performed by: EMERGENCY MEDICINE

## 2024-12-30 PROCEDURE — NC001 PR NO CHARGE: Performed by: STUDENT IN AN ORGANIZED HEALTH CARE EDUCATION/TRAINING PROGRAM

## 2024-12-30 PROCEDURE — 99285 EMERGENCY DEPT VISIT HI MDM: CPT | Performed by: EMERGENCY MEDICINE

## 2024-12-30 PROCEDURE — 72148 MRI LUMBAR SPINE W/O DYE: CPT

## 2024-12-30 PROCEDURE — 82728 ASSAY OF FERRITIN: CPT | Performed by: INTERNAL MEDICINE

## 2024-12-30 PROCEDURE — 96367 TX/PROPH/DG ADDL SEQ IV INF: CPT

## 2024-12-30 PROCEDURE — 93010 ELECTROCARDIOGRAM REPORT: CPT | Performed by: STUDENT IN AN ORGANIZED HEALTH CARE EDUCATION/TRAINING PROGRAM

## 2024-12-30 PROCEDURE — 99233 SBSQ HOSP IP/OBS HIGH 50: CPT | Performed by: INTERNAL MEDICINE

## 2024-12-30 PROCEDURE — A9585 GADOBUTROL INJECTION: HCPCS | Performed by: INTERNAL MEDICINE

## 2024-12-30 PROCEDURE — 83880 ASSAY OF NATRIURETIC PEPTIDE: CPT | Performed by: EMERGENCY MEDICINE

## 2024-12-30 PROCEDURE — 72149 MRI LUMBAR SPINE W/DYE: CPT

## 2024-12-30 PROCEDURE — 96375 TX/PRO/DX INJ NEW DRUG ADDON: CPT

## 2024-12-30 PROCEDURE — 83550 IRON BINDING TEST: CPT | Performed by: INTERNAL MEDICINE

## 2024-12-30 PROCEDURE — 84484 ASSAY OF TROPONIN QUANT: CPT | Performed by: EMERGENCY MEDICINE

## 2024-12-30 RX ORDER — FUROSEMIDE 20 MG/1
1 TABLET ORAL DAILY
Status: ON HOLD | COMMUNITY
Start: 2024-09-15

## 2024-12-30 RX ORDER — FUROSEMIDE 20 MG/1
20 TABLET ORAL DAILY
Status: DISCONTINUED | OUTPATIENT
Start: 2024-12-30 | End: 2024-12-31 | Stop reason: HOSPADM

## 2024-12-30 RX ORDER — HEPARIN SODIUM 5000 [USP'U]/ML
5000 INJECTION, SOLUTION INTRAVENOUS; SUBCUTANEOUS EVERY 8 HOURS SCHEDULED
Status: DISCONTINUED | OUTPATIENT
Start: 2024-12-30 | End: 2024-12-31 | Stop reason: HOSPADM

## 2024-12-30 RX ORDER — PANTOPRAZOLE SODIUM 20 MG/1
20 TABLET, DELAYED RELEASE ORAL
Status: DISCONTINUED | OUTPATIENT
Start: 2024-12-30 | End: 2024-12-31 | Stop reason: HOSPADM

## 2024-12-30 RX ORDER — CYCLOBENZAPRINE HCL 5 MG
5 TABLET ORAL 3 TIMES DAILY
Status: DISCONTINUED | OUTPATIENT
Start: 2024-12-30 | End: 2024-12-31

## 2024-12-30 RX ORDER — KETOROLAC TROMETHAMINE 30 MG/ML
15 INJECTION, SOLUTION INTRAMUSCULAR; INTRAVENOUS ONCE
Status: COMPLETED | OUTPATIENT
Start: 2024-12-30 | End: 2024-12-30

## 2024-12-30 RX ORDER — GADOBUTROL 604.72 MG/ML
9 INJECTION INTRAVENOUS
Status: COMPLETED | OUTPATIENT
Start: 2024-12-30 | End: 2024-12-30

## 2024-12-30 RX ORDER — LORAZEPAM 2 MG/ML
1 INJECTION INTRAMUSCULAR ONCE
Status: COMPLETED | OUTPATIENT
Start: 2024-12-30 | End: 2024-12-30

## 2024-12-30 RX ORDER — PRAMIPEXOLE DIHYDROCHLORIDE 0.25 MG/1
0.5 TABLET ORAL
Status: DISCONTINUED | OUTPATIENT
Start: 2024-12-30 | End: 2024-12-31 | Stop reason: HOSPADM

## 2024-12-30 RX ORDER — MAGNESIUM SULFATE HEPTAHYDRATE 40 MG/ML
2 INJECTION, SOLUTION INTRAVENOUS ONCE
Status: COMPLETED | OUTPATIENT
Start: 2024-12-30 | End: 2024-12-30

## 2024-12-30 RX ORDER — POTASSIUM CHLORIDE 750 MG/1
10 TABLET, EXTENDED RELEASE ORAL 2 TIMES DAILY
Status: ON HOLD | COMMUNITY

## 2024-12-30 RX ORDER — DIAZEPAM 10 MG/2ML
2.5 INJECTION, SOLUTION INTRAMUSCULAR; INTRAVENOUS ONCE
Status: COMPLETED | OUTPATIENT
Start: 2024-12-30 | End: 2024-12-30

## 2024-12-30 RX ORDER — HYDROMORPHONE HCL/PF 1 MG/ML
0.5 SYRINGE (ML) INJECTION ONCE
Status: COMPLETED | OUTPATIENT
Start: 2024-12-30 | End: 2024-12-30

## 2024-12-30 RX ORDER — DOCUSATE SODIUM 100 MG/1
100 CAPSULE, LIQUID FILLED ORAL 2 TIMES DAILY
Status: DISCONTINUED | OUTPATIENT
Start: 2024-12-30 | End: 2024-12-31 | Stop reason: HOSPADM

## 2024-12-30 RX ORDER — ACETAMINOPHEN 10 MG/ML
1000 INJECTION, SOLUTION INTRAVENOUS ONCE
Status: COMPLETED | OUTPATIENT
Start: 2024-12-30 | End: 2024-12-30

## 2024-12-30 RX ORDER — ONDANSETRON 2 MG/ML
4 INJECTION INTRAMUSCULAR; INTRAVENOUS EVERY 6 HOURS PRN
Status: DISCONTINUED | OUTPATIENT
Start: 2024-12-30 | End: 2024-12-31 | Stop reason: HOSPADM

## 2024-12-30 RX ORDER — METHOCARBAMOL 500 MG/1
500 TABLET, FILM COATED ORAL EVERY 6 HOURS PRN
Status: DISCONTINUED | OUTPATIENT
Start: 2024-12-30 | End: 2024-12-31 | Stop reason: HOSPADM

## 2024-12-30 RX ORDER — ACETAMINOPHEN 325 MG/1
975 TABLET ORAL EVERY 8 HOURS SCHEDULED
Status: DISCONTINUED | OUTPATIENT
Start: 2024-12-30 | End: 2024-12-31 | Stop reason: HOSPADM

## 2024-12-30 RX ORDER — POTASSIUM CHLORIDE 750 MG/1
20 TABLET, EXTENDED RELEASE ORAL
Status: DISCONTINUED | OUTPATIENT
Start: 2024-12-30 | End: 2024-12-31 | Stop reason: HOSPADM

## 2024-12-30 RX ORDER — LORAZEPAM 2 MG/ML
1 INJECTION INTRAMUSCULAR
Status: COMPLETED | OUTPATIENT
Start: 2024-12-30 | End: 2024-12-30

## 2024-12-30 RX ORDER — DULOXETIN HYDROCHLORIDE 60 MG/1
60 CAPSULE, DELAYED RELEASE ORAL DAILY
Status: DISCONTINUED | OUTPATIENT
Start: 2024-12-30 | End: 2024-12-31 | Stop reason: HOSPADM

## 2024-12-30 RX ADMIN — POTASSIUM CHLORIDE 20 MEQ: 750 TABLET, EXTENDED RELEASE ORAL at 06:49

## 2024-12-30 RX ADMIN — DULOXETINE HYDROCHLORIDE 60 MG: 60 CAPSULE, DELAYED RELEASE ORAL at 11:41

## 2024-12-30 RX ADMIN — CYCLOBENZAPRINE 5 MG: 10 TABLET, FILM COATED ORAL at 17:25

## 2024-12-30 RX ADMIN — PRAMIPEXOLE DIHYDROCHLORIDE 0.5 MG: 0.25 TABLET ORAL at 22:03

## 2024-12-30 RX ADMIN — HEPARIN SODIUM 5000 UNITS: 5000 INJECTION INTRAVENOUS; SUBCUTANEOUS at 05:51

## 2024-12-30 RX ADMIN — HYDROMORPHONE HYDROCHLORIDE 0.5 MG: 1 INJECTION, SOLUTION INTRAMUSCULAR; INTRAVENOUS; SUBCUTANEOUS at 18:27

## 2024-12-30 RX ADMIN — METHOCARBAMOL 500 MG: 500 TABLET ORAL at 11:41

## 2024-12-30 RX ADMIN — HEPARIN SODIUM 5000 UNITS: 5000 INJECTION INTRAVENOUS; SUBCUTANEOUS at 13:52

## 2024-12-30 RX ADMIN — PANTOPRAZOLE SODIUM 20 MG: 20 TABLET, DELAYED RELEASE ORAL at 06:49

## 2024-12-30 RX ADMIN — ACETAMINOPHEN 1000 MG: 10 INJECTION INTRAVENOUS at 01:53

## 2024-12-30 RX ADMIN — ACETAMINOPHEN 975 MG: 325 TABLET, FILM COATED ORAL at 13:52

## 2024-12-30 RX ADMIN — ACETAMINOPHEN 975 MG: 325 TABLET, FILM COATED ORAL at 22:03

## 2024-12-30 RX ADMIN — CYCLOBENZAPRINE 5 MG: 10 TABLET, FILM COATED ORAL at 22:04

## 2024-12-30 RX ADMIN — KETOROLAC TROMETHAMINE 15 MG: 30 INJECTION, SOLUTION INTRAMUSCULAR; INTRAVENOUS at 02:26

## 2024-12-30 RX ADMIN — MAGNESIUM SULFATE HEPTAHYDRATE 2 G: 40 INJECTION, SOLUTION INTRAVENOUS at 03:49

## 2024-12-30 RX ADMIN — DIAZEPAM 2.5 MG: 5 INJECTION INTRAMUSCULAR; INTRAVENOUS at 01:53

## 2024-12-30 RX ADMIN — FUROSEMIDE 20 MG: 20 TABLET ORAL at 11:41

## 2024-12-30 RX ADMIN — LORAZEPAM 1 MG: 2 INJECTION INTRAMUSCULAR; INTRAVENOUS at 18:06

## 2024-12-30 RX ADMIN — DOCUSATE SODIUM 100 MG: 100 CAPSULE, LIQUID FILLED ORAL at 11:41

## 2024-12-30 RX ADMIN — ACETAMINOPHEN 975 MG: 325 TABLET, FILM COATED ORAL at 05:50

## 2024-12-30 RX ADMIN — METHOCARBAMOL 500 MG: 500 TABLET ORAL at 22:12

## 2024-12-30 RX ADMIN — CYCLOBENZAPRINE 5 MG: 10 TABLET, FILM COATED ORAL at 05:50

## 2024-12-30 RX ADMIN — GADOBUTROL 9 ML: 604.72 INJECTION INTRAVENOUS at 19:09

## 2024-12-30 RX ADMIN — LORAZEPAM 1 MG: 2 INJECTION INTRAMUSCULAR; INTRAVENOUS at 17:25

## 2024-12-30 RX ADMIN — SODIUM CHLORIDE, SODIUM LACTATE, POTASSIUM CHLORIDE, AND CALCIUM CHLORIDE 1000 ML: .6; .31; .03; .02 INJECTION, SOLUTION INTRAVENOUS at 02:26

## 2024-12-30 NOTE — ED NOTES
Pt requesting pain medication for 10/10 cramping left leg pain. RN made aware.      Claire Calvo  12/30/24 [id number]

## 2024-12-30 NOTE — ASSESSMENT & PLAN NOTE
Patient has a history of multiple spinal surgeries  Patient has had left lower extremity weakness for some time now, however over the last week has been worsening  She also has been experiencing superficial numbness and tingling on both legs from thighs to toes  CT shows: Multilevel lumbar degenerative changes as described above, notably moderate severe central canal and severe left neuroforaminal stenosis at L4-5, suspect slightly progressed compared to previous MRI   Check MRI of lumbar spine  Continue Cymbalta 60 mg daily  Start Tylenol 975 every 8 hours and cyclobenzaprine 5 mg every 8 hours  PT evaluation

## 2024-12-30 NOTE — CASE MANAGEMENT
Case Management Assessment & Discharge Planning Note    Patient name Hayley Rios  Location S 3 /S 3 -01 MRN [id number]  : 1960 Date 2024       Current Admission Date: 2024  Current Admission Diagnosis:Ambulatory dysfunction   Patient Active Problem List    Diagnosis Date Noted Date Diagnosed    Lymphedema 2024     Ambulatory dysfunction 2024     Class 1 obesity due to excess calories without serious comorbidity with body mass index (BMI) of 34.0 to 34.9 in adult 2024     Ventral hernia without obstruction or gangrene 2024     Muscle weakness of left upper extremity      Myofascial pain syndrome 2022     Iron deficiency 2022     Podagra 2021     Motion sickness      Recurrent incisional hernia      Macromastia 2020     Long-term current use of opiate analgesic 2020     Uncomplicated opioid dependence (HCC) 2020     Anemia 10/07/2020     Hypokalemia 10/07/2020     Recurrent umbilical hernia 2020     Lipoma of torso 2020     Sacroiliitis, not elsewhere classified (HCC)      Osteoarthritis of multiple joints 02/10/2020     Neck pain      Cervical spondylosis without myelopathy      Chronic cough 2019     Vocal fold paresis, right 2019     Dysphonia 2019     Muscle tension dysphonia 2019     Glottic insufficiency 2019     Reflux laryngitis 2019     Laryngeal edema 2019     Allergic rhinitis due to [employer] house dust mite 2019     Mild intermittent asthma without complication 2019     Laryngopharyngeal reflux (LPR) 2019     Dolan's esophagus with dysplasia 2019     Gastroesophageal reflux disease 2019     Prediabetes 2018     Vitamin D deficiency 2018     Lumbar radiculopathy 10/01/2018     Lumbar spondylosis      Environmental allergies 2018     S/P right knee arthroscopy 2018     Hernia of anterior abdominal wall  02/05/2018     Sleep disturbance 11/16/2017     Hyperlipidemia 08/11/2017     Primary osteoarthritis of left hip 07/26/2017     Restless leg syndrome 07/11/2017     Chronic venous insufficiency 06/02/2017     Chronic low back pain 04/11/2017     Lumbar postlaminectomy syndrome 04/11/2017     Arthritis of lumbar spine 03/16/2017     Chronic pain syndrome 03/16/2017     Depression, recurrent (HCC) 03/16/2017       LOS (days): 0  Geometric Mean LOS (GMLOS) (days):   Days to GMLOS:     OBJECTIVE:              Current admission status: Observation       Preferred Pharmacy:   [id number] - [place], [place] - [address]  [address] [address]  Phone: [phone] Fax: [phone]    [id number] [place], [place] - [address]  [address]  [facility] [address]  Phone: [phone] Fax: [phone]    Primary Care Provider: No primary care provider on file.    Primary Insurance: [employer]  Secondary Insurance:     ASSESSMENT:  Active Health Care Proxies    There are no active Health Care Proxies on file.                 Readmission Root Cause  30 Day Readmission: No    Patient Information  Admitted from:: Home  Mental Status: Alert  During Assessment patient was accompanied by: Not accompanied during assessment  Assessment information provided by:: Patient  Primary Caregiver: Self  Support Systems: Self, Family members, Friend  What city do you live in?: lives in [place], [place]  Home entry access options. Select all that apply.: No steps to enter home  Type of Current Residence: 2 story home  Upon entering residence, is there a bedroom on the main floor (no further steps)?: No  A bedroom is located on the following floor levels of residence (select all that apply):: 2nd Floor  Upon entering residence, is there a bathroom on the main floor (no further steps)?: Yes (1/2 bath)  Number of steps to 2nd floor from main floor: One  Flight  Living Arrangements: Lives Alone  Is patient a ?: No    Activities of Daily Living Prior to Admission  Functional Status: Independent  Completes ADLs independently?: Yes  Ambulates independently?: Yes  Does patient use assisted devices?: Yes  Assisted Devices (DME) used: Straight Cane  Does patient currently own DME?: Yes  What DME does the patient currently own?: Straight Cane  Does patient have a history of Outpatient Therapy (PT/OT)?: Yes  Does the patient have a history of Short-Term Rehab?: No  Does patient have a history of HHC?: Yes  Does patient currently have HHC?: No         Patient Information Continued  Income Source: SSI/SSD  Does patient have prescription coverage?: Yes  Does patient receive dialysis treatments?: No  Does patient have a history of substance abuse?: No  Does patient have a history of Mental Health Diagnosis?: No         Means of Transportation  Means of Transport to Appts:: Drives Self          DISCHARGE DETAILS:    Discharge planning discussed with:: patient  Freedom of Choice: Yes     CM contacted family/caregiver?: No- see comments (patient alert & oriented)  Were Treatment Team discharge recommendations reviewed with patient/caregiver?: Yes  Did patient/caregiver verbalize understanding of patient care needs?: Yes  Were patient/caregiver advised of the risks associated with not following Treatment Team discharge recommendations?: Yes    Contacts  Patient Contacts: rut Preston  Relationship to Patient:: Family  Contact Method: Phone  Phone Number: [phone] -- cell [phone]  Reason/Outcome: Emergency Contact                        Treatment Team Recommendation: Home  Discharge Destination Plan:: Home  Transport at Discharge : Other (Comment) (friend)                                      Additional Comments: Introduced self and role to patient at bedside.  Patient lives in [place], here visiting friends.  Patient independent at baseline,  ambulates with a cane, drives.  Has had difficulty with her foot and did consult with a surgeon in [place] during her stay here.  Patient was originally planning on flying back to [place] tomorrow, but can stay temporarily with her friend in [place] if necessary.  CM will continue to follow.

## 2024-12-30 NOTE — ED NOTES
Pt OOB to bedside commode independently; call bell remains within reach; fresh water provided; pt with no further needs at this time; will continue to monitor     Geneva Bo RN  12/30/24 0623

## 2024-12-30 NOTE — PHYSICAL THERAPY NOTE
PHYSICAL THERAPY NOTE          Patient Name: Hayley Rios  Today's Date: 12/30/2024 12/30/24 1452   PT Last Visit   PT Visit Date 12/30/24   Note Type   Note type Cancelled Session   Additional Comments PT eval orders received. Chart reviewed, pt awaiting repeat MRI to determine spinal involvement. Will hold eval today.   General   Additional Pertinent History Per provider report, 64-year-old female with past medical history significant for obesity, lymphedema, multiple spine surgeries, lumbar spinal stenosis with radiculopathy, known history of T3 spinal meningioma who presents with chronic weakness in her left lower extremity that has been worsening over the last week, bilateral superficial numbness and tingling in her thighs to her toes and the left lower extremity muscle spasms.      Patient is known to the neurosurgery office, last seen by Dr. Shah in May 2022 after she was noted to have T3 spinal meningioma with spinal cord displacement and compression.  Surgery vs radiation was discussed with patient at that time however given she was in the middle of relocating to [place], no formal follow up was made for her and she was ultimately lost to follow up.   Clinical exam per provider report, weakness in left lower extremity, gait difficulties, chronic bilateral lymphedema.     Imaging personally reviewed. MRI lumbar spine without contrast 12/30/2024: Extensively Motion degraded examination which reduces diagnostic sensitivity and renders a several of the sequences nondiagnostic.  Within these significant confines there is stable postsurgical changes as above.  Multilevel spondylotic changes of the lumbar spine. Patient lives in [place], here visiting friends.  Patient independent at baseline, ambulates with a cane, drives.  Has had difficulty with her foot and did consult with a surgeon in [place] during her stay here.   Patient was originally planning on flying back to [place] tomorrow, but can stay temporarily with her friend in [place] if necessary.     Rocio Grier PT, DPT, [facility]

## 2024-12-30 NOTE — TELEMEDICINE
"e-Consult (IPC)     Inpatient consult to Neurosurgery  Consult performed by: Ivania Amezquita PA-C  Consult ordered by: Wili Alfonso DO           Contacted by Dr. Alfonso at [facility].    Hayley Rios 64 y.o. female MRN: [id number]  Unit/Bed#: S 3 -01 Encounter: [id number]    Reason for Consult    Per provider report, 64-year-old female with past medical history significant for obesity, lymphedema, multiple spine surgeries, lumbar spinal stenosis with radiculopathy, known history of T3 spinal meningioma who presents with chronic weakness in her left lower extremity that has been worsening over the last week, bilateral superficial numbness and tingling in her thighs to her toes and the left lower extremity muscle spasms.     Patient is known to the neurosurgery office, last seen by Dr. Shah in May 2022 after she was noted to have T3 spinal meningioma with spinal cord displacement and compression.  Surgery vs radiation was discussed with patient at that time however given she was in the middle of relocating to [place], no formal follow up was made for her and she was ultimately lost to follow up.    Available past medical history,social history, surgical history, medication list, drug allergies and review of systems were reviewed.    /85 (BP Location: Right arm)   Pulse 78   Temp (!) 97 °F (36.1 °C) (Temporal)   Resp 16   Ht 5' 4.25\" (1.632 m)   Wt 98.8 kg (217 lb 13 oz)   SpO2 98%   BMI 37.10 kg/m²      Clinical exam per provider report, weakness in left lower extremity, gait difficulties, chronic bilateral lymphedema.    Imaging personally reviewed.   MRI lumbar spine without contrast 12/30/2024: Extensively Motion degraded examination which reduces diagnostic sensitivity and renders a several of the sequences nondiagnostic.  Within these significant confines there is stable postsurgical changes as above.  Multilevel spondylotic changes of the lumbar spine.    Assessment and " Recommendations    Imaging reviewed, significant motion degradation noted.  No significant central canal stenosis is appreciated.  There is some mention of varying degrees of foraminal narrowing most notable at L4-5 and L5-S1 however again difficult to fully assess given significant motion degradation.  I have recommended repeating MRI lumbar spine with and without contrast given prior surgical intervention  Also recommend XR lumbar spine with bending views to ensure no hardware instability  Also she should have a dedicated MRI thoracic spine with and without contrast to evaluate her T3 spinal meningioma  No need for transfer at this juncture, will review imaging once this is completed and provide further recommendations at that time  Rest of care per primary team  Neurosurgery will follow along, please reach out with questions or concerns    All questions answered. Provider is in agreement with the course of action. 11-20 minutes, >50% of the total time devoted to medical consultative verbal/EMR discussion between providers. Written report will be generated in the EMR.

## 2024-12-30 NOTE — PLAN OF CARE
Problem: NEUROSENSORY - ADULT  Goal: Achieves maximal functionality and self care  Description: INTERVENTIONS  - Monitor swallowing and airway patency with patient fatigue and changes in neurological status  - Encourage and assist patient to increase activity and self care.   - Encourage visually impaired, hearing impaired and aphasic patients to use assistive/communication devices  Outcome: Progressing     Problem: SKIN/TISSUE INTEGRITY - ADULT  Goal: Skin Integrity remains intact(Skin Breakdown Prevention)  Description: Assess:  -Perform Germain assessment   -Clean and moisturize skin   -Inspect skin when repositioning, toileting, and assisting with ADLS  -Assess under medical devices   -Assess extremities for adequate circulation and sensation     Bed Management:  -Have minimal linens on bed & keep smooth, unwrinkled  -Change linens as needed when moist or perspiring  -Avoid sitting or lying in one position for more than 2 hours while in bed  -Keep HOB at 45 degrees     Toileting:  -Offer bedside commode  -Assess for incontinence   -Use incontinent care products after each incontinent episode     Activity:  -Mobilize patient 3 times a day  -Encourage activity and walks on unit  -Encourage or provide ROM exercises   -Turn and reposition patient every 2 Hours  -Use appropriate equipment to lift or move patient in bed  -Instruct/ Assist with weight shifting every 2 when out of bed in chair  -Consider limitation of chair time 2 hour intervals    Skin Care:  -Avoid use of baby powder, tape, friction and shearing, hot water or constrictive clothing  -Relieve pressure over bony prominences  -Do not massage red bony areas    Next Steps:  -Teach patient strategies to minimize risks   -Consider consults to  interdisciplinary teams  Outcome: Progressing     Problem: MUSCULOSKELETAL - ADULT  Goal: Maintain or return mobility to safest level of function  Description: INTERVENTIONS:  - Assess patient's ability to carry out  ADLs; assess patient's baseline for ADL function and identify physical deficits which impact ability to perform ADLs (bathing, care of mouth/teeth, toileting, grooming, dressing, etc.)  - Assess/evaluate cause of self-care deficits   - Assess range of motion  - Assess patient's mobility  - Assess patient's need for assistive devices and provide as appropriate  - Encourage maximum independence but intervene and supervise when necessary  - Involve family in performance of ADLs  - Assess for home care needs following discharge   - Consider OT consult to assist with ADL evaluation and planning for discharge  - Provide patient education as appropriate  Outcome: Progressing  Goal: Maintain proper alignment of affected body part  Description: INTERVENTIONS:  - Support, maintain and protect limb and body alignment  - Provide patient/ family with appropriate education  Outcome: Progressing     Problem: PAIN - ADULT  Goal: Verbalizes/displays adequate comfort level or baseline comfort level  Description: Interventions:  - Encourage patient to monitor pain and request assistance  - Assess pain using appropriate pain scale  - Administer analgesics based on type and severity of pain and evaluate response  - Implement non-pharmacological measures as appropriate and evaluate response  - Consider cultural and social influences on pain and pain management  - Notify physician/advanced practitioner if interventions unsuccessful or patient reports new pain  Outcome: Progressing     Problem: SAFETY ADULT  Goal: Patient will remain free of falls  Description: INTERVENTIONS:  - Educate patient/family on patient safety including physical limitations  - Instruct patient to call for assistance with activity   - Consult OT/PT to assist with strengthening/mobility   - Keep Call bell within reach  - Keep bed low and locked with side rails adjusted as appropriate  - Keep care items and personal belongings within reach  - Initiate and maintain  comfort rounds  - Make Fall Risk Sign visible to staff  - Offer Toileting every 2 Hours, in advance of need  - Initiate/Maintain alarm  - Obtain necessary fall risk management equipment  - Apply yellow socks and bracelet for high fall risk patients  - Consider moving patient to room near nurses station  Outcome: Progressing  Goal: Maintain or return to baseline ADL function  Description: INTERVENTIONS:  -  Assess patient's ability to carry out ADLs; assess patient's baseline for ADL function and identify physical deficits which impact ability to perform ADLs (bathing, care of mouth/teeth, toileting, grooming, dressing, etc.)  - Assess/evaluate cause of self-care deficits   - Assess range of motion  - Assess patient's mobility; develop plan if impaired  - Assess patient's need for assistive devices and provide as appropriate  - Encourage maximum independence but intervene and supervise when necessary  - Involve family in performance of ADLs  - Assess for home care needs following discharge   - Consider OT consult to assist with ADL evaluation and planning for discharge  - Provide patient education as appropriate  Outcome: Progressing  Goal: Maintains/Returns to pre admission functional level  Description: INTERVENTIONS:  - Perform AM-PAC 6 Click Basic Mobility/ Daily Activity assessment daily.  - Set and communicate daily mobility goal to care team and patient/family/caregiver.   - Collaborate with rehabilitation services on mobility goals if consulted  - Perform Range of Motion 3 times a day.  - Reposition patient every 2 hours.  - Dangle patient 3 times a day  - Stand patient 3 times a day  - Ambulate patient 3 times a day  - Out of bed to chair 3 times a day   - Out of bed for meals 3 times a day  - Out of bed for toileting  - Record patient progress and toleration of activity level   Outcome: Progressing     Problem: DISCHARGE PLANNING  Goal: Discharge to home or other facility with appropriate  resources  Description: INTERVENTIONS:  - Identify barriers to discharge w/patient and caregiver  - Arrange for needed discharge resources and transportation as appropriate  - Identify discharge learning needs (meds, wound care, etc.)  - Arrange for interpretive services to assist at discharge as needed  - Refer to Case Management Department for coordinating discharge planning if the patient needs post-hospital services based on physician/advanced practitioner order or complex needs related to functional status, cognitive ability, or social support system  Outcome: Progressing     Problem: Knowledge Deficit  Goal: Patient/family/caregiver demonstrates understanding of disease process, treatment plan, medications, and discharge instructions  Description: Complete learning assessment and assess knowledge base.  Interventions:  - Provide teaching at level of understanding  - Provide teaching via preferred learning methods  Outcome: Progressing

## 2024-12-30 NOTE — ASSESSMENT & PLAN NOTE
Patient is a 64-year-old female with past medical history of obesity, lymphedema, multiple spinal surgeries, lumbar spinal stenosis with radiculopathy who presents to the hospital with multiple complaints.  She reports chronic weakness in her left lower extremity however has been worsening over the last week or so, she also reports bilateral superficial numbness and tingling from her thighs to her toes, and left lower extremity muscle spasms.   Physical therapy consult  Scheduled muscle relaxants for spasms  Check MRI of her spine to evaluate for worsening stenosis

## 2024-12-30 NOTE — ED PROVIDER NOTES
Time reflects when diagnosis was documented in both MDM as applicable and the Disposition within this note       Time User Action Codes Description Comment    12/30/2024  5:12 AM Francois Harding [R26.2] Ambulatory dysfunction     12/30/2024  5:13 AM Francois Harding [M48.061] Neuroforaminal stenosis of lumbar spine     12/30/2024  5:13 AM Francois Harding [R29.898] Left leg weakness           ED Disposition       ED Disposition   Admit    Condition   Stable    Date/Time   Mon Dec 30, 2024  5:13 AM    Comment   Case was discussed with SAJI and the patient's admission status was agreed to be Admission Status: observation status to the service of Dr. Magaña .               Assessment & Plan       Medical Decision Making  Patient's history and physical are concerning for potential stroke or compression of the lumbar nerve roots causing weakness of her left leg.  Other symptoms could be attributed to iron deficiency anemia, electrolyte dysfunction, WENDY, rhabdomyolysis, venous insufficiency, CHF, underlying fracture, cancer or tumor compressing the nerve root.    Patient unable to walk and is not safe for discharge.  She does agree for admission.  Will obtain a workup here to evaluate for potential prior stroke since symptoms started with a CT head.  Will hold off on a CT angio since this is not acute and she is otherwise neurologically intact.  Will probably need an MRI.  Will also add a CT lumbar spine to evaluate for possible compression fracture, occupying lesion that could be compressing the nerve root.  She has no other symptoms consistent with cauda equina at this time where I feel that she would need an emergent MRI.    Will check blood work to evaluate for other causes mentioned above.  Will treat with Valium and Tylenol.  Further treatment dictated by results of labs and imaging.  Disposition based on CT results.    Labs are overall normal.  No need for trending troponins since her initial 1  was negative.  CT is pending at this time.  Will continue with IV pain control and IV fluids for hydration.    5:09 AM  CT head is negative.  CT of the lumbar spine does show moderate to severe central canal and severe left neuroforaminal stenosis at L4-5.  This would explain her symptoms.  Given her ambulatory dysfunction and abnormal imaging I will admit her to internal medicine for further evaluation and potential need for repeat MRI.    Amount and/or Complexity of Data Reviewed  Labs: ordered. Decision-making details documented in ED Course.  Radiology: ordered.    Risk  Prescription drug management.        ED Course as of 12/30/24 0516   Mon Dec 30, 2024   0225 B-Type Natriuretic Peptide(BNP)  Normal    0225 CK  Normal    0225 HS Troponin 0hr (reflex protocol)  Normal    0225 Magnesium  Normal    0225 CBC and differential(!)  At baseline   0225 Comprehensive metabolic panel  Normal        Medications   acetaminophen (Ofirmev) injection 1,000 mg (0 mg Intravenous Stopped 12/30/24 0220)   diazepam (VALIUM) injection 2.5 mg (2.5 mg Intravenous Given 12/30/24 0153)   lactated ringers bolus 1,000 mL (0 mL Intravenous Stopped 12/30/24 0348)   ketorolac (TORADOL) injection 15 mg (15 mg Intravenous Given 12/30/24 0226)   magnesium sulfate 2 g/50 mL IVPB (premix) 2 g (2 g Intravenous New Bag 12/30/24 0349)       ED Risk Strat Scores                          SBIRT 22yo+      Flowsheet Row Most Recent Value   Initial Alcohol Screen: US AUDIT-C     1. How often do you have a drink containing alcohol? 0 Filed at: 12/30/2024 0229   2. How many drinks containing alcohol do you have on a typical day you are drinking?  0 Filed at: 12/30/2024 0229   3a. Male UNDER 65: How often do you have five or more drinks on one occasion? 0 Filed at: 12/29/2024 2354   3b. FEMALE Any Age, or MALE 65+: How often do you have 4 or more drinks on one occassion? 0 Filed at: 12/30/2024 0229   Audit-C Score 0 Filed at: 12/30/2024 0229   RUTH: How  "many times in the past year have you...    Used an illegal drug or used a prescription medication for non-medical reasons? Never Filed at: 12/30/2024 0229                            History of Present Illness       Chief Complaint   Patient presents with    Extremity Weakness     Pt reports worsening bilateral leg weakness, cramping, and swelling for 1 month       Past Medical History:   Diagnosis Date    Anemia     Anesthesia     \"sometimes low BP upon waking up\" pt states she stopped breathing 1 time during surgery    Arthritis     Back pain     Dolan's esophagus     Chronic pain disorder     back    Chronic venous insufficiency     Colon polyp     Cough variant asthma     d/t mold-all sx diminished when removed from source    COVID-19 03/2020    Depression     Environmental allergies     dust    GERD (gastroesophageal reflux disease)     Hiatal hernia     History of anemia     History of fusion of spine for scoliosis     \"as a teenager\"    History of transfusion     1978 - no adverse reaction    Left lumbar radiculitis     Last Assessed: [id number]    Lumbar postlaminectomy syndrome     Migraines     Morbid obesity (HCC)     Motion sickness     Neck pain     Osteoarthritis     of left hip    Right knee pain     Seasonal allergies     Shortness of breath     with stairs    Umbilical hernia     Uses brace     right knee    Wears glasses       Past Surgical History:   Procedure Laterality Date    ARTHRODESIS      lumbar    ARTHRODESIS      Spinal Arthrodesis for Deformity; Last Assessed: 16Mar2017    BACK SURGERY      lumbar fusion,with nydia/screw and cage implant    BACK SURGERY      surgery for scoliosis    BREAST CYST EXCISION Right     benign    CHOLECYSTECTOMY LAPAROSCOPIC N/A 12/20/2023    Procedure: FENESTRATED SUBTOTAL CHOLECYSTECTOMY LAPAROSCOPIC, EXTENSIVE LYSIS OF ADHESIONS.;  Surgeon: Chelle Butler MD;  Location: [facility] OR;  Service: General    COLONOSCOPY      COLONOSCOPY N/A 03/14/2019    " Procedure: COLONOSCOPY;  Surgeon: Van Dyson MD;  Location: [facility];  Service: Gastroenterology    ESOPHAGOGASTRODUODENOSCOPY N/A 03/14/2019    Procedure: ESOPHAGOGASTRODUODENOSCOPY (EGD) W RFA(BARRX);  Surgeon: Van Dyson MD;  Location:  GI LAB;  Service: Gastroenterology    HERNIA REPAIR      umbilical hernia repair x2    PLANTAR FASCIA SURGERY Left     FL ARTHRS KNE SURG W/MENISCECTOMY MED/LAT W/SHVG Right 04/04/2018    Procedure: ARTHROSCOPY KNEE PARTIAL MEDIAL MENISECTOMY , CHONDROPLASTY;  Surgeon: Rocio Roe DO;  [facility];  Service: Orthopedics    FL BREAST REDUCTION Bilateral 03/12/2021    Procedure: BREAST REDUCTION;  Surgeon: Cortez Sandoval MD;  Location:  MAIN OR;  Service: Plastics    FL COLONOSCOPY FLX DX W/COLLJ SPEC WHEN PFRMD N/A 02/20/2018    Procedure: COLONOSCOPY with polypectomy;  Surgeon: Apryl Garcia MD;  [facility];  Service: General    FL ESOPHAGOGASTRODUODENOSCOPY TRANSORAL DIAGNOSTIC N/A 05/24/2017    Procedure: ESOPHAGOGASTRODUODENOSCOPY (EGD);  Surgeon: Marcello Street MD;  Location: [facility] LAB;  Service: Gastroenterology    FL ESOPHAGOGASTRODUODENOSCOPY TRANSORAL DIAGNOSTIC N/A 08/31/2017    Procedure: ESOPHAGOGASTRODUODENOSCOPY (EGD) W RFA;  Surgeon: Macho Aguayo MD;  Location:  GI LAB;  Service: Gastroenterology    FL LAPS RPR RECURRENT INCISIONAL HERNIA REDUCIBLE N/A 01/21/2021    Procedure: REPAIR HERNIA INCISIONAL LAPAROSCOPIC W/ ROBOTICS, with mesh;  Surgeon: Errol Bermudez MD;  [facility];  Service: General    FL RPR AA HERNIA 1ST 3-10 CM REDUCIBLE N/A 2/19/2024    Procedure: VENTRAL HERNIA REPAIR 3CM WITH MESH;  Surgeon: Chelle Butler MD;  Location:  MAIN OR;  Service: General    WISDOM TOOTH EXTRACTION        Family History   Problem Relation Age of Onset    Lung cancer Mother     Cancer Mother     Alcohol abuse Father     Other Father         Cardiac Disorder    Depression Father     Hypertension Father     Heart  attack Father     Breast cancer Maternal Grandmother     Lung cancer Maternal Grandmother     Diabetes type I Other     No Known Problems Sister     No Known Problems Brother     No Known Problems Maternal Grandfather     Leukemia Paternal Grandmother     No Known Problems Paternal Grandfather     No Known Problems Sister     No Known Problems Maternal Aunt     No Known Problems Paternal Aunt     Stroke Neg Hx       Social History     Tobacco Use    Smoking status: Never    Smokeless tobacco: Never   Vaping Use    Vaping status: Never Used   Substance Use Topics    Alcohol use: Yes     Comment: rarely-every 3 mos    Drug use: Not Currently     Comment: medical marijuana 7 years ago      E-Cigarette/Vaping    E-Cigarette Use Never User       E-Cigarette/Vaping Substances    Nicotine No     THC No     CBD No     Flavoring No     Other No     Unknown No       I have reviewed and agree with the history as documented.     Patient is a 64-year-old female from [place] that is here visiting.  She presents tonight with multiple different complaints.  The primary complaint is persistent left upper leg pain and spasms.  States that she has been having pain in this leg for several weeks but it has been worsening over the past 1 week.  She does have a history of restless leg syndrome from iron deficiency anemia.  She is currently not on iron infusions.    In addition to this she tells me that she has had left leg weakness for over a month.  She states that she has a history of a left foot surgery that causes her to walk abnormally along with back problems that did require surgery and a large abdominal wall hernia that was repaired in [facility].  Patient initially thought the left leg weakness was due to the hernia but since that was repaired she states that the weakness has persisted and is only been worsening.  She denies any other weakness such as arm, face or other stroke symptoms.  No prior history of  stroke.    She states that she does have chronic back pain but denies any new symptoms other than left leg weakness.  She states that she does have intermittent numbness in both her legs but it is worse on the left.  She denies any bowel or bladder problems, incontinence, etc.  She has not had any recent procedures, fevers or chills.    She does take Lasix for lower extremity swelling and has had electrolyte disturbances in the past.      History provided by:  Patient   used: No        Review of Systems   Constitutional:  Negative for chills, diaphoresis and fever.   Respiratory:  Positive for shortness of breath (chronic when she gets anemic). Negative for cough and chest tightness.    Cardiovascular:  Positive for leg swelling. Negative for chest pain.   Gastrointestinal:  Negative for abdominal distention, abdominal pain, blood in stool, constipation, diarrhea, nausea and vomiting.   Genitourinary:  Negative for difficulty urinating, dysuria, flank pain, frequency, hematuria and urgency.   Musculoskeletal:  Positive for arthralgias, gait problem and myalgias.   Skin:  Negative for color change, pallor, rash and wound.   Allergic/Immunologic: Negative for immunocompromised state.   Neurological:  Positive for weakness and numbness. Negative for dizziness, light-headedness and headaches.   All other systems reviewed and are negative.          Objective       ED Triage Vitals   Temperature Pulse Blood Pressure Respirations SpO2 Patient Position - Orthostatic VS   12/29/24 2353 12/29/24 2353 12/29/24 2353 12/29/24 2353 12/29/24 2353 12/30/24 0200   98.6 °F (37 °C) 74 142/63 18 100 % Lying      Temp Source Heart Rate Source BP Location FiO2 (%) Pain Score    12/29/24 2353 12/30/24 0200 12/30/24 0200 -- 12/29/24 2353    Oral Monitor Right arm  8      Vitals      Date and Time Temp Pulse SpO2 Resp BP Pain Score FACES Pain Rating User   12/30/24 0339 -- 72 100 % 17 145/60 10 - Worst Possible Pain --  LB   12/30/24 0226 -- -- -- -- -- 10 - Worst Possible Pain -- DS   12/30/24 0200 -- 68 99 % 17 120/57 -- -- DS   12/29/24 2353 98.6 °F (37 °C) 74 100 % 18 142/63 8 -- BRB            Physical Exam  Vitals and nursing note reviewed.   Constitutional:       General: She is not in acute distress.     Appearance: She is well-developed. She is not ill-appearing, toxic-appearing or diaphoretic.   HENT:      Head: Normocephalic and atraumatic.      Right Ear: External ear normal.      Left Ear: External ear normal.      Nose: Nose normal. No congestion or rhinorrhea.   Eyes:      General: No scleral icterus.        Right eye: No discharge.         Left eye: No discharge.      Conjunctiva/sclera: Conjunctivae normal.      Pupils: Pupils are equal, round, and reactive to light.   Neck:      Trachea: No tracheal deviation.   Cardiovascular:      Rate and Rhythm: Normal rate and regular rhythm.      Heart sounds: Normal heart sounds. No murmur heard.     No friction rub.   Pulmonary:      Effort: Pulmonary effort is normal. No tachypnea, accessory muscle usage or respiratory distress.      Breath sounds: Normal breath sounds. No stridor. No wheezing or rales.   Abdominal:      General: There is no distension.      Palpations: Abdomen is soft.      Tenderness: There is no abdominal tenderness. There is no guarding or rebound.   Musculoskeletal:         General: No tenderness or deformity. Normal range of motion.      Cervical back: Normal range of motion and neck supple.      Right lower leg: Edema present.      Left lower leg: Edema present.   Skin:     General: Skin is warm and dry.   Neurological:      General: No focal deficit present.      Mental Status: She is alert and oriented to person, place, and time. She is not disoriented.      Cranial Nerves: Cranial nerves 2-12 are intact.      Motor: Weakness present.   Psychiatric:         Speech: Speech normal.         Behavior: Behavior normal.         Results Reviewed        Procedure Component Value Units Date/Time    TSH, 3rd generation with Free T4 reflex [[id number]]  (Normal) Collected: 12/30/24 0152    Lab Status: Final result Specimen: Blood from Arm, Left Updated: 12/30/24 0230     TSH 3RD GENERATON 4.384 uIU/mL     HS Troponin 0hr (reflex protocol) [[id number]]  (Normal) Collected: 12/30/24 0152    Lab Status: Final result Specimen: Blood from Arm, Left Updated: 12/30/24 0221     hs TnI 0hr <2 ng/L     B-Type Natriuretic Peptide(BNP) [[id number]]  (Normal) Collected: 12/30/24 0152    Lab Status: Final result Specimen: Blood from Arm, Left Updated: 12/30/24 0220     BNP 24 pg/mL     Comprehensive metabolic panel [[id number]] Collected: 12/30/24 0152    Lab Status: Final result Specimen: Blood from Arm, Left Updated: 12/30/24 0214     Sodium 139 mmol/L      Potassium 3.5 mmol/L      Chloride 104 mmol/L      CO2 27 mmol/L      ANION GAP 8 mmol/L      BUN 17 mg/dL      Creatinine 0.61 mg/dL      Glucose 112 mg/dL      Calcium 9.3 mg/dL      AST 14 U/L      ALT 12 U/L      Alkaline Phosphatase 68 U/L      Total Protein 7.3 g/dL      Albumin 4.4 g/dL      Total Bilirubin 0.38 mg/dL      eGFR 97 ml/min/1.73sq m     Narrative:      National Kidney Disease Foundation guidelines for Chronic Kidney Disease (CKD):     Stage 1 with normal or high GFR (GFR > 90 mL/min/1.73 square meters)    Stage 2 Mild CKD (GFR = 60-89 mL/min/1.73 square meters)    Stage 3A Moderate CKD (GFR = 45-59 mL/min/1.73 square meters)    Stage 3B Moderate CKD (GFR = 30-44 mL/min/1.73 square meters)    Stage 4 Severe CKD (GFR = 15-29 mL/min/1.73 square meters)    Stage 5 End Stage CKD (GFR <15 mL/min/1.73 square meters)  Note: GFR calculation is accurate only with a steady state creatinine    Magnesium [[id number]]  (Normal) Collected: 12/30/24 0152    Lab Status: Final result Specimen: Blood from Arm, Left Updated: 12/30/24 0214     Magnesium 2.2 mg/dL     CK [[id number]]  (Normal) Collected: 12/30/24 0155    Lab Status:  Final result Specimen: Blood from Arm, Left Updated: 12/30/24 0214     Total CK 89 U/L     CBC and differential [[id number]]  (Abnormal) Collected: 12/30/24 0152    Lab Status: Final result Specimen: Blood from Arm, Left Updated: 12/30/24 0158     WBC 7.36 Thousand/uL      RBC 4.42 Million/uL      Hemoglobin 11.5 g/dL      Hematocrit 38.2 %      MCV 86 fL      MCH 26.0 pg      MCHC 30.1 g/dL      RDW 15.1 %      MPV 9.4 fL      Platelets 300 Thousands/uL      nRBC 0 /100 WBCs      Segmented % 60 %      Immature Grans % 0 %      Lymphocytes % 31 %      Monocytes % 5 %      Eosinophils Relative 3 %      Basophils Relative 1 %      Absolute Neutrophils 4.42 Thousands/µL      Absolute Immature Grans 0.03 Thousand/uL      Absolute Lymphocytes 2.27 Thousands/µL      Absolute Monocytes 0.37 Thousand/µL      Eosinophils Absolute 0.22 Thousand/µL      Basophils Absolute 0.05 Thousands/µL             CT head without contrast   Final Interpretation by Wiliam Lopez MD (12/30 0231)      No acute intracranial abnormality.                  Workstation performed: [id number]         CT spine lumbar without contrast   Final Interpretation by Israel Anderson MD (12/30 7246)      No acute compression fracture or posttraumatic subluxation.      Stable postsurgical changes as described above.   No gross evidence of acute hardware complication.   Multilevel lumbar degenerative changes as described above, notably moderate severe central canal and severe left neuroforaminal stenosis at L4-5, suspect slightly progressed compared to previous MRI study dated 6/26/2021 accounting for differences in    modality,? Source of the patient's symptoms. Clinical correlation and follow-up recommended.      Workstation performed: [id number]             ECG 12 Lead Documentation Only    Date/Time: 12/30/2024 1:59 AM    Performed by: Francois Harding Jr., DO  Authorized by: Francois Harding Jr., DO    Indications / Diagnosis:  SOB  ECG reviewed by me, the  ED Provider: yes    Patient location:  ED  Previous ECG:     Previous ECG:  Unavailable  Interpretation:     Interpretation: normal    Rate:     ECG rate:  64    ECG rate assessment: normal    Rhythm:     Rhythm: sinus rhythm    Ectopy:     Ectopy: none    QRS:     QRS intervals:  Normal  Conduction:     Conduction: normal    ST segments:     ST segments:  Normal  T waves:     T waves: normal        ED Medication and Procedure Management   Prior to Admission Medications   Prescriptions Last Dose Informant Patient Reported? Taking?   DULoxetine (CYMBALTA) 60 mg delayed release capsule   No No   Sig: Take 1 capsule (60 mg total) by mouth daily   VITAMIN D PO  Self Yes No   Sig: Take 1 capsule by mouth once a week   clotrimazole-betamethasone (LOTRISONE) 1-0.05 % cream   Yes No   Sig: Apply topically 2 (two) times a day   fluconazole (DIFLUCAN) 150 mg tablet   Yes No   Sig: TAKE 1 TABLET BY MOUTH FOR ONE DOSE THEN 2ND DOSE 1 WEEK LATER   hydrOXYzine HCL (ATARAX) 25 mg tablet   No No   Sig: Take 1 tablet (25 mg total) by mouth every 6 (six) hours as needed for itching   nystatin (MYCOSTATIN) powder   No No   Sig: Apply topically 3 (three) times a day for 7 days   omeprazole (PriLOSEC OTC) 20 MG tablet   Yes No   Sig: Take 20 mg by mouth daily   pramipexole (MIRAPEX) 0.25 mg tablet   No No   Sig: TAKE 2 TABLETS BY MOUTH DAILY AT BEDTIME   Patient taking differently: 3 (three) times a day   simvastatin (ZOCOR) 20 mg tablet   Yes No   Sig: Take 20 mg by mouth every other day   triamcinolone (KENALOG) 0.1 % ointment   Yes No      Facility-Administered Medications: None     Patient's Medications   Discharge Prescriptions    No medications on file     No discharge procedures on file.  ED SEPSIS DOCUMENTATION   Time reflects when diagnosis was documented in both MDM as applicable and the Disposition within this note       Time User Action Codes Description Comment    12/30/2024  5:12 AM Francois Harding Add [R26.2]  Ambulatory dysfunction     12/30/2024  5:13 AM Francois Harding [M48.061] Neuroforaminal stenosis of lumbar spine     12/30/2024  5:13 AM Francois Harding [R29.898] Left leg weakness                  Francois Harding Jr., DO  12/30/24 0516

## 2024-12-30 NOTE — ASSESSMENT & PLAN NOTE
Patient's BMI is 34.33  She would benefit greatly from a very low carbohydrate diet (less than 50 g daily) and intermittent fasting

## 2024-12-30 NOTE — ED NOTES
Pt's breakfast (Waffles w/butter & syrup and coffee w/cream & sweet & low) ordered at this time     Geneva Bo RN  12/30/24 4117

## 2024-12-30 NOTE — ED NOTES
Pt provided breakfast menu at this time; instructed to utilized call bell when ready to place order     Geneva Bo RN  12/30/24 2787

## 2024-12-30 NOTE — H&P
H&P - Hospitalist   Name: Hayley Rios 64 y.o. female I MRN: [id number]  Unit/Bed#: ED-02 I Date of Admission: 12/30/2024   Date of Service: 12/30/2024 I Hospital Day: 0     Assessment & Plan  Ambulatory dysfunction  Patient is a 64-year-old female with past medical history of obesity, lymphedema, multiple spinal surgeries, lumbar spinal stenosis with radiculopathy who presents to the hospital with multiple complaints.  She reports chronic weakness in her left lower extremity however has been worsening over the last week or so, she also reports bilateral superficial numbness and tingling from her thighs to her toes, and left lower extremity muscle spasms.   Physical therapy consult  Scheduled muscle relaxants for spasms  Check MRI of her spine to evaluate for worsening stenosis  Restless leg syndrome  Continue Mirapex 0.5 mg before bed  Lumbar radiculopathy  Patient has a history of multiple spinal surgeries  Patient has had left lower extremity weakness for some time now, however over the last week has been worsening  She also has been experiencing superficial numbness and tingling on both legs from thighs to toes  CT shows: Multilevel lumbar degenerative changes as described above, notably moderate severe central canal and severe left neuroforaminal stenosis at L4-5, suspect slightly progressed compared to previous MRI   Check MRI of lumbar spine  Continue Cymbalta 60 mg daily  Start Tylenol 975 every 8 hours and cyclobenzaprine 5 mg every 8 hours  PT evaluation  Lymphedema  Chronic, stable  Continue Lasix daily  Patient does not have leg wraps  Class 1 obesity due to excess calories without serious comorbidity with body mass index (BMI) of 34.0 to 34.9 in adult  Patient's BMI is 34.33  She would benefit greatly from a very low carbohydrate diet (less than 50 g daily) and intermittent fasting      VTE Pharmacologic Prophylaxis: VTE Score: 3 Moderate Risk (Score 3-4) - Pharmacological DVT Prophylaxis Ordered:  "heparin.  Code Status: Level 1 - Full Code   Discussion with patient    Anticipated Length of Stay: Patient will be admitted on an observation basis with an anticipated length of stay of less than 2 midnights secondary to PT evaluation.    History of Present Illness   Chief Complaint: Ambulatory dysfunction due to left lower extremity weakness    Hayley Rios is a 64-year-old female with past medical history of obesity, lymphedema, multiple spinal surgeries, lumbar spinal stenosis with radiculopathy who presents to the hospital with multiple complaints.  She reports chronic weakness in her left lower extremity however has been worsening over the last week or so, she also reports bilateral superficial numbness and tingling from her thighs to her toes, and left lower extremity muscle spasms.     Review of Systems   Constitutional:  Negative for chills and fever.   HENT:  Negative for ear pain and sore throat.    Eyes:  Negative for pain and visual disturbance.   Respiratory:  Negative for cough and shortness of breath.    Cardiovascular:  Positive for leg swelling. Negative for chest pain and palpitations.   Gastrointestinal:  Negative for abdominal pain and vomiting.   Genitourinary:  Negative for dysuria and hematuria.   Musculoskeletal:  Positive for back pain and gait problem. Negative for arthralgias.   Skin:  Negative for color change and rash.   Neurological:  Positive for weakness and numbness. Negative for seizures and syncope.   All other systems reviewed and are negative.      Historical Information   Past Medical History:   Diagnosis Date    Anemia     Anesthesia     \"sometimes low BP upon waking up\" pt states she stopped breathing 1 time during surgery    Arthritis     Back pain     Dolan's esophagus     Chronic pain disorder     back    Chronic venous insufficiency     Colon polyp     Cough variant asthma     d/t mold-all sx diminished when removed from source    COVID-19 03/2020    Depression     " "Environmental allergies     dust    GERD (gastroesophageal reflux disease)     Hiatal hernia     History of anemia     History of fusion of spine for scoliosis     \"as a teenager\"    History of transfusion     1978 - no adverse reaction    Left lumbar radiculitis     Last Assessed: [id number]    Lumbar postlaminectomy syndrome     Migraines     Morbid obesity (HCC)     Motion sickness     Neck pain     Osteoarthritis     of left hip    Right knee pain     Seasonal allergies     Shortness of breath     with stairs    Umbilical hernia     Uses brace     right knee    Wears glasses      Past Surgical History:   Procedure Laterality Date    ARTHRODESIS      lumbar    ARTHRODESIS      Spinal Arthrodesis for Deformity; Last Assessed: 16Mar2017    BACK SURGERY      lumbar fusion,with nydia/screw and cage implant    BACK SURGERY      surgery for scoliosis    BREAST CYST EXCISION Right     benign    CHOLECYSTECTOMY LAPAROSCOPIC N/A 12/20/2023    Procedure: FENESTRATED SUBTOTAL CHOLECYSTECTOMY LAPAROSCOPIC, EXTENSIVE LYSIS OF ADHESIONS.;  Surgeon: Chelle Butler MD;  Location: [facility];  Service: General    COLONOSCOPY      COLONOSCOPY N/A 03/14/2019    Procedure: COLONOSCOPY;  Surgeon: Van Dyson MD;  Location: [facility];  Service: Gastroenterology    ESOPHAGOGASTRODUODENOSCOPY N/A 03/14/2019    Procedure: ESOPHAGOGASTRODUODENOSCOPY (EGD) W RFA(BARRX);  Surgeon: Van Dyson MD;  [facility]: [facility];  Service: Gastroenterology    HERNIA REPAIR      umbilical hernia repair x2    PLANTAR FASCIA SURGERY Left     MO ARTHRS KNE SURG W/MENISCECTOMY MED/LAT W/SHVG Right 04/04/2018    Procedure: ARTHROSCOPY KNEE PARTIAL MEDIAL MENISECTOMY , CHONDROPLASTY;  Surgeon: Rocio Roe DO;  [facility]: [facility];  Service: Orthopedics    MO BREAST REDUCTION Bilateral 03/12/2021    Procedure: BREAST REDUCTION;  Surgeon: Cortez Sandoval MD;  Location:  MAIN OR;  Service: Plastics    MO COLONOSCOPY FLX DX W/COLLJ SPEC WHEN PFRMD N/A " 02/20/2018    Procedure: COLONOSCOPY with polypectomy;  Surgeon: Apryl Garcia MD;  [facility]: [facility];  Service: General    NM ESOPHAGOGASTRODUODENOSCOPY TRANSORAL DIAGNOSTIC N/A 05/24/2017    Procedure: ESOPHAGOGASTRODUODENOSCOPY (EGD);  Surgeon: Marcello Street MD;  Location: [facility];  Service: Gastroenterology    NM ESOPHAGOGASTRODUODENOSCOPY TRANSORAL DIAGNOSTIC N/A 08/31/2017    Procedure: ESOPHAGOGASTRODUODENOSCOPY (EGD) W RFA;  Surgeon: Macho Aguayo MD;  Location:  GI LAB;  Service: Gastroenterology    NM LAPS RPR RECURRENT INCISIONAL HERNIA REDUCIBLE N/A 01/21/2021    Procedure: REPAIR HERNIA INCISIONAL LAPAROSCOPIC W/ ROBOTICS, with mesh;  Surgeon: Errol Bermudez MD;  [facility]: [facility];  Service: General    NM RPR AA HERNIA 1ST 3-10 CM REDUCIBLE N/A 2/19/2024    Procedure: VENTRAL HERNIA REPAIR 3CM WITH MESH;  Surgeon: Chelle Butler MD;  Location:  MAIN OR;  Service: General    WISDOM TOOTH EXTRACTION       Social History     Tobacco Use    Smoking status: Never    Smokeless tobacco: Never   Vaping Use    Vaping status: Never Used   Substance and Sexual Activity    Alcohol use: Yes     Comment: rarely-every 3 mos    Drug use: Not Currently     Comment: medical marijuana 7 years ago    Sexual activity: Not Currently     E-Cigarette/Vaping    E-Cigarette Use Never User      E-Cigarette/Vaping Substances    Nicotine No     THC No     CBD No     Flavoring No     Other No     Unknown No      Family History   Problem Relation Age of Onset    Lung cancer Mother     Cancer Mother     Alcohol abuse Father     Other Father         Cardiac Disorder    Depression Father     Hypertension Father     Heart attack Father     Breast cancer Maternal Grandmother     Lung cancer Maternal Grandmother     Diabetes type I Other     No Known Problems Sister     No Known Problems Brother     No Known Problems Maternal Grandfather     Leukemia Paternal Grandmother     No Known Problems Paternal Grandfather     No  Known Problems Sister     No Known Problems Maternal Aunt     No Known Problems Paternal Aunt     Stroke Neg Hx      Social History:  Marital Status: Single   Occupation: Not addressed  Patient Pre-hospital Living Situation: Home in [place]  Patient Pre-hospital Level of Mobility: walks  Patient Pre-hospital Diet Restrictions: None at all    Meds/Allergies   I have reviewed home medications with patient personally.  Prior to Admission medications    Medication Sig Start Date End Date Taking? Authorizing Provider   furosemide (LASIX) 20 mg tablet Take 1 tablet by mouth daily 9/15/24  Yes Historical Provider, MD   clotrimazole-betamethasone (LOTRISONE) 1-0.05 % cream Apply topically 2 (two) times a day 2/29/24 3/7/24  Historical Provider, MD   DULoxetine (CYMBALTA) 60 mg delayed release capsule Take 1 capsule (60 mg total) by mouth daily 8/26/22   Mau Garcia DO   nystatin (MYCOSTATIN) powder Apply topically 3 (three) times a day for 7 days 1/2/24 3/6/24  Chelle Butler MD   omeprazole (PriLOSEC OTC) 20 MG tablet Take 20 mg by mouth daily    Historical Provider, MD   potassium chloride (Klor-Con) 10 mEq tablet Take 10 mEq by mouth 2 (two) times a day    Historical Provider, MD   pramipexole (MIRAPEX) 0.25 mg tablet TAKE 2 TABLETS BY MOUTH DAILY AT BEDTIME  Patient taking differently: 3 (three) times a day 2/10/23   Mau Garcia DO   VITAMIN D PO Take 1 capsule by mouth once a week    Historical Provider, MD   fluconazole (DIFLUCAN) 150 mg tablet TAKE 1 TABLET BY MOUTH FOR ONE DOSE THEN 2ND DOSE 1 WEEK LATER 2/29/24 12/30/24  Historical Provider, MD   hydrOXYzine HCL (ATARAX) 25 mg tablet Take 1 tablet (25 mg total) by mouth every 6 (six) hours as needed for itching 3/6/24 12/30/24  Chelle Butler MD   simvastatin (ZOCOR) 20 mg tablet Take 20 mg by mouth every other day  12/30/24  Historical Provider, MD   triamcinolone (KENALOG) 0.1 % ointment  3/6/24 12/30/24  Historical Provider, MD  "    Allergies   Allergen Reactions    Dust Mite Extract Shortness Of Breath    Latex Blisters, Itching, Other (See Comments) and Rash     contact    Other Other (See Comments)     Seasonal AND cock roaches    Sulfa Antibiotics GI Intolerance, Other (See Comments) and Vomiting     Topical-blistering    \"intense vomiting\"       Objective :  Temp:  [98.6 °F (37 °C)] 98.6 °F (37 °C)  HR:  [68-78] 78  BP: (120-145)/(57-64) 143/64  Resp:  [17-19] 19  SpO2:  [98 %-100 %] 98 %  O2 Device: None (Room air)    Physical Exam  Vitals and nursing note reviewed.   Constitutional:       General: She is awake. She is not in acute distress.     Appearance: She is well-developed. She is obese.   HENT:      Head: Normocephalic and atraumatic.   Eyes:      Conjunctiva/sclera: Conjunctivae normal.   Cardiovascular:      Rate and Rhythm: Normal rate and regular rhythm.      Heart sounds: No murmur heard.  Pulmonary:      Effort: Pulmonary effort is normal. No respiratory distress.      Breath sounds: Normal breath sounds.   Abdominal:      Palpations: Abdomen is soft.      Tenderness: There is no abdominal tenderness.   Musculoskeletal:         General: Swelling present.      Cervical back: Neck supple.      Right lower leg: Edema present.      Left lower leg: Edema present.      Comments: Chronic bilateral lymphedema   Skin:     General: Skin is warm and dry.      Capillary Refill: Capillary refill takes less than 2 seconds.   Neurological:      Mental Status: She is alert and oriented to person, place, and time.      Motor: Weakness present.      Gait: Gait abnormal.      Comments: Lower extremity strength right > left   Psychiatric:         Mood and Affect: Mood normal.         Behavior: Behavior is cooperative.          Lines/Drains:            Lab Results: I have reviewed the following results:  Results from last 7 days   Lab Units 12/30/24  0152   WBC Thousand/uL 7.36   HEMOGLOBIN g/dL 11.5   HEMATOCRIT % 38.2   PLATELETS " Thousands/uL 300   SEGS PCT % 60   LYMPHO PCT % 31   MONO PCT % 5   EOS PCT % 3     Results from last 7 days   Lab Units 12/30/24  0152   SODIUM mmol/L 139   POTASSIUM mmol/L 3.5   CHLORIDE mmol/L 104   CO2 mmol/L 27   BUN mg/dL 17   CREATININE mg/dL 0.61   ANION GAP mmol/L 8   CALCIUM mg/dL 9.3   ALBUMIN g/dL 4.4   TOTAL BILIRUBIN mg/dL 0.38   ALK PHOS U/L 68   ALT U/L 12   AST U/L 14   GLUCOSE RANDOM mg/dL 112             Lab Results   Component Value Date    HGBA1C 6.2 07/11/2019    HGBA1C 5.8 02/05/2018           Imaging Results Review: I reviewed radiology reports from this admission including: CT head and CT lumbar spine.  Other Study Results Review: EKG was reviewed.     ** Please Note: This note has been constructed using a voice recognition system. **

## 2024-12-31 ENCOUNTER — HOSPITAL ENCOUNTER (INPATIENT)
Facility: HOSPITAL | Age: 64
LOS: 15 days | DRG: 029 | End: 2025-01-15
Attending: INTERNAL MEDICINE | Admitting: INTERNAL MEDICINE
Payer: COMMERCIAL

## 2024-12-31 VITALS
DIASTOLIC BLOOD PRESSURE: 79 MMHG | OXYGEN SATURATION: 96 % | WEIGHT: 217.81 LBS | BODY MASS INDEX: 37.19 KG/M2 | HEIGHT: 64 IN | TEMPERATURE: 97.7 F | SYSTOLIC BLOOD PRESSURE: 124 MMHG | HEART RATE: 83 BPM | RESPIRATION RATE: 17 BRPM

## 2024-12-31 DIAGNOSIS — G95.20 COMPRESSION OF SPINAL CORD WITH MYELOPATHY (HCC): Primary | ICD-10-CM

## 2024-12-31 DIAGNOSIS — D32.1 BENIGN TUMOR OF SPINAL MENINGIOMA (HCC): ICD-10-CM

## 2024-12-31 DIAGNOSIS — G25.81 RESTLESS LEGS SYNDROME: ICD-10-CM

## 2024-12-31 DIAGNOSIS — G95.20 COMPRESSION OF SPINAL CORD WITH MYELOPATHY (HCC): ICD-10-CM

## 2024-12-31 PROBLEM — M62.81: Status: ACTIVE | Noted: 2024-12-31

## 2024-12-31 PROCEDURE — 99233 SBSQ HOSP IP/OBS HIGH 50: CPT | Performed by: INTERNAL MEDICINE

## 2024-12-31 PROCEDURE — 99223 1ST HOSP IP/OBS HIGH 75: CPT | Performed by: INTERNAL MEDICINE

## 2024-12-31 RX ORDER — DOCUSATE SODIUM 100 MG/1
100 CAPSULE, LIQUID FILLED ORAL 2 TIMES DAILY
Status: CANCELLED | OUTPATIENT
Start: 2025-01-01

## 2024-12-31 RX ORDER — PANTOPRAZOLE SODIUM 20 MG/1
20 TABLET, DELAYED RELEASE ORAL
Status: CANCELLED | OUTPATIENT
Start: 2025-01-01

## 2024-12-31 RX ORDER — POTASSIUM CHLORIDE 1500 MG/1
20 TABLET, EXTENDED RELEASE ORAL
Status: CANCELLED | OUTPATIENT
Start: 2025-01-01

## 2024-12-31 RX ORDER — HEPARIN SODIUM 5000 [USP'U]/ML
5000 INJECTION, SOLUTION INTRAVENOUS; SUBCUTANEOUS EVERY 8 HOURS SCHEDULED
Status: CANCELLED | OUTPATIENT
Start: 2024-12-31

## 2024-12-31 RX ORDER — CYCLOBENZAPRINE HCL 5 MG
5 TABLET ORAL 3 TIMES DAILY
Status: CANCELLED | OUTPATIENT
Start: 2024-12-31

## 2024-12-31 RX ORDER — PRAMIPEXOLE DIHYDROCHLORIDE 0.5 MG/1
0.5 TABLET ORAL
Status: DISCONTINUED | OUTPATIENT
Start: 2025-01-01 | End: 2025-01-15 | Stop reason: HOSPADM

## 2024-12-31 RX ORDER — FUROSEMIDE 20 MG/1
20 TABLET ORAL DAILY
Status: CANCELLED | OUTPATIENT
Start: 2025-01-01

## 2024-12-31 RX ORDER — DOCUSATE SODIUM 100 MG/1
100 CAPSULE, LIQUID FILLED ORAL 2 TIMES DAILY
Status: DISCONTINUED | OUTPATIENT
Start: 2025-01-01 | End: 2025-01-08

## 2024-12-31 RX ORDER — ONDANSETRON 2 MG/ML
4 INJECTION INTRAMUSCULAR; INTRAVENOUS EVERY 6 HOURS PRN
Status: DISCONTINUED | OUTPATIENT
Start: 2024-12-31 | End: 2025-01-15 | Stop reason: HOSPADM

## 2024-12-31 RX ORDER — ACETAMINOPHEN 325 MG/1
975 TABLET ORAL EVERY 8 HOURS SCHEDULED
Status: CANCELLED | OUTPATIENT
Start: 2024-12-31

## 2024-12-31 RX ORDER — METHOCARBAMOL 750 MG/1
750 TABLET, FILM COATED ORAL EVERY 8 HOURS SCHEDULED
Status: CANCELLED | OUTPATIENT
Start: 2024-12-31

## 2024-12-31 RX ORDER — PRAMIPEXOLE DIHYDROCHLORIDE 0.25 MG/1
0.5 TABLET ORAL
Status: CANCELLED | OUTPATIENT
Start: 2024-12-31

## 2024-12-31 RX ORDER — ACETAMINOPHEN 325 MG/1
975 TABLET ORAL EVERY 8 HOURS SCHEDULED
Status: DISCONTINUED | OUTPATIENT
Start: 2025-01-01 | End: 2025-01-07

## 2024-12-31 RX ORDER — HEPARIN SODIUM 5000 [USP'U]/ML
5000 INJECTION, SOLUTION INTRAVENOUS; SUBCUTANEOUS EVERY 8 HOURS SCHEDULED
Status: DISPENSED | OUTPATIENT
Start: 2024-12-31 | End: 2025-01-06

## 2024-12-31 RX ORDER — METHOCARBAMOL 750 MG/1
750 TABLET, FILM COATED ORAL EVERY 8 HOURS SCHEDULED
Status: DISCONTINUED | OUTPATIENT
Start: 2024-12-31 | End: 2024-12-31 | Stop reason: HOSPADM

## 2024-12-31 RX ORDER — PANTOPRAZOLE SODIUM 20 MG/1
20 TABLET, DELAYED RELEASE ORAL
Status: DISCONTINUED | OUTPATIENT
Start: 2025-01-01 | End: 2025-01-03

## 2024-12-31 RX ORDER — DULOXETIN HYDROCHLORIDE 60 MG/1
60 CAPSULE, DELAYED RELEASE ORAL DAILY
Status: CANCELLED | OUTPATIENT
Start: 2025-01-01

## 2024-12-31 RX ORDER — FUROSEMIDE 20 MG/1
20 TABLET ORAL DAILY
Status: DISCONTINUED | OUTPATIENT
Start: 2025-01-01 | End: 2025-01-15 | Stop reason: HOSPADM

## 2024-12-31 RX ORDER — ONDANSETRON 2 MG/ML
4 INJECTION INTRAMUSCULAR; INTRAVENOUS EVERY 6 HOURS PRN
Status: CANCELLED | OUTPATIENT
Start: 2024-12-31

## 2024-12-31 RX ORDER — DULOXETIN HYDROCHLORIDE 60 MG/1
60 CAPSULE, DELAYED RELEASE ORAL DAILY
Status: DISCONTINUED | OUTPATIENT
Start: 2025-01-01 | End: 2025-01-15 | Stop reason: HOSPADM

## 2024-12-31 RX ORDER — METHOCARBAMOL 750 MG/1
750 TABLET, FILM COATED ORAL EVERY 8 HOURS SCHEDULED
Status: DISCONTINUED | OUTPATIENT
Start: 2025-01-01 | End: 2025-01-01

## 2024-12-31 RX ORDER — POTASSIUM CHLORIDE 1500 MG/1
20 TABLET, EXTENDED RELEASE ORAL
Status: DISCONTINUED | OUTPATIENT
Start: 2025-01-01 | End: 2025-01-02

## 2024-12-31 RX ADMIN — POTASSIUM CHLORIDE 20 MEQ: 750 TABLET, EXTENDED RELEASE ORAL at 08:16

## 2024-12-31 RX ADMIN — PANTOPRAZOLE SODIUM 20 MG: 20 TABLET, DELAYED RELEASE ORAL at 05:35

## 2024-12-31 RX ADMIN — METHOCARBAMOL 500 MG: 500 TABLET ORAL at 14:43

## 2024-12-31 RX ADMIN — PRAMIPEXOLE DIHYDROCHLORIDE 0.5 MG: 0.25 TABLET ORAL at 20:42

## 2024-12-31 RX ADMIN — ACETAMINOPHEN 975 MG: 325 TABLET, FILM COATED ORAL at 20:41

## 2024-12-31 RX ADMIN — METHOCARBAMOL 750 MG: 750 TABLET ORAL at 20:42

## 2024-12-31 RX ADMIN — CYCLOBENZAPRINE 5 MG: 10 TABLET, FILM COATED ORAL at 16:08

## 2024-12-31 RX ADMIN — IRON SUCROSE 200 MG: 20 INJECTION, SOLUTION INTRAVENOUS at 10:41

## 2024-12-31 RX ADMIN — ACETAMINOPHEN 975 MG: 325 TABLET, FILM COATED ORAL at 13:09

## 2024-12-31 RX ADMIN — FUROSEMIDE 20 MG: 20 TABLET ORAL at 08:17

## 2024-12-31 RX ADMIN — CYCLOBENZAPRINE 5 MG: 10 TABLET, FILM COATED ORAL at 05:35

## 2024-12-31 RX ADMIN — ACETAMINOPHEN 975 MG: 325 TABLET, FILM COATED ORAL at 05:35

## 2024-12-31 RX ADMIN — METHOCARBAMOL 500 MG: 500 TABLET ORAL at 08:16

## 2024-12-31 RX ADMIN — DULOXETINE HYDROCHLORIDE 60 MG: 60 CAPSULE, DELAYED RELEASE ORAL at 08:17

## 2024-12-31 NOTE — NURSING NOTE
Pt received to room 205-1.  Pt oriented to staff and room. Pt provided with call bell.  This RN agrees with prior nursing assessment, pt reports when she removed her robe her IV was pulled out accidently, this RN replaced IV access.

## 2024-12-31 NOTE — EMTALA/ACUTE CARE TRANSFER
[place] [address]  [address] [facility] [address]  Dept: [phone]      ACUTE CARE TRANSFER CONSENT    NAME Hayley Rios                                         1960                              MRN [id number]    I have been informed of my rights regarding examination, treatment, and transfer   by Dr. Wili Alfonso DO    Benefits:  Neurosurgical evaluation    Risks:  Delay in care, traffic accident and death      Consent for Transfer:  I acknowledge that my medical condition has been evaluated and explained to me by the treating physician or other qualified medical person and/or my attending physician, who has recommended that I be transferred to the service of    at  . The above potential benefits of such transfer, the potential risks associated with such transfer, and the probable risks of not being transferred have been explained to me, and I fully understand them.  The doctor has explained that, in my case, the benefits of transfer outweigh the risks.  I agree to be transferred.    I authorize the performance of emergency medical procedures and treatments upon me in both transit and upon arrival at the receiving facility.  Additionally, I authorize the release of any and all medical records to the receiving facility and request they be transported with me, if possible.  I understand that the safest mode of transportation during a medical emergency is an ambulance and that the Hospital advocates the use of this mode of transport. Risks of traveling to the receiving facility by car, including absence of medical control, life sustaining equipment, such as oxygen, and medical personnel has been explained to me and I fully understand them.    (CLARENCE CORRECT BOX BELOW)  [  ]  I consent to the stated transfer and to be transported by ambulance/helicopter.  [  ]  I consent to the stated transfer, but refuse transportation by ambulance and accept full responsibility for my  transportation by car.  I understand the risks of non-ambulance transfers and I exonerate the Hospital and its staff from any deterioration in my condition that results from this refusal.    X___________________________________________    DATE  24  TIME________  Signature of patient or legally responsible individual signing on patient behalf           RELATIONSHIP TO PATIENT_________________________          Provider Certification    NAME Hayley Rios                                         1960                              MRN [id number]    A medical screening exam was performed on the above named patient.  Based on the examination:    Condition Necessitating Transfer Cord compression from Meningioma    Patient Condition:  Stable    Reason for Transfer:  Neurosurgery evaluation    Transfer Requirements: Facility     Space available and qualified personnel available for treatment as acknowledged by    Agreed to accept transfer and to provide appropriate medical treatment as acknowledged by          Appropriate medical records of the examination and treatment of the patient are provided at the time of transfer   STAFF INITIAL WHEN COMPLETED _______  Transfer will be performed by qualified personnel from    and appropriate transfer equipment as required, including the use of necessary and appropriate life support measures.    Provider Certification: I have examined the patient and explained the following risks and benefits of being transferred/refusing transfer to the patient/family:         Based on these reasonable risks and benefits to the patient and/or the unborn child(trace), and based upon the information available at the time of the patient’s examination, I certify that the medical benefits reasonably to be expected from the provision of appropriate medical treatments at another medical facility outweigh the increasing risks, if any, to the individual’s medical condition, and in the case of labor  to the unborn child, from effecting the transfer.    X____________________________________________ DATE 12/31/24        TIME_______      ORIGINAL - SEND TO MEDICAL RECORDS   COPY - SEND WITH PATIENT DURING TRANSFER

## 2024-12-31 NOTE — TREATMENT PLAN
Assessment:  History of multiple lumbar spine  Obesity  Lymphedema  Spinal stenosis  Known T3 spinal meningioma  Acute on chronic left lower extremity weakness and numbness    Imaging:  MRI thoracic spine with and without contrast 12/30/2024:  Suboptimal examination due to mild, moderate, and severe motion artifact - worse on postcontrast sequences. Similar 1.4 cm intradural extramedullary probable meningioma in left posterolateral thoracic spine region at approximately T3 level with associated marked spinal cord compression with severe canal stenosis given motion degraded exam. No cord edema given motion degradation. Recommend evaluation by neurosurgery. Degenerative changes, as detailed above.  MRI lumbar spine with contrast 12/30/2024: Suboptimal examination due to moderate-to-severe motion degraded exam of lumbar spine and 3 plane localizer images, sagittal T1 post contrast and axial T1 postcontrast sequences. No abnormal enhancement in lumbar spine given limitations of motion degradation. Please see earlier same day MRI lumbar spine without contrast, MRI thoracic spine with and without contrast, CT lumbar spine without contrast for further evaluation.  X-ray lumbar spine 12/30/2024: No acute osseous abnormality. Intact hardware.    Plan:  Imaging reviewed, in regard to lumbar spine stable postoperative changes noted, no significant central canal stenosis appreciated.  In regard to thoracic spine MRI, redemonstrated T3 lesion is noted although study is motion degraded.  When compared to imaging in 2022 it does appear to be slightly larger although difficult to assess because of the motion degradation of the new scan.  Nevertheless it is causing significant cord compression and may be contributing to her overall weakness and clinical picture.  X-rays are completed of the lumbar spine without any osseous abnormality and hardware is intact.  After discussion with primary team, plan is for patient to transfer to [facility]  for neurosurgical evaluation.  Will determine at that time if surgical intervention is warranted on an inpatient versus outpatient basis.  Rest of care per primary team  Please reach out to neurosurgery in the interim should patient develop any new or worsening symptoms, otherwise full consultation to follow up on arrival to [facility]

## 2024-12-31 NOTE — PLAN OF CARE
Problem: NEUROSENSORY - ADULT  Goal: Achieves maximal functionality and self care  Description: INTERVENTIONS  - Monitor swallowing and airway patency with patient fatigue and changes in neurological status  - Encourage and assist patient to increase activity and self care.   - Encourage visually impaired, hearing impaired and aphasic patients to use assistive/communication devices  Outcome: Progressing     Problem: SKIN/TISSUE INTEGRITY - ADULT  Goal: Skin Integrity remains intact(Skin Breakdown Prevention)  Description: Assess:  -Perform Germain assessment every   -Clean and moisturize skin every   -Inspect skin when repositioning, toileting, and assisting with ADLS  -Assess under medical devices such as  every   -Assess extremities for adequate circulation and sensation     Bed Management:  -Have minimal linens on bed & keep smooth, unwrinkled  -Change linens as needed when moist or perspiring  -Avoid sitting or lying in one position for more than  hours while in bed  -Keep HOB at degrees     Toileting:  -Offer bedside commode  -Assess for incontinence every -Use incontinent care products after each incontinent episode such as     Activity:  -Mobilize patient  times a day  -Encourage activity and walks on unit  -Encourage or provide ROM exercises   -Turn and reposition patient every  Hours  -Use appropriate equipment to lift or move patient in bed  -Instruct/ Assist with weight shifting every  when out of bed in chair  -Consider limitation of chair time  hour intervals    Skin Care:  -Avoid use of baby powder, tape, friction and shearing, hot water or constrictive clothing  -Relieve pressure over bony prominences using -Do not massage red bony areas    Next Steps:  -Teach patient strategies to minimize risks such as    -Consider consults to  interdisciplinary teams such as   Outcome: Progressing     Problem: MUSCULOSKELETAL - ADULT  Goal: Maintain or return mobility to safest level of function  Description:  INTERVENTIONS:  - Assess patient's ability to carry out ADLs; assess patient's baseline for ADL function and identify physical deficits which impact ability to perform ADLs (bathing, care of mouth/teeth, toileting, grooming, dressing, etc.)  - Assess/evaluate cause of self-care deficits   - Assess range of motion  - Assess patient's mobility  - Assess patient's need for assistive devices and provide as appropriate  - Encourage maximum independence but intervene and supervise when necessary  - Involve family in performance of ADLs  - Assess for home care needs following discharge   - Consider OT consult to assist with ADL evaluation and planning for discharge  - Provide patient education as appropriate  Outcome: Progressing  Goal: Maintain proper alignment of affected body part  Description: INTERVENTIONS:  - Support, maintain and protect limb and body alignment  - Provide patient/ family with appropriate education  Outcome: Progressing     Problem: PAIN - ADULT  Goal: Verbalizes/displays adequate comfort level or baseline comfort level  Description: Interventions:  - Encourage patient to monitor pain and request assistance  - Assess pain using appropriate pain scale  - Administer analgesics based on type and severity of pain and evaluate response  - Implement non-pharmacological measures as appropriate and evaluate response  - Consider cultural and social influences on pain and pain management  - Notify physician/advanced practitioner if interventions unsuccessful or patient reports new pain  Outcome: Progressing     Problem: SAFETY ADULT  Goal: Patient will remain free of falls  Description: INTERVENTIONS:  - Educate patient/family on patient safety including physical limitations  - Instruct patient to call for assistance with activity   - Consult OT/PT to assist with strengthening/mobility   - Keep Call bell within reach  - Keep bed low and locked with side rails adjusted as appropriate  - Keep care items and  personal belongings within reach  - Initiate and maintain comfort rounds  - Make Fall Risk Sign visible to staff  - Offer Toileting every  Hours, in advance of need  - Initiate/Maintain alarm  - Obtain necessary fall risk management equipment:   - Apply yellow socks and bracelet for high fall risk patients  - Consider moving patient to room near nurses station  Outcome: Progressing  Goal: Maintain or return to baseline ADL function  Description: INTERVENTIONS:  -  Assess patient's ability to carry out ADLs; assess patient's baseline for ADL function and identify physical deficits which impact ability to perform ADLs (bathing, care of mouth/teeth, toileting, grooming, dressing, etc.)  - Assess/evaluate cause of self-care deficits   - Assess range of motion  - Assess patient's mobility; develop plan if impaired  - Assess patient's need for assistive devices and provide as appropriate  - Encourage maximum independence but intervene and supervise when necessary  - Involve family in performance of ADLs  - Assess for home care needs following discharge   - Consider OT consult to assist with ADL evaluation and planning for discharge  - Provide patient education as appropriate  Outcome: Progressing  Goal: Maintains/Returns to pre admission functional level  Description: INTERVENTIONS:  - Perform AM-PAC 6 Click Basic Mobility/ Daily Activity assessment daily.  - Set and communicate daily mobility goal to care team and patient/family/caregiver.   - Collaborate with rehabilitation services on mobility goals if consulted  - Perform Range of Motion  times a day.  - Reposition patient every  hours.  - Dangle patient  times a day  - Stand patient  times a day  - Ambulate patient times a day  - Out of bed to chair  times a day   - Out of bed for meals  times a day  - Out of bed for toileting  - Record patient progress and toleration of activity level   Outcome: Progressing     Problem: DISCHARGE PLANNING  Goal: Discharge to home or  other facility with appropriate resources  Description: INTERVENTIONS:  - Identify barriers to discharge w/patient and caregiver  - Arrange for needed discharge resources and transportation as appropriate  - Identify discharge learning needs (meds, wound care, etc.)  - Arrange for interpretive services to assist at discharge as needed  - Refer to Case Management Department for coordinating discharge planning if the patient needs post-hospital services based on physician/advanced practitioner order or complex needs related to functional status, cognitive ability, or social support system  Outcome: Progressing     Problem: Knowledge Deficit  Goal: Patient/family/caregiver demonstrates understanding of disease process, treatment plan, medications, and discharge instructions  Description: Complete learning assessment and assess knowledge base.  Interventions:  - Provide teaching at level of understanding  - Provide teaching via preferred learning methods  Outcome: Progressing

## 2024-12-31 NOTE — ASSESSMENT & PLAN NOTE
Patient has a history of multiple spinal surgeries  Patient has had left lower extremity weakness for some time now, however over the last week has been worsening  She also has been experiencing superficial numbness and tingling on both legs from thighs to toes  CT shows: Multilevel lumbar degenerative changes as described above, notably moderate severe central canal and severe left neuroforaminal stenosis at L4-5, suspect slightly progressed compared to previous MRI   MRI of lumbar spine-initially performed which was done without contrast was nondiagnostic  Does have previous history of spinal meningioma T3 and T4 imaging was noted to have a possible compression at that nerve root level  Continue Cymbalta 60 mg daily  Start Tylenol 975 every 8 hours and Robaxin 750 mg scheduled

## 2024-12-31 NOTE — ASSESSMENT & PLAN NOTE
Patient with acute weakness as well as muscle spasm  MRI with possible T3 compression of nerve root as etiology  Patient with muscle spasms of the legs, denies back pain  Still with intermittent anesthesia of the legs  Suspect myelopathy secondary to nerve root impingement  Consider repeat MRI with anesthesia sedation as second study was degraded by motion artifact due to muscle spasm  B12 and A1c pending

## 2024-12-31 NOTE — PLAN OF CARE
Problem: NEUROSENSORY - ADULT  Goal: Achieves maximal functionality and self care  Description: INTERVENTIONS  - Monitor swallowing and airway patency with patient fatigue and changes in neurological status  - Encourage and assist patient to increase activity and self care.   - Encourage visually impaired, hearing impaired and aphasic patients to use assistive/communication devices  Outcome: Progressing     Problem: SKIN/TISSUE INTEGRITY - ADULT  Goal: Skin Integrity remains intact(Skin Breakdown Prevention)  Description: Assess:  -Perform Germain assessment  -Clean and moisturize skin  -Inspect skin when repositioning, toileting, and assisting with ADLS  -Assess under medical devices  -Assess extremities for adequate circulation and sensation     Bed Management:  -Have minimal linens on bed & keep smooth, unwrinkled  -Change linens as needed when moist or perspiring  -Avoid sitting or lying in one position for more than 2 hours while in bed  -Keep HOB at 30 degrees     Toileting:  -Offer bedside commode  -Assess for incontinence  -Use incontinent care products after each incontinent episode    Activity:  -Mobilize patient 3 times a day  -Encourage activity and walks on unit  -Encourage or provide ROM exercises   -Turn and reposition patient every 2 Hours  -Use appropriate equipment to lift or move patient in bed  -Instruct/ Assist with weight shifting when out of bed in chair  -Consider limitation of chair time 2 hour intervals    Skin Care:  -Avoid use of baby powder, tape, friction and shearing, hot water or constrictive clothing  -Relieve pressure over bony prominences  -Do not massage red bony areas    Next Steps:  -Teach patient strategies to minimize risks   -Consider consults to  interdisciplinary teams  Outcome: Progressing     Problem: MUSCULOSKELETAL - ADULT  Goal: Maintain or return mobility to safest level of function  Description: INTERVENTIONS:  - Assess patient's ability to carry out ADLs; assess  patient's baseline for ADL function and identify physical deficits which impact ability to perform ADLs (bathing, care of mouth/teeth, toileting, grooming, dressing, etc.)  - Assess/evaluate cause of self-care deficits   - Assess range of motion  - Assess patient's mobility  - Assess patient's need for assistive devices and provide as appropriate  - Encourage maximum independence but intervene and supervise when necessary  - Involve family in performance of ADLs  - Assess for home care needs following discharge   - Consider OT consult to assist with ADL evaluation and planning for discharge  - Provide patient education as appropriate  Outcome: Progressing  Goal: Maintain proper alignment of affected body part  Description: INTERVENTIONS:  - Support, maintain and protect limb and body alignment  - Provide patient/ family with appropriate education  Outcome: Progressing     Problem: PAIN - ADULT  Goal: Verbalizes/displays adequate comfort level or baseline comfort level  Description: Interventions:  - Encourage patient to monitor pain and request assistance  - Assess pain using appropriate pain scale  - Administer analgesics based on type and severity of pain and evaluate response  - Implement non-pharmacological measures as appropriate and evaluate response  - Consider cultural and social influences on pain and pain management  - Notify physician/advanced practitioner if interventions unsuccessful or patient reports new pain  Outcome: Progressing     Problem: SAFETY ADULT  Goal: Patient will remain free of falls  Description: INTERVENTIONS:  - Educate patient/family on patient safety including physical limitations  - Instruct patient to call for assistance with activity   - Consult OT/PT to assist with strengthening/mobility   - Keep Call bell within reach  - Keep bed low and locked with side rails adjusted as appropriate  - Keep care items and personal belongings within reach  - Initiate and maintain comfort  rounds  - Make Fall Risk Sign visible to staff  - Offer Toileting every 2 Hours, in advance of need  - Initiate/Maintain bed alarm  - Obtain necessary fall risk management equipment  - Apply yellow socks and bracelet for high fall risk patients  - Consider moving patient to room near nurses station  Outcome: Progressing  Goal: Maintain or return to baseline ADL function  Description: INTERVENTIONS:  -  Assess patient's ability to carry out ADLs; assess patient's baseline for ADL function and identify physical deficits which impact ability to perform ADLs (bathing, care of mouth/teeth, toileting, grooming, dressing, etc.)  - Assess/evaluate cause of self-care deficits   - Assess range of motion  - Assess patient's mobility; develop plan if impaired  - Assess patient's need for assistive devices and provide as appropriate  - Encourage maximum independence but intervene and supervise when necessary  - Involve family in performance of ADLs  - Assess for home care needs following discharge   - Consider OT consult to assist with ADL evaluation and planning for discharge  - Provide patient education as appropriate  Outcome: Progressing  Goal: Maintains/Returns to pre admission functional level  Description: INTERVENTIONS:  - Perform AM-PAC 6 Click Basic Mobility/ Daily Activity assessment daily.  - Set and communicate daily mobility goal to care team and patient/family/caregiver.   - Collaborate with rehabilitation services on mobility goals if consulted  - Perform Range of Motion 3 times a day.  - Reposition patient every 2 hours.  - Dangle patient 3 times a day  - Stand patient 3 times a day  - Ambulate patient 3 times a day  - Out of bed to chair 3 times a day   - Out of bed for meals 3 times a day  - Out of bed for toileting  - Record patient progress and toleration of activity level   Outcome: Progressing     Problem: DISCHARGE PLANNING  Goal: Discharge to home or other facility with appropriate  resources  Description: INTERVENTIONS:  - Identify barriers to discharge w/patient and caregiver  - Arrange for needed discharge resources and transportation as appropriate  - Identify discharge learning needs (meds, wound care, etc.)  - Arrange for interpretive services to assist at discharge as needed  - Refer to Case Management Department for coordinating discharge planning if the patient needs post-hospital services based on physician/advanced practitioner order or complex needs related to functional status, cognitive ability, or social support system  Outcome: Progressing     Problem: Knowledge Deficit  Goal: Patient/family/caregiver demonstrates understanding of disease process, treatment plan, medications, and discharge instructions  Description: Complete learning assessment and assess knowledge base.  Interventions:  - Provide teaching at level of understanding  - Provide teaching via preferred learning methods  Outcome: Progressing     Problem: METABOLIC, FLUID AND ELECTROLYTES - ADULT  Goal: Electrolytes maintained within normal limits  Description: INTERVENTIONS:  - Monitor labs and assess patient for signs and symptoms of electrolyte imbalances  - Administer electrolyte replacement as ordered  - Monitor response to electrolyte replacements, including repeat lab results as appropriate  - Instruct patient on fluid and nutrition as appropriate  Outcome: Progressing  Goal: Fluid balance maintained  Description: INTERVENTIONS:  - Monitor labs   - Monitor I/O and WT  - Instruct patient on fluid and nutrition as appropriate  - Assess for signs & symptoms of volume excess or deficit  Outcome: Progressing

## 2024-12-31 NOTE — QUICK NOTE
Patient with concern for possible nerve root impingement especially at the T3 region where previous spinal meningioma was noted.    Case discussed with neurosurgery as well as radiology early in the morning.  Obtained stat MRI.  There is notable marked spinal cord compression with severe canal stenosis.    Vikram was severely degraded by motion artifact as patient had multiple muscle spasms throughout the study.  This was despite multiple doses of Ativan and Dilaudid as well as muscle relaxers.    Will plan at this time for transfer to facility capable of in person neurosurgical evaluation.    Will make n.p.o. in case of need for possible surgical management    VTE prophylaxis held for now    DO COOKIE

## 2024-12-31 NOTE — PROGRESS NOTES
Progress Note - Hospitalist   Name: Hayley Rios 64 y.o. female I MRN: [id number]  Unit/Bed#: [place] [address] -01 I Date of Admission: 12/30/2024   Date of Service: 12/31/2024 I Hospital Day: 0    Assessment & Plan  Ambulatory dysfunction  Patient is a 64-year-old female with past medical history of obesity, lymphedema, multiple spinal surgeries, lumbar spinal stenosis with radiculopathy who presents to the hospital with multiple complaints.  She reports chronic weakness in her left lower extremity however has been worsening over the last week or so, she also reports bilateral superficial numbness and tingling from her thighs to her toes, and left lower extremity muscle spasms.   Suspect that this is secondary to nerve root compression, radiculopathy versus true cord compression  Restless leg syndrome  Continue Mirapex 0.5 mg before bed  Iron panel reviewed, evidence of iron deficiency with low iron stores and low percent saturation  Will start Venofer day #1 of 3  Could consider carbidopa-levodopa as an outpatient if no improvement, defer to providers in [place]  Possible that patient's symptoms may be related to nerve root impingement  Lumbar radiculopathy  Patient has a history of multiple spinal surgeries  Patient has had left lower extremity weakness for some time now, however over the last week has been worsening  She also has been experiencing superficial numbness and tingling on both legs from thighs to toes  CT shows: Multilevel lumbar degenerative changes as described above, notably moderate severe central canal and severe left neuroforaminal stenosis at L4-5, suspect slightly progressed compared to previous MRI   MRI of lumbar spine-initially performed which was done without contrast was nondiagnostic  Does have previous history of spinal meningioma T3 and T4 imaging was noted to have a possible compression at that nerve root level  Continue Cymbalta 60 mg daily  Start Tylenol 975 every 8 hours and  Robaxin 750 mg scheduled    Lymphedema  Chronic, stable  Continue Lasix daily  Patient does not have leg wraps  Class 1 obesity due to excess calories without serious comorbidity with body mass index (BMI) of 34.0 to 34.9 in adult  Patient's BMI is 34.33  Consider GLP-1 as an outpatient  Acute muscle weakness  Patient with acute weakness as well as muscle spasm  MRI with possible T3 compression of nerve root as etiology  Patient with muscle spasms of the legs, denies back pain  Still with intermittent anesthesia of the legs  Suspect myelopathy secondary to nerve root impingement  Consider repeat MRI with anesthesia sedation as second study was degraded by motion artifact due to muscle spasm  B12 and A1c pending            Hospital Course:     64-year-old female patient presenting with leg spasms and leg cramps as well as lower extremity weakness left greater than right.  Patient with MRI showing possible nerve root compression at T4.  Patient was initially known to have T3 meningioma which was lost to follow-up.  She has been accepted to transfer to [facility] for in person neurosurgical evaluation and possible operative management if necessary.    Assessment:      Principal Problem:    Ambulatory dysfunction  Active Problems:    Restless leg syndrome    Lumbar radiculopathy    Lymphedema    Class 1 obesity due to excess calories without serious comorbidity with body mass index (BMI) of 34.0 to 34.9 in adult    Acute muscle weakness      Plan:    Start Venofer given iron deficiency anemia.  Per patient she was recommended for iron infusions  This continue Flexeril and start Robaxin at increased dose of 750 mg every 12 hours  Continue serial leg exams  Awaiting transfer to [facility]       VTE Pharmacologic Prophylaxis:   Pharmacologic:  DVT prophylaxis on hold in case patient requires operative management  Mechanical VTE Prophylaxis in Place: Yes    AM-PAC Basic Mobility:  Basic Mobility  Inpatient Raw Score: 21    JH-HLM Achieved: 6: Walk 10 steps or more  JH-HLM Goal: 6: Walk 10 steps or more    HLM Goal listed above. Continue with multidisciplinary rounding and encourage appropriate mobility to improve upon HLM goals.         Patient Centered Rounds: Case discussed and reviewed with nursing    Discussions with Specialists or Other Care Team Provider: Case management, neurosurgical physician assistant in the morning    Education and Discussions with Family / Patient: Patient friend at bedside    Time Spent for Care: 80 minutes.  More than 50% of total time spent on counseling and coordination of care as described above.    Current Length of Stay: 0 day(s)    Current Patient Status: Inpatient   Certification Statement: The patient will continue to require additional inpatient hospital stay due to neurosurgical evaluation and transfer to [facility]    Discharge Plan / Estimated Discharge Date: Pending discharge    Code Status: Level 1 - Full Code      Subjective:   Seen and examined, patient denies any pain in her legs, her main complaint is weakness.  She also reports she is having intermittent muscle spasms and cramps which she cannot get any significant relief.  She denies any urinary symptoms at my time of evaluation including urinary retention.  She does report that she does have episodes where it is difficult to go but is able to urinate.        A complete and comprehensive 14 point organ system review has been performed and all other systems are negative other than stated above.    Objective:     Vitals:   Temp (24hrs), Av °F (36.7 °C), Min:97.7 °F (36.5 °C), Max:98.5 °F (36.9 °C)    Temp:  [97.7 °F (36.5 °C)-98.5 °F (36.9 °C)] 97.7 °F (36.5 °C)  HR:  [76-92] 83  Resp:  [17-20] 17  BP: (119-124)/(61-85) 124/79  SpO2:  [96 %-99 %] 96 %  Body mass index is 37.1 kg/m².     Input and Output Summary (last 24 hours):       Intake/Output Summary (Last 24 hours) at 2024 1622  Last  data filed at 12/31/2024 1327  Gross per 24 hour   Intake 600 ml   Output 2500 ml   Net -1900 ml       Physical Exam:     General: well appearing, no acute distress  HEENT: atraumatic, PERRLA, moist mucosa, normal pharynx, normal tonsils and adenoids, normal tongue, no fluid in sinuses  Neck: Trachea midline, no carotid bruit, no masses  Respiratory: normal chest wall expansion, CTA B, no r/r/w, no rubs  Cardiovascular: RRR, no m/r/g, Normal S1 and S2  Abdomen: Soft, non-tender, non-distended, normal bowel sounds in all quadrants, no hepatosplenomegaly, no tympany  Rectal: deferred  Musculoskeletal: Leg weakness, notably left greater than right, muscle strength graded 3 out of 5 bilaterally, tendon reflexes difficult to obtain  Integumentary: warm, dry, and pink, with no rash, purpura, or petechia  Heme/Lymph: no lymphadenopathy, no bruises  Neurological: Cranial Nerves II-XII grossly intact  Psychiatric: cooperative with normal mood, affect, and cognition      Additional Data:     Labs:    Results from last 7 days   Lab Units 12/30/24  0152   WBC Thousand/uL 7.36   HEMOGLOBIN g/dL 11.5   HEMATOCRIT % 38.2   PLATELETS Thousands/uL 300   SEGS PCT % 60   LYMPHO PCT % 31   MONO PCT % 5   EOS PCT % 3     Results from last 7 days   Lab Units 12/30/24  0152   POTASSIUM mmol/L 3.5   CHLORIDE mmol/L 104   CO2 mmol/L 27   BUN mg/dL 17   CREATININE mg/dL 0.61   CALCIUM mg/dL 9.3   ALK PHOS U/L 68   ALT U/L 12   AST U/L 14           * I Have Reviewed All Lab Data Listed Above.  * Additional Pertinent Lab Tests Reviewed: All Labs For Current Hospital Admission Reviewed    Imaging:    Imaging Reports Reviewed Today Include: Reviewed MRI of the thoracic spine and lumbar spine  Imaging Personally Reviewed by Myself Includes: Reviewed MRI of the thoracic and lumbar spine    Recent Cultures (last 7 days):           Last 24 Hours Medication List:   Current Facility-Administered Medications   Medication Dose Route Frequency Provider  Last Rate    acetaminophen  975 mg Oral Q8H [facility] ROGE Lamb      docusate sodium  100 mg Oral BID ROGE Lamb      DULoxetine  60 mg Oral Daily ROGE Lamb      furosemide  20 mg Oral Daily ROGE Lamb      [Held by provider] heparin (porcine)  5,000 Units Subcutaneous Q8H [facility] ROGE Lmab      iron sucrose  200 mg Intravenous Daily Wili Alfonso  mg (12/31/24 1041)    methocarbamol  500 mg Oral Q6H PRN Wili Alfonso DO      methocarbamol  750 mg Oral Q8H [facility] Wili Alfonso DO      ondansetron  4 mg Intravenous Q6H PRN ROGE Lamb      pantoprazole  20 mg Oral Early Morning RGOE Lamb      potassium chloride  20 mEq Oral Daily With Breakfast ROGE Lamb      pramipexole  0.5 mg Oral HS ROGE Lamb         AM-PAC Basic Mobility:  Basic Mobility Inpatient Raw Score: 21    JH-HLM Achieved: 6: Walk 10 steps or more  JH-HLM Goal: 6: Walk 10 steps or more    HLM Goal listed above. Continue with multidisciplinary rounding and encourage appropriate mobility to improve upon HLM goals.     Today, Patient Was Seen By: Wili Alfonso DO    ** Please Note: This note was completed in part utilizing Nuance Dragon One Medical software dictation.  Grammatical errors, random word insertions, spelling mistakes, and incomplete sentences may be an occasional consequence of this system secondary to software limitations, ambient noise, and hardware issues.  If you have any questions or concerns about the content, text, or information contained within the body of this dictation, please contact the provider for clarification. **

## 2024-12-31 NOTE — PHYSICAL THERAPY NOTE
PHYSICAL THERAPY NOTE          Patient Name: Hayley Rios  Today's Date: 12/31/2024 12/31/24 0827   PT Last Visit   PT Visit Date 12/31/24   Note Type   Note type Cancelled Session   Cancel Reasons Other   Additional Comments PT consult received. Chart reviewed.  Per medicine plan to transfer to [facility] for neurosurgical consult given MRI results.     Ita Carson, PT

## 2024-12-31 NOTE — ASSESSMENT & PLAN NOTE
Patient is a 64-year-old female with past medical history of obesity, lymphedema, multiple spinal surgeries, lumbar spinal stenosis with radiculopathy who presents to the hospital with multiple complaints.  She reports chronic weakness in her left lower extremity however has been worsening over the last week or so, she also reports bilateral superficial numbness and tingling from her thighs to her toes, and left lower extremity muscle spasms.   Suspect that this is secondary to nerve root compression, radiculopathy versus true cord compression

## 2024-12-31 NOTE — ASSESSMENT & PLAN NOTE
Continue Mirapex 0.5 mg before bed  Iron panel reviewed, evidence of iron deficiency with low iron stores and low percent saturation  Will start Venofer day #1 of 3  Could consider carbidopa-levodopa as an outpatient if no improvement, defer to providers in [place]  Possible that patient's symptoms may be related to nerve root impingement

## 2024-12-31 NOTE — DISCHARGE SUMMARY
Discharge Summary - Hospitalist   Name: Hayley Rios 64 y.o. female I MRN: [id number]  Unit/Bed#: [place] [address] -01 I Date of Admission: 12/30/2024   Date of Service: 12/31/2024 I Hospital Day: 0         Admitting Provider:  Wili Alfonso DO  Discharge Provider:  Wili Alfonso DO  Admission Date: 12/30/2024       Discharge Date: 12/31/24   LOS: 0  Primary Care Physician at Discharge: No primary care provider on file. None    HOSPITAL COURSE:  Hayley Rios is a 64 y.o. female who presented acute on chronic weakness as well as new muscle spasms of her lower extremity.  She has a past medical history of obesity, lymphedema, as well as previous history of multiple spine surgeries due to lumbar spinal stenosis.  She also has a known history of T3 spinal meningioma.  The patient is currently living in [place] but had previously establish care at [facility] in May 2022.    The patient subsequently was admitted due to ambulatory dysfunction.  She reported having numbness of both legs bilaterally which was worse on the outer quadrants of her legs.  She also reported having muscle spasms, left greater than right.    Given previous history of back surgery stat MRI was performed.  The initial MRI without contrast was nondiagnostic.  The patient was urgently evaluated in consultation by the neurosurgical service.    An updated MRI was performed with and without contrast of the lumbar and thoracic spine.    Given the patient's persistent neurologic symptoms as well as paresthesias and muscle weakness the case was discussed with neurosurgical services at [facility] and based upon MRI findings and need for inpatient neurosurgical service the patient was transferred for in person neurosurgical evaluation.    At the time of transfer the patient was tolerating oral diet they were without acute complaint and they were medically cleared for transfer.  All questions were answered the patient's satisfaction and they were in  agreement with the discharge plan.    DISCHARGE DIAGNOSES  * Ambulatory dysfunction  Assessment & Plan  Patient is a 64-year-old female with past medical history of obesity, lymphedema, multiple spinal surgeries, lumbar spinal stenosis with radiculopathy who presents to the hospital with multiple complaints.  She reports chronic weakness in her left lower extremity however has been worsening over the last week or so, she also reports bilateral superficial numbness and tingling from her thighs to her toes, and left lower extremity muscle spasms.   Suspect that this is secondary to nerve root compression, radiculopathy versus true cord compression    Acute muscle weakness  Assessment & Plan  Patient with acute weakness as well as muscle spasm  MRI with possible T3 compression of nerve root as etiology  Patient with muscle spasms of the legs, denies back pain  Still with intermittent anesthesia of the legs  Suspect myelopathy secondary to nerve root impingement  Consider repeat MRI with anesthesia sedation as second study was degraded by motion artifact due to muscle spasm  B12 and A1c pending    Class 1 obesity due to excess calories without serious comorbidity with body mass index (BMI) of 34.0 to 34.9 in adult  Assessment & Plan  Patient's BMI is 34.33  Consider GLP-1 as an outpatient    Lymphedema  Assessment & Plan  Chronic, stable  Continue Lasix daily  Patient does not have leg wraps    Lumbar radiculopathy  Assessment & Plan  Patient has a history of multiple spinal surgeries  Patient has had left lower extremity weakness for some time now, however over the last week has been worsening  She also has been experiencing superficial numbness and tingling on both legs from thighs to toes  CT shows: Multilevel lumbar degenerative changes as described above, notably moderate severe central canal and severe left neuroforaminal stenosis at L4-5, suspect slightly progressed compared to previous MRI   MRI of lumbar  spine-initially performed which was done without contrast was nondiagnostic  Does have previous history of spinal meningioma T3 and T4 imaging was noted to have a possible compression at that nerve root level  Continue Cymbalta 60 mg daily  Start Tylenol 975 every 8 hours and Robaxin 750 mg scheduled      Restless leg syndrome  Assessment & Plan  Continue Mirapex 0.5 mg before bed  Iron panel reviewed, evidence of iron deficiency with low iron stores and low percent saturation  Will start Venofer day #1 of 3  Could consider carbidopa-levodopa as an outpatient if no improvement, defer to providers in [place]  Possible that patient's symptoms may be related to nerve root impingement      CONSULTING PROVIDERS   IP CONSULT TO NEUROSURGERY    PROCEDURES PERFORMED  * No surgery found *    RADIOLOGY RESULTS  MRI lumbar spine w contrast  Result Date: 12/30/2024  Impression: Suboptimal examination due to moderate-to-severe motion degraded exam of lumbar spine and 3 plane localizer images, sagittal T1 post contrast and axial T1 postcontrast sequences. - No abnormal enhancement in lumbar spine given limitations of motion degradation. Please see earlier same day MRI lumbar spine without contrast, MRI thoracic spine with and without contrast, CT lumbar spine without contrast for further evaluation. The study was marked in EPIC for immediate notification. Workstation performed: [id number]     MRI thoracic spine w wo contrast  Result Date: 12/30/2024  Impression: Suboptimal examination due to mild, moderate, and severe motion artifact - worse on postcontrast sequences. - Similar 1.4 cm intradural extramedullary probable meningioma in left posterolateral thoracic spine region at approximately T3 level with associated marked spinal cord compression with severe canal stenosis given motion degraded exam. No cord edema given motion degradation. Recommend evaluation by neurosurgery. - Degenerative changes, as detailed above. The  study was marked in EPIC for immediate notification. Workstation performed: [id number]     XR spine lumbar minimum 4 views non injury  Result Date: 12/30/2024  Impression: No acute osseous abnormality. Intact hardware. Computerized Assisted Algorithm (CAA) may have been used to analyze all applicable images. Workstation performed: [id number]     MRI lumbar spine wo contrast  Result Date: 12/30/2024  Impression: Extensively motion-degraded examination, which reduces diagnostic sensitivity and renders several of the sequences nondiagnostic. Within these significant confines: 1. Stable postsurgical changes as above. 2. Multilevel spondylotic changes of the lumbar spine. See narrative above for full details. Workstation performed: [id number]     CT spine lumbar without contrast  Result Date: 12/30/2024  Impression: No acute compression fracture or posttraumatic subluxation. Stable postsurgical changes as described above. No gross evidence of acute hardware complication. Multilevel lumbar degenerative changes as described above, notably moderate severe central canal and severe left neuroforaminal stenosis at L4-5, suspect slightly progressed compared to previous MRI study dated 6/26/2021 accounting for differences in modality,? Source of the patient's symptoms. Clinical correlation and follow-up recommended. Workstation performed: [id number]     CT head without contrast  Result Date: 12/30/2024  Impression: No acute intracranial abnormality. Workstation performed: [id number]       LABS  Results from last 7 days   Lab Units 12/30/24  0152   WBC Thousand/uL 7.36   HEMOGLOBIN g/dL 11.5   HEMATOCRIT % 38.2   MCV fL 86   PLATELETS Thousands/uL 300     Results from last 7 days   Lab Units 12/30/24  0152   SODIUM mmol/L 139   POTASSIUM mmol/L 3.5   CHLORIDE mmol/L 104   CO2 mmol/L 27   BUN mg/dL 17   CREATININE mg/dL 0.61   CALCIUM mg/dL 9.3   ALBUMIN g/dL 4.4   TOTAL BILIRUBIN mg/dL 0.38   ALK PHOS U/L 68   ALT U/L 12   AST U/L  "14   EGFR ml/min/1.73sq m 97   GLUCOSE RANDOM mg/dL 112     Results from last 7 days   Lab Units 12/30/24  0152   HS TNI 0HR ng/L <2                      Results from last 7 days   Lab Units 12/30/24  0152   TSH 3RD GENERATON uIU/mL 4.384               Cultures:         Invalid input(s): \"URIBILINOGEN\"                    PHYSICAL EXAM:  Vitals:   Blood Pressure: 124/79 (12/31/24 1507)  Pulse: 83 (12/31/24 1507)  Temperature: 97.7 °F (36.5 °C) (12/31/24 1507)  Temp Source: Oral (12/31/24 0533)  Respirations: 17 (12/31/24 1507)  Height: 5' 4.25\" (163.2 cm) (12/30/24 0835)  Weight - Scale: 98.8 kg (217 lb 13 oz) (12/30/24 0835)  SpO2: 96 % (12/31/24 1507)      General: well appearing, no acute distress  HEENT: atraumatic, PERRLA, moist mucosa, normal pharynx, normal tonsils and adenoids, normal tongue, no fluid in sinuses  Neck: Trachea midline, no carotid bruit, no masses  Respiratory: normal chest wall expansion, CTA B  Cardiovascular: RRR, no m/r/g, Normal S1 and S2  Abdomen: Soft, non-tender, non-distended, normal bowel sounds in all quadrants, no hepatosplenomegaly, no tympany  Rectal: deferred  Musculoskeletal: Moves all  Muscle strength was graded 3 out of 5 of bilateral lower extremities, she had reported lack of sensation in the saddle distribution.  Notable muscle contractures were noted.  Reflexes were difficult to be obtained and were noted to be 1+  out of 4 bilaterally of the lower extremities  Integumentary: warm, dry, and pink, with no visible rash, purpura, or petechia  Heme/Lymph: no lymphadenopathy, no bruises  Neurological: Cranial Nerves II-XII grossly intact  Psychiatric: cooperative with normal mood, affect, and cognition       Discharge Disposition: Home/Self Care    AM-PAC Basic Mobility:  Basic Mobility Inpatient Raw Score: 21    JH-HLM Achieved: 6: Walk 10 steps or more  JH-HLM Goal: 6: Walk 10 steps or more    HLM Goal listed above. Continue with ongoing physical therapy and encourage " appropriate mobility to improve upon HLM goals.      Test Results Pending at Discharge:           Medications   Summary of Medication Adjustments made as a result of this hospitalization: See discharge summary and AVS for medication changes  Medication Dosing Tapers - Please refer to Discharge Medication List for details on any medication dosing tapers (if applicable to patient).  Discharge Medication List: See after visit summary for reconciled discharge medications.     Diet restrictions:         Diet Orders   (From admission, onward)                 Start     Ordered    12/31/24 0838  Diet Regular; Regular House  (ED Bridging Orders Panel)  Diet effective now        References:    Adult Nutrition Support Algorithm    RD Therapeutic Diet Order Protocol   Question Answer Comment   Diet Type Regular    Regular Regular House    RD to adjust diet per protocol? Yes        12/31/24 0837                  Activity restrictions: No strenuous activity  Discharge Condition: stable    Outpatient Follow-Up and Discharge Instructions  See after visit summary section titled Discharge Instructions for information provided to patient and family.      Code Status: Level 1 - Full Code  Discharge Statement   I spent 86 minutes discharging the patient. This time was spent on the day of discharge. Greater than 50% of total time was spent with the patient and / or family counseling and / or coordination of care.    ** Please Note: This note was completed in part utilizing [employer] Dragon [employer] One Software.  Grammatical errors, random word insertions, spelling mistakes, and incomplete sentences may be an occasional consequence of this system secondary to software limitations, ambient noise, and hardware issues.  If you have any questions or concerns about the content, text, or information contained within the body of this dictation, please contact the provider for clarification.**

## 2024-12-31 NOTE — UTILIZATION REVIEW
Initial Clinical Review    OBSERVATION 12/30 CHANGED TO INPATIENT ON 12/31 - work up for poss spinal cord compression.   Admission: Date/Time/Statement:   Admission Orders (From admission, onward)       Ordered        12/31/24 0918  INPATIENT ADMISSION  Once            12/30/24 0516  Place in Observation  Once                                Orders Placed This Encounter   Procedures    INPATIENT ADMISSION     Standing Status:   Standing     Number of Occurrences:   1     Level of Care:   Med Surg [16]     Bed Type:   [facility] [4]     Estimated length of stay:   More than 2 Midnights     Certification:   I certify that inpatient services are medically necessary for this patient for a duration of greater than two midnights. See H&P and MD Progress Notes for additional information about the patient's course of treatment.     ED Arrival Information       Expected   -    Arrival   12/29/2024 23:44    Acuity   Urgent              Means of arrival   Walk-In    Escorted by   Self    Service   Hospitalist    Admission type   Emergency              Arrival complaint   leg pain             Chief Complaint   Patient presents with    Extremity Weakness     Pt reports worsening bilateral leg weakness, cramping, and swelling for 1 month       Initial Presentation: 64 y.o. female presents to the ED from home with c/o worsening chronic weakness LLE x 1 wk, bilat superficial numbness and tingling thighs to toes, LLE muscle spasms.  PMH: of obesity, lymphedema, multiple spinal surgeries, lumbar spinal stenosis with radiculopathy.  In the ED, VS stable,  pain rated 8/10.  Labs - low iron.  ECG - NSR.  Imaging - no acute comp fracture, no hardware complication. 1.4 cm intradural extramedullary probable meningioma in left posterolateral thoracic spine region at approximately T3 level with associated marked spinal cord compression with severe canal stenosis. No cord edema.  Treated with IV Tylenol, IV Valium, IV fluids, IV Toradol, IV  Mag, sq Heparin, PO Tylenol, KCL, Protonix, Flexeril.  On exam generalized swelling and BLE edema, chronic bilat lymphedema,abnormal BLE strength R>L, gait abnormality, strength noted to be 2 out of 5 bilaterally of LE, notable saddle anesthesia.  Admitted to Observation Status with Ambulatory dysfunction, Lumbago with concern for possible spinal cord compromise-lumbar radiculopathy versus possible nerve root impingement, RLS - PLAN: symptomatic control, MRI final reads, poss transfer to [facility] for neurosurgery eval, Neurosurgery consult, therapy evals, schedule muscle relaxants, continue home Mirapex HS, Cymbalta, start scheduled Tylenol and Cyclobenzarprine q 8 hr, daily Lasix.  POST ADMIT NOTE - NPO, MRI shows notable marked spinal cord compression with severe canal stenosis,  plan for transfer to [facility], VTE PPX on hold.     Anticipated Length of Stay/Certification Statement:  Patient will be admitted on an observation basis with an anticipated length of stay of less than 2 midnights secondary to PT evaluation.    12/30 Neurosurgery Consult - h/o multiple spine surgeries, L spinal stenosis w/ radiculopathy, know T3 spinal meningioma now with worsening chronic weakness LLE, bilat superficial numbness, tingling thighs to toes, LLE muscle spasms.  Imaging motion degraded but no significant central canal stenosis appreciated. Will get L spine Xray with bending, MRI T spine, no need for transfer to higher level of care.      Date: 12/31 CHANGED TO INPATIENT STATUS TODAY:   Ambulatory dysfunction, Lumbago with concern for possible spinal cord compromise-lumbar radiculopathy versus possible nerve root impingement, RLS  - MRI T spine shows slightly larger although difficult to assess because of the motion degradation of the new scan. Nevertheless it is causing significant cord compression and may be contributing to her overall weakness and clinical picture.  Will transfer to [facility] for neurosurgical eval.        ED  Treatment-Medication Administration from 12/29/2024 2344 to 12/30/2024 0830         Date/Time Order Dose Route Action     12/30/2024 0153 acetaminophen (Ofirmev) injection 1,000 mg 1,000 mg Intravenous New Bag     12/30/2024 0153 diazepam (VALIUM) injection 2.5 mg 2.5 mg Intravenous Given     12/30/2024 0226 lactated ringers bolus 1,000 mL 1,000 mL Intravenous New Bag     12/30/2024 0226 ketorolac (TORADOL) injection 15 mg 15 mg Intravenous Given     12/30/2024 0349 magnesium sulfate 2 g/50 mL IVPB (premix) 2 g 2 g Intravenous New Bag     12/30/2024 0551 heparin (porcine) subcutaneous injection 5,000 Units 5,000 Units Subcutaneous Given     12/30/2024 0550 acetaminophen (TYLENOL) tablet 975 mg 975 mg Oral Given     12/30/2024 0550 cyclobenzaprine (FLEXERIL) tablet 5 mg 5 mg Oral Given     12/30/2024 0649 pantoprazole (PROTONIX) EC tablet 20 mg 20 mg Oral Given     12/30/2024 0649 potassium chloride (Klor-Con M10) CR tablet 20 mEq 20 mEq Oral Given            Scheduled Medications:  acetaminophen, 975 mg, Oral, Q8H EVGENY  cyclobenzaprine, 5 mg, Oral, TID  docusate sodium, 100 mg, Oral, BID  DULoxetine, 60 mg, Oral, Daily  furosemide, 20 mg, Oral, Daily  [Held by provider] heparin (porcine), 5,000 Units, Subcutaneous, Q8H EVGENY  iron sucrose, 200 mg, Intravenous, Daily  pantoprazole, 20 mg, Oral, Early Morning  potassium chloride, 20 mEq, Oral, Daily With Breakfast  pramipexole, 0.5 mg, Oral, HS      Continuous IV Infusions:     PRN Meds:  methocarbamol, 500 mg, Oral, Q6H PRN - x 2 12/30. X 1 12/31  ondansetron, 4 mg, Intravenous, Q6H PRN      ED Triage Vitals [12/29/24 2353]   Temperature Pulse Respirations Blood Pressure SpO2 Pain Score   98.6 °F (37 °C) 74 18 142/63 100 % 8     Weight (last 2 days)       Date/Time Weight    12/30/24 0835 98.8 (217.81)    12/29/24 2353 100 (220.46)            Vital Signs (last 3 days)       Date/Time Temp Pulse Resp BP MAP (mmHg) SpO2 O2 Device Patient Position - Orthostatic VS Karin  Coma Scale Score Pain    12/31/24 0816 -- 76 -- 121/69 -- -- -- Lying -- --    12/31/24 0810 -- -- -- -- -- -- None (Room air) -- 15 No Pain    12/31/24 0535 -- -- -- -- -- -- -- -- -- No Pain    12/31/24 0533 98.5 °F (36.9 °C) 77 18 119/85 97 99 % None (Room air) Lying -- --    12/30/24 2219 97.9 °F (36.6 °C) 92 20 122/61 86 98 % None (Room air) Lying 15 No Pain    12/30/24 2203 -- -- -- -- -- -- -- -- -- No Pain    12/30/24 1827 -- -- -- -- -- -- -- -- -- Med Not Given for Pain - for MAR use only    12/30/24 1524 97.7 °F (36.5 °C) 90 16 144/67 77 98 % None (Room air) Lying -- --    12/30/24 0835 97 °F (36.1 °C) 78 16 121/85 102 98 % None (Room air) Lying 15 3    12/30/24 0550 -- -- -- -- -- -- -- -- -- 9    12/30/24 0530 -- 78 19 143/64 92 98 % None (Room air) Lying -- --    12/30/24 0339 -- 72 17 145/60 -- 100 % None (Room air) Lying -- 10 - Worst Possible Pain    12/30/24 0230 -- -- -- -- -- -- None (Room air) -- 15 --    12/30/24 0226 -- -- -- -- -- -- -- -- -- 10 - Worst Possible Pain    12/30/24 0200 -- 68 17 120/57 82 99 % None (Room air) Lying -- --    12/29/24 2353 98.6 °F (37 °C) 74 18 142/63 -- 100 % None (Room air) -- -- 8              Pertinent Labs/Diagnostic Test Results:   Radiology:  MRI lumbar spine w contrast   Final Interpretation by Koby Crystal MD (12/30 2010)      Suboptimal examination due to moderate-to-severe motion degraded exam of lumbar spine and 3 plane localizer images, sagittal T1 post contrast and axial T1 postcontrast sequences.   - No abnormal enhancement in lumbar spine given limitations of motion degradation.      Please see earlier same day MRI lumbar spine without contrast, MRI thoracic spine with and without contrast, CT lumbar spine without contrast for further evaluation.      MRI thoracic spine w wo contrast   Final Interpretation by Koby Crystal MD (12/30 1951)      Suboptimal examination due to mild, moderate, and severe motion artifact - worse on  postcontrast sequences.   - Similar 1.4 cm intradural extramedullary probable meningioma in left posterolateral thoracic spine region at approximately T3 level with associated marked spinal cord compression with severe canal stenosis given motion degraded exam. No cord edema    given motion degradation. Recommend evaluation by neurosurgery.   - Degenerative changes, as detailed above.      XR spine lumbar minimum 4 views non injury   Final Interpretation by Waqar Fulton MD (12/30 7540)      No acute osseous abnormality. Intact hardware.      MRI lumbar spine wo contrast   Final Interpretation by Dm Turner MD (12/30 8198)      Extensively motion-degraded examination, which reduces diagnostic sensitivity and renders several of the sequences nondiagnostic. Within these significant confines:      1. Stable postsurgical changes as above.      2. Multilevel spondylotic changes of the lumbar spine. See narrative above for full details.      CT head without contrast   Final Interpretation by Wiliam Lopez MD (12/30 6123)      No acute intracranial abnormality.      CT spine lumbar without contrast   Final Interpretation by Israel Anderson MD (12/30 6791)      No acute compression fracture or posttraumatic subluxation.      Stable postsurgical changes as described above.   No gross evidence of acute hardware complication.   Multilevel lumbar degenerative changes as described above, notably moderate severe central canal and severe left neuroforaminal stenosis at L4-5, suspect slightly progressed compared to previous MRI study dated 6/26/2021 accounting for differences in    modality,? Source of the patient's symptoms. Clinical correlation and follow-up recommended.     Cardiology:  ECG 12 lead   Final Result by Donny Ulloa MD (12/30 7611)   Normal sinus rhythm   Normal ECG   When compared with ECG of 20-Dec-2023 08:19,   No significant change was found   Confirmed by Donny Ulloa ([id number]) on 12/30/2024  "3:31:30 AM        GI:  No orders to display           Results from last 7 days   Lab Units 12/30/24  0152   WBC Thousand/uL 7.36   HEMOGLOBIN g/dL 11.5   HEMATOCRIT % 38.2   PLATELETS Thousands/uL 300   TOTAL NEUT ABS Thousands/µL 4.42         Results from last 7 days   Lab Units 12/30/24  0152   SODIUM mmol/L 139   POTASSIUM mmol/L 3.5   CHLORIDE mmol/L 104   CO2 mmol/L 27   ANION GAP mmol/L 8   BUN mg/dL 17   CREATININE mg/dL 0.61   EGFR ml/min/1.73sq m 97   CALCIUM mg/dL 9.3   MAGNESIUM mg/dL 2.2     Results from last 7 days   Lab Units 12/30/24  0152   AST U/L 14   ALT U/L 12   ALK PHOS U/L 68   TOTAL PROTEIN g/dL 7.3   ALBUMIN g/dL 4.4   TOTAL BILIRUBIN mg/dL 0.38         Results from last 7 days   Lab Units 12/30/24  0152   GLUCOSE RANDOM mg/dL 112     Results from last 7 days   Lab Units 12/30/24  0152   CK TOTAL U/L 89     Results from last 7 days   Lab Units 12/30/24  0152   HS TNI 0HR ng/L <2             Results from last 7 days   Lab Units 12/30/24  0152   TSH 3RD GENERATON uIU/mL 4.384           Results from last 7 days   Lab Units 12/30/24  0152   BNP pg/mL 24     Results from last 7 days   Lab Units 12/30/24  0152   FERRITIN ng/mL 9*   IRON SATURATION % 10*   IRON ug/dL 40*   TIBC ug/dL 396.2     Results from last 7 days   Lab Units 12/30/24  0152   TRANSFERRIN mg/dL 283     Past Medical History:   Diagnosis Date    Anemia     Anesthesia     \"sometimes low BP upon waking up\" pt states she stopped breathing 1 time during surgery    Arthritis     Back pain     Dolan's esophagus     Chronic pain disorder     back    Chronic venous insufficiency     Colon polyp     Cough variant asthma     d/t mold-all sx diminished when removed from source    COVID-19 03/2020    Depression     Environmental allergies     dust    GERD (gastroesophageal reflux disease)     Hiatal hernia     History of anemia     History of fusion of spine for scoliosis     \"as a teenager\"    History of transfusion     1978 - no adverse " reaction    Left lumbar radiculitis     Last Assessed: [id number]    Lumbar postlaminectomy syndrome     Migraines     Morbid obesity (HCC)     Motion sickness     Neck pain     Osteoarthritis     of left hip    Right knee pain     Seasonal allergies     Shortness of breath     with stairs    Umbilical hernia     Uses brace     right knee    Wears glasses      Present on Admission:   Lumbar radiculopathy   Restless leg syndrome      Admitting Diagnosis: Left leg weakness [R29.898]  Ambulatory dysfunction [R26.2]  Neuroforaminal stenosis of lumbar spine [M48.061]  Age/Sex: 64 y.o. female    Network Utilization Review Department  ATTENTION: Please call with any questions or concerns to [phone] and carefully listen to the prompts so that you are directed to the right person. All voicemails are confidential.   For Discharge needs, contact Care Management DC Support Team at [phone] opt. 2  Send all requests for admission clinical reviews, approved or denied determinations and any other requests to dedicated fax number below belonging to the campus where the patient is receiving treatment. List of dedicated fax numbers for the Facilities:  FACILITY NAME UR FAX NUMBER   ADMISSION DENIALS (Administrative/Medical Necessity) [phone]   DISCHARGE SUPPORT TEAM (NETWORK) [phone]   PARENT CHILD HEALTH (Maternity/NICU/Pediatrics) [phone]   [facility] [phone]   [facility] [phone]   [facility] [phone]   [facility] [phone]   [facility] [phone]   [facility] [phone]   [facility] [phone]   [facility] [phone]   [facility] [phone]   [facility] [phone]   [facility]  [phone]   [facility] [phone]

## 2025-01-01 PROBLEM — D50.9 IDA (IRON DEFICIENCY ANEMIA): Status: ACTIVE | Noted: 2022-07-19

## 2025-01-01 LAB
ANION GAP SERPL CALCULATED.3IONS-SCNC: 8 MMOL/L (ref 4–13)
BUN SERPL-MCNC: 14 MG/DL (ref 5–25)
CALCIUM SERPL-MCNC: 9.3 MG/DL (ref 8.4–10.2)
CHLORIDE SERPL-SCNC: 104 MMOL/L (ref 96–108)
CO2 SERPL-SCNC: 28 MMOL/L (ref 21–32)
CREAT SERPL-MCNC: 0.6 MG/DL (ref 0.6–1.3)
ERYTHROCYTE [DISTWIDTH] IN BLOOD BY AUTOMATED COUNT: 15.1 % (ref 11.6–15.1)
EST. AVERAGE GLUCOSE BLD GHB EST-MCNC: 120 MG/DL
GFR SERPL CREATININE-BSD FRML MDRD: 98 ML/MIN/1.73SQ M
GLUCOSE SERPL-MCNC: 101 MG/DL (ref 65–140)
HBA1C MFR BLD: 5.8 %
HCT VFR BLD AUTO: 35.1 % (ref 34.8–46.1)
HGB BLD-MCNC: 10.7 G/DL (ref 11.5–15.4)
MCH RBC QN AUTO: 26.4 PG (ref 26.8–34.3)
MCHC RBC AUTO-ENTMCNC: 30.5 G/DL (ref 31.4–37.4)
MCV RBC AUTO: 87 FL (ref 82–98)
PLATELET # BLD AUTO: 271 THOUSANDS/UL (ref 149–390)
PMV BLD AUTO: 9.6 FL (ref 8.9–12.7)
POTASSIUM SERPL-SCNC: 3.9 MMOL/L (ref 3.5–5.3)
RBC # BLD AUTO: 4.06 MILLION/UL (ref 3.81–5.12)
SODIUM SERPL-SCNC: 140 MMOL/L (ref 135–147)
VIT B12 SERPL-MCNC: 346 PG/ML (ref 180–914)
WBC # BLD AUTO: 6.6 THOUSAND/UL (ref 4.31–10.16)

## 2025-01-01 PROCEDURE — 85027 COMPLETE CBC AUTOMATED: CPT | Performed by: INTERNAL MEDICINE

## 2025-01-01 PROCEDURE — 99223 1ST HOSP IP/OBS HIGH 75: CPT | Performed by: PHYSICIAN ASSISTANT

## 2025-01-01 PROCEDURE — 83036 HEMOGLOBIN GLYCOSYLATED A1C: CPT | Performed by: PHYSICIAN ASSISTANT

## 2025-01-01 PROCEDURE — 80048 BASIC METABOLIC PNL TOTAL CA: CPT | Performed by: INTERNAL MEDICINE

## 2025-01-01 PROCEDURE — 99232 SBSQ HOSP IP/OBS MODERATE 35: CPT | Performed by: PHYSICIAN ASSISTANT

## 2025-01-01 PROCEDURE — 82607 VITAMIN B-12: CPT | Performed by: PHYSICIAN ASSISTANT

## 2025-01-01 RX ORDER — OXYCODONE HYDROCHLORIDE 10 MG/1
10 TABLET ORAL EVERY 6 HOURS PRN
Refills: 0 | Status: DISCONTINUED | OUTPATIENT
Start: 2025-01-01 | End: 2025-01-03

## 2025-01-01 RX ORDER — METHOCARBAMOL 750 MG/1
750 TABLET, FILM COATED ORAL EVERY 6 HOURS SCHEDULED
Status: DISCONTINUED | OUTPATIENT
Start: 2025-01-01 | End: 2025-01-02

## 2025-01-01 RX ORDER — OXYCODONE HYDROCHLORIDE 5 MG/1
5 TABLET ORAL EVERY 6 HOURS PRN
Refills: 0 | Status: DISCONTINUED | OUTPATIENT
Start: 2025-01-01 | End: 2025-01-07

## 2025-01-01 RX ORDER — CALCIUM CARBONATE 500 MG/1
500 TABLET, CHEWABLE ORAL ONCE
Status: COMPLETED | OUTPATIENT
Start: 2025-01-01 | End: 2025-01-01

## 2025-01-01 RX ADMIN — FUROSEMIDE 20 MG: 20 TABLET ORAL at 08:27

## 2025-01-01 RX ADMIN — CALCIUM CARBONATE (ANTACID) CHEW TAB 500 MG 500 MG: 500 CHEW TAB at 23:03

## 2025-01-01 RX ADMIN — ACETAMINOPHEN 975 MG: 325 TABLET, FILM COATED ORAL at 05:09

## 2025-01-01 RX ADMIN — Medication 3 MG: at 00:44

## 2025-01-01 RX ADMIN — POTASSIUM CHLORIDE 20 MEQ: 1500 TABLET, EXTENDED RELEASE ORAL at 08:27

## 2025-01-01 RX ADMIN — PANTOPRAZOLE SODIUM 20 MG: 20 TABLET, DELAYED RELEASE ORAL at 05:09

## 2025-01-01 RX ADMIN — ACETAMINOPHEN 975 MG: 325 TABLET, FILM COATED ORAL at 22:35

## 2025-01-01 RX ADMIN — PRAMIPEXOLE DIHYDROCHLORIDE 0.5 MG: 0.5 TABLET ORAL at 22:35

## 2025-01-01 RX ADMIN — DULOXETINE HYDROCHLORIDE 60 MG: 60 CAPSULE, DELAYED RELEASE ORAL at 08:27

## 2025-01-01 RX ADMIN — METHOCARBAMOL 750 MG: 750 TABLET ORAL at 23:03

## 2025-01-01 RX ADMIN — METHOCARBAMOL 750 MG: 750 TABLET ORAL at 00:27

## 2025-01-01 RX ADMIN — METHOCARBAMOL 750 MG: 750 TABLET ORAL at 17:42

## 2025-01-01 RX ADMIN — MELATONIN 6 MG: at 22:35

## 2025-01-01 RX ADMIN — METHOCARBAMOL 750 MG: 750 TABLET ORAL at 12:52

## 2025-01-01 RX ADMIN — METHOCARBAMOL 750 MG: 750 TABLET ORAL at 05:09

## 2025-01-01 RX ADMIN — ACETAMINOPHEN 975 MG: 325 TABLET, FILM COATED ORAL at 12:52

## 2025-01-01 RX ADMIN — DOCUSATE SODIUM 100 MG: 100 CAPSULE, LIQUID FILLED ORAL at 08:27

## 2025-01-01 RX ADMIN — OXYCODONE HYDROCHLORIDE 10 MG: 10 TABLET ORAL at 20:24

## 2025-01-01 RX ADMIN — DOXYLAMINE SUCCINATE 25 MG: 25 TABLET ORAL at 23:28

## 2025-01-01 RX ADMIN — IRON SUCROSE 200 MG: 20 INJECTION, SOLUTION INTRAVENOUS at 08:27

## 2025-01-01 NOTE — ASSESSMENT & PLAN NOTE
Presented to [facility] with worsening of chronic LLE weakness, numbness/tingling of LLE>RLE, and muscle spasms. Known history of thoracic meningioma with spinal cord displacement and compression, lost to follow up due to move to [place].  MRI T spine: Suboptimal examination due to mild, moderate, and severe motion artifact - worse on postcontrast sequences. Similar 1.4 cm intradural extramedullary probable meningioma in left posterolateral thoracic spine region at approximately T3 level with associated marked spinal cord compression with severe canal stenosis given motion degraded exam. No cord edema given motion degradation.   MRI L spine: Suboptimal examination due to moderate-to-severe motion degraded exam of lumbar spine and 3 plane localizer images, sagittal T1 post contrast and axial T1 postcontrast sequences. No abnormal enhancement in lumbar spine given limitations of motion degradation.   Case was d/w neurosurgery who recommended tx to [facility] for possible surgical intervention  Hold VTE prophylaxis pending neurosurgery evaluation and surgical plans  Monitor neuro checks, bladder scans   Analgesics as needed   PT/OT evaluations

## 2025-01-01 NOTE — PHYSICAL THERAPY NOTE
Physical Therapy Cancellation Note    Patient's Name: Hayley Rios       01/01/25 1138   Note Type   Note type Cancelled Session   Cancel Reasons Medical status   Additional Comments Pt admitted with L LE weakness, compression of thoracic spinal cord with myelopathy.  Pt transferred for NSX evaluation and potential surgical intervention.  Will follow for PT evaluation as appropriate pending NSX POC.  Thank you.       Maddison Mathur, PT, DPT, GCS

## 2025-01-01 NOTE — PLAN OF CARE
Problem: PAIN - ADULT  Goal: Verbalizes/displays adequate comfort level or baseline comfort level  Description: Interventions:  - Encourage patient to monitor pain and request assistance  - Assess pain using appropriate pain scale  - Administer analgesics based on type and severity of pain and evaluate response  - Implement non-pharmacological measures as appropriate and evaluate response  - Consider cultural and social influences on pain and pain management  - Notify physician/advanced practitioner if interventions unsuccessful or patient reports new pain  Outcome: Progressing     Problem: INFECTION - ADULT  Goal: Absence or prevention of progression during hospitalization  Description: INTERVENTIONS:  - Assess and monitor for signs and symptoms of infection  - Monitor lab/diagnostic results  - Monitor all insertion sites, i.e. indwelling lines, tubes, and drains  - Monitor endotracheal if appropriate and nasal secretions for changes in amount and color  - [place] appropriate cooling/warming therapies per order  - Administer medications as ordered  - Instruct and encourage patient and family to use good hand hygiene technique  - Identify and instruct in appropriate isolation precautions for identified infection/condition  Outcome: Progressing     Problem: SAFETY ADULT  Goal: Patient will remain free of falls  Description: INTERVENTIONS:  - Educate patient/family on patient safety including physical limitations  - Instruct patient to call for assistance with activity   - Consult OT/PT to assist with strengthening/mobility   - Keep Call bell within reach  - Keep bed low and locked with side rails adjusted as appropriate  - Keep care items and personal belongings within reach  - Initiate and maintain comfort rounds  - Make Fall Risk Sign visible to staff  - Offer Toileting every 2 Hours, in advance of need  - Initiate/Maintain bed alarm  - Obtain necessary fall risk management equipment: bed alarm   - Apply yellow  socks and bracelet for high fall risk patients  - Consider moving patient to room near nurses station  Outcome: Progressing  Goal: Maintain or return to baseline ADL function  Description: INTERVENTIONS:  -  Assess patient's ability to carry out ADLs; assess patient's baseline for ADL function and identify physical deficits which impact ability to perform ADLs (bathing, care of mouth/teeth, toileting, grooming, dressing, etc.)  - Assess/evaluate cause of self-care deficits   - Assess range of motion  - Assess patient's mobility; develop plan if impaired  - Assess patient's need for assistive devices and provide as appropriate  - Encourage maximum independence but intervene and supervise when necessary  - Involve family in performance of ADLs  - Assess for home care needs following discharge   - Consider OT consult to assist with ADL evaluation and planning for discharge  - Provide patient education as appropriate  Outcome: Progressing  Goal: Maintains/Returns to pre admission functional level  Description: INTERVENTIONS:  - Perform AM-PAC 6 Click Basic Mobility/ Daily Activity assessment daily.  - Set and communicate daily mobility goal to care team and patient/family/caregiver.   - Collaborate with rehabilitation services on mobility goals if consulted  - Perform Range of Motion 3 times a day.  - Reposition patient every 2 hours.  - Dangle patient 3 times a day  - Stand patient 3 times a day  - Ambulate patient 3 times a day  - Out of bed to chair 3 times a day   - Out of bed for meals 3 times a day  - Out of bed for toileting  - Record patient progress and toleration of activity level   Outcome: Progressing     Problem: DISCHARGE PLANNING  Goal: Discharge to home or other facility with appropriate resources  Description: INTERVENTIONS:  - Identify barriers to discharge w/patient and caregiver  - Arrange for needed discharge resources and transportation as appropriate  - Identify discharge learning needs (meds,  wound care, etc.)  - Arrange for interpretive services to assist at discharge as needed  - Refer to Case Management Department for coordinating discharge planning if the patient needs post-hospital services based on physician/advanced practitioner order or complex needs related to functional status, cognitive ability, or social support system  Outcome: Progressing     Problem: Knowledge Deficit  Goal: Patient/family/caregiver demonstrates understanding of disease process, treatment plan, medications, and discharge instructions  Description: Complete learning assessment and assess knowledge base.  Interventions:  - Provide teaching at level of understanding  - Provide teaching via preferred learning methods  Outcome: Progressing     Problem: NEUROSENSORY - ADULT  Goal: Achieves stable or improved neurological status  Description: INTERVENTIONS  - Monitor and report changes in neurological status  - Monitor vital signs such as temperature, blood pressure, glucose, and any other labs ordered   - Initiate measures to prevent increased intracranial pressure  - Monitor for seizure activity and implement precautions if appropriate      Outcome: Progressing  Goal: Achieves maximal functionality and self care  Description: INTERVENTIONS  - Monitor swallowing and airway patency with patient fatigue and changes in neurological status  - Encourage and assist patient to increase activity and self care.   - Encourage visually impaired, hearing impaired and aphasic patients to use assistive/communication devices  Outcome: Progressing

## 2025-01-01 NOTE — ASSESSMENT & PLAN NOTE
Presented to [facility] with worsening of chronic LLE weakness, numbness/tingling of LLE>RLE, and muscle spasms   Known history of thoracic meningioma with spinal cord displacement and compression, lost to follow up due to move to [place]  MRI T spine revealed 1.4 cm intradural extramedullary probable meningioma at T3 level with associated marked spinal cord compression with severe canal stenosis   Case was d/w neurosurgery who recommended tx to [facility] for possible surgical intervention  Hold VTE prophylaxis pending neurosurgery plans  Monitor neuro checks, bladder scans

## 2025-01-01 NOTE — ASSESSMENT & PLAN NOTE
Vitamin B12 level within normal limits  Continue home dose Mirapex 0.5 mg qhs  Low iron stores and low percent saturation on iron panel, was started on venofer at [facility] x 3 days, continue  Possibility this is related to nerve root impingement, await input from neurosurgery  Follow-up with her outpatient providers in [place] for further management

## 2025-01-01 NOTE — ASSESSMENT & PLAN NOTE
Continue Mirapex 0.5 mg qhs  Low iron stores and low percent saturation on iron panel, was started on venofer at [facility] x 3 days, continue  Possibility this is related to nerve root impingement.  Await input from neurosurgery.  Regardless patient needs follow-up with her outpatient providers in [place] for further management

## 2025-01-01 NOTE — UTILIZATION REVIEW
Initial Clinical Review    The patient was transferred to [facility] on 12/31/24 from [facility], where care began on 12/30/24. 1 midnight has already been surpassed with active ongoing care.     Admission: Date/Time/Statement:   Admission Orders (From admission, onward)       Ordered        12/31/24 2217  INPATIENT ADMISSION  Once                          Orders Placed This Encounter   Procedures    INPATIENT ADMISSION     Standing Status:   Standing     Number of Occurrences:   1     Level of Care:   Med Surg [16]     Estimated length of stay:   More than 2 Midnights     Certification:   I certify that inpatient services are medically necessary for this patient for a duration of greater than two midnights. See H&P and MD Progress Notes for additional information about the patient's course of treatment.       12/31 Day 2: 64 y.o. female who presented as a transfer by EMS to [facility] as a direct admission. Admitted as Inpatient for evaluation and treatment of thoracic spinal cord compression w/ myelopathy. PMHx: arthritis, chronic pain, COVID-19 (3/2020), GERD, migraine. Presented w/ worsening LLE weakness, numbness and tingling as well as muscle spasms. MRI showed 1.4 cm intradural extramedullary probable meningioma at T3 level w/ marked spinal cord compression and severe canal stenosis. EXAM: LLE weaker w/ extension than R. PLAN: hold DVT ppx, neuro checks, bladder scan, PT/OT evals, continue current meds, IV venofer, Trend labs, replete electrolytes as needed. I&O. Neurosurgery consulted.      Date: 01/01/25  Day 3: Has surpassed a 2nd midnight with active treatments and services. Notes rapid progression of b/l LE more severe in LLE over past 2 weeks, although has had LLE weakness for several months. Notes new onset numbness in b/l LE and LLE muscle cramping. Endorses urinary urgency but no incontinence. Exam: b/l LE edema, b/l LE  weak (L>R). Plan: neuro checks, analgesics, continue current meds, IV venofer, supportive car:.     Neurosurgery: Pt reports known lesion as reported on MRI. She has been following with a spinal surgery team in [place] where she lives. RLE: 4+/5 throughout; LLE: 3/5 throughout, 2-3 at hip. Neuro checks, normotensive goal, pain control. PT/OT evals. Symptoms likely from T3 mass, continue conservative management.       Scheduled Medications:  acetaminophen, 975 mg, Oral, Q8H EVGENY  docusate sodium, 100 mg, Oral, BID  DULoxetine, 60 mg, Oral, Daily  furosemide, 20 mg, Oral, Daily  [Held by provider] heparin (porcine), 5,000 Units, Subcutaneous, Q8H EVGENY  iron sucrose, 200 mg, Intravenous, Daily  methocarbamol, 750 mg, Oral, Q6H EVGENY  pantoprazole, 20 mg, Oral, Early Morning  potassium chloride, 20 mEq, Oral, Daily With Breakfast  pramipexole, 0.5 mg, Oral, HS    Continuous IV Infusions: none    PRN Meds:  melatonin, 3 mg, Oral, HS PRN; 1/1 x1  ondansetron, 4 mg, Intravenous, Q6H PRN  oxyCODONE, 10 mg, Oral, Q6H PRN  oxyCODONE, 5 mg, Oral, Q6H PRN      ED Triage Vitals   Temperature Pulse Respirations Blood Pressure SpO2 Pain Score   12/31/24 2300 12/31/24 2300 12/31/24 2300 12/31/24 2300 01/01/25 0715 12/31/24 2223   98.1 °F (36.7 °C) 77 16 130/67 97 % 4     Weight (last 2 days)       Date/Time Weight    12/31/24 2300 90.7 (200)            Vital Signs (last 3 days)       Date/Time Temp Pulse Resp BP MAP (mmHg) SpO2 O2 Device [place] Coma Scale Score Pain    01/01/25 14:12:25 98.7 °F (37.1 °C) 92 -- 146/80 102 86 % -- -- --    01/01/25 07:15:32 98.7 °F (37.1 °C) 81 18 148/79 102 97 % -- -- --    01/01/25 0509 -- -- -- -- -- -- -- -- 5    12/31/24 2300 98.1 °F (36.7 °C) 77 16 130/67 -- -- -- -- --    12/31/24 2223 -- -- -- -- -- -- None (Room air) 15 4              Pertinent Labs/Diagnostic Test Results:    Results from last 7 days   Lab Units 01/01/25  0440 12/30/24  0152   WBC Thousand/uL 6.60 7.36   HEMOGLOBIN g/dL  "10.7* 11.5   HEMATOCRIT % 35.1 38.2   PLATELETS Thousands/uL 271 300   TOTAL NEUT ABS Thousands/µL  --  4.42         Results from last 7 days   Lab Units 01/01/25  0440 12/30/24  0152   SODIUM mmol/L 140 139   POTASSIUM mmol/L 3.9 3.5   CHLORIDE mmol/L 104 104   CO2 mmol/L 28 27   ANION GAP mmol/L 8 8   BUN mg/dL 14 17   CREATININE mg/dL 0.60 0.61   EGFR ml/min/1.73sq m 98 97   CALCIUM mg/dL 9.3 9.3   MAGNESIUM mg/dL  --  2.2     Results from last 7 days   Lab Units 12/30/24  0152   AST U/L 14   ALT U/L 12   ALK PHOS U/L 68   TOTAL PROTEIN g/dL 7.3   ALBUMIN g/dL 4.4   TOTAL BILIRUBIN mg/dL 0.38         Results from last 7 days   Lab Units 01/01/25  0440 12/30/24  0152   GLUCOSE RANDOM mg/dL 101 112         Results from last 7 days   Lab Units 01/01/25  0440   HEMOGLOBIN A1C % 5.8*   EAG mg/dl 120      Results from last 7 days   Lab Units 12/30/24  0152   CK TOTAL U/L 89     Results from last 7 days   Lab Units 12/30/24  0152   HS TNI 0HR ng/L <2             Results from last 7 days   Lab Units 12/30/24  0152   TSH 3RD GENERATON uIU/mL 4.384              Results from last 7 days   Lab Units 12/30/24  0152   BNP pg/mL 24     Results from last 7 days   Lab Units 12/30/24  0152   FERRITIN ng/mL 9*   IRON SATURATION % 10*   IRON ug/dL 40*   TIBC ug/dL 396.2     Results from last 7 days   Lab Units 12/30/24  0152   TRANSFERRIN mg/dL 283       Past Medical History:   Diagnosis Date    Anemia     Anesthesia     \"sometimes low BP upon waking up\" pt states she stopped breathing 1 time during surgery    Arthritis     Back pain     Dolan's esophagus     Chronic pain disorder     back    Chronic venous insufficiency     Colon polyp     Cough variant asthma     d/t mold-all sx diminished when removed from source    COVID-19 03/2020    Depression     Environmental allergies     dust    GERD (gastroesophageal reflux disease)     Hiatal hernia     History of anemia     History of fusion of spine for scoliosis     \"as a teenager\" "    History of transfusion     1978 - no adverse reaction    Left lumbar radiculitis     Last Assessed: [id number]    Lumbar postlaminectomy syndrome     Migraines     Morbid obesity (HCC)     Motion sickness     Neck pain     Osteoarthritis     of left hip    Right knee pain     Seasonal allergies     Shortness of breath     with stairs    Umbilical hernia     Uses brace     right knee    Wears glasses      Present on Admission:   Compression of thoracic spinal cord with myelopathy (HCC)   Lymphedema   Ambulatory dysfunction   Restless leg syndrome   MARV (iron deficiency anemia)   Prediabetes      Admitting Diagnosis: Ambulatory dysfunction  Age/Sex: 64 y.o. female    Network Utilization Review Department  ATTENTION: Please call with any questions or concerns to [phone] and carefully listen to the prompts so that you are directed to the right person. All voicemails are confidential.   For Discharge needs, contact Care Management DC Support Team at [phone] opt. [phone]  Send all requests for admission clinical reviews, approved or denied determinations and any other requests to dedicated fax number below belonging to the campus where the patient is receiving treatment. List of dedicated fax numbers for the Facilities:  FACILITY NAME UR FAX NUMBER   ADMISSION DENIALS (Administrative/Medical Necessity) [phone]   DISCHARGE SUPPORT TEAM (NETWORK) [phone]   PARENT CHILD HEALTH (Maternity/NICU/Pediatrics) [phone]   [facility] [phone]   [facility] [phone]   [facility] [phone]   [facility] [phone]   [facility] [phone]   [facility] [phone]   [facility] [phone]   [facility] [phone]   [facility] [phone]   [facility]  [facility] [phone]   [facility] [phone]   [facility] [phone]

## 2025-01-01 NOTE — ASSESSMENT & PLAN NOTE
T3 lesion w/ concern of progressively worsening myelopathy  P/w progressively worsening bilateral lower extremity, L >R, weakness and balance difficulty x approx 2 months   Acutely worsened over the last 2 weeks, now requiring use of a rolling walker    Imagin/30 MRI T-spine w/wo: sub-optimal examination due to mild, moderate, and severe motion artifact - worse on postcontrast sequences. Similar 1.4 cm intradural extramedullary probable meningioma in left posterolateral thoracic spine region at approximately T3 level with associated marked spinal cord compression with severe canal stenosis given motion degraded exam. No cord edema given motion degradation. Recommend evaluation by neurosurgery. Degenerative changes   MRI L-spine w/wo: Suboptimal examination due to moderate-to-severe motion degraded exam of lumbar spine and 3 plane localizer images, sagittal T1 post contrast and axial T1 postcontrast sequences. No abnormal enhancement in lumbar spine given limitations of motion degradation.   MRI L-spine w/o: Extensively Motion degraded examination.Stable postsurgical changes as above. Multilevel spondylotic changes of the lumbar spine. See narrative above for full details   lumbar x-rays: Left-sided thoracolumbar nydia with L3-4 posterior instrumented fusion and interbody device, stable from prior exam. No acute fracture. Intact pedicles. Five non-rib-bearing lumbar vertebral bodies. Unchanged thoracolumbar dextroscoliosis. Stable grade 1 anterolisthesis of L4 on L5. No significant dynamic instability. Age-appropriate lumbar degenerative changes.    Plan:   Continue to closely monitor neuro exam   Frequent neuro checks per primary team   Maintain normotensive BP goals, SBP < 160   Case and imaging reviewed this am on rounds   MRI reviewed and reveals largely stable in size known T3 IDEM mass, however, given patient's presenting symptoms, it appears she has become much more symptomatic in this  regard and is now myelopathic on exam.  Given the patient has become much more symptomatic and does demonstrate signs of myelopathy both in her symptoms and on exam I feel is related to this T3 IDEM mass.  Given this, surgery is likely now indicated.  However I will review the case formally on rounds tomorrow morning with full neurosurgery team to discuss options of possible inpatient surgical intervention versus close outpatient follow-up and surgery on a more elective basis.  In the meantime, would recommend medically optimizing the patient.  She will require medical clearance from our internal medicine colleagues  Continue ongoing conservative management  Pain control primary team  PT/OT  Hold all AC/AP meds in the setting of possible nsgy intervention this admission   No reported use at home   DVT ppx: gemma Curtis for chem dvt ppx from a nsgy standpoint   Medical management per primary team   Social work following or assistance with dispo once medically cleared     Neurosurgery will continue to follow. Please reach out with any further questions or concerns.

## 2025-01-01 NOTE — ASSESSMENT & PLAN NOTE
Iron panel 12/31/24: iron 40, ferritin 9, iron saturation 40%, TIBC 396  Continue with IV Venofer 200 mg daily x 3 doses (day2/3 today)  Monitor CBC and transfuse for hemoglobin < 7

## 2025-01-01 NOTE — H&P
H&P - Hospitalist   Name: Hayley Rios 64 y.o. female I MRN: [id number]  Unit/Bed#: 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)  Presented to [facility] with worsening of chronic LLE weakness, numbness/tingling of LLE>RLE, and muscle spasms   Known history of thoracic meningioma with spinal cord displacement and compression, lost to follow up due to move to [place]  MRI T spine revealed 1.4 cm intradural extramedullary probable meningioma at T3 level with associated marked spinal cord compression with severe canal stenosis   Case was d/w neurosurgery who recommended tx to [facility] for possible surgical intervention  Hold VTE prophylaxis pending neurosurgery plans  Monitor neuro checks, bladder scans   Ambulatory dysfunction  Due to primary problem  PT/OT evals  Restless leg syndrome  Continue Mirapex 0.5 mg qhs  Low iron stores and low percent saturation on iron panel, was started on venofer at [facility] x 3 days, continue  Possibility this is related to nerve root impingement.  Await input from neurosurgery.  Regardless patient needs follow-up with her outpatient providers in [place] for further management  Lymphedema  Continue home lasix      VTE Prophylaxis: Pharmacologic VTE Prophylaxis contraindicated due to possible neurosurgical intervention pending consultation   / sequential compression device   Code Status: Level 1 - Full Code   POLST: POLST form is not discussed and not completed at this time.  Discussion with family: Offered however patient declines at this time, states she has updated family    Anticipated Length of Stay:  Patient will be admitted on an Inpatient basis with an anticipated length of stay of greater than 2 midnights.   Justification for Hospital Stay: Please see detailed plans noted above.    Chief Complaint:     Worsening left leg weakness and muscle spasms  History of Present  Illness:  Hayley Rios is a 64 y.o. female who has a past medical history significant for obesity, lymphedema, multiple spinal surgeries, lumbar spinal stenosis with radiculopathy, known T3 spinal meningioma lost to follow-up who initially was admitted to the [facility] 12/30/2024-12/31/2024 with worsening left lower extremity weakness and spasming.  She underwent multiple imaging studies as below throughout the hospitalization and of particular note noted with MRI lumbar spine with contrast limited due to motion artifact however without abnormal enhancement in the lumbar spine given the limitations of motion degradation noted, additionally MRI thoracic spine with/without contrast was performed redemonstrating the known T3 level meningioma with associated marked spinal cord compression with severe canal stenosis.  Hospitalist at [facility] discussed with neurosurgery on-call, who advised patient be transferred here for neurosurgical evaluation to determine if surgical intervention is warranted.    Currently, patient is lying in bed in no acute distress and comfortable appearing, primarily complaining of ongoing left leg spasming without other acute complaints at this time.    Review of Systems:    Constitutional:  Denies fever or chills   Eyes:  Denies change in visual acuity   HENT:  Denies nasal congestion or sore throat   Respiratory:  Denies cough or shortness of breath   Cardiovascular:  Denies chest pain or edema   GI:  Denies abdominal pain, nausea, vomiting, bloody stools or diarrhea   :  Denies dysuria   Musculoskeletal:  Denies joint pain but reports back pain and left leg pain/spasm  Integument:  Denies rash   Neurologic:  Denies headache, focal weakness or sensory changes   Endocrine:  Denies polyuria or polydipsia   Lymphatic:  Denies swollen glands   Psychiatric:  Denies depression or anxiety     Past Medical and Surgical History:   Past Medical History:   Diagnosis Date    Anemia      "Anesthesia     \"sometimes low BP upon waking up\" pt states she stopped breathing 1 time during surgery    Arthritis     Back pain     Dolan's esophagus     Chronic pain disorder     back    Chronic venous insufficiency     Colon polyp     Cough variant asthma     d/t mold-all sx diminished when removed from source    COVID-19 03/2020    Depression     Environmental allergies     dust    GERD (gastroesophageal reflux disease)     Hiatal hernia     History of anemia     History of fusion of spine for scoliosis     \"as a teenager\"    History of transfusion     1978 - no adverse reaction    Left lumbar radiculitis     Last Assessed: [id number]    Lumbar postlaminectomy syndrome     Migraines     Morbid obesity (HCC)     Motion sickness     Neck pain     Osteoarthritis     of left hip    Right knee pain     Seasonal allergies     Shortness of breath     with stairs    Umbilical hernia     Uses brace     right knee    Wears glasses      Past Surgical History:   Procedure Laterality Date    ARTHRODESIS      lumbar    ARTHRODESIS      Spinal Arthrodesis for Deformity; Last Assessed: 16Mar2017    BACK SURGERY      lumbar fusion,with nydia/screw and cage implant    BACK SURGERY      surgery for scoliosis    BREAST CYST EXCISION Right     benign    CHOLECYSTECTOMY LAPAROSCOPIC N/A 12/20/2023    Procedure: FENESTRATED SUBTOTAL CHOLECYSTECTOMY LAPAROSCOPIC, EXTENSIVE LYSIS OF ADHESIONS.;  Surgeon: Chelle Butler MD;  Location: [facility];  Service: General    COLONOSCOPY      COLONOSCOPY N/A 03/14/2019    Procedure: COLONOSCOPY;  Surgeon: Van Dyson MD;  Location: [facility];  Service: Gastroenterology    ESOPHAGOGASTRODUODENOSCOPY N/A 03/14/2019    Procedure: ESOPHAGOGASTRODUODENOSCOPY (EGD) W RFA(BARRX);  Surgeon: Van Dyson MD;  [facility]: [facility];  Service: Gastroenterology    HERNIA REPAIR      umbilical hernia repair x2    PLANTAR FASCIA SURGERY Left     TX ARTHRS KNE SURG W/MENISCECTOMY MED/LAT W/SHVG Right " 04/04/2018    Procedure: ARTHROSCOPY KNEE PARTIAL MEDIAL MENISECTOMY , CHONDROPLASTY;  Surgeon: Rocio Roe DO;  [facility];  Service: Orthopedics    RI BREAST REDUCTION Bilateral 03/12/2021    Procedure: BREAST REDUCTION;  Surgeon: Cortez Sandoval MD;  Location:  MAIN OR;  Service: Plastics    RI COLONOSCOPY FLX DX W/COLLJ SPEC WHEN PFRMD N/A 02/20/2018    Procedure: COLONOSCOPY with polypectomy;  Surgeon: Apryl Garcia MD;  [facility]: [facility];  Service: General    RI ESOPHAGOGASTRODUODENOSCOPY TRANSORAL DIAGNOSTIC N/A 05/24/2017    Procedure: ESOPHAGOGASTRODUODENOSCOPY (EGD);  Surgeon: Marcello Street MD;  Location: [facility];  Service: Gastroenterology    RI ESOPHAGOGASTRODUODENOSCOPY TRANSORAL DIAGNOSTIC N/A 08/31/2017    Procedure: ESOPHAGOGASTRODUODENOSCOPY (EGD) W RFA;  Surgeon: Macho Aguayo MD;  Location:  GI LAB;  Service: Gastroenterology    RI LAPS RPR RECURRENT INCISIONAL HERNIA REDUCIBLE N/A 01/21/2021    Procedure: REPAIR HERNIA INCISIONAL LAPAROSCOPIC W/ ROBOTICS, with mesh;  Surgeon: Errol Bermudez MD;  [facility];  Service: General    RI RPR AA HERNIA 1ST 3-10 CM REDUCIBLE N/A 2/19/2024    Procedure: VENTRAL HERNIA REPAIR 3CM WITH MESH;  Surgeon: Chelle Butler MD;  Location:  MAIN OR;  Service: General    WISDOM TOOTH EXTRACTION         Meds/Allergies:    Medications Prior to Admission:     clotrimazole-betamethasone (LOTRISONE) 1-0.05 % cream    DULoxetine (CYMBALTA) 60 mg delayed release capsule    furosemide (LASIX) 20 mg tablet    nystatin (MYCOSTATIN) powder    omeprazole (PriLOSEC OTC) 20 MG tablet    potassium chloride (Klor-Con) 10 mEq tablet    pramipexole (MIRAPEX) 0.25 mg tablet    VITAMIN D PO    Allergies:   Allergies   Allergen Reactions    Dust Mite Extract Shortness Of Breath    Latex Blisters, Itching, Other (See Comments) and Rash     contact    Other Other (See Comments)     Seasonal AND cock roaches    Sulfa Antibiotics GI Intolerance, Other  "(See Comments) and Vomiting     Topical-blistering    \"intense vomiting\"     History:  Marital Status: Single     Substance Use History:   Social History     Substance and Sexual Activity   Alcohol Use Not Currently    Comment: rarely-every 3 mos     Social History     Tobacco Use   Smoking Status Never   Smokeless Tobacco Never     Social History     Substance and Sexual Activity   Drug Use Not Currently    Comment: medical marijuana 7 years ago       Family History:  Family History   Problem Relation Age of Onset    Lung cancer Mother     Cancer Mother     Alcohol abuse Father     Other Father         Cardiac Disorder    Depression Father     Hypertension Father     Heart attack Father     Breast cancer Maternal Grandmother     Lung cancer Maternal Grandmother     Diabetes type I Other     No Known Problems Sister     No Known Problems Brother     No Known Problems Maternal Grandfather     Leukemia Paternal Grandmother     No Known Problems Paternal Grandfather     No Known Problems Sister     No Known Problems Maternal Aunt     No Known Problems Paternal Aunt     Stroke Neg Hx        Physical Exam:     Vitals:   Blood Pressure: 130/67 (12/31/24 2300)  Pulse: 77 (12/31/24 2300)  Temperature: 98.1 °F (36.7 °C) (12/31/24 2300)  Temp Source: Oral (12/31/24 2300)  Respirations: 16 (12/31/24 2300)  Height: 5' 4\" (162.6 cm) (12/31/24 2300)  Weight - Scale: 90.7 kg (200 lb) (12/31/24 2300)    Constitutional:  Well developed, well nourished, no acute distress, non-toxic appearance   Eyes:  PERRL, conjunctiva normal   HENT:  Atraumatic, external ears normal, nose normal, oropharynx moist, no pharyngeal exudates. Neck- normal range of motion, no tenderness, supple   Respiratory:  No respiratory distress, normal breath sounds, no rales, no wheezing   Cardiovascular:  Normal rate, normal rhythm, no murmurs, no gallops, no rubs   GI:  Soft, nondistended, normal bowel sounds, nontender, no organomegaly, no mass, no rebound, no " guarding   :  No costovertebral angle tenderness   Musculoskeletal:  No edema, no tenderness, no deformities. Back- no tenderness  Integument:  Well hydrated, no rash   Lymphatic:  No lymphadenopathy noted   Neurologic:  Alert &awake, communicative, CN 2-12 normal, normal motor function although left lower extremity to extension mildly weaker compared to right although somewhat pain limited, normal sensory function, no focal deficits noted   Psychiatric:  Speech and behavior appropriate       Lab Results: I have reviewed laboratory reports/results from this admission    Results from last 7 days   Lab Units 12/30/24  0152   WBC Thousand/uL 7.36   HEMOGLOBIN g/dL 11.5   HEMATOCRIT % 38.2   PLATELETS Thousands/uL 300   SEGS PCT % 60   LYMPHO PCT % 31   MONO PCT % 5   EOS PCT % 3     Results from last 7 days   Lab Units 12/30/24  0152   SODIUM mmol/L 139   POTASSIUM mmol/L 3.5   CHLORIDE mmol/L 104   CO2 mmol/L 27   BUN mg/dL 17   CREATININE mg/dL 0.61   ANION GAP mmol/L 8   CALCIUM mg/dL 9.3   ALBUMIN g/dL 4.4   TOTAL BILIRUBIN mg/dL 0.38   ALK PHOS U/L 68   ALT U/L 12   AST U/L 14   GLUCOSE RANDOM mg/dL 112                       EKG: Reviewed    Imaging: Results Review Statement: I reviewed radiology reports from this admission including: CT spine, MRI spine, and xray(s).    MRI lumbar spine w contrast  Result Date: 12/30/2024  Narrative: MRI LUMBAR SPINE WITH CONTRAST INDICATION: weakness,h/o spinal mengioma. COMPARISON: Earlier same day day MRI thoracic spine with and without contrast and MRI lumbar spine without contrast. CT lumbar spine without contrast done earlier today. TECHNIQUE:  Multiplanar, multisequence imaging of the lumbar spine was performed before and after gadolinium administration. . IV Contrast:  9 mL of Gadobutrol injection (SINGLE-DOSE) IMAGE QUALITY: Suboptimal examination due to moderate-to-severe motion degraded exam of lumbar spine and 3 plane localizer images, sagittal T1 post contrast and  axial T1 postcontrast sequences. FINDINGS: VERTEBRAL BODIES: Partially imaged posterior spinal fixation hardware of thoracic and lumbar spine and interbody cage device at L3-L4. Moderate dextroscoliosis of visualized thoracolumbar spine. Please see MRI lumbar spine without contrast done earlier today. SACRUM: Please see MRI lumbar spine without contrast and CT lumbar spine without contrast done earlier today. DISTAL CORD AND CONUS: Please see MRI lumbar spine without contrast done earlier today. PARASPINAL SOFT TISSUES:  Please see MRI lumbar spine without contrast done earlier today. LOWER THORACIC DISC SPACES: Please see same day MRI thoracic spine with and without contrast done earlier today. LUMBAR DISC SPACES: Please see same day MRI lumbar spine without contrast on earlier today. POSTCONTRAST IMAGING:  No abnormal enhancement given limitations of motion degradation. OTHER FINDINGS:  None.     Impression: Suboptimal examination due to moderate-to-severe motion degraded exam of lumbar spine and 3 plane localizer images, sagittal T1 post contrast and axial T1 postcontrast sequences. - No abnormal enhancement in lumbar spine given limitations of motion degradation. Please see earlier same day MRI lumbar spine without contrast, MRI thoracic spine with and without contrast, CT lumbar spine without contrast for further evaluation. The study was marked in EPIC for immediate notification. Workstation performed: [id number]     MRI thoracic spine w wo contrast  Result Date: 12/30/2024  Narrative: MRI THORACIC SPINE WITH AND WITHOUT CONTRAST INDICATION: weakness,h/o spinal mengioma. COMPARISON: MRI lumbar spine without contrast 12/30/2024. MRI thoracic spine with and without contrast 5/16/2022. TECHNIQUE:  Multiplanar, multisequence imaging of the thoracic spine was performed before and after gadolinium administration. . IV Contrast:  9 mL of Gadobutrol injection (SINGLE-DOSE) IMAGE QUALITY: Suboptimal examination due to  mild, moderate, and severe motion artifact - worse on postcontrast sequences FINDINGS: ALIGNMENT: Similar mild-to-moderate reversed S-shaped scoliosis of thoracic spine (worse in lower thoracic spine). No listhesis. MARROW SIGNAL: Susceptibility hardware artifact from mid thoracic to upper lumbar spinal hardware. Small scattered intraosseous vertebral body hemangiomas. Type II Modic endplate change C6-C7. Otherwise, normal bone marrow signal. THORACIC CORD: Similar 1.4 cm intradural extramedullary dural based enhancing lesion in left posterolateral thoracic spine region at approximately T3 level with associated marked spinal cord compression and severe canal stenosis given motion degraded exam (3:9). No cord edema given motion degradation.. PREVERTEBRAL AND PARASPINAL SOFT TISSUES:   Normal. THORACIC DEGENERATIVE CHANGE: Mild multilevel degenerative changes of the thoracic spine consist of endplate osteophytes, degenerative endplate changes, and mild facet loss. Severe canal stenosis at T3 level due to intradural extra medullary dural based lesion, detailed above. Otherwise, no significant canal stenosis or foraminal narrowing. POSTCONTRAST: Please see above discussion. OTHER FINDINGS:  None.     Impression: Suboptimal examination due to mild, moderate, and severe motion artifact - worse on postcontrast sequences. - Similar 1.4 cm intradural extramedullary probable meningioma in left posterolateral thoracic spine region at approximately T3 level with associated marked spinal cord compression with severe canal stenosis given motion degraded exam. No cord edema given motion degradation. Recommend evaluation by neurosurgery. - Degenerative changes, as detailed above. The study was marked in EPIC for immediate notification. Workstation performed: [id number]     XR spine lumbar minimum 4 views non injury  Result Date: 12/30/2024  Narrative: XR SPINE LUMBAR MINIMUM 4 VIEWS NON INJURY INDICATION: prior surgery, assess  hardware. COMPARISON: Lumbar spine radiographs 1/8/2019 FINDINGS: Left-sided thoracolumbar nydia with L3-4 posterior instrumented fusion and interbody device, stable from prior exam. No acute fracture. Intact pedicles. Five non-rib-bearing lumbar vertebral bodies. Unchanged thoracolumbar dextroscoliosis. Stable grade 1 anterolisthesis of L4 on L5. No significant dynamic instability. Age-appropriate lumbar degenerative changes. Unremarkable soft tissues.     Impression: No acute osseous abnormality. Intact hardware. Computerized Assisted Algorithm (CAA) may have been used to analyze all applicable images. Workstation performed: [id number]     MRI lumbar spine wo contrast  Result Date: 12/30/2024  Narrative: MRI LUMBAR SPINE WITHOUT CONTRAST INDICATION: weakness. COMPARISON: CT lumbar spine 12/30/2024. TECHNIQUE:  Multiplanar, multisequence imaging of the lumbar spine was performed. . IMAGE QUALITY: Motion-degraded, which reduces diagnostic sensitivity and renders several of the sequences nondiagnostic. FINDINGS: VERTEBRAL BODIES: Redemonstrated thoracolumbar nydia fixation with stable alignment, including S-shaped scoliosis of the thoracolumbar spine. Pedicle screws again identified at L3 and L4, with interbody hardware at L3-L4. Mild, grade 1 anterolisthesis of L4 and L5 is again noted. SACRUM: Not well evaluated given significant motion limitations. DISTAL CORD AND CONUS:  Normal size and signal within the distal cord and conus. PARASPINAL SOFT TISSUES: No acute abnormality within the confines of motion degradation. LOWER THORACIC DISC SPACES: Spondylotic changes without acute critical central canal stenosis. LUMBAR DISC SPACES: Multilevel mild degenerative disc disease at the unfused levels. L1-L2: Mild bilateral neuroforaminal narrowing. No significant spinal canal stenosis. L2-L3: Mild bilateral neuroforaminal narrowing. No significant spinal canal stenosis. L3-L4: Interbody hardware material. Mild-moderate  bilateral neuroforaminal narrowing. No significant spinal canal stenosis. L4-L5: Disc bulge with left foraminal disc herniation. Moderate facet arthropathy. Moderate-marked bilateral neuroforaminal narrowing. Mild-moderate spinal canal stenosis. L5-S1: Moderate left with marked compressive right neuroforaminal narrowing. Mild spinal canal stenosis.     Impression: Extensively motion-degraded examination, which reduces diagnostic sensitivity and renders several of the sequences nondiagnostic. Within these significant confines: 1. Stable postsurgical changes as above. 2. Multilevel spondylotic changes of the lumbar spine. See narrative above for full details. Workstation performed: [id number]     CT spine lumbar without contrast  Result Date: 12/30/2024  Narrative: CT LUMBAR SPINE WITHOUT CONTRAST INDICATION:   left leg weakness. COMPARISON: 6/26/2021. TECHNIQUE:  Contiguous axial images through the lumbar spine were obtained. Sagittal and coronal reconstructions were performed. Radiation dose length product (DLP) for this visit:  1146.33 mGy-cm .  This examination, like all CT scans performed in the [facility], was performed utilizing techniques to minimize radiation dose exposure, including the use of iterative reconstruction and automated exposure control. IMAGE QUALITY:  Diagnostic. FINDINGS: ALIGNMENT:  There are 5 lumbar type vertebral bodies. Reidentified thoracolumbar nydia with stable alignment including moderate rotatory dextroscoliosis of the thoracolumbar spine and compensatory levoscoliosis of the lower lumbar spine with scoliosis hardware. Stable minimal grade 1 anterolisthesis L4 upon L5. VERTEBRAE:  No acute fracture.  No lytic or blastic lesion. Postsurgical changes as described below. DEGENERATIVE CHANGES: Lower Thoracic spine: Normal lower thoracic disc spaces. Lumbar Spine: L1-2: Facet arthrosis. No central canal stenosis or foraminal stenosis. L2-3: Facet arthrosis. No central canal  stenosis or foraminal stenosis. L3-4: Reidentified postsurgical changes of prior lumbar fusion with interbody graft within the disc space, pedicle screws at the L3 and L4 levels and posterior spinal rods. No gross evidence of acute hardware complication. Advanced facet arthrosis. Evaluation of the canal limited secondary to streak artifact. L4-5: Diffuse disc bulge. Advanced facet arthrosis. Moderate to severe central canal stenosis. Severe left and moderate right neuroforaminal stenosis. L5-S1: No significant central canal stenosis. Mild to moderate right neuroforaminal stenosis. PARASPINAL SOFT TISSUES:  Normal. OTHER: Advanced degenerative changes/sclerosis at the bilateral sacroiliac joints, left greater than right as on prior.     Impression: No acute compression fracture or posttraumatic subluxation. Stable postsurgical changes as described above. No gross evidence of acute hardware complication. Multilevel lumbar degenerative changes as described above, notably moderate severe central canal and severe left neuroforaminal stenosis at L4-5, suspect slightly progressed compared to previous MRI study dated 6/26/2021 accounting for differences in modality,? Source of the patient's symptoms. Clinical correlation and follow-up recommended. Workstation performed: [id number]     CT head without contrast  Result Date: 12/30/2024  Narrative: CT BRAIN - WITHOUT CONTRAST INDICATION:   left leg weakness. COMPARISON:  None. TECHNIQUE:  CT examination of the brain was performed.  Multiplanar 2D reformatted images were created from the source data. Radiation dose length product (DLP) for this visit:  850.68 mGy-cm .  This examination, like all CT scans performed in the [facility], was performed utilizing techniques to minimize radiation dose exposure, including the use of iterative  reconstruction and automated exposure control. IMAGE QUALITY:  Diagnostic. FINDINGS: PARENCHYMA:No intracranial mass, mass effect  or midline shift. No CT signs of acute infarction.  No acute parenchymal hemorrhage. VENTRICLES AND EXTRA-AXIAL SPACES:  Normal for the patient's age. VISUALIZED ORBITS:  Normal visualized orbits. PARANASAL SINUSES:  Normal visualized paranasal sinuses. CALVARIUM AND EXTRACRANIAL SOFT TISSUES:   Unremarkable     Impression: No acute intracranial abnormality. Workstation performed: [id number]         ** Please Note: Dragon 360 Dictation voice to text software was used in the creation of this document. **      ** Please Note: This note has been constructed using a voice recognition system. **

## 2025-01-01 NOTE — PROGRESS NOTES
Progress Note - Hospitalist   Name: Hayley Rios 64 y.o. 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 Known history of thoracic meningioma with spinal cord displacement and compression, lost to follow up due to move to [place].  MRI T spine: Suboptimal examination due to mild, moderate, and severe motion artifact - worse on postcontrast sequences. Similar 1.4 cm intradural extramedullary probable meningioma in left posterolateral thoracic spine region at approximately T3 level with associated marked spinal cord compression with severe canal stenosis given motion degraded exam. No cord edema given motion degradation.   MRI L spine: Suboptimal examination due to moderate-to-severe motion degraded exam of lumbar spine and 3 plane localizer images, sagittal T1 post contrast and axial T1 postcontrast sequences. No abnormal enhancement in lumbar spine given limitations of motion degradation.   Case was d/w neurosurgery who recommended tx to [facility] for possible surgical intervention  Hold VTE prophylaxis pending neurosurgery evaluation and surgical plans  Monitor neuro checks, bladder scans   Analgesics as needed   PT/OT evaluations   Restless leg syndrome  Vitamin B12 level within normal limits  Continue home dose Mirapex 0.5 mg qhs  Low iron stores and low percent saturation on iron panel, was started on venofer at [facility] x 3 days, continue  Possibility this is related to nerve root impingement, await input from neurosurgery  Follow-up with her outpatient providers in [place] for further management  MARV (iron deficiency anemia)  Iron panel 12/31/24: iron 40, ferritin 9, iron saturation 40%, TIBC 396  Continue with IV Venofer 200 mg daily x 3 doses (day2/3  today)  Monitor CBC and transfuse for hemoglobin < 7  Prediabetes  Evidenced by A1c of 5.8%  Sugars stable on BMP   Defer accuchecks/SSI  Ambulatory dysfunction  Due to primary problem, see plan of care outlined above   Lymphedema  Continue home dose Lasix    VTE Pharmacologic Prophylaxis: VTE Score: 5 High Risk (Score >/= 5) - Pharmacological DVT Prophylaxis Contraindicated. Sequential Compression Devices Ordered. Hold pending NS evaluation/recommendations.     Mobility:   Basic Mobility Inpatient Raw Score: 21  JH-HLM Goal: 6: Walk 10 steps or more  JH-HLM Achieved: 6: Walk 10 steps or more  JH-HLM Goal achieved. Continue to encourage appropriate mobility.    Patient Centered Rounds: I performed bedside rounds with nursing staff today.   Discussions with Specialists or Other Care Team Provider: primary RN, neurosurgery AP    Education and Discussions with Family / Patient: Patient declined call to .     Current Length of Stay: 1 day(s)  Current Patient Status: Inpatient   Certification Statement: The patient will continue to require additional inpatient hospital stay due to pending neurosurgery evaluation and possible surgical intervention   Discharge Plan:  will largely depending on if need for neurosurgical intervention; therapy evaluations pending    Code Status: Level 1 - Full Code    Subjective   Patient expressing some anxiety regarding hospitalization and plan of care.  Advised at this time we are awaiting neurosurgery evaluation and recommendations regarding need for surgical intervention during this hospitalization.  Patient notes she has been noting left lower extremity weakness over the last several months but in the last 2 weeks has had rapid progression of bilateral lower extremity weakness, still more severe on the left side.  She also reports new onset numbness in bilateral lower extremities as well as left lower extremity muscle cramping.  She denies bowel or bladder incontinence  although does endorse urgency.    Objective :  Temp:  [97.7 °F (36.5 °C)-98.7 °F (37.1 °C)] 98.7 °F (37.1 °C)  HR:  [77-83] 81  BP: (124-148)/(67-79) 148/79  Resp:  [16-18] 18  SpO2:  [96 %-97 %] 97 %  O2 Device: None (Room air)    Body mass index is 34.33 kg/m².     Input and Output Summary (last 24 hours):     Intake/Output Summary (Last 24 hours) at 1/1/2025 0954  Last data filed at 1/1/2025 0853  Gross per 24 hour   Intake 118 ml   Output 66 ml   Net 52 ml       Physical Exam  Vitals and nursing note reviewed.   Constitutional:       Appearance: She is obese.   Cardiovascular:      Rate and Rhythm: Normal rate.   Pulmonary:      Effort: Pulmonary effort is normal. No respiratory distress.   Musculoskeletal:      Right lower leg: Edema present.      Left lower leg: Edema present.      Comments: Chronic LE swelling at baseline per patient   Neurological:      Mental Status: She is alert.      Motor: Weakness (bilateral LE L > R) present.         Lines/Drains:              Lab Results: I have reviewed the following results:   Results from last 7 days   Lab Units 01/01/25  0440 12/30/24  0152   WBC Thousand/uL 6.60 7.36   HEMOGLOBIN g/dL 10.7* 11.5   HEMATOCRIT % 35.1 38.2   PLATELETS Thousands/uL 271 300   SEGS PCT %  --  60   LYMPHO PCT %  --  31   MONO PCT %  --  5   EOS PCT %  --  3     Results from last 7 days   Lab Units 01/01/25  0440 12/30/24  0152   SODIUM mmol/L 140 139   POTASSIUM mmol/L 3.9 3.5   CHLORIDE mmol/L 104 104   CO2 mmol/L 28 27   BUN mg/dL 14 17   CREATININE mg/dL 0.60 0.61   ANION GAP mmol/L 8 8   CALCIUM mg/dL 9.3 9.3   ALBUMIN g/dL  --  4.4   TOTAL BILIRUBIN mg/dL  --  0.38   ALK PHOS U/L  --  68   ALT U/L  --  12   AST U/L  --  14   GLUCOSE RANDOM mg/dL 101 112             Results from last 7 days   Lab Units 01/01/25  0440   HEMOGLOBIN A1C % 5.8*           Recent Cultures (last 7 days):         Imaging Results Review: I reviewed radiology reports from this admission including: MRI  spine.  Other Study Results Review: No additional pertinent studies reviewed.    Last 24 Hours Medication List:     Current Facility-Administered Medications:     acetaminophen (TYLENOL) tablet 975 mg, Q8H EVGENY    docusate sodium (COLACE) capsule 100 mg, BID    DULoxetine (CYMBALTA) delayed release capsule 60 mg, Daily    furosemide (LASIX) tablet 20 mg, Daily    [Held by provider] heparin (porcine) subcutaneous injection 5,000 Units, Q8H EVGENY    iron sucrose (VENOFER) 200 mg in sodium chloride 0.9 % 50 mL IVPB, Daily, Last Rate: 200 mg (01/01/25 0827)    melatonin tablet 3 mg, HS PRN    methocarbamol (ROBAXIN) tablet 750 mg, Q6H EVGENY    ondansetron (ZOFRAN) injection 4 mg, Q6H PRN    oxyCODONE (ROXICODONE) immediate release tablet 10 mg, Q6H PRN    oxyCODONE (ROXICODONE) IR tablet 5 mg, Q6H PRN    pantoprazole (PROTONIX) EC tablet 20 mg, Early Morning    potassium chloride (Klor-Con M20) CR tablet 20 mEq, Daily With Breakfast    pramipexole (MIRAPEX) tablet 0.5 mg, HS    Administrative Statements   Today, Patient Was Seen By: Ramila Duckworth PA-C  I have spent a total time of 30 minutes in caring for this patient on the day of the visit/encounter including Impressions, Counseling / Coordination of care, Documenting in the medical record, Reviewing / ordering tests, medicine, procedures  , Obtaining or reviewing history  , and Communicating with other healthcare professionals .    **Please Note: This note may have been constructed using a voice recognition system.**

## 2025-01-01 NOTE — OCCUPATIONAL THERAPY NOTE
Occupational Therapy Cancellation Note     01/01/25 1200   OT Last Visit   OT Visit Date 01/01/25   Note Type   Note type Cancelled Session   Cancel Reasons Medical status   Additional Comments Pt transferred for neurosurgical evaluation and potential surgical intervention.  Will follow for OT evaluation as appropriate pending neurosurgery POC.     MEENA German, OTR/L, CSRS  [place] License [id number]  [place] License [id number]

## 2025-01-01 NOTE — PLAN OF CARE
Problem: PAIN - ADULT  Goal: Verbalizes/displays adequate comfort level or baseline comfort level  Description: Interventions:  - Encourage patient to monitor pain and request assistance  - Assess pain using appropriate pain scale  - Administer analgesics based on type and severity of pain and evaluate response  - Implement non-pharmacological measures as appropriate and evaluate response  - Consider cultural and social influences on pain and pain management  - Notify physician/advanced practitioner if interventions unsuccessful or patient reports new pain  Outcome: Progressing     Problem: INFECTION - ADULT  Goal: Absence or prevention of progression during hospitalization  Description: INTERVENTIONS:  - Assess and monitor for signs and symptoms of infection  - Monitor lab/diagnostic results  - Monitor all insertion sites, i.e. indwelling lines, tubes, and drains  - Monitor endotracheal if appropriate and nasal secretions for changes in amount and color  - Basile appropriate cooling/warming therapies per order  - Administer medications as ordered  - Instruct and encourage patient and family to use good hand hygiene technique  - Identify and instruct in appropriate isolation precautions for identified infection/condition  Outcome: Progressing     Problem: SAFETY ADULT  Goal: Patient will remain free of falls  Description: INTERVENTIONS:  - Educate patient/family on patient safety including physical limitations  - Instruct patient to call for assistance with activity   - Consult OT/PT to assist with strengthening/mobility   - Keep Call bell within reach  - Keep bed low and locked with side rails adjusted as appropriate  - Keep care items and personal belongings within reach  - Initiate and maintain comfort rounds  - Make Fall Risk Sign visible to staff  - Offer Toileting every 2 Hours, in advance of need  - Initiate/Maintain bed/chair alarm  - Obtain necessary fall risk management equipment:   - Apply yellow  socks and bracelet for high fall risk patients  - Consider moving patient to room near nurses station  Outcome: Progressing  Goal: Maintain or return to baseline ADL function  Description: INTERVENTIONS:  -  Assess patient's ability to carry out ADLs; assess patient's baseline for ADL function and identify physical deficits which impact ability to perform ADLs (bathing, care of mouth/teeth, toileting, grooming, dressing, etc.)  - Assess/evaluate cause of self-care deficits   - Assess range of motion  - Assess patient's mobility; develop plan if impaired  - Assess patient's need for assistive devices and provide as appropriate  - Encourage maximum independence but intervene and supervise when necessary  - Involve family in performance of ADLs  - Assess for home care needs following discharge   - Consider OT consult to assist with ADL evaluation and planning for discharge  - Provide patient education as appropriate  Outcome: Progressing  Goal: Maintains/Returns to pre admission functional level  Description: INTERVENTIONS:  - Perform AM-PAC 6 Click Basic Mobility/ Daily Activity assessment daily.  - Set and communicate daily mobility goal to care team and patient/family/caregiver.   - Collaborate with rehabilitation services on mobility goals if consulted  - Perform Range of Motion 3 times a day.  - Reposition patient every 2 hours.  - Dangle patient 3 times a day  - Stand patient 3 times a day  - Ambulate patient 3 times a day  - Out of bed to chair 3 times a day   - Out of bed for meals 3 times a day  - Out of bed for toileting  - Record patient progress and toleration of activity level   Outcome: Progressing     Problem: DISCHARGE PLANNING  Goal: Discharge to home or other facility with appropriate resources  Description: INTERVENTIONS:  - Identify barriers to discharge w/patient and caregiver  - Arrange for needed discharge resources and transportation as appropriate  - Identify discharge learning needs (meds,  wound care, etc.)  - Arrange for interpretive services to assist at discharge as needed  - Refer to Case Management Department for coordinating discharge planning if the patient needs post-hospital services based on physician/advanced practitioner order or complex needs related to functional status, cognitive ability, or social support system  Outcome: Progressing     Problem: Knowledge Deficit  Goal: Patient/family/caregiver demonstrates understanding of disease process, treatment plan, medications, and discharge instructions  Description: Complete learning assessment and assess knowledge base.  Interventions:  - Provide teaching at level of understanding  - Provide teaching via preferred learning methods  Outcome: Progressing     Problem: NEUROSENSORY - ADULT  Goal: Achieves stable or improved neurological status  Description: INTERVENTIONS  - Monitor and report changes in neurological status  - Monitor vital signs such as temperature, blood pressure, glucose, and any other labs ordered   - Initiate measures to prevent increased intracranial pressure  - Monitor for seizure activity and implement precautions if appropriate      Outcome: Progressing  Goal: Achieves maximal functionality and self care  Description: INTERVENTIONS  - Monitor swallowing and airway patency with patient fatigue and changes in neurological status  - Encourage and assist patient to increase activity and self care.   - Encourage visually impaired, hearing impaired and aphasic patients to use assistive/communication devices  Outcome: Progressing

## 2025-01-01 NOTE — CONSULTS
Consultation - Neurosurgery   Name: Hayley Rios 64 y.o. 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 Similar 1.4 cm intradural extramedullary probable meningioma in left posterolateral thoracic spine region at approximately T3 level with associated marked spinal cord compression with severe canal stenosis given motion degraded exam. No cord edema given motion degradation. Recommend evaluation by neurosurgery. Degenerative changes   MRI L-spine w/wo: Suboptimal examination due to moderate-to-severe motion degraded exam of lumbar spine and 3 plane localizer images, sagittal T1 post contrast and axial T1 postcontrast sequences. No abnormal enhancement in lumbar spine given limitations of motion degradation.   MRI L-spine w/o: Extensively Motion degraded examination.Stable postsurgical changes as above. Multilevel spondylotic changes of the lumbar spine. See narrative above for full details   lumbar x-rays: Left-sided thoracolumbar nydia with L3-4 posterior instrumented fusion and interbody device, stable from prior exam. No acute fracture. Intact pedicles. Five non-rib-bearing  lumbar vertebral bodies. Unchanged thoracolumbar dextroscoliosis. Stable grade 1 anterolisthesis of L4 on L5. No significant dynamic instability. Age-appropriate lumbar degenerative changes.    Plan:   Continue to closely monitor neuro exam   Frequent neuro checks per primary team   Maintain normotensive BP goals, SBP < 160   Case and imaging reviewed this am on rounds   MRI reviewed and reveals largely stable in size known T3 IDEM mass, however, given patient's presenting symptoms, it appears she has become much more symptomatic in this regard and is now myelopathic on exam.  Given the patient has become much more symptomatic and does demonstrate signs of myelopathy both in her symptoms and on exam I feel is related to this T3 IDEM mass.  Given this, surgery is likely now indicated.  However I will review the case formally on rounds tomorrow morning with full neurosurgery team to discuss options of possible inpatient surgical intervention versus close outpatient follow-up and surgery on a more elective basis.  In the meantime, would recommend medically optimizing the patient.  She will require medical clearance from our internal medicine colleagues  Continue ongoing conservative management  Pain control primary team  PT/OT  Hold all AC/AP meds in the setting of possible nsgy intervention this admission   No reported use at home   DVT ppx: gemma Curtis for chem dvt ppx from a nsgy standpoint   Medical management per primary team   Social work following or assistance with dispo once medically cleared     Neurosurgery will continue to follow. Please reach out with any further questions or concerns.     Please contact the SecureChat role for the Neurosurgery service with any questions/concerns.    History of Present Illness   HPI: Hayley Rios is a 64 y.o. year old female with a PMH significant for obesity, lymphedema, multiple spine surgeries with chronic LBP and LE radiculopathy, lumbar spinal stenosis with  radiculopathy, known history of T3 spinal meningioma who presented to the [facility] emergency department on 12/30/2024 with reports of progressively worsening bilateral lower extremity weakness, L >R, worsening balance difficulty and frequent falls at home.  Patient states that for the last 2 months she has had worsening weakness in her left lower extremity.  She states at baseline her left leg is her worst leg but it was getting progressively worse and she noted she was dragging the leg a bit.  She did start using a cane about 2 months ago to help with her steadiness and balance.  She was attributing these issues to her surgery she had on her left foot that will eventually need revision per the patient's report.  However in the last months she has noted now progressive weakness in the right lower extremity and worsening balance difficulty.  Over the last 2 weeks she has had many falls and is now requiring the use of a walker because her balance is so bad.  Patient states that her friends convinced her to come to the ER for further evaluation given this weakness.  She denies any worsening of her chronic back pain or any new radicular pain.  She does admit to some numbness and tingling throughout bilateral lower extremities that is also been progressively worsening.  She denies any urinary retention or incontinence, but does report some increased urgency.  Denies any saddle anesthesia.  Ambulate with a rolling walker and she does certainly favor her right lower extremity, but she expresses significant concern about this rapidly progressing balance difficulty.    Patient is known to the neurosurgery office, last seen by Dr. Shah in May 2022 after she was noted to have T3 spinal meningioma with spinal cord displacement and compression.  Surgery vs radiation was discussed with patient at that time however given she was in the middle of relocating to [place], no formal follow up was made for her and she was  ultimately lost to follow up in our office.  Patient states she did see a spinal surgery team in [place] where she currently lives and has just been monitoring this lesion there.    Review of Systems   Respiratory:  Negative for shortness of breath.    Cardiovascular:  Negative for chest pain.   Gastrointestinal:  Negative for nausea and vomiting.   Genitourinary:  Positive for urgency. Negative for decreased urine volume, difficulty urinating and dysuria.   Musculoskeletal:  Positive for back pain (unchanged, chronic back pain) and gait problem. Negative for myalgias.   Neurological:  Positive for weakness and numbness.   Psychiatric/Behavioral:  Negative for agitation, behavioral problems, confusion and decreased concentration.      I have reviewed the patient's PMH, PSH, Social History, Family History, Meds, and Allergies    Objective :  Temp:  [97.7 °F (36.5 °C)-98.7 °F (37.1 °C)] 98.7 °F (37.1 °C)  HR:  [77-83] 81  BP: (124-148)/(67-79) 148/79  Resp:  [16-18] 18  SpO2:  [96 %-97 %] 97 %  O2 Device: None (Room air)    Physical Exam   General appearance: alert, appears stated age, cooperative and no distress  Head: Normocephalic, without obvious abnormality, atraumatic  Eyes: EOMI, PERRL  Neck: supple, symmetrical, trachea midline and NT  Back: no kyphosis present, no tenderness to percussion or palpation  Lungs: non labored breathing, no respiratory distress on room air  Heart: regular heart rate  Neurologic:   Mental status: Alert, oriented x3, thought content appropriate  Cranial nerves: grossly intact (Cranial nerves II-XII)  Sensory:   - decreased sensation in brandi LE to LT, intact painful sensation to LLE, decreased pain sensation in the RLE   - sensation equal and intact in brandi UE   Motor:   - brandi UE: intact   - RLE: 4+/5 throughout   - LLE: 3/5 throughout, 2-3 at hip   Reflexes: Noted brisk reflexes throughout bilateral lower extremities, positive clonus bilaterally      Lab Results: I have reviewed  the following results:  Recent Labs     12/30/24  0152 01/01/25  0440   WBC 7.36 6.60   HGB 11.5 10.7*   HCT 38.2 35.1    271   SODIUM 139 140   K 3.5 3.9    104   CO2 27 28   BUN 17 14   CREATININE 0.61 0.60   GLUC 112 101   MG 2.2  --    AST 14  --    ALT 12  --    ALB 4.4  --    TBILI 0.38  --    ALKPHOS 68  --    HSTNI0 <2  --    BNP 24  --      VTE Pharmacologic Prophylaxis: Sequential compression device (Venodyne)  and Heparin

## 2025-01-02 ENCOUNTER — APPOINTMENT (INPATIENT)
Dept: RADIOLOGY | Facility: HOSPITAL | Age: 65
DRG: 029 | End: 2025-01-02
Payer: COMMERCIAL

## 2025-01-02 LAB
ERYTHROCYTE [DISTWIDTH] IN BLOOD BY AUTOMATED COUNT: 15.2 % (ref 11.6–15.1)
HCT VFR BLD AUTO: 37.9 % (ref 34.8–46.1)
HGB BLD-MCNC: 11.4 G/DL (ref 11.5–15.4)
MCH RBC QN AUTO: 26 PG (ref 26.8–34.3)
MCHC RBC AUTO-ENTMCNC: 30.1 G/DL (ref 31.4–37.4)
MCV RBC AUTO: 87 FL (ref 82–98)
PLATELET # BLD AUTO: 274 THOUSANDS/UL (ref 149–390)
PMV BLD AUTO: 9.9 FL (ref 8.9–12.7)
RBC # BLD AUTO: 4.38 MILLION/UL (ref 3.81–5.12)
WBC # BLD AUTO: 6.24 THOUSAND/UL (ref 4.31–10.16)

## 2025-01-02 PROCEDURE — 72128 CT CHEST SPINE W/O DYE: CPT

## 2025-01-02 PROCEDURE — 99233 SBSQ HOSP IP/OBS HIGH 50: CPT | Performed by: NEUROLOGICAL SURGERY

## 2025-01-02 PROCEDURE — 99232 SBSQ HOSP IP/OBS MODERATE 35: CPT | Performed by: PHYSICIAN ASSISTANT

## 2025-01-02 PROCEDURE — 72125 CT NECK SPINE W/O DYE: CPT

## 2025-01-02 PROCEDURE — 97163 PT EVAL HIGH COMPLEX 45 MIN: CPT

## 2025-01-02 PROCEDURE — 72131 CT LUMBAR SPINE W/O DYE: CPT

## 2025-01-02 PROCEDURE — 72141 MRI NECK SPINE W/O DYE: CPT

## 2025-01-02 PROCEDURE — 85027 COMPLETE CBC AUTOMATED: CPT | Performed by: INTERNAL MEDICINE

## 2025-01-02 PROCEDURE — 97167 OT EVAL HIGH COMPLEX 60 MIN: CPT

## 2025-01-02 RX ORDER — LIDOCAINE 50 MG/G
2 PATCH TOPICAL DAILY
Status: DISCONTINUED | OUTPATIENT
Start: 2025-01-02 | End: 2025-01-13

## 2025-01-02 RX ORDER — LORAZEPAM 2 MG/ML
1 INJECTION INTRAMUSCULAR ONCE AS NEEDED
Status: COMPLETED | OUTPATIENT
Start: 2025-01-02 | End: 2025-01-02

## 2025-01-02 RX ORDER — LORAZEPAM 2 MG/ML
1 INJECTION INTRAMUSCULAR ONCE
Status: COMPLETED | OUTPATIENT
Start: 2025-01-02 | End: 2025-01-02

## 2025-01-02 RX ORDER — CALCIUM CARBONATE 500 MG/1
500 TABLET, CHEWABLE ORAL 3 TIMES DAILY PRN
Status: DISCONTINUED | OUTPATIENT
Start: 2025-01-02 | End: 2025-01-06

## 2025-01-02 RX ORDER — CYCLOBENZAPRINE HCL 10 MG
5 TABLET ORAL 3 TIMES DAILY
Status: DISCONTINUED | OUTPATIENT
Start: 2025-01-02 | End: 2025-01-03

## 2025-01-02 RX ORDER — POTASSIUM CHLORIDE 1500 MG/1
40 TABLET, EXTENDED RELEASE ORAL
Status: DISCONTINUED | OUTPATIENT
Start: 2025-01-03 | End: 2025-01-15 | Stop reason: HOSPADM

## 2025-01-02 RX ADMIN — OXYCODONE HYDROCHLORIDE 10 MG: 10 TABLET ORAL at 21:14

## 2025-01-02 RX ADMIN — PRAMIPEXOLE DIHYDROCHLORIDE 0.5 MG: 0.5 TABLET ORAL at 21:14

## 2025-01-02 RX ADMIN — HEPARIN SODIUM 5000 UNITS: 5000 INJECTION, SOLUTION INTRAVENOUS; SUBCUTANEOUS at 13:19

## 2025-01-02 RX ADMIN — ANTACID TABLETS 500 MG: 500 TABLET, CHEWABLE ORAL at 14:45

## 2025-01-02 RX ADMIN — OXYCODONE HYDROCHLORIDE 10 MG: 10 TABLET ORAL at 14:40

## 2025-01-02 RX ADMIN — POTASSIUM CHLORIDE 20 MEQ: 1500 TABLET, EXTENDED RELEASE ORAL at 08:57

## 2025-01-02 RX ADMIN — DULOXETINE HYDROCHLORIDE 60 MG: 60 CAPSULE, DELAYED RELEASE ORAL at 08:56

## 2025-01-02 RX ADMIN — METHOCARBAMOL 750 MG: 750 TABLET ORAL at 06:34

## 2025-01-02 RX ADMIN — LORAZEPAM 1 MG: 2 INJECTION INTRAMUSCULAR; INTRAVENOUS at 19:55

## 2025-01-02 RX ADMIN — HEPARIN SODIUM 5000 UNITS: 5000 INJECTION, SOLUTION INTRAVENOUS; SUBCUTANEOUS at 21:13

## 2025-01-02 RX ADMIN — PANTOPRAZOLE SODIUM 20 MG: 20 TABLET, DELAYED RELEASE ORAL at 06:34

## 2025-01-02 RX ADMIN — ACETAMINOPHEN 975 MG: 325 TABLET, FILM COATED ORAL at 21:13

## 2025-01-02 RX ADMIN — OXYCODONE HYDROCHLORIDE 10 MG: 10 TABLET ORAL at 06:34

## 2025-01-02 RX ADMIN — DOCUSATE SODIUM 100 MG: 100 CAPSULE, LIQUID FILLED ORAL at 08:56

## 2025-01-02 RX ADMIN — ACETAMINOPHEN 975 MG: 325 TABLET, FILM COATED ORAL at 13:19

## 2025-01-02 RX ADMIN — ACETAMINOPHEN 975 MG: 325 TABLET, FILM COATED ORAL at 06:34

## 2025-01-02 RX ADMIN — CYCLOBENZAPRINE HYDROCHLORIDE 5 MG: 10 TABLET, FILM COATED ORAL at 12:10

## 2025-01-02 RX ADMIN — IRON SUCROSE 200 MG: 20 INJECTION, SOLUTION INTRAVENOUS at 08:56

## 2025-01-02 RX ADMIN — LIDOCAINE 2 PATCH: 50 PATCH TOPICAL at 12:11

## 2025-01-02 RX ADMIN — FUROSEMIDE 20 MG: 20 TABLET ORAL at 08:56

## 2025-01-02 RX ADMIN — LORAZEPAM 1 MG: 2 INJECTION INTRAMUSCULAR; INTRAVENOUS at 19:17

## 2025-01-02 RX ADMIN — CYCLOBENZAPRINE HYDROCHLORIDE 5 MG: 10 TABLET, FILM COATED ORAL at 21:14

## 2025-01-02 RX ADMIN — CYCLOBENZAPRINE HYDROCHLORIDE 5 MG: 10 TABLET, FILM COATED ORAL at 15:37

## 2025-01-02 NOTE — UTILIZATION REVIEW
NOTIFICATION OF ADMISSION DISCHARGE   This is a Notification of Discharge from [facility]. Please be advised that this patient has been discharge from our facility. Below you will find the admission and discharge date and time including the patient’s disposition.   UTILIZATION REVIEW CONTACT:  Kate Akers  Utilization   Network Utilization Review Department  Phone: [phone] x carefully listen to the prompts. All voicemails are confidential.  Email: [email or web address]     ADMISSION INFORMATION  PRESENTATION DATE: 12/30/2024  1:07 AM  OBERVATION ADMISSION DATE: 12/30/2024 0516  INPATIENT ADMISSION DATE: 12/31/24  9:18 AM   DISCHARGE DATE: 12/31/2024  9:30 PM   DISPOSITION:[facility] Utilization Review Department  ATTENTION: Please call with any questions or concerns to [phone] and carefully listen to the prompts so that you are directed to the right person. All voicemails are confidential.   For Discharge needs, contact Care Management DC Support Team at [phone] opt. 2  Send all requests for admission clinical reviews, approved or denied determinations and any other requests to dedicated fax number below belonging to the campus where the patient is receiving treatment. List of dedicated fax numbers for the Facilities:  FACILITY NAME UR FAX NUMBER   ADMISSION DENIALS (Administrative/Medical Necessity) [phone]   DISCHARGE SUPPORT TEAM ([facility]) [phone]   PARENT CHILD HEALTH (Maternity/NICU/Pediatrics) [phone]   [facility] [phone]   [facility] [phone]   [facility] [phone]   [facility] [phone]   [facility] [phone]   [facility] [phone]   [facility] [phone]   [facility]  [facility] [phone]   [facility] [phone]   Replaced by [facility] [phone]   [facility] [phone]   [facility] [phone]

## 2025-01-02 NOTE — PLAN OF CARE
Problem: OCCUPATIONAL THERAPY ADULT  Goal: Performs self-care activities at highest level of function for planned discharge setting.  See evaluation for individualized goals.  Description: Treatment Interventions: ADL retraining, Functional transfer training, Endurance training, Patient/family training, Equipment evaluation/education, Compensatory technique education, Activityengagement          See flowsheet documentation for full assessment, interventions and recommendations.   Note: Limitation: Decreased ADL status, Decreased endurance, Decreased self-care trans, Decreased high-level ADLs  Prognosis: Good  Assessment: Pt is a 64 y.o. 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 Patient  has a past medical history of Anemia, Anesthesia, Arthritis, Back pain, Dolan's esophagus, Chronic pain disorder, Chronic venous insufficiency, Colon polyp, Cough variant asthma, COVID-19, Depression, Environmental allergies, GERD (gastroesophageal reflux disease), Hiatal hernia, History of anemia, History of fusion of spine for scoliosis, History of transfusion, Left lumbar radiculitis, Lumbar postlaminectomy syndrome, Migraines, Morbid obesity (HCC), Motion sickness, Neck pain, Osteoarthritis, Right knee pain, Seasonal allergies, Shortness of breath, Umbilical hernia, Uses brace, and Wears glasses.   At baseline pt was completing adls and mobility independently - most recently using SPC but progressed to RW over past 2 wks - admits to 2 falls - I iadls. Pt lives alone in 2 story home in [place] - in [place] visiting friends for Holiday's. Currently pt requires min assist for overall ADLS and min assist for functional mobility/transfers. Pt currently presents with impairments in the following categories -steps to enter environment, limited home support, difficulty performing ADLS, difficulty performing IADLS , and  environment activity tolerance, endurance, and standing balance/tolerance. These impairments, as well as pt's fatigue, pain, spinal precautions, decreased caregiver support, risk for falls, and home environment  limit pt's ability to safely engage in all baseline areas of occupation, includingbathing, dressing, toileting, functional mobility/transfers, community mobility, laundry , driving, house maintenance, meal prep, cleaning, social participation , and leisure activities  From OT standpoint, recommend Level I resources upon D/C. OT will continue to follow to address the below stated goals.     Rehab Resource Intensity Level, OT: I (Maximum Resource Intensity)

## 2025-01-02 NOTE — PLAN OF CARE
Problem: PAIN - ADULT  Goal: Verbalizes/displays adequate comfort level or baseline comfort level  Description: Interventions:  - Encourage patient to monitor pain and request assistance  - Assess pain using appropriate pain scale  - Administer analgesics based on type and severity of pain and evaluate response  - Implement non-pharmacological measures as appropriate and evaluate response  - Consider cultural and social influences on pain and pain management  - Notify physician/advanced practitioner if interventions unsuccessful or patient reports new pain  Outcome: Progressing     Problem: INFECTION - ADULT  Goal: Absence or prevention of progression during hospitalization  Description: INTERVENTIONS:  - Assess and monitor for signs and symptoms of infection  - Monitor lab/diagnostic results  - Monitor all insertion sites, i.e. indwelling lines, tubes, and drains  - Monitor endotracheal if appropriate and nasal secretions for changes in amount and color  - [place] appropriate cooling/warming therapies per order  - Administer medications as ordered  - Instruct and encourage patient and family to use good hand hygiene technique  - Identify and instruct in appropriate isolation precautions for identified infection/condition  Outcome: Progressing     Problem: SAFETY ADULT  Goal: Patient will remain free of falls  Description: INTERVENTIONS:  - Educate patient/family on patient safety including physical limitations  - Instruct patient to call for assistance with activity   - Consult OT/PT to assist with strengthening/mobility   - Keep Call bell within reach  - Keep bed low and locked with side rails adjusted as appropriate  - Keep care items and personal belongings within reach  - Initiate and maintain comfort rounds  - Make Fall Risk Sign visible to staff  - Offer Toileting every 2 Hours, in advance of need  - Initiate/Maintain alarm  - Obtain necessary fall risk management equipment  - Apply yellow socks and  bracelet for high fall risk patients  - Consider moving patient to room near nurses station  Outcome: Progressing  Goal: Maintain or return to baseline ADL function  Description: INTERVENTIONS:  -  Assess patient's ability to carry out ADLs; assess patient's baseline for ADL function and identify physical deficits which impact ability to perform ADLs (bathing, care of mouth/teeth, toileting, grooming, dressing, etc.)  - Assess/evaluate cause of self-care deficits   - Assess range of motion  - Assess patient's mobility; develop plan if impaired  - Assess patient's need for assistive devices and provide as appropriate  - Encourage maximum independence but intervene and supervise when necessary  - Involve family in performance of ADLs  - Assess for home care needs following discharge   - Consider OT consult to assist with ADL evaluation and planning for discharge  - Provide patient education as appropriate  Outcome: Progressing  Goal: Maintains/Returns to pre admission functional level  Description: INTERVENTIONS:  - Perform AM-PAC 6 Click Basic Mobility/ Daily Activity assessment daily.  - Set and communicate daily mobility goal to care team and patient/family/caregiver.   - Collaborate with rehabilitation services on mobility goals if consulted  - Perform Range of Motion 3 times a day.  - Reposition patient every 2 hours.  - Dangle patient 3 times a day  - Stand patient 3 times a day  - Ambulate patient 3 times a day  - Out of bed to chair 3 times a day   - Out of bed for meals 3 times a day  - Out of bed for toileting  - Record patient progress and toleration of activity level   Outcome: Progressing     Problem: DISCHARGE PLANNING  Goal: Discharge to home or other facility with appropriate resources  Description: INTERVENTIONS:  - Identify barriers to discharge w/patient and caregiver  - Arrange for needed discharge resources and transportation as appropriate  - Identify discharge learning needs (meds, wound care,  etc.)  - Arrange for interpretive services to assist at discharge as needed  - Refer to Case Management Department for coordinating discharge planning if the patient needs post-hospital services based on physician/advanced practitioner order or complex needs related to functional status, cognitive ability, or social support system  Outcome: Progressing     Problem: Knowledge Deficit  Goal: Patient/family/caregiver demonstrates understanding of disease process, treatment plan, medications, and discharge instructions  Description: Complete learning assessment and assess knowledge base.  Interventions:  - Provide teaching at level of understanding  - Provide teaching via preferred learning methods  Outcome: Progressing     Problem: NEUROSENSORY - ADULT  Goal: Achieves stable or improved neurological status  Description: INTERVENTIONS  - Monitor and report changes in neurological status  - Monitor vital signs such as temperature, blood pressure, glucose, and any other labs ordered   - Initiate measures to prevent increased intracranial pressure  - Monitor for seizure activity and implement precautions if appropriate      Outcome: Progressing  Goal: Achieves maximal functionality and self care  Description: INTERVENTIONS  - Monitor swallowing and airway patency with patient fatigue and changes in neurological status  - Encourage and assist patient to increase activity and self care.   - Encourage visually impaired, hearing impaired and aphasic patients to use assistive/communication devices  Outcome: Progressing

## 2025-01-02 NOTE — ASSESSMENT & PLAN NOTE
Presented to [facility] with worsening of chronic LLE weakness, numbness/tingling of LLE>RLE, and muscle spasms. Known history of thoracic meningioma with spinal cord displacement and compression, lost to follow up due to move to [place].  MRI T spine: Suboptimal examination due to mild, moderate, and severe motion artifact - worse on postcontrast sequences. Similar 1.4 cm intradural extramedullary probable meningioma in left posterolateral thoracic spine region at approximately T3 level with associated marked spinal cord compression with severe canal stenosis given motion degraded exam. No cord edema given motion degradation.   MRI L spine: Suboptimal examination due to moderate-to-severe motion degraded exam of lumbar spine and 3 plane localizer images, sagittal T1 post contrast and axial T1 postcontrast sequences. No abnormal enhancement in lumbar spine given limitations of motion degradation.   Neurosurgery consult and recommendations appreciated   Given progressive symptoms, feel patient would likely benefit from surgical intervention - final surgical plan TBD  Cleared to start VTE prophylaxis  Continue pain regimen with scheduled Tylenol and as needed oxycodone; switch from scheduled Robaxin to Flexeril, add Lidoderm patches and aqua K-pad   Monitor neuro checks, bladder scans   PT/OT evaluations pending

## 2025-01-02 NOTE — PHYSICAL THERAPY NOTE
"   PHYSICAL THERAPY EVALUATION  NAME:  Hayley Rios  DATE: 01/02/25    AGE:   64 y.o.  Mrn:   [id number]  ADMIT DX:  Ambulatory dysfunction    Past Medical History:   Diagnosis Date    Anemia     Anesthesia     \"sometimes low BP upon waking up\" pt states she stopped breathing 1 time during surgery    Arthritis     Back pain     Dolan's esophagus     Chronic pain disorder     back    Chronic venous insufficiency     Colon polyp     Cough variant asthma     d/t mold-all sx diminished when removed from source    COVID-19 03/2020    Depression     Environmental allergies     dust    GERD (gastroesophageal reflux disease)     Hiatal hernia     History of anemia     History of fusion of spine for scoliosis     \"as a teenager\"    History of transfusion     1978 - no adverse reaction    Left lumbar radiculitis     Last Assessed: [id number]    Lumbar postlaminectomy syndrome     Migraines     Morbid obesity (HCC)     Motion sickness     Neck pain     Osteoarthritis     of left hip    Right knee pain     Seasonal allergies     Shortness of breath     with stairs    Umbilical hernia     Uses brace     right knee    Wears glasses        Past Surgical History:   Procedure Laterality Date    ARTHRODESIS      lumbar    ARTHRODESIS      Spinal Arthrodesis for Deformity; Last Assessed: 16Mar2017    BACK SURGERY      lumbar fusion,with nydia/screw and cage implant    BACK SURGERY      surgery for scoliosis    BREAST CYST EXCISION Right     benign    CHOLECYSTECTOMY LAPAROSCOPIC N/A 12/20/2023    Procedure: FENESTRATED SUBTOTAL CHOLECYSTECTOMY LAPAROSCOPIC, EXTENSIVE LYSIS OF ADHESIONS.;  Surgeon: Chelle Butler MD;  Location: [facility];  Service: General    COLONOSCOPY      COLONOSCOPY N/A 03/14/2019    Procedure: COLONOSCOPY;  Surgeon: Van Dyson MD;  Location:  GI LAB;  Service: Gastroenterology    ESOPHAGOGASTRODUODENOSCOPY N/A 03/14/2019    Procedure: ESOPHAGOGASTRODUODENOSCOPY (EGD) W RFA(BARRX);  Surgeon: Van" MD Karis;  Location:  GI LAB;  Service: Gastroenterology    HERNIA REPAIR      umbilical hernia repair x2    PLANTAR FASCIA SURGERY Left     MA ARTHRS KNE SURG W/MENISCECTOMY MED/LAT W/SHVG Right 04/04/2018    Procedure: ARTHROSCOPY KNEE PARTIAL MEDIAL MENISECTOMY , CHONDROPLASTY;  Surgeon: Rocio Roe DO;  [facility]: [facility];  Service: Orthopedics    MA BREAST REDUCTION Bilateral 03/12/2021    Procedure: BREAST REDUCTION;  Surgeon: Cortez Sandoval MD;  Location:  MAIN OR;  Service: Plastics    MA COLONOSCOPY FLX DX W/COLLJ SPEC WHEN PFRMD N/A 02/20/2018    Procedure: COLONOSCOPY with polypectomy;  Surgeon: Apryl Garcia MD;  Location: [facility];  Service: General    MA ESOPHAGOGASTRODUODENOSCOPY TRANSORAL DIAGNOSTIC N/A 05/24/2017    Procedure: ESOPHAGOGASTRODUODENOSCOPY (EGD);  Surgeon: Marcello Street MD;  Location: [facility];  Service: Gastroenterology    MA ESOPHAGOGASTRODUODENOSCOPY TRANSORAL DIAGNOSTIC N/A 08/31/2017    Procedure: ESOPHAGOGASTRODUODENOSCOPY (EGD) W RFA;  Surgeon: Macho Aguayo MD;  Location:  GI LAB;  Service: Gastroenterology    MA LAPS RPR RECURRENT INCISIONAL HERNIA REDUCIBLE N/A 01/21/2021    Procedure: REPAIR HERNIA INCISIONAL LAPAROSCOPIC W/ ROBOTICS, with mesh;  Surgeon: Errol Bermudez MD;  [facility]: [facility];  Service: General    MA RPR AA HERNIA 1ST 3-10 CM REDUCIBLE N/A 2/19/2024    Procedure: VENTRAL HERNIA REPAIR 3CM WITH MESH;  Surgeon: Chelle Butler MD;  Location:  MAIN OR;  Service: General    WISDOM TOOTH EXTRACTION         Length Of Stay: 2    PHYSICAL THERAPY EVALUATION:        01/02/25 1027   Note Type   Note type Evaluation   Pain Assessment   Pain Assessment Tool 0-10   Pain Score 5   Pain Location/Orientation Orientation: Left;Location: Leg;Location: Back   Pain Onset/Description Onset: Ongoing;Frequency: Constant/Continuous;Descriptor: Aching   Effect of Pain on Daily Activities increased pain with activity   Patient's Stated Pain Goal No pain    Hospital Pain Intervention(s) Ambulation/increased activity;Repositioned   Restrictions/Precautions   Weight Bearing Precautions Per Order No   Other Precautions Fall Risk;Pain;Spinal precautions  (pt signed form to opt out of chair/bed alarm)   Home Living   Type of Home House   Home Layout Two level;Bed/bath upstairs;Able to live on main level with bedroom/bathroom;Stairs to enter with rails   Additional Comments Pt reports living alone in [place] in the above setup however is in [place] visiting friends. Pt able stay with friends in [place] if needed   Prior Function   Level of [place] Independent with functional mobility   Lives With Alone   Receives Help From Family;Friend(s)   Falls in the last 6 months 1 to 4   Comments Pt reports the use of a SPC for ambulation recently   General   Family/Caregiver Present No   Cognition   Overall Cognitive Status WFL   Arousal/Participation Alert   Orientation Level Oriented X4   Memory Within functional limits   Following Commands Follows all commands and directions without difficulty   RUE Assessment   RUE Assessment WFL   LUE Assessment   LUE Assessment WFL   RLE Assessment   RLE Assessment WFL   Strength RLE   RLE Overall Strength 4/5   LLE Assessment   LLE Assessment X   Strength LLE   LLE Overall Strength 3+/5   Bed Mobility   Supine to Sit 4  Minimal assistance   Additional items Assist x 1;Increased time required;Verbal cues   Transfers   Sit to Stand 4  Minimal assistance   Additional items Assist x 1;Increased time required;Verbal cues   Stand to Sit 4  Minimal assistance   Additional items Assist x 1;Increased time required;Verbal cues   Ambulation/Elevation   Gait pattern Short stride;Foward flexed;Inconsistent shyla   Gait Assistance 4  Minimal assist   Additional items Assist x 1   Assistive Device Rolling walker   Distance 25ft x 2   Balance   Static Sitting Fair   Static Standing Fair -   Ambulatory Poor +   Activity Tolerance   Activity Tolerance Patient limited  by fatigue;Patient limited by pain   Medical Staff Made Aware Arcadio OTMallory OT present for co evaluation due to pts current medical presentation   Nurse Made Aware Pt appropriate to be seen and mobilize per nsg   Assessment   Prognosis Good   Problem List Decreased strength;Decreased endurance;Impaired balance;Decreased mobility;Decreased range of motion;Pain   Assessment Pt is 64 y.o. female seen for PT evaluation s/p admit to [facility] on 12/[facility]/2024. Two pt identifiers were used to confirm. Pt presented w/ worsening LLE weakness and muscle spasms.  Pt was admitted with a primary dx of: compression of thoracic spinal cord with myelopathy, and other active problems including restless leg syndrome, MARV, prediabetes, ambulatory dysfunction, lymphedema. Per neurosx, sucical plan TBD and recommends continued conservative management with PT/OT.  PT now consulted for assessment of mobility and d/c needs.  Pts current co morbidities affecting treatment include:  has a past medical history of Anemia, Anesthesia, Arthritis, Back pain, Dolan's esophagus, Chronic pain disorder, Chronic venous insufficiency, Colon polyp, Cough variant asthma, COVID-19, Depression, Environmental allergies, GERD (gastroesophageal reflux disease), Hiatal hernia, History of anemia, History of fusion of spine for scoliosis, History of transfusion, Left lumbar radiculitis, Lumbar postlaminectomy syndrome, Migraines, Morbid obesity (HCC), Motion sickness, Neck pain, Osteoarthritis, Right knee pain, Seasonal allergies, Shortness of breath, Umbilical hernia, Uses brace, and Wears glasses. . Pts current clinical presentation is Unstable/ Unpredictable (high complexity) due to Ongoing medical management for primary dx, Increased reliance on more restrictive AD compared to baseline, Decreased activity tolerance compared to baseline, Fall risk, Increased assistance needed from caregiver at current time, Continuous pulse oximetry monitoring   . Upon  "evaluation, pt currently is requiring Min Ax1 for bed mobility; Min Ax1 for transfers and Min Ax1 for ambulation w/ RW. Pt presents at PT eval functioning below baseline and currently w/ overall mobility deficits 2* to: BLE weakness, impaired balance, decreased endurance, gait deviations, pain, decreased activity tolerance compared to baseline, fall risk. Pt currently at a fall risk 2* to impairments listed above.  Based on the aforementioned PT evaluation, pt will continue to benefit from skilled Acute PT interventions to address stated impairments; to maximize functional mobility; for ongoing pt/ family training; and DME needs. At conclusion of PT session pt returned back in chair with phone and call bell within reach. Pt denies any further questions at this time. PT is currently recommending Level I resource intensity. PT will continue to follow during hospital stay.   Goals   Patient Goals \" to have surgery here and get better\"   STG Expiration Date 01/16/25   Short Term Goal #1 In 14 days pt will complete: 1) Bed mobility skills with mod I to increase safety and independence as well as decrease caregiver burden. 2) Functional transfers with mod I to promote increased independence, safety, and QOL. 3) Ambulate 200' using least restrictive AD with mod I without LOB and stable vitals so that pt can negotiate previous living environment safely and promote independence with functional mobility and return to PLOF. 4) Stair training up/ down 2 step/s using rail/s with mod I so that pt can enter/negotiate previous living environment safely and decrease fall risk. 5) Improve balance grades by 1/2 grade to increase safety with all mobility and decrease fall risk.  6) Improve BLE strength by 1/2 grade to help increase overall functional mobility and decrease fall risk.   Plan   Treatment/Interventions Functional transfer training;LE strengthening/ROM;Elevations;Therapeutic exercise;Endurance training;Patient/family " training;Equipment eval/education;Bed mobility;Gait training;Spoke to nursing;OT   PT Frequency 3-5x/wk   Discharge Recommendation   Rehab Resource Intensity Level, PT I (Maximum Resource Intensity)   Equipment Recommended Walker   Walker Package Recommended Wheeled walker   AM-PAC Basic Mobility Inpatient   Turning in Flat Bed Without Bedrails 3   Lying on Back to Sitting on Edge of Flat Bed Without Bedrails 3   Moving Bed to Chair 3   Standing Up From Chair Using Arms 3   Walk in Room 2   Climb 3-5 Stairs With Railing 2   Basic Mobility Inpatient Raw Score 16   Basic Mobility Standardized Score 38.32   [facility] Highest Level Of Mobility   -HLM Goal 5: Stand one or more mins   JH-HLM Achieved 7: Walk 25 feet or more   Modified [place] Scale   Modified [place] Scale 4   Portions of the documentation may have been created using voice recognition software.Occasional wrong word or sound alike substitutions may have occurred due to the inherent limitations of the voice recognition software. Read the chart carefully and recognize, using context, where substitutions have occurred.    Talha Chen, PT, DPT

## 2025-01-02 NOTE — UTILIZATION REVIEW
"NOTIFICATION OF INPATIENT ADMISSION   AUTHORIZATION REQUEST   SERVICING FACILITY:   Critical [facility] hospital  Address: [address] [place] [address]  Tax ID: [id number]  NPI: [id number] ATTENDING PROVIDER:  Attending Name and NPI#: Nelida Guerra Md [[id number]]  Address: [address] [place] [address]  Phone: [phone]   ADMISSION INFORMATION:  Place of Service: Inpatient [facility]  Place of Service Code: 21  Inpatient Admission Date/Time: 12/31/24 10:15 PM  Discharge Date/Time: No discharge date for patient encounter.  Admitting Diagnosis Code/Description:  Ambulatory dysfunction     UTILIZATION REVIEW CONTACT:  Gianna \"Alpa\" Tyrese Utilization   Network Utilization Review Department  Phone: [phone]  Fax: [phone]  Email: [email or web address]  Contact for approvals/pending authorizations, clinical reviews, and discharge.     PHYSICIAN ADVISORY SERVICES:  Medical Necessity Denial & Jjxp-[id number] Review  Phone: [phone]  Fax: [phone]  Email: [email or web address]     DISCHARGE SUPPORT TEAM:  For Patients Discharge Needs & Updates  Phone: [phone] opt. 2 Fax: [phone]  Email: [email or web address]     "

## 2025-01-02 NOTE — CASE MANAGEMENT
Case Management Discharge Planning Note    Patient name Hayley Rios  Location [facility] 602/[facility] 602-01 MRN [id number]  : 1960 Date 2025       Current Admission Date: 2024  Current Admission Diagnosis:Compression of thoracic spinal cord with myelopathy (HCC)   Patient Active Problem List    Diagnosis Date Noted Date Diagnosed    Compression of thoracic spinal cord with myelopathy (HCC) 2024     Lymphedema 2024     Ambulatory dysfunction 2024     Class 1 obesity due to excess calories without serious comorbidity with body mass index (BMI) of 34.0 to 34.9 in adult 2024     Ventral hernia without obstruction or gangrene 2024     Myofascial pain syndrome 2022     MARV (iron deficiency anemia) 2022     Podagra 2021     Motion sickness      Recurrent incisional hernia      Macromastia 2020     Long-term current use of opiate analgesic 2020     Uncomplicated opioid dependence (HCC) 2020     Recurrent umbilical hernia 2020     Lipoma of torso 2020     Sacroiliitis, not elsewhere classified (HCC)      Osteoarthritis of multiple joints 02/10/2020     Neck pain      Cervical spondylosis without myelopathy      Chronic cough 2019     Vocal fold paresis, right 2019     Dysphonia 2019     Muscle tension dysphonia 2019     Glottic insufficiency 2019     Reflux laryngitis 2019     Laryngeal edema 2019     Allergic rhinitis due to [employer] house dust mite 2019     Mild intermittent asthma without complication 2019     Laryngopharyngeal reflux (LPR) 2019     Dolan's esophagus with dysplasia 2019     Gastroesophageal reflux disease 2019     Prediabetes 2018     Vitamin D deficiency 2018     Lumbar radiculopathy 10/01/2018     Lumbar spondylosis      Environmental allergies 2018     S/P right knee arthroscopy 2018     Hernia of anterior abdominal  wall 02/05/2018     Sleep disturbance 11/16/2017     Hyperlipidemia 08/11/2017     Primary osteoarthritis of left hip 07/26/2017     Restless leg syndrome 07/11/2017     Chronic venous insufficiency 06/02/2017     Chronic low back pain 04/11/2017     Lumbar postlaminectomy syndrome 04/11/2017     Chronic pain syndrome 03/16/2017     Depression, recurrent (HCC) 03/16/2017       LOS (days): 2  Geometric Mean LOS (GMLOS) (days):   Days to GMLOS:     OBJECTIVE:  Risk of Unplanned Readmission Score: 16.95   Current admission status: Inpatient   Preferred Pharmacy:   [facility] #[id number] - [place], [place] - [address]  [address] [address]  Phone: [phone] Fax: [phone]    [id number] [place], [place] - [address]  [address]  [facility] [address]  Phone: [phone] Fax: [phone]    Primary Care Provider: No primary care provider on file.    Primary Insurance: [facility] REP  Secondary Insurance:     DISCHARGE DETAILS:    Discharge planning discussed with:: Patient  Freedom of Choice: Yes  Comments - Freedom of Choice: Discussed FOC  CM contacted family/caregiver?: No- see comments  Were Treatment Team discharge recommendations reviewed with patient/caregiver?: Yes    Other Referral/Resources/Interventions Provided:  Interventions: Short Term Rehab, Acute Rehab  Referral Comments: This CM discussed therapy recs with pt. Therapy recs STR. pt agreeable to referral to [facility]. referral entered in aidin, awaiting response.    Treatment Team Recommendation: Acute Rehab  CM reviewed d/c planning process including the following: identifying help at home, patient preference for d/c planning needs, Discharge Lounge, [facility] Meds to Bed program, availability of treatment team to discuss questions or concerns patient and/or family may have regarding understanding medications and recognizing signs and symptoms once discharged.  CM also  encouraged patient to follow up with all recommended appointments after discharge. Patient advised of importance for patient and family to participate in managing patient’s medical well being.     This CM introduced self and role. Pt is a [facility]. Please see assessment from 12/30

## 2025-01-02 NOTE — PHYSICAL THERAPY NOTE
"   PHYSICAL THERAPY EVALUATION  NAME:  Hayley Rios  DATE: 01/02/25    AGE:   64 y.o.  Mrn:   [id number]  ADMIT DX:  Ambulatory dysfunction    Past Medical History:   Diagnosis Date    Anemia     Anesthesia     \"sometimes low BP upon waking up\" pt states she stopped breathing 1 time during surgery    Arthritis     Back pain     Dolan's esophagus     Chronic pain disorder     back    Chronic venous insufficiency     Colon polyp     Cough variant asthma     d/t mold-all sx diminished when removed from source    COVID-19 03/2020    Depression     Environmental allergies     dust    GERD (gastroesophageal reflux disease)     Hiatal hernia     History of anemia     History of fusion of spine for scoliosis     \"as a teenager\"    History of transfusion     1978 - no adverse reaction    Left lumbar radiculitis     Last Assessed: [id number]    Lumbar postlaminectomy syndrome     Migraines     Morbid obesity (HCC)     Motion sickness     Neck pain     Osteoarthritis     of left hip    Right knee pain     Seasonal allergies     Shortness of breath     with stairs    Umbilical hernia     Uses brace     right knee    Wears glasses        Past Surgical History:   Procedure Laterality Date    ARTHRODESIS      lumbar    ARTHRODESIS      Spinal Arthrodesis for Deformity; Last Assessed: 16Mar2017    BACK SURGERY      lumbar fusion,with nydia/screw and cage implant    BACK SURGERY      surgery for scoliosis    BREAST CYST EXCISION Right     benign    CHOLECYSTECTOMY LAPAROSCOPIC N/A 12/20/2023    Procedure: FENESTRATED SUBTOTAL CHOLECYSTECTOMY LAPAROSCOPIC, EXTENSIVE LYSIS OF ADHESIONS.;  Surgeon: Chelle Butler MD;  [facility]: [facility];  Service: General    COLONOSCOPY      COLONOSCOPY N/A 03/14/2019    Procedure: COLONOSCOPY;  Surgeon: Van Dyson MD;  Location:  GI LAB;  Service: Gastroenterology    ESOPHAGOGASTRODUODENOSCOPY N/A 03/14/2019    Procedure: ESOPHAGOGASTRODUODENOSCOPY (EGD) W RFA(BARRX);  Surgeon: Van" MD Karis;  Location:  GI LAB;  Service: Gastroenterology    HERNIA REPAIR      umbilical hernia repair x2    PLANTAR FASCIA SURGERY Left     AR ARTHRS KNE SURG W/MENISCECTOMY MED/LAT W/SHVG Right 04/04/2018    Procedure: ARTHROSCOPY KNEE PARTIAL MEDIAL MENISECTOMY , CHONDROPLASTY;  Surgeon: Rocio Roe DO;  [facility];  Service: Orthopedics    AR BREAST REDUCTION Bilateral 03/12/2021    Procedure: BREAST REDUCTION;  Surgeon: Cortez Sandoval MD;  Location:  MAIN OR;  Service: Plastics    AR COLONOSCOPY FLX DX W/COLLJ SPEC WHEN PFRMD N/A 02/20/2018    Procedure: COLONOSCOPY with polypectomy;  Surgeon: Apryl Garcia MD;  Location: [facility];  Service: General    AR ESOPHAGOGASTRODUODENOSCOPY TRANSORAL DIAGNOSTIC N/A 05/24/2017    Procedure: ESOPHAGOGASTRODUODENOSCOPY (EGD);  Surgeon: Marcello Street MD;  Location: [facility] GI LAB;  Service: Gastroenterology    AR ESOPHAGOGASTRODUODENOSCOPY TRANSORAL DIAGNOSTIC N/A 08/31/2017    Procedure: ESOPHAGOGASTRODUODENOSCOPY (EGD) W RFA;  Surgeon: Macho Aguayo MD;  Location:  GI LAB;  Service: Gastroenterology    AR LAPS RPR RECURRENT INCISIONAL HERNIA REDUCIBLE N/A 01/21/2021    Procedure: REPAIR HERNIA INCISIONAL LAPAROSCOPIC W/ ROBOTICS, with mesh;  Surgeon: Errol Bermudez MD;  [facility];  Service: General    AR RPR AA HERNIA 1ST 3-10 CM REDUCIBLE N/A 2/19/2024    Procedure: VENTRAL HERNIA REPAIR 3CM WITH MESH;  Surgeon: Chelle Butler MD;  Location:  MAIN OR;  Service: General    WISDOM TOOTH EXTRACTION         Length Of Stay: 2    PHYSICAL THERAPY EVALUATION:        01/02/25 1027   Note Type   Note type Evaluation   Pain Assessment   Pain Assessment Tool 0-10   Pain Score 5   Pain Location/Orientation Orientation: Left;Location: Leg;Location: Back   Pain Onset/Description Onset: Ongoing;Frequency: Constant/Continuous;Descriptor: Aching   Effect of Pain on Daily Activities increased pain with activity   Patient's Stated Pain Goal No pain    Hospital Pain Intervention(s) Ambulation/increased activity;Repositioned   Restrictions/Precautions   Weight Bearing Precautions Per Order No   Other Precautions Fall Risk;Pain;Spinal precautions  (pt signed form to opt out of chair/bed alarm)   Home Living   Type of Home House   Home Layout Two level;Bed/bath upstairs;Able to live on main level with bedroom/bathroom;Stairs to enter with rails   Additional Comments Pt reports living alone in [place] in the above setup however is in [place] visiting friends. Pt able stay with friends in [place] if needed   Prior Function   Level of Assumption Independent with functional mobility   Lives With Alone   Receives Help From Family;Friend(s)   Falls in the last 6 months 1 to 4   Comments Pt reports the use of a SPC for ambulation recently   General   Family/Caregiver Present No   Cognition   Overall Cognitive Status WFL   Arousal/Participation Alert   Orientation Level Oriented X4   Memory Within functional limits   Following Commands Follows all commands and directions without difficulty   RUE Assessment   RUE Assessment WFL   LUE Assessment   LUE Assessment WFL   RLE Assessment   RLE Assessment WFL   Strength RLE   RLE Overall Strength 4/5   LLE Assessment   LLE Assessment X   Strength LLE   LLE Overall Strength 3+/5   Bed Mobility   Supine to Sit 4  Minimal assistance   Additional items Assist x 1;Increased time required;Verbal cues   Transfers   Sit to Stand 4  Minimal assistance   Additional items Assist x 1;Increased time required;Verbal cues   Stand to Sit 4  Minimal assistance   Additional items Assist x 1;Increased time required;Verbal cues   Ambulation/Elevation   Gait pattern Short stride;Foward flexed;Inconsistent shyla   Gait Assistance 4  Minimal assist   Additional items Assist x 1   Assistive Device Rolling walker   Distance 25ft x 2   Balance   Static Sitting Fair   Static Standing Fair -   Ambulatory Poor +   Activity Tolerance   Activity Tolerance Patient limited  by fatigue;Patient limited by pain   Medical Staff Made Aware BHARAT Ervin; OT present for co evaluation due to pts current medical presentation   Nurse Made Aware Pt appropriate to be seen and mobilize per nsg   Assessment   Prognosis Good   Problem List Decreased strength;Decreased endurance;Impaired balance;Decreased mobility;Decreased range of motion;Pain   Assessment Pt is 64 y.o. female seen for PT evaluation s/p admit to [facility] on 12/31/2024. Two pt identifiers were used to confirm. Pt presented w/ worsening LLE weakness and muscle spasms.  Pt was admitted with a primary dx of: compression of thoracic spinal cord with myelopathy, and other active problems including restless leg syndrome, MARV, prediabetes, ambulatory dysfunction, lymphedema.  PT now consulted for assessment of mobility and d/c needs.  Pts current co morbidities affecting treatment include:  has a past medical history of Anemia, Anesthesia, Arthritis, Back pain, Dolan's esophagus, Chronic pain disorder, Chronic venous insufficiency, Colon polyp, Cough variant asthma, COVID-19, Depression, Environmental allergies, GERD (gastroesophageal reflux disease), Hiatal hernia, History of anemia, History of fusion of spine for scoliosis, History of transfusion, Left lumbar radiculitis, Lumbar postlaminectomy syndrome, Migraines, Morbid obesity (HCC), Motion sickness, Neck pain, Osteoarthritis, Right knee pain, Seasonal allergies, Shortness of breath, Umbilical hernia, Uses brace, and Wears glasses. . Pts current clinical presentation is Unstable/ Unpredictable (high complexity) due to Ongoing medical management for primary dx, Increased reliance on more restrictive AD compared to baseline, Decreased activity tolerance compared to baseline, Fall risk, Increased assistance needed from caregiver at current time, Continuous pulse oximetry monitoring   . Upon evaluation, pt currently is requiring Min Ax1 for bed mobility; Min Ax1 for transfers and  "Min Ax1 for ambulation w/ RW. Pt presents at PT eval functioning below baseline and currently w/ overall mobility deficits 2* to: BLE weakness, impaired balance, decreased endurance, gait deviations, pain, decreased activity tolerance compared to baseline, fall risk. Pt currently at a fall risk 2* to impairments listed above.  Based on the aforementioned PT evaluation, pt will continue to benefit from skilled Acute PT interventions to address stated impairments; to maximize functional mobility; for ongoing pt/ family training; and DME needs. At conclusion of PT session pt returned back in chair with phone and call bell within reach. Pt denies any further questions at this time. PT is currently recommending Level I resource intensity. PT will continue to follow during hospital stay.   Goals   Patient Goals \" to have surgery here and get better\"   [facility] Expiration Date 01/16/25   Short Term Goal #1 In 14 days pt will complete: 1) Bed mobility skills with mod I to increase safety and independence as well as decrease caregiver burden. 2) Functional transfers with mod I to promote increased independence, safety, and QOL. 3) Ambulate 200' using least restrictive AD with mod I without LOB and stable vitals so that pt can negotiate previous living environment safely and promote independence with functional mobility and return to PLOF. 4) Stair training up/ down 2 step/s using rail/s with mod I so that pt can enter/negotiate previous living environment safely and decrease fall risk. 5) Improve balance grades by 1/2 grade to increase safety with all mobility and decrease fall risk.  6) Improve BLE strength by 1/2 grade to help increase overall functional mobility and decrease fall risk.   Plan   Treatment/Interventions Functional transfer training;LE strengthening/ROM;Elevations;Therapeutic exercise;Endurance training;Patient/family training;Equipment eval/education;Bed mobility;Gait training;Spoke to nursing;OT   PT Frequency " 3-5x/wk   Discharge Recommendation   Rehab Resource Intensity Level, PT I (Maximum Resource Intensity)   Equipment Recommended Walker   Walker Package Recommended Wheeled walker   AM-PAC Basic Mobility Inpatient   Turning in Flat Bed Without Bedrails 3   Lying on Back to Sitting on Edge of Flat Bed Without Bedrails 3   Moving Bed to Chair 3   Standing Up From Chair Using Arms 3   Walk in Room 2   Climb 3-5 Stairs With Railing 2   Basic Mobility Inpatient Raw Score 16   Basic Mobility Standardized Score 38.32   [facility] Highest Level Of Mobility   -[facility] Goal 5: Stand one or more mins   -[facility] Achieved 7: Walk 25 feet or more   Modified Bott[place] Scale   Modified [place]au Scale 4   D/C acute care PT at this time due to pt being near baseline in terms of functional mobility. Pt denies any mobility or safety concerns about returning home at d/c. Recommend pt continues to mobilize with nsg and restorative techs during hospital stay.

## 2025-01-02 NOTE — PROGRESS NOTES
Progress Note - Neurosurgery   Name: Hayley Rios 64 y.o. female I MRN: [id number]  [facility] I Date of Admission: 2024   Date of Service: 2025 I Hospital Day: 2    Assessment & Plan  Compression of thoracic spinal cord with myelopathy (HCC)  T3 lesion w/ concern of progressively worsening myelopathy  P/w progressively worsening bilateral lower extremity, L >R, weakness and balance difficulty x approx 2 months   Acutely worsened over the last 2 weeks, now requiring use of a rolling walker    Imagin/30 MRI T-spine w/wo: sub-optimal examination due to mild, moderate, and severe motion artifact - worse on postcontrast sequences. Similar 1.4 cm intradural extramedullary probable meningioma in left posterolateral thoracic spine region at approximately T3 level with associated marked spinal cord compression with severe canal stenosis given motion degraded exam. No cord edema given motion degradation. Recommend evaluation by neurosurgery. Degenerative changes    Plan:   Continue to closely monitor neuro exam   Frequent neuro checks per primary team   Maintain normotensive BP goals, SBP < 160   Case and imaging reviewed this am on rounds   MRI reviewed and reveals largely stable in size known T3 IDEM mass, however, given patient's presenting symptoms, it appears she has become much more symptomatic in this regards to this with worsening myelopathy  Continue ongoing conservative management  Pain control primary team  PT/OT  Hold all AC/AP meds in the setting of possible nsgy intervention this admission   No reported use at home   DVT ppx: SCDs, HSQ  Medical management per primary team   Social work following or assistance with dispo once medically cleared   Will plan for MRI cervical spine to imaging through the T5 level from a superior aspect.   CT cervical thoracic and lumbar spine without contrast for surgical planning and bony evaluation in setting of prior thoracolumbar fusions.   No  intervention planned at this time. Patient updated that surgical intervention is more likely to take place next week if we determine inpatient treatment is most appropriate. Agreeable to proceeding with care here.    Neurosurgery will review additional imaging when completed and resume follow up once determination and plan for surgical timing has been completed. Please reach out with any further questions or concerns.     Lymphedema  LE lymphedema  Evident on exam   Ambulatory dysfunction  Likely secondary to underlying myelopathy  Could be multifactorial with underlying peripheral neuropathy an chronic podiatric issues s/p multiple surgeries.     Please contact the SecureChat role for the Neurosurgery service with any questions/concerns.    Subjective   Sitting in bedside chair in NAD. No acute issues or concerns. Still with LLE>RLE weakness. Ambulatory dysfunction with use of rolling walker but fearful of additional falls with her LLE giving out.     Objective :  Temp:  [97.9 °F (36.6 °C)-98.7 °F (37.1 °C)] 97.9 °F (36.6 °C)  HR:  [70-92] 71  BP: (115-146)/(74-80) 118/74  Resp:  [16-17] 17  SpO2:  [86 %-95 %] 95 %  O2 Device: None (Room air)    I/O         12/31 0701  01/01 0700 01/01 0701  01/02 0700 01/02 0701  01/03 0700    P.O.  778 700    Total Intake(mL/kg)  778 (8.6) 700 (7.7)    Urine (mL/kg/hr) 66      Total Output 66      Net -66 +778 +700           Unmeasured Urine Occurrence 3 x 6 x           Physical Exam  Constitutional:       Appearance: Normal appearance. She is well-developed.   HENT:      Head: Normocephalic and atraumatic.   Eyes:      Extraocular Movements: Extraocular movements intact.   Neck:      Trachea: No tracheal deviation.   Pulmonary:      Effort: Pulmonary effort is normal. No respiratory distress.   Musculoskeletal:         General: Normal range of motion.      Cervical back: Normal range of motion.      Right lower leg: Edema present.      Left lower leg: Edema present.   Skin:      General: Skin is warm and dry.   Neurological:      Mental Status: She is alert and oriented to person, place, and time.      Cranial Nerves: No cranial nerve deficit.      Sensory: Sensory deficit present.      Motor: Weakness present.      Comments: 4-/5 LLE strength proximally, 3/5 DF. 5/5 in the RLE. full strength in BUE.    Mild subjective numbness to light touch in the lateral chest wall and medical aspect of the R thigh.    Psychiatric:         Behavior: Behavior normal.         Thought Content: Thought content normal.          Lab Results: I have reviewed the following results:  Recent Labs     01/01/25  0440 01/02/25  0639   WBC 6.60 6.24   HGB 10.7* 11.4*   HCT 35.1 37.9    274   SODIUM 140  --    K 3.9  --      --    CO2 28  --    BUN 14  --    CREATININE 0.60  --    GLUC 101  --        Imaging Results Review: I personally reviewed the following image studies in PACS and associated radiology reports: MRI spine. My interpretation of the radiology images/reports is: See above.  Other Study Results Review: No additional pertinent studies reviewed.    VTE Pharmacologic Prophylaxis: Sequential compression device (Venodyne)  and Heparin

## 2025-01-02 NOTE — ASSESSMENT & PLAN NOTE
Likely secondary to underlying myelopathy  Could be multifactorial with underlying peripheral neuropathy an chronic podiatric issues s/p multiple surgeries.

## 2025-01-02 NOTE — UTILIZATION REVIEW
NOTIFICATION OF OBSERVATION ADMISSION   AUTHORIZATION REQUEST   SERVICING FACILITY:   [place] [address]  Tax ID: [id number]  NPI: [id number]   ATTENDING PROVIDER:  Attending Name and NPI#: Wili Alfonso Do [[id number]]  Address: [address] [place] [address]  Phone: [phone]     ADMISSION INFORMATION:  Place of Service: [facility]  Place of Service Code: 22 CPT Code:   Admitting Diagnosis Code/Description:  Left leg weakness [R29.898]  Ambulatory dysfunction [R26.2]  Neuroforaminal stenosis of lumbar spine [M48.061]  Observation Admission Date/Time:   Discharge Date/Time: 12/31/2024  9:30 PM     UTILIZATION REVIEW CONTACT:  Kate Akers Utilization   Network Utilization Review Department  Phone: [phone]  Fax: [phone]  Email: [email or web address]  Contact for approvals/pending authorizations, clinical reviews, and discharge.     PHYSICIAN ADVISORY SERVICES:  Medical Necessity Denial & Cujd-na-Jajb Review  Phone: [phone]  Fax: [phone]  Email: [email or web address]     DISCHARGE SUPPORT TEAM:  For Patients Discharge Needs & Updates  Phone: [phone] opt. 2 Fax: [phone]  Email: [email or web address]

## 2025-01-02 NOTE — PLAN OF CARE
Problem: PHYSICAL THERAPY ADULT  Goal: Performs mobility at highest level of function for planned discharge setting.  See evaluation for individualized goals.  Description: Treatment/Interventions: Functional transfer training, LE strengthening/ROM, Elevations, Therapeutic exercise, Endurance training, Patient/family training, Equipment eval/education, Bed mobility, Gait training, Spoke to nursing, OT  Equipment Recommended: Walker       See flowsheet documentation for full assessment, interventions and recommendations.  Note: Prognosis: Good  Problem List: Decreased strength, Decreased endurance, Impaired balance, Decreased mobility, Decreased range of motion, Pain  Assessment: Pt is 64 y.o. female seen for PT evaluation s/p admit to [facility] on 12/31/2024. Two pt identifiers were used to confirm. Pt presented w/ worsening LLE weakness and muscle spasms.  Pt was admitted with a primary dx of: compression of thoracic spinal cord with myelopathy, and other active problems including restless leg syndrome, MARV, prediabetes, ambulatory dysfunction, lymphedema. Per neurosx, sucical plan TBD and recommends continued conservative management with PT/OT.  PT now consulted for assessment of mobility and d/c needs.  Pts current co morbidities affecting treatment include:  has a past medical history of Anemia, Anesthesia, Arthritis, Back pain, Dolan's esophagus, Chronic pain disorder, Chronic venous insufficiency, Colon polyp, Cough variant asthma, COVID-19, Depression, Environmental allergies, GERD (gastroesophageal reflux disease), Hiatal hernia, History of anemia, History of fusion of spine for scoliosis, History of transfusion, Left lumbar radiculitis, Lumbar postlaminectomy syndrome, Migraines, Morbid obesity (HCC), Motion sickness, Neck pain, Osteoarthritis, Right knee pain, Seasonal allergies, Shortness of breath, Umbilical hernia, Uses brace, and Wears glasses. . Pts current clinical presentation is  Unstable/ Unpredictable (high complexity) due to Ongoing medical management for primary dx, Increased reliance on more restrictive AD compared to baseline, Decreased activity tolerance compared to baseline, Fall risk, Increased assistance needed from caregiver at current time, Continuous pulse oximetry monitoring   . Upon evaluation, pt currently is requiring Min Ax1 for bed mobility; Min Ax1 for transfers and Min Ax1 for ambulation w/ RW. Pt presents at PT eval functioning below baseline and currently w/ overall mobility deficits 2* to: BLE weakness, impaired balance, decreased endurance, gait deviations, pain, decreased activity tolerance compared to baseline, fall risk. Pt currently at a fall risk 2* to impairments listed above.  Based on the aforementioned PT evaluation, pt will continue to benefit from skilled Acute PT interventions to address stated impairments; to maximize functional mobility; for ongoing pt/ family training; and DME needs. At conclusion of PT session pt returned back in chair with phone and call bell within reach. Pt denies any further questions at this time. PT is currently recommending Level I resource intensity. PT will continue to follow during hospital stay.        Rehab Resource Intensity Level, PT: I (Maximum Resource Intensity)    See flowsheet documentation for full assessment.

## 2025-01-02 NOTE — PROGRESS NOTES
Progress Note - Hospitalist   Name: Hayley Rios 64 y.o. female I MRN: [id number]  Unit/Bed#: [facility] 602-01 I Date of Admission: 12/31/2024   Date of Service: 1/2/2025 I Hospital Day: 2    Assessment & Plan  Compression of thoracic spinal cord with myelopathy (HCC)  Presented to [facility] with worsening of chronic LLE weakness, numbness/tingling of LLE>RLE, and muscle spasms. Known history of thoracic meningioma with spinal cord displacement and compression, lost to follow up due to move to [place].  MRI T spine: Suboptimal examination due to mild, moderate, and severe motion artifact - worse on postcontrast sequences. Similar 1.4 cm intradural extramedullary probable meningioma in left posterolateral thoracic spine region at approximately T3 level with associated marked spinal cord compression with severe canal stenosis given motion degraded exam. No cord edema given motion degradation.   MRI L spine: Suboptimal examination due to moderate-to-severe motion degraded exam of lumbar spine and 3 plane localizer images, sagittal T1 post contrast and axial T1 postcontrast sequences. No abnormal enhancement in lumbar spine given limitations of motion degradation.   Neurosurgery consult and recommendations appreciated   Given progressive symptoms, feel patient would likely benefit from surgical intervention - final surgical plan TBD  Cleared to start VTE prophylaxis  Continue pain regimen with scheduled Tylenol and as needed oxycodone; switch from scheduled Robaxin to Flexeril, add Lidoderm patches and aqua K-pad   Monitor neuro checks, bladder scans   PT/OT evaluations pending   Restless leg syndrome  Vitamin B12 level within normal limits  Continue home dose Mirapex 0.5 mg qhs  Low iron stores and low percent saturation on iron panel, was started on venofer at [facility] x 3 days, continue  Possibility this is related to nerve root impingement, await input from neurosurgery  Follow-up with her outpatient providers in [place] for  further management  MARV (iron deficiency anemia)  Iron panel 12/31/24: iron 40, ferritin 9, iron saturation 40%, TIBC 396  Continue with IV Venofer 200 mg daily x 3 doses (day2/3 today)  Monitor CBC and transfuse for hemoglobin < 7  Prediabetes  Evidenced by A1c of 5.8%  Sugars stable on BMP   Defer accuchecks/SSI  Ambulatory dysfunction  Due to primary problem, see plan of care outlined above   Lymphedema  Continue home dose Lasix    VTE Pharmacologic Prophylaxis: VTE Score: 5 High Risk (Score >/= 5) - Pharmacological DVT Prophylaxis Ordered: heparin. Sequential Compression Devices Ordered.    Mobility:   Basic Mobility Inpatient Raw Score: 23  JH-HLM Goal: 7: Walk 25 feet or more  JH-HLM Achieved: 6: Walk 10 steps or more  JH-HLM Goal NOT achieved. Continue with multidisciplinary rounding and encourage appropriate mobility to improve upon JH-HLM goals.    Patient Centered Rounds: I performed bedside rounds with nursing staff today.   Discussions with Specialists or Other Care Team Provider: primary RN, case management     Education and Discussions with Family / Patient: Patient declined call to .     Current Length of Stay: 2 day(s)  Current Patient Status: Inpatient   Certification Statement: The patient will continue to require additional inpatient hospital stay due to pending neurosurgical plan, therapy evaluations   Discharge Plan:  Will largely depend on the need for inpatient surgical intervention and therapy evaluations    Code Status: Level 1 - Full Code    Subjective   Patient reports continued left lower extremity muscle cramping, and states she is now developing back and neck pain secondary to this.  Discussed trying a different muscle relaxer and patient is in agreement.  Advised I would follow-up with neurosurgery later today regarding surgical plan.    Objective :  Temp:  [97.9 °F (36.6 °C)-98.7 °F (37.1 °C)] 97.9 °F (36.6 °C)  HR:  [70-92] 71  BP: (115-146)/(74-80) 118/74  Resp:   [16-17] 17  SpO2:  [86 %-95 %] 95 %  O2 Device: None (Room air)    Body mass index is 34.33 kg/m².     Input and Output Summary (last 24 hours):     Intake/Output Summary (Last 24 hours) at 1/2/2025 0847  Last data filed at 1/1/2025 2300  Gross per 24 hour   Intake 778 ml   Output --   Net 778 ml       Physical Exam  Vitals and nursing note reviewed. Exam conducted with a chaperone present.   Constitutional:       General: She is not in acute distress.     Appearance: She is obese.   Cardiovascular:      Rate and Rhythm: Normal rate.   Pulmonary:      Effort: Pulmonary effort is normal. No respiratory distress.   Musculoskeletal:      Right lower leg: Edema present.      Left lower leg: Edema present.      Comments: Chronic LE lymphedema   Skin:     General: Skin is warm and dry.      Coloration: Skin is not pale.      Findings: No erythema.   Neurological:      Mental Status: She is alert and oriented to person, place, and time. Mental status is at baseline.           Lines/Drains:              Lab Results: I have reviewed the following results:   Results from last 7 days   Lab Units 01/02/25  0639 01/01/25  0440 12/30/24  0152   WBC Thousand/uL 6.24   < > 7.36   HEMOGLOBIN g/dL 11.4*   < > 11.5   HEMATOCRIT % 37.9   < > 38.2   PLATELETS Thousands/uL 274   < > 300   SEGS PCT %  --   --  60   LYMPHO PCT %  --   --  31   MONO PCT %  --   --  5   EOS PCT %  --   --  3    < > = values in this interval not displayed.     Results from last 7 days   Lab Units 01/01/25  0440 12/30/24  0152   SODIUM mmol/L 140 139   POTASSIUM mmol/L 3.9 3.5   CHLORIDE mmol/L 104 104   CO2 mmol/L 28 27   BUN mg/dL 14 17   CREATININE mg/dL 0.60 0.61   ANION GAP mmol/L 8 8   CALCIUM mg/dL 9.3 9.3   ALBUMIN g/dL  --  4.4   TOTAL BILIRUBIN mg/dL  --  0.38   ALK PHOS U/L  --  68   ALT U/L  --  12   AST U/L  --  14   GLUCOSE RANDOM mg/dL 101 112             Results from last 7 days   Lab Units 01/01/25  0440   HEMOGLOBIN A1C % 5.8*            Recent Cultures (last 7 days):         Imaging Results Review: No pertinent imaging studies reviewed.  Other Study Results Review: No additional pertinent studies reviewed.    Last 24 Hours Medication List:     Current Facility-Administered Medications:     acetaminophen (TYLENOL) tablet 975 mg, Q8H EVGENY    docusate sodium (COLACE) capsule 100 mg, BID    DULoxetine (CYMBALTA) delayed release capsule 60 mg, Daily    furosemide (LASIX) tablet 20 mg, Daily    [Held by provider] heparin (porcine) subcutaneous injection 5,000 Units, Q8H EVGENY    iron sucrose (VENOFER) 200 mg in sodium chloride 0.9 % 50 mL IVPB, Daily, Last Rate: 200 mg (01/01/25 0827)    melatonin tablet 6 mg, HS PRN    methocarbamol (ROBAXIN) tablet 750 mg, Q6H EVGENY    ondansetron (ZOFRAN) injection 4 mg, Q6H PRN    oxyCODONE (ROXICODONE) immediate release tablet 10 mg, Q6H PRN    oxyCODONE (ROXICODONE) IR tablet 5 mg, Q6H PRN    pantoprazole (PROTONIX) EC tablet 20 mg, Early Morning    potassium chloride (Klor-Con M20) CR tablet 20 mEq, Daily With Breakfast    pramipexole (MIRAPEX) tablet 0.5 mg, HS    Administrative Statements   Today, Patient Was Seen By: Ramila Duckworth PA-C  I have spent a total time of 25 minutes in caring for this patient on the day of the visit/encounter including Patient and family education, Impressions, Counseling / Coordination of care, Documenting in the medical record, Reviewing / ordering tests, medicine, procedures  , Obtaining or reviewing history  , and Communicating with other healthcare professionals .    **Please Note: This note may have been constructed using a voice recognition system.**

## 2025-01-02 NOTE — ASSESSMENT & PLAN NOTE
T3 lesion w/ concern of progressively worsening myelopathy  P/w progressively worsening bilateral lower extremity, L >R, weakness and balance difficulty x approx 2 months   Acutely worsened over the last 2 weeks, now requiring use of a rolling walker    Imagin/30 MRI T-spine w/wo: sub-optimal examination due to mild, moderate, and severe motion artifact - worse on postcontrast sequences. Similar 1.4 cm intradural extramedullary probable meningioma in left posterolateral thoracic spine region at approximately T3 level with associated marked spinal cord compression with severe canal stenosis given motion degraded exam. No cord edema given motion degradation. Recommend evaluation by neurosurgery. Degenerative changes    Plan:   Continue to closely monitor neuro exam   Frequent neuro checks per primary team   Maintain normotensive BP goals, SBP < 160   Case and imaging reviewed this am on rounds   MRI reviewed and reveals largely stable in size known T3 IDEM mass, however, given patient's presenting symptoms, it appears she has become much more symptomatic in this regards to this with worsening myelopathy  Continue ongoing conservative management  Pain control primary team  PT/OT  Hold all AC/AP meds in the setting of possible nsgy intervention this admission   No reported use at home   DVT ppx: SCDs, HSQ  Medical management per primary team   Social work following or assistance with dispo once medically cleared   Will plan for MRI cervical spine to imaging through the T5 level from a superior aspect.   CT cervical thoracic and lumbar spine without contrast for surgical planning and bony evaluation in setting of prior thoracolumbar fusions.   No intervention planned at this time. Patient updated that surgical intervention is more likely to take place next week if we determine inpatient treatment is most appropriate. Agreeable to proceeding with care here.    Neurosurgery will review additional imaging when  completed and resume follow up once determination and plan for surgical timing has been completed. Please reach out with any further questions or concerns.

## 2025-01-02 NOTE — OCCUPATIONAL THERAPY NOTE
"    Occupational Therapy Evaluation     Patient Name: Hayley Rios  Today's Date: 1/2/2025  Problem List  Principal Problem:    Compression of thoracic spinal cord with myelopathy (HCC)  Active Problems:    Restless leg syndrome    Prediabetes    MARV (iron deficiency anemia)    Lymphedema    Ambulatory dysfunction    Past Medical History  Past Medical History:   Diagnosis Date    Anemia     Anesthesia     \"sometimes low BP upon waking up\" pt states she stopped breathing 1 time during surgery    Arthritis     Back pain     Dolan's esophagus     Chronic pain disorder     back    Chronic venous insufficiency     Colon polyp     Cough variant asthma     d/t mold-all sx diminished when removed from source    COVID-19 03/2020    Depression     Environmental allergies     dust    GERD (gastroesophageal reflux disease)     Hiatal hernia     History of anemia     History of fusion of spine for scoliosis     \"as a teenager\"    History of transfusion     1978 - no adverse reaction    Left lumbar radiculitis     Last Assessed: [id number]    Lumbar postlaminectomy syndrome     Migraines     Morbid obesity (HCC)     Motion sickness     Neck pain     Osteoarthritis     of left hip    Right knee pain     Seasonal allergies     Shortness of breath     with stairs    Umbilical hernia     Uses brace     right knee    Wears glasses      Past Surgical History  Past Surgical History:   Procedure Laterality Date    ARTHRODESIS      lumbar    ARTHRODESIS      Spinal Arthrodesis for Deformity; Last Assessed: 16Mar2017    BACK SURGERY      lumbar fusion,with nydia/screw and cage implant    BACK SURGERY      surgery for scoliosis    BREAST CYST EXCISION Right     benign    CHOLECYSTECTOMY LAPAROSCOPIC N/A 12/20/2023    Procedure: FENESTRATED SUBTOTAL CHOLECYSTECTOMY LAPAROSCOPIC, EXTENSIVE LYSIS OF ADHESIONS.;  Surgeon: Chelle Butler MD;  Location: [facility];  Service: General    COLONOSCOPY      COLONOSCOPY N/A 03/14/2019    Procedure: " COLONOSCOPY;  Surgeon: Van Dyson MD;  Location: [facility];  Service: Gastroenterology    ESOPHAGOGASTRODUODENOSCOPY N/A 03/14/2019    Procedure: ESOPHAGOGASTRODUODENOSCOPY (EGD) W RFA(BARRX);  Surgeon: Van Dyson MD;  Location:  GI LAB;  Service: Gastroenterology    HERNIA REPAIR      umbilical hernia repair x2    PLANTAR FASCIA SURGERY Left     AK ARTHRS KNE SURG W/MENISCECTOMY MED/LAT W/SHVG Right 04/04/2018    Procedure: ARTHROSCOPY KNEE PARTIAL MEDIAL MENISECTOMY , CHONDROPLASTY;  Surgeon: Rocio Roe DO;  [facility]: [facility];  Service: Orthopedics    AK BREAST REDUCTION Bilateral 03/12/2021    Procedure: BREAST REDUCTION;  Surgeon: Cortez Sandoval MD;  Location:  MAIN OR;  Service: Plastics    AK COLONOSCOPY FLX DX W/COLLJ SPEC WHEN PFRMD N/A 02/20/2018    Procedure: COLONOSCOPY with polypectomy;  Surgeon: Apryl Garcia MD;  [facility]: [facility];  Service: General    AK ESOPHAGOGASTRODUODENOSCOPY TRANSORAL DIAGNOSTIC N/A 05/24/2017    Procedure: ESOPHAGOGASTRODUODENOSCOPY (EGD);  Surgeon: Marcello Street MD;  Location: [facility];  Service: Gastroenterology    AK ESOPHAGOGASTRODUODENOSCOPY TRANSORAL DIAGNOSTIC N/A 08/31/2017    Procedure: ESOPHAGOGASTRODUODENOSCOPY (EGD) W RFA;  Surgeon: Macho Aguayo MD;  Location:  GI LAB;  Service: Gastroenterology    AK LAPS RPR RECURRENT INCISIONAL HERNIA REDUCIBLE N/A 01/21/2021    Procedure: REPAIR HERNIA INCISIONAL LAPAROSCOPIC W/ ROBOTICS, with mesh;  Surgeon: Errol Bermudez MD;  [facility];  Service: General    AK RPR AA HERNIA 1ST 3-10 CM REDUCIBLE N/A 2/19/2024    Procedure: VENTRAL HERNIA REPAIR 3CM WITH MESH;  Surgeon: Chelle Butler MD;  Location:  MAIN OR;  Service: General    WISDOM TOOTH EXTRACTION           01/02/25 1025   OT Last Visit   OT Visit Date 01/02/25   Note Type   Note type Evaluation   Pain Assessment   Pain Assessment Tool 0-10   Pain Score 5   Pain Location/Orientation Orientation: Lower;Location:  "Back;Orientation: Bilateral;Orientation: Upper;Location: Leg   Hospital Pain Intervention(s) Repositioned;Ambulation/increased activity;Emotional support;Relaxation technique   Restrictions/Precautions   Weight Bearing Precautions Per Order No   Other Precautions Fall Risk;Pain;Spinal precautions  (pt signed paper to opt out of chair/bed alarm)   Home Living   Type of Home House   Home Layout Two level   Additional Comments resides in [place] - was in [place] visiting friends for the Holiday's  - was to return home yesterday   Prior Function   Level of [place] Independent with ADLs;Independent with functional mobility;Independent with IADLS   Lives With (S)  Alone   Receives Help From Family;Friend(s)   IADLs Independent with meal prep;Independent with driving;Independent with medication management   Falls in the last 6 months 1 to 4   Vocational On disability   Lifestyle   Autonomy I adls and mobility - reports she was most recently using SPC 2* LE weakness (L>R) but borrowed friends RW 2* progressive weakness over past 2 wks   Reciprocal Relationships supportive family and friends   Service to Others not working   Intrinsic Gratification active pta   Subjective   Subjective \"I hope they can take care of this before I leave here\"   ADL   Eating Assistance 7  Independent   Grooming Assistance 5  Supervision/Setup   UB Bathing Assistance 5  Supervision/Setup   LB Bathing Assistance 4  Minimal Assistance   UB Dressing Assistance 5  Supervision/Setup   LB Dressing Assistance 4  Minimal Assistance   Toileting Assistance  4  Minimal Assistance   Bed Mobility   Supine to Sit 4  Minimal assistance   Transfers   Sit to Stand 4  Minimal assistance   Stand to Sit 4  Minimal assistance   Functional Mobility   Functional Mobility 4  Minimal assistance   Additional items Rolling walker   Balance   Static Sitting Fair +   Dynamic Sitting Fair   Static Standing Fair -   Dynamic Standing Poor +   Ambulatory Poor +   Activity Tolerance "   Activity Tolerance Patient limited by fatigue;Patient limited by pain   Medical Staff Made Aware PT present for co-eval 2* medical complexity, comorbidities and limited overall tolerance to activities   RUE Assessment   RUE Assessment WFL   LUE Assessment   LUE Assessment WFL   Cognition   Overall Cognitive Status WFL   Assessment   Limitation Decreased ADL status;Decreased endurance;Decreased self-care trans;Decreased high-level ADLs   Prognosis Good   Assessment Pt is a 64 y.o. female who was admitted to [facility] on 12/31/2024 with Compression of spinal cord with myelopathy (HCC) and progressive LE weakness/difficulty walking. Patient  has a past medical history of Anemia, Anesthesia, Arthritis, Back pain, Dolan's esophagus, Chronic pain disorder, Chronic venous insufficiency, Colon polyp, Cough variant asthma, COVID-19, Depression, Environmental allergies, GERD (gastroesophageal reflux disease), Hiatal hernia, History of anemia, History of fusion of spine for scoliosis, History of transfusion, Left lumbar radiculitis, Lumbar postlaminectomy syndrome, Migraines, Morbid obesity (HCC), Motion sickness, Neck pain, Osteoarthritis, Right knee pain, Seasonal allergies, Shortness of breath, Umbilical hernia, Uses brace, and Wears glasses.   At baseline pt was completing adls and mobility independently - most recently using SPC but progressed to RW over past 2 wks - admits to 2 falls - I iadls. Pt lives alone in 2 story home in [place] - in [place] visiting friends for Holiday's. Currently pt requires min assist for overall ADLS and min assist for functional mobility/transfers. Pt currently presents with impairments in the following categories -steps to enter environment, limited home support, difficulty performing ADLS, difficulty performing IADLS , and environment activity tolerance, endurance, and standing balance/tolerance. These impairments, as well as pt's fatigue, pain, spinal precautions, decreased caregiver  support, risk for falls, and home environment  limit pt's ability to safely engage in all baseline areas of occupation, includingbathing, dressing, toileting, functional mobility/transfers, community mobility, laundry , driving, house maintenance, meal prep, cleaning, social participation , and leisure activities  From OT standpoint, recommend Level I resources upon D/C. OT will continue to follow to address the below stated goals.   Goals   Patient Goals have the surgery here   LTG Time Frame 10-14   Long Term Goal #1 1) Mod I UB/LB adls after setup 2)  Mod I toileting and clothing management  3) Mod I bed mobility  4) Mod I functional mob/transfers to and from all surfaces with fair+ to good balance/safety   5) Increase activity tolerance to 30-35min for participation in adls and enjoyable activities  6) Assess DME needs   7) Demonstrate good carryover with safe use of AD during functional tasks   8) Assess DME needs   9) Assist with safe d/c recommendations   Plan   Treatment Interventions ADL retraining;Functional transfer training;Endurance training;Patient/family training;Equipment evaluation/education;Compensatory technique education;Activityengagement   Goal Expiration Date 01/16/25   OT Frequency 3-5x/wk   Discharge Recommendation   Rehab Resource Intensity Level, OT I (Maximum Resource Intensity)   AM-PAC Daily Activity Inpatient   Lower Body Dressing 3   Bathing 3   Toileting 3   Upper Body Dressing 4   Grooming 4   Eating 4   Daily Activity Raw Score 21   Daily Activity Standardized Score (Calc for Raw Score >=11) 44.27   AM-PAC Applied Cognition Inpatient   Following a Speech/Presentation 4   Understanding Ordinary Conversation 4   Taking Medications 4   Remembering Where Things Are Placed or Put Away 4   Remembering List of 4-5 Errands 4   Taking Care of Complicated Tasks 4   Applied Cognition Raw Score 24   Applied Cognition Standardized Score 62.21   End of Consult   Education Provided Yes   Patient  Position at End of Consult Bedside chair;All needs within reach  (refusing bed/chair alarm)   Nurse Communication Nurse aware of consult     Documentation Completed By:    JESSICA Dunne/L  [employer] Certified - [id number]

## 2025-01-03 LAB
ERYTHROCYTE [DISTWIDTH] IN BLOOD BY AUTOMATED COUNT: 15.5 % (ref 11.6–15.1)
HCT VFR BLD AUTO: 38.2 % (ref 34.8–46.1)
HGB BLD-MCNC: 11.6 G/DL (ref 11.5–15.4)
MCH RBC QN AUTO: 26.3 PG (ref 26.8–34.3)
MCHC RBC AUTO-ENTMCNC: 30.4 G/DL (ref 31.4–37.4)
MCV RBC AUTO: 87 FL (ref 82–98)
PLATELET # BLD AUTO: 287 THOUSANDS/UL (ref 149–390)
PMV BLD AUTO: 9.6 FL (ref 8.9–12.7)
RBC # BLD AUTO: 4.41 MILLION/UL (ref 3.81–5.12)
WBC # BLD AUTO: 7.24 THOUSAND/UL (ref 4.31–10.16)

## 2025-01-03 PROCEDURE — 99232 SBSQ HOSP IP/OBS MODERATE 35: CPT | Performed by: STUDENT IN AN ORGANIZED HEALTH CARE EDUCATION/TRAINING PROGRAM

## 2025-01-03 PROCEDURE — 85027 COMPLETE CBC AUTOMATED: CPT | Performed by: INTERNAL MEDICINE

## 2025-01-03 PROCEDURE — 97116 GAIT TRAINING THERAPY: CPT

## 2025-01-03 PROCEDURE — 99233 SBSQ HOSP IP/OBS HIGH 50: CPT | Performed by: NEUROLOGICAL SURGERY

## 2025-01-03 PROCEDURE — 97110 THERAPEUTIC EXERCISES: CPT

## 2025-01-03 RX ORDER — PANTOPRAZOLE SODIUM 20 MG/1
20 TABLET, DELAYED RELEASE ORAL ONCE
Status: COMPLETED | OUTPATIENT
Start: 2025-01-03 | End: 2025-01-03

## 2025-01-03 RX ORDER — DIAZEPAM 10 MG/2ML
2.5 INJECTION, SOLUTION INTRAMUSCULAR; INTRAVENOUS EVERY 6 HOURS PRN
Status: DISCONTINUED | OUTPATIENT
Start: 2025-01-03 | End: 2025-01-13

## 2025-01-03 RX ORDER — HYDROMORPHONE HYDROCHLORIDE 4 MG/1
4 TABLET ORAL EVERY 4 HOURS PRN
Refills: 0 | Status: DISCONTINUED | OUTPATIENT
Start: 2025-01-03 | End: 2025-01-07

## 2025-01-03 RX ORDER — PANTOPRAZOLE SODIUM 40 MG/1
40 TABLET, DELAYED RELEASE ORAL
Status: DISCONTINUED | OUTPATIENT
Start: 2025-01-04 | End: 2025-01-11

## 2025-01-03 RX ORDER — CYCLOBENZAPRINE HCL 10 MG
10 TABLET ORAL 3 TIMES DAILY
Status: DISCONTINUED | OUTPATIENT
Start: 2025-01-03 | End: 2025-01-07

## 2025-01-03 RX ORDER — CYANOCOBALAMIN 1000 UG/ML
1000 INJECTION, SOLUTION INTRAMUSCULAR; SUBCUTANEOUS
Status: DISCONTINUED | OUTPATIENT
Start: 2025-01-03 | End: 2025-01-15 | Stop reason: HOSPADM

## 2025-01-03 RX ORDER — CYCLOBENZAPRINE HCL 10 MG
10 TABLET ORAL ONCE
Status: COMPLETED | OUTPATIENT
Start: 2025-01-03 | End: 2025-01-03

## 2025-01-03 RX ADMIN — ACETAMINOPHEN 975 MG: 325 TABLET, FILM COATED ORAL at 04:40

## 2025-01-03 RX ADMIN — PRAMIPEXOLE DIHYDROCHLORIDE 0.5 MG: 0.5 TABLET ORAL at 22:15

## 2025-01-03 RX ADMIN — ACETAMINOPHEN 975 MG: 325 TABLET, FILM COATED ORAL at 13:03

## 2025-01-03 RX ADMIN — CYANOCOBALAMIN 1000 MCG: 1000 INJECTION, SOLUTION INTRAMUSCULAR at 17:08

## 2025-01-03 RX ADMIN — DIAZEPAM 2.5 MG: 10 INJECTION, SOLUTION INTRAMUSCULAR; INTRAVENOUS at 22:30

## 2025-01-03 RX ADMIN — OXYCODONE HYDROCHLORIDE 10 MG: 10 TABLET ORAL at 04:38

## 2025-01-03 RX ADMIN — ACETAMINOPHEN 975 MG: 325 TABLET, FILM COATED ORAL at 22:15

## 2025-01-03 RX ADMIN — HEPARIN SODIUM 5000 UNITS: 5000 INJECTION, SOLUTION INTRAVENOUS; SUBCUTANEOUS at 13:04

## 2025-01-03 RX ADMIN — PANTOPRAZOLE SODIUM 20 MG: 20 TABLET, DELAYED RELEASE ORAL at 13:15

## 2025-01-03 RX ADMIN — PANTOPRAZOLE SODIUM 20 MG: 20 TABLET, DELAYED RELEASE ORAL at 04:40

## 2025-01-03 RX ADMIN — OXYCODONE HYDROCHLORIDE 10 MG: 10 TABLET ORAL at 13:03

## 2025-01-03 RX ADMIN — HYDROMORPHONE HYDROCHLORIDE 4 MG: 4 TABLET ORAL at 17:07

## 2025-01-03 RX ADMIN — DOCUSATE SODIUM 100 MG: 100 CAPSULE, LIQUID FILLED ORAL at 17:07

## 2025-01-03 RX ADMIN — HEPARIN SODIUM 5000 UNITS: 5000 INJECTION, SOLUTION INTRAVENOUS; SUBCUTANEOUS at 22:15

## 2025-01-03 RX ADMIN — POTASSIUM CHLORIDE 40 MEQ: 1500 TABLET, EXTENDED RELEASE ORAL at 08:42

## 2025-01-03 RX ADMIN — HYDROMORPHONE HYDROCHLORIDE 4 MG: 4 TABLET ORAL at 23:25

## 2025-01-03 RX ADMIN — CYCLOBENZAPRINE HYDROCHLORIDE 10 MG: 10 TABLET, FILM COATED ORAL at 17:08

## 2025-01-03 RX ADMIN — HEPARIN SODIUM 5000 UNITS: 5000 INJECTION, SOLUTION INTRAVENOUS; SUBCUTANEOUS at 04:40

## 2025-01-03 RX ADMIN — CYCLOBENZAPRINE HYDROCHLORIDE 10 MG: 10 TABLET, FILM COATED ORAL at 22:15

## 2025-01-03 RX ADMIN — LIDOCAINE 2 PATCH: 50 PATCH TOPICAL at 08:43

## 2025-01-03 RX ADMIN — DULOXETINE HYDROCHLORIDE 60 MG: 60 CAPSULE, DELAYED RELEASE ORAL at 08:42

## 2025-01-03 RX ADMIN — CYCLOBENZAPRINE HYDROCHLORIDE 5 MG: 10 TABLET, FILM COATED ORAL at 08:42

## 2025-01-03 NOTE — PLAN OF CARE
Problem: PAIN - ADULT  Goal: Verbalizes/displays adequate comfort level or baseline comfort level  Description: Interventions:  - Encourage patient to monitor pain and request assistance  - Assess pain using appropriate pain scale  - Administer analgesics based on type and severity of pain and evaluate response  - Implement non-pharmacological measures as appropriate and evaluate response  - Consider cultural and social influences on pain and pain management  - Notify physician/advanced practitioner if interventions unsuccessful or patient reports new pain  Outcome: Progressing     Problem: INFECTION - ADULT  Goal: Absence or prevention of progression during hospitalization  Description: INTERVENTIONS:  - Assess and monitor for signs and symptoms of infection  - Monitor lab/diagnostic results  - Monitor all insertion sites, i.e. indwelling lines, tubes, and drains  - Monitor endotracheal if appropriate and nasal secretions for changes in amount and color  - [place] appropriate cooling/warming therapies per order  - Administer medications as ordered  - Instruct and encourage patient and family to use good hand hygiene technique  - Identify and instruct in appropriate isolation precautions for identified infection/condition  Outcome: Progressing     Problem: SAFETY ADULT  Goal: Patient will remain free of falls  Description: INTERVENTIONS:  - Educate patient/family on patient safety including physical limitations  - Instruct patient to call for assistance with activity   - Consult OT/PT to assist with strengthening/mobility   - Keep Call bell within reach  - Keep bed low and locked with side rails adjusted as appropriate  - Keep care items and personal belongings within reach  - Initiate and maintain comfort rounds  - Make Fall Risk Sign visible to staff  - Offer Toileting every 2 Hours, in advance of need  - Initiate/Maintain alarm  - Obtain necessary fall risk management equipment  - Apply yellow socks and  bracelet for high fall risk patients  - Consider moving patient to room near nurses station  Outcome: Progressing  Goal: Maintain or return to baseline ADL function  Description: INTERVENTIONS:  -  Assess patient's ability to carry out ADLs; assess patient's baseline for ADL function and identify physical deficits which impact ability to perform ADLs (bathing, care of mouth/teeth, toileting, grooming, dressing, etc.)  - Assess/evaluate cause of self-care deficits   - Assess range of motion  - Assess patient's mobility; develop plan if impaired  - Assess patient's need for assistive devices and provide as appropriate  - Encourage maximum independence but intervene and supervise when necessary  - Involve family in performance of ADLs  - Assess for home care needs following discharge   - Consider OT consult to assist with ADL evaluation and planning for discharge  - Provide patient education as appropriate  Outcome: Progressing  Goal: Maintains/Returns to pre admission functional level  Description: INTERVENTIONS:  - Perform AM-PAC 6 Click Basic Mobility/ Daily Activity assessment daily.  - Set and communicate daily mobility goal to care team and patient/family/caregiver.   - Collaborate with rehabilitation services on mobility goals if consulted  - Perform Range of Motion 3 times a day.  - Reposition patient every 2 hours.  - Dangle patient 3 times a day  - Stand patient 3 times a day  - Ambulate patient 3 times a day  - Out of bed to chair 3 times a day   - Out of bed for meals 3 times a day  - Out of bed for toileting  - Record patient progress and toleration of activity level   Outcome: Progressing     Problem: DISCHARGE PLANNING  Goal: Discharge to home or other facility with appropriate resources  Description: INTERVENTIONS:  - Identify barriers to discharge w/patient and caregiver  - Arrange for needed discharge resources and transportation as appropriate  - Identify discharge learning needs (meds, wound care,  etc.)  - Arrange for interpretive services to assist at discharge as needed  - Refer to Case Management Department for coordinating discharge planning if the patient needs post-hospital services based on physician/advanced practitioner order or complex needs related to functional status, cognitive ability, or social support system  Outcome: Progressing     Problem: Knowledge Deficit  Goal: Patient/family/caregiver demonstrates understanding of disease process, treatment plan, medications, and discharge instructions  Description: Complete learning assessment and assess knowledge base.  Interventions:  - Provide teaching at level of understanding  - Provide teaching via preferred learning methods  Outcome: Progressing     Problem: NEUROSENSORY - ADULT  Goal: Achieves stable or improved neurological status  Description: INTERVENTIONS  - Monitor and report changes in neurological status  - Monitor vital signs such as temperature, blood pressure, glucose, and any other labs ordered   - Initiate measures to prevent increased intracranial pressure  - Monitor for seizure activity and implement precautions if appropriate      Outcome: Progressing  Goal: Achieves maximal functionality and self care  Description: INTERVENTIONS  - Monitor swallowing and airway patency with patient fatigue and changes in neurological status  - Encourage and assist patient to increase activity and self care.   - Encourage visually impaired, hearing impaired and aphasic patients to use assistive/communication devices  Outcome: Progressing

## 2025-01-03 NOTE — PLAN OF CARE
Problem: PHYSICAL THERAPY ADULT  Goal: Performs mobility at highest level of function for planned discharge setting.  See evaluation for individualized goals.  Description: Treatment/Interventions: Functional transfer training, LE strengthening/ROM, Elevations, Therapeutic exercise, Endurance training, Patient/family training, Equipment eval/education, Bed mobility, Gait training, Spoke to nursing, OT  Equipment Recommended: Walker       See flowsheet documentation for full assessment, interventions and recommendations.  Outcome: Progressing  Note: Prognosis: Good  Problem List: Decreased strength, Decreased endurance, Impaired balance, Decreased mobility, Decreased range of motion, Pain  Assessment: Pt motivated to participate w/ PT. She demonstrated progress this session w/ decreased assistance required for bed mobility + increased distance covered during gait training. Pt mildly unsteady w/ ambulation; she relies heavily on use of BUE due to fear of buckling. Instructed pt in full L knee extension during stance phase to prevent buckling (none observed this session). Pt reported discomfort in B shoulders due to heavy BUE depression into RW. Adjusted height of RW 1 notch down and pt reported improvement. From a PT standpoint continue to recommend acute medial rehab upon d/c.        Rehab Resource Intensity Level, PT: I (Maximum Resource Intensity)    See flowsheet documentation for full assessment.

## 2025-01-03 NOTE — ASSESSMENT & PLAN NOTE
Presented to [facility] with worsening of chronic LLE weakness, numbness/tingling of LLE>RLE, and muscle spasms. Known history of thoracic meningioma with spinal cord displacement and compression, lost to follow up due to move to [place].  MRI T spine: Suboptimal examination due to mild, moderate, and severe motion artifact - worse on postcontrast sequences. Similar 1.4 cm intradural extramedullary probable meningioma in left posterolateral thoracic spine region at approximately T3 level with associated marked spinal cord compression with severe canal stenosis given motion degraded exam. No cord edema given motion degradation.   MRI L spine: Suboptimal examination due to moderate-to-severe motion degraded exam of lumbar spine and 3 plane localizer images, sagittal T1 post contrast and axial T1 postcontrast sequences. No abnormal enhancement in lumbar spine given limitations of motion degradation.   Neurosurgery consult and recommendations appreciated   Given progressive symptoms, feel patient would likely benefit from surgical intervention - final surgical plan TBD  Cleared to start VTE prophylaxis  Continue pain regimen with scheduled Tylenol and as needed oxycodone; switch from scheduled Robaxin to Flexeril, add Lidoderm patches and aqua K-pad   Monitor neuro checks, bladder scans   PT/OT  Plan for OR on Tuesday

## 2025-01-03 NOTE — [facility] ADMISSION
[facility] admissions team received referral on patient's case for possible [facility] placement. [facility] admissions team will continue to review and follow patient's progress and correspond with PT/OT therapies / [facility] physician and case management until a determination of acceptance to [facility] is made.

## 2025-01-03 NOTE — ASSESSMENT & PLAN NOTE
Vitamin B12 borderline low, will give cyanocobalamin injection  Continue home dose Mirapex 0.5 mg qhs  Low iron stores and low percent saturation on iron panel, was started on venofer at [facility] x 3 days, continue  Possibility this is related to nerve root impingement, await input from neurosurgery  Follow-up with her outpatient providers in [place] for further management

## 2025-01-03 NOTE — ASSESSMENT & PLAN NOTE
T3 lesion w/ concern of progressively worsening myelopathy  P/w progressively worsening bilateral lower extremity, L >R, weakness and balance difficulty x approx 2 months   Acutely worsened over the last 2 weeks, now requiring use of a rolling walker    Imaging:   MRI cervical spine 1/2/2025: Partially nondiagnostic exam due to motion artifact.  T3 lesion likely mildly increased in size based on evaluation and sagittal imaging.  CT cervical spine 1/2/2025: Partially calcified extramedullary T3 lesion slightly larger than prior MRI report.  CT thoracic and lumbar spine 1/2/2025: Thoracolumbar fusion.  Chronic disc and facet degenerative changes at L4-5 resulting in mild canal stenosis and severe foraminal narrowing.    Plan:   Continue to closely monitor neuro exam   Frequent neuro checks per primary team   Maintain normotensive BP goals, SBP < 160   Case and imaging reviewed   Suspected T3 intradural extramedullary meningioma with severe mass effect on the spinal cord  Continue ongoing conservative management  Pain control primary team  PT/OT  Hold all AC/AP meds in the setting of possible nsgy intervention this admission   No reported use at home   DVT ppx: SCDs, HSQ  Medical management per primary team   Social work following or assistance with dispo once medically cleared   Case further reviewed and discussed with Dr. Coulter  Tentative plan for OR on Tuesday, 1/7/2025.   Discussed with patient who remains agreeable to proceeding with intervention.     Neurosurgery will be available as needed. Plan to follow up on Monday to place preoperative orders for Tuesday.  Please reach out with any further questions or concerns.

## 2025-01-03 NOTE — PHYSICAL THERAPY NOTE
Physical Therapy Treatment Note    Patient's Name: Hayley Rios  : 1960     01/03/25 1325   PT Last Visit   PT Visit Date 25   Note Type   Note Type Treatment   Pain Assessment   Pain Assessment Tool 0-10   Pain Score 8   Pain Location/Orientation Orientation: Bilateral;Location: Back   Hospital Pain Intervention(s) Heat applied   Restrictions/Precautions   Weight Bearing Precautions Per Order No   Other Precautions Chair Alarm;Bed Alarm;Fall Risk;Pain;Spinal precautions   General   Chart Reviewed Yes   Response to Previous Treatment Patient reporting fatigue but able to participate.   Family/Caregiver Present Yes  (sister)   Subjective   Subjective Agreeable to mobilize.   Bed Mobility   Supine to Sit 5  Supervision   Additional items HOB elevated;Increased time required   Additional Comments Pt greeted in supine.   Transfers   Sit to Stand 4  Minimal assistance   Additional items Assist x 1;Increased time required;Verbal cues   Stand to Sit 4  Minimal assistance   Additional items Assist x 1;Increased time required;Verbal cues   Toilet transfer 4  Minimal assistance   Additional items Increased time required;Verbal cues;Commode;Assist x 1   Additional Comments RW   Ambulation/Elevation   Gait pattern Excessively slow;Short stride;Decreased foot clearance;Decreased heel strike   Gait Assistance 4  Minimal assist   Additional items Assist x 1;Verbal cues;Tactile cues   Assistive Device Rolling walker   Distance 75'x2 + 20'   Stair Management Assistance Not tested   Balance   Static Sitting Fair   Dynamic Sitting Fair   Static Standing Fair -   Dynamic Standing Poor +   Ambulatory Poor +  (RW)   Endurance Deficit   Endurance Deficit Yes   Endurance Deficit Description weakness, fatigue   Activity Tolerance   Activity Tolerance Patient limited by fatigue;Patient limited by pain   Nurse Made Aware yes - present during portion of session   Exercises   Hip Abduction Sitting;20 reps;AROM;Bilateral   Hip  Adduction Sitting;20 reps;AROM;Bilateral   Knee AROM Long Arc Quad Sitting;20 reps;AROM;Bilateral   Marching Sitting;20 reps;AROM;Bilateral;Standing  (standing for RLE; sitting for LLE (reported too much pain performing LLE standing))   Assessment   Prognosis Good   Problem List Decreased strength;Decreased endurance;Impaired balance;Decreased mobility;Decreased range of motion;Pain   Assessment Pt motivated to participate w/ PT. She demonstrated progress this session w/ decreased assistance required for bed mobility + increased distance covered during gait training. Pt mildly unsteady w/ ambulation; she relies heavily on use of BUE due to fear of buckling. Instructed pt in full L knee extension during stance phase to prevent buckling (none observed this session). Pt reported discomfort in B shoulders due to heavy BUE depression into RW. Adjusted height of RW 1 notch down and pt reported improvement. From a PT standpoint continue to recommend acute medial rehab upon d/c.   Goals   Patient Goals get better   Plan   Treatment/Interventions Functional transfer training;LE strengthening/ROM;Therapeutic exercise;Endurance training;Elevations;Patient/family training;Equipment eval/education;Bed mobility;Gait training;Compensatory technique education;Spoke to nursing;Family   Progress Progressing toward goals   PT Frequency 3-5x/wk   Discharge Recommendation   Rehab Resource Intensity Level, PT I (Maximum Resource Intensity)   Equipment Recommended Walker   Walker Package Recommended Wheeled walker   Change/add to Walker Package? No   AM-PAC Basic Mobility Inpatient   Turning in Flat Bed Without Bedrails 3   Lying on Back to Sitting on Edge of Flat Bed Without Bedrails 3   Moving Bed to Chair 3   Standing Up From Chair Using Arms 3   Walk in Room 3   Climb 3-5 Stairs With Railing 2   Basic Mobility Inpatient Raw Score 17   Basic Mobility Standardized Score 39.67   [facility] Highest Level Of Mobility   [facility] Goal 5:  Stand one or more mins   -HLM Achieved 7: Walk 25 feet or more   Education   Education Provided Mobility training;Assistive device   Patient Demonstrates acceptance/verbal understanding;Reinforcement needed   End of Consult   Patient Position at End of Consult Bedside chair;Bed/Chair alarm activated;All needs within reach  (on waffle cushion)     Ramila Loaiza, PT, DPT

## 2025-01-03 NOTE — PROGRESS NOTES
Progress Note - Hospitalist   Name: Hayley Rios 64 y.o. 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 Known history of thoracic meningioma with spinal cord displacement and compression, lost to follow up due to move to [place].  MRI T spine: Suboptimal examination due to mild, moderate, and severe motion artifact - worse on postcontrast sequences. Similar 1.4 cm intradural extramedullary probable meningioma in left posterolateral thoracic spine region at approximately T3 level with associated marked spinal cord compression with severe canal stenosis given motion degraded exam. No cord edema given motion degradation.   MRI L spine: Suboptimal examination due to moderate-to-severe motion degraded exam of lumbar spine and 3 plane localizer images, sagittal T1 post contrast and axial T1 postcontrast sequences. No abnormal enhancement in lumbar spine given limitations of motion degradation.   Neurosurgery consult and recommendations appreciated   Given progressive symptoms, feel patient would likely benefit from surgical intervention - final surgical plan TBD  Cleared to start VTE prophylaxis  Continue pain regimen with scheduled Tylenol and as needed oxycodone; switch from scheduled Robaxin to Flexeril, add Lidoderm patches and aqua K-pad   Monitor neuro checks, bladder scans   PT/OT  Plan for OR on Tuesday  Restless leg syndrome  Vitamin B12 borderline low, will give cyanocobalamin injection  Continue home dose Mirapex 0.5 mg qhs  Low iron stores and low percent saturation on iron panel, was started on venofer at [facility] x 3 days, continue  Possibility this is related to nerve root impingement, await input from neurosurgery  Follow-up with her outpatient  providers in [place] for further management  MARV (iron deficiency anemia)  Iron panel 12/31/24: iron 40, ferritin 9, iron saturation 40%, TIBC 396  Continue with IV Venofer 200 mg daily x 3 doses (day2/3 today)  Monitor CBC and transfuse for hemoglobin < 7  Prediabetes  Evidenced by A1c of 5.8%  Sugars stable on BMP   Defer accuchecks/SSI  Ambulatory dysfunction  Due to primary problem, see plan of care outlined above   Lymphedema  Continue home dose Lasix    VTE Pharmacologic Prophylaxis: VTE Score: 5 High Risk (Score >/= 5) - Pharmacological DVT Prophylaxis Ordered: heparin. Sequential Compression Devices Ordered.    Mobility:   Basic Mobility Inpatient Raw Score: 17  JH-HLM Goal: 5: Stand one or more mins  JH-HLM Achieved: 7: Walk 25 feet or more  JH-HLM Goal achieved. Continue to encourage appropriate mobility.    Patient Centered Rounds: I performed bedside rounds with nursing staff today.   Discussions with Specialists or Other Care Team Provider: case management    Education and Discussions with Family / Patient: Updated  (sister) at bedside.    Current Length of Stay: 3 day(s)  Current Patient Status: Inpatient   Certification Statement: The patient will continue to require additional inpatient hospital stay due to Plan for OR Tuesday  Discharge Plan: Anticipate discharge in >72 hrs to discharge location to be determined pending rehab evaluations.    Code Status: Level 1 - Full Code    Subjective   Patient reports admits to having restless legs, back pain, and muscle spasms     Objective :  Temp:  [97.9 °F (36.6 °C)-99.6 °F (37.6 °C)] 97.9 °F (36.6 °C)  HR:  [65-92] 78  BP: (107-138)/(62-98) 128/68  Resp:  [18] 18  SpO2:  [94 %-96 %] 96 %  O2 Device: None (Room air)    Body mass index is 34.33 kg/m².     Input and Output Summary (last 24 hours):     Intake/Output Summary (Last 24 hours) at 1/3/2025 1611  Last data filed at 1/2/2025 1835  Gross per 24 hour   Intake 480 ml   Output --    Net 480 ml       Physical Exam  Vitals and nursing note reviewed. Exam conducted with a chaperone present.   Constitutional:       General: She is not in acute distress.     Appearance: She is obese.   Cardiovascular:      Rate and Rhythm: Normal rate and regular rhythm.      Heart sounds: Normal heart sounds.   Pulmonary:      Effort: Pulmonary effort is normal. No respiratory distress.   Musculoskeletal:      Comments: Chronic LE lymphedema   Skin:     General: Skin is warm and dry.   Neurological:      Mental Status: She is alert and oriented to person, place, and time. Mental status is at baseline.           Lines/Drains:              Lab Results: I have reviewed the following results:   Results from last 7 days   Lab Units 01/03/25  0435 01/01/25  0440 12/30/24  0152   WBC Thousand/uL 7.24   < > 7.36   HEMOGLOBIN g/dL 11.6   < > 11.5   HEMATOCRIT % 38.2   < > 38.2   PLATELETS Thousands/uL 287   < > 300   SEGS PCT %  --   --  60   LYMPHO PCT %  --   --  31   MONO PCT %  --   --  5   EOS PCT %  --   --  3    < > = values in this interval not displayed.     Results from last 7 days   Lab Units 01/01/25  0440 12/30/24  0152   SODIUM mmol/L 140 139   POTASSIUM mmol/L 3.9 3.5   CHLORIDE mmol/L 104 104   CO2 mmol/L 28 27   BUN mg/dL 14 17   CREATININE mg/dL 0.60 0.61   ANION GAP mmol/L 8 8   CALCIUM mg/dL 9.3 9.3   ALBUMIN g/dL  --  4.4   TOTAL BILIRUBIN mg/dL  --  0.38   ALK PHOS U/L  --  68   ALT U/L  --  12   AST U/L  --  14   GLUCOSE RANDOM mg/dL 101 112             Results from last 7 days   Lab Units 01/01/25  0440   HEMOGLOBIN A1C % 5.8*           Recent Cultures (last 7 days):         Imaging Results Review: I reviewed radiology reports from this admission including: MRI spine.  Other Study Results Review: EKG was reviewed.     Last 24 Hours Medication List:     Current Facility-Administered Medications:     acetaminophen (TYLENOL) tablet 975 mg, Q8H EVGENY    calcium carbonate (TUMS) chewable tablet 500 mg,  TID PRN    cyclobenzaprine (FLEXERIL) tablet 5 mg, TID    docusate sodium (COLACE) capsule 100 mg, BID    DULoxetine (CYMBALTA) delayed release capsule 60 mg, Daily    furosemide (LASIX) tablet 20 mg, Daily    heparin (porcine) subcutaneous injection 5,000 Units, Q8H EVGENY    lidocaine (LIDODERM) 5 % patch 2 patch, Daily    melatonin tablet 6 mg, HS PRN    ondansetron (ZOFRAN) injection 4 mg, Q6H PRN    oxyCODONE (ROXICODONE) immediate release tablet 10 mg, Q6H PRN    oxyCODONE (ROXICODONE) IR tablet 5 mg, Q6H PRN    [START ON 1/4/2025] pantoprazole (PROTONIX) EC tablet 40 mg, Early Morning    potassium chloride (Klor-Con M20) CR tablet 40 mEq, Daily With Breakfast    pramipexole (MIRAPEX) tablet 0.5 mg, HS    Administrative Statements   Today, Patient Was Seen By: Albert Jamil DO    **Please Note: This note may have been constructed using a voice recognition system.**

## 2025-01-03 NOTE — [facility] ADMISSION
Anticipate patient will be appropriate for [facility] pending medical readiness / continued functional need /insurance auth /  bed avail - CM made aware in Aidin

## 2025-01-03 NOTE — PROGRESS NOTES
Progress Note - Neurosurgery   Name: Hayley Rios 64 y.o. female I MRN: [id number]  [facility] I Date of Admission: 12/31/2024   Date of Service: 1/3/2025 I Hospital Day: 3    Assessment & Plan  Compression of thoracic spinal cord with myelopathy (HCC)  T3 lesion w/ concern of progressively worsening myelopathy  P/w progressively worsening bilateral lower extremity, L >R, weakness and balance difficulty x approx 2 months   Acutely worsened over the last 2 weeks, now requiring use of a rolling walker    Imaging:   MRI cervical spine 1/2/2025: Partially nondiagnostic exam due to motion artifact.  T3 lesion likely mildly increased in size based on evaluation and sagittal imaging.  CT cervical spine 1/2/2025: Partially calcified extramedullary T3 lesion slightly larger than prior MRI report.  CT thoracic and lumbar spine 1/2/2025: Thoracolumbar fusion.  Chronic disc and facet degenerative changes at L4-5 resulting in mild canal stenosis and severe foraminal narrowing.    Plan:   Continue to closely monitor neuro exam   Frequent neuro checks per primary team   Maintain normotensive BP goals, SBP < 160   Case and imaging reviewed   Suspected T3 intradural extramedullary meningioma with severe mass effect on the spinal cord  Continue ongoing conservative management  Pain control primary team  PT/OT  Hold all AC/AP meds in the setting of possible nsgy intervention this admission   No reported use at home   DVT ppx: SCDs, HSQ  Medical management per primary team   Social work following or assistance with dispo once medically cleared   Case further reviewed and discussed with Dr. Coulter  Tentative plan for OR on Tuesday, 1/7/2025.   Discussed with patient who remains agreeable to proceeding with intervention.     Neurosurgery will be available as needed. Plan to follow up on Monday to place preoperative orders for Tuesday.  Please reach out with any further questions or concerns.     Lymphedema  LE  lymphedema  Evident on exam   Ambulatory dysfunction  Likely secondary to underlying myelopathy  Could be multifactorial with underlying peripheral neuropathy an chronic podiatric issues s/p multiple surgeries.     Please contact the SecureChat role for the Neurosurgery service with any questions/concerns.    Subjective   Laying in bed in no acute distress.  Patient denies any acute issues overnight.  Neurologically stable.  Continues to be agreeable to proceeding with surgical intervention.    Objective :  Temp:  [98.5 °F (36.9 °C)-99.6 °F (37.6 °C)] 98.5 °F (36.9 °C)  HR:  [65-92] 65  BP: (107-138)/(62-98) 107/62  Resp:  [17-18] 18  SpO2:  [94 %-95 %] 94 %  O2 Device: None (Room air)    I/O         01/01 0701  01/02 0700 01/02 0701  01/03 0700 01/03 0701  01/04 0700    P.O. 778 1180     Total Intake(mL/kg) 778 (8.6) 1180 (13)     Urine (mL/kg/hr)       Total Output       Net +778 +1180            Unmeasured Urine Occurrence 6 x 5 x           Physical Exam  Constitutional:       Appearance: Normal appearance. She is well-developed.   HENT:      Head: Normocephalic and atraumatic.   Eyes:      Extraocular Movements: Extraocular movements intact.   Neck:      Trachea: No tracheal deviation.   Pulmonary:      Effort: Pulmonary effort is normal. No respiratory distress.   Musculoskeletal:         General: Normal range of motion.      Cervical back: Normal range of motion.      Right lower leg: Edema present.      Left lower leg: Edema present.   Skin:     General: Skin is warm and dry.   Neurological:      Mental Status: She is alert and oriented to person, place, and time.      Cranial Nerves: No cranial nerve deficit.      Sensory: Sensory deficit present.      Motor: Weakness present.      Comments: 4/5 LLE and 5/5 in RLE     Mild subjective numbness to light touch in the lateral chest wall and medical aspect of the R thigh.    Psychiatric:         Behavior: Behavior normal.         Thought Content: Thought content  normal.         Lab Results: I have reviewed the following results:  Recent Labs     01/01/25  0440 01/02/25  0639 01/03/25  0435   WBC 6.60   < > 7.24   HGB 10.7*   < > 11.6   HCT 35.1   < > 38.2      < > 287   SODIUM 140  --   --    K 3.9  --   --      --   --    CO2 28  --   --    BUN 14  --   --    CREATININE 0.60  --   --    GLUC 101  --   --     < > = values in this interval not displayed.       Imaging Results Review: I personally reviewed the following image studies in PACS and associated radiology reports: CT C-spine and MRI spine. My interpretation of the radiology images/reports is: See above.  Other Study Results Review: No additional pertinent studies reviewed.    VTE Pharmacologic Prophylaxis: Sequential compression device (Venodyne)  and Heparin

## 2025-01-03 NOTE — CASE MANAGEMENT
Case Management Discharge Planning Note    Patient name Hayley Rios  Location [facility] 602/[facility] 602-01 MRN [id number]  : 1960 Date 1/3/2025       Current Admission Date: 2024  Current Admission Diagnosis:Compression of thoracic spinal cord with myelopathy (HCC)   Patient Active Problem List    Diagnosis Date Noted Date Diagnosed    Compression of thoracic spinal cord with myelopathy (HCC) 2024     Lymphedema 2024     Ambulatory dysfunction 2024     Class 1 obesity due to excess calories without serious comorbidity with body mass index (BMI) of 34.0 to 34.9 in adult 2024     Ventral hernia without obstruction or gangrene 2024     Myofascial pain syndrome 2022     MARV (iron deficiency anemia) 2022     Podagra 2021     Motion sickness      Recurrent incisional hernia      Macromastia 2020     Long-term current use of opiate analgesic 2020     Uncomplicated opioid dependence (HCC) 2020     Recurrent umbilical hernia 2020     Lipoma of torso 2020     Sacroiliitis, not elsewhere classified (HCC)      Osteoarthritis of multiple joints 02/10/2020     Neck pain      Cervical spondylosis without myelopathy      Chronic cough 2019     Vocal fold paresis, right 2019     Dysphonia 2019     Muscle tension dysphonia 2019     Glottic insufficiency 2019     Reflux laryngitis 2019     Laryngeal edema 2019     Allergic rhinitis due to [employer] house dust mite 2019     Mild intermittent asthma without complication 2019     Laryngopharyngeal reflux (LPR) 2019     Dolan's esophagus with dysplasia 2019     Gastroesophageal reflux disease 2019     Prediabetes 2018     Vitamin D deficiency 2018     Lumbar radiculopathy 10/01/2018     Lumbar spondylosis      Environmental allergies 2018     S/P right knee arthroscopy 2018     Hernia of anterior abdominal  wall 02/05/2018     Sleep disturbance 11/16/2017     Hyperlipidemia 08/11/2017     Primary osteoarthritis of left hip 07/26/2017     Restless leg syndrome 07/11/2017     Chronic venous insufficiency 06/02/2017     Chronic low back pain 04/11/2017     Lumbar postlaminectomy syndrome 04/11/2017     Chronic pain syndrome 03/16/2017     Depression, recurrent (HCC) 03/16/2017       LOS (days): 3  Geometric Mean LOS (GMLOS) (days): 2.9  Days to GMLOS:0.4     OBJECTIVE:  Risk of Unplanned Readmission Score: 15.67   Current admission status: Inpatient   Preferred Pharmacy:   [facility] #[id number] - [place], [place] - [address]  [address] [facility] [address]  Phone: [phone] Fax: [phone]    [id number] [address] [place] [address] [address]  [facility] [address]  Phone: [phone] Fax: [phone]    Primary Care Provider: No primary care provider on file.    Primary Insurance: [employer] REP  Secondary Insurance: AARP  REP    DISCHARGE DETAILS:    Discharge planning discussed with:: patient  Freedom of Choice: Yes  Comments - Freedom of Choice: Discussed FOC  CM contacted family/caregiver?: No- see comments  Were Treatment Team discharge recommendations reviewed with patient/caregiver?: Yes  Did patient/caregiver verbalize understanding of patient care needs?: N/A- going to facility  Were patient/caregiver advised of the risks associated with not following Treatment Team discharge recommendations?: Yes    Other Referral/Resources/Interventions Provided:  Interventions: Short Term Rehab, Acute Rehab  Referral Comments: [facility] able to accept and reserved in aidin. Pt in agreement with dcp.    Treatment Team Recommendation: Acute Rehab  Discharge Destination Plan:: Acute Rehab

## 2025-01-04 ENCOUNTER — ANESTHESIA EVENT (OUTPATIENT)
Dept: ANESTHESIOLOGY | Facility: HOSPITAL | Age: 65
End: 2025-01-04

## 2025-01-04 ENCOUNTER — ANESTHESIA (OUTPATIENT)
Dept: ANESTHESIOLOGY | Facility: HOSPITAL | Age: 65
End: 2025-01-04

## 2025-01-04 LAB
ALBUMIN SERPL BCG-MCNC: 3.7 G/DL (ref 3.5–5)
ANION GAP SERPL CALCULATED.3IONS-SCNC: 8 MMOL/L (ref 4–13)
BUN SERPL-MCNC: 19 MG/DL (ref 5–25)
CALCIUM SERPL-MCNC: 9.1 MG/DL (ref 8.4–10.2)
CHLORIDE SERPL-SCNC: 103 MMOL/L (ref 96–108)
CO2 SERPL-SCNC: 28 MMOL/L (ref 21–32)
CREAT SERPL-MCNC: 0.59 MG/DL (ref 0.6–1.3)
ERYTHROCYTE [DISTWIDTH] IN BLOOD BY AUTOMATED COUNT: 15.6 % (ref 11.6–15.1)
GFR SERPL CREATININE-BSD FRML MDRD: 98 ML/MIN/1.73SQ M
GLUCOSE SERPL-MCNC: 97 MG/DL (ref 65–140)
HCT VFR BLD AUTO: 37 % (ref 34.8–46.1)
HGB BLD-MCNC: 11 G/DL (ref 11.5–15.4)
MAGNESIUM SERPL-MCNC: 2 MG/DL (ref 1.9–2.7)
MCH RBC QN AUTO: 26.2 PG (ref 26.8–34.3)
MCHC RBC AUTO-ENTMCNC: 29.7 G/DL (ref 31.4–37.4)
MCV RBC AUTO: 88 FL (ref 82–98)
PHOSPHATE SERPL-MCNC: 4 MG/DL (ref 2.3–4.1)
PLATELET # BLD AUTO: 239 THOUSANDS/UL (ref 149–390)
PMV BLD AUTO: 9.4 FL (ref 8.9–12.7)
POTASSIUM SERPL-SCNC: 3.7 MMOL/L (ref 3.5–5.3)
RBC # BLD AUTO: 4.2 MILLION/UL (ref 3.81–5.12)
SODIUM SERPL-SCNC: 139 MMOL/L (ref 135–147)
WBC # BLD AUTO: 5.96 THOUSAND/UL (ref 4.31–10.16)

## 2025-01-04 PROCEDURE — 83735 ASSAY OF MAGNESIUM: CPT | Performed by: STUDENT IN AN ORGANIZED HEALTH CARE EDUCATION/TRAINING PROGRAM

## 2025-01-04 PROCEDURE — 99232 SBSQ HOSP IP/OBS MODERATE 35: CPT | Performed by: INTERNAL MEDICINE

## 2025-01-04 PROCEDURE — 80069 RENAL FUNCTION PANEL: CPT | Performed by: STUDENT IN AN ORGANIZED HEALTH CARE EDUCATION/TRAINING PROGRAM

## 2025-01-04 PROCEDURE — 85027 COMPLETE CBC AUTOMATED: CPT | Performed by: STUDENT IN AN ORGANIZED HEALTH CARE EDUCATION/TRAINING PROGRAM

## 2025-01-04 RX ADMIN — HEPARIN SODIUM 5000 UNITS: 5000 INJECTION, SOLUTION INTRAVENOUS; SUBCUTANEOUS at 05:00

## 2025-01-04 RX ADMIN — HYDROMORPHONE HYDROCHLORIDE 4 MG: 4 TABLET ORAL at 13:42

## 2025-01-04 RX ADMIN — HEPARIN SODIUM 5000 UNITS: 5000 INJECTION, SOLUTION INTRAVENOUS; SUBCUTANEOUS at 13:42

## 2025-01-04 RX ADMIN — CYCLOBENZAPRINE HYDROCHLORIDE 10 MG: 10 TABLET, FILM COATED ORAL at 21:11

## 2025-01-04 RX ADMIN — POTASSIUM CHLORIDE 40 MEQ: 1500 TABLET, EXTENDED RELEASE ORAL at 07:55

## 2025-01-04 RX ADMIN — DULOXETINE HYDROCHLORIDE 60 MG: 60 CAPSULE, DELAYED RELEASE ORAL at 08:08

## 2025-01-04 RX ADMIN — ACETAMINOPHEN 975 MG: 325 TABLET, FILM COATED ORAL at 21:11

## 2025-01-04 RX ADMIN — FUROSEMIDE 20 MG: 20 TABLET ORAL at 08:08

## 2025-01-04 RX ADMIN — HYDROMORPHONE HYDROCHLORIDE 4 MG: 4 TABLET ORAL at 22:00

## 2025-01-04 RX ADMIN — HYDROMORPHONE HYDROCHLORIDE 4 MG: 4 TABLET ORAL at 07:55

## 2025-01-04 RX ADMIN — LIDOCAINE 2 PATCH: 50 PATCH TOPICAL at 08:09

## 2025-01-04 RX ADMIN — ACETAMINOPHEN 975 MG: 325 TABLET, FILM COATED ORAL at 13:42

## 2025-01-04 RX ADMIN — CYCLOBENZAPRINE HYDROCHLORIDE 10 MG: 10 TABLET, FILM COATED ORAL at 08:08

## 2025-01-04 RX ADMIN — HEPARIN SODIUM 5000 UNITS: 5000 INJECTION, SOLUTION INTRAVENOUS; SUBCUTANEOUS at 21:12

## 2025-01-04 RX ADMIN — ACETAMINOPHEN 975 MG: 325 TABLET, FILM COATED ORAL at 05:00

## 2025-01-04 RX ADMIN — CYCLOBENZAPRINE HYDROCHLORIDE 10 MG: 10 TABLET, FILM COATED ORAL at 17:48

## 2025-01-04 RX ADMIN — PANTOPRAZOLE SODIUM 40 MG: 40 TABLET, DELAYED RELEASE ORAL at 05:00

## 2025-01-04 RX ADMIN — MELATONIN 6 MG: at 22:00

## 2025-01-04 RX ADMIN — PRAMIPEXOLE DIHYDROCHLORIDE 0.5 MG: 0.5 TABLET ORAL at 21:11

## 2025-01-04 RX ADMIN — DOCUSATE SODIUM 100 MG: 100 CAPSULE, LIQUID FILLED ORAL at 17:48

## 2025-01-04 RX ADMIN — DOCUSATE SODIUM 100 MG: 100 CAPSULE, LIQUID FILLED ORAL at 08:08

## 2025-01-04 NOTE — ASSESSMENT & PLAN NOTE
Presented to [facility] with worsening of chronic LLE weakness, numbness/tingling of LLE>RLE, and muscle spasms. Known history of thoracic meningioma with spinal cord displacement and compression, lost to follow up due to move to [place].  MRI T spine: Suboptimal examination due to mild, moderate, and severe motion artifact - worse on postcontrast sequences. Similar 1.4 cm intradural extramedullary probable meningioma in left posterolateral thoracic spine region at approximately T3 level with associated marked spinal cord compression with severe canal stenosis given motion degraded exam. No cord edema given motion degradation.   MRI L spine: Suboptimal examination due to moderate-to-severe motion degraded exam of lumbar spine and 3 plane localizer images, sagittal T1 post contrast and axial T1 postcontrast sequences. No abnormal enhancement in lumbar spine given limitations of motion degradation.   Neurosurgery consult and recommendations appreciated   Given progressive symptoms, feel patient would likely benefit from surgical intervention - tentatively Tuesday 1/7  Was cleared to start VTE prophylaxis  Pending MRI cervical thoracic junction which will be done Monday 1/6 with anesthesia, NPO MN  Continue pain regimen with scheduled Tylenol and as needed oxycodone; switched from scheduled Robaxin to Flexeril, added Lidoderm patches and aqua K-pad   Monitor neuro checks, bladder scans   PT/OT recommending rehab, eventually will go to [facility]   VIEW ALL

## 2025-01-04 NOTE — ASSESSMENT & PLAN NOTE
Vitamin B12 borderline low, s/p cyanocobalamin injection  Continue home dose Mirapex 0.5 mg qhs  Low iron stores and low percent saturation on iron panel, was started on venofer at [facility] x 3 days  Possibility this is related to nerve root impingement  Follow-up with her outpatient providers in [place] for further management

## 2025-01-04 NOTE — PLAN OF CARE
Problem: PAIN - ADULT  Goal: Verbalizes/displays adequate comfort level or baseline comfort level  Description: Interventions:  - Encourage patient to monitor pain and request assistance  - Assess pain using appropriate pain scale  - Administer analgesics based on type and severity of pain and evaluate response  - Implement non-pharmacological measures as appropriate and evaluate response  - Consider cultural and social influences on pain and pain management  - Notify physician/advanced practitioner if interventions unsuccessful or patient reports new pain  Outcome: Progressing     Problem: INFECTION - ADULT  Goal: Absence or prevention of progression during hospitalization  Description: INTERVENTIONS:  - Assess and monitor for signs and symptoms of infection  - Monitor lab/diagnostic results  - Monitor all insertion sites, i.e. indwelling lines, tubes, and drains  - Monitor endotracheal if appropriate and nasal secretions for changes in amount and color  - [place] appropriate cooling/warming therapies per order  - Administer medications as ordered  - Instruct and encourage patient and family to use good hand hygiene technique  - Identify and instruct in appropriate isolation precautions for identified infection/condition  Outcome: Progressing     Problem: SAFETY ADULT  Goal: Patient will remain free of falls  Description: INTERVENTIONS:  - Educate patient/family on patient safety including physical limitations  - Instruct patient to call for assistance with activity   - Consult OT/PT to assist with strengthening/mobility   - Keep Call bell within reach  - Keep bed low and locked with side rails adjusted as appropriate  - Keep care items and personal belongings within reach  - Initiate and maintain comfort rounds  - Make Fall Risk Sign visible to staff  - Offer Toileting every 2 Hours, in advance of need  - Initiate/Maintain alarm  - Obtain necessary fall risk management equipment  - Apply yellow socks and  bracelet for high fall risk patients  - Consider moving patient to room near nurses station  Outcome: Progressing  Goal: Maintain or return to baseline ADL function  Description: INTERVENTIONS:  -  Assess patient's ability to carry out ADLs; assess patient's baseline for ADL function and identify physical deficits which impact ability to perform ADLs (bathing, care of mouth/teeth, toileting, grooming, dressing, etc.)  - Assess/evaluate cause of self-care deficits   - Assess range of motion  - Assess patient's mobility; develop plan if impaired  - Assess patient's need for assistive devices and provide as appropriate  - Encourage maximum independence but intervene and supervise when necessary  - Involve family in performance of ADLs  - Assess for home care needs following discharge   - Consider OT consult to assist with ADL evaluation and planning for discharge  - Provide patient education as appropriate  Outcome: Progressing  Goal: Maintains/Returns to pre admission functional level  Description: INTERVENTIONS:  - Perform AM-PAC 6 Click Basic Mobility/ Daily Activity assessment daily.  - Set and communicate daily mobility goal to care team and patient/family/caregiver.   - Collaborate with rehabilitation services on mobility goals if consulted  - Perform Range of Motion 3 times a day.  - Reposition patient every 2 hours.  - Dangle patient 3 times a day  - Stand patient 3 times a day  - Ambulate patient 3 times a day  - Out of bed to chair 3 times a day   - Out of bed for meals 3 times a day  - Out of bed for toileting  - Record patient progress and toleration of activity level   Outcome: Progressing     Problem: DISCHARGE PLANNING  Goal: Discharge to home or other facility with appropriate resources  Description: INTERVENTIONS:  - Identify barriers to discharge w/patient and caregiver  - Arrange for needed discharge resources and transportation as appropriate  - Identify discharge learning needs (meds, wound care,  etc.)  - Arrange for interpretive services to assist at discharge as needed  - Refer to Case Management Department for coordinating discharge planning if the patient needs post-hospital services based on physician/advanced practitioner order or complex needs related to functional status, cognitive ability, or social support system  Outcome: Progressing     Problem: Knowledge Deficit  Goal: Patient/family/caregiver demonstrates understanding of disease process, treatment plan, medications, and discharge instructions  Description: Complete learning assessment and assess knowledge base.  Interventions:  - Provide teaching at level of understanding  - Provide teaching via preferred learning methods  Outcome: Progressing     Problem: NEUROSENSORY - ADULT  Goal: Achieves stable or improved neurological status  Description: INTERVENTIONS  - Monitor and report changes in neurological status  - Monitor vital signs such as temperature, blood pressure, glucose, and any other labs ordered   - Initiate measures to prevent increased intracranial pressure  - Monitor for seizure activity and implement precautions if appropriate      Outcome: Progressing  Goal: Achieves maximal functionality and self care  Description: INTERVENTIONS  - Monitor swallowing and airway patency with patient fatigue and changes in neurological status  - Encourage and assist patient to increase activity and self care.   - Encourage visually impaired, hearing impaired and aphasic patients to use assistive/communication devices  Outcome: Progressing

## 2025-01-04 NOTE — ASSESSMENT & PLAN NOTE
Iron panel 12/31/24: iron 40, ferritin 9, iron saturation 40%, TIBC 396  S/p IV Venofer x3  Monitor CBC and transfuse for hemoglobin < 7

## 2025-01-04 NOTE — PROGRESS NOTES
Progress Note - Hospitalist   Name: Hayley Rios 64 y.o. female I MRN: [id number]  Unit/Bed#: [facility] 602-01 I Date of Admission: 12/31/2024   Date of Service: 1/4/2025 I Hospital Day: 4    Assessment & Plan  Compression of thoracic spinal cord with myelopathy (HCC)  Presented to [facility] with worsening of chronic LLE weakness, numbness/tingling of LLE>RLE, and muscle spasms. Known history of thoracic meningioma with spinal cord displacement and compression, lost to follow up due to move to [place].  MRI T spine: Suboptimal examination due to mild, moderate, and severe motion artifact - worse on postcontrast sequences. Similar 1.4 cm intradural extramedullary probable meningioma in left posterolateral thoracic spine region at approximately T3 level with associated marked spinal cord compression with severe canal stenosis given motion degraded exam. No cord edema given motion degradation.   MRI L spine: Suboptimal examination due to moderate-to-severe motion degraded exam of lumbar spine and 3 plane localizer images, sagittal T1 post contrast and axial T1 postcontrast sequences. No abnormal enhancement in lumbar spine given limitations of motion degradation.   Neurosurgery consult and recommendations appreciated   Given progressive symptoms, feel patient would likely benefit from surgical intervention - tentatively Tuesday 1/7  Was cleared to start VTE prophylaxis  Pending MRI cervical thoracic junction which will be done Monday 1/6 with anesthesia, NPO MN  Continue pain regimen with scheduled Tylenol and as needed oxycodone; switched from scheduled Robaxin to Flexeril, added Lidoderm patches and aqua K-pad   Monitor neuro checks, bladder scans   PT/OT recommending rehab, eventually will go to [facility]  Restless leg syndrome  Vitamin B12 borderline low, s/p cyanocobalamin injection  Continue home dose Mirapex 0.5 mg qhs  Low iron stores and low percent saturation on iron panel, was started on venofer at [facility] x 3 days  Possibility this  is related to nerve root impingement  Follow-up with her outpatient providers in [place] for further management  MARV (iron deficiency anemia)  Iron panel 12/31/24: iron 40, ferritin 9, iron saturation 40%, TIBC 396  S/p IV Venofer x3  Monitor CBC and transfuse for hemoglobin < 7  Prediabetes  Evidenced by A1c of 5.8%  Sugars stable on BMP   Defer accuchecks/SSI  Ambulatory dysfunction  Due to primary problem, see plan of care outlined above   Lymphedema  Continue home dose Lasix    VTE Pharmacologic Prophylaxis: VTE Score: 5 Moderate Risk (Score 3-4) - Pharmacological DVT Prophylaxis Ordered: heparin.    Mobility:   Basic Mobility Inpatient Raw Score: 17  JH-HLM Goal: 5: Stand one or more mins  JH-HLM Achieved: 7: Walk 25 feet or more  JH-HLM Goal achieved. Continue to encourage appropriate mobility.    Patient Centered Rounds: I performed bedside rounds with nursing staff today.   Discussions with Specialists or Other Care Team Provider: appreciate Nsx team    Education and Discussions with Family / Patient: Patient declined call to .     Current Length of Stay: 4 day(s)  Current Patient Status: Inpatient   Certification Statement: The patient will continue to require additional inpatient hospital stay due to OR   Discharge Plan: Anticipate discharge in >72 hrs to rehab facility.    Code Status: Level 1 - Full Code    Subjective   Doing okay today. Has some cramping and spasms of legs at times, pain relatively well controlled. Had some trouble sleeping due to beeping monitors. Voiding, having bowel movements, appetite good. Has been trying to ambulate in room with walker.     Objective :  Temp:  [97.5 °F (36.4 °C)-98.3 °F (36.8 °C)] 97.5 °F (36.4 °C)  HR:  [68-78] 70  BP: (110-138)/(58-75) 138/75  Resp:  [18-19] 19  SpO2:  [95 %-97 %] 97 %  O2 Device: None (Room air)    Body mass index is 34.33 kg/m².     Input and Output Summary (last 24 hours):     Intake/Output Summary (Last 24 hours) at  1/4/2025 1044  Last data filed at 1/4/2025 0447  Gross per 24 hour   Intake 720 ml   Output 0 ml   Net 720 ml       Physical Exam  Vitals and nursing note reviewed.   Constitutional:       Appearance: She is obese.   Cardiovascular:      Rate and Rhythm: Normal rate.   Pulmonary:      Effort: No respiratory distress.      Breath sounds: No wheezing.   Abdominal:      General: There is no distension.      Palpations: Abdomen is soft.   Musculoskeletal:      Right lower leg: Edema present.      Left lower leg: Edema present.   Neurological:      Mental Status: She is alert and oriented to person, place, and time.   Psychiatric:         Mood and Affect: Mood normal.           Lines/Drains:              Lab Results: I have reviewed the following results:   Results from last 7 days   Lab Units 01/04/25 0436 01/01/25 0440 12/30/24  0152   WBC Thousand/uL 5.96   < > 7.36   HEMOGLOBIN g/dL 11.0*   < > 11.5   HEMATOCRIT % 37.0   < > 38.2   PLATELETS Thousands/uL 239   < > 300   SEGS PCT %  --   --  60   LYMPHO PCT %  --   --  31   MONO PCT %  --   --  5   EOS PCT %  --   --  3    < > = values in this interval not displayed.     Results from last 7 days   Lab Units 01/04/25 0436 01/01/25 0440 12/30/24  0152   SODIUM mmol/L 139   < > 139   POTASSIUM mmol/L 3.7   < > 3.5   CHLORIDE mmol/L 103   < > 104   CO2 mmol/L 28   < > 27   BUN mg/dL 19   < > 17   CREATININE mg/dL 0.59*   < > 0.61   ANION GAP mmol/L 8   < > 8   CALCIUM mg/dL 9.1   < > 9.3   ALBUMIN g/dL 3.7  --  4.4   TOTAL BILIRUBIN mg/dL  --   --  0.38   ALK PHOS U/L  --   --  68   ALT U/L  --   --  12   AST U/L  --   --  14   GLUCOSE RANDOM mg/dL 97   < > 112    < > = values in this interval not displayed.             Results from last 7 days   Lab Units 01/01/25  0440   HEMOGLOBIN A1C % 5.8*           Recent Cultures (last 7 days):         Imaging Results Review: No pertinent imaging studies reviewed.  Other Study Results Review: No additional pertinent studies  reviewed.    Last 24 Hours Medication List:     Current Facility-Administered Medications:     acetaminophen (TYLENOL) tablet 975 mg, Q8H EVGENY    calcium carbonate (TUMS) chewable tablet 500 mg, TID PRN    cyanocobalamin injection 1,000 mcg, Q30 Days    cyclobenzaprine (FLEXERIL) tablet 10 mg, TID    diazepam (VALIUM) injection 2.5 mg, Q6H PRN    docusate sodium (COLACE) capsule 100 mg, BID    DULoxetine (CYMBALTA) delayed release capsule 60 mg, Daily    furosemide (LASIX) tablet 20 mg, Daily    heparin (porcine) subcutaneous injection 5,000 Units, Q8H EVGENY    HYDROmorphone (DILAUDID) tablet 4 mg, Q4H PRN    lidocaine (LIDODERM) 5 % patch 2 patch, Daily    melatonin tablet 6 mg, HS PRN    ondansetron (ZOFRAN) injection 4 mg, Q6H PRN    oxyCODONE (ROXICODONE) IR tablet 5 mg, Q6H PRN    pantoprazole (PROTONIX) EC tablet 40 mg, Early Morning    potassium chloride (Klor-Con M20) CR tablet 40 mEq, Daily With Breakfast    pramipexole (MIRAPEX) tablet 0.5 mg, HS    Administrative Statements   Today, Patient Was Seen By: Mylene Krause PA-C      **Please Note: This note may have been constructed using a voice recognition system.**

## 2025-01-05 PROCEDURE — 99232 SBSQ HOSP IP/OBS MODERATE 35: CPT | Performed by: INTERNAL MEDICINE

## 2025-01-05 RX ORDER — POLYETHYLENE GLYCOL 3350 17 G/17G
17 POWDER, FOR SOLUTION ORAL DAILY PRN
Status: DISCONTINUED | OUTPATIENT
Start: 2025-01-05 | End: 2025-01-14

## 2025-01-05 RX ADMIN — HEPARIN SODIUM 5000 UNITS: 5000 INJECTION, SOLUTION INTRAVENOUS; SUBCUTANEOUS at 13:08

## 2025-01-05 RX ADMIN — HYDROMORPHONE HYDROCHLORIDE 4 MG: 4 TABLET ORAL at 04:14

## 2025-01-05 RX ADMIN — LIDOCAINE 2 PATCH: 50 PATCH TOPICAL at 08:08

## 2025-01-05 RX ADMIN — POTASSIUM CHLORIDE 40 MEQ: 1500 TABLET, EXTENDED RELEASE ORAL at 07:44

## 2025-01-05 RX ADMIN — OXYCODONE HYDROCHLORIDE 5 MG: 5 TABLET ORAL at 21:16

## 2025-01-05 RX ADMIN — DOCUSATE SODIUM 100 MG: 100 CAPSULE, LIQUID FILLED ORAL at 18:24

## 2025-01-05 RX ADMIN — DOCUSATE SODIUM 100 MG: 100 CAPSULE, LIQUID FILLED ORAL at 08:08

## 2025-01-05 RX ADMIN — DIAZEPAM 2.5 MG: 10 INJECTION, SOLUTION INTRAMUSCULAR; INTRAVENOUS at 18:24

## 2025-01-05 RX ADMIN — CYCLOBENZAPRINE HYDROCHLORIDE 10 MG: 10 TABLET, FILM COATED ORAL at 16:28

## 2025-01-05 RX ADMIN — CYCLOBENZAPRINE HYDROCHLORIDE 10 MG: 10 TABLET, FILM COATED ORAL at 08:08

## 2025-01-05 RX ADMIN — POLYETHYLENE GLYCOL 3350 17 G: 17 POWDER, FOR SOLUTION ORAL at 13:08

## 2025-01-05 RX ADMIN — PRAMIPEXOLE DIHYDROCHLORIDE 0.5 MG: 0.5 TABLET ORAL at 21:01

## 2025-01-05 RX ADMIN — CYCLOBENZAPRINE HYDROCHLORIDE 10 MG: 10 TABLET, FILM COATED ORAL at 21:01

## 2025-01-05 RX ADMIN — HYDROMORPHONE HYDROCHLORIDE 4 MG: 4 TABLET ORAL at 13:08

## 2025-01-05 RX ADMIN — DULOXETINE HYDROCHLORIDE 60 MG: 60 CAPSULE, DELAYED RELEASE ORAL at 08:08

## 2025-01-05 RX ADMIN — ACETAMINOPHEN 975 MG: 325 TABLET, FILM COATED ORAL at 13:08

## 2025-01-05 RX ADMIN — ACETAMINOPHEN 975 MG: 325 TABLET, FILM COATED ORAL at 05:06

## 2025-01-05 RX ADMIN — ACETAMINOPHEN 975 MG: 325 TABLET, FILM COATED ORAL at 21:01

## 2025-01-05 RX ADMIN — HEPARIN SODIUM 5000 UNITS: 5000 INJECTION, SOLUTION INTRAVENOUS; SUBCUTANEOUS at 05:07

## 2025-01-05 RX ADMIN — PANTOPRAZOLE SODIUM 40 MG: 40 TABLET, DELAYED RELEASE ORAL at 05:06

## 2025-01-05 RX ADMIN — DIAZEPAM 2.5 MG: 10 INJECTION, SOLUTION INTRAMUSCULAR; INTRAVENOUS at 07:44

## 2025-01-05 RX ADMIN — HEPARIN SODIUM 5000 UNITS: 5000 INJECTION, SOLUTION INTRAVENOUS; SUBCUTANEOUS at 21:01

## 2025-01-05 NOTE — PLAN OF CARE
Problem: PAIN - ADULT  Goal: Verbalizes/displays adequate comfort level or baseline comfort level  Description: Interventions:  - Encourage patient to monitor pain and request assistance  - Assess pain using appropriate pain scale  - Administer analgesics based on type and severity of pain and evaluate response  - Implement non-pharmacological measures as appropriate and evaluate response  - Consider cultural and social influences on pain and pain management  - Notify physician/advanced practitioner if interventions unsuccessful or patient reports new pain  Outcome: Progressing     Problem: INFECTION - ADULT  Goal: Absence or prevention of progression during hospitalization  Description: INTERVENTIONS:  - Assess and monitor for signs and symptoms of infection  - Monitor lab/diagnostic results  - Monitor all insertion sites, i.e. indwelling lines, tubes, and drains  - Monitor endotracheal if appropriate and nasal secretions for changes in amount and color  - [place] appropriate cooling/warming therapies per order  - Administer medications as ordered  - Instruct and encourage patient and family to use good hand hygiene technique  - Identify and instruct in appropriate isolation precautions for identified infection/condition  Outcome: Progressing     Problem: SAFETY ADULT  Goal: Patient will remain free of falls  Description: INTERVENTIONS:  - Educate patient/family on patient safety including physical limitations  - Instruct patient to call for assistance with activity   - Consult OT/PT to assist with strengthening/mobility   - Keep Call bell within reach  - Keep bed low and locked with side rails adjusted as appropriate  - Keep care items and personal belongings within reach  - Initiate and maintain comfort rounds  - Make Fall Risk Sign visible to staff  - Offer Toileting every 2 Hours, in advance of need  - Initiate/Maintain alarm  - Obtain necessary fall risk management equipment  - Apply yellow socks and  bracelet for high fall risk patients  - Consider moving patient to room near nurses station  Outcome: Progressing  Goal: Maintain or return to baseline ADL function  Description: INTERVENTIONS:  -  Assess patient's ability to carry out ADLs; assess patient's baseline for ADL function and identify physical deficits which impact ability to perform ADLs (bathing, care of mouth/teeth, toileting, grooming, dressing, etc.)  - Assess/evaluate cause of self-care deficits   - Assess range of motion  - Assess patient's mobility; develop plan if impaired  - Assess patient's need for assistive devices and provide as appropriate  - Encourage maximum independence but intervene and supervise when necessary  - Involve family in performance of ADLs  - Assess for home care needs following discharge   - Consider OT consult to assist with ADL evaluation and planning for discharge  - Provide patient education as appropriate  Outcome: Progressing  Goal: Maintains/Returns to pre admission functional level  Description: INTERVENTIONS:  - Perform AM-PAC 6 Click Basic Mobility/ Daily Activity assessment daily.  - Set and communicate daily mobility goal to care team and patient/family/caregiver.   - Collaborate with rehabilitation services on mobility goals if consulted  - Perform Range of Motion 3 times a day.  - Reposition patient every 2 hours.  - Dangle patient 3 times a day  - Stand patient 3 times a day  - Ambulate patient 3 times a day  - Out of bed to chair 3 times a day   - Out of bed for meals 3 times a day  - Out of bed for toileting  - Record patient progress and toleration of activity level   Outcome: Progressing     Problem: DISCHARGE PLANNING  Goal: Discharge to home or other facility with appropriate resources  Description: INTERVENTIONS:  - Identify barriers to discharge w/patient and caregiver  - Arrange for needed discharge resources and transportation as appropriate  - Identify discharge learning needs (meds, wound care,  etc.)  - Arrange for interpretive services to assist at discharge as needed  - Refer to Case Management Department for coordinating discharge planning if the patient needs post-hospital services based on physician/advanced practitioner order or complex needs related to functional status, cognitive ability, or social support system  Outcome: Progressing     Problem: Knowledge Deficit  Goal: Patient/family/caregiver demonstrates understanding of disease process, treatment plan, medications, and discharge instructions  Description: Complete learning assessment and assess knowledge base.  Interventions:  - Provide teaching at level of understanding  - Provide teaching via preferred learning methods  Outcome: Progressing     Problem: NEUROSENSORY - ADULT  Goal: Achieves stable or improved neurological status  Description: INTERVENTIONS  - Monitor and report changes in neurological status  - Monitor vital signs such as temperature, blood pressure, glucose, and any other labs ordered   - Initiate measures to prevent increased intracranial pressure  - Monitor for seizure activity and implement precautions if appropriate      Outcome: Progressing  Goal: Achieves maximal functionality and self care  Description: INTERVENTIONS  - Monitor swallowing and airway patency with patient fatigue and changes in neurological status  - Encourage and assist patient to increase activity and self care.   - Encourage visually impaired, hearing impaired and aphasic patients to use assistive/communication devices  Outcome: Progressing

## 2025-01-05 NOTE — ANESTHESIA PREPROCEDURE EVALUATION
Procedure:  PRE-OP ONLY    Relevant Problems   ANESTHESIA   (+) Motion sickness      CARDIO   (+) Chronic venous insufficiency   (+) Hyperlipidemia      GI/HEPATIC   (+) Gastroesophageal reflux disease      HEMATOLOGY   (+) MARV (iron deficiency anemia)      MUSCULOSKELETAL   (+) Cervical spondylosis without myelopathy   (+) Chronic low back pain   (+) Lumbar spondylosis   (+) Myofascial pain syndrome   (+) Osteoarthritis of multiple joints   (+) Podagra   (+) Primary osteoarthritis of left hip      NEURO/PSYCH   (+) Chronic low back pain   (+) Chronic pain syndrome   (+) Depression, recurrent (HCC)   (+) Myofascial pain syndrome      PULMONARY   (+) Mild intermittent asthma without complication      Neurology/Sleep   (+) Compression of thoracic spinal cord with myelopathy (HCC)   (+) Restless leg syndrome      Care Coordination   (+) Ambulatory dysfunction      Other   (+) Lymphedema   (+) Prediabetes      EKG 12/30/2024:  Normal sinus rhythm  Normal ECG  When compared with ECG of 20-Dec-2023 08:19,  No significant change was found    TTE 6/2022:    Technically difficult study.    Left Ventricle: Left ventricular cavity size is normal. There is mild asymmetric hypertrophy of the septal wall. The left ventricular ejection fraction is 55% by visual estimation. Systolic function is normal. There are no obvious high-grade regional wall motion abnormalities. Diastolic function is normal.    Right Ventricle: Right ventricular cavity size is normal. Systolic function is normal.    Aortic Valve: There is trace regurgitation.    Tricuspid Valve: There is trace regurgitation. Pulmonary artery systolic pressures cannot be estimated due to suboptimal tricuspid regurgitation envelope.    There is no study for comparison.    Lab Results   Component Value Date    WBC 5.96 01/04/2025    HGB 11.0 (L) 01/04/2025    HCT 37.0 01/04/2025    MCV 88 01/04/2025     01/04/2025     Lab Results   Component Value Date    SODIUM 139  "01/04/2025    K 3.7 01/04/2025     01/04/2025    CO2 28 01/04/2025    BUN 19 01/04/2025    CREATININE 0.59 (L) 01/04/2025    GLUC 97 01/04/2025    CALCIUM 9.1 01/04/2025     No results found for: \"INR\", \"PROTIME\"  Lab Results   Component Value Date    HGBA1C 5.8 (H) 01/01/2025               Anesthesia Plan  ASA Score- 3     Anesthesia Type- general with ASA Monitors.         Additional Monitors:     Airway Plan: LMA.           Plan Factors-    Chart reviewed. EKG reviewed.  Existing labs reviewed. Patient summary reviewed.                  Induction- intravenous.    Postoperative Plan-     Perioperative Resuscitation Plan - Level 1 - Full Code.       Informed Consent-         "

## 2025-01-05 NOTE — PROGRESS NOTES
Progress Note - Hospitalist   Name: Hayley Rios 64 y.o. female I MRN: [id number]  Unit/Bed#: [facility] 602-01 I Date of Admission: 12/31/2024   Date of Service: 1/5/2025 I Hospital Day: 5    Assessment & Plan  Compression of thoracic spinal cord with myelopathy (HCC)  Presented to [facility] with worsening of chronic LLE weakness, numbness/tingling of LLE>RLE, and muscle spasms. Known history of thoracic meningioma with spinal cord displacement and compression, lost to follow up due to move to [place].  MRI T spine: Suboptimal examination due to mild, moderate, and severe motion artifact - worse on postcontrast sequences. Similar 1.4 cm intradural extramedullary probable meningioma in left posterolateral thoracic spine region at approximately T3 level with associated marked spinal cord compression with severe canal stenosis given motion degraded exam. No cord edema given motion degradation.   MRI L spine: Suboptimal examination due to moderate-to-severe motion degraded exam of lumbar spine and 3 plane localizer images, sagittal T1 post contrast and axial T1 postcontrast sequences. No abnormal enhancement in lumbar spine given limitations of motion degradation.   Neurosurgery consult and recommendations appreciated   Given progressive symptoms, feel patient would likely benefit from surgical intervention - tentatively Tuesday 1/7  Was cleared to start VTE prophylaxis  Pending MRI cervical thoracic junction which will be done Monday 1/6 with anesthesia, NPO MN  Continue pain regimen with scheduled Tylenol and as needed oxycodone; switched from scheduled Robaxin to Flexeril, added Lidoderm patches and aqua K-pad   Monitor neuro checks, bladder scans   PT/OT recommending rehab, eventually will go to [facility]  Restless leg syndrome  Vitamin B12 borderline low, s/p cyanocobalamin injection  Continue home dose Mirapex 0.5 mg qhs  Low iron stores and low percent saturation on iron panel, was started on venofer at [facility] x 3 days  Possibility this  is related to nerve root impingement  Follow-up with her outpatient providers in [place] for further management  MARV (iron deficiency anemia)  Iron panel 12/31/24: iron 40, ferritin 9, iron saturation 40%, TIBC 396  S/p IV Venofer x3  Monitor CBC and transfuse for hemoglobin < 7  Prediabetes  Evidenced by A1c of 5.8%  Sugars stable on BMP   Defer accuchecks/SSI  Ambulatory dysfunction  Due to primary problem, see plan of care outlined above   Lymphedema  Continue home dose Lasix    VTE Pharmacologic Prophylaxis: VTE Score: 5 Moderate Risk (Score 3-4) - Pharmacological DVT Prophylaxis Ordered: heparin.    Mobility:   Basic Mobility Inpatient Raw Score: 17  JH-HLM Goal: 5: Stand one or more mins  JH-HLM Achieved: 7: Walk 25 feet or more  JH-HLM Goal achieved. Continue to encourage appropriate mobility.    Patient Centered Rounds: I performed bedside rounds with nursing staff today.   Discussions with Specialists or Other Care Team Provider: appreciate Nsx, updated CM     Education and Discussions with Family / Patient: Patient declined call to .     Current Length of Stay: 5 day(s)  Current Patient Status: Inpatient   Certification Statement: The patient will continue to require additional inpatient hospital stay due to OR this coming week   Discharge Plan: Anticipate discharge in >72 hrs to rehab facility.    Code Status: Level 1 - Full Code    Subjective   Per RN meghan had a tough night with pain and spasms. Requesting to rest this AM. Upon entrance to room she is sleeping soundly. Full exam deferred. No real changes to plan from yesterday.     Objective :  Temp:  [97.8 °F (36.6 °C)-98 °F (36.7 °C)] 98 °F (36.7 °C)  HR:  [63-83] 63  BP: (103-124)/(66-72) 103/69  Resp:  [18-19] 19  SpO2:  [95 %-96 %] 96 %  O2 Device: None (Room air)    Body mass index is 34.33 kg/m².     Input and Output Summary (last 24 hours):     Intake/Output Summary (Last 24 hours) at 1/5/2025 1023  Last data filed at  1/5/2025 0744  Gross per 24 hour   Intake 780 ml   Output --   Net 780 ml       Physical Exam  Vitals and nursing note reviewed.   Constitutional:       Appearance: She is obese.      Comments: Resting comfortably        Full exam deferred, sleeping.     Lines/Drains:              Lab Results: I have reviewed the following results:   Results from last 7 days   Lab Units 01/04/25 0436 01/01/25 0440 12/30/24  0152   WBC Thousand/uL 5.96   < > 7.36   HEMOGLOBIN g/dL 11.0*   < > 11.5   HEMATOCRIT % 37.0   < > 38.2   PLATELETS Thousands/uL 239   < > 300   SEGS PCT %  --   --  60   LYMPHO PCT %  --   --  31   MONO PCT %  --   --  5   EOS PCT %  --   --  3    < > = values in this interval not displayed.     Results from last 7 days   Lab Units 01/04/25 0436 01/01/25 0440 12/30/24  0152   SODIUM mmol/L 139   < > 139   POTASSIUM mmol/L 3.7   < > 3.5   CHLORIDE mmol/L 103   < > 104   CO2 mmol/L 28   < > 27   BUN mg/dL 19   < > 17   CREATININE mg/dL 0.59*   < > 0.61   ANION GAP mmol/L 8   < > 8   CALCIUM mg/dL 9.1   < > 9.3   ALBUMIN g/dL 3.7  --  4.4   TOTAL BILIRUBIN mg/dL  --   --  0.38   ALK PHOS U/L  --   --  68   ALT U/L  --   --  12   AST U/L  --   --  14   GLUCOSE RANDOM mg/dL 97   < > 112    < > = values in this interval not displayed.             Results from last 7 days   Lab Units 01/01/25 0440   HEMOGLOBIN A1C % 5.8*           Recent Cultures (last 7 days):         Imaging Results Review: No pertinent imaging studies reviewed.  Other Study Results Review: No additional pertinent studies reviewed.    Last 24 Hours Medication List:     Current Facility-Administered Medications:     acetaminophen (TYLENOL) tablet 975 mg, Q8H EVGENY    calcium carbonate (TUMS) chewable tablet 500 mg, TID PRN    cyanocobalamin injection 1,000 mcg, Q30 Days    cyclobenzaprine (FLEXERIL) tablet 10 mg, TID    diazepam (VALIUM) injection 2.5 mg, Q6H PRN    docusate sodium (COLACE) capsule 100 mg, BID    DULoxetine (CYMBALTA) delayed  release capsule 60 mg, Daily    furosemide (LASIX) tablet 20 mg, Daily    heparin (porcine) subcutaneous injection 5,000 Units, Q8H EVGENY    HYDROmorphone (DILAUDID) tablet 4 mg, Q4H PRN    lidocaine (LIDODERM) 5 % patch 2 patch, Daily    melatonin tablet 6 mg, HS PRN    ondansetron (ZOFRAN) injection 4 mg, Q6H PRN    oxyCODONE (ROXICODONE) IR tablet 5 mg, Q6H PRN    pantoprazole (PROTONIX) EC tablet 40 mg, Early Morning    potassium chloride (Klor-Con M20) CR tablet 40 mEq, Daily With Breakfast    pramipexole (MIRAPEX) tablet 0.5 mg, HS    Administrative Statements   Today, Patient Was Seen By: Mylene Krause PA-C      **Please Note: This note may have been constructed using a voice recognition system.**

## 2025-01-05 NOTE — ASSESSMENT & PLAN NOTE
Presented to [facility] with worsening of chronic LLE weakness, numbness/tingling of LLE>RLE, and muscle spasms. Known history of thoracic meningioma with spinal cord displacement and compression, lost to follow up due to move to [place].  MRI T spine: Suboptimal examination due to mild, moderate, and severe motion artifact - worse on postcontrast sequences. Similar 1.4 cm intradural extramedullary probable meningioma in left posterolateral thoracic spine region at approximately T3 level with associated marked spinal cord compression with severe canal stenosis given motion degraded exam. No cord edema given motion degradation.   MRI L spine: Suboptimal examination due to moderate-to-severe motion degraded exam of lumbar spine and 3 plane localizer images, sagittal T1 post contrast and axial T1 postcontrast sequences. No abnormal enhancement in lumbar spine given limitations of motion degradation.   Neurosurgery consult and recommendations appreciated   Given progressive symptoms, feel patient would likely benefit from surgical intervention - tentatively Tuesday 1/7  Was cleared to start VTE prophylaxis  Pending MRI cervical thoracic junction which will be done Monday 1/6 with anesthesia, NPO MN  Continue pain regimen with scheduled Tylenol and as needed oxycodone; switched from scheduled Robaxin to Flexeril, added Lidoderm patches and aqua K-pad   Monitor neuro checks, bladder scans   PT/OT recommending rehab, eventually will go to [facility]

## 2025-01-06 ENCOUNTER — ANESTHESIA EVENT (INPATIENT)
Dept: PERIOP | Facility: HOSPITAL | Age: 65
DRG: 029 | End: 2025-01-06
Payer: COMMERCIAL

## 2025-01-06 ENCOUNTER — ANESTHESIA EVENT (INPATIENT)
Dept: RADIOLOGY | Facility: HOSPITAL | Age: 65
DRG: 029 | End: 2025-01-06
Payer: COMMERCIAL

## 2025-01-06 ENCOUNTER — ANESTHESIA (INPATIENT)
Dept: RADIOLOGY | Facility: HOSPITAL | Age: 65
DRG: 029 | End: 2025-01-06
Payer: COMMERCIAL

## 2025-01-06 ENCOUNTER — APPOINTMENT (INPATIENT)
Dept: RADIOLOGY | Facility: HOSPITAL | Age: 65
DRG: 029 | End: 2025-01-06
Payer: COMMERCIAL

## 2025-01-06 LAB
ABO GROUP BLD: NORMAL
ABO GROUP BLD: NORMAL
APTT PPP: 29 SECONDS (ref 23–34)
BLD GP AB SCN SERPL QL: NEGATIVE
INR PPP: 0.95 (ref 0.85–1.19)
PROTHROMBIN TIME: 13 SECONDS (ref 12.3–15)
RH BLD: POSITIVE
RH BLD: POSITIVE
SPECIMEN EXPIRATION DATE: NORMAL

## 2025-01-06 PROCEDURE — 86850 RBC ANTIBODY SCREEN: CPT | Performed by: PHYSICIAN ASSISTANT

## 2025-01-06 PROCEDURE — 85730 THROMBOPLASTIN TIME PARTIAL: CPT | Performed by: PHYSICIAN ASSISTANT

## 2025-01-06 PROCEDURE — 86900 BLOOD TYPING SEROLOGIC ABO: CPT | Performed by: PHYSICIAN ASSISTANT

## 2025-01-06 PROCEDURE — 72156 MRI NECK SPINE W/O & W/DYE: CPT

## 2025-01-06 PROCEDURE — 99232 SBSQ HOSP IP/OBS MODERATE 35: CPT | Performed by: PHYSICIAN ASSISTANT

## 2025-01-06 PROCEDURE — A9585 GADOBUTROL INJECTION: HCPCS | Performed by: NEUROLOGICAL SURGERY

## 2025-01-06 PROCEDURE — 85610 PROTHROMBIN TIME: CPT | Performed by: PHYSICIAN ASSISTANT

## 2025-01-06 PROCEDURE — 99233 SBSQ HOSP IP/OBS HIGH 50: CPT | Performed by: NEUROLOGICAL SURGERY

## 2025-01-06 PROCEDURE — 86901 BLOOD TYPING SEROLOGIC RH(D): CPT | Performed by: PHYSICIAN ASSISTANT

## 2025-01-06 RX ORDER — PROPOFOL 10 MG/ML
INJECTION, EMULSION INTRAVENOUS AS NEEDED
Status: DISCONTINUED | OUTPATIENT
Start: 2025-01-06 | End: 2025-01-06

## 2025-01-06 RX ORDER — CALCIUM CARBONATE 500 MG/1
1000 TABLET, CHEWABLE ORAL 3 TIMES DAILY PRN
Status: DISCONTINUED | OUTPATIENT
Start: 2025-01-06 | End: 2025-01-15 | Stop reason: HOSPADM

## 2025-01-06 RX ORDER — PHENYLEPHRINE HCL IN 0.9% NACL 1 MG/10 ML
SYRINGE (ML) INTRAVENOUS AS NEEDED
Status: DISCONTINUED | OUTPATIENT
Start: 2025-01-06 | End: 2025-01-06

## 2025-01-06 RX ORDER — GADOBUTROL 604.72 MG/ML
9 INJECTION INTRAVENOUS
Status: COMPLETED | OUTPATIENT
Start: 2025-01-06 | End: 2025-01-06

## 2025-01-06 RX ORDER — EPHEDRINE SULFATE 50 MG/ML
INJECTION INTRAVENOUS AS NEEDED
Status: DISCONTINUED | OUTPATIENT
Start: 2025-01-06 | End: 2025-01-06

## 2025-01-06 RX ORDER — SODIUM CHLORIDE, SODIUM LACTATE, POTASSIUM CHLORIDE, CALCIUM CHLORIDE 600; 310; 30; 20 MG/100ML; MG/100ML; MG/100ML; MG/100ML
INJECTION, SOLUTION INTRAVENOUS CONTINUOUS PRN
Status: DISCONTINUED | OUTPATIENT
Start: 2025-01-06 | End: 2025-01-06

## 2025-01-06 RX ADMIN — ACETAMINOPHEN 975 MG: 325 TABLET, FILM COATED ORAL at 21:21

## 2025-01-06 RX ADMIN — ACETAMINOPHEN 975 MG: 325 TABLET, FILM COATED ORAL at 05:31

## 2025-01-06 RX ADMIN — GADOBUTROL 9 ML: 604.72 INJECTION INTRAVENOUS at 09:00

## 2025-01-06 RX ADMIN — POTASSIUM CHLORIDE 40 MEQ: 1500 TABLET, EXTENDED RELEASE ORAL at 09:49

## 2025-01-06 RX ADMIN — HYDROMORPHONE HYDROCHLORIDE 4 MG: 4 TABLET ORAL at 22:35

## 2025-01-06 RX ADMIN — DULOXETINE HYDROCHLORIDE 60 MG: 60 CAPSULE, DELAYED RELEASE ORAL at 09:49

## 2025-01-06 RX ADMIN — LIDOCAINE 2 PATCH: 50 PATCH TOPICAL at 09:51

## 2025-01-06 RX ADMIN — PROPOFOL 200 MG: 10 INJECTION, EMULSION INTRAVENOUS at 08:25

## 2025-01-06 RX ADMIN — ACETAMINOPHEN 975 MG: 325 TABLET, FILM COATED ORAL at 12:50

## 2025-01-06 RX ADMIN — PRAMIPEXOLE DIHYDROCHLORIDE 0.5 MG: 0.5 TABLET ORAL at 21:21

## 2025-01-06 RX ADMIN — EPHEDRINE SULFATE 10 MG: 50 INJECTION, SOLUTION INTRAVENOUS at 08:52

## 2025-01-06 RX ADMIN — DOCUSATE SODIUM 100 MG: 100 CAPSULE, LIQUID FILLED ORAL at 09:51

## 2025-01-06 RX ADMIN — PANTOPRAZOLE SODIUM 40 MG: 40 TABLET, DELAYED RELEASE ORAL at 05:31

## 2025-01-06 RX ADMIN — HEPARIN SODIUM 5000 UNITS: 5000 INJECTION, SOLUTION INTRAVENOUS; SUBCUTANEOUS at 05:31

## 2025-01-06 RX ADMIN — DIAZEPAM 2.5 MG: 10 INJECTION, SOLUTION INTRAMUSCULAR; INTRAVENOUS at 04:05

## 2025-01-06 RX ADMIN — CYCLOBENZAPRINE HYDROCHLORIDE 10 MG: 10 TABLET, FILM COATED ORAL at 16:42

## 2025-01-06 RX ADMIN — DIAZEPAM 2.5 MG: 10 INJECTION, SOLUTION INTRAMUSCULAR; INTRAVENOUS at 23:49

## 2025-01-06 RX ADMIN — Medication 200 MCG: at 08:58

## 2025-01-06 RX ADMIN — EPHEDRINE SULFATE 10 MG: 50 INJECTION, SOLUTION INTRAVENOUS at 08:38

## 2025-01-06 RX ADMIN — FUROSEMIDE 20 MG: 20 TABLET ORAL at 09:49

## 2025-01-06 RX ADMIN — DIAZEPAM 2.5 MG: 10 INJECTION, SOLUTION INTRAMUSCULAR; INTRAVENOUS at 12:54

## 2025-01-06 RX ADMIN — EPHEDRINE SULFATE 10 MG: 50 INJECTION, SOLUTION INTRAVENOUS at 08:58

## 2025-01-06 RX ADMIN — Medication 200 MCG: at 08:32

## 2025-01-06 RX ADMIN — SODIUM CHLORIDE, SODIUM LACTATE, POTASSIUM CHLORIDE, AND CALCIUM CHLORIDE: .6; .31; .03; .02 INJECTION, SOLUTION INTRAVENOUS at 08:25

## 2025-01-06 RX ADMIN — ANTACID TABLETS 500 MG: 500 TABLET, CHEWABLE ORAL at 10:13

## 2025-01-06 RX ADMIN — CYCLOBENZAPRINE HYDROCHLORIDE 10 MG: 10 TABLET, FILM COATED ORAL at 21:21

## 2025-01-06 RX ADMIN — CYCLOBENZAPRINE HYDROCHLORIDE 10 MG: 10 TABLET, FILM COATED ORAL at 09:49

## 2025-01-06 RX ADMIN — DOCUSATE SODIUM 100 MG: 100 CAPSULE, LIQUID FILLED ORAL at 16:44

## 2025-01-06 RX ADMIN — HEPARIN SODIUM 5000 UNITS: 5000 INJECTION, SOLUTION INTRAVENOUS; SUBCUTANEOUS at 12:51

## 2025-01-06 RX ADMIN — HEPARIN SODIUM 5000 UNITS: 5000 INJECTION, SOLUTION INTRAVENOUS; SUBCUTANEOUS at 21:21

## 2025-01-06 RX ADMIN — EPHEDRINE SULFATE 10 MG: 50 INJECTION, SOLUTION INTRAVENOUS at 08:46

## 2025-01-06 RX ADMIN — OXYCODONE HYDROCHLORIDE 5 MG: 5 TABLET ORAL at 05:52

## 2025-01-06 NOTE — NURSING NOTE
Cervical spine MRI with and w/o contrast completed and patient tolerated procedure well. Post-procedure vital signs taken and recorded. Report given to patient's assigned P6 nurse Sandie. Patient placed in for transport to be taken back to assigned room 602. Patient offers no complaints or verbalizes any issues upon leaving MRI.

## 2025-01-06 NOTE — OCCUPATIONAL THERAPY NOTE
Occupational Therapy CX        Patient Name: Hayley Rios  Today's Date: 1/6/2025 01/06/25 0739   OT Last Visit   OT Visit Date 01/06/24   Note Type   Note type Cancelled Session   Cancel Reasons Medical status       PT WILL PROGRESSIVE SYMPTOMS- PLANS FOR MRI WITH ANESTHESIA TODAY THEN OR WITH NEUROSX ON TUESDAY. WILL HOLD THERAPY AND CONT TO FOLLOW POST-OP AS APPROPRIATE.     Yomi Fischer, MEENA, OTR/L

## 2025-01-06 NOTE — ASSESSMENT & PLAN NOTE
Presented to [facility] with worsening of chronic LLE weakness, numbness/tingling of LLE>RLE, and muscle spasms. Known history of thoracic meningioma with spinal cord displacement and compression, lost to follow up due to move to [place].  MRI T spine: Suboptimal examination due to mild, moderate, and severe motion artifact - worse on postcontrast sequences. Similar 1.4 cm intradural extramedullary probable meningioma in left posterolateral thoracic spine region at approximately T3 level with associated marked spinal cord compression with severe canal stenosis given motion degraded exam. No cord edema given motion degradation.   MRI L spine: Suboptimal examination due to moderate-to-severe motion degraded exam of lumbar spine and 3 plane localizer images, sagittal T1 post contrast and axial T1 postcontrast sequences. No abnormal enhancement in lumbar spine given limitations of motion degradation.   Neurosurgery consult and recommendations appreciated   Plan for OR tomorrow, 1/7/25 with Neurosurgery  Continue pain regimen  Monitor neuro checks, bladder scans   PT/OT recommending rehab, eventually will go to [facility]

## 2025-01-06 NOTE — ASSESSMENT & PLAN NOTE
Likely secondary to underlying myelopathy  Could be multifactorial with underlying peripheral neuropathy and chronic podiatric issues s/p multiple surgeries.

## 2025-01-06 NOTE — ASSESSMENT & PLAN NOTE
T3 lesion w/ concern of progressively worsening myelopathy  P/w progressively worsening bilateral lower extremity, L >R, weakness and balance difficulty x approx 2 months   Acutely worsened over 2 weeks prior to admission, requiring use of a rolling walker    Imaging:   MRI Cervical spine 1/6/25: T3 lesion noted causing severe cord compression. Final read pending.   MRI cervical spine 1/2/2025: Partially nondiagnostic exam due to motion artifact.  T3 lesion likely mildly increased in size based on evaluation and sagittal imaging.  CT cervical spine 1/2/2025: Partially calcified extramedullary T3 lesion slightly larger than prior MRI report.  CT thoracic and lumbar spine 1/2/2025: Thoracolumbar fusion.  Chronic disc and facet degenerative changes at L4-5 resulting in mild canal stenosis and severe foraminal narrowing.    Plan:   Continue to closely monitor neuro exam   Frequent neuro checks per primary team   Maintain normotensive BP goals, SBP < 160   Continue ongoing conservative management  Pain control primary team  PT/OT  Hold/avoid all AC/AP meds. No AC/AP meds prior to admission.    DVT ppx: SCDs, HSQ  Medical management per primary team   Social work following or assistance with dispo once medically cleared   Tentative plan for OR tomorrow 1/7/2025  for Navigated T2-4 laminectomy for tumor resection by Dr. Coulter.      Neurosurgery will continue to follow.  Please reach out with any further questions or concerns.

## 2025-01-06 NOTE — ANESTHESIA POSTPROCEDURE EVALUATION
Post-Op Assessment Note    CV Status:  Stable    Pain management: adequate       Mental Status:  Alert and awake   Hydration Status:  Euvolemic   PONV Controlled:  Controlled   Airway Patency:  Patent     Post Op Vitals Reviewed: Yes    No anethesia notable event occurred.    Staff: Anesthesiologist           Last Filed PACU Vitals:  Vitals Value Taken Time   Temp     Pulse 100 01/06/25 1147   BP     Resp     SpO2 95 % 01/06/25 1147   Vitals shown include unfiled device data.

## 2025-01-06 NOTE — ANESTHESIA POSTPROCEDURE EVALUATION
Post-Op Assessment Note    CV Status:  Stable  Pain Score: 0    Pain management: adequate       Mental Status:  Alert and awake   Hydration Status:  Euvolemic   PONV Controlled:  Controlled   Airway Patency:  Patent     Post Op Vitals Reviewed: Yes    No anethesia notable event occurred.    Staff: CRNA, Anesthesiologist           Last Filed PACU Vitals:  Vitals Value Taken Time   Temp     Pulse     BP     Resp     SpO2

## 2025-01-06 NOTE — PROGRESS NOTES
Progress Note - Hospitalist   Name: Hayley Rios 64 y.o. 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 Known history of thoracic meningioma with spinal cord displacement and compression, lost to follow up due to move to [place].  MRI T spine: Suboptimal examination due to mild, moderate, and severe motion artifact - worse on postcontrast sequences. Similar 1.4 cm intradural extramedullary probable meningioma in left posterolateral thoracic spine region at approximately T3 level with associated marked spinal cord compression with severe canal stenosis given motion degraded exam. No cord edema given motion degradation.   MRI L spine: Suboptimal examination due to moderate-to-severe motion degraded exam of lumbar spine and 3 plane localizer images, sagittal T1 post contrast and axial T1 postcontrast sequences. No abnormal enhancement in lumbar spine given limitations of motion degradation.   Neurosurgery consult and recommendations appreciated   Plan for OR tomorrow, 1/7/25 with Neurosurgery  Continue pain regimen  Monitor neuro checks, bladder scans   PT/OT recommending rehab, eventually will go to [facility]  Restless leg syndrome  Vitamin B12 borderline low, s/p cyanocobalamin injection  Continue home dose Mirapex 0.5 mg qhs  Low iron stores and low percent saturation on iron panel, was started on venofer at [facility] x 3 days  Possibility this is related to nerve root impingement  Follow-up with her outpatient providers in [place] for further management  MARV (iron deficiency anemia)  Iron panel 12/31/24: iron 40, ferritin 9, iron saturation 40%, TIBC 396  S/p IV Venofer x3  Monitor CBC and transfuse for hemoglobin < 7  Prediabetes  Evidenced by A1c of  5.8%  Sugars stable on BMP   Defer accuchecks/SSI  Ambulatory dysfunction  Due to primary problem, see plan of care outlined above   Lymphedema  Continue home dose Lasix    VTE Pharmacologic Prophylaxis: VTE Score: 5 High Risk (Score >/= 5) - Pharmacological DVT Prophylaxis Ordered: heparin. Sequential Compression Devices Ordered.    Mobility:   Basic Mobility Inpatient Raw Score: 17  JH-HLM Goal: 5: Stand one or more mins  JH-HLM Achieved: 5: Stand (1 or more minutes)  JH-HLM Goal achieved. Continue to encourage appropriate mobility.    Patient Centered Rounds: I performed bedside rounds with nursing staff today.   Discussions with Specialists or Other Care Team Provider: Neurosx AP    Education and Discussions with Family / Patient: Patient declined call to .     Current Length of Stay: 6 day(s)  Current Patient Status: Inpatient   Certification Statement: The patient will continue to require additional inpatient hospital stay due to OR tomorrow w/ Neurosx  Discharge Plan:  pending OR tomorrow, 1/7/25 w/ Neurosx and clinical course    Code Status: Level 1 - Full Code    Subjective   Ms. Rios reports some indigestion and thinks that her home omeprazole works better than pantoprazole     Objective :  Temp:  [96.5 °F (35.8 °C)-97.8 °F (36.6 °C)] 97.6 °F (36.4 °C)  HR:  [] 98  BP: (105-143)/(56-82) 111/76  Resp:  [16-22] 17  SpO2:  [90 %-99 %] 95 %  O2 Device: None (Room air)    Body mass index is 34.33 kg/m².     Input and Output Summary (last 24 hours):     Intake/Output Summary (Last 24 hours) at 1/6/2025 1414  Last data filed at 1/6/2025 1001  Gross per 24 hour   Intake 990 ml   Output --   Net 990 ml       Physical Exam  Vitals reviewed.   Constitutional:       Comments: Patient seen sitting at the side of her bed comfortably resting, NAD   Cardiovascular:      Rate and Rhythm: Normal rate and regular rhythm.   Pulmonary:      Effort: Pulmonary effort is normal. No respiratory distress.       Breath sounds: Normal breath sounds.   Abdominal:      General: Bowel sounds are normal.      Palpations: Abdomen is soft.      Tenderness: There is no abdominal tenderness.   Musculoskeletal:      Right lower leg: No edema.      Left lower leg: No edema.   Skin:     General: Skin is warm.   Neurological:      Mental Status: She is alert and oriented to person, place, and time.   Psychiatric:         Mood and Affect: Mood normal.           Lines/Drains:              Lab Results: I have reviewed the following results:   Results from last 7 days   Lab Units 01/04/25  0436   WBC Thousand/uL 5.96   HEMOGLOBIN g/dL 11.0*   HEMATOCRIT % 37.0   PLATELETS Thousands/uL 239     Results from last 7 days   Lab Units 01/04/25  0436   SODIUM mmol/L 139   POTASSIUM mmol/L 3.7   CHLORIDE mmol/L 103   CO2 mmol/L 28   BUN mg/dL 19   CREATININE mg/dL 0.59*   ANION GAP mmol/L 8   CALCIUM mg/dL 9.1   ALBUMIN g/dL 3.7   GLUCOSE RANDOM mg/dL 97     Results from last 7 days   Lab Units 01/06/25  1320   INR  0.95         Results from last 7 days   Lab Units 01/01/25  0440   HEMOGLOBIN A1C % 5.8*           Recent Cultures (last 7 days):         Imaging Results Review: I reviewed radiology reports from this admission including: MRI spine.      Last 24 Hours Medication List:     Current Facility-Administered Medications:     acetaminophen (TYLENOL) tablet 975 mg, Q8H EVGENY    calcium carbonate (TUMS) chewable tablet 1,000 mg, TID PRN    cyanocobalamin injection 1,000 mcg, Q30 Days    cyclobenzaprine (FLEXERIL) tablet 10 mg, TID    diazepam (VALIUM) injection 2.5 mg, Q6H PRN    docusate sodium (COLACE) capsule 100 mg, BID    DULoxetine (CYMBALTA) delayed release capsule 60 mg, Daily    furosemide (LASIX) tablet 20 mg, Daily    heparin (porcine) subcutaneous injection 5,000 Units, Q8H EVGENY    HYDROmorphone (DILAUDID) tablet 4 mg, Q4H PRN    lidocaine (LIDODERM) 5 % patch 2 patch, Daily    melatonin tablet 6 mg, HS PRN    ondansetron (ZOFRAN)  injection 4 mg, Q6H PRN    oxyCODONE (ROXICODONE) IR tablet 5 mg, Q6H PRN    pantoprazole (PROTONIX) EC tablet 40 mg, Early Morning    polyethylene glycol (MIRALAX) packet 17 g, Daily PRN    potassium chloride (Klor-Con M20) CR tablet 40 mEq, Daily With Breakfast    pramipexole (MIRAPEX) tablet 0.5 mg, HS    Administrative Statements   Today, Patient Was Seen By: Anant Flores PA-C      **Please Note: This note may have been constructed using a voice recognition system.**

## 2025-01-06 NOTE — ANESTHESIA PREPROCEDURE EVALUATION
Procedure:  MRI CERVICAL SPINE W WO CONTRAST    Relevant Problems   ANESTHESIA   (+) Motion sickness      CARDIO   (+) Chronic venous insufficiency   (+) Hyperlipidemia      GI/HEPATIC   (+) Gastroesophageal reflux disease      HEMATOLOGY   (+) MARV (iron deficiency anemia)      MUSCULOSKELETAL   (+) Cervical spondylosis without myelopathy   (+) Chronic low back pain   (+) Lumbar spondylosis   (+) Myofascial pain syndrome   (+) Osteoarthritis of multiple joints   (+) Podagra   (+) Primary osteoarthritis of left hip      NEURO/PSYCH   (+) Chronic low back pain   (+) Chronic pain syndrome   (+) Depression, recurrent (HCC)   (+) Myofascial pain syndrome      PULMONARY   (+) Mild intermittent asthma without complication      Ear/Nose/Throat   (+) Glottic insufficiency   (+) Laryngeal edema   (+) Laryngopharyngeal reflux (LPR)      Neurology/Sleep   (+) 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    Left Ventricle: Left ventricular cavity size is normal. There is mild asymmetric hypertrophy of the septal wall. The left ventricular ejection fraction is 55% by visual estimation. Systolic function is normal. There are no obvious high-grade regional wall motion abnormalities. Diastolic function is normal.    Right Ventricle: Right ventricular cavity size is normal. Systolic function is normal.    Aortic Valve: There is trace regurgitation.    Tricuspid Valve: There is trace regurgitation. Pulmonary artery systolic pressures cannot be estimated due to suboptimal tricuspid regurgitation envelope.    There is no study for  "comparison.    Lab Results   Component Value Date    WBC 5.96 01/04/2025    HGB 11.0 (L) 01/04/2025    HCT 37.0 01/04/2025    MCV 88 01/04/2025     01/04/2025     Lab Results   Component Value Date    SODIUM 139 01/04/2025    K 3.7 01/04/2025     01/04/2025    CO2 28 01/04/2025    BUN 19 01/04/2025    CREATININE 0.59 (L) 01/04/2025    GLUC 97 01/04/2025    CALCIUM 9.1 01/04/2025     No results found for: \"INR\", \"PROTIME\"  Lab Results   Component Value Date    HGBA1C 5.8 (H) 01/01/2025          Physical Exam    Airway    Mallampati score: II  TM Distance: >3 FB  Neck ROM: full     Dental       Cardiovascular  Cardiovascular exam normal    Pulmonary  Pulmonary exam normal     Other Findings  post-pubertal.      Anesthesia Plan  ASA Score- 3     Anesthesia Type- general with ASA Monitors.         Additional Monitors:     Airway Plan: LMA.    Comment: Discussed with patient plan for anesthesia, as well as risks/benefits, including likely chance of PONV and sore throat, as well as rare possibilities of dental/oropharyngeal/ocular injuries, aspiration, and severe/life-threatening surgical and anesthetic emergencies.  Patient expressed understanding and agreement.  .       Plan Factors-Exercise tolerance (METS): >4 METS.    Chart reviewed. EKG reviewed.  Existing labs reviewed. Patient summary reviewed.                  Induction- intravenous.    Postoperative Plan-     Perioperative Resuscitation Plan - Level 1 - Full Code.       Informed Consent- Anesthetic plan and risks discussed with patient.  I personally reviewed this patient with the CRNA. Discussed and agreed on the Anesthesia Plan with the CRNA..        "

## 2025-01-06 NOTE — PROGRESS NOTES
Progress Note - Neurosurgery   Name: Hayley Rios 64 y.o. 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 Final read pending.   MRI cervical spine 1/2/2025: Partially nondiagnostic exam due to motion artifact.  T3 lesion likely mildly increased in size based on evaluation and sagittal imaging.  CT cervical spine 1/2/2025: Partially calcified extramedullary T3 lesion slightly larger than prior MRI report.  CT thoracic and lumbar spine 1/2/2025: Thoracolumbar fusion.  Chronic disc and facet degenerative changes at L4-5 resulting in mild canal stenosis and severe foraminal narrowing.    Plan:   Continue to closely monitor neuro exam   Frequent neuro checks per primary team   Maintain normotensive BP goals, SBP < 160   Continue ongoing conservative management  Pain control primary team  PT/OT  Hold/avoid all AC/AP meds. No AC/AP meds prior to admission.    DVT ppx: SCDs, HSQ  Medical management per primary team   Social work following or assistance with dispo once medically cleared   Tentative plan for OR tomorrow 1/7/2025  for Navigated T2-4 laminectomy for tumor resection by Dr. Coulter.      Neurosurgery will continue to follow.  Please reach out with any further questions or concerns.     Lymphedema  LE lymphedema  Evident on exam   Ambulatory dysfunction  Likely secondary to underlying myelopathy  Could be multifactorial with underlying peripheral neuropathy and chronic podiatric issues s/p multiple surgeries.      Please contact the SecureChat role for the Neurosurgery service with any questions/concerns.    Subjective   Patient reports she continues to have LLE > RLE weakness and numbness. She also reports numbness below the T3 region. She reports she has been able to ambulate with the walker to the bathroom. Denies urinary or bowel incontinence.     Objective :  Temp:  [96.5 °F (35.8 °C)-97.8 °F (36.6 °C)] 97.6 °F (36.4 °C)  HR:  [] 98  BP: (105-143)/(56-82) 111/76  Resp:  [16-22] 17  SpO2:  [90 %-99 %] 95 %  O2 Device: None (Room air)    I/O         01/01 0701 01/02 0700 01/02 0701 01/03 0700 01/03 0701  01/04 0700    P.O. 778 1180     Total Intake(mL/kg) 778 (8.6) 1180 (13)     Urine (mL/kg/hr)       Total Output       Net +778 +1180            Unmeasured Urine Occurrence 6 x 5 x           Physical Exam  Constitutional:       Appearance: Normal appearance. She is well-developed.   HENT:      Head: Normocephalic and atraumatic.   Eyes:      Extraocular Movements: Extraocular movements intact.   Neck:      Trachea: No tracheal deviation.   Pulmonary:      Effort: Pulmonary effort is normal. No respiratory distress.   Musculoskeletal:         General: Normal range of motion.      Cervical back: Normal range of motion.      Right lower leg: Edema present.      Left lower leg: Edema present.   Skin:     General: Skin is warm and dry.   Neurological:      Mental Status: She is alert and oriented to person, place, and time.      Cranial Nerves: No cranial nerve deficit.      Sensory: Sensory deficit present.      Motor: Weakness present.      Deep Tendon Reflexes: Reflexes abnormal.      Comments: Motor: LLE HF 2-3/5, KF 3-4/5, DF/PF 4/5  and 4+/5 in RLE. BUE 5/5.     Sensation: Decreased sensation to LT/PP from T4 and below and decreased in LLE anteriorly greater than laterally/medially. RLE decreased throughout. Intact BUE.     Reflexes: Left perez's, bilateral clonus     Psychiatric:         Behavior: Behavior  normal.         Thought Content: Thought content normal.         Lab Results: I have reviewed the following results:  Recent Labs     01/04/25  0436   WBC 5.96   HGB 11.0*   HCT 37.0      SODIUM 139   K 3.7      CO2 28   BUN 19   CREATININE 0.59*   GLUC 97   MG 2.0   PHOS 4.0   ALB 3.7       Neurological Exam  Mental Status  Alert. Oriented to person, place, and time.    Cranial Nerves  CN III, IV, VI: Extraocular movements intact bilaterally.  Motor: LLE HF 2-3/5, KF 3-4/5, DF/PF 4/5  and 4+/5 in RLE. BUE 5/5.     Sensation: Decreased sensation to LT/PP from T4 and below and decreased in LLE anteriorly greater than laterally/medially. RLE decreased throughout. Intact BUE.     Reflexes: Left perez's, bilateral clonus  .      Imaging Results Review: I personally reviewed the following image studies in PACS and associated radiology reports: CT C-spine and MRI spine. My interpretation of the radiology images/reports is: See above.  Other Study Results Review: No additional pertinent studies reviewed.    VTE Pharmacologic Prophylaxis: Sequential compression device (Venodyne)  and Heparin- to be held at midnight for tentative OR tomorrow.

## 2025-01-07 ENCOUNTER — APPOINTMENT (INPATIENT)
Dept: RADIOLOGY | Facility: HOSPITAL | Age: 65
DRG: 029 | End: 2025-01-07
Payer: COMMERCIAL

## 2025-01-07 ENCOUNTER — ANESTHESIA (INPATIENT)
Dept: PERIOP | Facility: HOSPITAL | Age: 65
DRG: 029 | End: 2025-01-07
Payer: COMMERCIAL

## 2025-01-07 LAB
ANION GAP SERPL CALCULATED.3IONS-SCNC: 10 MMOL/L (ref 4–13)
ANION GAP SERPL CALCULATED.3IONS-SCNC: 9 MMOL/L (ref 4–13)
APTT PPP: 29 SECONDS (ref 23–34)
BASOPHILS # BLD AUTO: 0.02 THOUSANDS/ΜL (ref 0–0.1)
BASOPHILS NFR BLD AUTO: 0 % (ref 0–1)
BUN SERPL-MCNC: 13 MG/DL (ref 5–25)
BUN SERPL-MCNC: 15 MG/DL (ref 5–25)
CALCIUM SERPL-MCNC: 8.9 MG/DL (ref 8.4–10.2)
CALCIUM SERPL-MCNC: 9 MG/DL (ref 8.4–10.2)
CHLORIDE SERPL-SCNC: 102 MMOL/L (ref 96–108)
CHLORIDE SERPL-SCNC: 105 MMOL/L (ref 96–108)
CHOLEST SERPL-MCNC: 175 MG/DL (ref ?–200)
CO2 SERPL-SCNC: 25 MMOL/L (ref 21–32)
CO2 SERPL-SCNC: 30 MMOL/L (ref 21–32)
CREAT SERPL-MCNC: 0.51 MG/DL (ref 0.6–1.3)
CREAT SERPL-MCNC: 0.63 MG/DL (ref 0.6–1.3)
EOSINOPHIL # BLD AUTO: 0.01 THOUSAND/ΜL (ref 0–0.61)
EOSINOPHIL NFR BLD AUTO: 0 % (ref 0–6)
ERYTHROCYTE [DISTWIDTH] IN BLOOD BY AUTOMATED COUNT: 15.9 % (ref 11.6–15.1)
ERYTHROCYTE [DISTWIDTH] IN BLOOD BY AUTOMATED COUNT: 15.9 % (ref 11.6–15.1)
GFR SERPL CREATININE-BSD FRML MDRD: 103 ML/MIN/1.73SQ M
GFR SERPL CREATININE-BSD FRML MDRD: 96 ML/MIN/1.73SQ M
GLUCOSE SERPL-MCNC: 134 MG/DL (ref 65–140)
GLUCOSE SERPL-MCNC: 165 MG/DL (ref 65–140)
GLUCOSE SERPL-MCNC: 179 MG/DL (ref 65–140)
GLUCOSE SERPL-MCNC: 97 MG/DL (ref 65–140)
HCT VFR BLD AUTO: 35 % (ref 34.8–46.1)
HCT VFR BLD AUTO: 35.6 % (ref 34.8–46.1)
HDLC SERPL-MCNC: 46 MG/DL
HGB BLD-MCNC: 10.5 G/DL (ref 11.5–15.4)
HGB BLD-MCNC: 10.7 G/DL (ref 11.5–15.4)
IMM GRANULOCYTES # BLD AUTO: 0.05 THOUSAND/UL (ref 0–0.2)
IMM GRANULOCYTES NFR BLD AUTO: 1 % (ref 0–2)
INR PPP: 0.96 (ref 0.85–1.19)
LDLC SERPL CALC-MCNC: 99 MG/DL (ref 0–100)
LYMPHOCYTES # BLD AUTO: 0.5 THOUSANDS/ΜL (ref 0.6–4.47)
LYMPHOCYTES NFR BLD AUTO: 10 % (ref 14–44)
MCH RBC QN AUTO: 26 PG (ref 26.8–34.3)
MCH RBC QN AUTO: 26.7 PG (ref 26.8–34.3)
MCHC RBC AUTO-ENTMCNC: 29.5 G/DL (ref 31.4–37.4)
MCHC RBC AUTO-ENTMCNC: 30.6 G/DL (ref 31.4–37.4)
MCV RBC AUTO: 87 FL (ref 82–98)
MCV RBC AUTO: 88 FL (ref 82–98)
MONOCYTES # BLD AUTO: 0.06 THOUSAND/ΜL (ref 0.17–1.22)
MONOCYTES NFR BLD AUTO: 1 % (ref 4–12)
NEUTROPHILS # BLD AUTO: 4.54 THOUSANDS/ΜL (ref 1.85–7.62)
NEUTS SEG NFR BLD AUTO: 88 % (ref 43–75)
NRBC BLD AUTO-RTO: 0 /100 WBCS
PLATELET # BLD AUTO: 216 THOUSANDS/UL (ref 149–390)
PLATELET # BLD AUTO: 222 THOUSANDS/UL (ref 149–390)
PMV BLD AUTO: 9.1 FL (ref 8.9–12.7)
PMV BLD AUTO: 9.8 FL (ref 8.9–12.7)
POTASSIUM SERPL-SCNC: 3.5 MMOL/L (ref 3.5–5.3)
POTASSIUM SERPL-SCNC: 3.7 MMOL/L (ref 3.5–5.3)
PROTHROMBIN TIME: 13.1 SECONDS (ref 12.3–15)
RBC # BLD AUTO: 4.01 MILLION/UL (ref 3.81–5.12)
RBC # BLD AUTO: 4.04 MILLION/UL (ref 3.81–5.12)
SODIUM SERPL-SCNC: 140 MMOL/L (ref 135–147)
SODIUM SERPL-SCNC: 141 MMOL/L (ref 135–147)
TRIGL SERPL-MCNC: 151 MG/DL (ref ?–150)
WBC # BLD AUTO: 5.18 THOUSAND/UL (ref 4.31–10.16)
WBC # BLD AUTO: 6.33 THOUSAND/UL (ref 4.31–10.16)

## 2025-01-07 PROCEDURE — 4A133J1 MONITORING OF ARTERIAL PULSE, PERIPHERAL, PERCUTANEOUS APPROACH: ICD-10-PCS | Performed by: ANESTHESIOLOGY

## 2025-01-07 PROCEDURE — 88331 PATH CONSLTJ SURG 1 BLK 1SPC: CPT | Performed by: PATHOLOGY

## 2025-01-07 PROCEDURE — 0PB40ZZ EXCISION OF THORACIC VERTEBRA, OPEN APPROACH: ICD-10-PCS | Performed by: NEUROLOGICAL SURGERY

## 2025-01-07 PROCEDURE — A9585 GADOBUTROL INJECTION: HCPCS | Performed by: ANESTHESIOLOGY

## 2025-01-07 PROCEDURE — 63281 BX/EXC IDRL SPINE LESN THRC: CPT | Performed by: NEUROLOGICAL SURGERY

## 2025-01-07 PROCEDURE — 85027 COMPLETE CBC AUTOMATED: CPT | Performed by: PHYSICIAN ASSISTANT

## 2025-01-07 PROCEDURE — C1781 MESH (IMPLANTABLE): HCPCS | Performed by: NEUROLOGICAL SURGERY

## 2025-01-07 PROCEDURE — 85610 PROTHROMBIN TIME: CPT | Performed by: PHYSICIAN ASSISTANT

## 2025-01-07 PROCEDURE — 99223 1ST HOSP IP/OBS HIGH 75: CPT | Performed by: ANESTHESIOLOGY

## 2025-01-07 PROCEDURE — 72157 MRI CHEST SPINE W/O & W/DYE: CPT

## 2025-01-07 PROCEDURE — 72080 X-RAY EXAM THORACOLMB 2/> VW: CPT

## 2025-01-07 PROCEDURE — 88307 TISSUE EXAM BY PATHOLOGIST: CPT | Performed by: PATHOLOGY

## 2025-01-07 PROCEDURE — 4A133B1 MONITORING OF ARTERIAL PRESSURE, PERIPHERAL, PERCUTANEOUS APPROACH: ICD-10-PCS | Performed by: ANESTHESIOLOGY

## 2025-01-07 PROCEDURE — 85025 COMPLETE CBC W/AUTO DIFF WBC: CPT | Performed by: PHYSICIAN ASSISTANT

## 2025-01-07 PROCEDURE — 80061 LIPID PANEL: CPT

## 2025-01-07 PROCEDURE — 85730 THROMBOPLASTIN TIME PARTIAL: CPT | Performed by: PHYSICIAN ASSISTANT

## 2025-01-07 PROCEDURE — 03HY32Z INSERTION OF MONITORING DEVICE INTO UPPER ARTERY, PERCUTANEOUS APPROACH: ICD-10-PCS | Performed by: ANESTHESIOLOGY

## 2025-01-07 PROCEDURE — 88360 TUMOR IMMUNOHISTOCHEM/MANUAL: CPT | Performed by: PATHOLOGY

## 2025-01-07 PROCEDURE — 00BX0ZZ EXCISION OF THORACIC SPINAL CORD, OPEN APPROACH: ICD-10-PCS | Performed by: NEUROLOGICAL SURGERY

## 2025-01-07 PROCEDURE — 80048 BASIC METABOLIC PNL TOTAL CA: CPT | Performed by: PHYSICIAN ASSISTANT

## 2025-01-07 PROCEDURE — 88342 IMHCHEM/IMCYTCHM 1ST ANTB: CPT | Performed by: PATHOLOGY

## 2025-01-07 PROCEDURE — 82948 REAGENT STRIP/BLOOD GLUCOSE: CPT

## 2025-01-07 DEVICE — DURAGEN® PLUS DURAL REGENERATION MATRIX, 3 IN X 3 IN (7.5 CM X 7.5 CM)
Type: IMPLANTABLE DEVICE | Site: SPINE THORACIC | Status: FUNCTIONAL
Brand: DURAGEN® PLUS

## 2025-01-07 RX ORDER — DEXAMETHASONE 4 MG/1
4 TABLET ORAL EVERY 8 HOURS SCHEDULED
Status: DISCONTINUED | OUTPATIENT
Start: 2025-01-09 | End: 2025-01-09

## 2025-01-07 RX ORDER — SENNOSIDES 8.6 MG
1 TABLET ORAL DAILY
Status: DISCONTINUED | OUTPATIENT
Start: 2025-01-07 | End: 2025-01-14

## 2025-01-07 RX ORDER — PROPOFOL 10 MG/ML
INJECTION, EMULSION INTRAVENOUS AS NEEDED
Status: DISCONTINUED | OUTPATIENT
Start: 2025-01-07 | End: 2025-01-07

## 2025-01-07 RX ORDER — BISACODYL 10 MG
10 SUPPOSITORY, RECTAL RECTAL DAILY PRN
Status: DISCONTINUED | OUTPATIENT
Start: 2025-01-07 | End: 2025-01-15 | Stop reason: HOSPADM

## 2025-01-07 RX ORDER — HEPARIN SODIUM 5000 [USP'U]/ML
5000 INJECTION, SOLUTION INTRAVENOUS; SUBCUTANEOUS EVERY 8 HOURS SCHEDULED
Status: DISCONTINUED | OUTPATIENT
Start: 2025-01-08 | End: 2025-01-08

## 2025-01-07 RX ORDER — ONDANSETRON 2 MG/ML
4 INJECTION INTRAMUSCULAR; INTRAVENOUS ONCE AS NEEDED
Status: DISCONTINUED | OUTPATIENT
Start: 2025-01-07 | End: 2025-01-07 | Stop reason: HOSPADM

## 2025-01-07 RX ORDER — CHLORHEXIDINE GLUCONATE ORAL RINSE 1.2 MG/ML
15 SOLUTION DENTAL ONCE
Status: DISCONTINUED | OUTPATIENT
Start: 2025-01-07 | End: 2025-01-07 | Stop reason: HOSPADM

## 2025-01-07 RX ORDER — OXYCODONE HYDROCHLORIDE 10 MG/1
10 TABLET ORAL EVERY 4 HOURS PRN
Status: DISCONTINUED | OUTPATIENT
Start: 2025-01-07 | End: 2025-01-14

## 2025-01-07 RX ORDER — GADOBUTROL 604.72 MG/ML
9 INJECTION INTRAVENOUS
Status: COMPLETED | OUTPATIENT
Start: 2025-01-07 | End: 2025-01-07

## 2025-01-07 RX ORDER — INSULIN LISPRO 100 [IU]/ML
1-6 INJECTION, SOLUTION INTRAVENOUS; SUBCUTANEOUS
Status: DISCONTINUED | OUTPATIENT
Start: 2025-01-07 | End: 2025-01-15 | Stop reason: HOSPADM

## 2025-01-07 RX ORDER — DEXAMETHASONE 2 MG/1
2 TABLET ORAL EVERY 6 HOURS SCHEDULED
Status: DISCONTINUED | OUTPATIENT
Start: 2025-01-13 | End: 2025-01-07

## 2025-01-07 RX ORDER — SODIUM CHLORIDE 9 MG/ML
100 INJECTION, SOLUTION INTRAVENOUS CONTINUOUS
Status: DISCONTINUED | OUTPATIENT
Start: 2025-01-07 | End: 2025-01-07

## 2025-01-07 RX ORDER — ONDANSETRON 2 MG/ML
INJECTION INTRAMUSCULAR; INTRAVENOUS AS NEEDED
Status: DISCONTINUED | OUTPATIENT
Start: 2025-01-07 | End: 2025-01-07

## 2025-01-07 RX ORDER — POLYETHYLENE GLYCOL 3350 17 G/17G
17 POWDER, FOR SOLUTION ORAL DAILY
Status: DISCONTINUED | OUTPATIENT
Start: 2025-01-07 | End: 2025-01-15 | Stop reason: HOSPADM

## 2025-01-07 RX ORDER — DEXAMETHASONE 2 MG/1
2 TABLET ORAL EVERY 12 HOURS SCHEDULED
Status: DISCONTINUED | OUTPATIENT
Start: 2025-01-19 | End: 2025-01-07

## 2025-01-07 RX ORDER — SODIUM CHLORIDE, SODIUM LACTATE, POTASSIUM CHLORIDE, CALCIUM CHLORIDE 600; 310; 30; 20 MG/100ML; MG/100ML; MG/100ML; MG/100ML
INJECTION, SOLUTION INTRAVENOUS CONTINUOUS PRN
Status: DISCONTINUED | OUTPATIENT
Start: 2025-01-07 | End: 2025-01-07

## 2025-01-07 RX ORDER — SODIUM CHLORIDE 9 MG/ML
75 INJECTION, SOLUTION INTRAVENOUS CONTINUOUS
Status: DISCONTINUED | OUTPATIENT
Start: 2025-01-07 | End: 2025-01-07

## 2025-01-07 RX ORDER — ACETAMINOPHEN 325 MG/1
975 TABLET ORAL EVERY 8 HOURS SCHEDULED
Status: COMPLETED | OUTPATIENT
Start: 2025-01-07 | End: 2025-01-09

## 2025-01-07 RX ORDER — BUPIVACAINE HYDROCHLORIDE AND EPINEPHRINE 5; 5 MG/ML; UG/ML
INJECTION, SOLUTION EPIDURAL; INTRACAUDAL; PERINEURAL AS NEEDED
Status: DISCONTINUED | OUTPATIENT
Start: 2025-01-07 | End: 2025-01-07 | Stop reason: HOSPADM

## 2025-01-07 RX ORDER — DEXAMETHASONE 4 MG/1
4 TABLET ORAL EVERY 6 HOURS SCHEDULED
Status: DISCONTINUED | OUTPATIENT
Start: 2025-01-07 | End: 2025-01-07

## 2025-01-07 RX ORDER — CEFAZOLIN SODIUM 2 G/50ML
2000 SOLUTION INTRAVENOUS EVERY 8 HOURS
Status: COMPLETED | OUTPATIENT
Start: 2025-01-07 | End: 2025-01-08

## 2025-01-07 RX ORDER — SODIUM CHLORIDE, SODIUM LACTATE, POTASSIUM CHLORIDE, CALCIUM CHLORIDE 600; 310; 30; 20 MG/100ML; MG/100ML; MG/100ML; MG/100ML
50 INJECTION, SOLUTION INTRAVENOUS CONTINUOUS
Status: DISCONTINUED | OUTPATIENT
Start: 2025-01-07 | End: 2025-01-07

## 2025-01-07 RX ORDER — FENTANYL CITRATE/PF 50 MCG/ML
50 SYRINGE (ML) INJECTION
Status: COMPLETED | OUTPATIENT
Start: 2025-01-07 | End: 2025-01-07

## 2025-01-07 RX ORDER — DEXAMETHASONE 2 MG/1
2 TABLET ORAL EVERY 12 HOURS SCHEDULED
Status: DISCONTINUED | OUTPATIENT
Start: 2025-01-15 | End: 2025-01-09

## 2025-01-07 RX ORDER — DEXAMETHASONE SODIUM PHOSPHATE 10 MG/ML
INJECTION, SOLUTION INTRAMUSCULAR; INTRAVENOUS AS NEEDED
Status: DISCONTINUED | OUTPATIENT
Start: 2025-01-07 | End: 2025-01-07

## 2025-01-07 RX ORDER — DEXAMETHASONE 4 MG/1
4 TABLET ORAL EVERY 6 HOURS SCHEDULED
Status: DISCONTINUED | OUTPATIENT
Start: 2025-01-07 | End: 2025-01-09

## 2025-01-07 RX ORDER — MIDAZOLAM HYDROCHLORIDE 2 MG/2ML
INJECTION, SOLUTION INTRAMUSCULAR; INTRAVENOUS AS NEEDED
Status: DISCONTINUED | OUTPATIENT
Start: 2025-01-07 | End: 2025-01-07

## 2025-01-07 RX ORDER — DEXAMETHASONE 2 MG/1
2 TABLET ORAL EVERY 6 HOURS SCHEDULED
Status: DISCONTINUED | OUTPATIENT
Start: 2025-01-11 | End: 2025-01-09

## 2025-01-07 RX ORDER — DEXAMETHASONE 2 MG/1
2 TABLET ORAL EVERY 8 HOURS SCHEDULED
Status: DISCONTINUED | OUTPATIENT
Start: 2025-01-17 | End: 2025-01-07

## 2025-01-07 RX ORDER — LIDOCAINE HYDROCHLORIDE 20 MG/ML
INJECTION, SOLUTION EPIDURAL; INFILTRATION; INTRACAUDAL; PERINEURAL AS NEEDED
Status: DISCONTINUED | OUTPATIENT
Start: 2025-01-07 | End: 2025-01-07

## 2025-01-07 RX ORDER — DEXAMETHASONE 4 MG/1
4 TABLET ORAL EVERY 8 HOURS SCHEDULED
Status: DISCONTINUED | OUTPATIENT
Start: 2025-01-10 | End: 2025-01-07

## 2025-01-07 RX ORDER — KETAMINE HCL IN NACL, ISO-OSM 100MG/10ML
SYRINGE (ML) INJECTION AS NEEDED
Status: DISCONTINUED | OUTPATIENT
Start: 2025-01-07 | End: 2025-01-07

## 2025-01-07 RX ORDER — ACETAMINOPHEN 10 MG/ML
1000 INJECTION, SOLUTION INTRAVENOUS ONCE
Status: DISCONTINUED | OUTPATIENT
Start: 2025-01-07 | End: 2025-01-07

## 2025-01-07 RX ORDER — DEXAMETHASONE 2 MG/1
2 TABLET ORAL DAILY
Status: DISCONTINUED | OUTPATIENT
Start: 2025-01-18 | End: 2025-01-09

## 2025-01-07 RX ORDER — HYDROMORPHONE HCL/PF 1 MG/ML
SYRINGE (ML) INJECTION AS NEEDED
Status: DISCONTINUED | OUTPATIENT
Start: 2025-01-07 | End: 2025-01-07

## 2025-01-07 RX ORDER — HYDROMORPHONE HCL/PF 1 MG/ML
0.2 SYRINGE (ML) INJECTION EVERY 4 HOURS PRN
Status: DISCONTINUED | OUTPATIENT
Start: 2025-01-07 | End: 2025-01-15

## 2025-01-07 RX ORDER — METHOCARBAMOL 100 MG/ML
1000 INJECTION, SOLUTION INTRAMUSCULAR; INTRAVENOUS EVERY 8 HOURS SCHEDULED
Status: DISCONTINUED | OUTPATIENT
Start: 2025-01-07 | End: 2025-01-08

## 2025-01-07 RX ORDER — PROMETHAZINE HYDROCHLORIDE 25 MG/ML
25 INJECTION, SOLUTION INTRAMUSCULAR; INTRAVENOUS ONCE AS NEEDED
Status: DISCONTINUED | OUTPATIENT
Start: 2025-01-07 | End: 2025-01-07 | Stop reason: HOSPADM

## 2025-01-07 RX ORDER — CEFAZOLIN SODIUM 1 G/3ML
INJECTION, POWDER, FOR SOLUTION INTRAMUSCULAR; INTRAVENOUS AS NEEDED
Status: DISCONTINUED | OUTPATIENT
Start: 2025-01-07 | End: 2025-01-07

## 2025-01-07 RX ORDER — DEXAMETHASONE 2 MG/1
2 TABLET ORAL DAILY
Status: DISCONTINUED | OUTPATIENT
Start: 2025-01-23 | End: 2025-01-07

## 2025-01-07 RX ORDER — VANCOMYCIN HYDROCHLORIDE 1 G/200ML
1000 INJECTION, SOLUTION INTRAVENOUS ONCE
Status: DISCONTINUED | OUTPATIENT
Start: 2025-01-07 | End: 2025-01-07

## 2025-01-07 RX ORDER — LIDOCAINE HYDROCHLORIDE AND EPINEPHRINE 10; 10 MG/ML; UG/ML
INJECTION, SOLUTION INFILTRATION; PERINEURAL AS NEEDED
Status: DISCONTINUED | OUTPATIENT
Start: 2025-01-07 | End: 2025-01-07 | Stop reason: HOSPADM

## 2025-01-07 RX ORDER — SODIUM CHLORIDE 9 MG/ML
INJECTION, SOLUTION INTRAVENOUS CONTINUOUS PRN
Status: DISCONTINUED | OUTPATIENT
Start: 2025-01-07 | End: 2025-01-07

## 2025-01-07 RX ORDER — HYDROMORPHONE HCL/PF 1 MG/ML
0.5 SYRINGE (ML) INJECTION
Status: COMPLETED | OUTPATIENT
Start: 2025-01-07 | End: 2025-01-07

## 2025-01-07 RX ORDER — DEXAMETHASONE 2 MG/1
2 TABLET ORAL EVERY 8 HOURS SCHEDULED
Status: DISCONTINUED | OUTPATIENT
Start: 2025-01-14 | End: 2025-01-09

## 2025-01-07 RX ORDER — OXYCODONE HYDROCHLORIDE 5 MG/1
5 TABLET ORAL EVERY 4 HOURS PRN
Status: DISCONTINUED | OUTPATIENT
Start: 2025-01-07 | End: 2025-01-14

## 2025-01-07 RX ORDER — DIAZEPAM 10 MG/2ML
5 INJECTION, SOLUTION INTRAMUSCULAR; INTRAVENOUS ONCE
Status: COMPLETED | OUTPATIENT
Start: 2025-01-07 | End: 2025-01-07

## 2025-01-07 RX ORDER — FENTANYL CITRATE 50 UG/ML
INJECTION, SOLUTION INTRAMUSCULAR; INTRAVENOUS AS NEEDED
Status: DISCONTINUED | OUTPATIENT
Start: 2025-01-07 | End: 2025-01-07

## 2025-01-07 RX ORDER — PROPOFOL 10 MG/ML
INJECTION, EMULSION INTRAVENOUS CONTINUOUS PRN
Status: DISCONTINUED | OUTPATIENT
Start: 2025-01-07 | End: 2025-01-07

## 2025-01-07 RX ORDER — GLYCOPYRROLATE 0.2 MG/ML
INJECTION INTRAMUSCULAR; INTRAVENOUS AS NEEDED
Status: DISCONTINUED | OUTPATIENT
Start: 2025-01-07 | End: 2025-01-07

## 2025-01-07 RX ORDER — SUCCINYLCHOLINE/SOD CL,ISO/PF 100 MG/5ML
SYRINGE (ML) INTRAVENOUS AS NEEDED
Status: DISCONTINUED | OUTPATIENT
Start: 2025-01-07 | End: 2025-01-07

## 2025-01-07 RX ORDER — ROCURONIUM BROMIDE 10 MG/ML
INJECTION, SOLUTION INTRAVENOUS AS NEEDED
Status: DISCONTINUED | OUTPATIENT
Start: 2025-01-07 | End: 2025-01-07

## 2025-01-07 RX ORDER — LABETALOL HYDROCHLORIDE 5 MG/ML
10 INJECTION, SOLUTION INTRAVENOUS EVERY 4 HOURS PRN
Status: DISCONTINUED | OUTPATIENT
Start: 2025-01-07 | End: 2025-01-15 | Stop reason: HOSPADM

## 2025-01-07 RX ADMIN — CEFAZOLIN 2000 MG: 1 INJECTION, POWDER, FOR SOLUTION INTRAMUSCULAR; INTRAVENOUS at 08:35

## 2025-01-07 RX ADMIN — ONDANSETRON 4 MG: 2 INJECTION INTRAMUSCULAR; INTRAVENOUS at 11:35

## 2025-01-07 RX ADMIN — HYDROMORPHONE HYDROCHLORIDE 0.5 MG: 1 INJECTION, SOLUTION INTRAMUSCULAR; INTRAVENOUS; SUBCUTANEOUS at 11:19

## 2025-01-07 RX ADMIN — CEFAZOLIN SODIUM 2000 MG: 2 SOLUTION INTRAVENOUS at 16:06

## 2025-01-07 RX ADMIN — HYDROMORPHONE HYDROCHLORIDE 0.5 MG: 1 INJECTION, SOLUTION INTRAMUSCULAR; INTRAVENOUS; SUBCUTANEOUS at 13:39

## 2025-01-07 RX ADMIN — GLYCOPYRROLATE 0.2 MCG: 0.2 INJECTION, SOLUTION INTRAMUSCULAR; INTRAVENOUS at 08:35

## 2025-01-07 RX ADMIN — FENTANYL CITRATE 100 MCG: 50 INJECTION INTRAMUSCULAR; INTRAVENOUS at 07:40

## 2025-01-07 RX ADMIN — GADOBUTROL 9 ML: 604.72 INJECTION INTRAVENOUS at 22:28

## 2025-01-07 RX ADMIN — PRAMIPEXOLE DIHYDROCHLORIDE 0.5 MG: 0.5 TABLET ORAL at 23:00

## 2025-01-07 RX ADMIN — FENTANYL CITRATE 50 MCG: 50 INJECTION INTRAMUSCULAR; INTRAVENOUS at 13:03

## 2025-01-07 RX ADMIN — CEFAZOLIN SODIUM 2000 MG: 2 SOLUTION INTRAVENOUS at 23:41

## 2025-01-07 RX ADMIN — ACETAMINOPHEN 975 MG: 325 TABLET, FILM COATED ORAL at 05:12

## 2025-01-07 RX ADMIN — Medication 500 MG: at 20:21

## 2025-01-07 RX ADMIN — SODIUM CHLORIDE, SODIUM LACTATE, POTASSIUM CHLORIDE, AND CALCIUM CHLORIDE: .6; .31; .03; .02 INJECTION, SOLUTION INTRAVENOUS at 07:34

## 2025-01-07 RX ADMIN — DEXAMETHASONE SODIUM PHOSPHATE 10 MG: 10 INJECTION, SOLUTION INTRAMUSCULAR; INTRAVENOUS at 08:00

## 2025-01-07 RX ADMIN — RIMEGEPANT SULFATE 75 MG: 75 TABLET, ORALLY DISINTEGRATING ORAL at 22:52

## 2025-01-07 RX ADMIN — MIDAZOLAM 2 MG: 1 INJECTION INTRAMUSCULAR; INTRAVENOUS at 07:25

## 2025-01-07 RX ADMIN — OXYCODONE HYDROCHLORIDE 10 MG: 10 TABLET ORAL at 16:32

## 2025-01-07 RX ADMIN — Medication 20 MG: at 09:16

## 2025-01-07 RX ADMIN — DEXAMETHASONE 4 MG: 4 TABLET ORAL at 17:44

## 2025-01-07 RX ADMIN — HYDROMORPHONE HYDROCHLORIDE 0.5 MG: 1 INJECTION, SOLUTION INTRAMUSCULAR; INTRAVENOUS; SUBCUTANEOUS at 11:33

## 2025-01-07 RX ADMIN — CYCLOBENZAPRINE HYDROCHLORIDE 10 MG: 10 TABLET, FILM COATED ORAL at 16:06

## 2025-01-07 RX ADMIN — HYDROMORPHONE HYDROCHLORIDE 0.5 MG: 1 INJECTION, SOLUTION INTRAMUSCULAR; INTRAVENOUS; SUBCUTANEOUS at 15:34

## 2025-01-07 RX ADMIN — DIAZEPAM 2.5 MG: 10 INJECTION, SOLUTION INTRAMUSCULAR; INTRAVENOUS at 16:32

## 2025-01-07 RX ADMIN — PHENYLEPHRINE HYDROCHLORIDE 40 MCG/MIN: 10 INJECTION INTRAVENOUS at 07:52

## 2025-01-07 RX ADMIN — DOCUSATE SODIUM 100 MG: 100 CAPSULE, LIQUID FILLED ORAL at 17:45

## 2025-01-07 RX ADMIN — REMIFENTANIL HYDROCHLORIDE 0.14 MCG/KG/MIN: 1 INJECTION, POWDER, LYOPHILIZED, FOR SOLUTION INTRAVENOUS at 07:45

## 2025-01-07 RX ADMIN — LIDOCAINE HYDROCHLORIDE 80 MG: 20 INJECTION, SOLUTION EPIDURAL; INFILTRATION; INTRACAUDAL; PERINEURAL at 07:41

## 2025-01-07 RX ADMIN — ACETAMINOPHEN 975 MG: 325 TABLET, FILM COATED ORAL at 16:06

## 2025-01-07 RX ADMIN — CEFAZOLIN 2000 MG: 1 INJECTION, POWDER, FOR SOLUTION INTRAMUSCULAR; INTRAVENOUS at 12:02

## 2025-01-07 RX ADMIN — SODIUM CHLORIDE: 0.9 INJECTION, SOLUTION INTRAVENOUS at 10:57

## 2025-01-07 RX ADMIN — PROPOFOL 150 MCG/KG/MIN: 10 INJECTION, EMULSION INTRAVENOUS at 07:45

## 2025-01-07 RX ADMIN — HYDROMORPHONE HYDROCHLORIDE 0.5 MG: 1 INJECTION, SOLUTION INTRAMUSCULAR; INTRAVENOUS; SUBCUTANEOUS at 13:17

## 2025-01-07 RX ADMIN — PROPOFOL 20 MG: 10 INJECTION, EMULSION INTRAVENOUS at 12:18

## 2025-01-07 RX ADMIN — Medication 100 MG: at 07:42

## 2025-01-07 RX ADMIN — HYDROMORPHONE HYDROCHLORIDE 0.2 MG: 1 INJECTION, SOLUTION INTRAMUSCULAR; INTRAVENOUS; SUBCUTANEOUS at 16:32

## 2025-01-07 RX ADMIN — CALCIUM CARBONATE (ANTACID) CHEW TAB 500 MG 1000 MG: 500 CHEW TAB at 23:41

## 2025-01-07 RX ADMIN — Medication 30 MG: at 08:59

## 2025-01-07 RX ADMIN — HYDROMORPHONE HYDROCHLORIDE 0.5 MG: 1 INJECTION, SOLUTION INTRAMUSCULAR; INTRAVENOUS; SUBCUTANEOUS at 13:58

## 2025-01-07 RX ADMIN — SODIUM CHLORIDE 75 ML/HR: 0.9 INJECTION, SOLUTION INTRAVENOUS at 16:09

## 2025-01-07 RX ADMIN — CYCLOBENZAPRINE HYDROCHLORIDE 10 MG: 10 TABLET, FILM COATED ORAL at 21:00

## 2025-01-07 RX ADMIN — HYDROMORPHONE HYDROCHLORIDE 0.5 MG: 1 INJECTION, SOLUTION INTRAMUSCULAR; INTRAVENOUS; SUBCUTANEOUS at 14:27

## 2025-01-07 RX ADMIN — PANTOPRAZOLE SODIUM 40 MG: 40 TABLET, DELAYED RELEASE ORAL at 05:12

## 2025-01-07 RX ADMIN — DIAZEPAM 5 MG: 10 INJECTION, SOLUTION INTRAMUSCULAR; INTRAVENOUS at 21:24

## 2025-01-07 RX ADMIN — ROCURONIUM BROMIDE 20 MG: 10 INJECTION, SOLUTION INTRAVENOUS at 09:16

## 2025-01-07 RX ADMIN — SODIUM CHLORIDE, SODIUM LACTATE, POTASSIUM CHLORIDE, AND CALCIUM CHLORIDE 50 ML/HR: .6; .31; .03; .02 INJECTION, SOLUTION INTRAVENOUS at 13:00

## 2025-01-07 RX ADMIN — MELATONIN 6 MG: at 23:41

## 2025-01-07 RX ADMIN — PROPOFOL 150 MG: 10 INJECTION, EMULSION INTRAVENOUS at 07:41

## 2025-01-07 RX ADMIN — SODIUM CHLORIDE: 0.9 INJECTION, SOLUTION INTRAVENOUS at 08:00

## 2025-01-07 RX ADMIN — SENNOSIDES 8.6 MG: 8.6 TABLET, FILM COATED ORAL at 16:08

## 2025-01-07 RX ADMIN — FENTANYL CITRATE 50 MCG: 50 INJECTION INTRAMUSCULAR; INTRAVENOUS at 12:55

## 2025-01-07 NOTE — PROGRESS NOTES
Pt. To Pacu. Report given. Course uneventful. VSS. Vision intact. RAMOS's x4. Airway patent. No c/o PONV.

## 2025-01-07 NOTE — H&P
H&P Note - Critical Care/ICU   Name: Hayley Rios 64 y.o. female I MRN: [id number]  Unit/Bed#: OR POOL I Date of Admission: 12/31/2024   Date of Service: 1/7/2025 I Hospital Day: 7       Assessment & Plan   Neuro:   POD 0 T2-4 Laminectomy for spinal meningioma  NCC for postop monitoring  Decadron taper  Neurosurgery following  HOB >30 for 48h  One drain to gravity, it should remain upright in a bag.   Ancef while drain is in place  F/U MRI Brain  F/U XR Thoracolumbar spine    Peripheral Neuropathy, DDD, muscle spasms  Continue home Cymbalta 60 mg  Flexeril 10 mg TID  Valium 2.5 mg q6 PRN    Post operative Pain  Dilaudid 4 mg Q4 as needed  Oxycodone 5/10  Tylenol 875 mg q8    RLS  Mirapex 0.5 mg daily    CV:   No active issues     Pulm:  No active issues    GI:   GERD  On omeprazole 20 mg at home  Will continue PPI 20 mg IV, until patient is cleared for diet  Postoperative bowel regimen -- miralax, colace and senna    :   Valencia catheter in place  DC on POD 1 per neurosurgery  Hypokalemia  Continue at home K Dur 20 mg daily    F/E/N:   F: none  E: Replete PRN  N: Regular diet     Heme/Onc:   Lymphedema  Will continue home Lasix 20 mg    MARV:  Hb 10.5  Recently had outpatient iron infusion  Continue to monitor    Endo:   Prediabetes   Hba1c 5.8  SSI while on decadron taper    ID:   POD 0 for T2-4 laminectomy, drain in place  Ancef while drain is in place    MSK/Skin:   PT/OT  Holding DVT prophylaxis until cleared by neurosurgery    Disposition: Critical care    ICU Core Measures     A: Assess, Prevent, and Manage Pain Has pain been assessed? Yes  Need for changes to pain regimen? No   B: Both SAT/SAT  N/A   C: Choice of Sedation RASS Goal: 0 Alert and Calm  Need for changes to sedation or analgesia regimen? No   D: Delirium CAM-ICU: Negative   E: Early Mobility  Plan for early mobility? Yes   F: Family Engagement Plan for family engagement today? Yes       Antibiotic Review: Patient on appropriate coverage based on  culture data.     Review of Invasive Devices:    Valencia Plan: Continue for accurate I/O monitoring for 48 hours    Cristy Plan: Keep arterial line for hemodynamic monitoring    Prophylaxis:  VTE POD 0   Stress Ulcer  covered byomeprazole (PriLOSEC OTC) 20 MG tablet [[id number]] (Long-Term Med), pantoprazole (PROTONIX) EC tablet 40 mg [[id number]]         24 Hour Events : 64-year-old female with past medical history of RLS, DDD, MARV, multiple hernia repairs now s/p abdominoplasty, hypokalemia, prediabetes, GERD and meningioma, POD 0 for T2-4 laminectomy for spinal meningioma.    Subjective       Objective :                   Vitals I/O      Most Recent Min/Max in 24hrs   Temp 98.1 °F (36.7 °C) Temp  Min: 96.7 °F (35.9 °C)  Max: 98.1 °F (36.7 °C)   Pulse 97 Pulse  Min: 97  Max: 104   Resp 20 Resp  Min: 17  Max: 20   /77 BP  Min: 123/77  Max: 136/77   O2 Sat 96 % SpO2  Min: 95 %  Max: 96 %      Intake/Output Summary (Last 24 hours) at 1/7/2025 1300  Last data filed at 1/7/2025 1235  Gross per 24 hour   Intake 2770 ml   Output 600 ml   Net 2170 ml       Diet NPO; Sips with meds    Invasive Monitoring           Physical Exam   Physical Exam  Eyes:      General: Neglect  Cardiovascular:      Rate and Rhythm: Normal rate and regular rhythm.      Pulses: Normal pulses.      Heart sounds: Normal heart sounds.   Musculoskeletal:      Right lower leg: No edema.      Left lower leg: No edema.   Abdominal: General: There is no distension.      Palpations: Abdomen is soft.      Tenderness: There is no abdominal tenderness. There is no guarding.      Hernia: No hernia is present.   Constitutional:       Appearance: She is well-developed and well-nourished.   Pulmonary:      Effort: Pulmonary effort is normal. No accessory muscle usage, respiratory distress or accessory muscle usage.      Breath sounds: Normal breath sounds. No stridor. No wheezing or rhonchi.   Secretions are normal.Psychiatric:         Speech: Speech is not no  receptive aphasia or no expressive aphasia.   Neurological:      Cranial Nerves: No dysarthria or facial asymmetry.      Sensory: No sensory deficit.      Motor: gross motor function is at baseline for patient. Strength full and intact in all extremities.          Diagnostic Studies        Lab Results: I have reviewed the following results:     Medications:  Scheduled PRN   [Transfer Hold] acetaminophen, 975 mg, Q8H EVGENY  [Transfer Hold] cyanocobalamin, 1,000 mcg, Q30 Days  [Transfer Hold] cyclobenzaprine, 10 mg, TID  [Transfer Hold] docusate sodium, 100 mg, BID  [Transfer Hold] DULoxetine, 60 mg, Daily  [Transfer Hold] furosemide, 20 mg, Daily  [Transfer Hold] lidocaine, 2 patch, Daily  [Transfer Hold] pantoprazole, 40 mg, Early Morning  [Transfer Hold] potassium chloride, 40 mEq, Daily With Breakfast  [Transfer Hold] pramipexole, 0.5 mg, HS  vancomycin, 1,000 mg, Once      [Transfer Hold] calcium carbonate, 1,000 mg, TID PRN  [Transfer Hold] diazepam, 2.5 mg, Q6H PRN  fentaNYL, 50 mcg, Q5 Min PRN  HYDROmorphone, 0.5 mg, Q10 Min PRN  [Transfer Hold] HYDROmorphone, 4 mg, Q4H PRN  [Transfer Hold] melatonin, 6 mg, HS PRN  [Transfer Hold] ondansetron, 4 mg, Q6H PRN  ondansetron, 4 mg, Once PRN  [Transfer Hold] oxyCODONE, 5 mg, Q6H PRN  [Transfer Hold] polyethylene glycol, 17 g, Daily PRN  promethazine, 25 mg, Once PRN       Continuous    lactated ringers, 50 mL/hr         Labs:   CBC    Recent Labs     01/07/25  0440   WBC 6.33   HGB 10.5*   HCT 35.6        BMP    Recent Labs     01/07/25  0440   SODIUM 141   K 3.7      CO2 30   AGAP 9   BUN 15   CREATININE 0.63   CALCIUM 9.0       Coags    Recent Labs     01/06/25  1320   INR 0.95   PTT 29        Additional Electrolytes  No recent results       Blood Gas    No recent results  No recent results LFTs  No recent results    Infectious  No recent results  Glucose  Recent Labs     01/07/25  0440   GLUC 97

## 2025-01-07 NOTE — PLAN OF CARE
Problem: PAIN - ADULT  Goal: Verbalizes/displays adequate comfort level or baseline comfort level  Description: Interventions:  - Encourage patient to monitor pain and request assistance  - Assess pain using appropriate pain scale  - Administer analgesics based on type and severity of pain and evaluate response  - Implement non-pharmacological measures as appropriate and evaluate response  - Consider cultural and social influences on pain and pain management  - Notify physician/advanced practitioner if interventions unsuccessful or patient reports new pain  Outcome: Progressing     Problem: INFECTION - ADULT  Goal: Absence or prevention of progression during hospitalization  Description: INTERVENTIONS:  - Assess and monitor for signs and symptoms of infection  - Monitor lab/diagnostic results  - Monitor all insertion sites, i.e. indwelling lines, tubes, and drains  - Monitor endotracheal if appropriate and nasal secretions for changes in amount and color  - [place] appropriate cooling/warming therapies per order  - Administer medications as ordered  - Instruct and encourage patient and family to use good hand hygiene technique  - Identify and instruct in appropriate isolation precautions for identified infection/condition  Outcome: Progressing     Problem: SAFETY ADULT  Goal: Patient will remain free of falls  Description: INTERVENTIONS:  - Educate patient/family on patient safety including physical limitations  - Instruct patient to call for assistance with activity   - Consult OT/PT to assist with strengthening/mobility   - Keep Call bell within reach  - Keep bed low and locked with side rails adjusted as appropriate  - Keep care items and personal belongings within reach  - Initiate and maintain comfort rounds  - Make Fall Risk Sign visible to staff  - Offer Toileting every 2 Hours, in advance of need  - Initiate/Maintain alarm  - Obtain necessary fall risk management equipment  - Apply yellow socks and  bracelet for high fall risk patients  - Consider moving patient to room near nurses station  Outcome: Progressing  Goal: Maintain or return to baseline ADL function  Description: INTERVENTIONS:  -  Assess patient's ability to carry out ADLs; assess patient's baseline for ADL function and identify physical deficits which impact ability to perform ADLs (bathing, care of mouth/teeth, toileting, grooming, dressing, etc.)  - Assess/evaluate cause of self-care deficits   - Assess range of motion  - Assess patient's mobility; develop plan if impaired  - Assess patient's need for assistive devices and provide as appropriate  - Encourage maximum independence but intervene and supervise when necessary  - Involve family in performance of ADLs  - Assess for home care needs following discharge   - Consider OT consult to assist with ADL evaluation and planning for discharge  - Provide patient education as appropriate  Outcome: Progressing  Goal: Maintains/Returns to pre admission functional level  Description: INTERVENTIONS:  - Perform AM-PAC 6 Click Basic Mobility/ Daily Activity assessment daily.  - Set and communicate daily mobility goal to care team and patient/family/caregiver.   - Collaborate with rehabilitation services on mobility goals if consulted  - Perform Range of Motion 3 times a day.  - Reposition patient every 2 hours.  - Dangle patient 3 times a day  - Stand patient 3 times a day  - Ambulate patient 3 times a day  - Out of bed to chair 3 times a day   - Out of bed for meals 3 times a day  - Out of bed for toileting  - Record patient progress and toleration of activity level   Outcome: Progressing     Problem: DISCHARGE PLANNING  Goal: Discharge to home or other facility with appropriate resources  Description: INTERVENTIONS:  - Identify barriers to discharge w/patient and caregiver  - Arrange for needed discharge resources and transportation as appropriate  - Identify discharge learning needs (meds, wound care,  etc.)  - Arrange for interpretive services to assist at discharge as needed  - Refer to Case Management Department for coordinating discharge planning if the patient needs post-hospital services based on physician/advanced practitioner order or complex needs related to functional status, cognitive ability, or social support system  Outcome: Progressing     Problem: Knowledge Deficit  Goal: Patient/family/caregiver demonstrates understanding of disease process, treatment plan, medications, and discharge instructions  Description: Complete learning assessment and assess knowledge base.  Interventions:  - Provide teaching at level of understanding  - Provide teaching via preferred learning methods  Outcome: Progressing     Problem: NEUROSENSORY - ADULT  Goal: Achieves stable or improved neurological status  Description: INTERVENTIONS  - Monitor and report changes in neurological status  - Monitor vital signs such as temperature, blood pressure, glucose, and any other labs ordered   - Initiate measures to prevent increased intracranial pressure  - Monitor for seizure activity and implement precautions if appropriate      Outcome: Progressing  Goal: Achieves maximal functionality and self care  Description: INTERVENTIONS  - Monitor swallowing and airway patency with patient fatigue and changes in neurological status  - Encourage and assist patient to increase activity and self care.   - Encourage visually impaired, hearing impaired and aphasic patients to use assistive/communication devices  Outcome: Progressing

## 2025-01-07 NOTE — OP NOTE
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 (Active)   Number of days: 0       Urethral Catheter Non-latex 16 Fr. 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  He was a significant amount of compression on the spinal cord likely the cause of her progressive symptoms.  After discussing the risks and benefits of resection including bleeding, stroke, infection, CSF leak, paralysis and death she elected to proceed.    Operative Findings:  Pathology consistent with meningioma      Complications:   None    Procedure and Technique:  After obtaining informed consent the patient was brought to the operating room.  General tracheal anesthesia was induced.  Arterial line Valencia catheter placed by anesthesia.  The patient then received perioperative Decadron and  cefazolin.  Neuromonitoring leads were placed.  The patient was then flipped prone onto a Tarik table with all her pressure points padded.  Baseline motor evoked potentials and somatosensory evoked potentials were then obtained.  These were found to be monitorable and significantly decreased consistent with her exam on the left lower extremity.     Fluoroscopy was brought into the field and help localize the T1-T4 levels.  A midline incision was planned.  Her back was prepped and draped in the usual sterile fashion.  Surgical timeout was performed.  A midline incision was then made spanning from T1-T4.  Using monopolar cautery we dissected down to the fascia undermining it.  Then the fascia was opened and using a subperiosteal dissection the lamina and spinous process of T2-3 and 4 were exposed.  Fluoroscopy was brought into the field and help confirm our level.  Next a high-speed Midas Cory drill was used to create troughs of the lamina of T2 and T3.  These lamina were then removed and a lobster tail fashion.  50% of the lamina of T4 was then removed.  Next, I worked around the gutters of the lamina to expose the lamina all the way to the pedicle ensuring that the facet remains intact.  There were significantly enlarged and dilated epidural veins.  This venous plexus was bipolared to obtain hemostasis.  The gutters were then lined with Surgicel snow.    With the dura exposed there was easily noticeable where the meningioma was as it was significantly firm along the T3 lamina.  Ultrasound was brought into the field and help confirm the location of the tumor as well as ensure that our exposure rostral and caudal was sufficient.  Next, beginning caudally the dura was opened with a 15 blade.  Using a Nava elevator we began to work rostrally taking care to dissect the tumor as we moved rostral.  The dura was ultimately opened.  The arachnoid was opened.  The dura was then tented laterally and secured to the muscle  and fascia.    After obtaining adequate exposure the tumor was carefully explored.  The attachment on the left side of the dura was significantly calcified.  The arachnoid was opened surrounding the tumor and the tumor capsule was bipolared to help shrink it and obtain hemostasis.  A small portion was sent as frozen specimen and returned consistent with meningioma.  We then worked circumferentially around the tumor detaching it from overlying arachnoid and nerve roots.  I was able to debulk the majority of the tumor internally using suction and bipolar cautery.  This allowed for some easier mobilization of the tumor.  Once the majority of the tumor was removed we worked along the calcified edge of the dura.  Using a Rhoton 2 we carefully dissected the calcified tumor attachment away from the inner layer of the dura.  This was able to be fully removed without causing large rent in the dura.  Once this was done all dural attachments were bipolared.  We then explored the incision and underlying dura to ensure that there is no residual tumor.  Hemostasis was achieved and we irrigated all blood products away from the spinal cord.    Next we turned our attention to closure.  The dural incision was closed with a running 6-0 Rippey-Buster suture.  The wound was then copiously irrigated with 3 L of antibiotic irrigation.  After ensuring no dural leak a small amount of DuraSeal was placed over the suture line.  DuraGen was placed over this with more DuraSeal.  A 15 [place] round drain was then tunneled in a subfascial manner.  The muscle was closed with 0 Vicryl's.  The fascia was closed with 0 Vicryl's and run in a continuous fashion with an 0 Vicryl.  The deep dermal layer was closed with 0 Vicryl's.  The dermis was closed using inverted 2-0 Vicryl's and the skin was closed using a 3 OV lock and glue.  30 cc of Marcaine was infiltrated along the incision.  Sterile dressing was applied.    The drain stitch was secured in place  with a 2-0 Prolene.      The patient was then flipped supine onto her hospital bed and found to be in stable neurologic condition.  She was extubated without difficulty and transferred to the postanesthesia care unit.  There were 2 uninterrupted and correct surgical counts.  A surgical sign out was performed.    Motor evoked potentials and somatosensory evoked potentials remained stable throughout the duration of the case without any changes.    Due to the complexity of the case and lack of qualified , Dr. Rene Shah MD PhD, assisted throughout the duration of the case with the critical portions including exposure, laminectomy, tumor resection and manipulation, and complex closure.    I was present for the entire procedure.    Patient Disposition:  PACU        SIGNATURE: Ascencion Coulter MD  DATE: January 7, 2025  TIME: 12:45 PM

## 2025-01-07 NOTE — ANESTHESIA POSTPROCEDURE EVALUATION
Post-Op Assessment Note    CV Status:  Stable    Pain management: adequate       Mental Status:  Alert and awake   Hydration Status:  Euvolemic   PONV Controlled:  Controlled   Airway Patency:  Patent     Post Op Vitals Reviewed: Yes    No anethesia notable event occurred.    Staff: Anesthesiologist           Last Filed PACU Vitals:  Vitals Value Taken Time   Temp 98.1 °F (36.7 °C) 01/07/25 1251   Pulse 93 01/07/25 1522   /54 01/07/25 1515   Resp 12 01/07/25 1522   SpO2 95 % 01/07/25 1522   Vitals shown include unfiled device data.    Modified Mariama:     Vitals Value Taken Time   Activity 2 01/07/25 1255   Respiration 2 01/07/25 1255   Circulation 2 01/07/25 1255   Consciousness 2 01/07/25 1255   Oxygen Saturation 2 01/07/25 1255     Modified Mariama Score: 10

## 2025-01-07 NOTE — ANESTHESIA POSTPROCEDURE EVALUATION
Post-Op Assessment Note    CV Status:  Stable  Pain Score: 2    Pain management: adequate       Mental Status:  Alert and awake   Hydration Status:  Stable   PONV Controlled:  None   Airway Patency:  Patent     Post Op Vitals Reviewed: Yes    No anethesia notable event occurred.    Staff: CRNA       Last Filed PACU Vitals:  Vitals Value Taken Time   Temp 98.1 °F (36.7 °C) 01/07/25 1251   Pulse 100 01/07/25 1253   /77 01/07/25 1251   Resp 23 01/07/25 1253   SpO2 96 % 01/07/25 1253   Vitals shown include unfiled device data.

## 2025-01-07 NOTE — ANESTHESIA PREPROCEDURE EVALUATION
Procedure:  NAVIGATED T2-4 LAMINECTOMY LUMBAR/THORACIC FOR TUMOR, possible additional levels, possible fusion (Bilateral: Spine Lumbar)    Relevant Problems   ANESTHESIA   (+) Motion sickness      CARDIO   (+) Chronic venous insufficiency   (+) Hyperlipidemia      GI/HEPATIC   (+) Gastroesophageal reflux disease      HEMATOLOGY   (+) MARV (iron deficiency anemia)      MUSCULOSKELETAL   (+) Cervical spondylosis without myelopathy   (+) Chronic low back pain   (+) Lumbar spondylosis   (+) Myofascial pain syndrome   (+) Osteoarthritis of multiple joints   (+) Podagra   (+) Primary osteoarthritis of left hip      NEURO/PSYCH   (+) Chronic low back pain   (+) Chronic pain syndrome   (+) Depression, recurrent (HCC)   (+) Myofascial pain syndrome      PULMONARY   (+) Mild intermittent asthma without complication      Ear/Nose/Throat   (+) Reflux laryngitis      Neurology/Sleep   (+) Compression of thoracic spinal cord with myelopathy (HCC)   (+) Vocal fold paresis, right        Physical Exam    Airway    Mallampati score: II  TM Distance: >3 FB  Neck ROM: full     Dental   No notable dental hx     Cardiovascular  Cardiovascular exam normal    Pulmonary  Pulmonary exam normal     Other Findings  post-pubertal.      Anesthesia Plan  ASA Score- 3     Anesthesia Type- general with ASA Monitors.         Additional Monitors: arterial line.    Airway Plan: ETT.           Plan Factors-Exercise tolerance (METS): >4 METS.    Chart reviewed. EKG reviewed. Imaging results reviewed. Existing labs reviewed. Patient summary reviewed.    Patient is not a current smoker.              Induction- intravenous.    Postoperative Plan- Plan for postoperative opioid use. Planned trial extubation    Perioperative Resuscitation Plan - Level 1 - Full Code.       Informed Consent- Anesthetic plan and risks discussed with patient.        Discussed General Anesthesia with patient including but not limited to risk of cardiac insult, pulmonary  complication, stroke, reaction to medications and death. All questions answered and consent was obtained.      NPO appropriate.     Post-op ICU

## 2025-01-07 NOTE — ANESTHESIA PROCEDURE NOTES
"Arterial Line Insertion    Performed by: Milagros Naik MD  Authorized by: Milagros Naik MD  Consent: Verbal consent obtained. Written consent obtained.  Risks and benefits: risks, benefits and alternatives were discussed  Consent given by: patient  Patient understanding: patient states understanding of the procedure being performed  Patient consent: the patient's understanding of the procedure matches consent given  Procedure consent: procedure consent matches procedure scheduled  Relevant documents: relevant documents present and verified  Test results: test results available and properly labeled  Site marked: the operative site was marked  Radiology Images: Radiology Images displayed and confirmed. If images not available, report reviewed  Required items: required blood products, implants, devices, and special equipment available  Patient identity confirmed: verbally with patient  Time out: Immediately prior to procedure a \"time out\" was called to verify the correct patient, procedure, equipment, support staff and site/side marked as required.  Preparation: Patient was prepped and draped in the usual sterile fashion.  Indications: hemodynamic monitoring  Orientation:  Right  Location: radial artery  Sedation:  Patient sedated: yes    Procedure Details:  Needle gauge: 20  Seldinger technique: Seldinger technique used  Number of attempts: 1    Post-procedure:  Post-procedure: dressing applied  Waveform: good waveform  Post-procedure CNS: normal  Patient tolerance: Patient tolerated the procedure well with no immediate complications          "

## 2025-01-08 LAB
ALBUMIN SERPL BCG-MCNC: 3.6 G/DL (ref 3.5–5)
ALP SERPL-CCNC: 71 U/L (ref 34–104)
ALT SERPL W P-5'-P-CCNC: 25 U/L (ref 7–52)
ANION GAP SERPL CALCULATED.3IONS-SCNC: 7 MMOL/L (ref 4–13)
APTT PPP: 27 SECONDS (ref 23–34)
AST SERPL W P-5'-P-CCNC: 21 U/L (ref 13–39)
BASOPHILS # BLD AUTO: 0.01 THOUSANDS/ΜL (ref 0–0.1)
BASOPHILS NFR BLD AUTO: 0 % (ref 0–1)
BILIRUB SERPL-MCNC: 0.26 MG/DL (ref 0.2–1)
BUN SERPL-MCNC: 12 MG/DL (ref 5–25)
CALCIUM SERPL-MCNC: 8.9 MG/DL (ref 8.4–10.2)
CHLORIDE SERPL-SCNC: 105 MMOL/L (ref 96–108)
CO2 SERPL-SCNC: 27 MMOL/L (ref 21–32)
CREAT SERPL-MCNC: 0.52 MG/DL (ref 0.6–1.3)
EOSINOPHIL # BLD AUTO: 0 THOUSAND/ΜL (ref 0–0.61)
EOSINOPHIL NFR BLD AUTO: 0 % (ref 0–6)
ERYTHROCYTE [DISTWIDTH] IN BLOOD BY AUTOMATED COUNT: 16.2 % (ref 11.6–15.1)
GFR SERPL CREATININE-BSD FRML MDRD: 102 ML/MIN/1.73SQ M
GLUCOSE SERPL-MCNC: 130 MG/DL (ref 65–140)
GLUCOSE SERPL-MCNC: 132 MG/DL (ref 65–140)
GLUCOSE SERPL-MCNC: 144 MG/DL (ref 65–140)
GLUCOSE SERPL-MCNC: 167 MG/DL (ref 65–140)
HCT VFR BLD AUTO: 32.7 % (ref 34.8–46.1)
HGB BLD-MCNC: 10 G/DL (ref 11.5–15.4)
IMM GRANULOCYTES # BLD AUTO: 0.05 THOUSAND/UL (ref 0–0.2)
IMM GRANULOCYTES NFR BLD AUTO: 1 % (ref 0–2)
INR PPP: 1.01 (ref 0.85–1.19)
LYMPHOCYTES # BLD AUTO: 0.67 THOUSANDS/ΜL (ref 0.6–4.47)
LYMPHOCYTES NFR BLD AUTO: 8 % (ref 14–44)
MCH RBC QN AUTO: 27 PG (ref 26.8–34.3)
MCHC RBC AUTO-ENTMCNC: 30.6 G/DL (ref 31.4–37.4)
MCV RBC AUTO: 88 FL (ref 82–98)
MONOCYTES # BLD AUTO: 0.23 THOUSAND/ΜL (ref 0.17–1.22)
MONOCYTES NFR BLD AUTO: 3 % (ref 4–12)
NEUTROPHILS # BLD AUTO: 7.09 THOUSANDS/ΜL (ref 1.85–7.62)
NEUTS SEG NFR BLD AUTO: 88 % (ref 43–75)
NRBC BLD AUTO-RTO: 0 /100 WBCS
PLATELET # BLD AUTO: 226 THOUSANDS/UL (ref 149–390)
PLATELET # BLD AUTO: 249 THOUSANDS/UL (ref 149–390)
PMV BLD AUTO: 9.6 FL (ref 8.9–12.7)
PMV BLD AUTO: 9.7 FL (ref 8.9–12.7)
POTASSIUM SERPL-SCNC: 3.7 MMOL/L (ref 3.5–5.3)
PROT SERPL-MCNC: 6.3 G/DL (ref 6.4–8.4)
PROTHROMBIN TIME: 13.6 SECONDS (ref 12.3–15)
RBC # BLD AUTO: 3.7 MILLION/UL (ref 3.81–5.12)
SODIUM SERPL-SCNC: 139 MMOL/L (ref 135–147)
WBC # BLD AUTO: 8.05 THOUSAND/UL (ref 4.31–10.16)

## 2025-01-08 PROCEDURE — 82948 REAGENT STRIP/BLOOD GLUCOSE: CPT

## 2025-01-08 PROCEDURE — 85610 PROTHROMBIN TIME: CPT | Performed by: PHYSICIAN ASSISTANT

## 2025-01-08 PROCEDURE — 99024 POSTOP FOLLOW-UP VISIT: CPT | Performed by: NEUROLOGICAL SURGERY

## 2025-01-08 PROCEDURE — 85730 THROMBOPLASTIN TIME PARTIAL: CPT | Performed by: PHYSICIAN ASSISTANT

## 2025-01-08 PROCEDURE — 85025 COMPLETE CBC W/AUTO DIFF WBC: CPT

## 2025-01-08 PROCEDURE — 97164 PT RE-EVAL EST PLAN CARE: CPT

## 2025-01-08 PROCEDURE — 85049 AUTOMATED PLATELET COUNT: CPT | Performed by: PHYSICIAN ASSISTANT

## 2025-01-08 PROCEDURE — 99233 SBSQ HOSP IP/OBS HIGH 50: CPT | Performed by: ANESTHESIOLOGY

## 2025-01-08 PROCEDURE — 97168 OT RE-EVAL EST PLAN CARE: CPT

## 2025-01-08 PROCEDURE — NC001 PR NO CHARGE: Performed by: ANESTHESIOLOGY

## 2025-01-08 PROCEDURE — 80053 COMPREHEN METABOLIC PANEL: CPT

## 2025-01-08 RX ORDER — METHOCARBAMOL 500 MG/1
1000 TABLET, FILM COATED ORAL EVERY 8 HOURS SCHEDULED
Status: DISCONTINUED | OUTPATIENT
Start: 2025-01-08 | End: 2025-01-15 | Stop reason: HOSPADM

## 2025-01-08 RX ORDER — HEPARIN SODIUM 5000 [USP'U]/ML
5000 INJECTION, SOLUTION INTRAVENOUS; SUBCUTANEOUS EVERY 8 HOURS SCHEDULED
Status: COMPLETED | OUTPATIENT
Start: 2025-01-08 | End: 2025-01-09

## 2025-01-08 RX ORDER — PANTOPRAZOLE SODIUM 40 MG/10ML
40 INJECTION, POWDER, LYOPHILIZED, FOR SOLUTION INTRAVENOUS ONCE
Status: DISCONTINUED | OUTPATIENT
Start: 2025-01-08 | End: 2025-01-08

## 2025-01-08 RX ORDER — CEFAZOLIN SODIUM 1 G/50ML
1000 SOLUTION INTRAVENOUS EVERY 8 HOURS
Status: COMPLETED | OUTPATIENT
Start: 2025-01-08 | End: 2025-01-11

## 2025-01-08 RX ORDER — PANTOPRAZOLE SODIUM 40 MG/10ML
40 INJECTION, POWDER, LYOPHILIZED, FOR SOLUTION INTRAVENOUS ONCE
Status: COMPLETED | OUTPATIENT
Start: 2025-01-08 | End: 2025-01-08

## 2025-01-08 RX ADMIN — OXYCODONE HYDROCHLORIDE 10 MG: 10 TABLET ORAL at 21:53

## 2025-01-08 RX ADMIN — CALCIUM CARBONATE (ANTACID) CHEW TAB 500 MG 1000 MG: 500 CHEW TAB at 08:26

## 2025-01-08 RX ADMIN — CEFAZOLIN SODIUM 2000 MG: 2 SOLUTION INTRAVENOUS at 08:17

## 2025-01-08 RX ADMIN — METHOCARBAMOL 1000 MG: 500 TABLET ORAL at 21:54

## 2025-01-08 RX ADMIN — DEXAMETHASONE 4 MG: 4 TABLET ORAL at 17:12

## 2025-01-08 RX ADMIN — PANTOPRAZOLE SODIUM 40 MG: 40 INJECTION, POWDER, FOR SOLUTION INTRAVENOUS at 09:16

## 2025-01-08 RX ADMIN — OXYCODONE HYDROCHLORIDE 5 MG: 5 TABLET ORAL at 12:44

## 2025-01-08 RX ADMIN — PRAMIPEXOLE DIHYDROCHLORIDE 0.5 MG: 0.5 TABLET ORAL at 21:54

## 2025-01-08 RX ADMIN — DEXAMETHASONE 4 MG: 4 TABLET ORAL at 00:06

## 2025-01-08 RX ADMIN — POLYETHYLENE GLYCOL 3350 17 G: 17 POWDER, FOR SOLUTION ORAL at 08:17

## 2025-01-08 RX ADMIN — MELATONIN TAB 3 MG 3 MG: 3 TAB at 21:56

## 2025-01-08 RX ADMIN — DULOXETINE HYDROCHLORIDE 60 MG: 60 CAPSULE, DELAYED RELEASE ORAL at 08:17

## 2025-01-08 RX ADMIN — DOCUSATE SODIUM 100 MG: 100 CAPSULE, LIQUID FILLED ORAL at 08:16

## 2025-01-08 RX ADMIN — INSULIN LISPRO 1 UNITS: 100 INJECTION, SOLUTION INTRAVENOUS; SUBCUTANEOUS at 11:27

## 2025-01-08 RX ADMIN — ACETAMINOPHEN 975 MG: 325 TABLET, FILM COATED ORAL at 00:05

## 2025-01-08 RX ADMIN — PANTOPRAZOLE SODIUM 40 MG: 40 TABLET, DELAYED RELEASE ORAL at 05:16

## 2025-01-08 RX ADMIN — METHOCARBAMOL 1000 MG: 100 INJECTION INTRAMUSCULAR; INTRAVENOUS at 05:16

## 2025-01-08 RX ADMIN — SENNOSIDES 8.6 MG: 8.6 TABLET, FILM COATED ORAL at 08:16

## 2025-01-08 RX ADMIN — CEFAZOLIN SODIUM 1000 MG: 1 SOLUTION INTRAVENOUS at 16:34

## 2025-01-08 RX ADMIN — ACETAMINOPHEN 975 MG: 325 TABLET, FILM COATED ORAL at 05:15

## 2025-01-08 RX ADMIN — ACETAMINOPHEN 975 MG: 325 TABLET, FILM COATED ORAL at 21:54

## 2025-01-08 RX ADMIN — OXYCODONE HYDROCHLORIDE 5 MG: 5 TABLET ORAL at 08:17

## 2025-01-08 RX ADMIN — FUROSEMIDE 20 MG: 20 TABLET ORAL at 08:12

## 2025-01-08 RX ADMIN — LIDOCAINE 2 PATCH: 50 PATCH TOPICAL at 08:15

## 2025-01-08 RX ADMIN — SODIUM CHLORIDE 500 ML: 0.9 INJECTION, SOLUTION INTRAVENOUS at 04:26

## 2025-01-08 RX ADMIN — DEXAMETHASONE 4 MG: 4 TABLET ORAL at 11:29

## 2025-01-08 RX ADMIN — METHOCARBAMOL 1000 MG: 100 INJECTION INTRAMUSCULAR; INTRAVENOUS at 00:06

## 2025-01-08 RX ADMIN — HEPARIN SODIUM 5000 UNITS: 5000 INJECTION, SOLUTION INTRAVENOUS; SUBCUTANEOUS at 13:58

## 2025-01-08 RX ADMIN — HYDROMORPHONE HYDROCHLORIDE 0.2 MG: 1 INJECTION, SOLUTION INTRAMUSCULAR; INTRAVENOUS; SUBCUTANEOUS at 19:29

## 2025-01-08 RX ADMIN — ACETAMINOPHEN 975 MG: 325 TABLET, FILM COATED ORAL at 13:58

## 2025-01-08 RX ADMIN — DEXAMETHASONE 4 MG: 4 TABLET ORAL at 05:16

## 2025-01-08 RX ADMIN — POTASSIUM CHLORIDE 40 MEQ: 1500 TABLET, EXTENDED RELEASE ORAL at 08:16

## 2025-01-08 RX ADMIN — OXYCODONE HYDROCHLORIDE 10 MG: 10 TABLET ORAL at 16:58

## 2025-01-08 RX ADMIN — HEPARIN SODIUM 5000 UNITS: 5000 INJECTION, SOLUTION INTRAVENOUS; SUBCUTANEOUS at 21:53

## 2025-01-08 RX ADMIN — METHOCARBAMOL 1000 MG: 500 TABLET ORAL at 12:45

## 2025-01-08 NOTE — PLAN OF CARE
Problem: PAIN - ADULT  Goal: Verbalizes/displays adequate comfort level or baseline comfort level  Description: Interventions:  - Encourage patient to monitor pain and request assistance  - Assess pain using appropriate pain scale  - Administer analgesics based on type and severity of pain and evaluate response  - Implement non-pharmacological measures as appropriate and evaluate response  - Consider cultural and social influences on pain and pain management  - Notify physician/advanced practitioner if interventions unsuccessful or patient reports new pain  Outcome: Progressing     Problem: INFECTION - ADULT  Goal: Absence or prevention of progression during hospitalization  Description: INTERVENTIONS:  - Assess and monitor for signs and symptoms of infection  - Monitor lab/diagnostic results  - Monitor all insertion sites, i.e. indwelling lines, tubes, and drains  - Monitor endotracheal if appropriate and nasal secretions for changes in amount and color  - [place] appropriate cooling/warming therapies per order  - Administer medications as ordered  - Instruct and encourage patient and family to use good hand hygiene technique  - Identify and instruct in appropriate isolation precautions for identified infection/condition  Outcome: Progressing     Problem: SAFETY ADULT  Goal: Patient will remain free of falls  Description: INTERVENTIONS:  - Educate patient/family on patient safety including physical limitations  - Instruct patient to call for assistance with activity   - Consult OT/PT to assist with strengthening/mobility   - Keep Call bell within reach  - Keep bed low and locked with side rails adjusted as appropriate  - Keep care items and personal belongings within reach  - Initiate and maintain comfort rounds  - Make Fall Risk Sign visible to staff  - Offer Toileting every 2 Hours, in advance of need  - Initiate/Maintain alarm  - Obtain necessary fall risk management equipment  - Apply yellow socks and  bracelet for high fall risk patients  - Consider moving patient to room near nurses station  Outcome: Progressing  Goal: Maintain or return to baseline ADL function  Description: INTERVENTIONS:  -  Assess patient's ability to carry out ADLs; assess patient's baseline for ADL function and identify physical deficits which impact ability to perform ADLs (bathing, care of mouth/teeth, toileting, grooming, dressing, etc.)  - Assess/evaluate cause of self-care deficits   - Assess range of motion  - Assess patient's mobility; develop plan if impaired  - Assess patient's need for assistive devices and provide as appropriate  - Encourage maximum independence but intervene and supervise when necessary  - Involve family in performance of ADLs  - Assess for home care needs following discharge   - Consider OT consult to assist with ADL evaluation and planning for discharge  - Provide patient education as appropriate  Outcome: Progressing  Goal: Maintains/Returns to pre admission functional level  Description: INTERVENTIONS:  - Perform AM-PAC 6 Click Basic Mobility/ Daily Activity assessment daily.  - Set and communicate daily mobility goal to care team and patient/family/caregiver.   - Collaborate with rehabilitation services on mobility goals if consulted  - Perform Range of Motion 3 times a day.  - Reposition patient every 2 hours.  - Dangle patient 3 times a day  - Stand patient 3 times a day  - Ambulate patient 3 times a day  - Out of bed to chair 3 times a day   - Out of bed for meals 3 times a day  - Out of bed for toileting  - Record patient progress and toleration of activity level   Outcome: Progressing     Problem: DISCHARGE PLANNING  Goal: Discharge to home or other facility with appropriate resources  Description: INTERVENTIONS:  - Identify barriers to discharge w/patient and caregiver  - Arrange for needed discharge resources and transportation as appropriate  - Identify discharge learning needs (meds, wound care,  etc.)  - Arrange for interpretive services to assist at discharge as needed  - Refer to Case Management Department for coordinating discharge planning if the patient needs post-hospital services based on physician/advanced practitioner order or complex needs related to functional status, cognitive ability, or social support system  Outcome: Progressing

## 2025-01-08 NOTE — PLAN OF CARE
"  Problem: OCCUPATIONAL THERAPY ADULT  Goal: Performs self-care activities at highest level of function for planned discharge setting.  See evaluation for individualized goals.  Description: Treatment Interventions: ADL retraining, Functional transfer training, Endurance training, Patient/family training, Equipment evaluation/education, Compensatory technique education, Activityengagement          See flowsheet documentation for full assessment, interventions and recommendations.   1/8/2025 1516 by Dione Guillen OT  Note: Limitation: Decreased ADL status, Decreased endurance, Decreased high-level ADLs, Decreased self-care trans, Decreased UE ROM, Decreased sensation  Prognosis: Fair  Assessment: Pt is a 63 y/o female seen for OT re-reval s/p the following procedure \"Bilateral - T2-4 LAMINECTOMY THORACIC FOR TUMOR \" performed on 1/7. Pt initially admitted for compression of spinal cord myelopath and progressive LE weakness/difficulty walking. PT seen for initial OT eval on 1/2, performed at a level of  Min A ADLS, transfers and functional mobility. Pt currently performing @ a level of Min A UB ADLS, Mod A LB ADLS, Min A transfers and functional mobility w/ RW. Pt remains limited 2* pain, sensation, ROM, strength, endurance, forward functional reach, activity tolerance, functional mobility, balance and decreased I w/ ADLS. Recommend maximum resource intensity, upon D/C. Pt continues to benefit from immediate inpatient skilled OT services, 3-5x/wk. Will continue to follow to address goals stated below to be met within the next 10-14 days:  Recommendation: Physiatry Consult  Rehab Resource Intensity Level, OT: I (Maximum Resource Intensity)       1/8/2025 1516 by Dione Guillen OT  Note: Limitation: Decreased ADL status, Decreased endurance, Decreased high-level ADLs, Decreased self-care trans, Decreased UE ROM, Decreased sensation  Prognosis: Fair  Assessment: Pt is a 63 y/o female seen for OT re-reval s/p the " "following procedure \"Bilateral - T2-4 LAMINECTOMY THORACIC FOR TUMOR \" performed on 1/7. Pt initially admitted for compression of spinal cord myelopath and progressive LE weakness/difficulty walking. PT seen for initial OT eval on 1/2, performed at a level of  Min A ADLS, transfers and functional mobility. Pt currently performing @ a level of Min A UB ADLS, Mod A LB ADLS, Min A transfers and functional mobility w/ RW. Pt remains limited 2* pain, sensation, ROM, strength, endurance, forward functional reach, activity tolerance, functional mobility, balance and decreased I w/ ADLS. Recommend maximum resource intensity, upon D/C. Pt continues to benefit from immediate inpatient skilled OT services, 3-5x/wk. Will continue to follow to address goals stated below to be met within the next 10-14 days:  Recommendation: Physiatry Consult  Rehab Resource Intensity Level, OT: I (Maximum Resource Intensity)        Dione Guillen MS, OTR/L    "

## 2025-01-08 NOTE — ASSESSMENT & PLAN NOTE
POD#1 - s/p T2-4 laminectomy for resection of thoracic tumor (EM 1/7)   T3 lesion w/ concern of progressively worsening myelopathy  P/w progressively worsening bilateral lower extremity, L >R, weakness and balance difficulty x approx 2 months   Acutely worsened over 2 weeks prior to admission, requiring use of a rolling walker    Imaging:   MRI Cervical spine 1/6/25: Redemonstration of meningioma in the posterior left aspect of the canal at T3. Compared with 2022, mass is mildly increased in size and marked cord compression is also mildly increased. Focal cord edema at T3 not excluded but there is no edema in the immediately above or below the level of compression. Stable degenerative spondylosis in the cervical spine most pronounced at C5-6  MRI cervical spine 1/2/2025: Partially nondiagnostic exam due to motion artifact.  T3 lesion likely mildly increased in size based on evaluation and sagittal imaging.  CT cervical spine 1/2/2025: Partially calcified extramedullary T3 lesion slightly larger than prior MRI report.  CT thoracic and lumbar spine 1/2/2025: Thoracolumbar fusion.  Chronic disc and facet degenerative changes at L4-5 resulting in mild canal stenosis and severe foraminal narrowing.    Plan:   Continue to closely monitor neuro exam   Frequent neuro checks per primary team  Complete MAP goals > 85 x 24hrs post-op   1 posterior BRAYAN drain in place to GRAVITY   95cc/24hrs, serosanguinous drainage   Maintain in place at this time --> tentative plan to remove pending output Friday   Continue IV ancef 1g q 8hrs while BRAYAN drain in place   Continue local wound care to incision   Keep clean and dry   Dressing to remain in place until POD2   Continue dex wean as ordered   Continue to hold all AC/AP meds x at least 2 weeks post-op   No reports use at home prior to arrival   DVT ppx: gemma Curtis for chem dvt ppx to start this afternoon   Will tentative hold SQH Friday am for drain removal   Medical management per primary  team  Appreciate ICU assistance --> pt is cleared from a nsgy standpoint to transfer out of the ICU today   Pain control per primary team   Pt encouraged to mobilize with PT/OT    Social work following or assistance with dispo once medically cleared      Neurosurgery will continue to follow.  Please reach out with any further questions or concerns.

## 2025-01-08 NOTE — PLAN OF CARE
Problem: PAIN - ADULT  Goal: Verbalizes/displays adequate comfort level or baseline comfort level  Description: Interventions:  - Encourage patient to monitor pain and request assistance  - Assess pain using appropriate pain scale  - Administer analgesics based on type and severity of pain and evaluate response  - Implement non-pharmacological measures as appropriate and evaluate response  - Consider cultural and social influences on pain and pain management  - Notify physician/advanced practitioner if interventions unsuccessful or patient reports new pain  Outcome: Progressing     Problem: INFECTION - ADULT  Goal: Absence or prevention of progression during hospitalization  Description: INTERVENTIONS:  - Assess and monitor for signs and symptoms of infection  - Monitor lab/diagnostic results  - Monitor all insertion sites, i.e. indwelling lines, tubes, and drains  - Monitor endotracheal if appropriate and nasal secretions for changes in amount and color  - [place] appropriate cooling/warming therapies per order  - Administer medications as ordered  - Instruct and encourage patient and family to use good hand hygiene technique  - Identify and instruct in appropriate isolation precautions for identified infection/condition  Outcome: Progressing     Problem: SAFETY ADULT  Goal: Patient will remain free of falls  Description: INTERVENTIONS:  - Educate patient/family on patient safety including physical limitations  - Instruct patient to call for assistance with activity   - Consult OT/PT to assist with strengthening/mobility   - Keep Call bell within reach  - Keep bed low and locked with side rails adjusted as appropriate  - Keep care items and personal belongings within reach  - Initiate and maintain comfort rounds  - Make Fall Risk Sign visible to staff  - Offer Toileting every 2 Hours, in advance of need  - Initiate/Maintain alarm  - Obtain necessary fall risk management equipment  - Apply yellow socks and  bracelet for high fall risk patients  - Consider moving patient to room near nurses station  Outcome: Progressing  Goal: Maintain or return to baseline ADL function  Description: INTERVENTIONS:  -  Assess patient's ability to carry out ADLs; assess patient's baseline for ADL function and identify physical deficits which impact ability to perform ADLs (bathing, care of mouth/teeth, toileting, grooming, dressing, etc.)  - Assess/evaluate cause of self-care deficits   - Assess range of motion  - Assess patient's mobility; develop plan if impaired  - Assess patient's need for assistive devices and provide as appropriate  - Encourage maximum independence but intervene and supervise when necessary  - Involve family in performance of ADLs  - Assess for home care needs following discharge   - Consider OT consult to assist with ADL evaluation and planning for discharge  - Provide patient education as appropriate  Outcome: Progressing  Goal: Maintains/Returns to pre admission functional level  Description: INTERVENTIONS:  - Perform AM-PAC 6 Click Basic Mobility/ Daily Activity assessment daily.  - Set and communicate daily mobility goal to care team and patient/family/caregiver.   - Collaborate with rehabilitation services on mobility goals if consulted  - Perform Range of Motion 3 times a day.  - Reposition patient every 2 hours.  - Dangle patient 3 times a day  - Stand patient 3 times a day  - Ambulate patient 3 times a day  - Out of bed to chair 3 times a day   - Out of bed for meals 3 times a day  - Out of bed for toileting  - Record patient progress and toleration of activity level   Outcome: Progressing     Problem: DISCHARGE PLANNING  Goal: Discharge to home or other facility with appropriate resources  Description: INTERVENTIONS:  - Identify barriers to discharge w/patient and caregiver  - Arrange for needed discharge resources and transportation as appropriate  - Identify discharge learning needs (meds, wound care,  etc.)  - Arrange for interpretive services to assist at discharge as needed  - Refer to Case Management Department for coordinating discharge planning if the patient needs post-hospital services based on physician/advanced practitioner order or complex needs related to functional status, cognitive ability, or social support system  Outcome: Progressing     Problem: Knowledge Deficit  Goal: Patient/family/caregiver demonstrates understanding of disease process, treatment plan, medications, and discharge instructions  Description: Complete learning assessment and assess knowledge base.  Interventions:  - Provide teaching at level of understanding  - Provide teaching via preferred learning methods  Outcome: Progressing     Problem: NEUROSENSORY - ADULT  Goal: Achieves stable or improved neurological status  Description: INTERVENTIONS  - Monitor and report changes in neurological status  - Monitor vital signs such as temperature, blood pressure, glucose, and any other labs ordered   - Initiate measures to prevent increased intracranial pressure  - Monitor for seizure activity and implement precautions if appropriate      Outcome: Progressing  Goal: Achieves maximal functionality and self care  Description: INTERVENTIONS  - Monitor swallowing and airway patency with patient fatigue and changes in neurological status  - Encourage and assist patient to increase activity and self care.   - Encourage visually impaired, hearing impaired and aphasic patients to use assistive/communication devices  Outcome: Progressing

## 2025-01-08 NOTE — OCCUPATIONAL THERAPY NOTE
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 (HCC)  Active Problems:    Restless leg syndrome    Prediabetes    MARV (iron deficiency anemia)    Lymphedema    Ambulatory dysfunction      @hPl@    Past Surgical History:   Procedure Laterality Date    ARTHRODESIS      lumbar    ARTHRODESIS      Spinal Arthrodesis for Deformity; Last Assessed: 16Mar2017    BACK SURGERY      lumbar fusion,with nydia/screw and cage implant    BACK SURGERY      surgery for scoliosis    BREAST CYST EXCISION Right     benign    CHOLECYSTECTOMY LAPAROSCOPIC N/A 12/20/2023    Procedure: FENESTRATED SUBTOTAL CHOLECYSTECTOMY LAPAROSCOPIC, EXTENSIVE LYSIS OF ADHESIONS.;  Surgeon: Chelle Butler MD;  [facility];  Service: General    COLONOSCOPY      COLONOSCOPY N/A 03/14/2019    Procedure: COLONOSCOPY;  Surgeon: Van Dyson MD;  [facility]: [facility];  Service: Gastroenterology    ESOPHAGOGASTRODUODENOSCOPY N/A 03/14/2019    Procedure: ESOPHAGOGASTRODUODENOSCOPY (EGD) W RFA(BARRX);  Surgeon: Van Dyson MD;  [facility];  Service: Gastroenterology    HERNIA REPAIR      umbilical hernia repair x2    LUMBAR LAMINECTOMY FOR EXCISION OF NEOPLASM Bilateral 1/7/2025    Procedure: T2-4 LAMINECTOMY THORACIC FOR TUMOR;  Surgeon: Ascencion Coulter MD;  Location:  MAIN OR;  Service: Neurosurgery    PLANTAR FASCIA SURGERY Left     HI ARTHRS KNE SURG W/MENISCECTOMY MED/LAT W/SHVG Right 04/04/2018    Procedure: ARTHROSCOPY KNEE PARTIAL MEDIAL MENISECTOMY , CHONDROPLASTY;  Surgeon: Rocio Roe DO;  [facility];  Service: Orthopedics    HI BREAST REDUCTION Bilateral 03/12/2021    Procedure: BREAST REDUCTION;  Surgeon: Cortez Sandoval MD;  Location:  MAIN OR;  Service: Plastics    HI COLONOSCOPY FLX DX W/COLLJ SPEC WHEN PFRMD N/A 02/20/2018    Procedure: COLONOSCOPY with polypectomy;  Surgeon: Apryl Garcia MD;  [facility];  Service: General    ME ESOPHAGOGASTRODUODENOSCOPY TRANSORAL DIAGNOSTIC N/A 05/24/2017    Procedure: ESOPHAGOGASTRODUODENOSCOPY (EGD);  Surgeon: Marcello Street MD;  Location: [facility];  Service: Gastroenterology    ME ESOPHAGOGASTRODUODENOSCOPY TRANSORAL DIAGNOSTIC N/A 08/31/2017    Procedure: ESOPHAGOGASTRODUODENOSCOPY (EGD) W RFA;  Surgeon: Macho Aguayo MD;  Location:  GI LAB;  Service: Gastroenterology    ME LAPS RPR RECURRENT INCISIONAL HERNIA REDUCIBLE N/A 01/21/2021    Procedure: REPAIR HERNIA INCISIONAL LAPAROSCOPIC W/ ROBOTICS, with mesh;  Surgeon: Errol Bermudez MD;  [facility];  Service: General    ME RPR AA HERNIA 1ST 3-10 CM REDUCIBLE N/A 2/19/2024    Procedure: VENTRAL HERNIA REPAIR 3CM WITH MESH;  Surgeon: Chelle Butler MD;  Location:  MAIN OR;  Service: General    WISDOM TOOTH EXTRACTION          01/08/25 1009   OT Last Visit   OT Visit Date 01/08/25   Note Type   Note type (S)  Re-Evaluation   Pain Assessment   Pain Assessment Tool 0-10   Pain Score 9   Pain Location/Orientation Location: Shoulder;Location: Back;Orientation: Upper;Orientation: Bilateral   Pain Onset/Description Onset: Ongoing;Descriptor: Aching;Descriptor: Discomfort   Patient's Stated Pain Goal No pain   Hospital Pain Intervention(s) Repositioned;Ambulation/increased activity;Emotional support   Restrictions/Precautions   Weight Bearing Precautions Per Order No   Braces or Orthoses Other (Comment)  (none required)   Other Precautions Chair Alarm;Bed Alarm;Multiple lines;Telemetry;Fall Risk;Pain;Spinal precautions  (BRAYAN drain)   Home Living   Additional Comments See initial OT eval- pt is from [place]; plans to stay w/ a friend in [place] in 1 SH w/ eventual return back to [place].   Prior Function   Level of [place] Independent with ADLs;Independent with functional mobility;Independent with IADLS   Lives With Alone  (lives alone in [place] @ baseline)   Comments see initial OT eval   Lifestyle   Autonomy I adls and  mobility - reports she was most recently using SPC 2* LE weakness (L>R) but borrowed friends RW 2* progressive weakness over past 2 wks   Reciprocal Relationships supportive family and friends   Service to Others not working   Intrinsic Gratification active pta   ADL   Eating Assistance 7  Independent   Grooming Assistance 5  Supervision/Setup   UB Bathing Assistance 4  Minimal Assistance   LB Bathing Assistance 3  Moderate Assistance   UB Dressing Assistance 4  Minimal Assistance   LB Dressing Assistance 3  Moderate Assistance   Toileting Assistance  4  Minimal Assistance   Functional Assistance 4  Minimal Assistance   Functional Deficit Steadying;Verbal cueing;Supervision/safety;Increased time to complete   Bed Mobility   Supine to Sit Unable to assess   Sit to Supine Unable to assess   Additional Comments pt seated up OOB in recliner @ beginning and end of session   Transfers   Sit to Stand 4  Minimal assistance   Additional items Assist x 1;Increased time required;Verbal cues   Stand to Sit 4  Minimal assistance   Additional items Assist x 1;Increased time required;Verbal cues   Additional Comments w/ RW; VC for safety   Functional Mobility   Functional Mobility 4  Minimal assistance   Additional Comments pt engaged in short household distance functional mobility w/ Min A x1; use of RW.   Additional items Rolling walker   Balance   Static Sitting Good   Dynamic Sitting Fair +   Static Standing Fair   Dynamic Standing Fair -   Ambulatory Poor +   Activity Tolerance   Activity Tolerance Patient limited by fatigue;Patient limited by pain   Medical Staff Made Aware Valentina UGALDE   Nurse Made Aware yes, Frankie LAKHANI Assessment   RUE Assessment X   LUE Assessment   LUE Assessment X  (pain w/ shoulder ROM; limited in full range due to this.)   Hand Function   Gross Motor Coordination Functional   Fine Motor Coordination Functional   Sensation   Additional Comments decreased sensation in bilateral feel and knees  "  Vision-Basic Assessment   Current Vision Wears glasses all the time   Vision - Complex Assessment   Ocular Range of Motion Intact   Psychosocial   Psychosocial (WDL) WDL   Cognition   Overall Cognitive Status WFL   Arousal/Participation Responsive;Cooperative   Attention Within functional limits   Orientation Level Oriented X4   Memory Within functional limits   Following Commands Follows all commands and directions without difficulty   Comments pt is pleasant and cooperative   Assessment   Limitation Decreased ADL status;Decreased endurance;Decreased high-level ADLs;Decreased self-care trans;Decreased UE ROM;Decreased sensation   Prognosis Fair   Assessment Pt is a 65 y/o female seen for OT re-reval s/p the following procedure \"Bilateral - T2-4 LAMINECTOMY THORACIC FOR TUMOR \" performed on 1/7. Pt initially admitted for compression of spinal cord myelopath and progressive LE weakness/difficulty walking. PT seen for initial OT eval on 1/2, performed at a level of  Min A ADLS, transfers and functional mobility. Pt currently performing @ a level of Min A UB ADLS, Mod A LB ADLS, Min A transfers and functional mobility w/ RW. Pt remains limited 2* pain, sensation, ROM, strength, endurance, forward functional reach, activity tolerance, functional mobility, balance and decreased I w/ ADLS. Recommend maximum resource intensity, upon D/C. Pt continues to benefit from immediate inpatient skilled OT services, 3-5x/wk. Will continue to follow to address goals stated below to be met within the next 10-14 days:   Goals   Patient Goals to build more strength   LTG Time Frame 10-14   Long Term Goal #1 see below listed goals   Plan   Treatment Interventions ADL retraining;Functional transfer training;UE strengthening/ROM;Endurance training;Cognitive reorientation;Patient/family training;Equipment evaluation/education;Compensatory technique education;Continued evaluation;Energy conservation;Activityengagement   Goal Expiration " Date 01/22/25   OT Frequency 3-5x/wk   Discharge Recommendation   Recommendation Physiatry Consult   Rehab Resource Intensity Level, OT I (Maximum Resource Intensity)   Additional Comments  The patient's raw score on the AM-PAC Daily Activity Inpatient Short Form is 19. A raw score of greater than or equal to 19 suggests the patient may benefit from discharge to home. Please refer to the recommendation of the Occupational Therapist for safe discharge planning.   Additional Comments 2 Pt seen as a co-session due to the patient's co-morbidities, clinically unstable presentation, and present impairments which are a regression from the patient's baseline.   [facility] Daily Activity Inpatient   Lower Body Dressing 2   Bathing 3   Toileting 3   Upper Body Dressing 3   Grooming 4   Eating 4   Daily Activity Raw Score 19   Daily Activity Standardized Score (Calc for Raw Score >=11) 40.22   AM-PAC Applied Cognition Inpatient   Following a Speech/Presentation 4   Understanding Ordinary Conversation 4   Taking Medications 4   Remembering Where Things Are Placed or Put Away 4   Remembering List of 4-5 Errands 4   Taking Care of Complicated Tasks 4   Applied Cognition Raw Score 24   Applied Cognition Standardized Score 62.21   End of Consult   Education Provided Yes   Patient Position at End of Consult Bedside chair;Bed/Chair alarm activated;All needs within reach   Nurse Communication Nurse aware of consult       GOALS    1) Pt will improve activity tolerance to G for 30 min txment sessions for increase engagement in functional tasks  2) Pt will complete UB/LB dressing/self care w/ mod I using adaptive device and DME as needed  3) Pt will complete bathing w/ Mod I w/ use of AE and DME as needed  4) Pt will complete toileting w/ mod I w/ G hygiene/thoroughness using DME as needed  5) Pt will improve functional transfers to Mod I on/off all surfaces using DME as needed w/ G balance/safety   6) Pt will improve functional mobility  during ADL/IADL/leisure tasks to Mod I using DME as needed w/ G balance/safety   7) Pt will participate in simulated IADL management task to increase independence to Mod I w/ G safety and endurance  8) Pt will be attentive 100% of the time during ongoing cognitive assessment w/ G participation to assist w/ safe d/c planning/recommendations  9) Pt will demonstrate G carryover of pt/caregiver education and training as appropriate w/o cues w/ good tolerance to increase safety during functional tasks  10) Pt will demonstrate 100% carryover of spinal precautions and energy conservation techniques t/o functional I/ADL/leisure tasks w/o cues s/p skilled education to increase endurance during functional tasks     Dione Guillen MS, OTR/L

## 2025-01-08 NOTE — PROGRESS NOTES
Progress Note - Critical Care/ICU   Name: Hayley Rios 64 y.o. female I MRN: [id number]  Unit/Bed#: ICU 04 I Date of Admission: 12/31/2024   Date of Service: 1/8/2025 I Hospital Day: 8       Critical Care Interval Transfer Note:    Brief Hospital Summary: 64-year-old female with past medical history of RLS, DDD, MARV, multiple hernia repairs now s/p abdominoplasty, hypokalemia, prediabetes, GERD and meningioma, POD 1 for T2-4 laminectomy for spinal meningioma. On 1/8, pt was deemed appropriate for downgrade to SD2.        Consults: IP CONSULT TO NEUROSURGERY  IP CONSULT TO CASE MANAGEMENT         Patient seen and evaluated by Critical Care today and deemed to be appropriate for transfer to Stepdown Level 2. Spoke to Dr. Remy from [facility] service to accept transfer. Critical care can be contacted via SecureChat with any questions or concerns. Please use the Critical Care AP Role in Secure Chat for any provider inquires until the patient is transferred out of the ICU or until tomorrow at 0600.

## 2025-01-08 NOTE — PLAN OF CARE
"  Problem: PHYSICAL THERAPY ADULT  Goal: Performs mobility at highest level of function for planned discharge setting.  See evaluation for individualized goals.  Description: Treatment/Interventions: Functional transfer training, LE strengthening/ROM, Elevations, Therapeutic exercise, Endurance training, Patient/family training, Equipment eval/education, Bed mobility, Gait training, Spoke to nursing, OT  Equipment Recommended: Walker       See flowsheet documentation for full assessment, interventions and recommendations.  Note: Prognosis: Good  Problem List: Decreased strength, Decreased range of motion, Decreased endurance, Impaired balance, Decreased mobility, Decreased coordination, Impaired sensation, Obesity, Decreased skin integrity, Orthopedic restrictions, Pain  Assessment: Pt is 64 y.o. female seen for PT RE- evaluation s/p T2-4 laminectomy for resection of thoracic tumor (EM 1/7)  2* to T3 lesion w/ concern of progressively worsening myelopathy.  No bracing per nsx post op.  PT IE completed on 1/2/25 following admission 12/31/24  w/ worsening LLE weakness and muscle spasms.  Pt was Bob for bed skills and functional transfers + gait w/ RW limited distances* R LE weakness / pain on initial PT eval. At baseline pt is functionally independent and lives in [place]. Currently staying w/ friends in [place] w/ plans for eventual return to [place] post rehab here. Will be staying w/ friend in a [facility] w/ [facility]LL[facility] on d/c.   Currently,  pt  is requiring  Bob for functional transfers and Bob for ambulation w/ RW 45+40' w/ standing rest break L LE pain reported as \"much improved + pt also reporting strength improvement in R LE since surgery.  Pt continues to have decreased sensation in B/L feet and anterior LE's.   Pt presents functioning below baseline and currently w/ overall mobility deficits 2* to:  post op pain/ discomfort; decreased L LE strength/AROM; limited flexibility;  generalized weakness/ deconditioning; decreased endurance; " decreased activity tolerance; decreased coordination; impaired balance; gait deviations; LE edema;  bed/ chair alarms; multiple lines; Pt currently at risk for falls.  (Please find additional objective findings from PT assessment regarding body systems outlined above.) Pt will continue to benefit from skilled PT interventions to address stated impairments; to maximize functional potential; for ongoing pt/ family training; and DME needs.  PT is recommending PT Resource Intensity Level  1 at current time to maximize functional  recovery prior to d/c and to maximize recovery for eventual return to [place].  on d/c.      PT will follow for STG as outlined on eval. Pt/ family agreeable to PT POC and goals as stated.  Barriers to Discharge: Inaccessible home environment, Decreased caregiver support     Rehab Resource Intensity Level, PT: I (Maximum Resource Intensity)    See flowsheet documentation for full assessment.

## 2025-01-08 NOTE — PROGRESS NOTES
Progress Note - Critical Care/ICU   Name: Hayley Rios 64 y.o. female I MRN: [id number]  Unit/Bed#: ICU 04 I Date of Admission: 12/31/2024   Date of Service: 1/8/2025 I Hospital Day: 8       Assessment & Plan   Neuro:   POD 1 T2-4 Laminectomy for spinal meningioma  NCC for postop monitoring  Decadron taper  Neurosurgery following  HOB >30 for 48h  One drain to gravity, it should remain upright in a bag.   Ancef while drain is in place  F/U MRI Brain  F/U XR Thoracolumbar spine     Peripheral Neuropathy, DDD, muscle spasms  Continue home Cymbalta 60 mg  Flexeril 10 mg TID  Valium 2.5 mg q6 PRN     Post operative Pain  Dilaudid 4 mg Q4 as needed  Oxycodone 5/10  Tylenol 875 mg q8     RLS  Mirapex 0.5 mg daily     CV:   No active issues     Pulm:  No active issues     GI:   GERD  On omeprazole 20 mg at home  Will continue PPI 20 mg IV, until patient is cleared for diet  Postoperative bowel regimen -- miralax, colace and senna     :   Valencia catheter in place  DC on POD 1 per neurosurgery  Hypokalemia  Continue at home K Dur 20 mg daily     F/E/N:   F: none  E: Replete PRN  N: Regular diet      Heme/Onc:   Lymphedema  Will continue home Lasix 20 mg     MARV:  Hb 10.5  Recently had outpatient iron infusion  Continue to monitor     Endo:   Prediabetes   Hba1c 5.8  SSI while on decadron taper     ID:   POD 0 for T2-4 laminectomy, drain in place  Ancef while drain is in place     MSK/Skin:   PT/OT  Holding DVT prophylaxis until cleared by neurosurgery    Disposition: Stepdown Level 2    ICU Core Measures     A: Assess, Prevent, and Manage Pain Has pain been assessed? Yes  Need for changes to pain regimen? No   B: Both SAT/SAT  N/A   C: Choice of Sedation RASS Goal: 0 Alert and Calm  Need for changes to sedation or analgesia regimen? No   D: Delirium CAM-ICU: Negative   E: Early Mobility  Plan for early mobility? Yes   F: Family Engagement Plan for family engagement today? Yes       Antibiotic Review: Patient on appropriate  coverage based on culture data.     Review of Invasive Devices:        Cristy Plan: Discontinue arterial line    Prophylaxis:  VTE VTE covered by:  heparin (porcine), Subcutaneous       Stress Ulcer  covered byomeprazole (PriLOSEC OTC) 20 MG tablet [[id number]] (Long-Term Med), pantoprazole (PROTONIX) EC tablet 40 mg [[id number]]         24 Hour Events : 64-year-old female with past medical history of RLS, DDD, MARV, multiple hernia repairs now s/p abdominoplasty, hypokalemia, prediabetes, GERD and meningioma, POD 1 for T2-4 laminectomy for spinal meningiom     No ON events  Subjective       Objective :                   Vitals I/O      Most Recent Min/Max in 24hrs   Temp 98.6 °F (37 °C) Temp  Min: 96.7 °F (35.9 °C)  Max: 98.8 °F (37.1 °C)   Pulse 70 Pulse  Min: 70  Max: 110   Resp 22 Resp  Min: 12  Max: 52   /74 BP  Min: 107/54  Max: 161/74   O2 Sat 94 % SpO2  Min: 94 %  Max: 99 %      Intake/Output Summary (Last 24 hours) at 1/8/2025 0632  Last data filed at 1/8/2025 0600  Gross per 24 hour   Intake 3147.5 ml   Output 2770 ml   Net 377.5 ml       Diet Regular; Regular House    Invasive Monitoring           Physical Exam   Physical Exam  Cardiovascular:      Rate and Rhythm: Normal rate and regular rhythm.      Pulses: Normal pulses.      Heart sounds: Normal heart sounds.   Musculoskeletal:      Right lower leg: No edema.      Left lower leg: No edema.   Abdominal: General: There is no distension.      Palpations: Abdomen is rigid.      Tenderness: There is no abdominal tenderness. There is no guarding.      Hernia: No hernia is present.   Constitutional:       Appearance: She is well-developed and well-nourished.   Pulmonary:      Effort: Pulmonary effort is normal. No accessory muscle usage, respiratory distress or accessory muscle usage.      Breath sounds: Normal breath sounds. No stridor. No wheezing, rhonchi or rales.   Secretions are normal.Chest:      Chest wall: No tenderness.   Psychiatric:          Mood and Affect:  mood and affect abnormal.        Speech: Speech is not no receptive aphasia or no expressive aphasia.   Neurological:      General: No focal deficit present.      Mental Status: She is oriented to person, place and time. She is CAM ICU negative.      Cranial Nerves: No dysarthria or facial asymmetry.   Genitourinary/Anorectal:  Valencia present.        Diagnostic Studies        Lab Results: I have reviewed the following results:     Medications:  Scheduled PRN   acetaminophen, 975 mg, Q8H EVGENY  cefazolin, 2,000 mg, Q8H  cyanocobalamin, 1,000 mcg, Q30 Days  dexamethasone, 4 mg, Q6H EVGENY   Followed by  [START ON 1/9/2025] dexamethasone, 4 mg, Q8H EVGENY   Followed by  [START ON 1/11/2025] dexamethasone, 2 mg, Q6H EVGENY   Followed by  [START ON 1/14/2025] dexamethasone, 2 mg, Q8H EVGENY   Followed by  [START ON 1/15/2025] dexamethasone, 2 mg, Q12H EVGENY   Followed by  [START ON 1/18/2025] dexamethasone, 2 mg, Daily  docusate sodium, 100 mg, BID  DULoxetine, 60 mg, Daily  furosemide, 20 mg, Daily  heparin (porcine), 5,000 Units, Q8H EVGENY  insulin lispro, 1-6 Units, TID AC  lidocaine, 2 patch, Daily  methocarbamol, 1,000 mg, Q8H EVGENY  pantoprazole, 40 mg, Early Morning  polyethylene glycol, 17 g, Daily  potassium chloride, 40 mEq, Daily With Breakfast  pramipexole, 0.5 mg, HS  senna, 1 tablet, Daily      bisacodyl, 10 mg, Daily PRN  calcium carbonate, 1,000 mg, TID PRN  diazepam, 2.5 mg, Q6H PRN  HYDROmorphone, 0.2 mg, Q4H PRN  labetalol, 10 mg, Q4H PRN  melatonin, 6 mg, HS PRN  ondansetron, 4 mg, Q6H PRN  oxyCODONE, 5 mg, Q4H PRN   Or  oxyCODONE, 10 mg, Q4H PRN  polyethylene glycol, 17 g, Daily PRN       Continuous          Labs:   CBC    Recent Labs     01/07/25  1310 01/08/25  0531   WBC 5.18 8.05   HGB 10.7* 10.0*   HCT 35.0 32.7*    226     BMP    Recent Labs     01/07/25  1310 01/08/25  0531   SODIUM 140 139   K 3.5 3.7    105   CO2 25 27   AGAP 10 7   BUN 13 12   CREATININE 0.51* 0.52*   CALCIUM 8.9  8.9       Coags    Recent Labs     01/07/25  1310 01/08/25  0531   INR 0.96 1.01   PTT 29 27        Additional Electrolytes  No recent results       Blood Gas    No recent results  No recent results LFTs  Recent Labs     01/08/25  0531   ALT 25   AST 21   ALKPHOS 71   ALB 3.6   TBILI 0.26       Infectious  No recent results  Glucose  Recent Labs     01/07/25  0440 01/07/25  1310 01/08/25  0531   GLUC 97 179* 132

## 2025-01-08 NOTE — PHYSICAL THERAPY NOTE
"   PHYSICAL THERAPY EVALUATION  NAME:  Hayley Rios  DATE: 01/08/25    AGE:   64 y.o.  Mrn:   [id number]  ADMIT DX:  Ambulatory dysfunction    Past Medical History:   Diagnosis Date    Anemia     Anesthesia     \"sometimes low BP upon waking up\" pt states she stopped breathing 1 time during surgery    Arthritis     Back pain     Dolan's esophagus     Chronic pain disorder     back    Chronic venous insufficiency     Colon polyp     Cough variant asthma     d/t mold-all sx diminished when removed from source    COVID-19 03/2020    Depression     Environmental allergies     dust    GERD (gastroesophageal reflux disease)     Hiatal hernia     History of anemia     History of fusion of spine for scoliosis     \"as a teenager\"    History of transfusion     1978 - no adverse reaction    Left lumbar radiculitis     Last Assessed: [id number]    Lumbar postlaminectomy syndrome     Migraines     Morbid obesity (HCC)     Motion sickness     Neck pain     Osteoarthritis     of left hip    Right knee pain     Seasonal allergies     Shortness of breath     with stairs    Umbilical hernia     Uses brace     right knee    Wears glasses      Past Surgical History:   Procedure Laterality Date    ARTHRODESIS      lumbar    ARTHRODESIS      Spinal Arthrodesis for Deformity; Last Assessed: 16Mar2017    BACK SURGERY      lumbar fusion,with nydia/screw and cage implant    BACK SURGERY      surgery for scoliosis    BREAST CYST EXCISION Right     benign    CHOLECYSTECTOMY LAPAROSCOPIC N/A 12/20/2023    Procedure: FENESTRATED SUBTOTAL CHOLECYSTECTOMY LAPAROSCOPIC, EXTENSIVE LYSIS OF ADHESIONS.;  Surgeon: Chelle Butler MD;  Location: [facility];  Service: General    COLONOSCOPY      COLONOSCOPY N/A 03/14/2019    Procedure: COLONOSCOPY;  Surgeon: Van Dyson MD;  Location:  GI LAB;  Service: Gastroenterology    ESOPHAGOGASTRODUODENOSCOPY N/A 03/14/2019    Procedure: ESOPHAGOGASTRODUODENOSCOPY (EGD) W RFA(BARRX);  Surgeon: Van Dyson" MD;  Location:  GI LAB;  Service: Gastroenterology    HERNIA REPAIR      umbilical hernia repair x2    LUMBAR LAMINECTOMY FOR EXCISION OF NEOPLASM Bilateral 1/7/2025    Procedure: T2-4 LAMINECTOMY THORACIC FOR TUMOR;  Surgeon: Ascencion Coulter MD;  Location:  MAIN OR;  Service: Neurosurgery    PLANTAR FASCIA SURGERY Left     TX ARTHRS KNE SURG W/MENISCECTOMY MED/LAT W/SHVG Right 04/04/2018    Procedure: ARTHROSCOPY KNEE PARTIAL MEDIAL MENISECTOMY , CHONDROPLASTY;  Surgeon: Rocio Roe DO;  [facility]: [facility];  Service: Orthopedics    TX BREAST REDUCTION Bilateral 03/12/2021    Procedure: BREAST REDUCTION;  Surgeon: Cortez Sandoval MD;  Location:  MAIN OR;  Service: Plastics    TX COLONOSCOPY FLX DX W/COLLJ SPEC WHEN PFRMD N/A 02/20/2018    Procedure: COLONOSCOPY with polypectomy;  Surgeon: Apryl Garcia MD;  Location: [facility];  Service: General    TX ESOPHAGOGASTRODUODENOSCOPY TRANSORAL DIAGNOSTIC N/A 05/24/2017    Procedure: ESOPHAGOGASTRODUODENOSCOPY (EGD);  Surgeon: Macrello Street MD;  Location: [facility] LAB;  Service: Gastroenterology    TX ESOPHAGOGASTRODUODENOSCOPY TRANSORAL DIAGNOSTIC N/A 08/31/2017    Procedure: ESOPHAGOGASTRODUODENOSCOPY (EGD) W RFA;  Surgeon: Macho Aguayo MD;  Location:  GI LAB;  Service: Gastroenterology    TX LAPS RPR RECURRENT INCISIONAL HERNIA REDUCIBLE N/A 01/21/2021    Procedure: REPAIR HERNIA INCISIONAL LAPAROSCOPIC W/ ROBOTICS, with mesh;  Surgeon: Errol Bermudez MD;  [facility]: [facility];  Service: General    TX RPR AA HERNIA 1ST 3-10 CM REDUCIBLE N/A 2/19/2024    Procedure: VENTRAL HERNIA REPAIR 3CM WITH MESH;  Surgeon: Chelle Butler MD;  Location:  MAIN OR;  Service: General    WISDOM TOOTH EXTRACTION         Length Of Stay: 8  PHYSICAL THERAPY EVALUATION :    01/08/25 1010   PT Last Visit   PT Visit Date 01/08/25   Note Type   Note type Re-Evaluation  (PT RE- evaluation s/p T2-4 laminectomy for resection of thoracic tumor (EM 1/7))   Pain Assessment   Pain  Assessment Tool 0-10   Pain Score 9   Pain Location/Orientation Location: Back;Location: Incision   Pain Radiating Towards B/L buttocks and LE   Pain Onset/Description Onset: Ongoing   Effect of Pain on Daily Activities limtied mobility/ comfort   Patient's Stated Pain Goal No pain   Restrictions/Precautions   Weight Bearing Precautions Per Order No   Braces or Orthoses   (no bracing post op per nsx)   Other Precautions Chair Alarm;Bed Alarm;Multiple lines;Telemetry;Fall Risk;Pain;Spinal precautions  (BRAYAN drain.)   Home Living   Additional Comments see IE- pt from [place] - in [place] visiting friends - plans to stay w/ friend in a [facility] w/ [facility] on d/cw/ plan for eventual return to [place] post rehab   Prior Function   Level of [place] Independent with ADLs;Independent with functional mobility;Independent with IADLS   Lives With Alone  (alone in [place] at baseline)   Falls in the last 6 months 1 to 4   Vocational On disability   General   Family/Caregiver Present No   Cognition   Overall Cognitive Status WFL   Arousal/Participation Alert   Orientation Level Oriented X4   Memory Within functional limits   Following Commands Follows all commands and directions without difficulty   Subjective   Subjective cooperative and eager and motivated to mobilize   RUE Assessment   RUE Assessment WFL   LUE Assessment   LUE Assessment WFL   RLE Assessment   RLE Assessment WFL   Strength RLE   RLE Overall Strength 4+/5   LLE Assessment   LLE Assessment X   Strength LLE   LLE Overall Strength 4/5   Coordination   Movements are Fluid and Coordinated   (antalgic w/ guarding post op)   Sensation X  (reports diminished sensation in B/L feet and anterior thighs)   Light Touch   RLE Light Touch Grossly intact   LLE Light Touch Grossly intact   Sharp/Dull   RLE Sharp/Dull Grossly intact   LLE Sharp/Dull Grossly intact   Proprioception   RLE Proprioception Grossly intact   LLE Proprioception Grossly Intact   Bed Mobility   Additional Comments OOB  "on presentation   Transfers   Sit to Stand 4  Minimal assistance   Additional items Assist x 1;Increased time required;Verbal cues   Stand to Sit 4  Minimal assistance   Additional items Assist x 1;Increased time required;Verbal cues   Additional Comments RW use + cues for safety   Ambulation/Elevation   Gait pattern Excessively slow;Knees flexed;Decreased foot clearance   Gait Assistance 4  Minimal assist   Additional items Assist x 1;Verbal cues;Tactile cues   Assistive Device Rolling walker   Distance 45+40' w/ standing rest break L LE pain reported as \"much improved + pt also reporting strength improvement in R LE since surgery.   Stair Management Assistance Not tested   Ambulation/Elevation Additional Comments rw   Balance   Static Sitting Fair +   Dynamic Sitting Fair   Static Standing Fair -   Dynamic Standing Poor +   Ambulatory Poor  (rw)   Endurance Deficit   Endurance Deficit Yes   Endurance Deficit Description weakness and fatigue   Activity Tolerance   Activity Tolerance Patient limited by fatigue;Patient limited by pain   Medical Staff Made Aware OT Bre   Nurse Made Aware yes   Assessment   Prognosis Good   Problem List Decreased strength;Decreased range of motion;Decreased endurance;Impaired balance;Decreased mobility;Decreased coordination;Impaired sensation;Obesity;Decreased skin integrity;Orthopedic restrictions;Pain   Assessment Pt is 64 y.o. female seen for PT RE- evaluation s/p T2-4 laminectomy for resection of thoracic tumor (EM 1/7)  2* to T3 lesion w/ concern of progressively worsening myelopathy.  No bracing per nsx post op.  PT IE completed on 1/2/25 following admission 12/31/24  w/ worsening LLE weakness and muscle spasms.  Pt was Bob for bed skills and functional transfers + gait w/ RW limited distances* R LE weakness / pain on initial PT eval.     At baseline pt is functionally independent and lives in [place]. Currently staying w/ friends in [place] w/ plans for eventual return to [place] post " "rehab here. Will be staying w/ friend in a [facility] w/ [facility] on d/c.       Currently,  pt  is requiring  Bob for functional transfers and Bob for ambulation w/ RW 45+40' w/ standing rest break L LE pain reported as \"much improved + pt also reporting strength improvement in R LE since surgery.  Pt continues to have decreased sensation in B/L feet and anterior LE's.   Pt presents functioning below baseline and currently w/ overall mobility deficits 2* to:  post op pain/ discomfort; decreased L LE strength/AROM; limited flexibility;  generalized weakness/ deconditioning; decreased endurance; decreased activity tolerance; decreased coordination; impaired balance; gait deviations; LE edema;  bed/ chair alarms; multiple lines; Pt currently at risk for falls.  (Please find additional objective findings from PT assessment regarding body systems outlined above.) Pt will continue to benefit from skilled PT interventions to address stated impairments; to maximize functional potential; for ongoing pt/ family training; and DME needs.      PT is recommending PT Resource Intensity Level  1 at current time to maximize functional  recovery prior to d/c and to maximize recovery for eventual return to [place].  on d/c.      PT will follow for STG as outlined on eval. Pt/ family agreeable to PT POC and goals as stated.   Barriers to Discharge Inaccessible home environment;Decreased caregiver support   Goals   Patient Goals get better and get home   STG Expiration Date 01/22/25   Short Term Goal #1 In 14 days pt will:  Complete bed mobility skills with MI to facilitate safe return to previous living environment and decrease burden on caregivers.  Perform all functional transfers with MI  to facilitate safe return to previous living environment.    Ambulate  with least restrictive AD MI> 250' all terrains. ' without LOB and stable vitals for safe ambulation in home/ community environment.   Complete stair training up/ down flight " step/s w/  MI  for safe access to previous living environment and to increase community access.    Improve balance by 1 grade in order to decrease fall risk.    Improve R LE strength grades by 1/2 to increase independence w/ all functional mobility, transfers and gait.  Actively participate w/ PT for ongoing pt and family education; DME needs and D/C planning to promote highest level of function in least restrictive environment.   PT Treatment Day 0   Plan   Treatment/Interventions ADL retraining;Functional transfer training;LE strengthening/ROM;Elevations;Therapeutic exercise;Endurance training;Patient/family training;Equipment eval/education;Bed mobility;Gait training;Compensatory technique education;Continued evaluation;Spoke to nursing;Spoke to case management;Spoke to advanced practitioner;OT   PT Frequency 3-5x/wk   Discharge Recommendation   Rehab Resource Intensity Level, PT I (Maximum Resource Intensity)   Equipment Recommended Walker   Walker Package Recommended Wheeled walker   Change/add to Walker Package? No   AM-PAC Basic Mobility Inpatient   Turning in Flat Bed Without Bedrails 3   Lying on Back to Sitting on Edge of Flat Bed Without Bedrails 3   Moving Bed to Chair 3   Standing Up From Chair Using Arms 3   Walk in Room 3   Climb 3-5 Stairs With Railing 1   Basic Mobility Inpatient Raw Score 16   Basic Mobility Standardized Score 38.32   [facility] Highest Level Of Mobility   -HLM Goal 5: Stand one or more mins   -HLM Achieved 7: Walk 25 feet or more   End of Consult   Patient Position at End of Consult Bedside chair;Bed/Chair alarm activated;All needs within reach     The patient's AM-PAC Basic Mobility Inpatient Short Form Raw Score is 16. A Raw score of less than or equal to 16 suggests the patient may benefit from discharge to post-acute rehabilitation services. Please also refer to the recommendation of the Physical Therapist for safe discharge planning.

## 2025-01-08 NOTE — PROGRESS NOTES
Progress Note - Neurosurgery   Name: Hayley Rios 64 y.o. female I MRN: [id number]  Unit/Bed#: ICU 04 I Date of Admission: 12/31/2024   Date of Service: 1/8/2025 I Hospital Day: 8    Assessment & Plan  Compression of thoracic spinal cord with myelopathy (HCC)  POD#1 - s/p T2-4 laminectomy for resection of thoracic tumor (EM 1/7)   T3 lesion w/ concern of progressively worsening myelopathy  P/w progressively worsening bilateral lower extremity, L >R, weakness and balance difficulty x approx 2 months   Acutely worsened over 2 weeks prior to admission, requiring use of a rolling walker    Imaging:   MRI Cervical spine 1/6/25: Redemonstration of meningioma in the posterior left aspect of the canal at T3. Compared with 2022, mass is mildly increased in size and marked cord compression is also mildly increased. Focal cord edema at T3 not excluded but there is no edema in the immediately above or below the level of compression. Stable degenerative spondylosis in the cervical spine most pronounced at C5-6  MRI cervical spine 1/2/2025: Partially nondiagnostic exam due to motion artifact.  T3 lesion likely mildly increased in size based on evaluation and sagittal imaging.  CT cervical spine 1/2/2025: Partially calcified extramedullary T3 lesion slightly larger than prior MRI report.  CT thoracic and lumbar spine 1/2/2025: Thoracolumbar fusion.  Chronic disc and facet degenerative changes at L4-5 resulting in mild canal stenosis and severe foraminal narrowing.    Plan:   Continue to closely monitor neuro exam   Frequent neuro checks per primary team  Complete MAP goals > 85 x 24hrs post-op   1 posterior BRAYAN drain in place to GRAVITY   95cc/24hrs, serosanguinous drainage   Maintain in place at this time --> tentative plan to remove pending output Friday   Continue IV ancef 1g q 8hrs while BRAYAN drain in place   Continue local wound care to incision   Keep clean and dry   Dressing to remain in place until POD2   Continue dex wean  as ordered   Continue to hold all AC/AP meds x at least 2 weeks post-op   No reports use at home prior to arrival   DVT ppx: SCDs, okay for chem dvt ppx to start this afternoon   Will tentative hold SQH Friday am for drain removal   Medical management per primary team  Appreciate ICU assistance --> pt is cleared from a nsgy standpoint to transfer out of the ICU today   Pain control per primary team   Pt encouraged to mobilize with PT/OT    Social work following or assistance with dispo once medically cleared      Neurosurgery will continue to follow.  Please reach out with any further questions or concerns.       Please contact the SecureChat role for the Neurosurgery service with any questions/concerns.    Subjective   Pt seen and examined this am on rounds. Pt     Objective :  Temp:  [98.1 °F (36.7 °C)-98.8 °F (37.1 °C)] 98.6 °F (37 °C)  HR:  [] 70  BP: (107-161)/(53-79) 161/74  Resp:  [12-52] 22  SpO2:  [94 %-99 %] 94 %  O2 Device: None (Room air)  Nasal Cannula O2 Flow Rate (L/min):  [2 L/min] 2 L/min    I/O         01/06 0701  01/07 0700 01/07 0701  01/08 0700 01/08 0701  01/09 0700    P.O. 1730 360     I.V. (mL/kg) 300 (3.3) 2137.5 (23.6)     IV Piggyback  650     Total Intake(mL/kg) 2030 (22.4) 3147.5 (34.7)     Urine (mL/kg/hr)  2475 (1.1)     Drains  95     Blood  200     Total Output  2770     Net +2030 +377.5            Unmeasured Urine Occurrence 4 x            Physical Exam   General appearance: alert, appears stated age, cooperative and no distress  Head: Normocephalic, without obvious abnormality, atraumatic  Eyes: EOMI, PERRL  Neck: supple, symmetrical, trachea midline and NT  Back:  - midline posterior thoracic incision with clean, dry, intact dressing place   - 1 posterior BRAYAN drain in place, serosanguinous drainage   Lungs: non labored breathing, no resp distress on room air   Heart: regular heart rate  Neurologic:   Mental status: Alert, oriented x3, thought content appropriate  Cranial  nerves: grossly intact (Cranial nerves II-XII)  Sensory: normal to LT brandi throughout   Motor: moving all extremities  - brandi UE: strength intact, 5/5 throughout   - RLE: strength intact 5/5 throughout   - LLE: 4/5 throughout       Lab Results: I have reviewed the following results:  Recent Labs     01/08/25  0531   WBC 8.05   HGB 10.0*   HCT 32.7*      SODIUM 139   K 3.7      CO2 27   BUN 12   CREATININE 0.52*   GLUC 132   AST 21   ALT 25   ALB 3.6   TBILI 0.26   ALKPHOS 71   PTT 27   INR 1.01     VTE Pharmacologic Prophylaxis: Sequential compression device (Venodyne)

## 2025-01-09 LAB
ALBUMIN SERPL BCG-MCNC: 3.9 G/DL (ref 3.5–5)
ALP SERPL-CCNC: 71 U/L (ref 34–104)
ALT SERPL W P-5'-P-CCNC: 15 U/L (ref 7–52)
ANION GAP SERPL CALCULATED.3IONS-SCNC: 10 MMOL/L (ref 4–13)
AST SERPL W P-5'-P-CCNC: 17 U/L (ref 13–39)
BASOPHILS # BLD AUTO: 0.03 THOUSANDS/ΜL (ref 0–0.1)
BASOPHILS NFR BLD AUTO: 0 % (ref 0–1)
BILIRUB SERPL-MCNC: 0.29 MG/DL (ref 0.2–1)
BUN SERPL-MCNC: 15 MG/DL (ref 5–25)
CALCIUM SERPL-MCNC: 9 MG/DL (ref 8.4–10.2)
CHLORIDE SERPL-SCNC: 102 MMOL/L (ref 96–108)
CO2 SERPL-SCNC: 28 MMOL/L (ref 21–32)
CREAT SERPL-MCNC: 0.54 MG/DL (ref 0.6–1.3)
EOSINOPHIL # BLD AUTO: 0 THOUSAND/ΜL (ref 0–0.61)
EOSINOPHIL NFR BLD AUTO: 0 % (ref 0–6)
ERYTHROCYTE [DISTWIDTH] IN BLOOD BY AUTOMATED COUNT: 16.3 % (ref 11.6–15.1)
GFR SERPL CREATININE-BSD FRML MDRD: 101 ML/MIN/1.73SQ M
GLUCOSE SERPL-MCNC: 134 MG/DL (ref 65–140)
GLUCOSE SERPL-MCNC: 137 MG/DL (ref 65–140)
GLUCOSE SERPL-MCNC: 138 MG/DL (ref 65–140)
GLUCOSE SERPL-MCNC: 138 MG/DL (ref 65–140)
GLUCOSE SERPL-MCNC: 141 MG/DL (ref 65–140)
HCT VFR BLD AUTO: 34.4 % (ref 34.8–46.1)
HGB BLD-MCNC: 10.2 G/DL (ref 11.5–15.4)
IMM GRANULOCYTES # BLD AUTO: 0.07 THOUSAND/UL (ref 0–0.2)
IMM GRANULOCYTES NFR BLD AUTO: 1 % (ref 0–2)
LYMPHOCYTES # BLD AUTO: 1.09 THOUSANDS/ΜL (ref 0.6–4.47)
LYMPHOCYTES NFR BLD AUTO: 11 % (ref 14–44)
MAGNESIUM SERPL-MCNC: 2 MG/DL (ref 1.9–2.7)
MCH RBC QN AUTO: 26.4 PG (ref 26.8–34.3)
MCHC RBC AUTO-ENTMCNC: 29.7 G/DL (ref 31.4–37.4)
MCV RBC AUTO: 89 FL (ref 82–98)
MONOCYTES # BLD AUTO: 0.42 THOUSAND/ΜL (ref 0.17–1.22)
MONOCYTES NFR BLD AUTO: 4 % (ref 4–12)
NEUTROPHILS # BLD AUTO: 7.92 THOUSANDS/ΜL (ref 1.85–7.62)
NEUTS SEG NFR BLD AUTO: 84 % (ref 43–75)
NRBC BLD AUTO-RTO: 0 /100 WBCS
PHOSPHATE SERPL-MCNC: 3 MG/DL (ref 2.3–4.1)
PLATELET # BLD AUTO: 247 THOUSANDS/UL (ref 149–390)
PMV BLD AUTO: 9.7 FL (ref 8.9–12.7)
POTASSIUM SERPL-SCNC: 3.6 MMOL/L (ref 3.5–5.3)
PROT SERPL-MCNC: 6.6 G/DL (ref 6.4–8.4)
RBC # BLD AUTO: 3.86 MILLION/UL (ref 3.81–5.12)
SODIUM SERPL-SCNC: 140 MMOL/L (ref 135–147)
WBC # BLD AUTO: 9.53 THOUSAND/UL (ref 4.31–10.16)

## 2025-01-09 PROCEDURE — 99024 POSTOP FOLLOW-UP VISIT: CPT | Performed by: NEUROLOGICAL SURGERY

## 2025-01-09 PROCEDURE — 85025 COMPLETE CBC W/AUTO DIFF WBC: CPT | Performed by: INTERNAL MEDICINE

## 2025-01-09 PROCEDURE — 80053 COMPREHEN METABOLIC PANEL: CPT | Performed by: INTERNAL MEDICINE

## 2025-01-09 PROCEDURE — 82948 REAGENT STRIP/BLOOD GLUCOSE: CPT

## 2025-01-09 PROCEDURE — 99232 SBSQ HOSP IP/OBS MODERATE 35: CPT | Performed by: NURSE PRACTITIONER

## 2025-01-09 PROCEDURE — 84100 ASSAY OF PHOSPHORUS: CPT | Performed by: INTERNAL MEDICINE

## 2025-01-09 PROCEDURE — 83735 ASSAY OF MAGNESIUM: CPT | Performed by: INTERNAL MEDICINE

## 2025-01-09 RX ORDER — DEXAMETHASONE 2 MG/1
2 TABLET ORAL EVERY 6 HOURS SCHEDULED
Status: COMPLETED | OUTPATIENT
Start: 2025-01-11 | End: 2025-01-12

## 2025-01-09 RX ORDER — DEXAMETHASONE 4 MG/1
4 TABLET ORAL EVERY 6 HOURS SCHEDULED
Status: COMPLETED | OUTPATIENT
Start: 2025-01-09 | End: 2025-01-10

## 2025-01-09 RX ORDER — DEXAMETHASONE 2 MG/1
2 TABLET ORAL EVERY 12 HOURS SCHEDULED
Status: COMPLETED | OUTPATIENT
Start: 2025-01-13 | End: 2025-01-13

## 2025-01-09 RX ORDER — DEXAMETHASONE 2 MG/1
2 TABLET ORAL EVERY 8 HOURS SCHEDULED
Status: COMPLETED | OUTPATIENT
Start: 2025-01-12 | End: 2025-01-13

## 2025-01-09 RX ORDER — DEXAMETHASONE 4 MG/1
4 TABLET ORAL EVERY 8 HOURS SCHEDULED
Status: COMPLETED | OUTPATIENT
Start: 2025-01-10 | End: 2025-01-11

## 2025-01-09 RX ORDER — DEXAMETHASONE 2 MG/1
2 TABLET ORAL DAILY
Status: COMPLETED | OUTPATIENT
Start: 2025-01-14 | End: 2025-01-14

## 2025-01-09 RX ADMIN — METHOCARBAMOL 1000 MG: 500 TABLET ORAL at 06:20

## 2025-01-09 RX ADMIN — HYDROMORPHONE HYDROCHLORIDE 0.2 MG: 1 INJECTION, SOLUTION INTRAMUSCULAR; INTRAVENOUS; SUBCUTANEOUS at 15:34

## 2025-01-09 RX ADMIN — POLYETHYLENE GLYCOL 3350 17 G: 17 POWDER, FOR SOLUTION ORAL at 09:02

## 2025-01-09 RX ADMIN — SENNOSIDES 8.6 MG: 8.6 TABLET, FILM COATED ORAL at 09:02

## 2025-01-09 RX ADMIN — CEFAZOLIN SODIUM 1000 MG: 1 SOLUTION INTRAVENOUS at 09:06

## 2025-01-09 RX ADMIN — CALCIUM CARBONATE (ANTACID) CHEW TAB 500 MG 1000 MG: 500 CHEW TAB at 22:13

## 2025-01-09 RX ADMIN — OXYCODONE HYDROCHLORIDE 10 MG: 10 TABLET ORAL at 18:20

## 2025-01-09 RX ADMIN — OXYCODONE HYDROCHLORIDE 10 MG: 10 TABLET ORAL at 13:34

## 2025-01-09 RX ADMIN — CEFAZOLIN SODIUM 1000 MG: 1 SOLUTION INTRAVENOUS at 00:27

## 2025-01-09 RX ADMIN — OXYCODONE HYDROCHLORIDE 10 MG: 10 TABLET ORAL at 09:06

## 2025-01-09 RX ADMIN — LIDOCAINE 2 PATCH: 50 PATCH TOPICAL at 09:03

## 2025-01-09 RX ADMIN — DULOXETINE HYDROCHLORIDE 60 MG: 60 CAPSULE, DELAYED RELEASE ORAL at 09:02

## 2025-01-09 RX ADMIN — METHOCARBAMOL 1000 MG: 500 TABLET ORAL at 13:33

## 2025-01-09 RX ADMIN — ACETAMINOPHEN 975 MG: 325 TABLET, FILM COATED ORAL at 22:13

## 2025-01-09 RX ADMIN — DEXAMETHASONE 4 MG: 4 TABLET ORAL at 18:19

## 2025-01-09 RX ADMIN — DEXAMETHASONE 4 MG: 4 TABLET ORAL at 00:27

## 2025-01-09 RX ADMIN — HEPARIN SODIUM 5000 UNITS: 5000 INJECTION, SOLUTION INTRAVENOUS; SUBCUTANEOUS at 13:34

## 2025-01-09 RX ADMIN — ACETAMINOPHEN 975 MG: 325 TABLET, FILM COATED ORAL at 13:33

## 2025-01-09 RX ADMIN — PRAMIPEXOLE DIHYDROCHLORIDE 0.5 MG: 0.5 TABLET ORAL at 22:13

## 2025-01-09 RX ADMIN — HEPARIN SODIUM 5000 UNITS: 5000 INJECTION, SOLUTION INTRAVENOUS; SUBCUTANEOUS at 06:20

## 2025-01-09 RX ADMIN — MELATONIN TAB 3 MG 3 MG: 3 TAB at 22:13

## 2025-01-09 RX ADMIN — FUROSEMIDE 20 MG: 20 TABLET ORAL at 09:02

## 2025-01-09 RX ADMIN — HEPARIN SODIUM 5000 UNITS: 5000 INJECTION, SOLUTION INTRAVENOUS; SUBCUTANEOUS at 22:13

## 2025-01-09 RX ADMIN — METHOCARBAMOL 1000 MG: 500 TABLET ORAL at 22:13

## 2025-01-09 RX ADMIN — DEXAMETHASONE 4 MG: 4 TABLET ORAL at 13:33

## 2025-01-09 RX ADMIN — CEFAZOLIN SODIUM 1000 MG: 1 SOLUTION INTRAVENOUS at 18:19

## 2025-01-09 RX ADMIN — ACETAMINOPHEN 975 MG: 325 TABLET, FILM COATED ORAL at 06:20

## 2025-01-09 RX ADMIN — OXYCODONE HYDROCHLORIDE 10 MG: 10 TABLET ORAL at 03:51

## 2025-01-09 RX ADMIN — DEXAMETHASONE 4 MG: 4 TABLET ORAL at 06:20

## 2025-01-09 RX ADMIN — HYDROMORPHONE HYDROCHLORIDE 0.2 MG: 1 INJECTION, SOLUTION INTRAMUSCULAR; INTRAVENOUS; SUBCUTANEOUS at 06:19

## 2025-01-09 RX ADMIN — POTASSIUM CHLORIDE 40 MEQ: 1500 TABLET, EXTENDED RELEASE ORAL at 09:05

## 2025-01-09 RX ADMIN — PANTOPRAZOLE SODIUM 40 MG: 40 TABLET, DELAYED RELEASE ORAL at 06:20

## 2025-01-09 NOTE — PLAN OF CARE
Problem: PAIN - ADULT  Goal: Verbalizes/displays adequate comfort level or baseline comfort level  Description: Interventions:  - Encourage patient to monitor pain and request assistance  - Assess pain using appropriate pain scale  - Administer analgesics based on type and severity of pain and evaluate response  - Implement non-pharmacological measures as appropriate and evaluate response  - Consider cultural and social influences on pain and pain management  - Notify physician/advanced practitioner if interventions unsuccessful or patient reports new pain  Outcome: Progressing     Problem: INFECTION - ADULT  Goal: Absence or prevention of progression during hospitalization  Description: INTERVENTIONS:  - Assess and monitor for signs and symptoms of infection  - Monitor lab/diagnostic results  - Monitor all insertion sites, i.e. indwelling lines, tubes, and drains  - Monitor endotracheal if appropriate and nasal secretions for changes in amount and color  - Hazel [place]st appropriate cooling/warming therapies per order  - Administer medications as ordered  - Instruct and encourage patient and family to use good hand hygiene technique  - Identify and instruct in appropriate isolation precautions for identified infection/condition  Outcome: Progressing     Problem: SAFETY ADULT  Goal: Patient will remain free of falls  Description: INTERVENTIONS:  - Educate patient/family on patient safety including physical limitations  - Instruct patient to call for assistance with activity   - Consult OT/PT to assist with strengthening/mobility   - Keep Call bell within reach  - Keep bed low and locked with side rails adjusted as appropriate  - Keep care items and personal belongings within reach  - Initiate and maintain comfort rounds  - Make Fall Risk Sign visible to staff  - Offer Toileting every 2 Hours, in advance of need  - Initiate/Maintain alarm  - Obtain necessary fall risk management equipment  - Apply yellow socks and  bracelet for high fall risk patients  - Consider moving patient to room near nurses station  Outcome: Progressing  Goal: Maintain or return to baseline ADL function  Description: INTERVENTIONS:  -  Assess patient's ability to carry out ADLs; assess patient's baseline for ADL function and identify physical deficits which impact ability to perform ADLs (bathing, care of mouth/teeth, toileting, grooming, dressing, etc.)  - Assess/evaluate cause of self-care deficits   - Assess range of motion  - Assess patient's mobility; develop plan if impaired  - Assess patient's need for assistive devices and provide as appropriate  - Encourage maximum independence but intervene and supervise when necessary  - Involve family in performance of ADLs  - Assess for home care needs following discharge   - Consider OT consult to assist with ADL evaluation and planning for discharge  - Provide patient education as appropriate  Outcome: Progressing  Goal: Maintains/Returns to pre admission functional level  Description: INTERVENTIONS:  - Perform AM-PAC 6 Click Basic Mobility/ Daily Activity assessment daily.  - Set and communicate daily mobility goal to care team and patient/family/caregiver.   - Collaborate with rehabilitation services on mobility goals if consulted  - Perform Range of Motion 3 times a day.  - Reposition patient every 2 hours.  - Dangle patient 3 times a day  - Stand patient 3 times a day  - Ambulate patient 3 times a day  - Out of bed to chair 3 times a day   - Out of bed for meals 3 times a day  - Out of bed for toileting  - Record patient progress and toleration of activity level   Outcome: Progressing     Problem: DISCHARGE PLANNING  Goal: Discharge to home or other facility with appropriate resources  Description: INTERVENTIONS:  - Identify barriers to discharge w/patient and caregiver  - Arrange for needed discharge resources and transportation as appropriate  - Identify discharge learning needs (meds, wound care,  etc.)  - Arrange for interpretive services to assist at discharge as needed  - Refer to Case Management Department for coordinating discharge planning if the patient needs post-hospital services based on physician/advanced practitioner order or complex needs related to functional status, cognitive ability, or social support system  Outcome: Progressing     Problem: Knowledge Deficit  Goal: Patient/family/caregiver demonstrates understanding of disease process, treatment plan, medications, and discharge instructions  Description: Complete learning assessment and assess knowledge base.  Interventions:  - Provide teaching at level of understanding  - Provide teaching via preferred learning methods  Outcome: Progressing     Problem: NEUROSENSORY - ADULT  Goal: Achieves stable or improved neurological status  Description: INTERVENTIONS  - Monitor and report changes in neurological status  - Monitor vital signs such as temperature, blood pressure, glucose, and any other labs ordered   - Initiate measures to prevent increased intracranial pressure  - Monitor for seizure activity and implement precautions if appropriate      Outcome: Progressing  Goal: Achieves maximal functionality and self care  Description: INTERVENTIONS  - Monitor swallowing and airway patency with patient fatigue and changes in neurological status  - Encourage and assist patient to increase activity and self care.   - Encourage visually impaired, hearing impaired and aphasic patients to use assistive/communication devices  Outcome: Progressing

## 2025-01-09 NOTE — CASE MANAGEMENT
Case Management Discharge Planning Note    Patient name Hayley Rios  Location [facility]-01 MRN [id number]  : 1960 Date 2025       Current Admission Date: 2024  Current Admission Diagnosis:Compression of thoracic spinal cord with myelopathy (HCC)   Patient Active Problem List    Diagnosis Date Noted Date Diagnosed    Compression of thoracic spinal cord with myelopathy (HCC) 2024     Lymphedema 2024     Ambulatory dysfunction 2024     Class 1 obesity due to excess calories without serious comorbidity with body mass index (BMI) of 34.0 to 34.9 in adult 2024     Ventral hernia without obstruction or gangrene 2024     Myofascial pain syndrome 2022     MARV (iron deficiency anemia) 2022     Podagra 2021     Motion sickness      Recurrent incisional hernia      Macromastia 2020     Long-term current use of opiate analgesic 2020     Uncomplicated opioid dependence (HCC) 2020     Recurrent umbilical hernia 2020     Lipoma of torso 2020     Sacroiliitis, not elsewhere classified (HCC)      Osteoarthritis of multiple joints 02/10/2020     Neck pain      Cervical spondylosis without myelopathy      Chronic cough 2019     Vocal fold paresis, right 2019     Dysphonia 2019     Muscle tension dysphonia 2019     Glottic insufficiency 2019     Reflux laryngitis 2019     Laryngeal edema 2019     Allergic rhinitis due to [employer] house dust mite 2019     Mild intermittent asthma without complication 2019     Laryngopharyngeal reflux (LPR) 2019     Dolan's esophagus with dysplasia 2019     Gastroesophageal reflux disease 2019     Prediabetes 2018     Vitamin D deficiency 2018     Lumbar radiculopathy 10/01/2018     Lumbar spondylosis      Environmental allergies 2018     S/P right knee arthroscopy 2018     Hernia of anterior abdominal  wall 02/05/2018     Sleep disturbance 11/16/2017     Hyperlipidemia 08/11/2017     Primary osteoarthritis of left hip 07/26/2017     Restless leg syndrome 07/11/2017     Chronic venous insufficiency 06/02/2017     Chronic low back pain 04/11/2017     Lumbar postlaminectomy syndrome 04/11/2017     Chronic pain syndrome 03/16/2017     Depression, recurrent (HCC) 03/16/2017       LOS (days): 9  Geometric Mean LOS (GMLOS) (days): 4.1  Days to GMLOS:-4.5     OBJECTIVE:  Risk of Unplanned Readmission Score: 24.67   Current admission status: Inpatient   Preferred Pharmacy:   [id number] - [place], [place] - [address]  [address] [address]  Phone: [phone] Fax: [phone]    [id number] [address]  [facility] [address]  Phone: [phone] Fax: [phone]    Primary Care Provider: Mau Garcia DO    Primary Insurance: [employer]  REP  Secondary Insurance: A[employer]  REP    DISCHARGE DETAILS:    Discharge planning discussed with:: patient  Freedom of Choice: Yes  Comments - Freedom of Choice: Discussed FOC  CM contacted family/caregiver?: Yes  Were Treatment Team discharge recommendations reviewed with patient/caregiver?: Yes  Did patient/caregiver verbalize understanding of patient care needs?: N/A- going to facility  Were patient/caregiver advised of the risks associated with not following Treatment Team discharge recommendations?: Yes    Other Referral/Resources/Interventions Provided:  Interventions: Short Term Rehab, Acute Rehab  Referral Comments: Expected d/c readiness 24hrs. [facility] still able to accept, pt still in agreement with dcp. CM discharge support tasked with submitting for auth. Cm will continue to follow.    Treatment Team Recommendation: Acute Rehab  Discharge Destination Plan:: Acute Rehab

## 2025-01-09 NOTE — CASE MANAGEMENT
[facility] received request for authorization from Care Manager.  Authorization request submitted for: Acute Rehab  Facility Name: [facility] NPI: [id number]   Facility MD: Ashley DePadua NPI: [id number]  Authorization initiated by contacting insurance: [employer]   Via: Qulsar   Clinicals submitted via Portal attachment   Pending Reference #: [id number]     Care Manager notified: Rene Delatorre     Updates to authorization status will be noted in chart. Please reach out to CM for updates on any clinical information.

## 2025-01-09 NOTE — ASSESSMENT & PLAN NOTE
POD#2 - s/p T2-4 laminectomy for resection of thoracic tumor (EM 1/7)   T3 lesion w/ concern of progressively worsening myelopathy  P/w progressively worsening bilateral lower extremity, L >R, weakness and balance difficulty x approx 2 months   Acutely worsened over 2 weeks prior to admission, requiring use of a rolling walker    Imaging:   MRI Cervical spine 1/6/25: Redemonstration of meningioma in the posterior left aspect of the canal at T3. Compared with 2022, mass is mildly increased in size and marked cord compression is also mildly increased. Focal cord edema at T3 not excluded but there is no edema in the immediately above or below the level of compression. Stable degenerative spondylosis in the cervical spine most pronounced at C5-6  MRI cervical spine 1/2/2025: Partially nondiagnostic exam due to motion artifact.  T3 lesion likely mildly increased in size based on evaluation and sagittal imaging.  CT cervical spine 1/2/2025: Partially calcified extramedullary T3 lesion slightly larger than prior MRI report.  CT thoracic and lumbar spine 1/2/2025: Thoracolumbar fusion.  Chronic disc and facet degenerative changes at L4-5 resulting in mild canal stenosis and severe foraminal narrowing.    Plan:   Continue to closely monitor neuro exam   Frequent neuro checks per primary team  Complete MAP goals > 85 x 24hrs post-op   1 posterior BRAYAN drain in place to GRAVITY   55cc/24hrs, serosanguinous drainage   Maintain in place at this time --> tentative plan to remove Friday am   Continue IV ancef 1g q 8hrs while BRAYAN drain in place   Continue local wound care to incision   Keep clean and dry --> to be wipe daily with a MARY wipe   May be left open to air   Continue dex wean as ordered --> increased to rapid taper today given reflux   Continue to hold all AC/AP meds x at least 2 weeks post-op   No reports use at home prior to arrival   DVT ppx: SCDs, SQH  Chem dvt ppx held for drain removal tomorrow morning   Medical  management per primary team  Pain control per primary team   PT/OT  --> recommended rehab   Social work following or assistance with dispo once medically cleared    Accepted at [facility], pending authorization     Neurosurgery will continue to follow.  Please reach out with any further questions or concerns.

## 2025-01-09 NOTE — PROGRESS NOTES
Progress Note - Neurosurgery   Name: Hayley Rios 64 y.o. female I MRN: [id number]  Unit/Bed#: [facility] 626-01 I Date of Admission: 12/31/2024   Date of Service: 1/9/2025 I Hospital Day: 9    Assessment & Plan  Compression of thoracic spinal cord with myelopathy (HCC)  POD#2 - s/p T2-4 laminectomy for resection of thoracic tumor (EM 1/7)   T3 lesion w/ concern of progressively worsening myelopathy  P/w progressively worsening bilateral lower extremity, L >R, weakness and balance difficulty x approx 2 months   Acutely worsened over 2 weeks prior to admission, requiring use of a rolling walker    Imaging:   MRI Cervical spine 1/6/25: Redemonstration of meningioma in the posterior left aspect of the canal at T3. Compared with 2022, mass is mildly increased in size and marked cord compression is also mildly increased. Focal cord edema at T3 not excluded but there is no edema in the immediately above or below the level of compression. Stable degenerative spondylosis in the cervical spine most pronounced at C5-6  MRI cervical spine 1/2/2025: Partially nondiagnostic exam due to motion artifact.  T3 lesion likely mildly increased in size based on evaluation and sagittal imaging.  CT cervical spine 1/2/2025: Partially calcified extramedullary T3 lesion slightly larger than prior MRI report.  CT thoracic and lumbar spine 1/2/2025: Thoracolumbar fusion.  Chronic disc and facet degenerative changes at L4-5 resulting in mild canal stenosis and severe foraminal narrowing.    Plan:   Continue to closely monitor neuro exam   Frequent neuro checks per primary team  Complete MAP goals > 85 x 24hrs post-op   1 posterior BRAYAN drain in place to GRAVITY   55cc/24hrs, serosanguinous drainage   Maintain in place at this time --> tentative plan to remove Friday am   Continue IV ancef 1g q 8hrs while BRAYAN drain in place   Continue local wound care to incision   Keep clean and dry --> to be wipe daily with a MARY wipe   May be left open to air    Continue dex wean as ordered --> increased to rapid taper today given reflux   Continue to hold all AC/AP meds x at least 2 weeks post-op   No reports use at home prior to arrival   DVT ppx: SCDs, SQH  Chem dvt ppx held for drain removal tomorrow morning   Medical management per primary team  Pain control per primary team   PT/OT  --> recommended rehab   Social work following or assistance with dispo once medically cleared    Accepted at [facility], pending authorization     Neurosurgery will continue to follow.  Please reach out with any further questions or concerns.       Please contact the SecureChat role for the Neurosurgery service with any questions/concerns.    Subjective   Patient seen and examined this a.m. on rounds.  No acute events overnight.  Patient continues to report ongoing improvement to her left lower extremity.  She states she has been up to ambulate and is still using the walker but overall has had significant improvement.  Patient is motivated to get to rehab soon.  She does admit to some ongoing posterior thoracic pain and muscle tightness, but does state that the medications help somewhat.    Objective :  Temp:  [98 °F (36.7 °C)-98.2 °F (36.8 °C)] 98.2 °F (36.8 °C)  HR:  [] 82  BP: (118-180)/(55-91) 118/79  Resp:  [16-28] 18  SpO2:  [94 %-98 %] 95 %  O2 Device: None (Room air)    I/O         01/07 0701  01/08 0700 01/08 0701  01/09 0700 01/09 0701  01/10 0700    P.O. 360 560     I.V. (mL/kg) 2137.5 (23.6)      IV Piggyback 650      Total Intake(mL/kg) 3147.5 (34.7) 560 (6.2)     Urine (mL/kg/hr) 2475 (1.1) 800 (0.4)     Drains 95 55     Blood 200      Total Output 2770 855     Net +377.5 -295            Unmeasured Urine Occurrence  2 x           Physical Exam   General appearance: alert, appears stated age, cooperative and no distress  Head: Normocephalic, without obvious abnormality, atraumatic  Eyes: EOMI, PERRL  Neck: supple, symmetrical, trachea midline and NT  Back:  - midline  posterior thoracic incision with clean, dry, intact dressing place   - 1 posterior BRAYAN drain in place, serosanguinous drainage   Lungs: non labored breathing, no resp distress on room air   Heart: regular heart rate  Neurologic:   Mental status: Alert, oriented x3, thought content appropriate  Cranial nerves: grossly intact (Cranial nerves II-XII)  Sensory: normal to LT brandi throughout   Motor: moving all extremities  - brandi UE: strength intact, 5/5 throughout   - RLE: strength intact 5/5 throughout   - LLE: 4+/5 throughout   Reflexes: No further clonus appreciated in RLE, mild sustained clonus noted to LLE    Lab Results: I have reviewed the following results:  Recent Labs     01/08/25  0531 01/08/25  1132 01/09/25  0619   WBC 8.05  --  9.53   HGB 10.0*  --  10.2*   HCT 32.7*  --  34.4*      < > 247   SODIUM 139  --  140   K 3.7  --  3.6     --  102   CO2 27  --  28   BUN 12  --  15   CREATININE 0.52*  --  0.54*   GLUC 132  --  138   MG  --   --  2.0   PHOS  --   --  3.0   AST 21  --  17   ALT 25  --  15   ALB 3.6  --  3.9   TBILI 0.26  --  0.29   ALKPHOS 71  --  71   PTT 27  --   --    INR 1.01  --   --     < > = values in this interval not displayed.     VTE Pharmacologic Prophylaxis: Sequential compression device (Venodyne)  and Heparin

## 2025-01-10 ENCOUNTER — TELEPHONE (OUTPATIENT)
Dept: NEUROSURGERY | Facility: CLINIC | Age: 65
End: 2025-01-10

## 2025-01-10 LAB
GLUCOSE SERPL-MCNC: 124 MG/DL (ref 65–140)
GLUCOSE SERPL-MCNC: 127 MG/DL (ref 65–140)
GLUCOSE SERPL-MCNC: 133 MG/DL (ref 65–140)
GLUCOSE SERPL-MCNC: 144 MG/DL (ref 65–140)

## 2025-01-10 PROCEDURE — 82948 REAGENT STRIP/BLOOD GLUCOSE: CPT

## 2025-01-10 PROCEDURE — 99232 SBSQ HOSP IP/OBS MODERATE 35: CPT | Performed by: NURSE PRACTITIONER

## 2025-01-10 PROCEDURE — 99024 POSTOP FOLLOW-UP VISIT: CPT | Performed by: NEUROLOGICAL SURGERY

## 2025-01-10 PROCEDURE — 88360 TUMOR IMMUNOHISTOCHEM/MANUAL: CPT | Performed by: PATHOLOGY

## 2025-01-10 PROCEDURE — 88307 TISSUE EXAM BY PATHOLOGIST: CPT | Performed by: PATHOLOGY

## 2025-01-10 PROCEDURE — 97116 GAIT TRAINING THERAPY: CPT

## 2025-01-10 PROCEDURE — 97530 THERAPEUTIC ACTIVITIES: CPT

## 2025-01-10 PROCEDURE — 88342 IMHCHEM/IMCYTCHM 1ST ANTB: CPT | Performed by: PATHOLOGY

## 2025-01-10 RX ORDER — DIPHENHYDRAMINE HCL 25 MG
25 TABLET ORAL EVERY 6 HOURS PRN
Status: DISCONTINUED | OUTPATIENT
Start: 2025-01-10 | End: 2025-01-15 | Stop reason: HOSPADM

## 2025-01-10 RX ORDER — HEPARIN SODIUM 5000 [USP'U]/ML
5000 INJECTION, SOLUTION INTRAVENOUS; SUBCUTANEOUS EVERY 8 HOURS SCHEDULED
Status: DISCONTINUED | OUTPATIENT
Start: 2025-01-10 | End: 2025-01-15 | Stop reason: HOSPADM

## 2025-01-10 RX ADMIN — DEXAMETHASONE 4 MG: 4 TABLET ORAL at 00:47

## 2025-01-10 RX ADMIN — POLYETHYLENE GLYCOL 3350 17 G: 17 POWDER, FOR SOLUTION ORAL at 08:20

## 2025-01-10 RX ADMIN — PRAMIPEXOLE DIHYDROCHLORIDE 0.5 MG: 0.5 TABLET ORAL at 22:19

## 2025-01-10 RX ADMIN — OXYCODONE HYDROCHLORIDE 10 MG: 10 TABLET ORAL at 13:10

## 2025-01-10 RX ADMIN — HEPARIN SODIUM 5000 UNITS: 5000 INJECTION INTRAVENOUS; SUBCUTANEOUS at 22:20

## 2025-01-10 RX ADMIN — CALCIUM CARBONATE (ANTACID) CHEW TAB 500 MG 1000 MG: 500 CHEW TAB at 22:18

## 2025-01-10 RX ADMIN — FUROSEMIDE 20 MG: 20 TABLET ORAL at 08:20

## 2025-01-10 RX ADMIN — MELATONIN TAB 3 MG 3 MG: 3 TAB at 22:18

## 2025-01-10 RX ADMIN — POTASSIUM CHLORIDE 40 MEQ: 1500 TABLET, EXTENDED RELEASE ORAL at 08:20

## 2025-01-10 RX ADMIN — METHOCARBAMOL 1000 MG: 500 TABLET ORAL at 13:10

## 2025-01-10 RX ADMIN — CEFAZOLIN SODIUM 1000 MG: 1 SOLUTION INTRAVENOUS at 08:20

## 2025-01-10 RX ADMIN — HEPARIN SODIUM 5000 UNITS: 5000 INJECTION INTRAVENOUS; SUBCUTANEOUS at 13:10

## 2025-01-10 RX ADMIN — METHOCARBAMOL 1000 MG: 500 TABLET ORAL at 22:19

## 2025-01-10 RX ADMIN — DEXAMETHASONE 4 MG: 4 TABLET ORAL at 22:19

## 2025-01-10 RX ADMIN — LIDOCAINE 2 PATCH: 50 PATCH TOPICAL at 08:20

## 2025-01-10 RX ADMIN — PANTOPRAZOLE SODIUM 40 MG: 40 TABLET, DELAYED RELEASE ORAL at 06:13

## 2025-01-10 RX ADMIN — OXYCODONE HYDROCHLORIDE 10 MG: 10 TABLET ORAL at 06:13

## 2025-01-10 RX ADMIN — SENNOSIDES 8.6 MG: 8.6 TABLET, FILM COATED ORAL at 08:19

## 2025-01-10 RX ADMIN — DEXAMETHASONE 4 MG: 4 TABLET ORAL at 06:13

## 2025-01-10 RX ADMIN — CEFAZOLIN SODIUM 1000 MG: 1 SOLUTION INTRAVENOUS at 00:48

## 2025-01-10 RX ADMIN — DEXAMETHASONE 4 MG: 4 TABLET ORAL at 13:11

## 2025-01-10 RX ADMIN — METHOCARBAMOL 1000 MG: 500 TABLET ORAL at 06:13

## 2025-01-10 RX ADMIN — OXYCODONE HYDROCHLORIDE 10 MG: 10 TABLET ORAL at 22:18

## 2025-01-10 RX ADMIN — CEFAZOLIN SODIUM 1000 MG: 1 SOLUTION INTRAVENOUS at 17:08

## 2025-01-10 RX ADMIN — DIPHENHYDRAMINE HCL 25 MG: 25 TABLET ORAL at 17:07

## 2025-01-10 RX ADMIN — HYDROMORPHONE HYDROCHLORIDE 0.2 MG: 1 INJECTION, SOLUTION INTRAMUSCULAR; INTRAVENOUS; SUBCUTANEOUS at 17:07

## 2025-01-10 RX ADMIN — DIAZEPAM 2.5 MG: 10 INJECTION, SOLUTION INTRAMUSCULAR; INTRAVENOUS at 08:20

## 2025-01-10 RX ADMIN — DULOXETINE HYDROCHLORIDE 60 MG: 60 CAPSULE, DELAYED RELEASE ORAL at 08:20

## 2025-01-10 NOTE — ASSESSMENT & PLAN NOTE
POD#3 - s/p T2-4 laminectomy for resection of thoracic tumor (EM 1/7)   T3 lesion w/ concern of progressively worsening myelopathy  P/w progressively worsening bilateral lower extremity, L >R, weakness and balance difficulty x approx 2 months   Acutely worsened over 2 weeks prior to admission, requiring use of a rolling walker    Imaging:   MRI Cervical spine 1/6/25: Redemonstration of meningioma in the posterior left aspect of the canal at T3. Compared with 2022, mass is mildly increased in size and marked cord compression is also mildly increased. Focal cord edema at T3 not excluded but there is no edema in the immediately above or below the level of compression. Stable degenerative spondylosis in the cervical spine most pronounced at C5-6  MRI cervical spine 1/2/2025: Partially nondiagnostic exam due to motion artifact.  T3 lesion likely mildly increased in size based on evaluation and sagittal imaging.  CT cervical spine 1/2/2025: Partially calcified extramedullary T3 lesion slightly larger than prior MRI report.  CT thoracic and lumbar spine 1/2/2025: Thoracolumbar fusion.  Chronic disc and facet degenerative changes at L4-5 resulting in mild canal stenosis and severe foraminal narrowing.    Plan:   Continue to closely monitor neuro exam   Frequent neuro checks per primary team  Complete MAP goals > 85 x 24hrs post-op   1 posterior BRAYAN drain in place to GRAVITY   30cc/24hrs, serosanguinous drainage   Removed at bedside without complication   Continue IV ancef 1g q 8hrs while BRAYAN drain in place --> course to complete this afternoon   Continue local wound care to incision   Keep clean and dry --> to be wipe daily with a MARY wipe   Pt will shower today with assistance form nursing staff   May be left open to air   Continue dex wean as ordered --> increased to rapid taper today given reflux   Continue to hold all AC/AP meds x at least 2 weeks post-op   No reports use at home prior to arrival   DVT ppx: SCDs, SQH to  be restarted this afternoon   Medical management per primary team  Pain control per primary team   PT/OT  --> recommended rehab   Social work following or assistance with dispo once medically cleared    Accepted at [facility], pending authorization     Neurosurgery will continue to follow.  Please reach out with any further questions or concerns.

## 2025-01-10 NOTE — PROGRESS NOTES
Progress Note - Neurosurgery   Name: Hayley Rios 64 y.o. female I MRN: [id number]  Unit/Bed#: [facility] 626-01 I Date of Admission: 12/31/2024   Date of Service: 1/10/2025 I Hospital Day: 10    Assessment & Plan  Compression of thoracic spinal cord with myelopathy (HCC)  POD#3 - s/p T2-4 laminectomy for resection of thoracic tumor (EM 1/7)   T3 lesion w/ concern of progressively worsening myelopathy  P/w progressively worsening bilateral lower extremity, L >R, weakness and balance difficulty x approx 2 months   Acutely worsened over 2 weeks prior to admission, requiring use of a rolling walker    Imaging:   MRI Cervical spine 1/6/25: Redemonstration of meningioma in the posterior left aspect of the canal at T3. Compared with 2022, mass is mildly increased in size and marked cord compression is also mildly increased. Focal cord edema at T3 not excluded but there is no edema in the immediately above or below the level of compression. Stable degenerative spondylosis in the cervical spine most pronounced at C5-6  MRI cervical spine 1/2/2025: Partially nondiagnostic exam due to motion artifact.  T3 lesion likely mildly increased in size based on evaluation and sagittal imaging.  CT cervical spine 1/2/2025: Partially calcified extramedullary T3 lesion slightly larger than prior MRI report.  CT thoracic and lumbar spine 1/2/2025: Thoracolumbar fusion.  Chronic disc and facet degenerative changes at L4-5 resulting in mild canal stenosis and severe foraminal narrowing.    Plan:   Continue to closely monitor neuro exam   Frequent neuro checks per primary team  Complete MAP goals > 85 x 24hrs post-op   1 posterior BRAYAN drain in place to GRAVITY   30cc/24hrs, serosanguinous drainage   Removed at bedside without complication   Continue IV ancef 1g q 8hrs while BRAYAN drain in place --> course to complete this afternoon   Continue local wound care to incision   Keep clean and dry --> to be wipe daily with a MARY wipe   Pt will shower  today with assistance form nursing staff   May be left open to air   Continue dex wean as ordered --> increased to rapid taper today given reflux   Continue to hold all AC/AP meds x at least 2 weeks post-op   No reports use at home prior to arrival   DVT ppx: SCDs, SQH to be restarted this afternoon   Medical management per primary team  Pain control per primary team   PT/OT  --> recommended rehab   Social work following or assistance with dispo once medically cleared    Accepted at [facility], pending authorization     Neurosurgery will continue to follow.  Please reach out with any further questions or concerns.     Lymphedema  LE lymphedema  Evident on exam   Ambulatory dysfunction  Likely secondary to underlying myelopathy  Could be multifactorial with underlying peripheral neuropathy and chronic podiatric issues s/p multiple surgeries.     Please contact the SecureChat role for the Neurosurgery service with any questions/concerns.    Subjective   Patient seen and examined this a.m. on rounds.  No acute events overnight.  Patient remains neurologically stable.  She offers no complaints this morning.  She does state that her posterior midthoracic pain is getting better but still is pretty significant.  Patient is motivated to shower and move around a bit more today.    Objective :  Temp:  [97.8 °F (36.6 °C)-98.1 °F (36.7 °C)] 98 °F (36.7 °C)  HR:  [63-94] 63  BP: (121-159)/(61-83) 158/77  Resp:  [18-20] 18  SpO2:  [95 %-97 %] 96 %  O2 Device: None (Room air)    I/O         01/08 0701 01/09 0700 01/09 0701  01/10 0700 01/10 0701  01/11 0700    P.O. 560 885     I.V. (mL/kg)       IV Piggyback       Total Intake(mL/kg) 560 (6.2) 885 (9.8)     Urine (mL/kg/hr) 800 (0.4) 0 (0)     Drains 55 30     Blood       Total Output 855 30     Net -295 +855            Unmeasured Urine Occurrence 2 x 2 x           Physical Exam   General appearance: alert, appears stated age, cooperative and no distress  Head: Normocephalic, without  obvious abnormality, atraumatic  Eyes: EOMI, PERRL  Neck: supple, symmetrical, trachea midline and NT  Back:  - midline posterior thoracic incision with clean, dry, intact.  No surrounding erythema, edema or drainage.  -BRAYAN drain removed and site closed with 1 suture.  Remains clean dry and intact  Lungs: non labored breathing, no resp distress on room air   Heart: regular heart rate  Neurologic:   Mental status: Alert, oriented x3, thought content appropriate  Cranial nerves: grossly intact (Cranial nerves II-XII)  Sensory:   -T3 sensory level appreciated, significantly improved with some mild paresthesias throughout  Motor: moving all extremities  - brandi UE: strength intact, 5/5 throughout   - RLE: strength intact 5/5 throughout   - LLE: 4+/5 throughout   Reflexes: No further clonus appreciated in RLE, mild sustained clonus noted to LLE      Lab Results: I have reviewed the following results:  Recent Labs     01/08/25  0531 01/08/25  1132 01/09/25  0619   WBC 8.05  --  9.53   HGB 10.0*  --  10.2*   HCT 32.7*  --  34.4*      < > 247   SODIUM 139  --  140   K 3.7  --  3.6     --  102   CO2 27  --  28   BUN 12  --  15   CREATININE 0.52*  --  0.54*   GLUC 132  --  138   MG  --   --  2.0   PHOS  --   --  3.0   AST 21  --  17   ALT 25  --  15   ALB 3.6  --  3.9   TBILI 0.26  --  0.29   ALKPHOS 71  --  71   PTT 27  --   --    INR 1.01  --   --     < > = values in this interval not displayed.     VTE Pharmacologic Prophylaxis: Sequential compression device (Venodyne)

## 2025-01-10 NOTE — PROGRESS NOTES
Progress Note - Hospitalist   Name: Hayley Rios 64 y.o. female I MRN: [id number]  Unit/Bed#: [facility] 626-01 I Date of Admission: 12/31/2024   Date of Service: 1/10/2025 I Hospital Day: 10    Assessment & Plan  Compression of thoracic spinal cord with myelopathy (HCC)  Presented to [facility] with worsening of chronic LLE weakness, numbness/tingling of LLE>RLE, and muscle spasms. Known history of thoracic meningioma with spinal cord displacement and compression, lost to follow up due to move to [place].  MRI T spine: Suboptimal examination due to mild, moderate, and severe motion artifact - worse on postcontrast sequences. Similar 1.4 cm intradural extramedullary probable meningioma in left posterolateral thoracic spine region at approximately T3 level with associated marked spinal cord compression with severe canal stenosis given motion degraded exam. No cord edema given motion degradation.   MRI L spine: Suboptimal examination due to moderate-to-severe motion degraded exam of lumbar spine and 3 plane localizer images, sagittal T1 post contrast and axial T1 postcontrast sequences. No abnormal enhancement in lumbar spine given limitations of motion degradation.   Neurosurgery consult and recommendations appreciated   POD#3- s/p T2-4 laminectomy for resection of thoracic tumor (EM 1/7)   T3 lesion w/ concern of progressively worsening myelopathy  Continue pain regimen  Drain removed today by nsx d/c iv ancef    Dexamethasone wean- accelerated given reflux symptoms   Hold AC/AP meds x at least 2 weeks post-op   Monitor neuro checks   PT/OT recommending rehab, eventually will go to [facility] pending auth   Restless leg syndrome  Vitamin B12 borderline low, s/p cyanocobalamin injection  Continue home dose Mirapex 0.5 mg qhs  Low iron stores and low percent saturation on iron panel, was started on venofer at [facility] x 3 days  Possibility this is related to nerve root impingement  Follow-up with her outpatient providers in [place] for  further management  MARV (iron deficiency anemia)  Iron panel 12/31/24: iron 40, ferritin 9, iron saturation 40%, TIBC 396  S/p IV Venofer x3  Monitor CBC and transfuse for hemoglobin < 7  Prediabetes  Evidenced by A1c of 5.8%  Sugars stable on BMP   Defer accuchecks/SSI  Ambulatory dysfunction  Due to primary problem, see plan of care outlined above   Lymphedema  Continue home dose Lasix    VTE Pharmacologic Prophylaxis: VTE Score: 5 High Risk (Score >/= 5) - Pharmacological DVT Prophylaxis Ordered: heparin. Sequential Compression Devices Ordered.    Mobility:   Basic Mobility Inpatient Raw Score: 17  JH-HLM Goal: 5: Stand one or more mins  JH-HLM Achieved: 7: Walk 25 feet or more  JH-HLM Goal NOT achieved. Continue with multidisciplinary rounding and encourage appropriate mobility to improve upon JH-HLM goals.    Patient Centered Rounds: I performed bedside rounds with nursing staff today.   Discussions with Specialists or Other Care Team Provider: d/w RN and neurosurgery     Education and Discussions with Family / Patient: Patient declined call to .     Current Length of Stay: 10 day(s)  Current Patient Status: Inpatient   Certification Statement: The patient will continue to require additional inpatient hospital stay due to pending placement   Discharge Plan: Anticipate discharge later today or tomorrow to rehab facility.    Code Status: Level 1 - Full Code    Subjective   Patient lying in bed reports that she is still having pain in her surgical incision but reports that it is improved since removal of the BRAYAN drain today less sharp pains are noted.  Denies having a bowel movement since OR but does report flatus.  Denies any abdominal pain nausea or vomiting.  Denies any chest pain shortness of breath.    Objective :  Temp:  [97.8 °F (36.6 °C)-98.1 °F (36.7 °C)] 98 °F (36.7 °C)  HR:  [63-94] 63  BP: (121-159)/(61-83) 158/77  Resp:  [18-20] 18  SpO2:  [95 %-97 %] 96 %  O2 Device: None (Room  air)    Body mass index is 34.33 kg/m².     Input and Output Summary (last 24 hours):     Intake/Output Summary (Last 24 hours) at 1/10/2025 1119  Last data filed at 1/10/2025 0613  Gross per 24 hour   Intake 885 ml   Output 20 ml   Net 865 ml       Physical Exam  Constitutional:       General: She is not in acute distress.     Appearance: She is obese.   Cardiovascular:      Rate and Rhythm: Normal rate and regular rhythm.      Heart sounds: Normal heart sounds. No murmur heard.  Pulmonary:      Effort: Pulmonary effort is normal. No respiratory distress.      Breath sounds: Normal breath sounds. No wheezing or rales.   Abdominal:      General: Bowel sounds are normal. There is no distension.      Palpations: Abdomen is soft.      Tenderness: There is no abdominal tenderness.   Musculoskeletal:         General: Swelling (chronic lower extremity lymphedema) present.   Skin:     General: Skin is warm and dry.      Findings: No erythema or rash.      Comments: Midline thoracic incision and BRAYAN site open to air, well approximated, no drainage. Some surrounding rash where tape was likely allergic reaction to tape    Neurological:      Mental Status: She is alert and oriented to person, place, and time.      Motor: Weakness (b/l LE) present.   Psychiatric:         Mood and Affect: Mood normal.           Lines/Drains:  Lines/Drains/Airways       Active Status       Name Placement date Placement time Site Days    Closed/Suction Drain Right;Superior Back Bulb 15 Fr. 01/07/25  1129  Back  2                            Lab Results: I have reviewed the following results:   Results from last 7 days   Lab Units 01/09/25  0619   WBC Thousand/uL 9.53   HEMOGLOBIN g/dL 10.2*   HEMATOCRIT % 34.4*   PLATELETS Thousands/uL 247   SEGS PCT % 84*   LYMPHO PCT % 11*   MONO PCT % 4   EOS PCT % 0     Results from last 7 days   Lab Units 01/09/25  0619   SODIUM mmol/L 140   POTASSIUM mmol/L 3.6   CHLORIDE mmol/L 102   CO2 mmol/L 28   BUN mg/dL  15   CREATININE mg/dL 0.54*   ANION GAP mmol/L 10   CALCIUM mg/dL 9.0   ALBUMIN g/dL 3.9   TOTAL BILIRUBIN mg/dL 0.29   ALK PHOS U/L 71   ALT U/L 15   AST U/L 17   GLUCOSE RANDOM mg/dL 138     Results from last 7 days   Lab Units 01/08/25  0531   INR  1.01     Results from last 7 days   Lab Units 01/10/25  0745 01/09/25  1707 01/09/25  1212 01/09/25  0819 01/08/25  2124 01/08/25  1646 01/08/25  1042 01/08/25  0714 01/07/25  1746 01/07/25  1308   POC GLUCOSE mg/dl 124 134 141* 138 137 130 167* 144* 134 165*               Recent Cultures (last 7 days):         Imaging Results Review: I reviewed radiology reports from this admission including: MRI spine.  Other Study Results Review: No additional pertinent studies reviewed.    Last 24 Hours Medication List:     Current Facility-Administered Medications:     bisacodyl (DULCOLAX) rectal suppository 10 mg, Daily PRN    calcium carbonate (TUMS) chewable tablet 1,000 mg, TID PRN    ceFAZolin (ANCEF) IVPB (premix in dextrose) 1,000 mg 50 mL, Q8H, Last Rate: 1,000 mg (01/10/25 0820)    cyanocobalamin injection 1,000 mcg, Q30 Days    [COMPLETED] dexamethasone (DECADRON) tablet 4 mg, Q6H EVGNEY **FOLLOWED BY** dexamethasone (DECADRON) tablet 4 mg, Q8H EVGENY **FOLLOWED BY** [START ON 1/11/2025] dexamethasone (DECADRON) tablet 2 mg, Q6H EVGENY **FOLLOWED BY** [START ON 1/12/2025] dexamethasone (DECADRON) tablet 2 mg, Q8H EVGENY **FOLLOWED BY** [START ON 1/13/2025] dexamethasone (DECADRON) tablet 2 mg, Q12H EVGENY **FOLLOWED BY** [START ON 1/14/2025] dexamethasone (DECADRON) tablet 2 mg, Daily    diazepam (VALIUM) injection 2.5 mg, Q6H PRN    DULoxetine (CYMBALTA) delayed release capsule 60 mg, Daily    furosemide (LASIX) tablet 20 mg, Daily    heparin (porcine) subcutaneous injection 5,000 Units, Q8H EVGENY    HYDROmorphone (DILAUDID) injection 0.2 mg, Q4H PRN    insulin lispro (HumALOG/ADMELOG) 100 units/mL subcutaneous injection 1-6 Units, TID AC **AND** Fingerstick Glucose (POCT), TID AC     labetalol (NORMODYNE) injection 10 mg, Q4H PRN    lidocaine (LIDODERM) 5 % patch 2 patch, Daily    melatonin tablet 3 mg, HS    methocarbamol (ROBAXIN) tablet 1,000 mg, Q8H EVGENY    ondansetron (ZOFRAN) injection 4 mg, Q6H PRN    oxyCODONE (ROXICODONE) IR tablet 5 mg, Q4H PRN **OR** oxyCODONE (ROXICODONE) immediate release tablet 10 mg, Q4H PRN    pantoprazole (PROTONIX) EC tablet 40 mg, Early Morning    polyethylene glycol (MIRALAX) packet 17 g, Daily PRN    polyethylene glycol (MIRALAX) packet 17 g, Daily    potassium chloride (Klor-Con M20) CR tablet 40 mEq, Daily With Breakfast    pramipexole (MIRAPEX) tablet 0.5 mg, HS    senna (SENOKOT) tablet 8.6 mg, Daily    Administrative Statements   Today, Patient Was Seen By: ROGE Villareal      **Please Note: This note may have been constructed using a voice recognition system.**

## 2025-01-10 NOTE — RESTORATIVE TECHNICIAN NOTE
Restorative Technician Note      Patient Name: Hayley Rios     Restorative Tech Visit Date: 01/10/25  Note Type: Mobility  Patient Position Upon Consult: Supine  Activity Performed: Ambulated  Assistive Device: Roller walker  Patient Position at End of Consult: Supine; All needs within reach; Bed/Chair alarm activated    MANPREET Basurto

## 2025-01-10 NOTE — TELEPHONE ENCOUNTER
1/10/25 - PT IN [facility]  2 WK POV/PATH W/EM PER JOSEPH  6 WK POV W/EM SOLO NO IMAGING PER JOSEPH  **WORKING ON SCHEDULING APT'S**    Joseph Villarreal PA-C  [facility] [place] [facility]  Hi,  Can you please schedule this patient for the appropriate postop visits as noted below?  She underwent thoracic laminectomies for resection of a thoracic tumor on 1/7/2025.  She will need the following postop visits:    -2-week POV for incision check and final pathology review with Dr. Marylin alexis  - 6-week POV with Dr. Marylin alexis, no imaging required    Thank you,  Joseph

## 2025-01-10 NOTE — CASE MANAGEMENT
[facility] received request for authorization from Care Manager.  Authorization request submitted for: Acute Rehab  Facility Name: [facility] NPI: [id number]   Facility MD: Ashley DePadua NPI: [id number]  Authorization initiated by contacting insurance: [employer]   Via: [place] Portal   Clinicals submitted via Portal attachment   Pending Reference #: [id number]      Care Manager notified: Rene Delatorre      Updates to authorization status will be noted in chart. Please reach out to CM for updates on any clinical information.

## 2025-01-10 NOTE — CASE MANAGEMENT
Received call from Lola @ [facility] regarding pended IRF auth (P#: [phone]). Looking to confirm requested dates for IRF since auth was back dated to 12/31/2024. Per rep, [employer] plan terminated on 12/31/2024 and patient now has insurance through [facility]. Insurance verifiers contacted to confirm current plan. CM notified: Rene Delatorre.

## 2025-01-10 NOTE — ASSESSMENT & PLAN NOTE
Presented to [facility] with worsening of chronic LLE weakness, numbness/tingling of LLE>RLE, and muscle spasms. Known history of thoracic meningioma with spinal cord displacement and compression, lost to follow up due to move to [place].  MRI T spine: Suboptimal examination due to mild, moderate, and severe motion artifact - worse on postcontrast sequences. Similar 1.4 cm intradural extramedullary probable meningioma in left posterolateral thoracic spine region at approximately T3 level with associated marked spinal cord compression with severe canal stenosis given motion degraded exam. No cord edema given motion degradation.   MRI L spine: Suboptimal examination due to moderate-to-severe motion degraded exam of lumbar spine and 3 plane localizer images, sagittal T1 post contrast and axial T1 postcontrast sequences. No abnormal enhancement in lumbar spine given limitations of motion degradation.   Neurosurgery consult and recommendations appreciated   POD#3- s/p T2-4 laminectomy for resection of thoracic tumor (EM 1/7)   T3 lesion w/ concern of progressively worsening myelopathy  Continue pain regimen  Drain removed today by nsx d/c iv ancef    Dexamethasone wean- accelerated given reflux symptoms   Hold AC/AP meds x at least 2 weeks post-op   Monitor neuro checks   PT/OT recommending rehab, eventually will go to [facility] pending auth

## 2025-01-10 NOTE — PLAN OF CARE
Problem: PAIN - ADULT  Goal: Verbalizes/displays adequate comfort level or baseline comfort level  Description: Interventions:  - Encourage patient to monitor pain and request assistance  - Assess pain using appropriate pain scale  - Administer analgesics based on type and severity of pain and evaluate response  - Implement non-pharmacological measures as appropriate and evaluate response  - Consider cultural and social influences on pain and pain management  - Notify physician/advanced practitioner if interventions unsuccessful or patient reports new pain  Outcome: Progressing     Problem: INFECTION - ADULT  Goal: Absence or prevention of progression during hospitalization  Description: INTERVENTIONS:  - Assess and monitor for signs and symptoms of infection  - Monitor lab/diagnostic results  - Monitor all insertion sites, i.e. indwelling lines, tubes, and drains  - Monitor endotracheal if appropriate and nasal secretions for changes in amount and color  - [place] appropriate cooling/warming therapies per order  - Administer medications as ordered  - Instruct and encourage patient and family to use good hand hygiene technique  - Identify and instruct in appropriate isolation precautions for identified infection/condition  Outcome: Progressing     Problem: SAFETY ADULT  Goal: Patient will remain free of falls  Description: INTERVENTIONS:  - Educate patient/family on patient safety including physical limitations  - Instruct patient to call for assistance with activity   - Consult OT/PT to assist with strengthening/mobility   - Keep Call bell within reach  - Keep bed low and locked with side rails adjusted as appropriate  - Keep care items and personal belongings within reach  - Initiate and maintain comfort rounds  - Make Fall Risk Sign visible to staff  - Offer Toileting every 2 Hours, in advance of need  - Initiate/Maintain alarm  - Obtain necessary fall risk management equipment  - Apply yellow socks and  bracelet for high fall risk patients  - Consider moving patient to room near nurses station  Outcome: Progressing  Goal: Maintain or return to baseline ADL function  Description: INTERVENTIONS:  -  Assess patient's ability to carry out ADLs; assess patient's baseline for ADL function and identify physical deficits which impact ability to perform ADLs (bathing, care of mouth/teeth, toileting, grooming, dressing, etc.)  - Assess/evaluate cause of self-care deficits   - Assess range of motion  - Assess patient's mobility; develop plan if impaired  - Assess patient's need for assistive devices and provide as appropriate  - Encourage maximum independence but intervene and supervise when necessary  - Involve family in performance of ADLs  - Assess for home care needs following discharge   - Consider OT consult to assist with ADL evaluation and planning for discharge  - Provide patient education as appropriate  Outcome: Progressing  Goal: Maintains/Returns to pre admission functional level  Description: INTERVENTIONS:  - Perform AM-PAC 6 Click Basic Mobility/ Daily Activity assessment daily.  - Set and communicate daily mobility goal to care team and patient/family/caregiver.   - Collaborate with rehabilitation services on mobility goals if consulted  - Perform Range of Motion 3 times a day.  - Reposition patient every 2 hours.  - Dangle patient 3 times a day  - Stand patient 3 times a day  - Ambulate patient 3 times a day  - Out of bed to chair 3 times a day   - Out of bed for meals 3 times a day  - Out of bed for toileting  - Record patient progress and toleration of activity level   Outcome: Progressing     Problem: DISCHARGE PLANNING  Goal: Discharge to home or other facility with appropriate resources  Description: INTERVENTIONS:  - Identify barriers to discharge w/patient and caregiver  - Arrange for needed discharge resources and transportation as appropriate  - Identify discharge learning needs (meds, wound care,  etc.)  - Arrange for interpretive services to assist at discharge as needed  - Refer to Case Management Department for coordinating discharge planning if the patient needs post-hospital services based on physician/advanced practitioner order or complex needs related to functional status, cognitive ability, or social support system  Outcome: Progressing     Problem: Knowledge Deficit  Goal: Patient/family/caregiver demonstrates understanding of disease process, treatment plan, medications, and discharge instructions  Description: Complete learning assessment and assess knowledge base.  Interventions:  - Provide teaching at level of understanding  - Provide teaching via preferred learning methods  Outcome: Progressing     Problem: NEUROSENSORY - ADULT  Goal: Achieves stable or improved neurological status  Description: INTERVENTIONS  - Monitor and report changes in neurological status  - Monitor vital signs such as temperature, blood pressure, glucose, and any other labs ordered   - Initiate measures to prevent increased intracranial pressure  - Monitor for seizure activity and implement precautions if appropriate      Outcome: Progressing  Goal: Achieves maximal functionality and self care  Description: INTERVENTIONS  - Monitor swallowing and airway patency with patient fatigue and changes in neurological status  - Encourage and assist patient to increase activity and self care.   - Encourage visually impaired, hearing impaired and aphasic patients to use assistive/communication devices  Outcome: Progressing

## 2025-01-10 NOTE — PHYSICAL THERAPY NOTE
"Physical Therapy Progress Note     01/10/25 1440   PT Last Visit   PT Visit Date 01/10/25   Note Type   Note Type Treatment   Pain Assessment   Pain Assessment Tool FLACC   Pain Rating: FLACC (Rest) - Face 1   Pain Rating: FLACC (Rest) - Legs 0   Pain Rating: FLACC (Rest) - Activity 0   Pain Rating: FLACC (Rest) - Cry 1   Pain Rating: FLACC (Rest) - Consolability 0   Score: FLACC (Rest) 2   Pain Rating: FLACC (Activity) - Face 1   Pain Rating: FLACC (Activity) - Legs 1   Pain Rating: FLACC (Activity) - Activity 1   Pain Rating: FLACC (Activity) - Cry 1   Pain Rating: FLACC (Activity) - Consolability 0   Score: FLACC (Activity) 4   Restrictions/Precautions   Other Precautions Pain;Fall Risk;Spinal precautions   Subjective   Subjective pt encountered supine in bed, asleep & easily roused.  Reports feeling better each day since procedure & offers no new complaints at this time.  She reports LE strength has improed significantly, but she has hand tremors & feels \"buzzing or vibrating\" in abdomen & thighs, which is sensation she had prior to admit.   Bed Mobility   Supine to Sit 5  Supervision   Additional items Assist x 1   Transfers   Sit to Stand 5  Supervision   Additional items Assist x 1;Armrests;Increased time required   Stand to Sit 5  Supervision   Additional items Assist x 1;Armrests;Increased time required   Ambulation/Elevation   Gait pattern Short stride;Inconsistent shyla;Decreased foot clearance;Decreased R stance;Improper Weight shift   Gait Assistance 4  Minimal assist   Additional items Assist x 1   Assistive Device Rolling walker   Distance 150', 200' 120', seated rest, then 120' with fingertip pressure on RW   Balance   Static Sitting Fair +   Static Standing Fair -   Ambulatory Poor +   Activity Tolerance   Activity Tolerance Patient tolerated treatment well;Patient limited by fatigue;Patient limited by pain   Nurse Made Aware yes   Assessment   Prognosis Good   Problem List Decreased " strength;Decreased range of motion;Decreased endurance;Impaired balance;Decreased mobility;Decreased coordination;Impaired sensation;Obesity;Decreased skin integrity;Orthopedic restrictions;Pain   Assessment pt demosntrated significantly improved functional endurance & mobiilty today, perfomring bed level mobitly & transfers OOB with decreased assist, then ambulating community distances with use of RW without LOB.  She does demosntrate dyspnea with activity, requiring occasional standing rests to recover, followed by seated rest prior to final gait trial.  she remains RW today, but during final gait trial, she was able to release pressure on RW and ambulate with just fingertip pressure for most of return to room, only progressing to palm pressure within last 20'.  she does demosntrate increased lateral weight shifting & notable weakness in hips with decreased reliance on RW, but no LOB noted.  Upon return to room, discussion held with pt regarding role of rehab, d/c planning & anticipated recovery.  PT POC & d/c recommendations remain appropriate at this time.   Goals   Patient Goals to get better & be able to go swimming   STG Expiration Date 01/22/25   PT Treatment Day 1   Plan   Treatment/Interventions LE strengthening/ROM;Functional transfer training;Elevations;Therapeutic exercise;Endurance training;Patient/family training;Gait training;Bed mobility;Equipment eval/education   Progress Progressing toward goals   PT Frequency 3-5x/wk   Discharge Recommendation   Rehab Resource Intensity Level, PT I (Maximum Resource Intensity)   AM-PAC Basic Mobility Inpatient   Turning in Flat Bed Without Bedrails 3   Lying on Back to Sitting on Edge of Flat Bed Without Bedrails 3   Moving Bed to Chair 3   Standing Up From Chair Using Arms 3   Walk in Room 3   Climb 3-5 Stairs With Railing 3   Basic Mobility Inpatient Raw Score 18   Basic Mobility Standardized Score 41.05   [facility] Highest Level Of Mobility   [facility] Goal 6:  Walk 10 steps or more   -HLM Achieved 8: Walk 250 feet ot more       Chuy Coker PTA    An [facility] Basic Mobility Raw Score less than 17 suggests pt would benefit from post acute rehab.  Please also refer to the recommendation of the Physical Therapist for safe discharge planning.

## 2025-01-10 NOTE — PLAN OF CARE
Problem: PHYSICAL THERAPY ADULT  Goal: Performs mobility at highest level of function for planned discharge setting.  See evaluation for individualized goals.  Description: Treatment/Interventions: Functional transfer training, LE strengthening/ROM, Elevations, Therapeutic exercise, Endurance training, Patient/family training, Equipment eval/education, Bed mobility, Gait training, Spoke to nursing, OT  Equipment Recommended: Walker       See flowsheet documentation for full assessment, interventions and recommendations.  Outcome: Progressing  Note: Prognosis: Good  Problem List: Decreased strength, Decreased range of motion, Decreased endurance, Impaired balance, Decreased mobility, Decreased coordination, Impaired sensation, Obesity, Decreased skin integrity, Orthopedic restrictions, Pain  Assessment: pt demosntrated significantly improved functional endurance & mobiilty today, perfomring bed level mobitly & transfers OOB with decreased assist, then ambulating community distances with use of RW without LOB.  She does demosntrate dyspnea with activity, requiring occasional standing rests to recover, followed by seated rest prior to final gait trial.  she remains RW today, but during final gait trial, she was able to release pressure on RW and ambulate with just fingertip pressure for most of return to room, only progressing to palm pressure within last 20'.  she does demosntrate increased lateral weight shifting & notable weakness in hips with decreased reliance on RW, but no LOB noted.  Upon return to room, discussion held with pt regarding role of rehab, d/c planning & anticipated recovery.  PT POC & d/c recommendations remain appropriate at this time.  Barriers to Discharge: Inaccessible home environment, Decreased caregiver support     Rehab Resource Intensity Level, PT: I (Maximum Resource Intensity)    See flowsheet documentation for full assessment.

## 2025-01-10 NOTE — ASSESSMENT & PLAN NOTE
Presented to [facility] with worsening of chronic LLE weakness, numbness/tingling of LLE>RLE, and muscle spasms. Known history of thoracic meningioma with spinal cord displacement and compression, lost to follow up due to move to [place].  MRI T spine: Suboptimal examination due to mild, moderate, and severe motion artifact - worse on postcontrast sequences. Similar 1.4 cm intradural extramedullary probable meningioma in left posterolateral thoracic spine region at approximately T3 level with associated marked spinal cord compression with severe canal stenosis given motion degraded exam. No cord edema given motion degradation.   MRI L spine: Suboptimal examination due to moderate-to-severe motion degraded exam of lumbar spine and 3 plane localizer images, sagittal T1 post contrast and axial T1 postcontrast sequences. No abnormal enhancement in lumbar spine given limitations of motion degradation.   Neurosurgery consult and recommendations appreciated   POD#2- s/p T2-4 laminectomy for resection of thoracic tumor (EM 1/7)   T3 lesion w/ concern of progressively worsening myelopathy  Continue pain regimen  Drain management- c/w iv ancef while drain in place   Dexamethasone wean   Hold AC/AP meds x at least 2 weeks post-op   Monitor neuro checks   PT/OT recommending rehab, eventually will go to [facility]

## 2025-01-10 NOTE — PROGRESS NOTES
Progress Note - Hospitalist   Name: Hayley Rios 64 y.o. 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 Known history of thoracic meningioma with spinal cord displacement and compression, lost to follow up due to move to [place].  MRI T spine: Suboptimal examination due to mild, moderate, and severe motion artifact - worse on postcontrast sequences. Similar 1.4 cm intradural extramedullary probable meningioma in left posterolateral thoracic spine region at approximately T3 level with associated marked spinal cord compression with severe canal stenosis given motion degraded exam. No cord edema given motion degradation.   MRI L spine: Suboptimal examination due to moderate-to-severe motion degraded exam of lumbar spine and 3 plane localizer images, sagittal T1 post contrast and axial T1 postcontrast sequences. No abnormal enhancement in lumbar spine given limitations of motion degradation.   Neurosurgery consult and recommendations appreciated   POD#2- s/p T2-4 laminectomy for resection of thoracic tumor (EM 1/7)   T3 lesion w/ concern of progressively worsening myelopathy  Continue pain regimen  Drain management- c/w iv ancef while drain in place   Dexamethasone wean   Hold AC/AP meds x at least 2 weeks post-op   Monitor neuro checks   PT/OT recommending rehab, eventually will go to [facility]  Restless leg syndrome  Vitamin B12 borderline low, s/p cyanocobalamin injection  Continue home dose Mirapex 0.5 mg qhs  Low iron stores and low percent saturation on iron panel, was started on venofer at [facility] x 3 days  Possibility this is related to nerve root impingement  Follow-up with her outpatient providers in [place] for further management  MARV (iron deficiency  anemia)  Iron panel 12/31/24: iron 40, ferritin 9, iron saturation 40%, TIBC 396  S/p IV Venofer x3  Monitor CBC and transfuse for hemoglobin < 7  Prediabetes  Evidenced by A1c of 5.8%  Sugars stable on BMP   Defer accuchecks/SSI  Ambulatory dysfunction  Due to primary problem, see plan of care outlined above   Lymphedema  Continue home dose Lasix    VTE Pharmacologic Prophylaxis: VTE Score: 5 High Risk (Score >/= 5) - Pharmacological DVT Prophylaxis Ordered: heparin. Sequential Compression Devices Ordered.    Mobility:   Basic Mobility Inpatient Raw Score: 17  -HLM Goal: 5: Stand one or more mins  JH-HLM Achieved: 7: Walk 25 feet or more  JH-HLM Goal NOT achieved. Continue with multidisciplinary rounding and encourage appropriate mobility to improve upon JH-HLM goals.    Patient Centered Rounds: I performed bedside rounds with nursing staff today.   Discussions with Specialists or Other Care Team Provider: d/w RN and nsx     Education and Discussions with Family / Patient: Patient declined call to .     Current Length of Stay: 10 day(s)  Current Patient Status: Inpatient   Certification Statement: The patient will continue to require additional inpatient hospital stay due to once cleared by NSX  Discharge Plan: Anticipate discharge in 24-48 hrs to rehab facility.    Code Status: Level 1 - Full Code    Subjective   Pt reports post op pain in thoracic spine feels burning sensation. Denies all other complaints     Objective :  Temp:  [97.8 °F (36.6 °C)-98.1 °F (36.7 °C)] 98 °F (36.7 °C)  HR:  [63-94] 63  BP: (121-159)/(61-83) 158/77  Resp:  [18-20] 18  SpO2:  [95 %-97 %] 96 %  O2 Device: None (Room air)    Body mass index is 34.33 kg/m².     Input and Output Summary (last 24 hours):     Intake/Output Summary (Last 24 hours) at 1/10/2025 1116  Last data filed at 1/10/2025 0613  Gross per 24 hour   Intake 885 ml   Output 20 ml   Net 865 ml       Physical Exam  Constitutional:       General: She is not in  acute distress.     Appearance: She is well-developed. She is obese.   Cardiovascular:      Rate and Rhythm: Normal rate and regular rhythm.      Heart sounds: Normal heart sounds. No murmur heard.  Pulmonary:      Effort: Pulmonary effort is normal. No respiratory distress.      Breath sounds: Normal breath sounds. No wheezing or rales.   Abdominal:      General: Bowel sounds are normal. There is no distension.      Palpations: Abdomen is soft.      Tenderness: There is no abdominal tenderness.   Musculoskeletal:         General: Swelling (chronic lymphedema) present. No tenderness.      Cervical back: Neck supple.   Skin:     General: Skin is warm and dry.      Findings: No erythema or rash.      Comments: Thoracic surgical dressing dry and intact    Neurological:      Mental Status: She is alert and oriented to person, place, and time.      Motor: Weakness (b/w LE) present.   Psychiatric:         Mood and Affect: Mood normal.           Lines/Drains:  Lines/Drains/Airways       Active Status       Name Placement date Placement time Site Days    Closed/Suction Drain Right;Superior Back Bulb 15 Fr. 01/07/25  1129  Back  2                            Lab Results: I have reviewed the following results:   Results from last 7 days   Lab Units 01/09/25  0619   WBC Thousand/uL 9.53   HEMOGLOBIN g/dL 10.2*   HEMATOCRIT % 34.4*   PLATELETS Thousands/uL 247   SEGS PCT % 84*   LYMPHO PCT % 11*   MONO PCT % 4   EOS PCT % 0     Results from last 7 days   Lab Units 01/09/25  0619   SODIUM mmol/L 140   POTASSIUM mmol/L 3.6   CHLORIDE mmol/L 102   CO2 mmol/L 28   BUN mg/dL 15   CREATININE mg/dL 0.54*   ANION GAP mmol/L 10   CALCIUM mg/dL 9.0   ALBUMIN g/dL 3.9   TOTAL BILIRUBIN mg/dL 0.29   ALK PHOS U/L 71   ALT U/L 15   AST U/L 17   GLUCOSE RANDOM mg/dL 138     Results from last 7 days   Lab Units 01/08/25  0531   INR  1.01     Results from last 7 days   Lab Units 01/10/25  0745 01/09/25  1707 01/09/25  1212 01/09/25  0819  01/08/25  2124 01/08/25  1646 01/08/25  1042 01/08/25  0714 01/07/25  1746 01/07/25  1308   POC GLUCOSE mg/dl 124 134 141* 138 137 130 167* 144* 134 165*               Recent Cultures (last 7 days):         Imaging Results Review: I reviewed radiology reports from this admission including: MRI spine.  Other Study Results Review: No additional pertinent studies reviewed.    Last 24 Hours Medication List:     Current Facility-Administered Medications:     bisacodyl (DULCOLAX) rectal suppository 10 mg, Daily PRN    calcium carbonate (TUMS) chewable tablet 1,000 mg, TID PRN    ceFAZolin (ANCEF) IVPB (premix in dextrose) 1,000 mg 50 mL, Q8H, Last Rate: 1,000 mg (01/10/25 0820)    cyanocobalamin injection 1,000 mcg, Q30 Days    [COMPLETED] dexamethasone (DECADRON) tablet 4 mg, Q6H EVGENY **FOLLOWED BY** dexamethasone (DECADRON) tablet 4 mg, Q8H EVGENY **FOLLOWED BY** [START ON 1/11/2025] dexamethasone (DECADRON) tablet 2 mg, Q6H EVGENY **FOLLOWED BY** [START ON 1/12/2025] dexamethasone (DECADRON) tablet 2 mg, Q8H EVGENY **FOLLOWED BY** [START ON 1/13/2025] dexamethasone (DECADRON) tablet 2 mg, Q12H EVGENY **FOLLOWED BY** [START ON 1/14/2025] dexamethasone (DECADRON) tablet 2 mg, Daily    diazepam (VALIUM) injection 2.5 mg, Q6H PRN    DULoxetine (CYMBALTA) delayed release capsule 60 mg, Daily    furosemide (LASIX) tablet 20 mg, Daily    heparin (porcine) subcutaneous injection 5,000 Units, Q8H EVGENY    HYDROmorphone (DILAUDID) injection 0.2 mg, Q4H PRN    insulin lispro (HumALOG/ADMELOG) 100 units/mL subcutaneous injection 1-6 Units, TID AC **AND** Fingerstick Glucose (POCT), TID AC    labetalol (NORMODYNE) injection 10 mg, Q4H PRN    lidocaine (LIDODERM) 5 % patch 2 patch, Daily    melatonin tablet 3 mg, HS    methocarbamol (ROBAXIN) tablet 1,000 mg, Q8H EVGENY    ondansetron (ZOFRAN) injection 4 mg, Q6H PRN    oxyCODONE (ROXICODONE) IR tablet 5 mg, Q4H PRN **OR** oxyCODONE (ROXICODONE) immediate release tablet 10 mg, Q4H PRN    pantoprazole  (PROTONIX) EC tablet 40 mg, Early Morning    polyethylene glycol (MIRALAX) packet 17 g, Daily PRN    polyethylene glycol (MIRALAX) packet 17 g, Daily    potassium chloride (Klor-Con M20) CR tablet 40 mEq, Daily With Breakfast    pramipexole (MIRAPEX) tablet 0.5 mg, HS    senna (SENOKOT) tablet 8.6 mg, Daily    Administrative Statements   Today, Patient Was Seen By: ROGE Villareal      **Please Note: This note may have been constructed using a voice recognition system.**

## 2025-01-11 LAB
ANION GAP SERPL CALCULATED.3IONS-SCNC: 11 MMOL/L (ref 4–13)
BUN SERPL-MCNC: 15 MG/DL (ref 5–25)
CALCIUM SERPL-MCNC: 10 MG/DL (ref 8.4–10.2)
CARDIAC TROPONIN I PNL SERPL HS: <2 NG/L (ref 8–18)
CHLORIDE SERPL-SCNC: 97 MMOL/L (ref 96–108)
CO2 SERPL-SCNC: 28 MMOL/L (ref 21–32)
CREAT SERPL-MCNC: 0.5 MG/DL (ref 0.6–1.3)
ERYTHROCYTE [DISTWIDTH] IN BLOOD BY AUTOMATED COUNT: 16.1 % (ref 11.6–15.1)
GFR SERPL CREATININE-BSD FRML MDRD: 104 ML/MIN/1.73SQ M
GLUCOSE SERPL-MCNC: 121 MG/DL (ref 65–140)
GLUCOSE SERPL-MCNC: 126 MG/DL (ref 65–140)
GLUCOSE SERPL-MCNC: 132 MG/DL (ref 65–140)
GLUCOSE SERPL-MCNC: 143 MG/DL (ref 65–140)
GLUCOSE SERPL-MCNC: 154 MG/DL (ref 65–140)
HCT VFR BLD AUTO: 36.5 % (ref 34.8–46.1)
HGB BLD-MCNC: 11.2 G/DL (ref 11.5–15.4)
MCH RBC QN AUTO: 26.2 PG (ref 26.8–34.3)
MCHC RBC AUTO-ENTMCNC: 30.7 G/DL (ref 31.4–37.4)
MCV RBC AUTO: 85 FL (ref 82–98)
PLATELET # BLD AUTO: 305 THOUSANDS/UL (ref 149–390)
PMV BLD AUTO: 9.5 FL (ref 8.9–12.7)
POTASSIUM SERPL-SCNC: 4.1 MMOL/L (ref 3.5–5.3)
RBC # BLD AUTO: 4.28 MILLION/UL (ref 3.81–5.12)
SODIUM SERPL-SCNC: 136 MMOL/L (ref 135–147)
WBC # BLD AUTO: 12.61 THOUSAND/UL (ref 4.31–10.16)

## 2025-01-11 PROCEDURE — 99024 POSTOP FOLLOW-UP VISIT: CPT | Performed by: SPECIALIST

## 2025-01-11 PROCEDURE — 84484 ASSAY OF TROPONIN QUANT: CPT

## 2025-01-11 PROCEDURE — 93005 ELECTROCARDIOGRAM TRACING: CPT

## 2025-01-11 PROCEDURE — 82948 REAGENT STRIP/BLOOD GLUCOSE: CPT

## 2025-01-11 PROCEDURE — 80048 BASIC METABOLIC PNL TOTAL CA: CPT | Performed by: NURSE PRACTITIONER

## 2025-01-11 PROCEDURE — 85027 COMPLETE CBC AUTOMATED: CPT | Performed by: NURSE PRACTITIONER

## 2025-01-11 PROCEDURE — 99232 SBSQ HOSP IP/OBS MODERATE 35: CPT | Performed by: INTERNAL MEDICINE

## 2025-01-11 RX ORDER — ACETAMINOPHEN 10 MG/ML
1000 INJECTION, SOLUTION INTRAVENOUS EVERY 6 HOURS SCHEDULED
Status: DISCONTINUED | OUTPATIENT
Start: 2025-01-11 | End: 2025-01-13

## 2025-01-11 RX ORDER — MAGNESIUM HYDROXIDE/ALUMINUM HYDROXICE/SIMETHICONE 120; 1200; 1200 MG/30ML; MG/30ML; MG/30ML
30 SUSPENSION ORAL EVERY 4 HOURS PRN
Status: DISCONTINUED | OUTPATIENT
Start: 2025-01-11 | End: 2025-01-15 | Stop reason: HOSPADM

## 2025-01-11 RX ORDER — OMEPRAZOLE 40 MG/1
40 CAPSULE, DELAYED RELEASE ORAL 2 TIMES DAILY
Status: DISCONTINUED | OUTPATIENT
Start: 2025-01-11 | End: 2025-01-15 | Stop reason: HOSPADM

## 2025-01-11 RX ADMIN — SENNOSIDES 8.6 MG: 8.6 TABLET, FILM COATED ORAL at 08:03

## 2025-01-11 RX ADMIN — OXYCODONE HYDROCHLORIDE 10 MG: 10 TABLET ORAL at 09:16

## 2025-01-11 RX ADMIN — OXYCODONE HYDROCHLORIDE 10 MG: 10 TABLET ORAL at 05:19

## 2025-01-11 RX ADMIN — ACETAMINOPHEN 1000 MG: 1000 INJECTION, SOLUTION INTRAVENOUS at 17:24

## 2025-01-11 RX ADMIN — OXYCODONE HYDROCHLORIDE 10 MG: 10 TABLET ORAL at 13:20

## 2025-01-11 RX ADMIN — ACETAMINOPHEN 1000 MG: 1000 INJECTION, SOLUTION INTRAVENOUS at 23:19

## 2025-01-11 RX ADMIN — DEXAMETHASONE 2 MG: 2 TABLET ORAL at 17:24

## 2025-01-11 RX ADMIN — DIPHENHYDRAMINE HCL 25 MG: 25 TABLET ORAL at 06:15

## 2025-01-11 RX ADMIN — POTASSIUM CHLORIDE 40 MEQ: 1500 TABLET, EXTENDED RELEASE ORAL at 08:03

## 2025-01-11 RX ADMIN — DIAZEPAM 2.5 MG: 10 INJECTION, SOLUTION INTRAMUSCULAR; INTRAVENOUS at 06:33

## 2025-01-11 RX ADMIN — DEXAMETHASONE 4 MG: 4 TABLET ORAL at 05:18

## 2025-01-11 RX ADMIN — DEXAMETHASONE 2 MG: 2 TABLET ORAL at 23:19

## 2025-01-11 RX ADMIN — METHOCARBAMOL 1000 MG: 500 TABLET ORAL at 13:20

## 2025-01-11 RX ADMIN — PANTOPRAZOLE SODIUM 40 MG: 40 TABLET, DELAYED RELEASE ORAL at 05:18

## 2025-01-11 RX ADMIN — METHOCARBAMOL 1000 MG: 500 TABLET ORAL at 05:18

## 2025-01-11 RX ADMIN — MELATONIN TAB 3 MG 3 MG: 3 TAB at 21:10

## 2025-01-11 RX ADMIN — HEPARIN SODIUM 5000 UNITS: 5000 INJECTION INTRAVENOUS; SUBCUTANEOUS at 21:10

## 2025-01-11 RX ADMIN — FUROSEMIDE 20 MG: 20 TABLET ORAL at 08:03

## 2025-01-11 RX ADMIN — POLYETHYLENE GLYCOL 3350 17 G: 17 POWDER, FOR SOLUTION ORAL at 08:03

## 2025-01-11 RX ADMIN — HEPARIN SODIUM 5000 UNITS: 5000 INJECTION INTRAVENOUS; SUBCUTANEOUS at 05:17

## 2025-01-11 RX ADMIN — OMEPRAZOLE 40 MG: 40 CAPSULE, DELAYED RELEASE ORAL at 21:10

## 2025-01-11 RX ADMIN — PRAMIPEXOLE DIHYDROCHLORIDE 0.5 MG: 0.5 TABLET ORAL at 21:10

## 2025-01-11 RX ADMIN — OXYCODONE HYDROCHLORIDE 10 MG: 10 TABLET ORAL at 17:25

## 2025-01-11 RX ADMIN — HEPARIN SODIUM 5000 UNITS: 5000 INJECTION INTRAVENOUS; SUBCUTANEOUS at 13:20

## 2025-01-11 RX ADMIN — DULOXETINE HYDROCHLORIDE 60 MG: 60 CAPSULE, DELAYED RELEASE ORAL at 08:03

## 2025-01-11 RX ADMIN — METHOCARBAMOL 1000 MG: 500 TABLET ORAL at 21:10

## 2025-01-11 RX ADMIN — CALCIUM CARBONATE (ANTACID) CHEW TAB 500 MG 1000 MG: 500 CHEW TAB at 10:16

## 2025-01-11 RX ADMIN — DEXAMETHASONE 2 MG: 2 TABLET ORAL at 13:20

## 2025-01-11 RX ADMIN — LIDOCAINE 2 PATCH: 50 PATCH TOPICAL at 08:03

## 2025-01-11 NOTE — PLAN OF CARE
Problem: PAIN - ADULT  Goal: Verbalizes/displays adequate comfort level or baseline comfort level  Description: Interventions:  - Encourage patient to monitor pain and request assistance  - Assess pain using appropriate pain scale  - Administer analgesics based on type and severity of pain and evaluate response  - Implement non-pharmacological measures as appropriate and evaluate response  - Consider cultural and social influences on pain and pain management  - Notify physician/advanced practitioner if interventions unsuccessful or patient reports new pain  Outcome: Progressing     Problem: INFECTION - ADULT  Goal: Absence or prevention of progression during hospitalization  Description: INTERVENTIONS:  - Assess and monitor for signs and symptoms of infection  - Monitor lab/diagnostic results  - Monitor all insertion sites, i.e. indwelling lines, tubes, and drains  - Monitor endotracheal if appropriate and nasal secretions for changes in amount and color  - [place] appropriate cooling/warming therapies per order  - Administer medications as ordered  - Instruct and encourage patient and family to use good hand hygiene technique  - Identify and instruct in appropriate isolation precautions for identified infection/condition  Outcome: Progressing     Problem: SAFETY ADULT  Goal: Patient will remain free of falls  Description: INTERVENTIONS:  - Educate patient/family on patient safety including physical limitations  - Instruct patient to call for assistance with activity   - Consult OT/PT to assist with strengthening/mobility   - Keep Call bell within reach  - Keep bed low and locked with side rails adjusted as appropriate  - Keep care items and personal belongings within reach  - Initiate and maintain comfort rounds  - Make Fall Risk Sign visible to staff  - Offer Toileting every 2 Hours, in advance of need  - Initiate/Maintain alarm  - Obtain necessary fall risk management equipment  - Apply yellow socks and  bracelet for high fall risk patients  - Consider moving patient to room near nurses station  Outcome: Progressing  Goal: Maintain or return to baseline ADL function  Description: INTERVENTIONS:  -  Assess patient's ability to carry out ADLs; assess patient's baseline for ADL function and identify physical deficits which impact ability to perform ADLs (bathing, care of mouth/teeth, toileting, grooming, dressing, etc.)  - Assess/evaluate cause of self-care deficits   - Assess range of motion  - Assess patient's mobility; develop plan if impaired  - Assess patient's need for assistive devices and provide as appropriate  - Encourage maximum independence but intervene and supervise when necessary  - Involve family in performance of ADLs  - Assess for home care needs following discharge   - Consider OT consult to assist with ADL evaluation and planning for discharge  - Provide patient education as appropriate  Outcome: Progressing  Goal: Maintains/Returns to pre admission functional level  Description: INTERVENTIONS:  - Perform AM-PAC 6 Click Basic Mobility/ Daily Activity assessment daily.  - Set and communicate daily mobility goal to care team and patient/family/caregiver.   - Collaborate with rehabilitation services on mobility goals if consulted  - Perform Range of Motion 3 times a day.  - Reposition patient every 2 hours.  - Dangle patient 3 times a day  - Stand patient 3 times a day  - Ambulate patient 3 times a day  - Out of bed to chair 3 times a day   - Out of bed for meals 3 times a day  - Out of bed for toileting  - Record patient progress and toleration of activity level   Outcome: Progressing     Problem: DISCHARGE PLANNING  Goal: Discharge to home or other facility with appropriate resources  Description: INTERVENTIONS:  - Identify barriers to discharge w/patient and caregiver  - Arrange for needed discharge resources and transportation as appropriate  - Identify discharge learning needs (meds, wound care,  etc.)  - Arrange for interpretive services to assist at discharge as needed  - Refer to Case Management Department for coordinating discharge planning if the patient needs post-hospital services based on physician/advanced practitioner order or complex needs related to functional status, cognitive ability, or social support system  Outcome: Progressing     Problem: Knowledge Deficit  Goal: Patient/family/caregiver demonstrates understanding of disease process, treatment plan, medications, and discharge instructions  Description: Complete learning assessment and assess knowledge base.  Interventions:  - Provide teaching at level of understanding  - Provide teaching via preferred learning methods  Outcome: Progressing     Problem: NEUROSENSORY - ADULT  Goal: Achieves stable or improved neurological status  Description: INTERVENTIONS  - Monitor and report changes in neurological status  - Monitor vital signs such as temperature, blood pressure, glucose, and any other labs ordered   - Initiate measures to prevent increased intracranial pressure  - Monitor for seizure activity and implement precautions if appropriate      Outcome: Progressing  Goal: Achieves maximal functionality and self care  Description: INTERVENTIONS  - Monitor swallowing and airway patency with patient fatigue and changes in neurological status  - Encourage and assist patient to increase activity and self care.   - Encourage visually impaired, hearing impaired and aphasic patients to use assistive/communication devices  Outcome: Progressing

## 2025-01-11 NOTE — ASSESSMENT & PLAN NOTE
POD#4 - s/p T2-4 laminectomy for resection of thoracic tumor (EM 1/7)   T3 lesion w/ concern of progressively worsening myelopathy  P/w progressively worsening bilateral lower extremity, L >R, weakness and balance difficulty x approx 2 months   Acutely worsened over 2 weeks prior to admission, requiring use of a rolling walker    Imaging:   MRI Cervical spine 1/6/25: Redemonstration of meningioma in the posterior left aspect of the canal at T3. Compared with 2022, mass is mildly increased in size and marked cord compression is also mildly increased. Focal cord edema at T3 not excluded but there is no edema in the immediately above or below the level of compression. Stable degenerative spondylosis in the cervical spine most pronounced at C5-6  MRI cervical spine 1/2/2025: Partially nondiagnostic exam due to motion artifact.  T3 lesion likely mildly increased in size based on evaluation and sagittal imaging.  CT cervical spine 1/2/2025: Partially calcified extramedullary T3 lesion slightly larger than prior MRI report.  CT thoracic and lumbar spine 1/2/2025: Thoracolumbar fusion.  Chronic disc and facet degenerative changes at L4-5 resulting in mild canal stenosis and severe foraminal narrowing.    Plan:   Continue to closely monitor neuro exam   Frequent neuro checks per primary team  Complete MAP goals > 85 x 24hrs post-op   BRAYAN drain removed 1/10  Continue local wound care to incision   Keep clean and dry --> to be wipe daily with a MARY wipe   S/p shower 1/10, okay to continue daily or every other day showers  May be left open to air   Continue dex wean as ordered --> increased to rapid taper today given reflux   Continue to hold all AC/AP meds x at least 2 weeks post-op   No reports use at home prior to arrival   DVT ppx: SCDs, SQH to be restarted this afternoon   Medical management per primary team  Pain control per primary team   PT/OT  --> recommended rehab   Social work following or assistance with dispo once  medically cleared    Accepted at [facility], pending authorization     Neurosurgery will follow peripherally.  Plan for outpatient follow-up in 2 weeks for incision check and pathology review in 6 weeks with MD. please reach out with any further questions or concerns.

## 2025-01-11 NOTE — PROGRESS NOTES
Progress Note - Hospitalist   Name: Hayley Rios 64 y.o. 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 Known history of thoracic meningioma with spinal cord displacement and compression, lost to follow up due to move to [place].  MRI T spine: Suboptimal examination due to mild, moderate, and severe motion artifact - worse on postcontrast sequences. Similar 1.4 cm intradural extramedullary probable meningioma in left posterolateral thoracic spine region at approximately T3 level with associated marked spinal cord compression with severe canal stenosis given motion degraded exam. No cord edema given motion degradation.   MRI L spine: Suboptimal examination due to moderate-to-severe motion degraded exam of lumbar spine and 3 plane localizer images, sagittal T1 post contrast and axial T1 postcontrast sequences. No abnormal enhancement in lumbar spine given limitations of motion degradation.   Neurosurgery consult and recommendations appreciated   POD#4- s/p T2-4 laminectomy for resection of thoracic tumor (EM 1/7)   T3 lesion w/ concern of progressively worsening myelopathy  Continue pain regimen  Drain removed 1/10 by nsx d/c iv ancef  -- can f/u nsx 2 weeks and 6 weeks   Dexamethasone wean- accelerated given reflux symptoms   Hold AC/AP meds x at least 2 weeks post-op   Monitor neuro checks   PT/OT recommending rehab, eventually will go to [facility] pending auth   Restless leg syndrome  Vitamin B12 borderline low, s/p cyanocobalamin injection  Continue home dose Mirapex 0.5 mg qhs  Low iron stores and low percent saturation on iron panel, was started on venofer at [facility] x 3 days  Possibility this is related to nerve root impingement  Follow-up with her outpatient  providers in [place] for further management  MARV (iron deficiency anemia)  Iron panel 12/31/24: iron 40, ferritin 9, iron saturation 40%, TIBC 396  S/p IV Venofer x3  Monitor CBC and transfuse for hemoglobin < 7  Prediabetes  Evidenced by A1c of 5.8%  Sugars stable on BMP   Defer accuchecks/SSI  Ambulatory dysfunction  Due to primary problem, see plan of care outlined above   Lymphedema  Continue home dose Lasix    VTE Pharmacologic Prophylaxis: VTE Score: 5 High Risk (Score >/= 5) - Pharmacological DVT Prophylaxis Ordered: heparin. Sequential Compression Devices Ordered.    Mobility:   Basic Mobility Inpatient Raw Score: 18  JH-HLM Goal: 6: Walk 10 steps or more  JH-HLM Achieved: 6: Walk 10 steps or more  JH-HLM Goal achieved. Continue to encourage appropriate mobility.    Patient Centered Rounds: I performed bedside rounds with nursing staff today.   Discussions with Specialists or Other Care Team Provider:     Education and Discussions with Family / Patient: Patient declined call to .     Current Length of Stay: 11 day(s)  Current Patient Status: Inpatient   Certification Statement: The patient will continue to require additional inpatient hospital stay due to safe dc rehab planning  Discharge Plan: Anticipate discharge in 24-48 hrs to rehab facility.    Code Status: Level 1 - Full Code    Subjective   Having some pain today and reflux.     Objective :  Temp:  [97.8 °F (36.6 °C)-98 °F (36.7 °C)] 97.8 °F (36.6 °C)  HR:  [72-85] 79  BP: (125-138)/(65-83) 125/68  Resp:  [16-17] 17  SpO2:  [92 %-97 %] 97 %  O2 Device: None (Room air)    Body mass index is 36.74 kg/m².     Input and Output Summary (last 24 hours):     Intake/Output Summary (Last 24 hours) at 1/11/2025 1708  Last data filed at 1/11/2025 1300  Gross per 24 hour   Intake 650 ml   Output --   Net 650 ml       Physical Exam  Vitals reviewed.   Constitutional:       General: She is not in acute distress.     Appearance: She is not  toxic-appearing.   HENT:      Head: Normocephalic and atraumatic.   Eyes:      Extraocular Movements: Extraocular movements intact.   Neck:      Comments: Incision c/d/I   Cardiovascular:      Rate and Rhythm: Normal rate and regular rhythm.   Pulmonary:      Effort: Pulmonary effort is normal. No respiratory distress.   Abdominal:      General: Bowel sounds are normal.      Palpations: Abdomen is soft.   Musculoskeletal:         General: Normal range of motion.   Neurological:      General: No focal deficit present.      Mental Status: She is alert and oriented to person, place, and time.   Psychiatric:         Mood and Affect: Mood normal.         Behavior: Behavior normal.         Thought Content: Thought content normal.         Lines/Drains:              Lab Results: I have reviewed the following results:   Results from last 7 days   Lab Units 01/11/25  0527 01/09/25  0619   WBC Thousand/uL 12.61* 9.53   HEMOGLOBIN g/dL 11.2* 10.2*   HEMATOCRIT % 36.5 34.4*   PLATELETS Thousands/uL 305 247   SEGS PCT %  --  84*   LYMPHO PCT %  --  11*   MONO PCT %  --  4   EOS PCT %  --  0     Results from last 7 days   Lab Units 01/11/25  0527 01/09/25  0619   SODIUM mmol/L 136 140   POTASSIUM mmol/L 4.1 3.6   CHLORIDE mmol/L 97 102   CO2 mmol/L 28 28   BUN mg/dL 15 15   CREATININE mg/dL 0.50* 0.54*   ANION GAP mmol/L 11 10   CALCIUM mg/dL 10.0 9.0   ALBUMIN g/dL  --  3.9   TOTAL BILIRUBIN mg/dL  --  0.29   ALK PHOS U/L  --  71   ALT U/L  --  15   AST U/L  --  17   GLUCOSE RANDOM mg/dL 121 138     Results from last 7 days   Lab Units 01/08/25  0531   INR  1.01     Results from last 7 days   Lab Units 01/11/25  1644 01/11/25  1120 01/11/25  0800 01/10/25  2041 01/10/25  1552 01/10/25  1141 01/10/25  0745 01/09/25  1707 01/09/25  1212 01/09/25  0819 01/08/25  2124 01/08/25  1646   POC GLUCOSE mg/dl 126 143* 132 133 144* 127 124 134 141* 138 137 130               Recent Cultures (last 7 days):         Imaging Results Review: No  pertinent imaging studies reviewed.  Other Study Results Review: No additional pertinent studies reviewed.    Last 24 Hours Medication List:     Current Facility-Administered Medications:     acetaminophen (Ofirmev) injection 1,000 mg, Q6H EVGENY    aluminum-magnesium hydroxide-simethicone (MAALOX) oral suspension 30 mL, Q4H PRN    bisacodyl (DULCOLAX) rectal suppository 10 mg, Daily PRN    calcium carbonate (TUMS) chewable tablet 1,000 mg, TID PRN    cyanocobalamin injection 1,000 mcg, Q30 Days    [COMPLETED] dexamethasone (DECADRON) tablet 4 mg, Q6H EVGENY **FOLLOWED BY** [COMPLETED] dexamethasone (DECADRON) tablet 4 mg, Q8H EVGENY **FOLLOWED BY** dexamethasone (DECADRON) tablet 2 mg, Q6H EVGENY **FOLLOWED BY** [START ON 1/12/2025] dexamethasone (DECADRON) tablet 2 mg, Q8H EVGENY **FOLLOWED BY** [START ON 1/13/2025] dexamethasone (DECADRON) tablet 2 mg, Q12H EVGENY **FOLLOWED BY** [START ON 1/14/2025] dexamethasone (DECADRON) tablet 2 mg, Daily    diazepam (VALIUM) injection 2.5 mg, Q6H PRN    diphenhydrAMINE (BENADRYL) tablet 25 mg, Q6H PRN    DULoxetine (CYMBALTA) delayed release capsule 60 mg, Daily    furosemide (LASIX) tablet 20 mg, Daily    heparin (porcine) subcutaneous injection 5,000 Units, Q8H EVGENY    HYDROmorphone (DILAUDID) injection 0.2 mg, Q4H PRN    insulin lispro (HumALOG/ADMELOG) 100 units/mL subcutaneous injection 1-6 Units, TID AC **AND** Fingerstick Glucose (POCT), TID AC    labetalol (NORMODYNE) injection 10 mg, Q4H PRN    lidocaine (LIDODERM) 5 % patch 2 patch, Daily    melatonin tablet 3 mg, HS    methocarbamol (ROBAXIN) tablet 1,000 mg, Q8H EVGENY    ondansetron (ZOFRAN) injection 4 mg, Q6H PRN    oxyCODONE (ROXICODONE) IR tablet 5 mg, Q4H PRN **OR** oxyCODONE (ROXICODONE) immediate release tablet 10 mg, Q4H PRN    pantoprazole (PROTONIX) EC tablet 40 mg, Early Morning    polyethylene glycol (MIRALAX) packet 17 g, Daily PRN    polyethylene glycol (MIRALAX) packet 17 g, Daily    potassium chloride (Klor-Con M20)  CR tablet 40 mEq, Daily With Breakfast    pramipexole (MIRAPEX) tablet 0.5 mg, HS    senna (SENOKOT) tablet 8.6 mg, Daily    Administrative Statements   Today, Patient Was Seen By: Lara Callaway PA-C      **Please Note: This note may have been constructed using a voice recognition system.**

## 2025-01-11 NOTE — RESTORATIVE TECHNICIAN NOTE
Restorative Technician Note      Patient Name: Hayley HAMM Gabriel     Pt deferred ambulation at this time.Will continue to follow.    Sugar Euceda BS

## 2025-01-11 NOTE — CASE MANAGEMENT
Case Management Assessment & Discharge Planning Note    Patient name Hayley Rios  Location [facility] 626/[facility] 626-01 MRN [id number]  : 1960 Date 2025       Current Admission Date: 2024  Current Admission Diagnosis:Compression of thoracic spinal cord with myelopathy (HCC)   Patient Active Problem List    Diagnosis Date Noted Date Diagnosed    Compression of thoracic spinal cord with myelopathy (HCC) 2024     Lymphedema 2024     Ambulatory dysfunction 2024     Class 1 obesity due to excess calories without serious comorbidity with body mass index (BMI) of 34.0 to 34.9 in adult 2024     Ventral hernia without obstruction or gangrene 2024     Myofascial pain syndrome 2022     MARV (iron deficiency anemia) 2022     Podagra 2021     Motion sickness      Recurrent incisional hernia      Macromastia 2020     Long-term current use of opiate analgesic 2020     Uncomplicated opioid dependence (HCC) 2020     Recurrent umbilical hernia 2020     Lipoma of torso 2020     Sacroiliitis, not elsewhere classified (HCC)      Osteoarthritis of multiple joints 02/10/2020     Neck pain      Cervical spondylosis without myelopathy      Chronic cough 2019     Vocal fold paresis, right 2019     Dysphonia 2019     Muscle tension dysphonia 2019     Glottic insufficiency 2019     Reflux laryngitis 2019     Laryngeal edema 2019     Allergic rhinitis due to [employer] house dust mite 2019     Mild intermittent asthma without complication 2019     Laryngopharyngeal reflux (LPR) 2019     Dolan's esophagus with dysplasia 2019     Gastroesophageal reflux disease 2019     Prediabetes 2018     Vitamin D deficiency 2018     Lumbar radiculopathy 10/01/2018     Lumbar spondylosis      Environmental allergies 2018     S/P right knee arthroscopy 2018     Hernia of  anterior abdominal wall 02/05/2018     Sleep disturbance 11/16/2017     Hyperlipidemia 08/11/2017     Primary osteoarthritis of left hip 07/26/2017     Restless leg syndrome 07/11/2017     Chronic venous insufficiency 06/02/2017     Chronic low back pain 04/11/2017     Lumbar postlaminectomy syndrome 04/11/2017     Chronic pain syndrome 03/16/2017     Depression, recurrent (HCC) 03/16/2017       LOS (days): 11  Geometric Mean LOS (GMLOS) (days): 4.1  Days to GMLOS:-6.4     OBJECTIVE:    Risk of Unplanned Readmission Score: 24.8         Current admission status: Inpatient       Preferred Pharmacy:   [id number] - [place], [place] - [address]  [address] [address]  Phone: [phone] Fax: [phone]    [id number] [address]  [address]  [facility] [address]  Phone: [phone] Fax: [phone]    Primary Care Provider: Mau Garcia DO    Primary Insurance: [employer]  REP  Secondary Insurance: AARP  REP    ASSESSMENT:  Active Health Care Proxies    There are no active Health Care Proxies on file.                                                      Social Determinants of Health (SDOH)      Flowsheet Row Most Recent Value   Housing Stability    In the last 12 months, was there a time when you were not able to pay the mortgage or rent on time? N   At any time in the past 12 months, were you homeless or living in a shelter (including now)? N   Transportation Needs    In the past 12 months, has lack of transportation kept you from medical appointments or from getting medications? no   In the past 12 months, has lack of transportation kept you from meetings, work, or from getting things needed for daily living? No   Food Insecurity    Within the past 12 months, you worried that your food would run out before you got the money to buy more. Never true   Within the past 12 months, the food you bought  just didn't last and you didn't have money to get more. Never true   Utilities    In the past 12 months has the electric, gas, oil, or water company threatened to shut off services in your home? No            DISCHARGE DETAILS:    Discharge planning discussed with:: chart reviewed. Pt is medically stable for discharged pending placement. [facility] following pt pending auth. auth initiated and still pending at this time. Cm will continue to follow.

## 2025-01-11 NOTE — ASSESSMENT & PLAN NOTE
Presented to [facility] with worsening of chronic LLE weakness, numbness/tingling of LLE>RLE, and muscle spasms. Known history of thoracic meningioma with spinal cord displacement and compression, lost to follow up due to move to [place].  MRI T spine: Suboptimal examination due to mild, moderate, and severe motion artifact - worse on postcontrast sequences. Similar 1.4 cm intradural extramedullary probable meningioma in left posterolateral thoracic spine region at approximately T3 level with associated marked spinal cord compression with severe canal stenosis given motion degraded exam. No cord edema given motion degradation.   MRI L spine: Suboptimal examination due to moderate-to-severe motion degraded exam of lumbar spine and 3 plane localizer images, sagittal T1 post contrast and axial T1 postcontrast sequences. No abnormal enhancement in lumbar spine given limitations of motion degradation.   Neurosurgery consult and recommendations appreciated   POD#4- s/p T2-4 laminectomy for resection of thoracic tumor (EM 1/7)   T3 lesion w/ concern of progressively worsening myelopathy  Continue pain regimen  Drain removed 1/10 by nsx d/c iv ancef  -- can f/u nsx 2 weeks and 6 weeks   Dexamethasone wean- accelerated given reflux symptoms   Hold AC/AP meds x at least 2 weeks post-op   Monitor neuro checks   PT/OT recommending rehab, eventually will go to [facility] pending auth

## 2025-01-11 NOTE — CASE MANAGEMENT
Per [employer] portal, IRF auth was cancelled since pt's [employer] was terminated on 12/31/24. Checked [facility] portal, and pending IRF auth [id number] / [id number] was voided since pt [employer] insurance was terminated on 12/31/24. Per [facility] previous note, insurance verifiers at [facility] confirmed that pt has [employer]. Per [facility] portal, auth [id number] for IRF is still pending at this time. CM notified: Johanna Barraza      UPDATE 1/12/25 8:33 am    Per [facility] portal, IRF auth still pending at this time. CM notified: Noel Payne

## 2025-01-11 NOTE — PROGRESS NOTES
Progress Note - Neurosurgery   Name: Hayley Rios 64 y.o. female I MRN: [id number]  Unit/Bed#: [facility] 626-01 I Date of Admission: 12/31/2024   Date of Service: 1/11/2025 I Hospital Day: 11    Assessment & Plan  Compression of thoracic spinal cord with myelopathy (HCC)  POD#4 - s/p T2-4 laminectomy for resection of thoracic tumor (EM 1/7)   T3 lesion w/ concern of progressively worsening myelopathy  P/w progressively worsening bilateral lower extremity, L >R, weakness and balance difficulty x approx 2 months   Acutely worsened over 2 weeks prior to admission, requiring use of a rolling walker    Imaging:   MRI Cervical spine 1/6/25: Redemonstration of meningioma in the posterior left aspect of the canal at T3. Compared with 2022, mass is mildly increased in size and marked cord compression is also mildly increased. Focal cord edema at T3 not excluded but there is no edema in the immediately above or below the level of compression. Stable degenerative spondylosis in the cervical spine most pronounced at C5-6  MRI cervical spine 1/2/2025: Partially nondiagnostic exam due to motion artifact.  T3 lesion likely mildly increased in size based on evaluation and sagittal imaging.  CT cervical spine 1/2/2025: Partially calcified extramedullary T3 lesion slightly larger than prior MRI report.  CT thoracic and lumbar spine 1/2/2025: Thoracolumbar fusion.  Chronic disc and facet degenerative changes at L4-5 resulting in mild canal stenosis and severe foraminal narrowing.    Plan:   Continue to closely monitor neuro exam   Frequent neuro checks per primary team  Complete MAP goals > 85 x 24hrs post-op   BRAYAN drain removed 1/10  Continue local wound care to incision   Keep clean and dry --> to be wipe daily with a MARY wipe   S/p shower 1/10, okay to continue daily or every other day showers  May be left open to air   Continue dex wean as ordered --> increased to rapid taper today given reflux   Continue to hold all AC/AP meds x at  least 2 weeks post-op   No reports use at home prior to arrival   DVT ppx: SCDs, SQH to be restarted this afternoon   Medical management per primary team  Pain control per primary team   PT/OT  --> recommended rehab   Social work following or assistance with dispo once medically cleared    Accepted at [facility], pending authorization     Neurosurgery will follow peripherally.  Plan for outpatient follow-up in 2 weeks for incision check and pathology review in 6 weeks with MD. please reach out with any further questions or concerns.     Lymphedema  LE lymphedema  Evident on exam   Ambulatory dysfunction  Likely secondary to underlying myelopathy  Could be multifactorial with underlying peripheral neuropathy and chronic podiatric issues s/p multiple surgeries.       Subjective   Patient is sitting up in bed, NAD.  She states she has having a lot of stabbing incisional pain and is wondering if she is able to do some mobilization activities to try and see if this may help.  She is using an heating pad which I instructed to not lay directly over the incision.  She has not had a bowel movement yet but is passing a lot of gas and she feels she will go today hopefully.  She does complain of a bit of abdominal distention and states in the past she has had extensive abdominal surgery but has been eating and drinking okay.  She feels that the strength in her legs has improved as well as the sensation and only mild superficial numbness noted.    Nursing rounds completed with Seema    Objective :  Temp:  [97.8 °F (36.6 °C)-98.3 °F (36.8 °C)] 97.9 °F (36.6 °C)  HR:  [72-81] 72  BP: (134-138)/(65-83) 138/83  Resp:  [16] 16  SpO2:  [93 %-95 %] 93 %  O2 Device: None (Room air)    I/O         01/09 0701  01/10 0700 01/10 0701 01/11 0700 01/11 0701  01/12 0700    P.O. 885 222     Total Intake(mL/kg) 885 (9.8) 222 (2.3)     Urine (mL/kg/hr) 0 (0)      Drains 30      Total Output 30      Net +855 +222            Unmeasured Urine Occurrence  2 x              General appearance: alert, appears stated age, cooperative and no distress  Head: Normocephalic, without obvious abnormality, atraumatic  Eyes: Conjugate gaze  Neck: supple, symmetrical, trachea midline  Back: Thoracic incision clean dry intact, some mild redness likely from heating pad  Lungs: non labored breathing  Heart: regular heart rate  Neurologic:   Mental status: Alert, oriented x 3, follows commands, thought content appropriate  Cranial nerves: grossly intact (Cranial nerves II-XII)  Sensory: Improved sensation in lower extremity, now with only mild numbness to light touch diffusely  Motor: moving all extremities without focal weakness  Reflexes: No clonus bilaterally  Coordination: finger to nose normal bilaterally, no drift bilaterally        Lab Results: I have reviewed the following results:  Recent Labs     01/09/25 0619 01/11/25 0527   WBC 9.53 12.61*   HGB 10.2* 11.2*   HCT 34.4* 36.5    305   SODIUM 140 136   K 3.6 4.1    97   CO2 28 28   BUN 15 15   CREATININE 0.54* 0.50*   GLUC 138 121   MG 2.0  --    PHOS 3.0  --    AST 17  --    ALT 15  --    ALB 3.9  --    TBILI 0.29  --    ALKPHOS 71  --        Imaging Results Review: No pertinent imaging studies reviewed.  Other Study Results Review: No additional pertinent studies reviewed.    VTE Pharmacologic Prophylaxis: VTE covered by:  heparin (porcine), Subcutaneous, 5,000 Units at 01/11/25 0517    and Sequential compression device (Venodyne)

## 2025-01-12 LAB
ANION GAP SERPL CALCULATED.3IONS-SCNC: 10 MMOL/L (ref 4–13)
ATRIAL RATE: 67 BPM
BASOPHILS # BLD AUTO: 0.04 THOUSANDS/ΜL (ref 0–0.1)
BASOPHILS NFR BLD AUTO: 0 % (ref 0–1)
BUN SERPL-MCNC: 18 MG/DL (ref 5–25)
CALCIUM SERPL-MCNC: 9.2 MG/DL (ref 8.4–10.2)
CHLORIDE SERPL-SCNC: 97 MMOL/L (ref 96–108)
CO2 SERPL-SCNC: 28 MMOL/L (ref 21–32)
CREAT SERPL-MCNC: 0.54 MG/DL (ref 0.6–1.3)
EOSINOPHIL # BLD AUTO: 0.32 THOUSAND/ΜL (ref 0–0.61)
EOSINOPHIL NFR BLD AUTO: 3 % (ref 0–6)
ERYTHROCYTE [DISTWIDTH] IN BLOOD BY AUTOMATED COUNT: 16.3 % (ref 11.6–15.1)
FERRITIN SERPL-MCNC: 106 NG/ML (ref 11–307)
GFR SERPL CREATININE-BSD FRML MDRD: 101 ML/MIN/1.73SQ M
GLUCOSE SERPL-MCNC: 114 MG/DL (ref 65–140)
GLUCOSE SERPL-MCNC: 126 MG/DL (ref 65–140)
GLUCOSE SERPL-MCNC: 132 MG/DL (ref 65–140)
GLUCOSE SERPL-MCNC: 146 MG/DL (ref 65–140)
GLUCOSE SERPL-MCNC: 169 MG/DL (ref 65–140)
HCT VFR BLD AUTO: 38 % (ref 34.8–46.1)
HGB BLD-MCNC: 11.7 G/DL (ref 11.5–15.4)
IMM GRANULOCYTES # BLD AUTO: 0.2 THOUSAND/UL (ref 0–0.2)
IMM GRANULOCYTES NFR BLD AUTO: 2 % (ref 0–2)
IRON SATN MFR SERPL: 19 % (ref 15–50)
IRON SERPL-MCNC: 56 UG/DL (ref 50–212)
LYMPHOCYTES # BLD AUTO: 1.64 THOUSANDS/ΜL (ref 0.6–4.47)
LYMPHOCYTES NFR BLD AUTO: 14 % (ref 14–44)
MCH RBC QN AUTO: 26.4 PG (ref 26.8–34.3)
MCHC RBC AUTO-ENTMCNC: 30.8 G/DL (ref 31.4–37.4)
MCV RBC AUTO: 86 FL (ref 82–98)
MONOCYTES # BLD AUTO: 0.62 THOUSAND/ΜL (ref 0.17–1.22)
MONOCYTES NFR BLD AUTO: 5 % (ref 4–12)
NEUTROPHILS # BLD AUTO: 8.7 THOUSANDS/ΜL (ref 1.85–7.62)
NEUTS SEG NFR BLD AUTO: 76 % (ref 43–75)
NRBC BLD AUTO-RTO: 0 /100 WBCS
P AXIS: 54 DEGREES
PLATELET # BLD AUTO: 315 THOUSANDS/UL (ref 149–390)
PMV BLD AUTO: 10 FL (ref 8.9–12.7)
POTASSIUM SERPL-SCNC: 4.2 MMOL/L (ref 3.5–5.3)
PR INTERVAL: 124 MS
QRS AXIS: 47 DEGREES
QRSD INTERVAL: 84 MS
QT INTERVAL: 404 MS
QTC INTERVAL: 427 MS
RBC # BLD AUTO: 4.44 MILLION/UL (ref 3.81–5.12)
SODIUM SERPL-SCNC: 135 MMOL/L (ref 135–147)
T WAVE AXIS: 76 DEGREES
TIBC SERPL-MCNC: 298.2 UG/DL (ref 250–450)
TRANSFERRIN SERPL-MCNC: 213 MG/DL (ref 203–362)
UIBC SERPL-MCNC: 242 UG/DL (ref 155–355)
VENTRICULAR RATE: 67 BPM
WBC # BLD AUTO: 11.52 THOUSAND/UL (ref 4.31–10.16)

## 2025-01-12 PROCEDURE — 82948 REAGENT STRIP/BLOOD GLUCOSE: CPT

## 2025-01-12 PROCEDURE — 93010 ELECTROCARDIOGRAM REPORT: CPT | Performed by: INTERNAL MEDICINE

## 2025-01-12 PROCEDURE — 97535 SELF CARE MNGMENT TRAINING: CPT

## 2025-01-12 PROCEDURE — 85025 COMPLETE CBC W/AUTO DIFF WBC: CPT | Performed by: PHYSICIAN ASSISTANT

## 2025-01-12 PROCEDURE — 82728 ASSAY OF FERRITIN: CPT | Performed by: PHYSICIAN ASSISTANT

## 2025-01-12 PROCEDURE — 99232 SBSQ HOSP IP/OBS MODERATE 35: CPT | Performed by: PHYSICIAN ASSISTANT

## 2025-01-12 PROCEDURE — 83540 ASSAY OF IRON: CPT | Performed by: PHYSICIAN ASSISTANT

## 2025-01-12 PROCEDURE — 97530 THERAPEUTIC ACTIVITIES: CPT

## 2025-01-12 PROCEDURE — 97116 GAIT TRAINING THERAPY: CPT

## 2025-01-12 PROCEDURE — 80048 BASIC METABOLIC PNL TOTAL CA: CPT | Performed by: PHYSICIAN ASSISTANT

## 2025-01-12 PROCEDURE — 83550 IRON BINDING TEST: CPT | Performed by: PHYSICIAN ASSISTANT

## 2025-01-12 RX ADMIN — METHOCARBAMOL 1000 MG: 500 TABLET ORAL at 13:06

## 2025-01-12 RX ADMIN — DULOXETINE HYDROCHLORIDE 60 MG: 60 CAPSULE, DELAYED RELEASE ORAL at 08:14

## 2025-01-12 RX ADMIN — HYDROMORPHONE HYDROCHLORIDE 0.2 MG: 1 INJECTION, SOLUTION INTRAMUSCULAR; INTRAVENOUS; SUBCUTANEOUS at 19:54

## 2025-01-12 RX ADMIN — POLYETHYLENE GLYCOL 3350 17 G: 17 POWDER, FOR SOLUTION ORAL at 08:14

## 2025-01-12 RX ADMIN — PRAMIPEXOLE DIHYDROCHLORIDE 0.5 MG: 0.5 TABLET ORAL at 21:12

## 2025-01-12 RX ADMIN — SENNOSIDES 8.6 MG: 8.6 TABLET, FILM COATED ORAL at 08:14

## 2025-01-12 RX ADMIN — ACETAMINOPHEN 1000 MG: 1000 INJECTION, SOLUTION INTRAVENOUS at 23:32

## 2025-01-12 RX ADMIN — ALUMINUM HYDROXIDE, MAGNESIUM HYDROXIDE, AND SIMETHICONE 30 ML: 200; 200; 20 SUSPENSION ORAL at 21:54

## 2025-01-12 RX ADMIN — INSULIN LISPRO 1 UNITS: 100 INJECTION, SOLUTION INTRAVENOUS; SUBCUTANEOUS at 17:19

## 2025-01-12 RX ADMIN — HEPARIN SODIUM 5000 UNITS: 5000 INJECTION INTRAVENOUS; SUBCUTANEOUS at 05:50

## 2025-01-12 RX ADMIN — HEPARIN SODIUM 5000 UNITS: 5000 INJECTION INTRAVENOUS; SUBCUTANEOUS at 13:07

## 2025-01-12 RX ADMIN — OXYCODONE HYDROCHLORIDE 10 MG: 10 TABLET ORAL at 08:13

## 2025-01-12 RX ADMIN — OMEPRAZOLE 40 MG: 40 CAPSULE, DELAYED RELEASE ORAL at 08:14

## 2025-01-12 RX ADMIN — ACETAMINOPHEN 1000 MG: 1000 INJECTION, SOLUTION INTRAVENOUS at 18:35

## 2025-01-12 RX ADMIN — ACETAMINOPHEN 1000 MG: 1000 INJECTION, SOLUTION INTRAVENOUS at 13:00

## 2025-01-12 RX ADMIN — LIDOCAINE 2 PATCH: 50 PATCH TOPICAL at 08:14

## 2025-01-12 RX ADMIN — POTASSIUM CHLORIDE 40 MEQ: 1500 TABLET, EXTENDED RELEASE ORAL at 08:14

## 2025-01-12 RX ADMIN — MELATONIN TAB 3 MG 3 MG: 3 TAB at 21:12

## 2025-01-12 RX ADMIN — FUROSEMIDE 20 MG: 20 TABLET ORAL at 08:14

## 2025-01-12 RX ADMIN — OXYCODONE HYDROCHLORIDE 10 MG: 10 TABLET ORAL at 17:14

## 2025-01-12 RX ADMIN — DEXAMETHASONE 2 MG: 2 TABLET ORAL at 21:12

## 2025-01-12 RX ADMIN — METHOCARBAMOL 1000 MG: 500 TABLET ORAL at 05:49

## 2025-01-12 RX ADMIN — POLYETHYLENE GLYCOL 3350 17 G: 17 POWDER, FOR SOLUTION ORAL at 22:02

## 2025-01-12 RX ADMIN — METHOCARBAMOL 1000 MG: 500 TABLET ORAL at 21:12

## 2025-01-12 RX ADMIN — ACETAMINOPHEN 1000 MG: 1000 INJECTION, SOLUTION INTRAVENOUS at 05:49

## 2025-01-12 RX ADMIN — HEPARIN SODIUM 5000 UNITS: 5000 INJECTION INTRAVENOUS; SUBCUTANEOUS at 21:12

## 2025-01-12 RX ADMIN — HYDROMORPHONE HYDROCHLORIDE 0.2 MG: 1 INJECTION, SOLUTION INTRAMUSCULAR; INTRAVENOUS; SUBCUTANEOUS at 11:33

## 2025-01-12 RX ADMIN — DEXAMETHASONE 2 MG: 2 TABLET ORAL at 05:49

## 2025-01-12 RX ADMIN — OXYCODONE HYDROCHLORIDE 10 MG: 10 TABLET ORAL at 21:11

## 2025-01-12 RX ADMIN — OXYCODONE HYDROCHLORIDE 10 MG: 10 TABLET ORAL at 13:06

## 2025-01-12 RX ADMIN — DEXAMETHASONE 2 MG: 2 TABLET ORAL at 13:07

## 2025-01-12 RX ADMIN — OMEPRAZOLE 40 MG: 40 CAPSULE, DELAYED RELEASE ORAL at 17:14

## 2025-01-12 NOTE — PLAN OF CARE
Problem: PHYSICAL THERAPY ADULT  Goal: Performs mobility at highest level of function for planned discharge setting.  See evaluation for individualized goals.  Description: Treatment/Interventions: Functional transfer training, LE strengthening/ROM, Elevations, Therapeutic exercise, Endurance training, Patient/family training, Equipment eval/education, Bed mobility, Gait training, Spoke to nursing, OT  Equipment Recommended: Walker       See flowsheet documentation for full assessment, interventions and recommendations.  Outcome: Progressing  Note: Prognosis: Good  Problem List: Decreased strength, Decreased range of motion, Decreased endurance, Impaired balance, Decreased mobility, Decreased coordination, Impaired sensation, Obesity, Decreased skin integrity, Orthopedic restrictions, Pain  Assessment: Patient seen for PT treatment session this date. Patient found supine in bed. Patient reporting 9/10 pain but agreeable to mobilize. Patient able don her shoes and ambulate 400' while conversing with PT. No loss of balance noted. Patient does have noted decreased left LE stance and unequal step length. Patient reported feeling as if she could walk without an AD but declining at this time to less restrictive device secondary to fear of falling. Patient able to navigate 6 and 4 inch steps during today's session in a reciprocal pattern. Patient did report increased pain however declined PT recommendation to perform a step to pattern. Spoke with RN after today's session to update on progress. RN reporting that patient is a self in the room and has waived the chair arm. She also reports that patient took a shower this morning. Spoke with patient concerning her progress. Patient reports she does have a place to stay in the [facility] until she return to [place]. At this time recommend level III resource intensity.  Barriers to Discharge: Inaccessible home environment, Decreased caregiver support     Rehab  Resource Intensity Level, PT: (S) III (Minimum Resource Intensity) (Change in recomendation)    See flowsheet documentation for full assessment.

## 2025-01-12 NOTE — PHYSICAL THERAPY NOTE
PHYSICAL THERAPY NOTE          Patient Name: Hayley Rios  Today's Date: 1/12/2025 01/12/25 1109   PT Last Visit   PT Visit Date 01/12/25   Note Type   Note Type Treatment for insurance authorization   Pain Assessment   Pain Assessment Tool 0-10   Pain Score 9   Pain Location/Orientation Orientation: Upper;Location: Back   Hospital Pain Intervention(s) Repositioned;Ambulation/increased activity   Restrictions/Precautions   Weight Bearing Precautions Per Order No   Other Precautions Pain;Spinal precautions;Fall Risk   General   Chart Reviewed Yes   Response to Previous Treatment Patient reporting fatigue but able to participate.   Family/Caregiver Present No   Cognition   Overall Cognitive Status WFL   Arousal/Participation Responsive;Cooperative   Attention Within functional limits   Orientation Level Oriented X4   Memory Within functional limits   Following Commands Follows all commands and directions without difficulty   Comments Pt pleasant and despite pain agreeable to participate   Bed Mobility   Supine to Sit 5  Supervision   Additional items Assist x 1;Increased time required   Sit to Supine 5  Supervision   Additional items Assist x 1;Increased time required   Transfers   Sit to Stand 5  Supervision   Additional items Assist x 1   Stand to Sit 5  Supervision   Additional items Assist x 1   Additional Comments w/ RW   Ambulation/Elevation   Gait pattern Inconsistent shyla;Short stride;Step through pattern;Decreased L stance   Gait Assistance 5  Supervision   Additional items Assist x 1   Assistive Device Rolling walker   Distance 400'   Stair Management Assistance 5  Supervision   Stair Management Technique Reciprocal;One rail L   Number of Stairs   (2, 6inch, 3, 4 inch)   Ambulation/Elevation Additional Comments Pt reporting pain however able to maintain conversation with PT during entire ambulation. Patient also  reported increased pain with stair mobility. Encouraged patient to perform step to pattern however patient declined and preferred recipricol pattern.   Balance   Static Sitting Fair +   Dynamic Sitting Fair   Static Standing Fair   Dynamic Standing Fair   Ambulatory Fair -   Endurance Deficit   Endurance Deficit Yes   Activity Tolerance   Activity Tolerance Patient tolerated treatment well;Patient limited by pain   Nurse Made Aware RN cleared and updated   Assessment   Prognosis Good   Problem List Decreased strength;Decreased range of motion;Decreased endurance;Impaired balance;Decreased mobility;Decreased coordination;Impaired sensation;Obesity;Decreased skin integrity;Orthopedic restrictions;Pain   Assessment Patient seen for PT treatment session this date. Patient found supine in bed. Patient reporting 9/10 pain but agreeable to mobilize. Patient able don her shoes and ambulate 400' while conversing with PT. No loss of balance noted. Patient does have noted decreased left LE stance and unequal step length. Patient reported feeling as if she could walk without an AD but declining at this time to less restrictive device secondary to fear of falling. Patient able to navigate 6 and 4 inch steps during today's session in a reciprocal pattern. Patient did report increased pain however declined PT recommendation to perform a step to pattern. Spoke with RN after today's session to update on progress. RN reporting that patient is a self in the room and has waived the chair arm. She also reports that patient took a shower this morning. Spoke with patient concerning her progress. Patient reports she does have a place to stay in the [facility] until she return to [place]. At this time recommend level III resource intensity.   Goals   Patient Goals To have less pain   STG Expiration Date 01/22/25   PT Treatment Day 2   Plan   Treatment/Interventions LE strengthening/ROM;Functional transfer  training;Elevations;Therapeutic exercise;Endurance training;Patient/family training;Gait training;Bed mobility;Equipment eval/education   Progress Progressing toward goals   PT Frequency 3-5x/wk   Discharge Recommendation   Rehab Resource Intensity Level, PT (S)  III (Minimum Resource Intensity)  (Change in recomendation)   Equipment Recommended Walker   Walker Package Recommended Wheeled walker   AM-PAC Basic Mobility Inpatient   Turning in Flat Bed Without Bedrails 4   Lying on Back to Sitting on Edge of Flat Bed Without Bedrails 4   Moving Bed to Chair 3   Standing Up From Chair Using Arms 3   Walk in Room 3   Climb 3-5 Stairs With Railing 3   Basic Mobility Inpatient Raw Score 20   Basic Mobility Standardized Score 43.99   [facility] Highest Level Of Mobility   -HLM Goal 6: Walk 10 steps or more   -HLM Achieved 8: Walk 250 feet ot more   Education   Education Provided Mobility training;Assistive device   Patient Demonstrates acceptance/verbal understanding   End of Consult   Patient Position at End of Consult Supine;All needs within reach       Korin Saldivar, PT

## 2025-01-12 NOTE — OCCUPATIONAL THERAPY NOTE
Occupational Therapy Progress Note     Patient Name: Hayley Rios  Today's Date: 1/12/2025  Problem List  Principal Problem:    Compression of thoracic spinal cord with myelopathy (HCC)  Active Problems:    Restless leg syndrome    Prediabetes    MARV (iron deficiency anemia)    Lymphedema    Ambulatory dysfunction         Occupational Therapy Treatment Note     01/12/25 1028   OT Last Visit   OT Visit Date 01/12/25   Note Type   Note Type Treatment for insurance authorization   Pain Assessment   Pain Assessment Tool 0-10   Pain Score 9   Pain Rating: FLACC (Rest) - Face 0   Pain Rating: FLACC (Rest) - Legs 0   Pain Rating: FLACC (Rest) - Activity 0   Pain Rating: FLACC (Rest) - Cry 0   Pain Rating: FLACC (Rest) - Consolability 0   Score: FLACC (Rest) 0   Pain Rating: FLACC (Activity) - Face 1   Pain Rating: FLACC (Activity) - Legs 1   Pain Rating: FLACC (Activity) - Activity 1   Pain Rating: FLACC (Activity) - Cry 1   Pain Rating: FLACC (Activity) - Consolability 0   Score: FLACC (Activity) 4   Restrictions/Precautions   Weight Bearing Precautions Per Order No   Braces or Orthoses   (no bracing post op per nsx)   Other Precautions Fall Risk;Pain;Spinal precautions   Lifestyle   Autonomy I adls and mobility - reports she was most recently using SPC 2* LE weakness (L>R) but borrowed friends RW 2* progressive weakness over past 2 wks   Reciprocal Relationships supportive family and friends   Service to Others not working   Intrinsic Gratification active pta   ADL   Where Assessed Standing at sink   Grooming Assistance 5  Supervision/Setup   Grooming Deficit Setup;Standing with assistive device;Wash/dry hands   UB Bathing Assistance 5  Supervision/Setup   UB Bathing Comments pt reported took a shower yesterday   LB Bathing Assistance 5  Supervision/Setup   LB Bathing Deficit Setup   LB Bathing Comments set up with shower chair   UB Dressing Assistance 5  Supervision/Setup   UB Dressing Deficit Increased time to  "complete   LB Dressing Assistance 5  Supervision/Setup   LB Dressing Deficit Don/doff R sock;Don/doff L sock;Thread RLE into pants;Thread LLE into pants;Pull up over hips   Toileting Assistance  5  Supervision/Setup   Toileting Deficit Setup;Clothing management up;Clothing management down;Perineal hygiene   Bed Mobility   Supine to Sit 5  Supervision   Additional items Assist x 1   Sit to Supine 5  Supervision   Additional items Assist x 1   Transfers   Sit to Stand 5  Supervision   Additional items Assist x 1   Stand to Sit 5  Supervision   Additional items Assist x 1   Toilet transfer 5  Supervision   Additional items Assist x 1   Functional Mobility   Functional Mobility 5  Supervision   Additional items Rolling walker   Subjective   Subjective Pt stated \"  I just don't know how I'll do without the walker\" \"I signed the waver\" \" I can take off my shirt but I have to wait a little for the pain to subside\"   Cognition   Overall Cognitive Status WFL   Arousal/Participation Responsive;Cooperative   Attention Within functional limits   Orientation Level Oriented X4   Memory Within functional limits   Following Commands Follows all commands and directions without difficulty   Activity Tolerance   Activity Tolerance Patient tolerated treatment well   Assessment   Assessment Pt seen for Occupational Therapy session with focus on activity tolerance, bed mob, functional transfers/mob, sitting balance and tolerance and standing tolerance and balance for pt engagement in UB/LB self-care tasks and energy conservation techniques. Pt cleared by ZAIN/ Seema for pt participated in OT session. Pt presented supine/HOB raised pt awake/alert and agreeable to participate in therapy following pt identifiers confirmed. Pt reported her therapy goal to was get stronger. She reported that she sign a wavered in order to be allowed to walk to and from the pt bathroom  without assistance.   Pt progressing well  and was able to tolerate " toileting/toilet transfers with SBA this session/  pt Increased time spend to educate pt on energy conservation techniques and pt able to verbalize good understanding.  Pt appropriate for II (Moderate Resources) home vs rehab pending pt continued progress with physical therapy.  Pt set up to bedside chair post session, chair alarm activated and all needs within pt reach   Plan   Treatment Interventions ADL retraining   Goal Expiration Date 01/22/25   OT Treatment Day 1   OT Frequency 3-5x/wk   Discharge Recommendation   Rehab Resource Intensity Level, OT I (Maximum Resource Intensity)   AM-PAC Daily Activity Inpatient   Lower Body Dressing 3   Bathing 3   Toileting 3   Upper Body Dressing 4   Grooming 4   Eating 4   Daily Activity Raw Score 21   Daily Activity Standardized Score (Calc for Raw Score >=11) 44.27   AM-PAC Applied Cognition Inpatient   Following a Speech/Presentation 4   Understanding Ordinary Conversation 4   Taking Medications 4   Remembering Where Things Are Placed or Put Away 4   Remembering List of 4-5 Errands 4   Taking Care of Complicated Tasks 4   Applied Cognition Raw Score 24   Applied Cognition Standardized Score 62.21   Modified [place] Scale   Modified [place] Scale 4       Shalonda SOLIS/KENNY

## 2025-01-12 NOTE — PLAN OF CARE
Problem: OCCUPATIONAL THERAPY ADULT  Goal: Performs self-care activities at highest level of function for planned discharge setting.  See evaluation for individualized goals.  Description: Treatment Interventions: ADL retraining, Functional transfer training, Endurance training, Patient/family training, Equipment evaluation/education, Compensatory technique education, Activityengagement          See flowsheet documentation for full assessment, interventions and recommendations.   Outcome: Progressing  Note: Limitation: Decreased ADL status, Decreased endurance, Decreased high-level ADLs, Decreased self-care trans, Decreased UE ROM, Decreased sensation  Prognosis: Fair  Assessment: Pt seen for Occupational Therapy session with focus on activity tolerance, bed mob, functional transfers/mob, sitting balance and tolerance and standing tolerance and balance for pt engagement in UB/LB self-care tasks and energy conservation techniques. Pt cleared by ZAIN/ Seema for pt participated in OT session. Pt presented supine/HOB raised pt awake/alert and agreeable to participate in therapy following pt identifiers confirmed. Pt reported her therapy goal to was get stronger. She reported that she sign a wavered in order to be allowed to walk to and from the pt bathroom  without assistance.   Pt progressing well  and was able to tolerate toileting/toilet transfers with SBA this session/  pt Increased time spend to educate pt on energy conservation techniques and pt able to verbalize good understanding.  Pt appropriate for II (Moderate Resources) home vs rehab pending pt continued progress with physical therapy.  Pt set up to bedside chair post session, chair alarm activated and all needs within pt reach  Recommendation: Physiatry Consult  Rehab Resource Intensity Level, OT: I (Maximum Resource Intensity)

## 2025-01-12 NOTE — PLAN OF CARE
Problem: PAIN - ADULT  Goal: Verbalizes/displays adequate comfort level or baseline comfort level  Description: Interventions:  - Encourage patient to monitor pain and request assistance  - Assess pain using appropriate pain scale  - Administer analgesics based on type and severity of pain and evaluate response  - Implement non-pharmacological measures as appropriate and evaluate response  - Consider cultural and social influences on pain and pain management  - Notify physician/advanced practitioner if interventions unsuccessful or patient reports new pain  Outcome: Progressing     Problem: INFECTION - ADULT  Goal: Absence or prevention of progression during hospitalization  Description: INTERVENTIONS:  - Assess and monitor for signs and symptoms of infection  - Monitor lab/diagnostic results  - Monitor all insertion sites, i.e. indwelling lines, tubes, and drains  - Monitor endotracheal if appropriate and nasal secretions for changes in amount and color  - [place] appropriate cooling/warming therapies per order  - Administer medications as ordered  - Instruct and encourage patient and family to use good hand hygiene technique  - Identify and instruct in appropriate isolation precautions for identified infection/condition  Outcome: Progressing     Problem: SAFETY ADULT  Goal: Patient will remain free of falls  Description: INTERVENTIONS:  - Educate patient/family on patient safety including physical limitations  - Instruct patient to call for assistance with activity   - Consult OT/PT to assist with strengthening/mobility   - Keep Call bell within reach  - Keep bed low and locked with side rails adjusted as appropriate  - Keep care items and personal belongings within reach  - Initiate and maintain comfort rounds  - Make Fall Risk Sign visible to staff  - Offer Toileting every 2 Hours, in advance of need  - Initiate/Maintain alarm  - Obtain necessary fall risk management equipment  - Apply yellow socks and  bracelet for high fall risk patients  - Consider moving patient to room near nurses station  Outcome: Progressing  Goal: Maintain or return to baseline ADL function  Description: INTERVENTIONS:  -  Assess patient's ability to carry out ADLs; assess patient's baseline for ADL function and identify physical deficits which impact ability to perform ADLs (bathing, care of mouth/teeth, toileting, grooming, dressing, etc.)  - Assess/evaluate cause of self-care deficits   - Assess range of motion  - Assess patient's mobility; develop plan if impaired  - Assess patient's need for assistive devices and provide as appropriate  - Encourage maximum independence but intervene and supervise when necessary  - Involve family in performance of ADLs  - Assess for home care needs following discharge   - Consider OT consult to assist with ADL evaluation and planning for discharge  - Provide patient education as appropriate  Outcome: Progressing  Goal: Maintains/Returns to pre admission functional level  Description: INTERVENTIONS:  - Perform AM-PAC 6 Click Basic Mobility/ Daily Activity assessment daily.  - Set and communicate daily mobility goal to care team and patient/family/caregiver.   - Collaborate with rehabilitation services on mobility goals if consulted  - Perform Range of Motion 3 times a day.  - Reposition patient every 2 hours.  - Dangle patient 3 times a day  - Stand patient 3 times a day  - Ambulate patient 3 times a day  - Out of bed to chair 3 times a day   - Out of bed for meals 3 times a day  - Out of bed for toileting  - Record patient progress and toleration of activity level   Outcome: Progressing     Problem: DISCHARGE PLANNING  Goal: Discharge to home or other facility with appropriate resources  Description: INTERVENTIONS:  - Identify barriers to discharge w/patient and caregiver  - Arrange for needed discharge resources and transportation as appropriate  - Identify discharge learning needs (meds, wound care,  etc.)  - Arrange for interpretive services to assist at discharge as needed  - Refer to Case Management Department for coordinating discharge planning if the patient needs post-hospital services based on physician/advanced practitioner order or complex needs related to functional status, cognitive ability, or social support system  Outcome: Progressing     Problem: Knowledge Deficit  Goal: Patient/family/caregiver demonstrates understanding of disease process, treatment plan, medications, and discharge instructions  Description: Complete learning assessment and assess knowledge base.  Interventions:  - Provide teaching at level of understanding  - Provide teaching via preferred learning methods  Outcome: Progressing     Problem: NEUROSENSORY - ADULT  Goal: Achieves stable or improved neurological status  Description: INTERVENTIONS  - Monitor and report changes in neurological status  - Monitor vital signs such as temperature, blood pressure, glucose, and any other labs ordered   - Initiate measures to prevent increased intracranial pressure  - Monitor for seizure activity and implement precautions if appropriate      Outcome: Progressing  Goal: Achieves maximal functionality and self care  Description: INTERVENTIONS  - Monitor swallowing and airway patency with patient fatigue and changes in neurological status  - Encourage and assist patient to increase activity and self care.   - Encourage visually impaired, hearing impaired and aphasic patients to use assistive/communication devices  Outcome: Progressing

## 2025-01-12 NOTE — CASE MANAGEMENT
Case Management Discharge Planning Note    Patient name Hayley Rios  Location [facility] 626/[facility] 6[facility]-01 MRN [id number]  : 1960 Date 2025       Current Admission Date: 2024  Current Admission Diagnosis:Compression of thoracic spinal cord with myelopathy (HCC)   Patient Active Problem List    Diagnosis Date Noted Date Diagnosed    Compression of thoracic spinal cord with myelopathy (HCC) 2024     Lymphedema 2024     Ambulatory dysfunction 2024     Class 1 obesity due to excess calories without serious comorbidity with body mass index (BMI) of 34.0 to 34.9 in adult 2024     Ventral hernia without obstruction or gangrene 2024     Myofascial pain syndrome 2022     MARV (iron deficiency anemia) 2022     Podagra 2021     Motion sickness      Recurrent incisional hernia      Macromastia 2020     Long-term current use of opiate analgesic 2020     Uncomplicated opioid dependence (HCC) 2020     Recurrent umbilical hernia 2020     Lipoma of torso 2020     Sacroiliitis, not elsewhere classified (HCC)      Osteoarthritis of multiple joints 02/10/2020     Neck pain      Cervical spondylosis without myelopathy      Chronic cough 2019     Vocal fold paresis, right 2019     Dysphonia 2019     Muscle tension dysphonia 2019     Glottic insufficiency 2019     Reflux laryngitis 2019     Laryngeal edema 2019     Allergic rhinitis due to [employer] house dust mite 2019     Mild intermittent asthma without complication 2019     Laryngopharyngeal reflux (LPR) 2019     Dolan's esophagus with dysplasia 2019     Gastroesophageal reflux disease 2019     Prediabetes 2018     Vitamin D deficiency 2018     Lumbar radiculopathy 10/01/2018     Lumbar spondylosis      Environmental allergies 2018     S/P right knee arthroscopy 2018     Hernia of anterior abdominal  "wall 02/05/2018     Sleep disturbance 11/16/2017     Hyperlipidemia 08/11/2017     Primary osteoarthritis of left hip 07/26/2017     Restless leg syndrome 07/11/2017     Chronic venous insufficiency 06/02/2017     Chronic low back pain 04/11/2017     Lumbar postlaminectomy syndrome 04/11/2017     Chronic pain syndrome 03/16/2017     Depression, recurrent (HCC) 03/16/2017       LOS (days): 12  Geometric Mean LOS (GMLOS) (days): 4.1  Days to GMLOS:-7.6     OBJECTIVE:  Risk of Unplanned Readmission Score: 21.32         Current admission status: Inpatient   Preferred Pharmacy:   [facility] #[id number] - [place], [place] - [address] [facility]  [address] [facility] [address]  Phone: [phone] Fax: [phone]    [facility] #[id number] [address] [address] [address]  [address] [address]  Phone: [phone] Fax: [phone]    Primary Care Provider: Mau Garcia DO    Primary Insurance: [employer]  REP  Secondary Insurance: AARP  REP    DISCHARGE DETAILS:    Pt's recommendation changed to level III  Pt doesn't feel comfortable \"getting on a plane\" and going home at this time.  Awaiting the insurance's approval for acute, but if denied, pt would like SNF.  CM placed 8 referrals and will follow up        "

## 2025-01-12 NOTE — CASE MANAGEMENT
Case Management Discharge Planning Note    Patient name Hayley Rios  Location [facility] 626/[facility] 626-01 MRN [id number]  : 1960 Date 2025       Current Admission Date: 2024  Current Admission Diagnosis:Compression of thoracic spinal cord with myelopathy (HCC)   Patient Active Problem List    Diagnosis Date Noted Date Diagnosed    Compression of thoracic spinal cord with myelopathy (HCC) 2024     Lymphedema 2024     Ambulatory dysfunction 2024     Class 1 obesity due to excess calories without serious comorbidity with body mass index (BMI) of 34.0 to 34.9 in adult 2024     Ventral hernia without obstruction or gangrene 2024     Myofascial pain syndrome 2022     MARV (iron deficiency anemia) 2022     Podagra 2021     Motion sickness      Recurrent incisional hernia      Macromastia 2020     Long-term current use of opiate analgesic 2020     Uncomplicated opioid dependence (HCC) 2020     Recurrent umbilical hernia 2020     Lipoma of torso 2020     Sacroiliitis, not elsewhere classified (HCC)      Osteoarthritis of multiple joints 02/10/2020     Neck pain      Cervical spondylosis without myelopathy      Chronic cough 2019     Vocal fold paresis, right 2019     Dysphonia 2019     Muscle tension dysphonia 2019     Glottic insufficiency 2019     Reflux laryngitis 2019     Laryngeal edema 2019     Allergic rhinitis due to [employer] house dust mite 2019     Mild intermittent asthma without complication 2019     Laryngopharyngeal reflux (LPR) 2019     Dolan's esophagus with dysplasia 2019     Gastroesophageal reflux disease 2019     Prediabetes 2018     Vitamin D deficiency 2018     Lumbar radiculopathy 10/01/2018     Lumbar spondylosis      Environmental allergies 2018     S/P right knee arthroscopy 2018     Hernia of anterior abdominal  wall 02/05/2018     Sleep disturbance 11/16/2017     Hyperlipidemia 08/11/2017     Primary osteoarthritis of left hip 07/26/2017     Restless leg syndrome 07/11/2017     Chronic venous insufficiency 06/02/2017     Chronic low back pain 04/11/2017     Lumbar postlaminectomy syndrome 04/11/2017     Chronic pain syndrome 03/16/2017     Depression, recurrent (HCC) 03/16/2017       LOS (days): 12  Geometric Mean LOS (GMLOS) (days): 4.1  Days to GMLOS:-7.3     OBJECTIVE:  Risk of Unplanned Readmission Score: 21.27         Current admission status: Inpatient   Preferred Pharmacy:   [facility] #[id number] [place], [place] - [address] [facility]  [address] [facility] [address]  Phone: [phone] Fax: [phone]    [facility] #[id number] [facility] [address] [address] [address]  [address] [place] [address] [address] [facility] [address]  Phone: [phone] Fax: [phone]    Primary Care Provider: Mau Garcia DO    Primary Insurance: [employer]  REP  Secondary Insurance: AARP  REP    DISCHARGE DETAILS:    Pt's auth is still pending  Plan for d/c to [facility] once auth is obtained. CM will continue to follow

## 2025-01-12 NOTE — PROGRESS NOTES
Progress Note - Hospitalist   Name: Hayley Rios 64 y.o. female I MRN: [id number]  Unit/Bed#: [facility] 626-01 I Date of Admission: 12/31/2024   Date of Service: 1/12/2025 I Hospital Day: 12    Assessment & Plan  Compression of thoracic spinal cord with myelopathy (HCC)  Presented to [facility] with worsening of chronic LLE weakness, numbness/tingling of LLE>RLE, and muscle spasms. Known history of thoracic meningioma with spinal cord displacement and compression, lost to follow up due to move to [place].  MRI T spine: Suboptimal examination due to mild, moderate, and severe motion artifact - worse on postcontrast sequences. Similar 1.4 cm intradural extramedullary probable meningioma in left posterolateral thoracic spine region at approximately T3 level with associated marked spinal cord compression with severe canal stenosis given motion degraded exam. No cord edema given motion degradation.   MRI L spine: Suboptimal examination due to moderate-to-severe motion degraded exam of lumbar spine and 3 plane localizer images, sagittal T1 post contrast and axial T1 postcontrast sequences. No abnormal enhancement in lumbar spine given limitations of motion degradation.   Neurosurgery consult and recommendations appreciated   S/p T2-4 laminectomy for resection of thoracic tumor (EM 1/7)   T3 lesion w/ concern of progressively worsening myelopathy  Continue pain regimen  Drain removed 1/10 by nsx d/c iv ancef  -- can f/u nsx 2 weeks and 6 weeks   Dexamethasone wean- accelerated given reflux symptoms   Hold AC/AP meds x at least 2 weeks post-op   Monitor neuro checks   PT/OT recommending rehab, eventually will go to [facility] pending auth   Restless leg syndrome  Vitamin B12 borderline low, s/p cyanocobalamin injection  Continue home dose Mirapex 0.5 mg qhs  Low iron stores and low percent saturation on iron panel, was started on venofer at [facility] x 3 days  Possibility this is related to nerve root impingement  Follow-up with her outpatient  providers in [place] for further management  MARV (iron deficiency anemia)  Iron panel 12/31/24: iron 40, ferritin 9, iron saturation 40%, TIBC 396  S/p IV Venofer x3  Monitor CBC and transfuse for hemoglobin < 7  Prediabetes  Evidenced by A1c of 5.8%  Sugars stable on BMP   Defer accuchecks/SSI  Ambulatory dysfunction  Due to primary problem, see plan of care outlined above   Lymphedema  Continue home dose Lasix    VTE Pharmacologic Prophylaxis: VTE Score: 5 High Risk (Score >/= 5) - Pharmacological DVT Prophylaxis Ordered: heparin. Sequential Compression Devices Ordered.    Mobility:   Basic Mobility Inpatient Raw Score: 20  JH-HLM Goal: 6: Walk 10 steps or more  JH-HLM Achieved: 8: Walk 250 feet ot more  JH-HLM Goal achieved. Continue to encourage appropriate mobility.    Patient Centered Rounds: I performed bedside rounds with nursing staff today.   Discussions with Specialists or Other Care Team Provider:     Education and Discussions with Family / Patient: Patient declined call to .     Current Length of Stay: 12 day(s)  Current Patient Status: Inpatient   Certification Statement: The patient will continue to require additional inpatient hospital stay due to safe dc planning.  Discharge Plan: Anticipate discharge in 24-48 hrs to home.    Code Status: Level 1 - Full Code    Subjective   C/o a lot of neck pain, did pretty well with therapy but still doesn't feel very confident and still interested in pursuing rehab.     Objective :  Temp:  [97.8 °F (36.6 °C)-98.2 °F (36.8 °C)] 98 °F (36.7 °C)  HR:  [68-92] 68  BP: (125-132)/(68-71) 129/71  Resp:  [17-18] 18  SpO2:  [94 %-97 %] 95 %  O2 Device: None (Room air)    Body mass index is 36.74 kg/m².     Input and Output Summary (last 24 hours):     Intake/Output Summary (Last 24 hours) at 1/12/2025 1226  Last data filed at 1/12/2025 0549  Gross per 24 hour   Intake 720 ml   Output --   Net 720 ml       Physical Exam  Vitals reviewed.    Constitutional:       General: She is not in acute distress.     Appearance: She is not toxic-appearing.   HENT:      Head: Normocephalic and atraumatic.   Eyes:      Extraocular Movements: Extraocular movements intact.   Cardiovascular:      Rate and Rhythm: Normal rate and regular rhythm.   Pulmonary:      Effort: Pulmonary effort is normal. No respiratory distress.   Abdominal:      General: Bowel sounds are normal. There is no distension.      Palpations: Abdomen is soft.   Musculoskeletal:         General: Normal range of motion.   Neurological:      General: No focal deficit present.      Mental Status: She is alert and oriented to person, place, and time.   Psychiatric:         Mood and Affect: Mood normal.         Behavior: Behavior normal.         Thought Content: Thought content normal.       Lines/Drains:              Lab Results: I have reviewed the following results:   Results from last 7 days   Lab Units 01/12/25  0644   WBC Thousand/uL 11.52*   HEMOGLOBIN g/dL 11.7   HEMATOCRIT % 38.0   PLATELETS Thousands/uL 315   SEGS PCT % 76*   LYMPHO PCT % 14   MONO PCT % 5   EOS PCT % 3     Results from last 7 days   Lab Units 01/12/25  0644 01/11/25  0527 01/09/25  0619   SODIUM mmol/L 135   < > 140   POTASSIUM mmol/L 4.2   < > 3.6   CHLORIDE mmol/L 97   < > 102   CO2 mmol/L 28   < > 28   BUN mg/dL 18   < > 15   CREATININE mg/dL 0.54*   < > 0.54*   ANION GAP mmol/L 10   < > 10   CALCIUM mg/dL 9.2   < > 9.0   ALBUMIN g/dL  --   --  3.9   TOTAL BILIRUBIN mg/dL  --   --  0.29   ALK PHOS U/L  --   --  71   ALT U/L  --   --  15   AST U/L  --   --  17   GLUCOSE RANDOM mg/dL 126   < > 138    < > = values in this interval not displayed.     Results from last 7 days   Lab Units 01/08/25  0531   INR  1.01     Results from last 7 days   Lab Units 01/12/25  1152 01/12/25  0756 01/11/25  2034 01/11/25  1644 01/11/25  1120 01/11/25  0800 01/10/25  2041 01/10/25  1552 01/10/25  1141 01/10/25  0745 01/09/25  1707  01/09/25  1212   POC GLUCOSE mg/dl 114 146* 154* 126 143* 132 133 144* 127 124 134 141*               Recent Cultures (last 7 days):         Imaging Results Review: No pertinent imaging studies reviewed.  Other Study Results Review: No additional pertinent studies reviewed.    Last 24 Hours Medication List:     Current Facility-Administered Medications:     acetaminophen (Ofirmev) injection 1,000 mg, Q6H EVGENY, Last Rate: 1,000 mg (01/12/25 0549)    aluminum-magnesium hydroxide-simethicone (MAALOX) oral suspension 30 mL, Q4H PRN    bisacodyl (DULCOLAX) rectal suppository 10 mg, Daily PRN    calcium carbonate (TUMS) chewable tablet 1,000 mg, TID PRN    cyanocobalamin injection 1,000 mcg, Q30 Days    [COMPLETED] dexamethasone (DECADRON) tablet 4 mg, Q6H EVGENY **FOLLOWED BY** [COMPLETED] dexamethasone (DECADRON) tablet 4 mg, Q8H EVGENY **FOLLOWED BY** [COMPLETED] dexamethasone (DECADRON) tablet 2 mg, Q6H EVGENY **FOLLOWED BY** dexamethasone (DECADRON) tablet 2 mg, Q8H EVGENY **FOLLOWED BY** [START ON 1/13/2025] dexamethasone (DECADRON) tablet 2 mg, Q12H EVGENY **FOLLOWED BY** [START ON 1/14/2025] dexamethasone (DECADRON) tablet 2 mg, Daily    diazepam (VALIUM) injection 2.5 mg, Q6H PRN    diphenhydrAMINE (BENADRYL) tablet 25 mg, Q6H PRN    DULoxetine (CYMBALTA) delayed release capsule 60 mg, Daily    furosemide (LASIX) tablet 20 mg, Daily    heparin (porcine) subcutaneous injection 5,000 Units, Q8H EVGENY    HYDROmorphone (DILAUDID) injection 0.2 mg, Q4H PRN    insulin lispro (HumALOG/ADMELOG) 100 units/mL subcutaneous injection 1-6 Units, TID AC **AND** Fingerstick Glucose (POCT), TID AC    labetalol (NORMODYNE) injection 10 mg, Q4H PRN    lidocaine (LIDODERM) 5 % patch 2 patch, Daily    melatonin tablet 3 mg, HS    methocarbamol (ROBAXIN) tablet 1,000 mg, Q8H EVGENY    omeprazole (PriLOSEC) capsule 40 mg, BID    ondansetron (ZOFRAN) injection 4 mg, Q6H PRN    oxyCODONE (ROXICODONE) IR tablet 5 mg, Q4H PRN **OR** oxyCODONE (ROXICODONE)  immediate release tablet 10 mg, Q4H PRN    polyethylene glycol (MIRALAX) packet 17 g, Daily PRN    polyethylene glycol (MIRALAX) packet 17 g, Daily    potassium chloride (Klor-Con M20) CR tablet 40 mEq, Daily With Breakfast    pramipexole (MIRAPEX) tablet 0.5 mg, HS    senna (SENOKOT) tablet 8.6 mg, Daily    Administrative Statements   Today, Patient Was Seen By: Lara Callaway PA-C      **Please Note: This note may have been constructed using a voice recognition system.**

## 2025-01-12 NOTE — ASSESSMENT & PLAN NOTE
Presented to [facility] with worsening of chronic LLE weakness, numbness/tingling of LLE>RLE, and muscle spasms. Known history of thoracic meningioma with spinal cord displacement and compression, lost to follow up due to move to [place].  MRI T spine: Suboptimal examination due to mild, moderate, and severe motion artifact - worse on postcontrast sequences. Similar 1.4 cm intradural extramedullary probable meningioma in left posterolateral thoracic spine region at approximately T3 level with associated marked spinal cord compression with severe canal stenosis given motion degraded exam. No cord edema given motion degradation.   MRI L spine: Suboptimal examination due to moderate-to-severe motion degraded exam of lumbar spine and 3 plane localizer images, sagittal T1 post contrast and axial T1 postcontrast sequences. No abnormal enhancement in lumbar spine given limitations of motion degradation.   Neurosurgery consult and recommendations appreciated   S/p T2-4 laminectomy for resection of thoracic tumor (EM 1/7)   T3 lesion w/ concern of progressively worsening myelopathy  Continue pain regimen  Drain removed 1/10 by nsx d/c iv ancef  -- can f/u nsx 2 weeks and 6 weeks   Dexamethasone wean- accelerated given reflux symptoms   Hold AC/AP meds x at least 2 weeks post-op   Monitor neuro checks   PT/OT recommending rehab, eventually will go to [facility] pending auth

## 2025-01-13 LAB
GLUCOSE SERPL-MCNC: 105 MG/DL (ref 65–140)
GLUCOSE SERPL-MCNC: 120 MG/DL (ref 65–140)
GLUCOSE SERPL-MCNC: 130 MG/DL (ref 65–140)

## 2025-01-13 PROCEDURE — 82948 REAGENT STRIP/BLOOD GLUCOSE: CPT

## 2025-01-13 PROCEDURE — 99232 SBSQ HOSP IP/OBS MODERATE 35: CPT | Performed by: PHYSICIAN ASSISTANT

## 2025-01-13 RX ORDER — DIAZEPAM 2 MG/1
2 TABLET ORAL EVERY 6 HOURS PRN
Status: DISCONTINUED | OUTPATIENT
Start: 2025-01-13 | End: 2025-01-15 | Stop reason: HOSPADM

## 2025-01-13 RX ORDER — ACETAMINOPHEN 325 MG/1
975 TABLET ORAL EVERY 8 HOURS SCHEDULED
Status: DISCONTINUED | OUTPATIENT
Start: 2025-01-13 | End: 2025-01-14

## 2025-01-13 RX ADMIN — POTASSIUM CHLORIDE 40 MEQ: 1500 TABLET, EXTENDED RELEASE ORAL at 09:13

## 2025-01-13 RX ADMIN — OXYCODONE HYDROCHLORIDE 10 MG: 10 TABLET ORAL at 23:57

## 2025-01-13 RX ADMIN — OMEPRAZOLE 40 MG: 40 CAPSULE, DELAYED RELEASE ORAL at 09:13

## 2025-01-13 RX ADMIN — HEPARIN SODIUM 5000 UNITS: 5000 INJECTION INTRAVENOUS; SUBCUTANEOUS at 14:03

## 2025-01-13 RX ADMIN — METHOCARBAMOL 1000 MG: 500 TABLET ORAL at 14:03

## 2025-01-13 RX ADMIN — FUROSEMIDE 20 MG: 20 TABLET ORAL at 09:13

## 2025-01-13 RX ADMIN — HEPARIN SODIUM 5000 UNITS: 5000 INJECTION INTRAVENOUS; SUBCUTANEOUS at 05:25

## 2025-01-13 RX ADMIN — DEXAMETHASONE 2 MG: 2 TABLET ORAL at 05:25

## 2025-01-13 RX ADMIN — DIAZEPAM 2.5 MG: 10 INJECTION, SOLUTION INTRAMUSCULAR; INTRAVENOUS at 09:13

## 2025-01-13 RX ADMIN — HEPARIN SODIUM 5000 UNITS: 5000 INJECTION INTRAVENOUS; SUBCUTANEOUS at 21:12

## 2025-01-13 RX ADMIN — DULOXETINE HYDROCHLORIDE 60 MG: 60 CAPSULE, DELAYED RELEASE ORAL at 09:13

## 2025-01-13 RX ADMIN — OXYCODONE HYDROCHLORIDE 10 MG: 10 TABLET ORAL at 19:23

## 2025-01-13 RX ADMIN — DIAZEPAM 2 MG: 2 TABLET ORAL at 21:12

## 2025-01-13 RX ADMIN — SENNOSIDES 8.6 MG: 8.6 TABLET, FILM COATED ORAL at 09:12

## 2025-01-13 RX ADMIN — DEXAMETHASONE 2 MG: 2 TABLET ORAL at 14:03

## 2025-01-13 RX ADMIN — MELATONIN TAB 3 MG 3 MG: 3 TAB at 21:11

## 2025-01-13 RX ADMIN — ACETAMINOPHEN 1000 MG: 1000 INJECTION, SOLUTION INTRAVENOUS at 06:00

## 2025-01-13 RX ADMIN — ACETAMINOPHEN 975 MG: 325 TABLET, FILM COATED ORAL at 21:11

## 2025-01-13 RX ADMIN — METHOCARBAMOL 1000 MG: 500 TABLET ORAL at 21:12

## 2025-01-13 RX ADMIN — OXYCODONE HYDROCHLORIDE 10 MG: 10 TABLET ORAL at 01:45

## 2025-01-13 RX ADMIN — METHOCARBAMOL 1000 MG: 500 TABLET ORAL at 05:25

## 2025-01-13 RX ADMIN — PRAMIPEXOLE DIHYDROCHLORIDE 0.5 MG: 0.5 TABLET ORAL at 21:12

## 2025-01-13 RX ADMIN — OMEPRAZOLE 40 MG: 40 CAPSULE, DELAYED RELEASE ORAL at 17:09

## 2025-01-13 RX ADMIN — ACETAMINOPHEN 975 MG: 325 TABLET, FILM COATED ORAL at 14:03

## 2025-01-13 RX ADMIN — DEXAMETHASONE 2 MG: 2 TABLET ORAL at 21:11

## 2025-01-13 RX ADMIN — POLYETHYLENE GLYCOL 3350 17 G: 17 POWDER, FOR SOLUTION ORAL at 09:12

## 2025-01-13 NOTE — CASE MANAGEMENT
Per H&CC portal, IRF auth still pended at this time. Message sent to reviewer requesting update. Reference #: [id number]. CM Notified: Rene Delatorre

## 2025-01-13 NOTE — PROGRESS NOTES
Progress Note - Hospitalist   Name: Hayley Rios 64 y.o. female I MRN: [id number]  Unit/Bed#: [facility] 626-01 I Date of Admission: 12/31/2024   Date of Service: 1/13/2025 I Hospital Day: 13    Assessment & Plan  Compression of thoracic spinal cord with myelopathy (HCC)  Presented to [facility] with worsening of chronic LLE weakness, numbness/tingling of LLE>RLE, and muscle spasms. Known history of thoracic meningioma with spinal cord displacement and compression, lost to follow up due to move to [place].  MRI T spine: Suboptimal examination due to mild, moderate, and severe motion artifact - worse on postcontrast sequences. Similar 1.4 cm intradural extramedullary probable meningioma in left posterolateral thoracic spine region at approximately T3 level with associated marked spinal cord compression with severe canal stenosis given motion degraded exam. No cord edema given motion degradation.   MRI L spine: Suboptimal examination due to moderate-to-severe motion degraded exam of lumbar spine and 3 plane localizer images, sagittal T1 post contrast and axial T1 postcontrast sequences. No abnormal enhancement in lumbar spine given limitations of motion degradation.   Neurosurgery consult and recommendations appreciated   S/p T2-4 laminectomy for resection of thoracic tumor (EM 1/7).  T3 lesion w/ concern of progressively worsening myelopathy  Drain removed 1/10 by nsx d/c iv ancef  -- can f/u nsx 2 weeks (around  1/24) and 6 weeks (around 2/21)   Continue pain regimen  Dexamethasone wean- accelerated given reflux symptoms   Hold AC/AP meds x at least 2 weeks post-op   Monitor neuro checks   PT/OT recommending rehab, eventually will go to [facility] pending auth   Restless leg syndrome  Vitamin B12 borderline low, s/p cyanocobalamin injection  Continue home dose Mirapex 0.5 mg qhs  Low iron stores and low percent saturation on iron panel, was started on venofer at [facility] x 3 days  Possibility this is related to nerve root impingement  Follow-up  with her outpatient providers in [place] for further management  MARV (iron deficiency anemia)  Iron panel 12/31/24: iron 40, ferritin 9, iron saturation 40%, TIBC 396  S/p IV Venofer x3, repeat iron panel reviewed and improved.    Monitor CBC and transfuse for hemoglobin < 7  Prediabetes  Evidenced by A1c of 5.8%  Sugars stable on BMP   Defer accuchecks/SSI  Ambulatory dysfunction  Due to primary problem, see plan of care outlined above   Lymphedema  Continue home dose Lasix    VTE Pharmacologic Prophylaxis: VTE Score: 5 High Risk (Score >/= 5) - Pharmacological DVT Prophylaxis Ordered: heparin. Sequential Compression Devices Ordered.    Mobility:   Basic Mobility Inpatient Raw Score: 20  JH-HLM Goal: 6: Walk 10 steps or more  JH-HLM Achieved: 8: Walk 250 feet ot more  JH-HLM Goal achieved. Continue to encourage appropriate mobility.    Patient Centered Rounds: I performed bedside rounds with nursing staff today.   Discussions with Specialists or Other Care Team Provider: WARREN.     Education and Discussions with Family / Patient: Patient declined call to .     Current Length of Stay: 13 day(s)  Current Patient Status: Inpatient   Certification Statement: The patient will continue to require additional inpatient hospital stay due to pending safe dc to rehab  Discharge Plan: Anticipate discharge in 24-48 hrs to rehab facility.    Code Status: Level 1 - Full Code    Subjective   Still with pain, but overall doing ok.      Objective :  Temp:  [97.9 °F (36.6 °C)-98.4 °F (36.9 °C)] 98.4 °F (36.9 °C)  HR:  [62-84] 62  BP: (110-130)/(68-73) 130/73  Resp:  [16] 16  SpO2:  [95 %-97 %] 95 %    Body mass index is 36.74 kg/m².     Input and Output Summary (last 24 hours):     Intake/Output Summary (Last 24 hours) at 1/13/2025 1356  Last data filed at 1/13/2025 0615  Gross per 24 hour   Intake 870 ml   Output --   Net 870 ml       Physical Exam  Vitals reviewed.   Constitutional:       General: She is not in  acute distress.     Appearance: She is not toxic-appearing.   HENT:      Head: Normocephalic and atraumatic.   Eyes:      Extraocular Movements: Extraocular movements intact.   Neck:      Comments: Dressing c/d/I.   Cardiovascular:      Rate and Rhythm: Normal rate and regular rhythm.   Pulmonary:      Effort: Pulmonary effort is normal. No respiratory distress.   Abdominal:      General: Bowel sounds are normal.      Palpations: Abdomen is soft.   Musculoskeletal:         General: Normal range of motion.   Neurological:      General: No focal deficit present.      Mental Status: She is alert and oriented to person, place, and time.   Psychiatric:         Mood and Affect: Mood normal.         Behavior: Behavior normal.         Thought Content: Thought content normal.         Lines/Drains:              Lab Results: I have reviewed the following results:   Results from last 7 days   Lab Units 01/12/25  0644   WBC Thousand/uL 11.52*   HEMOGLOBIN g/dL 11.7   HEMATOCRIT % 38.0   PLATELETS Thousands/uL 315   SEGS PCT % 76*   LYMPHO PCT % 14   MONO PCT % 5   EOS PCT % 3     Results from last 7 days   Lab Units 01/12/25  0644 01/11/25  0527 01/09/25  0619   SODIUM mmol/L 135   < > 140   POTASSIUM mmol/L 4.2   < > 3.6   CHLORIDE mmol/L 97   < > 102   CO2 mmol/L 28   < > 28   BUN mg/dL 18   < > 15   CREATININE mg/dL 0.54*   < > 0.54*   ANION GAP mmol/L 10   < > 10   CALCIUM mg/dL 9.2   < > 9.0   ALBUMIN g/dL  --   --  3.9   TOTAL BILIRUBIN mg/dL  --   --  0.29   ALK PHOS U/L  --   --  71   ALT U/L  --   --  15   AST U/L  --   --  17   GLUCOSE RANDOM mg/dL 126   < > 138    < > = values in this interval not displayed.     Results from last 7 days   Lab Units 01/08/25  0531   INR  1.01     Results from last 7 days   Lab Units 01/13/25  1217 01/13/25  0719 01/12/25  2033 01/12/25  1716 01/12/25  1152 01/12/25  0756 01/11/25  2034 01/11/25  1644 01/11/25  1120 01/11/25  0800 01/10/25  2041 01/10/25  1552   POC GLUCOSE mg/dl 105  120 132 169* 114 146* 154* 126 143* 132 133 144*               Recent Cultures (last 7 days):         Imaging Results Review: No pertinent imaging studies reviewed.  Other Study Results Review: No additional pertinent studies reviewed.    Last 24 Hours Medication List:     Current Facility-Administered Medications:     acetaminophen (TYLENOL) tablet 975 mg, Q8H EVGENY    aluminum-magnesium hydroxide-simethicone (MAALOX) oral suspension 30 mL, Q4H PRN    bisacodyl (DULCOLAX) rectal suppository 10 mg, Daily PRN    calcium carbonate (TUMS) chewable tablet 1,000 mg, TID PRN    cyanocobalamin injection 1,000 mcg, Q30 Days    [COMPLETED] dexamethasone (DECADRON) tablet 4 mg, Q6H EVGENY **FOLLOWED BY** [COMPLETED] dexamethasone (DECADRON) tablet 4 mg, Q8H EVGENY **FOLLOWED BY** [COMPLETED] dexamethasone (DECADRON) tablet 2 mg, Q6H EVGENY **FOLLOWED BY** [COMPLETED] dexamethasone (DECADRON) tablet 2 mg, Q8H EVGENY **FOLLOWED BY** dexamethasone (DECADRON) tablet 2 mg, Q12H EVGENY **FOLLOWED BY** [START ON 1/14/2025] dexamethasone (DECADRON) tablet 2 mg, Daily    diazepam (VALIUM) tablet 2 mg, Q6H PRN    diphenhydrAMINE (BENADRYL) tablet 25 mg, Q6H PRN    DULoxetine (CYMBALTA) delayed release capsule 60 mg, Daily    furosemide (LASIX) tablet 20 mg, Daily    heparin (porcine) subcutaneous injection 5,000 Units, Q8H EVGENY    HYDROmorphone (DILAUDID) injection 0.2 mg, Q4H PRN    insulin lispro (HumALOG/ADMELOG) 100 units/mL subcutaneous injection 1-6 Units, TID AC **AND** Fingerstick Glucose (POCT), TID AC    labetalol (NORMODYNE) injection 10 mg, Q4H PRN    melatonin tablet 3 mg, HS    methocarbamol (ROBAXIN) tablet 1,000 mg, Q8H EVGENY    omeprazole (PriLOSEC) capsule 40 mg, BID    ondansetron (ZOFRAN) injection 4 mg, Q6H PRN    oxyCODONE (ROXICODONE) IR tablet 5 mg, Q4H PRN **OR** oxyCODONE (ROXICODONE) immediate release tablet 10 mg, Q4H PRN    polyethylene glycol (MIRALAX) packet 17 g, Daily PRN    polyethylene glycol (MIRALAX) packet 17 g,  Daily    potassium chloride (Klor-Con M20) CR tablet 40 mEq, Daily With Breakfast    pramipexole (MIRAPEX) tablet 0.5 mg, HS    senna (SENOKOT) tablet 8.6 mg, Daily    Administrative Statements   Today, Patient Was Seen By: Lara Callaway PA-C      **Please Note: This note may have been constructed using a voice recognition system.**

## 2025-01-13 NOTE — PLAN OF CARE
Problem: PAIN - ADULT  Goal: Verbalizes/displays adequate comfort level or baseline comfort level  Description: Interventions:  - Encourage patient to monitor pain and request assistance  - Assess pain using appropriate pain scale  - Administer analgesics based on type and severity of pain and evaluate response  - Implement non-pharmacological measures as appropriate and evaluate response  - Consider cultural and social influences on pain and pain management  - Notify physician/advanced practitioner if interventions unsuccessful or patient reports new pain  Outcome: Progressing     Problem: INFECTION - ADULT  Goal: Absence or prevention of progression during hospitalization  Description: INTERVENTIONS:  - Assess and monitor for signs and symptoms of infection  - Monitor lab/diagnostic results  - Monitor all insertion sites, i.e. indwelling lines, tubes, and drains  - Monitor endotracheal if appropriate and nasal secretions for changes in amount and color  - [place] appropriate cooling/warming therapies per order  - Administer medications as ordered  - Instruct and encourage patient and family to use good hand hygiene technique  - Identify and instruct in appropriate isolation precautions for identified infection/condition  Outcome: Progressing     Problem: SAFETY ADULT  Goal: Patient will remain free of falls  Description: INTERVENTIONS:  - Educate patient/family on patient safety including physical limitations  - Instruct patient to call for assistance with activity   - Consult OT/PT to assist with strengthening/mobility   - Keep Call bell within reach  - Keep bed low and locked with side rails adjusted as appropriate  - Keep care items and personal belongings within reach  - Initiate and maintain comfort rounds  - Make Fall Risk Sign visible to staff  - Offer Toileting every 2 Hours, in advance of need  - Initiate/Maintain alarm  - Obtain necessary fall risk management equipment  - Apply yellow socks and  bracelet for high fall risk patients  - Consider moving patient to room near nurses station  Outcome: Progressing  Goal: Maintain or return to baseline ADL function  Description: INTERVENTIONS:  -  Assess patient's ability to carry out ADLs; assess patient's baseline for ADL function and identify physical deficits which impact ability to perform ADLs (bathing, care of mouth/teeth, toileting, grooming, dressing, etc.)  - Assess/evaluate cause of self-care deficits   - Assess range of motion  - Assess patient's mobility; develop plan if impaired  - Assess patient's need for assistive devices and provide as appropriate  - Encourage maximum independence but intervene and supervise when necessary  - Involve family in performance of ADLs  - Assess for home care needs following discharge   - Consider OT consult to assist with ADL evaluation and planning for discharge  - Provide patient education as appropriate  Outcome: Progressing  Goal: Maintains/Returns to pre admission functional level  Description: INTERVENTIONS:  - Perform AM-PAC 6 Click Basic Mobility/ Daily Activity assessment daily.  - Set and communicate daily mobility goal to care team and patient/family/caregiver.   - Collaborate with rehabilitation services on mobility goals if consulted  - Perform Range of Motion 3 times a day.  - Reposition patient every 2 hours.  - Dangle patient 3 times a day  - Stand patient 3 times a day  - Ambulate patient 3 times a day  - Out of bed to chair 3 times a day   - Out of bed for meals 3 times a day  - Out of bed for toileting  - Record patient progress and toleration of activity level   Outcome: Progressing     Problem: DISCHARGE PLANNING  Goal: Discharge to home or other facility with appropriate resources  Description: INTERVENTIONS:  - Identify barriers to discharge w/patient and caregiver  - Arrange for needed discharge resources and transportation as appropriate  - Identify discharge learning needs (meds, wound care,  etc.)  - Arrange for interpretive services to assist at discharge as needed  - Refer to Case Management Department for coordinating discharge planning if the patient needs post-hospital services based on physician/advanced practitioner order or complex needs related to functional status, cognitive ability, or social support system  Outcome: Progressing     Problem: Knowledge Deficit  Goal: Patient/family/caregiver demonstrates understanding of disease process, treatment plan, medications, and discharge instructions  Description: Complete learning assessment and assess knowledge base.  Interventions:  - Provide teaching at level of understanding  - Provide teaching via preferred learning methods  Outcome: Progressing     Problem: NEUROSENSORY - ADULT  Goal: Achieves stable or improved neurological status  Description: INTERVENTIONS  - Monitor and report changes in neurological status  - Monitor vital signs such as temperature, blood pressure, glucose, and any other labs ordered   - Initiate measures to prevent increased intracranial pressure  - Monitor for seizure activity and implement precautions if appropriate      Outcome: Progressing  Goal: Achieves maximal functionality and self care  Description: INTERVENTIONS  - Monitor swallowing and airway patency with patient fatigue and changes in neurological status  - Encourage and assist patient to increase activity and self care.   - Encourage visually impaired, hearing impaired and aphasic patients to use assistive/communication devices  Outcome: Progressing

## 2025-01-13 NOTE — CASE MANAGEMENT
[facility] has received APPROVED authorization.  Insurance: [employer]      Auth obtained via portal.  Authorization received for: Acute Rehab  Facility: Reference #: [id number]   Authorization #: [id number]   H&CC Reference #: [id number]   Start of Care: 01/13  Next Review Date: 01/20  Submit next review to: H&CC Portal / F#: [phone]     Care Manager notified: Rene Delatorre     Please reach out to  for updates on any clinical information.

## 2025-01-13 NOTE — CASE MANAGEMENT
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) 2024     Lymphedema 2024     Ambulatory dysfunction 2024     Class 1 obesity due to excess calories without serious comorbidity with body mass index (BMI) of 34.0 to 34.9 in adult 2024     Ventral hernia without obstruction or gangrene 2024     Myofascial pain syndrome 2022     MARV (iron deficiency anemia) 2022     Podagra 2021     Motion sickness      Recurrent incisional hernia      Macromastia 2020     Long-term current use of opiate analgesic 2020     Uncomplicated opioid dependence (HCC) 2020     Recurrent umbilical hernia 2020     Lipoma of torso 2020     Sacroiliitis, not elsewhere classified (HCC)      Osteoarthritis of multiple joints 02/10/2020     Neck pain      Cervical spondylosis without myelopathy      Chronic cough 2019     Vocal fold paresis, right 2019     Dysphonia 2019     Muscle tension dysphonia 2019     Glottic insufficiency 2019     Reflux laryngitis 2019     Laryngeal edema 2019     Allergic rhinitis due to [employer] house dust mite 2019     Mild intermittent asthma without complication 2019     Laryngopharyngeal reflux (LPR) 2019     Dolan's esophagus with dysplasia 2019     Gastroesophageal reflux disease 2019     Prediabetes 2018     Vitamin D deficiency 2018     Lumbar radiculopathy 10/01/2018     Lumbar spondylosis      Environmental allergies 2018     S/P right knee arthroscopy 2018     Hernia of anterior abdominal  wall 02/05/2018     Sleep disturbance 11/16/2017     Hyperlipidemia 08/11/2017     Primary osteoarthritis of left hip 07/26/2017     Restless leg syndrome 07/11/2017     Chronic venous insufficiency 06/02/2017     Chronic low back pain 04/11/2017     Lumbar postlaminectomy syndrome 04/11/2017     Chronic pain syndrome 03/16/2017     Depression, recurrent (HCC) 03/16/2017       LOS (days): 13  Geometric Mean LOS (GMLOS) (days): 4.1  Days to GMLOS:-8.6     OBJECTIVE:  Risk of Unplanned Readmission Score: 21.4         Current admission status: Inpatient   Preferred Pharmacy:   [id number] [address]  [address] [facility] [address]  Phone: [phone] Fax: [phone]    [id number] [address]  [address] [facility] [address] [facility] [address]  Phone: [phone] Fax: [phone]    Primary Care Provider: Mau Garcia DO    Primary Insurance: [employer] REP  Secondary Insurance: AARP  REP    DISCHARGE DETAILS:                                                                                                               Facility Insurance Auth Number: [id number]

## 2025-01-13 NOTE — ASSESSMENT & PLAN NOTE
Presented to [facility] with worsening of chronic LLE weakness, numbness/tingling of LLE>RLE, and muscle spasms. Known history of thoracic meningioma with spinal cord displacement and compression, lost to follow up due to move to [place].  MRI T spine: Suboptimal examination due to mild, moderate, and severe motion artifact - worse on postcontrast sequences. Similar 1.4 cm intradural extramedullary probable meningioma in left posterolateral thoracic spine region at approximately T3 level with associated marked spinal cord compression with severe canal stenosis given motion degraded exam. No cord edema given motion degradation.   MRI L spine: Suboptimal examination due to moderate-to-severe motion degraded exam of lumbar spine and 3 plane localizer images, sagittal T1 post contrast and axial T1 postcontrast sequences. No abnormal enhancement in lumbar spine given limitations of motion degradation.   Neurosurgery consult and recommendations appreciated   S/p T2-4 laminectomy for resection of thoracic tumor (EM 1/7).  T3 lesion w/ concern of progressively worsening myelopathy  Drain removed 1/10 by nsx d/c iv ancef  -- can f/u nsx 2 weeks (around  1/24) and 6 weeks (around 2/21)   Continue pain regimen  Dexamethasone wean- accelerated given reflux symptoms   Hold AC/AP meds x at least 2 weeks post-op   Monitor neuro checks   PT/OT recommending rehab, eventually will go to [facility] pending auth

## 2025-01-13 NOTE — ASSESSMENT & PLAN NOTE
Iron panel 12/31/24: iron 40, ferritin 9, iron saturation 40%, TIBC 396  S/p IV Venofer x3, repeat iron panel reviewed and improved.    Monitor CBC and transfuse for hemoglobin < 7

## 2025-01-14 LAB
GLUCOSE SERPL-MCNC: 111 MG/DL (ref 65–140)
GLUCOSE SERPL-MCNC: 124 MG/DL (ref 65–140)
GLUCOSE SERPL-MCNC: 126 MG/DL (ref 65–140)
GLUCOSE SERPL-MCNC: 168 MG/DL (ref 65–140)

## 2025-01-14 PROCEDURE — 82948 REAGENT STRIP/BLOOD GLUCOSE: CPT

## 2025-01-14 PROCEDURE — 99232 SBSQ HOSP IP/OBS MODERATE 35: CPT | Performed by: PHYSICIAN ASSISTANT

## 2025-01-14 RX ORDER — HYDROMORPHONE HYDROCHLORIDE 2 MG/1
4 TABLET ORAL EVERY 4 HOURS PRN
Refills: 0 | Status: DISCONTINUED | OUTPATIENT
Start: 2025-01-14 | End: 2025-01-15 | Stop reason: HOSPADM

## 2025-01-14 RX ORDER — DOCUSATE SODIUM 100 MG/1
100 CAPSULE, LIQUID FILLED ORAL 2 TIMES DAILY
Status: DISCONTINUED | OUTPATIENT
Start: 2025-01-14 | End: 2025-01-15 | Stop reason: HOSPADM

## 2025-01-14 RX ORDER — SENNOSIDES 8.6 MG
1 TABLET ORAL 2 TIMES DAILY
Status: DISCONTINUED | OUTPATIENT
Start: 2025-01-14 | End: 2025-01-15 | Stop reason: HOSPADM

## 2025-01-14 RX ORDER — ACETAMINOPHEN 160 MG/5ML
975 SUSPENSION ORAL EVERY 8 HOURS
Status: DISCONTINUED | OUTPATIENT
Start: 2025-01-14 | End: 2025-01-15 | Stop reason: HOSPADM

## 2025-01-14 RX ORDER — HYDROMORPHONE HYDROCHLORIDE 2 MG/1
2 TABLET ORAL EVERY 4 HOURS PRN
Refills: 0 | Status: DISCONTINUED | OUTPATIENT
Start: 2025-01-14 | End: 2025-01-15 | Stop reason: HOSPADM

## 2025-01-14 RX ORDER — POLYETHYLENE GLYCOL 3350 17 G/17G
17 POWDER, FOR SOLUTION ORAL DAILY
Status: DISCONTINUED | OUTPATIENT
Start: 2025-01-14 | End: 2025-01-14

## 2025-01-14 RX ORDER — KETOROLAC TROMETHAMINE 30 MG/ML
30 INJECTION, SOLUTION INTRAMUSCULAR; INTRAVENOUS EVERY 6 HOURS SCHEDULED
Status: DISCONTINUED | OUTPATIENT
Start: 2025-01-14 | End: 2025-01-15

## 2025-01-14 RX ADMIN — ACETAMINOPHEN 975 MG: 650 SUSPENSION ORAL at 06:03

## 2025-01-14 RX ADMIN — DIAZEPAM 2 MG: 2 TABLET ORAL at 16:46

## 2025-01-14 RX ADMIN — OMEPRAZOLE 40 MG: 40 CAPSULE, DELAYED RELEASE ORAL at 18:27

## 2025-01-14 RX ADMIN — HYDROMORPHONE HYDROCHLORIDE 4 MG: 2 TABLET ORAL at 14:20

## 2025-01-14 RX ADMIN — METHOCARBAMOL 1000 MG: 500 TABLET ORAL at 21:11

## 2025-01-14 RX ADMIN — DULOXETINE HYDROCHLORIDE 60 MG: 60 CAPSULE, DELAYED RELEASE ORAL at 09:57

## 2025-01-14 RX ADMIN — HEPARIN SODIUM 5000 UNITS: 5000 INJECTION INTRAVENOUS; SUBCUTANEOUS at 21:11

## 2025-01-14 RX ADMIN — SENNOSIDES 8.6 MG: 8.6 TABLET, FILM COATED ORAL at 09:57

## 2025-01-14 RX ADMIN — MELATONIN TAB 3 MG 3 MG: 3 TAB at 21:11

## 2025-01-14 RX ADMIN — DIAZEPAM 2 MG: 2 TABLET ORAL at 07:53

## 2025-01-14 RX ADMIN — KETOROLAC TROMETHAMINE 30 MG: 30 INJECTION, SOLUTION INTRAMUSCULAR; INTRAVENOUS at 18:27

## 2025-01-14 RX ADMIN — ACETAMINOPHEN 975 MG: 650 SUSPENSION ORAL at 14:19

## 2025-01-14 RX ADMIN — METHOCARBAMOL 1000 MG: 500 TABLET ORAL at 05:31

## 2025-01-14 RX ADMIN — OMEPRAZOLE 40 MG: 40 CAPSULE, DELAYED RELEASE ORAL at 05:34

## 2025-01-14 RX ADMIN — POTASSIUM CHLORIDE 40 MEQ: 1500 TABLET, EXTENDED RELEASE ORAL at 07:54

## 2025-01-14 RX ADMIN — PRAMIPEXOLE DIHYDROCHLORIDE 0.5 MG: 0.5 TABLET ORAL at 21:10

## 2025-01-14 RX ADMIN — CALCIUM CARBONATE (ANTACID) CHEW TAB 500 MG 1000 MG: 500 CHEW TAB at 09:58

## 2025-01-14 RX ADMIN — FUROSEMIDE 20 MG: 20 TABLET ORAL at 09:58

## 2025-01-14 RX ADMIN — INSULIN LISPRO 1 UNITS: 100 INJECTION, SOLUTION INTRAVENOUS; SUBCUTANEOUS at 18:26

## 2025-01-14 RX ADMIN — METHOCARBAMOL 1000 MG: 500 TABLET ORAL at 14:19

## 2025-01-14 RX ADMIN — OXYCODONE HYDROCHLORIDE 10 MG: 10 TABLET ORAL at 05:34

## 2025-01-14 RX ADMIN — ACETAMINOPHEN 975 MG: 650 SUSPENSION ORAL at 21:10

## 2025-01-14 RX ADMIN — CALCIUM CARBONATE (ANTACID) CHEW TAB 500 MG 1000 MG: 500 CHEW TAB at 01:15

## 2025-01-14 RX ADMIN — HEPARIN SODIUM 5000 UNITS: 5000 INJECTION INTRAVENOUS; SUBCUTANEOUS at 14:19

## 2025-01-14 RX ADMIN — DOCUSATE SODIUM 100 MG: 100 CAPSULE, LIQUID FILLED ORAL at 18:27

## 2025-01-14 RX ADMIN — POLYETHYLENE GLYCOL 3350 17 G: 17 POWDER, FOR SOLUTION ORAL at 12:17

## 2025-01-14 RX ADMIN — HYDROMORPHONE HYDROCHLORIDE 4 MG: 2 TABLET ORAL at 21:10

## 2025-01-14 RX ADMIN — KETOROLAC TROMETHAMINE 30 MG: 30 INJECTION, SOLUTION INTRAMUSCULAR; INTRAVENOUS at 12:17

## 2025-01-14 RX ADMIN — HEPARIN SODIUM 5000 UNITS: 5000 INJECTION INTRAVENOUS; SUBCUTANEOUS at 05:25

## 2025-01-14 RX ADMIN — DEXAMETHASONE 2 MG: 2 TABLET ORAL at 09:57

## 2025-01-14 RX ADMIN — SENNOSIDES 8.6 MG: 8.6 TABLET, FILM COATED ORAL at 18:27

## 2025-01-14 RX ADMIN — OXYCODONE HYDROCHLORIDE 10 MG: 10 TABLET ORAL at 09:35

## 2025-01-14 NOTE — ASSESSMENT & PLAN NOTE
Presented to [facility] with worsening of chronic LLE weakness, numbness/tingling of LLE>RLE, and muscle spasms. Known history of thoracic meningioma with spinal cord displacement and compression, lost to follow up due to move to [place].  MRI T spine: Suboptimal examination due to mild, moderate, and severe motion artifact - worse on postcontrast sequences. Similar 1.4 cm intradural extramedullary probable meningioma in left posterolateral thoracic spine region at approximately T3 level with associated marked spinal cord compression with severe canal stenosis given motion degraded exam. No cord edema given motion degradation.   MRI L spine: Suboptimal examination due to moderate-to-severe motion degraded exam of lumbar spine and 3 plane localizer images, sagittal T1 post contrast and axial T1 postcontrast sequences. No abnormal enhancement in lumbar spine given limitations of motion degradation.   Neurosurgery consult and recommendations appreciated   T3 lesion concerning for progressively worsening myelopathy  S/p T2-4 laminectomy for resection of thoracic tumor by Dr. Coulter on 1/7/25  Pathology consistent with meningioma   Drain removed 1/10  Hold AC/AP meds x at least 2 weeks post-op; cleared to resume DVT ppx    Status post accelerated dexamethasone taper, completed 1/14/25  Follow-up 2 weeks postop (around  1/24) and 6 weeks postop (around 2/21)   Current pain regimen: scheduled Tylenol 975 mg q8h, home dose Cymbalta 60 mg daily, scheduled Robaxin 1,000 mg q8h, Valium 2 mg q6h PRN, oxycodone 5-10 mg q4h PRN, IV dilaudid 0.2 mg q4h PRN breakthrough pain   1/14 patient endorsing worsening thoracic back pain -> add Toradol 30 mg q6h x 6 doses and rotate from oxycodone to PO dilaudid   PT/OT recommending rehab, plan for discharge to [facility] once pain better controlled

## 2025-01-14 NOTE — PROGRESS NOTES
Progress Note - Hospitalist   Name: Hayley Rios 64 y.o. 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 Known history of thoracic meningioma with spinal cord displacement and compression, lost to follow up due to move to [place].  MRI T spine: Suboptimal examination due to mild, moderate, and severe motion artifact - worse on postcontrast sequences. Similar 1.4 cm intradural extramedullary probable meningioma in left posterolateral thoracic spine region at approximately T3 level with associated marked spinal cord compression with severe canal stenosis given motion degraded exam. No cord edema given motion degradation.   MRI L spine: Suboptimal examination due to moderate-to-severe motion degraded exam of lumbar spine and 3 plane localizer images, sagittal T1 post contrast and axial T1 postcontrast sequences. No abnormal enhancement in lumbar spine given limitations of motion degradation.   Neurosurgery consult and recommendations appreciated   T3 lesion concerning for progressively worsening myelopathy  S/p T2-4 laminectomy for resection of thoracic tumor by Dr. Coulter on 1/7/25  Pathology consistent with meningioma   Drain removed 1/10  Hold AC/AP meds x at least 2 weeks post-op; cleared to resume DVT ppx    Status post accelerated dexamethasone taper, completed 1/14/25  Follow-up 2 weeks postop (around  1/24) and 6 weeks postop (around 2/21)   Current pain regimen: scheduled Tylenol 975 mg q8h, home dose Cymbalta 60 mg daily, scheduled Robaxin 1,000 mg q8h, Valium 2 mg q6h PRN, oxycodone 5-10 mg q4h PRN, IV dilaudid 0.2 mg q4h PRN breakthrough pain   1/14 patient endorsing worsening thoracic back pain -> add Toradol 30 mg q6h x 6 doses and rotate from  oxycodone to PO dilaudid   PT/OT recommending rehab, plan for discharge to [facility] once pain better controlled   Restless leg syndrome  Vitamin B12 borderline low, s/p cyanocobalamin injection  Continue home dose Mirapex 0.5 mg qhs  Low iron stores and low percent saturation on iron panel, was started on venofer at [facility] x 3 days  Possibility this is related to nerve root impingement  Follow-up with her outpatient providers in [place] for further management  MARV (iron deficiency anemia)  Iron panel 12/31/24: iron 40, ferritin 9, iron saturation 40%, TIBC 396  S/p IV Venofer x3, repeat iron panel reviewed and improved.    Monitor CBC and transfuse for hemoglobin < 7  Prediabetes  Evidenced by A1c of 5.8%  Sugars stable on BMP   Defer accuchecks/SSI  Lymphedema  Continue home dose Lasix  Ambulatory dysfunction  Due to primary problem, see plan of care outlined above     VTE Pharmacologic Prophylaxis: VTE Score: 5 High Risk (Score >/= 5) - Pharmacological DVT Prophylaxis Ordered: heparin. Sequential Compression Devices Ordered.    Mobility:   Basic Mobility Inpatient Raw Score: 20  JH-HLM Goal: 6: Walk 10 steps or more  JH-HLM Achieved: 7: Walk 25 feet or more  JH-HLM Goal achieved. Continue to encourage appropriate mobility.    Patient Centered Rounds: I performed bedside rounds with nursing staff today.   Discussions with Specialists or Other Care Team Provider: primary RN, case management    Education and Discussions with Family / Patient: Patient declined call to .     Current Length of Stay: 14 day(s)  Current Patient Status: Inpatient   Certification Statement: The patient will continue to require additional inpatient hospital stay due to pending pain control  Discharge Plan: Anticipate discharge tomorrow to rehab facility.    Code Status: Level 1 - Full Code    Subjective   Patient reports acute worsening thoracic back pain starting this morning, and does not feel she is ready for discharge to  rehab.  She feels as though her lower extremities are weaker today but also attributes this to the worsening pain.  Notes that she started having recurrence of mild paresthesias in bilateral thighs this morning as well.  No bowel/bladder incontinence, no saddle anesthesia.  Discussed plan to try addition of Toradol and rotate to p.o. Dilaudid in place of oxycodone, patient is in agreement.    Patient also notes she feels as though her abdomen is distended, but reports she had a bowel movement yesterday.  Denies abdominal pain, nausea or vomiting.    Objective :  Temp:  [97.7 °F (36.5 °C)-99 °F (37.2 °C)] 97.7 °F (36.5 °C)  HR:  [60-94] 60  BP: (114-154)/(54-81) 154/81  Resp:  [14-16] 14  SpO2:  [96 %-98 %] 96 %  O2 Device: None (Room air)    Body mass index is 36.48 kg/m².     Input and Output Summary (last 24 hours):     Intake/Output Summary (Last 24 hours) at 1/14/2025 1140  Last data filed at 1/14/2025 0901  Gross per 24 hour   Intake 1310 ml   Output --   Net 1310 ml       Physical Exam  Vitals and nursing note reviewed.   Constitutional:       Appearance: She is obese.   Cardiovascular:      Rate and Rhythm: Normal rate and regular rhythm.   Pulmonary:      Effort: Pulmonary effort is normal. No respiratory distress.   Abdominal:      General: There is distension (mild).      Tenderness: There is no abdominal tenderness.   Musculoskeletal:      Right lower leg: Edema present.      Left lower leg: Edema present.      Comments: Chronic lower extremity lymphedema   Skin:     Comments: Thoracic spinal surgical incision dressing CDI   Neurological:      General: No focal deficit present.      Mental Status: She is alert and oriented to person, place, and time. Mental status is at baseline.      Motor: Weakness (4/5 LLE weakness) present.         Lines/Drains:              Lab Results: I have reviewed the following results:   Results from last 7 days   Lab Units 01/12/25  0644   WBC Thousand/uL 11.52*   HEMOGLOBIN  g/dL 11.7   HEMATOCRIT % 38.0   PLATELETS Thousands/uL 315   SEGS PCT % 76*   LYMPHO PCT % 14   MONO PCT % 5   EOS PCT % 3     Results from last 7 days   Lab Units 01/12/25  0644 01/11/25  0527 01/09/25  0619   SODIUM mmol/L 135   < > 140   POTASSIUM mmol/L 4.2   < > 3.6   CHLORIDE mmol/L 97   < > 102   CO2 mmol/L 28   < > 28   BUN mg/dL 18   < > 15   CREATININE mg/dL 0.54*   < > 0.54*   ANION GAP mmol/L 10   < > 10   CALCIUM mg/dL 9.2   < > 9.0   ALBUMIN g/dL  --   --  3.9   TOTAL BILIRUBIN mg/dL  --   --  0.29   ALK PHOS U/L  --   --  71   ALT U/L  --   --  15   AST U/L  --   --  17   GLUCOSE RANDOM mg/dL 126   < > 138    < > = values in this interval not displayed.     Results from last 7 days   Lab Units 01/08/25  0531   INR  1.01     Results from last 7 days   Lab Units 01/14/25  0729 01/13/25  1709 01/13/25  1217 01/13/25  0719 01/12/25  2033 01/12/25  1716 01/12/25  1152 01/12/25  0756 01/11/25  2034 01/11/25  1644 01/11/25  1120 01/11/25  0800   POC GLUCOSE mg/dl 111 130 105 120 132 169* 114 146* 154* 126 143* 132               Recent Cultures (last 7 days):         Imaging Results Review: No pertinent imaging studies reviewed.  Other Study Results Review: No additional pertinent studies reviewed.    Last 24 Hours Medication List:     Current Facility-Administered Medications:     acetaminophen (TYLENOL) oral suspension 975 mg, Q8H    aluminum-magnesium hydroxide-simethicone (MAALOX) oral suspension 30 mL, Q4H PRN    bisacodyl (DULCOLAX) rectal suppository 10 mg, Daily PRN    calcium carbonate (TUMS) chewable tablet 1,000 mg, TID PRN    cyanocobalamin injection 1,000 mcg, Q30 Days    diazepam (VALIUM) tablet 2 mg, Q6H PRN    diphenhydrAMINE (BENADRYL) tablet 25 mg, Q6H PRN    DULoxetine (CYMBALTA) delayed release capsule 60 mg, Daily    furosemide (LASIX) tablet 20 mg, Daily    heparin (porcine) subcutaneous injection 5,000 Units, Q8H EVGENY    HYDROmorphone (DILAUDID) injection 0.2 mg, Q4H PRN    insulin  lispro (HumALOG/ADMELOG) 100 units/mL subcutaneous injection 1-6 Units, TID AC **AND** Fingerstick Glucose (POCT), TID AC    labetalol (NORMODYNE) injection 10 mg, Q4H PRN    melatonin tablet 3 mg, HS    methocarbamol (ROBAXIN) tablet 1,000 mg, Q8H EVGENY    omeprazole (PriLOSEC) capsule 40 mg, BID    ondansetron (ZOFRAN) injection 4 mg, Q6H PRN    oxyCODONE (ROXICODONE) IR tablet 5 mg, Q4H PRN **OR** oxyCODONE (ROXICODONE) immediate release tablet 10 mg, Q4H PRN    polyethylene glycol (MIRALAX) packet 17 g, Daily PRN    polyethylene glycol (MIRALAX) packet 17 g, Daily    potassium chloride (Klor-Con M20) CR tablet 40 mEq, Daily With Breakfast    pramipexole (MIRAPEX) tablet 0.5 mg, HS    senna (SENOKOT) tablet 8.6 mg, Daily    Administrative Statements   Today, Patient Was Seen By: Ramila Duckworth PA-C  I have spent a total time of 45 minutes in caring for this patient on the day of the visit/encounter including Instructions for management, Patient and family education, Impressions, Counseling / Coordination of care, Documenting in the medical record, Reviewing / ordering tests, medicine, procedures  , Obtaining or reviewing history  , and Communicating with other healthcare professionals .    **Please Note: This note may have been constructed using a voice recognition system.**

## 2025-01-14 NOTE — PLAN OF CARE
Problem: PAIN - ADULT  Goal: Verbalizes/displays adequate comfort level or baseline comfort level  Description: Interventions:  - Encourage patient to monitor pain and request assistance  - Assess pain using appropriate pain scale  - Administer analgesics based on type and severity of pain and evaluate response  - Implement non-pharmacological measures as appropriate and evaluate response  - Consider cultural and social influences on pain and pain management  - Notify physician/advanced practitioner if interventions unsuccessful or patient reports new pain  Outcome: Progressing     Problem: INFECTION - ADULT  Goal: Absence or prevention of progression during hospitalization  Description: INTERVENTIONS:  - Assess and monitor for signs and symptoms of infection  - Monitor lab/diagnostic results  - Monitor all insertion sites, i.e. indwelling lines, tubes, and drains  - Monitor endotracheal if appropriate and nasal secretions for changes in amount and color  - Ellinger appropriate cooling/warming therapies per order  - Administer medications as ordered  - Instruct and encourage patient and family to use good hand hygiene technique  - Identify and instruct in appropriate isolation precautions for identified infection/condition  Outcome: Progressing

## 2025-01-14 NOTE — CASE MANAGEMENT
Case Management Progress Note    Patient name Hayley Rios  Location [facility] 626/[facility] 626-01 MRN [id number]  : 1960 Date 2025       LOS (days): 14  Geometric Mean LOS (GMLOS) (days): 4.1  Days to GMLOS:-9.5        OBJECTIVE:        Current admission status: Inpatient  Preferred Pharmacy:   [id number] [address] [address]  Phone: [phone] Fax: [phone]    [id number] [address] [address]  Phone: [phone] Fax: [phone]    Primary Care Provider: Mau Garcia DO    Primary Insurance: [facility] REP  Secondary Insurance: [facility]    PROGRESS NOTE:    Auth received for [facility], next review . Pt requires additional hospital stay due to complaints of increased pain, per slim provider. Pt to d/c to  [facility] when medically cleared

## 2025-01-14 NOTE — TELEPHONE ENCOUNTER
1/15/25 - PT STILL IN HOSPITAL  **WORKING ON SCHEDULING APTS**  MUNA [facility]  Hi,  I just wanted to follow-up in regard to this pt's POVs.  She underwent T2-4 laminectomy for resection of thoracic tumor with Dr. Coulter on 1/7/2024.  She will need both a 2-week POV for incision check and pathology review as well as a 6-week POV with Dr. Marylin alexis.  She is leaving for rehab today.  Can we please ensure that these appointments are scheduled today before she goes?  Thank you so much,  MUNA [facility]  Hi! Just following up on this patient's appointments as they are still not scheduled. She is leaving for rehab today and I don't want her to get lost to follow up. Can we at least schedule her incision check with a nurse on a [facility] clinic day?    Surgery 1/7 01/13/2025- PT STILL IN HOSPITAL

## 2025-01-14 NOTE — PROGRESS NOTES
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 SS#   Marital Status: Single  Ethnicity:    Employment Status: on disability  Extended Emergency Contact Information  Primary Emergency Contact: Corry Rios  Mobile Phone: [phone]  Relation: Sister  Secondary Emergency Contact: Yessi Griffiths   [place]  Mobile Phone: [phone]  Relation: Sister  Advance Directive: Level 1 Full code ( No advance directive on file )    INSURANCE/COVERAGE:     Primary Payor:  NOAH  Rep ID# [id number]  Secondary Payer: Self Pay    Payer Contact: see below  Payer Contact: n/a    Contact Phone: see below  Contact Phone: n/a      Authorization #: [id number]   Start of Care: 01/15/25  Next Review Date: 01/20/2025  Submit next review on 1/20/25 to: H&CC Portal vs F#: [phone]    Benefits: NOAH  Rep - Ded $0, OOPM $5900 remaining, copay $325 days 1-6, coins 0%    FYI: [place]  rep termed on 12/31/24 ; [employer] became primary on 1/1/25    MEDICARE #: [id number]   Medicare Days: n/a    Medical Record #: [id number]    REFERRAL SOURCE:   Referring provider: Nelida Guerra MD  Referring facility: [facility]   Room: [place] [address]/[place] [address]  PCP: Mau Garcia DO PCP phone number: [phone]    MEDICAL INFORMATION  HPI: Hayley is a 64 y.o. female who has a past medical history of but not  limited to obesity, lymphedema, multiple spinal surgeries, lumbar spinal stenosis with radiculopathy, known T3 spinal meningioma lost to follow-up who initially was admitted to the [facility] 12/30/2024-12/31/2024 with worsening left lower extremity weakness and spasming.  She underwent multiple imaging studies as below throughout the hospitalization and of particular note noted with MRI lumbar spine with contrast limited due to motion artifact however without abnormal enhancement in the lumbar spine given the limitations of motion degradation noted, additionally MRI thoracic spine with/without contrast was performed redemonstrating the known T3 level meningioma with associated marked spinal cord compression with severe canal stenosis. She was transferred to [facility] on 12/31/24 for further eval/assessment/consultation/sx intervention. CT of spine 1/2/25 - Partially calcified extra medullary T3 lesion is slightly larger than on the prior MRI, measuring 1.8 x 1.4 x 1.2 cm ;  MRI of spine 1/6/25 - Redemonstration of meningioma in the posterior left aspect of the canal at T3. Compared with 2022, mass is mildly increased in size and marked cord compression is also mildly increased. Focal cord edema at T3 not excluded but there is no edema in the immediately above or below the level of compression. On 1/7/25 patient underwent sx intervention - Bilateral - T2-4 LAMINECTOMY THORACIC FOR TUMOR. Initial BRAYAN drain - removed 1/10 by nsx d/c iv ancef. Completed decadron wean on 1/14/25. Pain control was managed by primary team with most recent changes 1/14/25 and 1/15/25 currently on PO PRN analgesics. PT/OT therapies were consulted and continue to follow patient at this time and are recommending inpatient acute rehab when medically stable. All involved medical disciplines feel/agree patient is medically ready for discharge at this time. Inpatient acute rehabilitation physician was consulted. Upon review of  "patient's case and correspondence with PT/OT therapy services, [facility] physician feels Hayley will benefit and is a good candidate / appropriate for inpatient acute rehab at this time. She has demonstrated the willingness / desire and tolerance to participate in the required 3 hours or more of therapies per day.        Past Medical History:   Past Surgical History:   Allergies:     Past Medical History:   Diagnosis Date    Anemia     Anesthesia     \"sometimes low BP upon waking up\" pt states she stopped breathing 1 time during surgery    Arthritis     Back pain     Dolan's esophagus     Chronic pain disorder     back    Chronic venous insufficiency     Colon polyp     Cough variant asthma     d/t mold-all sx diminished when removed from source    COVID-19 03/2020    Depression     Environmental allergies     dust    GERD (gastroesophageal reflux disease)     Hiatal hernia     History of anemia     History of fusion of spine for scoliosis     \"as a teenager\"    History of transfusion     1978 - no adverse reaction    Left lumbar radiculitis     Last Assessed: 25Jul2017    Lumbar postlaminectomy syndrome     Migraines     Morbid obesity (HCC)     Motion sickness     Neck pain     Osteoarthritis     of left hip    Right knee pain     Seasonal allergies     Shortness of breath     with stairs    Umbilical hernia     Uses brace     right knee    Wears glasses     Past Surgical History:   Procedure Laterality Date    ARTHRODESIS      lumbar    ARTHRODESIS      Spinal Arthrodesis for Deformity; Last Assessed: 16Mar2017    BACK SURGERY      lumbar fusion,with nydia/screw and cage implant    BACK SURGERY      surgery for scoliosis    BREAST CYST EXCISION Right     benign    CHOLECYSTECTOMY LAPAROSCOPIC N/A 12/20/2023    Procedure: FENESTRATED SUBTOTAL CHOLECYSTECTOMY LAPAROSCOPIC, EXTENSIVE LYSIS OF ADHESIONS.;  Surgeon: Chelle Butler MD;  Location: [facility] OR;  Service: General    COLONOSCOPY      COLONOSCOPY N/A 03/14/2019 "    Procedure: COLONOSCOPY;  Surgeon: Van Dyson MD;  Location:  GI LAB;  Service: Gastroenterology    ESOPHAGOGASTRODUODENOSCOPY N/A 03/14/2019    Procedure: ESOPHAGOGASTRODUODENOSCOPY (EGD) W RFA(BARRX);  Surgeon: Van Dyson MD;  Location:  GI LAB;  Service: Gastroenterology    HERNIA REPAIR      umbilical hernia repair x2    LUMBAR LAMINECTOMY FOR EXCISION OF NEOPLASM Bilateral 1/7/2025    Procedure: T2-4 LAMINECTOMY THORACIC FOR TUMOR;  Surgeon: Ascencion Coulter MD;  Location:  MAIN OR;  Service: Neurosurgery    PLANTAR FASCIA SURGERY Left     NM ARTHRS KNE SURG W/MENISCECTOMY MED/LAT W/SHVG Right 04/04/2018    Procedure: ARTHROSCOPY KNEE PARTIAL MEDIAL MENISECTOMY , CHONDROPLASTY;  Surgeon: Rocio Roe DO;  Location: [facility];  Service: Orthopedics    NM BREAST REDUCTION Bilateral 03/12/2021    Procedure: BREAST REDUCTION;  Surgeon: Cortez Sandoval MD;  Location:  MAIN OR;  Service: Plastics    NM COLONOSCOPY FLX DX W/COLLJ SPEC WHEN PFRMD N/A 02/20/2018    Procedure: COLONOSCOPY with polypectomy;  Surgeon: Apryl Garcia MD;  Location: [facility] LAB;  Service: General    NM ESOPHAGOGASTRODUODENOSCOPY TRANSORAL DIAGNOSTIC N/A 05/24/2017    Procedure: ESOPHAGOGASTRODUODENOSCOPY (EGD);  Surgeon: Marcello Street MD;  Location: [facility] GI LAB;  Service: Gastroenterology    NM ESOPHAGOGASTRODUODENOSCOPY TRANSORAL DIAGNOSTIC N/A 08/31/2017    Procedure: ESOPHAGOGASTRODUODENOSCOPY (EGD) W RFA;  Surgeon: Macho Aguayo MD;  Location: [facility] LAB;  Service: Gastroenterology    NM LAPS RPR RECURRENT INCISIONAL HERNIA REDUCIBLE N/A 01/21/2021    Procedure: REPAIR HERNIA INCISIONAL LAPAROSCOPIC W/ ROBOTICS, with mesh;  Surgeon: Errol Bermudez MD;  Location: [facility];  Service: General    NM RPR AA HERNIA 1ST 3-10 CM REDUCIBLE N/A 2/19/2024    Procedure: VENTRAL HERNIA REPAIR 3CM WITH MESH;  Surgeon: Chelle Butler MD;  Location:  MAIN OR;  Service: General    WISDOM TOOTH EXTRACTION       Allergies   Allergen  "Reactions    Dust Mite Extract Shortness Of Breath    Latex Blisters, Itching, Other (See Comments) and Rash     contact    Other Other (See Comments)     Seasonal AND cock roaches    Sulfa Antibiotics GI Intolerance, Other (See Comments) and Vomiting     Topical-blistering    \"intense vomiting\"         Medical/functional conditions requiring inpatient rehabilitation: impaired mobility / self care ; impaired balance / ambulation     Risk for medical/clinical complications: risk for pain  ; risk for falls ; risk for skin breakdown ; risk for infection     Comorbidities/Surgeries in the last 100 days:   Compression of thoracic spinal cord with myelopathy   S/p T2-4 laminectomy for resection of thoracic tumor   Restless leg syndrome   MARV (iron deficiency anemia)   Lymphedema   CURRENT VITAL SIGNS:   Temp:  [97.9 °F (36.6 °C)-98.2 °F (36.8 °C)] 98 °F (36.7 °C)  HR:  [65-86] 86  BP: (100-158)/(52-80) 158/75  Resp:  [17-20] 17  SpO2:  [95 %-98 %] 97 %  O2 Device: None (Room air)   Intake/Output Summary (Last 24 hours) at 1/15/2025 1135  Last data filed at 1/15/2025 0852  Gross per 24 hour   Intake 1260 ml   Output --   Net 1260 ml        LABORATORY RESULTS:      Lab Results   Component Value Date    HGB 11.7 01/12/2025    HCT 38.0 01/12/2025    WBC 11.52 (H) 01/12/2025     Lab Results   Component Value Date    BUN 18 01/12/2025    K 4.2 01/12/2025    CL 97 01/12/2025    CREATININE 0.54 (L) 01/12/2025     Lab Results   Component Value Date    PROTIME 13.6 01/08/2025    INR 1.01 01/08/2025        DIAGNOSTIC STUDIES:  CT spine thoracic and lumbar wo contrast  Result Date: 1/2/2025  Impression: 1. Thoracolumbar fusion. 2. Chronic disc and facet degenerative change at L4-5 resulting in mild central canal stenosis and severe left neural foraminal narrowing. Workstation performed: [id number]     CT spine cervical wo contrast  Result Date: 1/2/2025  Impression: Partially calcified extra medullary T3 lesion is slightly larger than " on the prior MRI, measuring 1.8 x 1.4 x 1.2 cm Workstation performed: [id number]     MRI cervical spine wo contrast  Result Date: 1/2/2025  Impression: Partially nondiagnostic exam due to severe motion artifact. T3 lesion is likely mildly increased in size based on evaluation of sagittal images. Recommend repeat exam when clinically feasible. Workstation performed: [id number]       PRECAUTIONS/SPECIAL NEEDS:  Tobacco:   Social History     Tobacco Use   Smoking Status Never   Smokeless Tobacco Never   , Alcohol:    Social History     Substance and Sexual Activity   Alcohol Use Not Currently    Comment: rarely-every 3 mos   , Weight Bearing Precautions:  weight bearing as tolerated, Anticoagulation:  heparin, Edema Management, Safety Concerns, Pain Management, Dietary Restrictions: Regular, Language Preference: English, and Fall precautions    MEDICATIONS:     Current Facility-Administered Medications:     acetaminophen (TYLENOL) oral suspension 975 mg, 975 mg, Oral, Q8H, Rocio Rodriguez PA-C, 975 mg at 01/15/25 0507    aluminum-magnesium hydroxide-simethicone (MAALOX) oral suspension 30 mL, 30 mL, Oral, Q4H PRN, Lara Callaway PA-C, 30 mL at 01/12/25 2154    bisacodyl (DULCOLAX) rectal suppository 10 mg, 10 mg, Rectal, Daily PRN, Martin Remy DO    calcium carbonate (TUMS) chewable tablet 1,000 mg, 1,000 mg, Oral, TID PRN, Martin Remy DO, 1,000 mg at 01/14/25 0958    cyanocobalamin injection 1,000 mcg, 1,000 mcg, Intramuscular, Q30 Days, Martin Remy DO, 1,000 mcg at 01/03/25 1708    diazepam (VALIUM) tablet 2 mg, 2 mg, Oral, Q6H PRN, Lara Callaway PA-C, 2 mg at 01/15/25 0923    diphenhydrAMINE (BENADRYL) tablet 25 mg, 25 mg, Oral, Q6H PRN, ROGE Villareal, 25 mg at 01/11/25 0615    docusate sodium (COLACE) capsule 100 mg, 100 mg, Oral, BID, Ramila Duckworth PA-C, 100 mg at 01/15/25 0907    DULoxetine (CYMBALTA) delayed release capsule 60 mg, 60 mg, Oral, Daily, Martin Remy DO, 60 mg at 01/15/25 0906     furosemide (LASIX) tablet 20 mg, 20 mg, Oral, Daily, Martin Remy DO, 20 mg at 01/15/25 0909    heparin (porcine) subcutaneous injection 5,000 Units, 5,000 Units, Subcutaneous, Q8H [facility], Larissa Villarreal PA-C, 5,000 Units at 01/15/25 0507    HYDROmorphone (DILAUDID) tablet 2 mg, 2 mg, Oral, Q4H PRN, Ramila Duckworth PA-C    HYDROmorphone (DILAUDID) tablet 4 mg, 4 mg, Oral, Q4H PRN, Ramila Duckworth PA-C, 4 mg at 01/15/25 1019    insulin lispro (HumALOG/ADMELOG) 100 units/mL subcutaneous injection 1-6 Units, 1-6 Units, Subcutaneous, TID AC, 1 Units at 01/14/25 1826 **AND** Fingerstick Glucose (POCT), , , TID AC, Martin Remy DO    ketorolac (TORADOL) tablet 10 mg, 10 mg, Oral, Q6H PRN, Ramila Duckworth PA-C    labetalol (NORMODYNE) injection 10 mg, 10 mg, Intravenous, Q4H PRN, Martin Remy DO    melatonin tablet 3 mg, 3 mg, Oral, HS, Martin Remy DO, 3 mg at 01/14/25 2111    methocarbamol (ROBAXIN) tablet 1,000 mg, 1,000 mg, Oral, Q8H EVGENY, Martin Remy DO, 1,000 mg at 01/15/25 0507    omeprazole (PriLOSEC) capsule 40 mg, 40 mg, Oral, BID, Martin Remy DO, 40 mg at 01/15/25 0506    ondansetron (ZOFRAN) injection 4 mg, 4 mg, Intravenous, Q6H PRN, Martin Remy DO    polyethylene glycol (MIRALAX) packet 17 g, 17 g, Oral, Daily, Martin Remy DO, 17 g at 01/14/25 1217    potassium chloride (Klor-Con M20) CR tablet 40 mEq, 40 mEq, Oral, Daily With Breakfast, Martin Remy DO, 40 mEq at 01/15/25 0907    pramipexole (MIRAPEX) tablet 0.5 mg, 0.5 mg, Oral, HS, Martin Remy, , 0.5 mg at 01/14/25 2110    senna (SENOKOT) tablet 8.6 mg, 1 tablet, Oral, BID, Ramila Duckworth PA-C, 8.6 mg at 01/14/25 1827    SKIN INTEGRITY:   S/p Laminectomy sx site - treatment as ordered    PRIOR LEVEL OF FUNCTION:  She lives in a(n) single family home  Hayley Rios is not  and lives alone.  Self Care: Independent, Indoor Mobility: Independent, Stairs (in/outdoor): Independent, and Cognition: Independent    FALLS IN THE LAST 6 MONTHS: 1  to 4     HOME ENVIRONMENT:  The living area: can live on one level  There are 4 steps to enter the home.    The patient will not have 24 hour supervision/physical assistance available upon discharge.    PREVIOUS DME:  Equipment in home (previous DME): None    FUNCTIONAL STATUS:  Physical Therapy Occupational Therapy Speech Therapy   As per PT:    PT Visit Date 01/12/25   Note Type   Note Type Treatment for insurance authorization   Pain Assessment   Pain Assessment Tool 0-10   Pain Score 9   Pain Location/Orientation Orientation: Upper;Location: Back   Hospital Pain Intervention(s) Repositioned;Ambulation/increased activity   Restrictions/Precautions   Weight Bearing Precautions Per Order No   Other Precautions Pain;Spinal precautions;Fall Risk   General   Chart Reviewed Yes   Response to Previous Treatment Patient reporting fatigue but able to participate.   Family/Caregiver Present No   Cognition   Overall Cognitive Status WFL   Arousal/Participation Responsive;Cooperative   Attention Within functional limits   Orientation Level Oriented X4   Memory Within functional limits   Following Commands Follows all commands and directions without difficulty   Comments Pt pleasant and despite pain agreeable to participate   Bed Mobility   Supine to Sit 5  Supervision   Additional items Assist x 1;Increased time required   Sit to Supine 5  Supervision   Additional items Assist x 1;Increased time required   Transfers   Sit to Stand 5  Supervision   Additional items Assist x 1   Stand to Sit 5  Supervision   Additional items Assist x 1   Additional Comments w/ RW   Ambulation/Elevation   Gait pattern Inconsistent shyla;Short stride;Step through pattern;Decreased L stance   Gait Assistance 5  Supervision   Additional items Assist x 1   Assistive Device Rolling walker   Distance 400'   Stair Management Assistance 5  Supervision   Stair Management Technique Reciprocal;One rail L   Number of Stairs    (2, 6inch, 3, 4 inch)    Ambulation/Elevation Additional Comments Pt reporting pain however able to maintain conversation with PT during entire ambulation. Patient also reported increased pain with stair mobility. Encouraged patient to perform step to pattern however patient declined and preferred recipricol pattern.   Balance   Static Sitting Fair +   Dynamic Sitting Fair   Static Standing Fair   Dynamic Standing Fair   Ambulatory Fair -   Endurance Deficit   Endurance Deficit Yes   Activity Tolerance   Activity Tolerance Patient tolerated treatment well;Patient limited by pain   Nurse Made Aware RN cleared and updated   Assessment   Prognosis Good   Problem List Decreased strength;Decreased range of motion;Decreased endurance;Impaired balance;Decreased mobility;Decreased coordination;Impaired sensation;Obesity;Decreased skin integrity;Orthopedic restrictions;Pain   Assessment Patient seen for PT treatment session this date. Patient found supine in bed. Patient reporting 9/10 pain but agreeable to mobilize. Patient able don her shoes and ambulate 400' while conversing with PT. No loss of balance noted. Patient does have noted decreased left LE stance and unequal step length. Patient reported feeling as if she could walk without an AD but declining at this time to less restrictive device secondary to fear of falling. Patient able to navigate 6 and 4 inch steps during today's session in a reciprocal pattern. Patient did report increased pain however declined PT recommendation to perform a step to pattern. Spoke with RN after today's session to update on progress. RN reporting that patient is a self in the room and has waived the chair arm. She also reports that patient took a shower this morning. Spoke with patient concerning her progress. Patient reports she does have a place to stay in the [facility] until she return to [place]. At this time recommend level III resource intensity.    As per OT:     OT Visit Date 01/12/25   Note  Type   Note Type Treatment for insurance authorization   Pain Assessment   Pain Assessment Tool 0-10   Pain Score 9   Pain Rating: FLACC (Rest) - Face 0   Pain Rating: FLACC (Rest) - Legs 0   Pain Rating: FLACC (Rest) - Activity 0   Pain Rating: FLACC (Rest) - Cry 0   Pain Rating: FLACC (Rest) - Consolability 0   Score: FLACC (Rest) 0   Pain Rating: FLACC (Activity) - Face 1   Pain Rating: FLACC (Activity) - Legs 1   Pain Rating: FLACC (Activity) - Activity 1   Pain Rating: FLACC (Activity) - Cry 1   Pain Rating: FLACC (Activity) - Consolability 0   Score: FLACC (Activity) 4   Restrictions/Precautions   Weight Bearing Precautions Per Order No   Braces or Orthoses    (no bracing post op per nsx)   Other Precautions Fall Risk;Pain;Spinal precautions   Lifestyle   Autonomy I adls and mobility - reports she was most recently using SPC 2* LE weakness (L>R) but borrowed friends RW 2* progressive weakness over past 2 wks   Reciprocal Relationships supportive family and friends   Service to Others not working   Intrinsic Gratification active pta   ADL   Where Assessed Standing at sink   Grooming Assistance 5  Supervision/Setup   Grooming Deficit Setup;Standing with assistive device;Wash/dry hands   UB Bathing Assistance 5  Supervision/Setup   UB Bathing Comments pt reported took a shower yesterday   LB Bathing Assistance 5  Supervision/Setup   LB Bathing Deficit Setup   LB Bathing Comments set up with shower chair   UB Dressing Assistance 5  Supervision/Setup   UB Dressing Deficit Increased time to complete   LB Dressing Assistance 5  Supervision/Setup   LB Dressing Deficit Don/doff R sock;Don/doff L sock;Thread RLE into pants;Thread LLE into pants;Pull up over hips   Toileting Assistance  5  Supervision/Setup   Toileting Deficit Setup;Clothing management up;Clothing management down;Perineal hygiene   Bed Mobility   Supine to Sit 5  Supervision   Additional items Assist x 1   Sit to Supine 5  Supervision   Additional items  "Assist x 1   Transfers   Sit to Stand 5  Supervision   Additional items Assist x 1   Stand to Sit 5  Supervision   Additional items Assist x 1   Toilet transfer 5  Supervision   Additional items Assist x 1   Functional Mobility   Functional Mobility 5  Supervision   Additional items Rolling walker   Subjective   Subjective Pt stated \"  I just don't know how I'll do without the walker\" \"I signed the waver\" \" I can take off my shirt but I have to wait a little for the pain to subside\"   Cognition   Overall Cognitive Status WFL   Arousal/Participation Responsive;Cooperative   Attention Within functional limits   Orientation Level Oriented X4   Memory Within functional limits   Following Commands Follows all commands and directions without difficulty   Activity Tolerance   Activity Tolerance Patient tolerated treatment well   Assessment   Assessment Pt seen for Occupational Therapy session with focus on activity tolerance, bed mob, functional transfers/mob, sitting balance and tolerance and standing tolerance and balance for pt engagement in UB/LB self-care tasks and energy conservation techniques. Pt cleared by ZAIN/ Seema for pt participated in OT session. Pt presented supine/HOB raised pt awake/alert and agreeable to participate in therapy following pt identifiers confirmed. Pt reported her therapy goal to was get stronger. She reported that she sign a wavered in order to be allowed to walk to and from the pt bathroom  without assistance.   Pt progressing well  and was able to tolerate toileting/toilet transfers with SBA this session/  pt Increased time spend to educate pt on energy conservation techniques and pt able to verbalize good understanding.  Pt appropriate for II (Moderate Resources) home vs rehab pending pt continued progress with physical therapy.  Pt set up to bedside chair post session, chair alarm activated and all needs within pt reach    N/a     CARE SCORES:  Self Care:  Eatin: Setup or clean-up " assistance  Oral hygiene: 04: Supervision or touching  assistance  Toilet hygiene: 04: Supervision or touching  assistance  Shower/bathing self: 04: Supervision or touching  assistance  Upper body dressin: Supervision or touching  assistance  Lower body dressin: Supervision or touching  assistance  Putting on/taking off footwear: 04: Supervision or touching  assistance  Transfers:  Roll left and right: 04: Supervision or touching  assistance  Sit to lyin: Supervision or touching  assistance  Lying to sitting on side of bed: 04: Supervision or touching  assistance  Sit to stand: 04: Supervision or touching  assistance  Chair/bed to chair transfer: 04: Supervision or touching  assistance  Toilet transfer: 04: Supervision or touching  assistance  Mobility:  Walk 10 ft: 04: Supervision or touching  assistance  Walk 50 ft with two turns: 04: Supervision or touching  assistance  Walk 150ft: 04: Supervision or touching  assistance    CURRENT GAP IN FUNCTION  Prior to Admission: Functional Status: Patient was independent with mobility/ambulation, transfers, ADL's, IADL's.    Expected functional outcomes: It is expected that with skilled acute rehabilitation services the patient will progress to Independent for self care and Independent for mobility     Estimated length of stay: 10 to 14 days    Anticipated Post-Discharge Disposition/Treatment  Disposition: Return to previous home/apartment.  Outpatient Services: Physical Therapy (PT) and Occupational Therapy (OT)    BARRIERS TO DISCHARGE  Weakness, Pain, Balance Difficulty, Fatigue, Caregiver Accessibility, Equipment Needs, and Lives Alone    INTERVENTIONS FOR DISCHARGE  Adaptive equipment, Patient/Family/Caregiver Education, Arrange DME needs, Medication Changes per MD, Therapy exercises, and Energy conservation education     REQUIRED THERAPY:  Patient will require PT and OT 90 minutes each per day, five days per week to achieve rehab goals.     REQUIRED  FUNCTIONAL AND MEDICAL MANAGEMENT FOR INPATIENT REHABILITATION:  Pain Management: Overall pain is well controlled, Deep Vein Thrombosis (DVT) Prophylaxis:  heparin, SCD's while in bed, and   Nursing education and bowel/bladder management, internal medicine to manage/monitor medical conditions, PM&R to maximize function and provide medical oversight, PT/OT intervention, patient/family education/training, and any consults as needed     RECOMMENDED LEVEL OF CARE:   Hayley is a 64 y.o. female who has a past medical history of but not limited to obesity, lymphedema, multiple spinal surgeries, lumbar spinal stenosis with radiculopathy, known T3 spinal meningioma lost to follow-up who initially was admitted to the [facility] 12/30/2024-12/31/2024 with worsening left lower extremity weakness and spasming.  She underwent multiple imaging studies as below throughout the hospitalization and of particular note noted with MRI lumbar spine with contrast limited due to motion artifact however without abnormal enhancement in the lumbar spine given the limitations of motion degradation noted, additionally MRI thoracic spine with/without contrast was performed redemonstrating the known T3 level meningioma with associated marked spinal cord compression with severe canal stenosis. She was transferred to [facility] on 12/31/24 for further eval/assessment/consultation/sx intervention. CT of spine 1/2/25 - Partially calcified extra medullary T3 lesion is slightly larger than on the prior MRI, measuring 1.8 x 1.4 x 1.2 cm ;  MRI of spine 1/6/25 - Redemonstration of meningioma in the posterior left aspect of the canal at T3. Compared with 2022, mass is mildly increased in size and marked cord compression is also mildly increased. Focal cord edema at T3 not excluded but there is no edema in the immediately above or below the level of compression. On 1/7/25 patient underwent sx intervention - Bilateral - T2-4 LAMINECTOMY  THORACIC FOR TUMOR. Initial BRAYAN drain - removed 1/10 by dayami d/c iv ancef. Completed decadron wean on 1/14/25. Pain control was managed by primary team with most recent changes 1/14/25 and 1/15/25 currently on PO PRN analgesics. PT/OT therapies were consulted and continue to follow patient at this time and are recommending inpatient acute rehab when medically stable. All involved medical disciplines feel/agree patient is medically ready for discharge at this time. Inpatient acute rehabilitation physician was consulted. Upon review of patient's case and correspondence with PT/OT therapy services, [facility] physician feels Hayley will benefit and is a good candidate / appropriate for inpatient acute rehab at this time. She has demonstrated the willingness / desire and tolerance to participate in the required 3 hours or more of therapies per day. Prior to admission / hospital stay Hayley was independent with all ADLs /functional mobility / iADLs. Currently with PT therapies /  OT therapies : supervision with all ADLs .Nursing is being recommended for medication distribution / management , bowel / bladder management and educational purposes , internal medicine to continue to monitor and manage medical conditions ,PM&R to maximize  function and provide medical oversight, and inpatient rehab to maximize self care ,mobility ,strength , and endurance upon discharge to home.

## 2025-01-14 NOTE — PLAN OF CARE
Problem: PAIN - ADULT  Goal: Verbalizes/displays adequate comfort level or baseline comfort level  Description: Interventions:  - Encourage patient to monitor pain and request assistance  - Assess pain using appropriate pain scale  - Administer analgesics based on type and severity of pain and evaluate response  - Implement non-pharmacological measures as appropriate and evaluate response  - Consider cultural and social influences on pain and pain management  - Notify physician/advanced practitioner if interventions unsuccessful or patient reports new pain  Outcome: Progressing     Problem: INFECTION - ADULT  Goal: Absence or prevention of progression during hospitalization  Description: INTERVENTIONS:  - Assess and monitor for signs and symptoms of infection  - Monitor lab/diagnostic results  - Monitor all insertion sites, i.e. indwelling lines, tubes, and drains  - Monitor endotracheal if appropriate and nasal secretions for changes in amount and color  - [place] appropriate cooling/warming therapies per order  - Administer medications as ordered  - Instruct and encourage patient and family to use good hand hygiene technique  - Identify and instruct in appropriate isolation precautions for identified infection/condition  Outcome: Progressing     Problem: SAFETY ADULT  Goal: Patient will remain free of falls  Description: INTERVENTIONS:  - Educate patient/family on patient safety including physical limitations  - Instruct patient to call for assistance with activity   - Consult OT/PT to assist with strengthening/mobility   - Keep Call bell within reach  - Keep bed low and locked with side rails adjusted as appropriate  - Keep care items and personal belongings within reach  - Initiate and maintain comfort rounds  - Make Fall Risk Sign visible to staff  - Offer Toileting every 2 Hours, in advance of need  - Initiate/Maintain alarm  - Obtain necessary fall risk management equipment  - Apply yellow socks and  bracelet for high fall risk patients  - Consider moving patient to room near nurses station  Outcome: Progressing  Goal: Maintain or return to baseline ADL function  Description: INTERVENTIONS:  -  Assess patient's ability to carry out ADLs; assess patient's baseline for ADL function and identify physical deficits which impact ability to perform ADLs (bathing, care of mouth/teeth, toileting, grooming, dressing, etc.)  - Assess/evaluate cause of self-care deficits   - Assess range of motion  - Assess patient's mobility; develop plan if impaired  - Assess patient's need for assistive devices and provide as appropriate  - Encourage maximum independence but intervene and supervise when necessary  - Involve family in performance of ADLs  - Assess for home care needs following discharge   - Consider OT consult to assist with ADL evaluation and planning for discharge  - Provide patient education as appropriate  Outcome: Progressing  Goal: Maintains/Returns to pre admission functional level  Description: INTERVENTIONS:  - Perform AM-PAC 6 Click Basic Mobility/ Daily Activity assessment daily.  - Set and communicate daily mobility goal to care team and patient/family/caregiver.   - Collaborate with rehabilitation services on mobility goals if consulted  - Perform Range of Motion 3 times a day.  - Reposition patient every 2 hours.  - Dangle patient 3 times a day  - Stand patient 3 times a day  - Ambulate patient 3 times a day  - Out of bed to chair 3 times a day   - Out of bed for meals 3 times a day  - Out of bed for toileting  - Record patient progress and toleration of activity level   Outcome: Progressing     Problem: DISCHARGE PLANNING  Goal: Discharge to home or other facility with appropriate resources  Description: INTERVENTIONS:  - Identify barriers to discharge w/patient and caregiver  - Arrange for needed discharge resources and transportation as appropriate  - Identify discharge learning needs (meds, wound care,  etc.)  - Arrange for interpretive services to assist at discharge as needed  - Refer to Case Management Department for coordinating discharge planning if the patient needs post-hospital services based on physician/advanced practitioner order or complex needs related to functional status, cognitive ability, or social support system  Outcome: Progressing     Problem: Knowledge Deficit  Goal: Patient/family/caregiver demonstrates understanding of disease process, treatment plan, medications, and discharge instructions  Description: Complete learning assessment and assess knowledge base.  Interventions:  - Provide teaching at level of understanding  - Provide teaching via preferred learning methods  Outcome: Progressing     Problem: NEUROSENSORY - ADULT  Goal: Achieves stable or improved neurological status  Description: INTERVENTIONS  - Monitor and report changes in neurological status  - Monitor vital signs such as temperature, blood pressure, glucose, and any other labs ordered   - Initiate measures to prevent increased intracranial pressure  - Monitor for seizure activity and implement precautions if appropriate      Outcome: Progressing  Goal: Achieves maximal functionality and self care  Description: INTERVENTIONS  - Monitor swallowing and airway patency with patient fatigue and changes in neurological status  - Encourage and assist patient to increase activity and self care.   - Encourage visually impaired, hearing impaired and aphasic patients to use assistive/communication devices  Outcome: Progressing

## 2025-01-15 ENCOUNTER — HOSPITAL ENCOUNTER (INPATIENT)
Facility: HOSPITAL | Age: 65
LOS: 9 days | Discharge: HOME | DRG: 560 | End: 2025-01-24
Attending: INTERNAL MEDICINE | Admitting: INTERNAL MEDICINE
Payer: COMMERCIAL

## 2025-01-15 VITALS
HEIGHT: 64 IN | SYSTOLIC BLOOD PRESSURE: 158 MMHG | WEIGHT: 216.2 LBS | DIASTOLIC BLOOD PRESSURE: 75 MMHG | RESPIRATION RATE: 17 BRPM | BODY MASS INDEX: 36.91 KG/M2 | HEART RATE: 86 BPM | OXYGEN SATURATION: 97 % | TEMPERATURE: 98 F

## 2025-01-15 DIAGNOSIS — I89.0 LYMPHEDEMA: Primary | ICD-10-CM

## 2025-01-15 DIAGNOSIS — R73.03 PREDIABETES: ICD-10-CM

## 2025-01-15 DIAGNOSIS — K21.9 GASTROESOPHAGEAL REFLUX DISEASE, UNSPECIFIED WHETHER ESOPHAGITIS PRESENT: ICD-10-CM

## 2025-01-15 DIAGNOSIS — E55.9 VITAMIN D DEFICIENCY: ICD-10-CM

## 2025-01-15 DIAGNOSIS — F32.A DEPRESSION, UNSPECIFIED DEPRESSION TYPE: ICD-10-CM

## 2025-01-15 DIAGNOSIS — R52 ACUTE PAIN: ICD-10-CM

## 2025-01-15 DIAGNOSIS — G95.20 COMPRESSION OF SPINAL CORD WITH MYELOPATHY (HCC): ICD-10-CM

## 2025-01-15 PROBLEM — E53.8 VITAMIN B12 DEFICIENCY: Status: ACTIVE | Noted: 2025-01-15

## 2025-01-15 PROBLEM — D72.829 LEUKOCYTOSIS: Status: ACTIVE | Noted: 2025-01-15

## 2025-01-15 LAB
GLUCOSE SERPL-MCNC: 106 MG/DL (ref 65–140)
GLUCOSE SERPL-MCNC: 106 MG/DL (ref 65–140)

## 2025-01-15 PROCEDURE — 99239 HOSP IP/OBS DSCHRG MGMT >30: CPT | Performed by: PHYSICIAN ASSISTANT

## 2025-01-15 PROCEDURE — 99024 POSTOP FOLLOW-UP VISIT: CPT | Performed by: NEUROLOGICAL SURGERY

## 2025-01-15 PROCEDURE — NC001 PR NO CHARGE: Performed by: INTERNAL MEDICINE

## 2025-01-15 PROCEDURE — 99254 IP/OBS CNSLTJ NEW/EST MOD 60: CPT | Performed by: INTERNAL MEDICINE

## 2025-01-15 PROCEDURE — 82948 REAGENT STRIP/BLOOD GLUCOSE: CPT

## 2025-01-15 PROCEDURE — 99223 1ST HOSP IP/OBS HIGH 75: CPT | Performed by: INTERNAL MEDICINE

## 2025-01-15 RX ORDER — POTASSIUM CHLORIDE 1500 MG/1
40 TABLET, EXTENDED RELEASE ORAL
Status: DISCONTINUED | OUTPATIENT
Start: 2025-01-16 | End: 2025-01-24 | Stop reason: HOSPADM

## 2025-01-15 RX ORDER — ONDANSETRON 4 MG/1
4 TABLET, ORALLY DISINTEGRATING ORAL EVERY 6 HOURS PRN
Status: DISCONTINUED | OUTPATIENT
Start: 2025-01-15 | End: 2025-01-24 | Stop reason: HOSPADM

## 2025-01-15 RX ORDER — CYANOCOBALAMIN 1000 UG/ML
1000 INJECTION, SOLUTION INTRAMUSCULAR; SUBCUTANEOUS
Status: DISCONTINUED | OUTPATIENT
Start: 2025-01-19 | End: 2025-01-15

## 2025-01-15 RX ORDER — DIPHENHYDRAMINE HCL 25 MG
25 TABLET ORAL EVERY 6 HOURS PRN
Start: 2025-01-15

## 2025-01-15 RX ORDER — PANTOPRAZOLE SODIUM 40 MG/1
40 TABLET, DELAYED RELEASE ORAL
Status: DISCONTINUED | OUTPATIENT
Start: 2025-01-16 | End: 2025-01-16

## 2025-01-15 RX ORDER — SENNOSIDES 8.6 MG
8.6 TABLET ORAL 2 TIMES DAILY
Start: 2025-01-15 | End: 2025-01-24

## 2025-01-15 RX ORDER — DIAZEPAM 2 MG/1
2 TABLET ORAL EVERY 6 HOURS PRN
Status: DISCONTINUED | OUTPATIENT
Start: 2025-01-15 | End: 2025-01-20

## 2025-01-15 RX ORDER — BISACODYL 10 MG
10 SUPPOSITORY, RECTAL RECTAL DAILY PRN
Status: DISCONTINUED | OUTPATIENT
Start: 2025-01-15 | End: 2025-01-24 | Stop reason: HOSPADM

## 2025-01-15 RX ORDER — DOCUSATE SODIUM 100 MG/1
100 CAPSULE, LIQUID FILLED ORAL 2 TIMES DAILY
Status: DISCONTINUED | OUTPATIENT
Start: 2025-01-15 | End: 2025-01-20

## 2025-01-15 RX ORDER — BISACODYL 10 MG
10 SUPPOSITORY, RECTAL RECTAL DAILY PRN
Start: 2025-01-15 | End: 2025-01-24

## 2025-01-15 RX ORDER — KETOROLAC TROMETHAMINE 10 MG/1
10 TABLET, FILM COATED ORAL EVERY 6 HOURS PRN
Status: DISCONTINUED | OUTPATIENT
Start: 2025-01-15 | End: 2025-01-15 | Stop reason: HOSPADM

## 2025-01-15 RX ORDER — HYDROMORPHONE HYDROCHLORIDE 2 MG/1
2 TABLET ORAL EVERY 4 HOURS PRN
Refills: 0 | Status: DISCONTINUED | OUTPATIENT
Start: 2025-01-15 | End: 2025-01-20

## 2025-01-15 RX ORDER — MAGNESIUM HYDROXIDE/ALUMINUM HYDROXICE/SIMETHICONE 120; 1200; 1200 MG/30ML; MG/30ML; MG/30ML
30 SUSPENSION ORAL EVERY 4 HOURS PRN
Start: 2025-01-15

## 2025-01-15 RX ORDER — PRAMIPEXOLE DIHYDROCHLORIDE 0.25 MG/1
0.5 TABLET ORAL
Start: 2025-01-15

## 2025-01-15 RX ORDER — LANOLIN ALCOHOL/MO/W.PET/CERES
400 CREAM (GRAM) TOPICAL DAILY
Status: DISCONTINUED | OUTPATIENT
Start: 2025-01-16 | End: 2025-01-24 | Stop reason: HOSPADM

## 2025-01-15 RX ORDER — DIAZEPAM 2 MG/1
2 TABLET ORAL EVERY 6 HOURS PRN
Status: SHIPPED | OUTPATIENT
Start: 2025-01-15 | End: 2025-01-24

## 2025-01-15 RX ORDER — DULOXETIN HYDROCHLORIDE 60 MG/1
60 CAPSULE, DELAYED RELEASE ORAL DAILY
Status: DISCONTINUED | OUTPATIENT
Start: 2025-01-16 | End: 2025-01-24 | Stop reason: HOSPADM

## 2025-01-15 RX ORDER — KETOROLAC TROMETHAMINE 10 MG/1
10 TABLET, FILM COATED ORAL EVERY 6 HOURS PRN
Start: 2025-01-15 | End: 2025-01-24

## 2025-01-15 RX ORDER — POLYETHYLENE GLYCOL 3350 17 G/17G
17 POWDER, FOR SOLUTION ORAL DAILY
Status: DISCONTINUED | OUTPATIENT
Start: 2025-01-16 | End: 2025-01-24 | Stop reason: HOSPADM

## 2025-01-15 RX ORDER — INSULIN LISPRO 100 [IU]/ML
1-6 INJECTION, SOLUTION INTRAVENOUS; SUBCUTANEOUS
Status: DISCONTINUED | OUTPATIENT
Start: 2025-01-15 | End: 2025-01-15

## 2025-01-15 RX ORDER — ACETAMINOPHEN 160 MG/5ML
975 SUSPENSION ORAL EVERY 8 HOURS
Status: DISCONTINUED | OUTPATIENT
Start: 2025-01-15 | End: 2025-01-18

## 2025-01-15 RX ORDER — SENNOSIDES 8.6 MG
1 TABLET ORAL 2 TIMES DAILY
Status: DISCONTINUED | OUTPATIENT
Start: 2025-01-15 | End: 2025-01-23

## 2025-01-15 RX ORDER — DOCUSATE SODIUM 100 MG/1
100 CAPSULE, LIQUID FILLED ORAL 2 TIMES DAILY
Start: 2025-01-15 | End: 2025-01-24

## 2025-01-15 RX ORDER — ACETAMINOPHEN 160 MG/5ML
975 SUSPENSION ORAL EVERY 8 HOURS
Start: 2025-01-15 | End: 2025-01-24

## 2025-01-15 RX ORDER — ENOXAPARIN SODIUM 100 MG/ML
40 INJECTION SUBCUTANEOUS
Status: DISCONTINUED | OUTPATIENT
Start: 2025-01-16 | End: 2025-01-24 | Stop reason: HOSPADM

## 2025-01-15 RX ORDER — POLYETHYLENE GLYCOL 3350 17 G/17G
17 POWDER, FOR SOLUTION ORAL DAILY
Start: 2025-01-16 | End: 2025-01-24

## 2025-01-15 RX ORDER — HYDROMORPHONE HYDROCHLORIDE 4 MG/1
4 TABLET ORAL EVERY 4 HOURS PRN
Status: SHIPPED | OUTPATIENT
Start: 2025-01-15 | End: 2025-01-24

## 2025-01-15 RX ORDER — MAGNESIUM HYDROXIDE/ALUMINUM HYDROXICE/SIMETHICONE 120; 1200; 1200 MG/30ML; MG/30ML; MG/30ML
30 SUSPENSION ORAL EVERY 4 HOURS PRN
Status: DISCONTINUED | OUTPATIENT
Start: 2025-01-15 | End: 2025-01-24 | Stop reason: HOSPADM

## 2025-01-15 RX ORDER — CYANOCOBALAMIN 1000 UG/ML
1000 INJECTION, SOLUTION INTRAMUSCULAR; SUBCUTANEOUS
Start: 2025-02-02 | End: 2025-01-24

## 2025-01-15 RX ORDER — FUROSEMIDE 20 MG/1
20 TABLET ORAL DAILY
Status: DISCONTINUED | OUTPATIENT
Start: 2025-01-16 | End: 2025-01-16

## 2025-01-15 RX ORDER — KETOROLAC TROMETHAMINE 10 MG/1
10 TABLET, FILM COATED ORAL EVERY 6 HOURS PRN
Status: DISPENSED | OUTPATIENT
Start: 2025-01-15 | End: 2025-01-19

## 2025-01-15 RX ORDER — METHOCARBAMOL 1000 MG/1
1000 TABLET, FILM COATED ORAL EVERY 8 HOURS SCHEDULED
Start: 2025-01-15 | End: 2025-01-24

## 2025-01-15 RX ORDER — PRAMIPEXOLE DIHYDROCHLORIDE 0.25 MG/1
0.5 TABLET ORAL
Status: DISCONTINUED | OUTPATIENT
Start: 2025-01-15 | End: 2025-01-24 | Stop reason: HOSPADM

## 2025-01-15 RX ORDER — CALCIUM CARBONATE 500 MG/1
1000 TABLET, CHEWABLE ORAL 3 TIMES DAILY PRN
Start: 2025-01-15

## 2025-01-15 RX ORDER — HYDROMORPHONE HYDROCHLORIDE 4 MG/1
4 TABLET ORAL EVERY 4 HOURS PRN
Refills: 0 | Status: DISCONTINUED | OUTPATIENT
Start: 2025-01-15 | End: 2025-01-24 | Stop reason: HOSPADM

## 2025-01-15 RX ORDER — CALCIUM CARBONATE 500 MG/1
1000 TABLET, CHEWABLE ORAL 3 TIMES DAILY PRN
Status: DISCONTINUED | OUTPATIENT
Start: 2025-01-15 | End: 2025-01-24 | Stop reason: HOSPADM

## 2025-01-15 RX ORDER — HEPARIN SODIUM 5000 [USP'U]/ML
5000 INJECTION, SOLUTION INTRAVENOUS; SUBCUTANEOUS EVERY 8 HOURS SCHEDULED
Status: COMPLETED | OUTPATIENT
Start: 2025-01-15 | End: 2025-01-15

## 2025-01-15 RX ORDER — DIPHENHYDRAMINE HCL 25 MG
25 TABLET ORAL EVERY 6 HOURS PRN
Status: DISCONTINUED | OUTPATIENT
Start: 2025-01-15 | End: 2025-01-24 | Stop reason: HOSPADM

## 2025-01-15 RX ORDER — METHOCARBAMOL 500 MG/1
1000 TABLET, FILM COATED ORAL EVERY 8 HOURS SCHEDULED
Status: DISCONTINUED | OUTPATIENT
Start: 2025-01-15 | End: 2025-01-17

## 2025-01-15 RX ORDER — HYDROMORPHONE HYDROCHLORIDE 2 MG/1
2 TABLET ORAL EVERY 4 HOURS PRN
Status: SHIPPED | OUTPATIENT
Start: 2025-01-15 | End: 2025-01-24

## 2025-01-15 RX ORDER — PRAMIPEXOLE DIHYDROCHLORIDE 0.25 MG/1
0.25 TABLET ORAL 3 TIMES DAILY
Start: 2025-01-15 | End: 2025-01-15

## 2025-01-15 RX ADMIN — HEPARIN SODIUM 5000 UNITS: 5000 INJECTION INTRAVENOUS; SUBCUTANEOUS at 14:51

## 2025-01-15 RX ADMIN — HEPARIN SODIUM 5000 UNITS: 5000 INJECTION INTRAVENOUS; SUBCUTANEOUS at 21:12

## 2025-01-15 RX ADMIN — KETOROLAC TROMETHAMINE 30 MG: 30 INJECTION, SOLUTION INTRAMUSCULAR; INTRAVENOUS at 00:12

## 2025-01-15 RX ADMIN — HYDROMORPHONE HYDROCHLORIDE 4 MG: 2 TABLET ORAL at 10:19

## 2025-01-15 RX ADMIN — OMEPRAZOLE 40 MG: 40 CAPSULE, DELAYED RELEASE ORAL at 05:06

## 2025-01-15 RX ADMIN — Medication 400 UNITS: at 14:51

## 2025-01-15 RX ADMIN — KETOROLAC TROMETHAMINE 10 MG: 10 TABLET, FILM COATED ORAL at 21:46

## 2025-01-15 RX ADMIN — KETOROLAC TROMETHAMINE 10 MG: 10 TABLET, FILM COATED ORAL at 12:26

## 2025-01-15 RX ADMIN — DULOXETINE HYDROCHLORIDE 60 MG: 60 CAPSULE, DELAYED RELEASE ORAL at 09:06

## 2025-01-15 RX ADMIN — HYDROMORPHONE HYDROCHLORIDE 4 MG: 2 TABLET ORAL at 05:43

## 2025-01-15 RX ADMIN — POTASSIUM CHLORIDE 40 MEQ: 1500 TABLET, EXTENDED RELEASE ORAL at 09:07

## 2025-01-15 RX ADMIN — ACETAMINOPHEN 975 MG: 650 SUSPENSION ORAL at 14:52

## 2025-01-15 RX ADMIN — DOCUSATE SODIUM 100 MG: 100 CAPSULE, LIQUID FILLED ORAL at 17:32

## 2025-01-15 RX ADMIN — METHOCARBAMOL TABLETS 1000 MG: 500 TABLET, COATED ORAL at 21:11

## 2025-01-15 RX ADMIN — SENNOSIDES 8.6 MG: 8.6 TABLET, FILM COATED ORAL at 17:32

## 2025-01-15 RX ADMIN — ACETAMINOPHEN 975 MG: 650 SUSPENSION ORAL at 05:07

## 2025-01-15 RX ADMIN — FUROSEMIDE 20 MG: 20 TABLET ORAL at 09:09

## 2025-01-15 RX ADMIN — HYDROMORPHONE HYDROCHLORIDE 4 MG: 2 TABLET ORAL at 01:52

## 2025-01-15 RX ADMIN — HYDROMORPHONE HYDROCHLORIDE 2 MG: 2 TABLET ORAL at 17:44

## 2025-01-15 RX ADMIN — PRAMIPEXOLE DIHYDROCHLORIDE 0.5 MG: 0.25 TABLET ORAL at 21:11

## 2025-01-15 RX ADMIN — METHOCARBAMOL 1000 MG: 500 TABLET ORAL at 05:07

## 2025-01-15 RX ADMIN — HEPARIN SODIUM 5000 UNITS: 5000 INJECTION INTRAVENOUS; SUBCUTANEOUS at 05:07

## 2025-01-15 RX ADMIN — MELATONIN TAB 3 MG 3 MG: 3 TAB at 21:11

## 2025-01-15 RX ADMIN — METHOCARBAMOL TABLETS 1000 MG: 500 TABLET, COATED ORAL at 14:51

## 2025-01-15 RX ADMIN — ACETAMINOPHEN 975 MG: 650 SUSPENSION ORAL at 21:17

## 2025-01-15 RX ADMIN — DIAZEPAM 2 MG: 2 TABLET ORAL at 09:23

## 2025-01-15 RX ADMIN — DOCUSATE SODIUM 100 MG: 100 CAPSULE, LIQUID FILLED ORAL at 09:07

## 2025-01-15 NOTE — ASSESSMENT & PLAN NOTE
Continue to elevate legs when able.  Apply ACE wraps as tolerated.  Continue home Lasix 20mg daily.  May benefit from lymphedema specialist on discharge.

## 2025-01-15 NOTE — ASSESSMENT & PLAN NOTE
Patient was evaluated by the rehabilitation team MD and an appropriate candidate for acute inpatient rehabilitation program at this time.  The patient will tolerate 3 hours/day 5 to 7 days/week of intensive physical, occupational and speech therapy in order to obtain goals for community discharge  Due to the patient's functional Compared to their baseline level of function in addition to their ongoing medical needs, the patient would benefit from daily supervision from a rehabilitation physician as well as rehabilitation nursing to implement and adjust the medical as well as functional plan of care in order to meet the patient's goals.

## 2025-01-15 NOTE — PROGRESS NOTES
Patient:    MRN:  [id number]    Aidin Request ID:  [id number]    Level of care reserved:  Inpatient Rehab Facility    Partner Reserved:  [facility] - ([place]), [place] [address] [phone]    Clinical needs requested:    Geography searched:  10 miles around [id number]    Start of Service:    Request sent:  12:29pm EST on 1/2/2025 by Rene Delatorre    Partner reserved:  9:39am EST on 1/3/2025 by Rene Delatorre    Choice list shared:  9:39am EST on 1/3/2025 by Rene Delatorre

## 2025-01-15 NOTE — CONSULTS
Consultation - Internal Medicine   Name: Hayley Rios 64 y.o. 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  Hx of known thoracic meningioma and prior back surgeries (scoliosis s/p Guerra nydia).  MRI thoracic spine showed similar 1.4cm intradural extramedullary probable meningioma in the L posterolateral thoracic spine region at approximately T3 level with associated marked spinal cord compression with severe canal stenosis.  MRI C-spine showed re-demonstration of meningioma in the posterior L aspect of the canal at T3, compared with 2022 - mass is mildly increased in size and marked cord compression also mildly increase.    OR on 1/7 for bilateral T2-T4 laminectomy with resection of tumor performed by Dr. Coulter.  Pathology consistent with meningioma.    Hold AC/AP for at least 2 weeks post-op.  Neurovascular checks Q shift.  Ensure adequate pain control.  Monitor incision for s/s of infection.    Follow-up with Neurovascular in 2 weeks (1/21) and in 6 weeks.    Primary team following.  PT/OT.  Depression, recurrent (HCC)  Continue home Cymbalta 60mg daily.  Had been on trazodone 50mg at HS at home but would like to continue without at this time.  Supportive counseling as needed.  Restless leg syndrome  Continue home Mirapex 0.5mg at HS.  Had been on Mirapex 0.25mg TID in addition at home but pt would like to continue without at this time.  Prediabetes  Last A1c 5.8 on 1/1/2025.  Encourage lifestyle modifications.  Monitor BG with routine labs.  Vitamin D deficiency  Continue  home vitamin D 2000u daily.  Gastroesophageal reflux disease  Continue home omeprazole 40mg BID.  Reflux symptoms worsened after being placed on steroid pack.  MARV (iron deficiency anemia)  Hgb currently stable at 11.7.  Received IV venofer x3 doses in acute setting.  Iron panel on 12/30/2024: Iron Sat 10%, TIBC 396, Iron 40, and Ferritin 9.  Consider starting PO supplementation once constipation resolves.  Ventral hernia without obstruction or gangrene  Recently underwent ventral hernia repair with complications and required revision with panniculectomy on 10/24/24.  Follow-up with General Surgery as needed.  Lymphedema  Continue to elevate legs when able.  Apply ACE wraps as tolerated.  Continue home Lasix 20mg daily.  May benefit from lymphedema specialist on discharge.  Leukocytosis  WBC count currently 11.52.  Completed steroid taper on 1/14.  Likely steroid induced.  No active s/s of infection at this time.  Continue to trend routine CBC.  Vitamin B12 deficiency  Vitamin B12 level 346 on 1/1/25.  Received cyanocobalamin injections in acute setting.  Start on PO cyanocobalamin daily.    History of Present Illness:    Hayley Rios is a 64 y.o. female with a PMH of iron deficiency anemia, restless leg syndrome, lymphedema, ventral hernia s/p repair, prior back surgeries, and prediabetes who originally presented to [facility] on 12/30/2024 after developing L upper leg pain and spasms that had increasingly worsened over the past week.  MRI thoracic spine showed similar 1.4cm intradural extramedullary probably meningioma in the L posterolateral thoracic spine region at approximately the T3 level with associated marked spinal cord compression with severe canal stenosis.  Patient was transferred to [facility] for Neurosurgery evaluation.  Neurosurgery recommended obtaining further imaging prior to deciding on surgical intervention.  MRI C-spine on 1/6 re-demonstrated meningioma in the posterior L  aspect of the canal at T3, compared with 2022 - mass is mildly increased in size and marked cord compression also mildly increased.  Patient was taken to the OR on 1/7/2025 for bilateral T2-T4 laminectomy with resection of tumor performed by Dr. Coulter.  Post-operatively, patient completed a steroid taper and developed increased reflux symptoms requiring an increase in her home PPI.  BRAYAN drain was removed on 1/10 without any issues.  Neurosurgery recommending to hold anticoagulants and antiplatelets for 2 weeks and will follow-up with patient at 2 weeks and 6 weeks post-operatively.  Patient was evaluated by PT/OT and recommended for acute inpatient rehabilitation.     Admitted to [facility] on 1/15/2025; we are consulted for medical clearance.      Pt examined while pt lying in bed in pt room.  Currently has complaints of upper back, B/L shoulder pain, 8/10, worse after being transferred to facility.  Complaints of tingling to B/L lower extremities.  Prior to surgery, pt was unable to lift lower extremities off the bed and had numbness and constant tingling.  Feels that she has had much improvement since the surgery and just started to have a mild amount of tingling to the lower extremities again.  On exam, admits to decreased sensation of B/L lower extremities.  Denies any fevers, chills, lightheadedness, dizziness, SOB, palpitations, or abdominal pain.  States that she was worked up for CP in acute setting but feels that the pain is just radiating from her upper back.  Has been having heartburn but this resolved once she restarted her home omeprazole.  Had a very small BM today but prior to that she hadn't had a BM in almost 2 weeks.  Passing gas and denies any pain but does feel bloated.  Has been urinating without any issues besides occasional urgency.  Manages her medications on her own.  Currently visiting from [place] but plans to stay in the area with a friend for a few weeks on  "discharge.      Review of Systems:    Review of Systems   Constitutional:  Negative for appetite change, chills, fatigue and fever.   HENT:  Negative for trouble swallowing.    Eyes:  Negative for visual disturbance.   Respiratory:  Negative for cough, shortness of breath, wheezing and stridor.    Cardiovascular:  Negative for chest pain, palpitations and leg swelling.   Gastrointestinal:  Positive for constipation. Negative for abdominal distention, abdominal pain, diarrhea, nausea and vomiting.        LBM 1/15, very small   Genitourinary:  Positive for urgency. Negative for difficulty urinating, dysuria, frequency and hematuria.   Musculoskeletal:  Positive for arthralgias and back pain (upper back, B/L shoulder pain, 8/10, worse after transferring to facility). Negative for gait problem.   Neurological:  Positive for weakness (B/L lower extremity weakness, improving s/p surgery). Negative for dizziness, light-headedness, numbness and headaches.        Decreased sensation to B/L lower extremities, tingling to B/L feet   Psychiatric/Behavioral:  Negative for dysphoric mood and sleep disturbance. The patient is not nervous/anxious.    All other systems reviewed and are negative.      Past Medical and Surgical History:     Past Medical History:   Diagnosis Date    Anemia     Anesthesia     \"sometimes low BP upon waking up\" pt states she stopped breathing 1 time during surgery    Arthritis     Back pain     Dolan's esophagus     Chronic pain disorder     back    Chronic venous insufficiency     Colon polyp     Cough variant asthma     d/t mold-all sx diminished when removed from source    COVID-19 03/2020    Depression     Environmental allergies     dust    GERD (gastroesophageal reflux disease)     Hiatal hernia     History of anemia     History of fusion of spine for scoliosis     \"as a teenager\"    History of transfusion     1978 - no adverse reaction    Left lumbar radiculitis     Last Assessed: [id number]    " Lumbar postlaminectomy syndrome     Migraines     Morbid obesity (HCC)     Motion sickness     Neck pain     Osteoarthritis     of left hip    Right knee pain     Seasonal allergies     Shortness of breath     with stairs    Umbilical hernia     Uses brace     right knee    Wears glasses        Past Surgical History:   Procedure Laterality Date    ARTHRODESIS      lumbar    ARTHRODESIS      Spinal Arthrodesis for Deformity; Last Assessed: 16Mar2017    BACK SURGERY      lumbar fusion,with nydia/screw and cage implant    BACK SURGERY      surgery for scoliosis    BREAST CYST EXCISION Right     benign    CHOLECYSTECTOMY LAPAROSCOPIC N/A 12/20/2023    Procedure: FENESTRATED SUBTOTAL CHOLECYSTECTOMY LAPAROSCOPIC, EXTENSIVE LYSIS OF ADHESIONS.;  Surgeon: Chelle Butler MD;  Location: [facility];  Service: General    COLONOSCOPY      COLONOSCOPY N/A 03/14/2019    Procedure: COLONOSCOPY;  Surgeon: Van Dyson MD;  Location: [facility];  Service: Gastroenterology    ESOPHAGOGASTRODUODENOSCOPY N/A 03/14/2019    Procedure: ESOPHAGOGASTRODUODENOSCOPY (EGD) W RFA(BARRX);  Surgeon: Van Dyson MD;  [facility]: [facility];  Service: Gastroenterology    HERNIA REPAIR      umbilical hernia repair x2    LUMBAR LAMINECTOMY FOR EXCISION OF NEOPLASM Bilateral 1/7/2025    Procedure: T2-4 LAMINECTOMY THORACIC FOR TUMOR;  Surgeon: Ascencion Coulter MD;  Location: [facility];  Service: Neurosurgery    PLANTAR FASCIA SURGERY Left     LA ARTHRS KNE SURG W/MENISCECTOMY MED/LAT W/SHVG Right 04/04/2018    Procedure: ARTHROSCOPY KNEE PARTIAL MEDIAL MENISECTOMY , CHONDROPLASTY;  Surgeon: Rocio Roe DO;  [facility];  Service: Orthopedics    LA BREAST REDUCTION Bilateral 03/12/2021    Procedure: BREAST REDUCTION;  Surgeon: Cortez Sandoval MD;  Location:  MAIN OR;  Service: Plastics    LA COLONOSCOPY FLX DX W/COLLJ SPEC WHEN PFRMD N/A 02/20/2018    Procedure: COLONOSCOPY with polypectomy;  Surgeon: Apryl Garcia MD;  Location: [facility];  Service: General    NH ESOPHAGOGASTRODUODENOSCOPY TRANSORAL DIAGNOSTIC N/A 05/24/2017    Procedure: ESOPHAGOGASTRODUODENOSCOPY (EGD);  Surgeon: Marcello Street MD;  Location: [facility];  Service: Gastroenterology    NH ESOPHAGOGASTRODUODENOSCOPY TRANSORAL DIAGNOSTIC N/A 08/31/2017    Procedure: ESOPHAGOGASTRODUODENOSCOPY (EGD) W RFA;  Surgeon: Macho Aguayo MD;  Location:  GI LAB;  Service: Gastroenterology    NH LAPS RPR RECURRENT INCISIONAL HERNIA REDUCIBLE N/A 01/21/2021    Procedure: REPAIR HERNIA INCISIONAL LAPAROSCOPIC W/ ROBOTICS, with mesh;  Surgeon: Errol Bermudez MD;  Location: [facility];  Service: General    NH RPR AA HERNIA 1ST 3-10 CM REDUCIBLE N/A 2/19/2024    Procedure: VENTRAL HERNIA REPAIR 3CM WITH MESH;  Surgeon: Chelle Butler MD;  Location:  MAIN OR;  Service: General    WISDOM TOOTH EXTRACTION         Meds/Allergies:    all medications and allergies reviewed    Allergies:   Allergies   Allergen Reactions    Dust Mite Extract Shortness Of Breath    Latex Blisters, Itching, Other (See Comments) and Rash     contact    Other Other (See Comments)     Seasonal AND cock roaches    Sulfa Antibiotics GI Intolerance, Other (See Comments) and Vomiting     Topical-blistering    \"intense vomiting\"       Social History:     Marital Status: Single    Substance Use History:   Social History     Substance and Sexual Activity   Alcohol Use Not Currently    Comment: rarely-every 3 mos     Social History     Tobacco Use   Smoking Status Never   Smokeless Tobacco Never     Social History     Substance and Sexual Activity   Drug Use Not Currently    Comment: medical marijuana 7 years ago       Family History:  Reviewed    Physical Exam:     Vitals:        Physical Exam  Vitals and nursing note reviewed.   Constitutional:       General: She is not in acute distress.     Appearance: Normal appearance. She is obese. She is not ill-appearing.   HENT:      Head: Normocephalic and atraumatic. "   Cardiovascular:      Rate and Rhythm: Normal rate and regular rhythm.      Pulses: Normal pulses.      Heart sounds: Murmur heard.      Systolic murmur is present with a grade of 1/6.      No friction rub.   Pulmonary:      Effort: Pulmonary effort is normal. No respiratory distress.      Breath sounds: Normal breath sounds. No wheezing or rhonchi.   Abdominal:      General: Abdomen is flat. Bowel sounds are normal. There is distension.      Palpations: Abdomen is soft. There is no mass.      Tenderness: There is no abdominal tenderness. There is no guarding or rebound.      Hernia: No hernia is present.   Musculoskeletal:      Cervical back: Normal range of motion and neck supple. No tenderness.      Right lower leg: Edema (lymphedema) present.      Left lower leg: Edema (lymphedema) present.   Skin:     General: Skin is warm and dry.      Comments: Posterior neck incision.  Well-approximated.  No drainage, erythema, or edema present.   Neurological:      Mental Status: She is alert and oriented to person, place, and time.   Psychiatric:         Mood and Affect: Mood normal.         Behavior: Behavior normal.         Additional Data:     Labs and imaging reviewed.    EKG Reviewed - last EKG on 1/11/25 showed NSR with QTc of 427.    [employer] software was used to dictate this note.  It may contain errors with dictating incorrect words or incorrect spelling. Please contact the provider directly with any questions.

## 2025-01-15 NOTE — ASSESSMENT & PLAN NOTE
Recently underwent ventral hernia repair with complications and required revision with panniculectomy on 10/24/24.  Follow-up with General Surgery as needed.

## 2025-01-15 NOTE — ASSESSMENT & PLAN NOTE
POD#8 - s/p T2-4 laminectomy for resection of thoracic tumor (EM 1/7)   T3 lesion w/ concern of progressively worsening myelopathy  P/w progressively worsening bilateral lower extremity, L >R, weakness and balance difficulty x approx 2 months   Acutely worsened over 2 weeks prior to admission, requiring use of a rolling walker    Imaging:   MRI Cervical spine 1/6/25: Redemonstration of meningioma in the posterior left aspect of the canal at T3. Compared with 2022, mass is mildly increased in size and marked cord compression is also mildly increased. Focal cord edema at T3 not excluded but there is no edema in the immediately above or below the level of compression. Stable degenerative spondylosis in the cervical spine most pronounced at C5-6  MRI cervical spine 1/2/2025: Partially nondiagnostic exam due to motion artifact.  T3 lesion likely mildly increased in size based on evaluation and sagittal imaging.  CT cervical spine 1/2/2025: Partially calcified extramedullary T3 lesion slightly larger than prior MRI report.  CT thoracic and lumbar spine 1/2/2025: Thoracolumbar fusion.  Chronic disc and facet degenerative changes at L4-5 resulting in mild canal stenosis and severe foraminal narrowing.    Plan:   Continue to closely monitor neuro exam   Frequent neuro checks per primary team  Complete MAP goals > 85 x 24hrs post-op   BRAYAN drain removed 1/10  Continue local wound care to incision   Keep clean and dry --> to be wipe daily with a MARY wipe   Pt will shower again today prior to d/c   May be left open to air   Completed decadron wean post-op  Continue to hold all AC/AP meds x at least 2 weeks post-op   No reports use at home prior to arrival   DVT ppx: SCDs, SQH   Medical management per primary team  Pain control per primary team   PT/OT  --> recommended rehab   Social work following or assistance with dispo once medically cleared    Accepted at [facility], ad insurance auth obtained. Pt to d/c to [facility] this afternoon.      Neurosurgery will follow peripherally.  Plan for outpatient follow-up in 2 weeks for incision check and pathology review in 6 weeks with MD. please reach out with any further questions or concerns.

## 2025-01-15 NOTE — ASSESSMENT & PLAN NOTE
Hgb currently stable at 11.7.  Received IV venofer x3 doses in acute setting.  Iron panel on 12/30/2024: Iron Sat 10%, TIBC 396, Iron 40, and Ferritin 9.  Consider starting PO supplementation once constipation resolves.

## 2025-01-15 NOTE — TREATMENT PLAN
Individualized Plan of Care - [facility]  Hayley Rios 64 y.o. 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 2/2  Benign tumor of spinal meningioma    ADMISSION DIAGNOSIS: Myelopathy (HCC) [G95.9]  EXPECTED LOS: 10 to 14 days     MEDICAL/FUNCTIONAL PROGNOSIS  Based on my assessment of the patient's medical conditions and current functional status, the prognosis for attaining medical and functional goals or the IRF stay is:  Fair    Medical Goals: Patient will be medically stable for discharge to [facility] upon completion of rehab program      Pain management:  Pain will improve with frequent evaluation of pain, careful adjustments in medications, frequent re-evaluation of patient's pain and medical/neurologic status to ensure optimal pain control, avoidance of potential serious and even life-threatening side-effects and drug interactions, as well as weaning pain medications as soon as possible to decrease risk of short and long-term use.     Neurologic Disorder: Thoracic meningioma w/ myelopathy causing impaired mobility, ADLs, and gait:  intensive skilled therapies with physical therapy and occupational therapy with close oversight and management by rehab specialized physician in acute rehabilitation setting to most expeditiously and effectively improve functional mobility, transfers, upper and lower body strengthening, conditioning, balance, and gait training with appropriate assistive device.  Patient will have optimal supervision and management of patient's underlying neurologic disorder with specialized rehabilitation physician during this period of recovery to ensure most expeditious and optimal recovery with decreased risks of fall/injury and other  complications including acute worsening of neuro disorder, decrease risk of VTE, PNA, and skin ulceration.  Inpatient rehabilitation education/teaching:  To be provided to patient and typically family/caregiver (if able to be identified) by all skilled therapists, rehab nursing, case management, and rehab specialized physician to ensure optimal recovery and decrease risks of complications in both acute rehabilitation setting as well as after discharge.   Bowel dysfunction: Appropriate bowel management with appropriate toileting program from rehab nursing and staff with oversight management by rehabilitation physicians which include adjustments in medications to optimize appropriate bowel function and prevent/decrease risk of constipation and bowel obstruction.    Obesity:  Close monitoring of nutrition status, nutrition specialist with adjustments in diet.   on appropriate short and long term nutrition and activity.  Obesity increases complexity of patient's overall condition and causes unique challenges during this part of patient's recovery process.  Supervise and if necessary make adjustments in rehab nursing care and skilled therapy care to ensure appropriate toileting, bed mobility, other ADLs, and ambulation to decrease risk of falls/injuries, VTE, skin breakdown/ulceration and optimize functional recovery.      ANTICIPATED DISCHARGE DISPOSITION AND SERVICES  COMMUNITY SETTING: Home - independent/modified independent    Is a 24-hr caregiver available? No  Has discharge plan been discussed with primary caregiver?  No  Date of Discussion: unknown date    ANTICIPATED FOLLOW-UP SERVICE:   Outpatient Therapy Services: PT and OT      Home Health Services: PT, OT, and Nursing  DISCIPLINE SPECIFIC PLANS:  Required Disciplines & Services: Rehabillitation Nursing, Case Management,     REQUIRED THERAPY:  Therapy Hours per Day Days per Week   Physical Therapy 1-2 5-6   Occupational Therapy 1-2 5-6   NOTE:  Additional therapy time(s) or changes to allocation of therapies as appropriate to meet patient needs and to achieve functional goals.    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

## 2025-01-15 NOTE — ASSESSMENT & PLAN NOTE
WBC count currently 11.52.  Completed steroid taper on 1/14.  Likely steroid induced.  No active s/s of infection at this time.  Continue to trend routine CBC.

## 2025-01-15 NOTE — UTILIZATION REVIEW
NOTIFICATION OF ADMISSION DISCHARGE   This is a Notification of Discharge from [facility]. Please be advised that this patient has been discharge from our facility. Below you will find the admission and discharge date and time including the patient’s disposition.   UTILIZATION REVIEW CONTACT:  Kate Akers  Utilization   Network Utilization Review Department  Phone: [phone] x carefully listen to the prompts. All voicemails are confidential.  Email: [email or web address]     ADMISSION INFORMATION  PRESENTATION DATE: 12/30/2024  1:07 AM  OBERVATION ADMISSION DATE: 12/30/2024 0516  INPATIENT ADMISSION DATE: 12/31/24  9:18 AM   DISCHARGE DATE: 12/31/2024  9:30 PM   DISPOSITION:[facility] Utilization Review Department  ATTENTION: Please call with any questions or concerns to [phone] and carefully listen to the prompts so that you are directed to the right person. All voicemails are confidential.   For Discharge needs, contact Care Management DC Support Team at [phone] opt. 2  Send all requests for admission clinical reviews, approved or denied determinations and any other requests to dedicated fax number below belonging to the campus where the patient is receiving treatment. List of dedicated fax numbers for the Facilities:  FACILITY NAME UR FAX NUMBER   ADMISSION DENIALS (Administrative/Medical Necessity) [phone]   DISCHARGE SUPPORT TEAM ([facility]) [phone]   PARENT CHILD HEALTH (Maternity/NICU/Pediatrics) [phone]   [facility] [phone]   [facility] [phone]   [facility] [phone]   [facility] [phone]   [facility] [phone]   [facility] [phone]   [facility] [phone]   [facility]  [facility] [phone]   [facility] [phone]   [facility] [phone]   [facility] [phone]   [facility] [phone]

## 2025-01-15 NOTE — ARC ADMISSION
PHYSICAL MEDICINE AND REHABILITATION ARC REHAB CONSULT  Hayley Rios 64 y.o. 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 female who Hayley is a 64 y.o. female who has a past medical history of but not limited to obesity, lymphedema, multiple spinal surgeries, lumbar spinal stenosis with radiculopathy, known T3 spinal meningioma lost to follow-up who initially was admitted to the [facility] 12/30/2024-12/31/2024 with worsening left lower extremity weakness and spasming.  She underwent multiple imaging studies as below throughout the hospitalization and of particular note noted with MRI lumbar spine with contrast limited due to motion artifact however without abnormal enhancement in the lumbar spine given the limitations of motion degradation noted, additionally MRI thoracic spine with/without contrast was performed redemonstrating the known T3 level meningioma with associated marked spinal cord compression with severe canal stenosis. She was transferred to [facility] on 12/31/24 for further eval/assessment/consultation/sx intervention. CT of spine 1/2/25 - Partially calcified extra medullary T3 lesion is slightly larger than on the prior MRI, measuring 1.8 x 1.4 x 1.2 cm ;  MRI of spine 1/6/25 - Redemonstration of meningioma in the posterior left aspect of the canal at T3. Compared with 2022, mass is mildly increased in size and marked cord compression is also mildly increased. Focal cord edema at T3 not excluded but there is no edema in the immediately above or below the level of compression. On 1/7/25 patient underwent sx intervention -  Bilateral - T2-4 LAMINECTOMY THORACIC FOR TUMOR. Initial BRAYAN drain - removed 1/10 by nsx d/c iv ancef. Completed decadron wean on 1/14/25. Pain control was managed by primary team with most recent changes 1/14/25 and 1/15/25 currently on PO PRN analgesics. PT/OT therapies were consulted and continue to follow patient at this time and are recommending inpatient acute rehab when medically stable. All involved medical disciplines feel/agree patient is medically ready for discharge at this time. Inpatient acute rehabilitation physician was consulted. Upon review of patient's case and correspondence with PT/OT therapy services, ARC physician feels Hayley will benefit and is a good candidate / appropriate for inpatient acute rehab at this time. She has demonstrated the willingness / desire and tolerance to participate in the required 3 hours or more of therapies per day.     Subjective:     Review of Systems: A 10-point review of systems was performed. Negative except as listed above.    Plan:     No new Assessment & Plan notes have been filed under this hospital service since the last note was generated.  Service: PMR      Health Maintenance  #Delirium/Sleep: At risk. Optimize sleep/wake, pain, bowel, bladder management. Avoid deliriogenic meds and physical restraint. Environmental/behavioral interventions.   #Pain:  #Bowel:   #Bladder:  #Skin/Pressure Injury Prevention: Turn Q2hr in bed, with weight shifts [id number] in wheelchair. Float heels in bed.  #DVT Prophylaxis:  #GI Prophylaxis:  #Code Status  #FEN  #Dispo      Compression of thoracic spinal cord with myelopathy  Restless leg- pramipexole  MARV  Prediabetes- insulin  Lymphedema- lasix  Ambulatory dysfunction  Acute pain- toradol, dilaudid, tylenol, cymbalta  Bowel- colase, miralax, senna  Sleep- melatonin      {adptimespent:25189}   Drug regimen reviewed, all potential adverse effects identified and addressed:    Scheduled Meds:  Current Facility-Administered  Medications   Medication Dose Route Frequency Provider Last Rate    acetaminophen  975 mg Oral Q8H Rocio Rodriguez, MUNA      aluminum-magnesium hydroxide-simethicone  30 mL Oral Q4H PRN Lara Callaway, MUNA      bisacodyl  10 mg Rectal Daily PRN Martin Daoud, DO      calcium carbonate  1,000 mg Oral TID PRN Martin Daoud, DO      cyanocobalamin  1,000 mcg Intramuscular Q30 Days Martin Daoud, DO      diazepam  2 mg Oral Q6H PRN Lara Callaway PA-C      diphenhydrAMINE  25 mg Oral Q6H PRN Zabrina Chavez CRFRANK      docusate sodium  100 mg Oral BID Ramila E Held, MUNA      DULoxetine  60 mg Oral Daily Martin Daoud, DO      furosemide  20 mg Oral Daily Martin Daoud, DO      heparin (porcine)  5,000 Units Subcutaneous Q8H [facility] Larissa Villarreal, MUNA      HYDROmorphone  2 mg Oral Q4H PRN Ramila E Held, MUNA      HYDROmorphone  4 mg Oral Q4H PRN Ramila E Held, MUNA      insulin lispro  1-6 Units Subcutaneous TID [facility] Daoud, DO      ketorolac  10 mg Oral Q6H PRN Ramila E Held, MUNA      labetalol  10 mg Intravenous Q4H PRN Martin Daoud, DO      melatonin  3 mg Oral HS Martin Daoud, DO      methocarbamol  1,000 mg Oral Q8H [facility]      omeprazole  40 mg Oral BID Martin Daoud, DO      ondansetron  4 mg Intravenous Q6H PRN [place] Jonel, DO      polyethylene glycol  17 g Oral Daily Martin Daoud, DO      potassium chloride  40 mEq Oral Daily With Breakfast Martin Daoud, DO      pramipexole  0.5 mg Oral HS Martin Jonel, DO      senna  1 tablet Oral BID Ramila E Held, MUNA          Restrictions include:  {ARC Restrictions:04740} Fall precautions      Functional History/Home Set-up - Prior to Admission:      Functional Status: Patient was independent with mobility/ambulation, transfers, ADL's, IADL's.    Functional Status Upon Admission to [facility]:  Mobility: ***  Transfers: ***  ADLs: ***     Physical Exam:  Temp:  [97.9 °F (36.6 °C)-98.2 °F (36.8 °C)] 98 °F (36.7 °C)  HR:  [65-86] 86  Resp:  [17-20] 17  BP:  "(100-158)/(52-80) 158/75  SpO2:  [95 %-98 %] 97 %  Physical Exam    General: {General Appearance:93397::\"alert\",\"no apparent distress\",\"cooperative\",\"comfortable\"}  {STS[facility] HospitalsicalExam:24368}  MMT:   Strength:   Right  Left  Site  Right  Left  Site    5 5  S Ab: Shoulder Abductors  5  5  HF: Hip Flexors    5 5  EF: Elbow Flexors  5  5 KF: Knee Flexors    5  5  EE: Elbow Extensors  5  5  KE: Knee Extensors    5  5  WE: Wrist Extensors  5  5  DR: Dorsi Flexors    5  5  FF: Finger Flexors  5  5  PF: Plantar Flexors    5  5  HI: Hand Intrinsics  5  5  EHL: Extensor Hallucis Longus     Laboratory:    Results from last 7 days   Lab Units 01/12/25  0644 01/11/25  0527 01/09/25  0619   HEMOGLOBIN g/dL 11.7 11.2* 10.2*   HEMATOCRIT % 38.0 36.5 34.4*   WBC Thousand/uL 11.52* 12.61* 9.53     Results from last 7 days   Lab Units 01/12/25  0644 01/11/25  0527 01/09/25  0619   BUN mg/dL 18 15 15   SODIUM mmol/L 135 136 140   POTASSIUM mmol/L 4.2 4.1 3.6   CHLORIDE mmol/L 97 97 102   CREATININE mg/dL 0.54* 0.50* 0.54*   AST U/L  --   --  17   ALT U/L  --   --  15            Wt Readings from Last 1 Encounters:   01/15/25 98.1 kg (216 lb 3.2 oz)     Estimated body mass index is 37.11 kg/m² as calculated from the following:    Height as of 1/7/25: 5' 4\" (1.626 m).    Weight as of this encounter: 98.1 kg (216 lb 3.2 oz).    Imaging: reviewed     Rehabilitation Prognosis: good     Tolerance for three hours of therapy a day: good     Family/Patient Goals:  Patient/family's goals: {ARC Disposition:09629}    {ARC PT/OT:29070}    Mobility Goals: [place]  Transfer Goals: {Rehab goals:73017}  Activities of Daily Living (ADLs) Goals: [place]    Discharge Planning:  Rehabilitation and discharge goals discussed with the patient and/or family.    Case Managment and Social Work to review patient/family resources and to coordinate Discharge Planning.    Estimated length of stay: 10 to 14 days    Patient and Family Education and " "Training:  Rehabilitation and discharge goals discussed with the patient and/or family.  Patient/family education/training needs to be discussed in weekly team meeting.    Equipment/DME needs: Therapy teams to assess and evaluate for additional equipment/DME needs throughout rehabilitation stay    Past Medical History:   Past Surgical History:   Family History:   Social history:   Past Medical History:   Diagnosis Date    Anemia     Anesthesia     \"sometimes low BP upon waking up\" pt states she stopped breathing 1 time during surgery    Arthritis     Back pain     Dolan's esophagus     Chronic pain disorder     back    Chronic venous insufficiency     Colon polyp     Cough variant asthma     d/t mold-all sx diminished when removed from source    COVID-19 03/2020    Depression     Environmental allergies     dust    GERD (gastroesophageal reflux disease)     Hiatal hernia     History of anemia     History of fusion of spine for scoliosis     \"as a teenager\"    History of transfusion     1978 - no adverse reaction    Left lumbar radiculitis     Last Assessed: 25Jul2017    Lumbar postlaminectomy syndrome     Migraines     Morbid obesity (HCC)     Motion sickness     Neck pain     Osteoarthritis     of left hip    Right knee pain     Seasonal allergies     Shortness of breath     with stairs    Umbilical hernia     Uses brace     right knee    Wears glasses     Past Surgical History:   Procedure Laterality Date    ARTHRODESIS      lumbar    ARTHRODESIS      Spinal Arthrodesis for Deformity; Last Assessed: 16Mar2017    BACK SURGERY      lumbar fusion,with nydia/screw and cage implant    BACK SURGERY      surgery for scoliosis    BREAST CYST EXCISION Right     benign    CHOLECYSTECTOMY LAPAROSCOPIC N/A 12/20/2023    Procedure: FENESTRATED SUBTOTAL CHOLECYSTECTOMY LAPAROSCOPIC, EXTENSIVE LYSIS OF ADHESIONS.;  Surgeon: Chelle Butler MD;  [facility];  Service: General    COLONOSCOPY      COLONOSCOPY N/A " 03/14/2019    Procedure: COLONOSCOPY;  Surgeon: Van Dyson MD;  Location:  GI LAB;  Service: Gastroenterology    ESOPHAGOGASTRODUODENOSCOPY N/A 03/14/2019    Procedure: ESOPHAGOGASTRODUODENOSCOPY (EGD) W RFA(BARRX);  Surgeon: Van Dyson MD;  Location:  GI LAB;  Service: Gastroenterology    HERNIA REPAIR      umbilical hernia repair x2    LUMBAR LAMINECTOMY FOR EXCISION OF NEOPLASM Bilateral 1/7/2025    Procedure: T2-4 LAMINECTOMY THORACIC FOR TUMOR;  Surgeon: Ascencion Coulter MD;  Location:  MAIN OR;  Service: Neurosurgery    PLANTAR FASCIA SURGERY Left     OK ARTHRS KNE SURG W/MENISCECTOMY MED/LAT W/SHVG Right 04/04/2018    Procedure: ARTHROSCOPY KNEE PARTIAL MEDIAL MENISECTOMY , CHONDROPLASTY;  Surgeon: Rocio Roe DO;  [facility];  Service: Orthopedics    OK BREAST REDUCTION Bilateral 03/12/2021    Procedure: BREAST REDUCTION;  Surgeon: Cortez Sandoval MD;  Location:  MAIN OR;  Service: Plastics    OK COLONOSCOPY FLX DX W/COLLJ SPEC WHEN PFRMD N/A 02/20/2018    Procedure: COLONOSCOPY with polypectomy;  Surgeon: Apryl Garcia MD;  [facility]: [facility];  Service: General    OK ESOPHAGOGASTRODUODENOSCOPY TRANSORAL DIAGNOSTIC N/A 05/24/2017    Procedure: ESOPHAGOGASTRODUODENOSCOPY (EGD);  Surgeon: Marcello Street MD;  Location: [facility] GI LAB;  Service: Gastroenterology    OK ESOPHAGOGASTRODUODENOSCOPY TRANSORAL DIAGNOSTIC N/A 08/31/2017    Procedure: ESOPHAGOGASTRODUODENOSCOPY (EGD) W RFA;  Surgeon: Macho Aguayo MD;  Location: [facility];  Service: Gastroenterology    OK LAPS RPR RECURRENT INCISIONAL HERNIA REDUCIBLE N/A 01/21/2021    Procedure: REPAIR HERNIA INCISIONAL LAPAROSCOPIC W/ ROBOTICS, with mesh;  Surgeon: Errol Bermudez MD;  [facility];  Service: General    OK RPR AA HERNIA 1ST 3-10 CM REDUCIBLE N/A 2/19/2024    Procedure: VENTRAL HERNIA REPAIR 3CM WITH MESH;  Surgeon: Chelle Butler MD;  Location:  MAIN OR;  Service: General    WISDOM TOOTH EXTRACTION       Family  History   Problem Relation Age of Onset    Lung cancer Mother     Cancer Mother     Alcohol abuse Father     Other Father         Cardiac Disorder    Depression Father     Hypertension Father     Heart attack Father     Breast cancer Maternal Grandmother     Lung cancer Maternal Grandmother     Diabetes type I Other     No Known Problems Sister     No Known Problems Brother     No Known Problems Maternal Grandfather     Leukemia Paternal Grandmother     No Known Problems Paternal Grandfather     No Known Problems Sister     No Known Problems Maternal Aunt     No Known Problems Paternal Aunt     Stroke Neg Hx       Social History     Socioeconomic History    Marital status: Single     Spouse name: None    Number of children: None    Years of education: None    Highest education level: None   Occupational History    None   Tobacco Use    Smoking status: Never    Smokeless tobacco: Never   Vaping Use    Vaping status: Never Used   Substance and Sexual Activity    Alcohol use: Not Currently     Comment: rarely-every 3 mos    Drug use: Not Currently     Comment: medical marijuana 7 years ago    Sexual activity: Not Currently   Other Topics Concern    None   Social History Narrative         Social Drivers of Health     Financial Resource Strain: Low Risk  (10/3/2024)    Received from [employer]    Overall Financial Resource Strain (CARDIA)     Difficulty of Paying Living Expenses: Not hard at all   Food Insecurity: No Food Insecurity (1/2/2025)    Hunger Vital Sign     Worried About Running Out of Food in the Last Year: Never true     Ran Out of Food in the Last Year: Never true   Transportation Needs: No Transportation Needs (1/2/2025)    PRAPARE - Transportation     Lack of Transportation (Medical): No     Lack of Transportation (Non-Medical): No   Physical Activity: Insufficiently Active (10/3/2024)    Received from [employer]    Exercise Vital Sign     Days of Exercise per Week: 2 days     Minutes of  Exercise per Session: 30 min   Stress: No Stress Concern Present (10/3/2024)    Received from [facility]    [place] [employer] - Occupational Stress Questionnaire     Feeling of Stress : Only a little   Social Connections: Socially Isolated (10/3/2024)    Received from [facility]    Social Connection and Isolation Panel [NHANES]     Frequency of Communication with Friends and Family: More than three times a week     Frequency of Social Gatherings with Friends and Family: More than three times a week     Attends Jewish Services: Never     Active Member of Clubs or Organizations: No     Attends Club or Organization Meetings: Never     Marital Status: Never    Intimate Partner Violence: Unknown (12/31/2024)    Nursing IPS     Feels Physically and Emotionally Safe: Not on file     Physically Hurt by Someone: Not on file     Humiliated or Emotionally Abused by Someone: Not on file     Physically Hurt by Someone: No     Hurt or Threatened by Someone: No   Housing Stability: Unknown (1/2/2025)    Housing Stability Vital Sign     Unable to Pay for Housing in the Last Year: No     Number of Times Moved in the Last Year: Not on file     Homeless in the Last Year: No          Current Medical Diagnosis Allergies   Patient Active Problem List   Diagnosis    Chronic low back pain    Chronic pain syndrome    Chronic venous insufficiency    Depression, recurrent (HCC)    Hyperlipidemia    Lumbar postlaminectomy syndrome    Primary osteoarthritis of left hip    Restless leg syndrome    Sleep disturbance    Hernia of anterior abdominal wall    S/P right knee arthroscopy    Environmental allergies    Lumbar spondylosis    Lumbar radiculopathy    Prediabetes    Vitamin D deficiency    Dolan's esophagus with dysplasia    Gastroesophageal reflux disease    Laryngopharyngeal reflux (LPR)    Chronic cough    Vocal fold paresis, right    Dysphonia    Muscle tension dysphonia    Glottic insufficiency  "   Reflux laryngitis    Laryngeal edema    Allergic rhinitis due to [employer] house dust mite    Mild intermittent asthma without complication    Neck pain    Cervical spondylosis without myelopathy    Osteoarthritis of multiple joints    Sacroiliitis, not elsewhere classified (HCC)    Lipoma of torso    Recurrent umbilical hernia    Macromastia    Long-term current use of opiate analgesic    Uncomplicated opioid dependence (HCC)    Motion sickness    Recurrent incisional hernia    Podagra    MARV (iron deficiency anemia)    Myofascial pain syndrome    Ventral hernia without obstruction or gangrene    Lymphedema    Ambulatory dysfunction    Class 1 obesity due to excess calories without serious comorbidity with body mass index (BMI) of 34.0 to 34.9 in adult    Compression of thoracic spinal cord with myelopathy (HCC)    Allergies   Allergen Reactions    Dust Mite Extract Shortness Of Breath    Latex Blisters, Itching, Other (See Comments) and Rash     contact    Other Other (See Comments)     Seasonal AND cock roaches    Sulfa Antibiotics GI Intolerance, Other (See Comments) and Vomiting     Topical-blistering    \"intense vomiting\"           Medical Necessity Criteria for ARC Admission: Leukocystosis. In addition, the preadmission screen, post-admission physical evaluation, overall plan of care and admissions order demonstrate a reasonable expectation that the following criteria were met at the time of admission to the [facility].  The patient requires active and ongoing therapeutic intervention of multiple therapy disciplines (physical therapy, occupational therapy, speech-language pathology, or prosthetics/orthotics), one of which is physical or occupational therapy.    Patient requires an intensive rehabilitation therapy program, as defined in Chapter 1, section 110.2.2 of the CMS Medicare Policy Manual. This intensive rehabilitation therapy program will consist of at least 3 hours of therapy per day at least 5 days per " week or at least 15 hours of intensive rehabilitation therapy within a 7 consecutive day period, beginning with the date of admission to the [facility].    The patient is reasonably expected to actively participate in, and benefit significantly from, the intensive rehabilitation therapy program as defined in Chapter 1, section 110.2.2 of the CMS Medicare Policy Manual at this time of admission to the [facility]. She can reasonably be expected to make measurable improvement (that will be of practical value to improve the patient’s functional capacity or adaptation to impairments) as a result of the rehabilitation treatment, as defined in section 110.3, and such improvement can be expected to be made within the prescribed period of time. As noted in the CMS Medicare Policy Manual, the patient need not be expected to achieve complete independence in the domain of self-care nor be expected to return to his or her prior level of functioning in order to meet this standard.  The patient must require physician supervision by a rehabilitation physician. As such, a rehabilitation physician will conduct face-to-face visits with the patient at least 3 days per week throughout the patient’s stay in the [facility] to assess the patient both medically and functionally, as well as to modify the course of treatment as needed to maximize the patient’s capacity to benefit from the rehabilitation process.  The patient requires an intensive and coordinated interdisciplinary approach to providing rehabilitation, as defined in Chapter 1, section 110.2.5 of the CMS Medicare Policy Manual. This will be achieved through periodic team conferences, conducted at least once in a 7-day period, and comprising of an interdisciplinary team of medical professionals consisting of: a rehabilitation physician, registered nurse,  and/or , and a licensed/certified therapist from each therapy discipline involved in treating the patient.       ** Please  Note: Fluency Direct voice to text software may have been used in the creation of this document. **

## 2025-01-15 NOTE — ASSESSMENT & PLAN NOTE
Vitamin B12 borderline low, s/p cyanocobalamin injection  Continue home dose Mirapex 0.5 mg qhs  Low iron stores and low percent saturation on iron panel, see below   Follow-up with her outpatient providers in [place] for further management

## 2025-01-15 NOTE — PLAN OF CARE
Problem: PAIN - ADULT  Goal: Verbalizes/displays adequate comfort level or baseline comfort level  Description: Interventions:  - Encourage patient to monitor pain and request assistance  - Assess pain using appropriate pain scale  - Administer analgesics based on type and severity of pain and evaluate response  - Implement non-pharmacological measures as appropriate and evaluate response  - Consider cultural and social influences on pain and pain management  - Notify physician/advanced practitioner if interventions unsuccessful or patient reports new pain  Outcome: Progressing     Problem: INFECTION - ADULT  Goal: Absence or prevention of progression during hospitalization  Description: INTERVENTIONS:  - Assess and monitor for signs and symptoms of infection  - Monitor lab/diagnostic results  - Monitor all insertion sites, i.e. indwelling lines, tubes, and drains  - Monitor endotracheal if appropriate and nasal secretions for changes in amount and color  - [place] appropriate cooling/warming therapies per order  - Administer medications as ordered  - Instruct and encourage patient and family to use good hand hygiene technique  - Identify and instruct in appropriate isolation precautions for identified infection/condition  Outcome: Progressing     Problem: SAFETY ADULT  Goal: Patient will remain free of falls  Description: INTERVENTIONS:  - Educate patient/family on patient safety including physical limitations  - Instruct patient to call for assistance with activity   - Consult OT/PT to assist with strengthening/mobility   - Keep Call bell within reach  - Keep bed low and locked with side rails adjusted as appropriate  - Keep care items and personal belongings within reach  - Initiate and maintain comfort rounds  - Make Fall Risk Sign visible to staff  - Offer Toileting every 2 Hours, in advance of need  - Initiate/Maintain alarm  - Obtain necessary fall risk management equipment  - Apply yellow socks and  bracelet for high fall risk patients  - Consider moving patient to room near nurses station  Outcome: Progressing  Goal: Maintain or return to baseline ADL function  Description: INTERVENTIONS:  -  Assess patient's ability to carry out ADLs; assess patient's baseline for ADL function and identify physical deficits which impact ability to perform ADLs (bathing, care of mouth/teeth, toileting, grooming, dressing, etc.)  - Assess/evaluate cause of self-care deficits   - Assess range of motion  - Assess patient's mobility; develop plan if impaired  - Assess patient's need for assistive devices and provide as appropriate  - Encourage maximum independence but intervene and supervise when necessary  - Involve family in performance of ADLs  - Assess for home care needs following discharge   - Consider OT consult to assist with ADL evaluation and planning for discharge  - Provide patient education as appropriate  Outcome: Progressing  Goal: Maintains/Returns to pre admission functional level  Description: INTERVENTIONS:  - Perform AM-PAC 6 Click Basic Mobility/ Daily Activity assessment daily.  - Set and communicate daily mobility goal to care team and patient/family/caregiver.   - Collaborate with rehabilitation services on mobility goals if consulted  - Perform Range of Motion 3 times a day.  - Reposition patient every 2 hours.  - Dangle patient 3 times a day  - Stand patient 3 times a day  - Ambulate patient 3 times a day  - Out of bed to chair 3 times a day   - Out of bed for meals 3 times a day  - Out of bed for toileting  - Record patient progress and toleration of activity level   Outcome: Progressing     Problem: Knowledge Deficit  Goal: Patient/family/caregiver demonstrates understanding of disease process, treatment plan, medications, and discharge instructions  Description: Complete learning assessment and assess knowledge base.  Interventions:  - Provide teaching at level of understanding  - Provide teaching via  preferred learning methods  Outcome: Progressing     Problem: NEUROSENSORY - ADULT  Goal: Achieves stable or improved neurological status  Description: INTERVENTIONS  - Monitor and report changes in neurological status  - Monitor vital signs such as temperature, blood pressure, glucose, and any other labs ordered   - Initiate measures to prevent increased intracranial pressure  - Monitor for seizure activity and implement precautions if appropriate      Outcome: Progressing  Goal: Achieves maximal functionality and self care  Description: INTERVENTIONS  - Monitor swallowing and airway patency with patient fatigue and changes in neurological status  - Encourage and assist patient to increase activity and self care.   - Encourage visually impaired, hearing impaired and aphasic patients to use assistive/communication devices  Outcome: Progressing

## 2025-01-15 NOTE — PROGRESS NOTES
Progress Note - Neurosurgery   Name: Hayley Rios 64 y.o. female I MRN: [id number]  Unit/Bed#: [facility] 626-01 I Date of Admission: 12/31/2024   Date of Service: 1/15/2025 I Hospital Day: 15    Assessment & Plan  Compression of thoracic spinal cord with myelopathy (HCC)  POD#8 - s/p T2-4 laminectomy for resection of thoracic tumor (EM 1/7)   T3 lesion w/ concern of progressively worsening myelopathy  P/w progressively worsening bilateral lower extremity, L >R, weakness and balance difficulty x approx 2 months   Acutely worsened over 2 weeks prior to admission, requiring use of a rolling walker    Imaging:   MRI Cervical spine 1/6/25: Redemonstration of meningioma in the posterior left aspect of the canal at T3. Compared with 2022, mass is mildly increased in size and marked cord compression is also mildly increased. Focal cord edema at T3 not excluded but there is no edema in the immediately above or below the level of compression. Stable degenerative spondylosis in the cervical spine most pronounced at C5-6  MRI cervical spine 1/2/2025: Partially nondiagnostic exam due to motion artifact.  T3 lesion likely mildly increased in size based on evaluation and sagittal imaging.  CT cervical spine 1/2/2025: Partially calcified extramedullary T3 lesion slightly larger than prior MRI report.  CT thoracic and lumbar spine 1/2/2025: Thoracolumbar fusion.  Chronic disc and facet degenerative changes at L4-5 resulting in mild canal stenosis and severe foraminal narrowing.    Plan:   Continue to closely monitor neuro exam   Frequent neuro checks per primary team  Complete MAP goals > 85 x 24hrs post-op   BRAYAN drain removed 1/10  Continue local wound care to incision   Keep clean and dry --> to be wipe daily with a MARY wipe   Pt will shower again today prior to d/c   May be left open to air   Completed decadron wean post-op  Continue to hold all AC/AP meds x at least 2 weeks post-op   No reports use at home prior to arrival   DVT  ppx: SCDs, SQH   Medical management per primary team  Pain control per primary team   PT/OT  --> recommended rehab   Social work following or assistance with dispo once medically cleared    Accepted at [facility], ad insurance auth obtained. Pt to d/c to [facility] this afternoon.     Neurosurgery will follow peripherally.  Plan for outpatient follow-up in 2 weeks for incision check and pathology review in 6 weeks with MD. please reach out with any further questions or concerns.     Lymphedema  LE lymphedema  Evident on exam   Ambulatory dysfunction  Likely secondary to underlying myelopathy  Could be multifactorial with underlying peripheral neuropathy and chronic podiatric issues s/p multiple surgeries.   Improving post-op  Agree with plan for rehab to build strengthening and balance     Please contact the SecureChat role for the Neurosurgery service with any questions/concerns.    Subjective   Patient seen and examined this a.m. on rounds.  No acute events overnight.  Patient remains neurologically stable with ongoing improving left lower extremity weakness, balance, and paresthesias from T3 sensory level down.  Patient did report some fluctuating paresthesias in bilateral lower extremities yesterday but these seem to have improved today that she feels that is related to her moving around more and having stopped/completed her Decadron wean yesterday.  Patient states her pain is much better controlled today.  She is ready for discharge to rehab today.  Patient states she will shower this afternoon prior to her discharge.    Objective :  Temp:  [97.9 °F (36.6 °C)-98.2 °F (36.8 °C)] 98 °F (36.7 °C)  HR:  [65-85] 85  BP: (100-154)/(52-80) 147/70  Resp:  [17-20] 17  SpO2:  [95 %-98 %] 97 %  O2 Device: None (Room air)    I/O         01/13 0701  01/14 0700 01/14 0701  01/15 0700 01/15 0701  01/16 0700    P.O. 1430 960 540    I.V. (mL/kg)       IV Piggyback       Total Intake(mL/kg) 1430 (14.8) 960 (9.8) 540 (5.5)    Net +1430 +960  "+540           Unmeasured Urine Occurrence 3 x 3 x     Unmeasured Stool Occurrence 1 x            Physical Exam   General appearance: alert, appears stated age, cooperative and no distress  Head: Normocephalic, without obvious abnormality, atraumatic  Eyes: EOMI, PERRL  Neck: supple, symmetrical, trachea midline and NT  Back:  - midline posterior thoracic incision with clean, dry, intact.  No surrounding erythema, edema or drainage.  -BRAYAN drain removed and site closed with 1 suture.  Remains clean dry and intact  Lungs: non labored breathing, no resp distress on room air   Heart: regular heart rate  Neurologic:   Mental status: Alert, oriented x3, thought content appropriate  Cranial nerves: grossly intact (Cranial nerves II-XII)  Sensory:   -T3 sensory level appreciated, significantly improved with some mild paresthesias throughout from T3 down   Motor: moving all extremities  - brandi UE: strength intact, 5/5 throughout   - RLE: strength intact 5/5 throughout   - LLE: 4+/5 throughout   Reflexes: No further clonus appreciated in RLE, mild sustained clonus noted to LLE      Lab Results: I have reviewed the following results:  No results for input(s): \"WBC\", \"HGB\", \"HCT\", \"PLT\", \"BANDSPCT\", \"SODIUM\", \"K\", \"CL\", \"CO2\", \"BUN\", \"CREATININE\", \"GLUC\", \"CAIONIZED\", \"MG\", \"PHOS\", \"AST\", \"ALT\", \"ALB\", \"TBILI\", \"DBILI\", \"ALKPHOS\", \"PTT\", \"INR\", \"HSTNI0\", \"HSTNI2\", \"BNP\", \"LACTICACID\" in the last 72 hours.    VTE Pharmacologic Prophylaxis: Sequential compression device (Venodyne)  and Heparin    "

## 2025-01-15 NOTE — ASSESSMENT & PLAN NOTE
Presented to [facility] with worsening of chronic LLE weakness, numbness/tingling of LLE>RLE, and muscle spasms. Known history of thoracic meningioma with spinal cord displacement and compression, lost to follow up due to move to [place].  MRI T spine: Suboptimal examination due to mild, moderate, and severe motion artifact - worse on postcontrast sequences. Similar 1.4 cm intradural extramedullary probable meningioma in left posterolateral thoracic spine region at approximately T3 level with associated marked spinal cord compression with severe canal stenosis given motion degraded exam. No cord edema given motion degradation.   MRI L spine: Suboptimal examination due to moderate-to-severe motion degraded exam of lumbar spine and 3 plane localizer images, sagittal T1 post contrast and axial T1 postcontrast sequences. No abnormal enhancement in lumbar spine given limitations of motion degradation.   Neurosurgery consult and recommendations appreciated   T3 lesion concerning for progressively worsening myelopathy  S/p T2-4 laminectomy for resection of thoracic tumor by Dr. Coulter on 1/7/25  Pathology consistent with meningioma   Drain removed 1/10  Hold AC/AP meds x at least 2 weeks post-op; cleared to resume DVT ppx    Status post accelerated dexamethasone taper, completed 1/14/25  Follow-up 2 weeks postop (around  1/24) and 6 weeks postop (around 2/21)   Continue current pain regimen (adjusted on 1/14): scheduled Tylenol 975 mg q8h, home dose Cymbalta 60 mg daily, scheduled Robaxin 1,000 mg q8h, Valium 2 mg q6h PRN, Toradol 10 mg q6h PRN (day 2/5 today),  PO dilaudid 2-4 mg q4h PRN moderate/severe pain  PT/OT recommending rehab, discharge to [facility]

## 2025-01-15 NOTE — PLAN OF CARE
Problem: PAIN - ADULT  Goal: Verbalizes/displays adequate comfort level or baseline comfort level  Description: Interventions:  - Encourage patient to monitor pain and request assistance  - Assess pain using appropriate pain scale  - Administer analgesics based on type and severity of pain and evaluate response  - Implement non-pharmacological measures as appropriate and evaluate response  - Consider cultural and social influences on pain and pain management  - Notify physician/advanced practitioner if interventions unsuccessful or patient reports new pain  Outcome: Progressing     Problem: INFECTION - ADULT  Goal: Absence or prevention of progression during hospitalization  Description: INTERVENTIONS:  - Assess and monitor for signs and symptoms of infection  - Monitor lab/diagnostic results  - Monitor all insertion sites, i.e. indwelling lines, tubes, and drains  - Monitor endotracheal if appropriate and nasal secretions for changes in amount and color  - [place] appropriate cooling/warming therapies per order  - Administer medications as ordered  - Instruct and encourage patient and family to use good hand hygiene technique  - Identify and instruct in appropriate isolation precautions for identified infection/condition  Outcome: Progressing

## 2025-01-15 NOTE — DISCHARGE SUMMARY
Discharge Summary - Hospitalist   Name: Hayley Rios 64 y.o. female I MRN: [id number]  Unit/Bed#: [facility] 626-01 I Date of Admission: 12/31/2024   Date of Service: 1/15/2025 I Hospital Day: 15     Assessment & Plan  Compression of thoracic spinal cord with myelopathy (HCC)  Presented to [facility] with worsening of chronic LLE weakness, numbness/tingling of LLE>RLE, and muscle spasms. Known history of thoracic meningioma with spinal cord displacement and compression, lost to follow up due to move to [place].  MRI T spine: Suboptimal examination due to mild, moderate, and severe motion artifact - worse on postcontrast sequences. Similar 1.4 cm intradural extramedullary probable meningioma in left posterolateral thoracic spine region at approximately T3 level with associated marked spinal cord compression with severe canal stenosis given motion degraded exam. No cord edema given motion degradation.   MRI L spine: Suboptimal examination due to moderate-to-severe motion degraded exam of lumbar spine and 3 plane localizer images, sagittal T1 post contrast and axial T1 postcontrast sequences. No abnormal enhancement in lumbar spine given limitations of motion degradation.   Neurosurgery consult and recommendations appreciated   T3 lesion concerning for progressively worsening myelopathy  S/p T2-4 laminectomy for resection of thoracic tumor by Dr. Coulter on 1/7/25  Pathology consistent with meningioma   Drain removed 1/10  Hold AC/AP meds x at least 2 weeks post-op; cleared to resume DVT ppx    Status post accelerated dexamethasone taper, completed 1/14/25  Follow-up 2 weeks postop (around  1/24) and 6 weeks postop (around 2/21)   Continue current pain regimen (adjusted on 1/14): scheduled Tylenol 975 mg q8h, home dose Cymbalta 60 mg daily, scheduled Robaxin 1,000 mg q8h, Valium 2 mg q6h PRN, Toradol 10 mg q6h PRN (day 2/5 today),  PO dilaudid 2-4 mg q4h PRN moderate/severe pain  PT/OT recommending rehab, discharge to [facility]   Restless leg  EKG in office maintaining normal sinus rhythm on amiodarone  Denies any recurrent palpitations    CHADS VASc 3 with unfavorable risk-benefit ratio to anticoagulation due to frequent falls.  Maintained on DAPT and maintenance of normal sinus rhythm.   syndrome  Vitamin B12 borderline low, s/p cyanocobalamin injection  Continue home dose Mirapex 0.5 mg qhs  Low iron stores and low percent saturation on iron panel, see below   Follow-up with her outpatient providers in [place] for further management  MARV (iron deficiency anemia)  Iron panel 12/31/24: iron 40, ferritin 9, iron saturation 40%, TIBC 396  S/p IV Venofer x3, repeat iron panel reviewed and improved.    Monitor CBC and transfuse for hemoglobin < 7  Prediabetes  Evidenced by A1c of 5.8%  Sugars stable on BMP   Defer accuchecks/SSI  Lymphedema  Continue home dose Lasix  Ambulatory dysfunction  Due to primary problem, see plan of care outlined above      Medical Problems       Resolved Problems  Date Reviewed: 1/10/2025   None       Discharging Physician / Practitioner: Ramila Duckworth PA-C  PCP: Mau Garcia DO  Admission Date:   Admission Orders (From admission, onward)       Ordered        12/31/24 2217  INPATIENT ADMISSION  Once                          Discharge Date: 01/15/25    Consultations During Hospital Stay:  Neurosurgery     Procedures Performed:   T2-4 laminectomy for resection of thoracic tumor by Dr. Coulter on 1/7/25    Significant Findings / Test Results:   Outlined above     Incidental Findings:   None     Test Results Pending at Discharge (will require follow up):   None      Outpatient Tests Requested:  Outpatient follow-up with Neurosurgery     Complications:  none     Reason for Admission: Compression of thoracic spinal cord with myelopathy    Hospital Course:   Hayley Rios is a 64 y.o. female patient who originally presented to the hospital on 12/31/2024 due to progressively worsening acute on chronic left lower extremity weakness and paresthesias as well as muscle spasms of bilateral lower extremities in the setting of known thoracic meningioma.  Patient was transferred to [facility] for neurosurgery evaluation, concern that T3 lesion causing progressively worsening myelopathy.   "Patient underwent T2-T4 laminectomy for resection of thoracic tumor on 1/7/2025.  Pathology consistent with meningioma.  Patient was evaluated by therapy postoperatively and recommended acute rehab.  Pain persists but overall well-controlled on multimodal regimen as outlined above, recommending continue on discharge with ongoing adjustments at rehab.  Patient discharged to [facility] in stable condition.    Of note patient received IV Venofer x 3 doses in the setting of iron deficiency anemia.    Please see above list of diagnoses and related plan for additional information.     Condition at Discharge: fair    Discharge Day Visit / Exam:   Subjective: Patient reports improvement in pain today.  She expresses concern regarding bilateral lower extremity paresthesias, which we discussed is likely to be expected but would clarify with neurosurgery.  Otherwise no new complaints today.  Vitals: Blood Pressure: 158/75 (01/15/25 1125)  Pulse: 86 (01/15/25 1125)  Temperature: 98 °F (36.7 °C) (01/15/25 1125)  Temp Source: Oral (01/14/25 1213)  Respirations: 17 (01/15/25 1125)  Height: 5' 4\" (162.6 cm) (12/31/24 2300)  Weight - Scale: 98.1 kg (216 lb 3.2 oz) (01/15/25 0600)  SpO2: 97 % (01/15/25 1125)  Physical Exam  Vitals and nursing note reviewed.   Constitutional:       General: She is not in acute distress.     Appearance: She is obese.   Cardiovascular:      Rate and Rhythm: Normal rate.   Pulmonary:      Effort: Pulmonary effort is normal. No respiratory distress.   Musculoskeletal:      Right lower leg: Edema present.      Left lower leg: Edema present.      Comments: Chronic bilateral lower extremity lymphedema   Neurological:      General: No focal deficit present.      Mental Status: She is alert and oriented to person, place, and time. Mental status is at baseline.          Discussion with Family: Patient declined call to .     Discharge instructions/Information to patient and family:   See after visit " summary for information provided to patient and family.      Provisions for Follow-Up Care:  See after visit summary for information related to follow-up care and any pertinent home health orders.      Mobility at time of Discharge:   Basic Mobility Inpatient Raw Score: 22  JH-HLM Goal: 7: Walk 25 feet or more  JH-HLM Achieved: 7: Walk 25 feet or more  HLM Goal achieved. Continue to encourage appropriate mobility.     Disposition:   Acute Rehab at [facility]    Planned Readmission: no    Discharge Medications:  See after visit summary for reconciled discharge medications provided to patient and/or family.      Administrative Statements   Discharge Statement:  I have spent a total time of 40 minutes in caring for this patient on the day of the visit/encounter.    **Please Note: This note may have been constructed using a voice recognition system**

## 2025-01-15 NOTE — ASSESSMENT & PLAN NOTE
Hx 6 hernia surgeries, most recent 10/24/24 w/ hx bowel obstruction  Monitor BM, abominal pain, cw bowel meds

## 2025-01-15 NOTE — ASSESSMENT & PLAN NOTE
Cw tylenol 975 Q8h, cymbalta 60 mg daily, robaxin 1g Q8h  PRN: dilaudid 2-4 mg Q4h, tordol 10 mg Q6h, Valium 2mg Q6h  Nursing writing down next PRN dose in pt room  Titrate opioids as able  Ice/heat PRN, lidocaine patch bilateral shoulders either side of incision

## 2025-01-15 NOTE — ASSESSMENT & PLAN NOTE
Continue home Mirapex 0.5mg at HS.  Had been on Mirapex 0.25mg TID in addition at home but pt would like to continue without at this time.

## 2025-01-15 NOTE — ASSESSMENT & PLAN NOTE
Presented on 12/30 with LLE radiculopathy that had been worsening over the past week.  Hx of known thoracic meningioma and prior back surgeries (scoliosis s/p Guerra nydia).  MRI thoracic spine showed similar 1.4cm intradural extramedullary probable meningioma in the L posterolateral thoracic spine region at approximately T3 level with associated marked spinal cord compression with severe canal stenosis.  MRI C-spine showed re-demonstration of meningioma in the posterior L aspect of the canal at T3, compared with 2022 - mass is mildly increased in size and marked cord compression also mildly increase.    OR on 1/7 for bilateral T2-T4 laminectomy with resection of tumor performed by Dr. Coulter.  Pathology consistent with meningioma.    Hold AC/AP for at least 2 weeks post-op.  Neurovascular checks Q shift.  Ensure adequate pain control.  Monitor incision for s/s of infection.    Follow-up with Neurovascular in 2 weeks (1/21) and in 6 weeks.    Primary team following.  PT/OT.

## 2025-01-15 NOTE — CASE MANAGEMENT
Rec'd vm from Roseanne at [facility] calling to confirm admission date and obtain cm information. P# [phone]. Or can be faxed to [phone].    Call made back to Roseanne @ [facility] and notified that patient remains in hospital at this time. Notified Roseanne if anything needs to be adjusted/done for the acute rehab auth she can call [facility] back.

## 2025-01-15 NOTE — CASE MANAGEMENT
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) 2024     Lymphedema 2024     Ambulatory dysfunction 2024     Class 1 obesity due to excess calories without serious comorbidity with body mass index (BMI) of 34.0 to 34.9 in adult 2024     Ventral hernia without obstruction or gangrene 2024     Myofascial pain syndrome 2022     MARV (iron deficiency anemia) 2022     Podagra 2021     Motion sickness      Recurrent incisional hernia      Macromastia 2020     Long-term current use of opiate analgesic 2020     Uncomplicated opioid dependence (HCC) 2020     Recurrent umbilical hernia 2020     Lipoma of torso 2020     Sacroiliitis, not elsewhere classified (HCC)      Osteoarthritis of multiple joints 02/10/2020     Neck pain      Cervical spondylosis without myelopathy      Chronic cough 2019     Vocal fold paresis, right 2019     Dysphonia 2019     Muscle tension dysphonia 2019     Glottic insufficiency 2019     Reflux laryngitis 2019     Laryngeal edema 2019     Allergic rhinitis due to [employer] dust mite 2019     Mild intermittent asthma without complication 2019     Laryngopharyngeal reflux (LPR) 2019     Dolan's esophagus with dysplasia 2019     Gastroesophageal reflux disease 2019     Prediabetes 2018     Vitamin D deficiency 2018     Lumbar radiculopathy 10/01/2018     Lumbar spondylosis      Environmental allergies 2018     S/P right knee arthroscopy 2018     Hernia of anterior abdominal  wall 02/05/2018     Sleep disturbance 11/16/2017     Hyperlipidemia 08/11/2017     Primary osteoarthritis of left hip 07/26/2017     Restless leg syndrome 07/11/2017     Chronic venous insufficiency 06/02/2017     Chronic low back pain 04/11/2017     Lumbar postlaminectomy syndrome 04/11/2017     Chronic pain syndrome 03/16/2017     Depression, recurrent (HCC) 03/16/2017       LOS (days): 15  Geometric Mean LOS (GMLOS) (days): 4.1  Days to GMLOS:-10.5     OBJECTIVE:  Risk of Unplanned Readmission Score: 16.15   Current admission status: Inpatient   Preferred Pharmacy:   [id number] - [place], [place] - [address]  [address] [address]  Phone: [phone] Fax: [phone]    [id number] [address] [facility] [facility] [address]  [facility] [address]  Phone: [phone] Fax: [phone]    Primary Care Provider: Mau Garcia DO    Primary Insurance: [employer]  REP  Secondary Insurance: AARP  REP    DISCHARGE DETAILS:    Discharge planning discussed with:: Patient  Freedom of Choice: Yes  Comments - Freedom of Choice: Discussed FOC  CM contacted family/caregiver?: Yes  Were Treatment Team discharge recommendations reviewed with patient/caregiver?: Yes  Did patient/caregiver verbalize understanding of patient care needs?: N/A- going to facility  Were patient/caregiver advised of the risks associated with not following Treatment Team discharge recommendations?: Yes    Other Referral/Resources/Interventions Provided:  Interventions: Acute Rehab  Referral Comments: [facility] able to accept today. pt in agreement with dcp.    Treatment Team Recommendation: Acute Rehab  Discharge Destination Plan:: Acute Rehab  Transport at Discharge : Wheelchair van    Number/Name of Dispatcher: [facility]  Transported by (Company and Unit #): [employer]  ETA of Transport (Date): 01/15/25  ETA of Transport (Time):  1300    Accepting Facility Name, City & State : [facility]  Receiving Facility/Agency Phone Number: [phone]

## 2025-01-15 NOTE — ASSESSMENT & PLAN NOTE
Vitamin B12 level 346 on 1/1/25.  Received cyanocobalamin injections in acute setting.  Start on PO cyanocobalamin daily.

## 2025-01-15 NOTE — ASSESSMENT & PLAN NOTE
Hx known T3 spinal meningioma presented to [facility] with left lower extremity weakness and spasms  MRI T spine: Suboptimal examination due to mild, moderate, and severe motion artifact - worse on postcontrast sequences. Similar 1.4 cm intradural extramedullary probable meningioma in left posterolateral thoracic spine region at approximately T3 level with associated marked spinal cord compression with severe canal stenosis given motion degraded exam. No cord edema given motion degradation.   MRI L spine: Suboptimal examination due to moderate-to-severe motion degraded exam of lumbar spine and 3 plane localizer images, sagittal T1 post contrast and axial T1 postcontrast sequences. No abnormal enhancement in lumbar spine given limitations of motion degradation.   S/p T2-4 laminectomy for resection of thoracic tumor by Dr. Coulter 1/7, pathology consistent with meningioma, cleared to resume DVT ppx  Keep incision clean and dry and wipe daily w/ MARY wipe, can be left open to air  Completed dexamethasone taper 1/14, 2 week fu ~1/24  6 week surgeon f/u ~ 2/21  C/w PT/OT

## 2025-01-15 NOTE — ASSESSMENT & PLAN NOTE
Likely secondary to underlying myelopathy  Could be multifactorial with underlying peripheral neuropathy and chronic podiatric issues s/p multiple surgeries.   Improving post-op  Agree with plan for rehab to build strengthening and balance

## 2025-01-15 NOTE — H&P
PHYSICAL MEDICINE AND REHABILITATION ARC REHAB CONSULT  Hayley Rios 64 y.o. 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 female who Hayley is a 64 y.o. female who has a past medical history of but not limited to obesity, lymphedema, multiple spinal surgeries, lumbar spinal stenosis with radiculopathy, known T3 spinal meningioma lost to follow-up who initially was admitted to the [facility] 12/30/2024-12/31/2024 with worsening left lower extremity weakness and spasming. She underwent multiple imaging studies as below throughout the hospitalization and of particular note noted with MRI lumbar spine with contrast limited due to motion artifact however without abnormal enhancement in the lumbar spine given the limitations of motion degradation noted, additionally MRI thoracic spine with/without contrast was performed redemonstrating the known T3 level meningioma with associated marked spinal cord compression with severe canal stenosis. She was transferred to [facility] on 12/31/24 for further eval/assessment/consultation/sx intervention. CT of spine 1/2/25 - Partially calcified extra medullary T3 lesion is slightly larger than on the prior MRI, measuring 1.8 x 1.4 x 1.2 cm ; MRI of spine 1/6/25 - Redemonstration of meningioma in the posterior left aspect of the canal at T3. Compared with 2022, mass is mildly increased in size and marked cord compression is also mildly increased. Focal cord edema at T3 not excluded but there is no edema in the immediately above or below the level of compression. On 1/7/25 patient underwent sx intervention - Bilateral  - T2-4 LAMINECTOMY THORACIC FOR TUMOR. Initial BRAYAN drain - removed 1/10 by nsx d/c iv ancef. Completed decadron wean on 1/14/25. Pain control was managed by primary team with most recent changes 1/14/25 and 1/15/25 currently on PO PRN analgesics. PT/OT therapies were consulted and continue to follow patient at this time and are recommending inpatient acute rehab when medically stable. All involved medical disciplines feel/agree patient is medically ready for discharge at this time. Inpatient acute rehabilitation physician was consulted. Upon review of patient's case and correspondence with PT/OT therapy services, [facility] physician feels Hayley will benefit and is a good candidate / appropriate for inpatient acute rehab at this time. She has demonstrated the willingness / desire and tolerance to participate in the required 3 hours or more of therapies per day.     Subjective: Hayley was seen on her bed today. She reports some neck pain, itching at incision site, and some weakness in the legs. States stomach feels distended and her last BM was 1/15 and previously before admission.    Review of Systems: A 10-point review of systems was performed. Negative except as listed above.    Plan:     Acute pain  Assessment & Plan  Cw Acetaminophen, robaxin  PRN dilaudid, tordol    Vitamin B12 deficiency  Assessment & Plan  Cw Cyancobalmin  F/u outpt providers    Leukocytosis  Assessment & Plan  11.52 1/12  Monitoring with CBC    Ambulatory dysfunction  Assessment & Plan  Patient was evaluated by the rehabilitation team MD and an appropriate candidate for acute inpatient rehabilitation program at this time.  The patient will tolerate 3 hours/day 5 to 7 days/week of intensive physical, occupational and speech therapy in order to obtain goals for community discharge  Due to the patient's functional Compared to their baseline level of function in addition to their ongoing medical needs, the patient would benefit from daily supervision from  a rehabilitation physician as well as rehabilitation nursing to implement and adjust the medical as well as functional plan of care in order to meet the patient's goals.     Lymphedema  Assessment & Plan  Cw home lasix    Ventral hernia without obstruction or gangrene  Assessment & Plan  Monitor BW    MARV (iron deficiency anemia)  Assessment & Plan  Monitor CBC, transfuse for hemoglobin <7    Gastroesophageal reflux disease  Assessment & Plan  CW protonix  PRN Tums, maalox    Vitamin D deficiency  Assessment & Plan  Cw vitamin D supplementation    Prediabetes  Assessment & Plan  A1c 5.8%    Restless leg syndrome  Assessment & Plan  Cw pramipexole    Depression, recurrent (HCC)  Assessment & Plan  Will monitor mood/behavior and consult neuropsych if needed    * 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 Similar 1.4 cm intradural extramedullary probable meningioma in left posterolateral thoracic spine region at approximately T3 level with associated marked spinal cord compression with severe canal stenosis given motion degraded exam. No cord edema given motion degradation.   MRI L spine: Suboptimal examination due to moderate-to-severe motion degraded exam of lumbar spine and 3 plane localizer images, sagittal T1 post contrast and axial T1 postcontrast sequences. No abnormal enhancement in lumbar spine given limitations of motion degradation.   S/p T2-4 laminectomy for resection of thoracic tumor by Dr. Coulter 1/7           Drug regimen reviewed, all potential adverse effects identified and addressed:    Scheduled Meds:  Current Facility-Administered Medications   Medication Dose Route Frequency Provider Last Rate    acetaminophen  975 mg Oral Q8H Samantha Estrella  MUNA Allen      aluminum-magnesium hydroxide-simethicone  30 mL Oral Q4H PRN Samantha Allen PA-C      bisacodyl  10 mg Rectal Daily PRN Samantha Allen PA-C      calcium carbonate  1,000 mg Oral TID PRN Samantha Allen PA-C      [START ON 1/16/2025] cholecalciferol  2,000 Units Oral Daily ROCK YanNP      [START ON 1/16/2025] cyanocobalamin  1,000 mcg Oral Daily ROGE Yan      diazepam  2 mg Oral Q6H PRN Samantha Allen PA-C      diphenhydrAMINE  25 mg Oral Q6H PRN Samantha Allen PA-C      docusate sodium  100 mg Oral BID Samantha Allen PA-C      [START ON 1/16/2025] DULoxetine  60 mg Oral Daily Samantha Allen PA-C      [START ON 1/16/2025] enoxaparin  40 mg Subcutaneous Q24H [facility] Loni Wong MD      [START ON 1/16/2025] furosemide  20 mg Oral Daily Samantha Allen PA-C      heparin (porcine)  5,000 Units Subcutaneous Q8H [facility] Loni Wong MD      HYDROmorphone  2 mg Oral Q4H PRN Samantha Allen PA-C      HYDROmorphone  4 mg Oral Q4H PRN Samantha Allen PA-C      ketorolac  10 mg Oral Q6H PRN Samantha Allen PA-C      [START ON 1/16/2025] magnesium Oxide  400 mg Oral Daily ROCK YanNP      melatonin  3 mg Oral HS Samantha Allen PA-C      methocarbamol  1,000 mg Oral Q8H [facility] Samantha Allen PA-C      ondansetron  4 mg Oral Q6H PRN Samantha Allen PA-C      [START ON 1/16/2025] pantoprazole  40 mg Oral Early Morning Samantha Allen PA-C      [START ON 1/16/2025] polyethylene glycol  17 g Oral Daily Samantha Allen PA-C      [START ON 1/16/2025] potassium chloride  40 mEq Oral Daily With Breakfast Samantha Allen PA-C      pramipexole  0.5 mg Oral HS Samantha Allen PA-C      senna  1 tablet Oral BID Samantha Allen PA-C        Restrictions include:  Fall precautions      Functional History/Home Set-up - Prior to Admission:      Functional Status: Patient was  independent with mobility/ambulation, transfers, ADL's, IADL's.     Functional Status Upon Admission to [facility]:  Mobility: supervision  Transfers: supervision  ADLs: Supervision/setup     Physical Exam:  Temp:  [97.9 °F (36.6 °C)-98.2 °F (36.8 °C)] 98 °F (36.7 °C)  HR:  [65-86] 86  Resp:  [17-20] 17  BP: (100-158)/(52-80) 158/75  SpO2:  [95 %-98 %] 97 %  Physical Exam     General: alert, no apparent distress, and cooperative  CARDIAC:  regular rate and rhythm, S1, S2 normal, no murmur, click, rub or gallop  LUNGS:  no abnormal respiratory pattern, no retractions noted, non-labored breathing   ABDOMEN: nontender, normoactive bowel sounds abnormal findings: distended  EXTREMITIES:  extremities normal, warm and well-perfused; no cyanosis, clubbing, or edema  NEURO:  clear speech, following all commands, reflexes normal and symmetric, decreased sensation on bottom of feet bilaterally  MMT:   Strength:   Right  Left  Site  Right  Left  Site    5 5  S Ab: Shoulder Abductors  4 4  HF: Hip Flexors    5 5  EF: Elbow Flexors  5  5 KF: Knee Flexors    5  5  EE: Elbow Extensors  5  5  KE: Knee Extensors    5  5  WE: Wrist Extensors  5  5  DR: Dorsi Flexors    5  5  FF: Finger Flexors  5  5  PF: Plantar Flexors    5  5  HI: Hand Intrinsics  5  5  EHL: Extensor Hallucis Longus      Laboratory:                Results from last 7 days   Lab Units 01/12/25  0644 01/11/25  0527 01/09/25  0619   HEMOGLOBIN g/dL 11.7 11.2* 10.2*   HEMATOCRIT % 38.0 36.5 34.4*   WBC Thousand/uL 11.52* 12.61* 9.53                 Results from last 7 days   Lab Units 01/12/25  0644 01/11/25  0527 01/09/25  0619   BUN mg/dL 18 15 15   SODIUM mmol/L 135 136 140   POTASSIUM mmol/L 4.2 4.1 3.6   CHLORIDE mmol/L 97 97 102   CREATININE mg/dL 0.54* 0.50* 0.54*   AST U/L  --   --  17   ALT U/L  --   --  15                   Wt Readings from Last 1 Encounters:   01/15/25 98.1 kg (216 lb 3.2 oz)      Estimated body mass index is 37.11 kg/m² as calculated from the  "following:    Height as of 1/7/25: 5' 4\" (1.626 m).    Weight as of this encounter: 98.1 kg (216 lb 3.2 oz).     Imaging: reviewed  CT spine thoracic and lumbar wo contrast  Result Date: 1/2/2025  Impression: 1. Thoracolumbar fusion. 2. Chronic disc and facet degenerative change at L4-5 resulting in mild central canal stenosis and severe left neural foraminal narrowing. Workstation performed: [id number]      CT spine cervical wo contrast  Result Date: 1/2/2025  Impression: Partially calcified extra medullary T3 lesion is slightly larger than on the prior MRI, measuring 1.8 x 1.4 x 1.2 cm Workstation performed: [id number]      MRI cervical spine wo contrast  Result Date: 1/2/2025  Impression: Partially nondiagnostic exam due to severe motion artifact. T3 lesion is likely mildly increased in size based on evaluation of sagittal images. Recommend repeat exam when clinically feasible. Workstation performed: [id number]      Rehabilitation Prognosis: good     Tolerance for three hours of therapy a day: good      Family/Patient Goals:  Patient/family's goals: Return to previous home/apartment.     Patient will receive PT and OT 90 minutes each per day, five days per week to achieve rehab goals or participate in 900 minutes of therapy within a 7 day week period.     Mobility Goals: [place]  Transfer Goals: [place]  Activities of Daily Living (ADLs) Goals: [place]     Discharge Planning:  Rehabilitation and discharge goals discussed with the patient and/or family.     Case Managment and Social Work to review patient/family resources and to coordinate Discharge Planning.     Estimated length of stay: 10 to 14 days     Patient and Family Education and Training:  Rehabilitation and discharge goals discussed with the patient and/or family.  Patient/family education/training needs to be discussed in weekly team meeting.     Equipment/DME needs: Therapy teams to assess and evaluate for additional equipment/DME needs " "throughout rehabilitation stay     Past Medical History:   Past Surgical History:   Family History:   Social history:   Medical History           Past Medical History:   Diagnosis Date    Anemia      Anesthesia       \"sometimes low BP upon waking up\" pt states she stopped breathing 1 time during surgery    Arthritis      Back pain      Dolan's esophagus      Chronic pain disorder       back    Chronic venous insufficiency      Colon polyp      Cough variant asthma       d/t mold-all sx diminished when removed from source    COVID-19 03/2020    Depression      Environmental allergies       dust    GERD (gastroesophageal reflux disease)      Hiatal hernia      History of anemia      History of fusion of spine for scoliosis       \"as a teenager\"    History of transfusion       1978 - no adverse reaction    Left lumbar radiculitis       Last Assessed: [id number]    Lumbar postlaminectomy syndrome      Migraines      Morbid obesity (HCC)      Motion sickness      Neck pain      Osteoarthritis       of left hip    Right knee pain      Seasonal allergies      Shortness of breath       with stairs    Umbilical hernia      Uses brace       right knee    Wears glasses         Surgical History             Past Surgical History:   Procedure Laterality Date    ARTHRODESIS         lumbar    ARTHRODESIS         Spinal Arthrodesis for Deformity; Last Assessed: 16Mar2017    BACK SURGERY         lumbar fusion,with nydia/screw and cage implant    BACK SURGERY         surgery for scoliosis    BREAST CYST EXCISION Right       benign    CHOLECYSTECTOMY LAPAROSCOPIC N/A 12/20/2023     Procedure: FENESTRATED SUBTOTAL CHOLECYSTECTOMY LAPAROSCOPIC, EXTENSIVE LYSIS OF ADHESIONS.;  Surgeon: Chelle Butler MD;  Location: [facility];  Service: General    COLONOSCOPY        COLONOSCOPY N/A 03/14/2019     Procedure: COLONOSCOPY;  Surgeon: Van Dyson MD;  Location:  GI LAB;  Service: Gastroenterology    ESOPHAGOGASTRODUODENOSCOPY N/A " 03/14/2019     Procedure: ESOPHAGOGASTRODUODENOSCOPY (EGD) W RFA(BARRX);  Surgeon: Van Dyson MD;  Location: [facility];  Service: Gastroenterology    HERNIA REPAIR         umbilical hernia repair x2    LUMBAR LAMINECTOMY FOR EXCISION OF NEOPLASM Bilateral 1/7/2025     Procedure: T2-4 LAMINECTOMY THORACIC FOR TUMOR;  Surgeon: Ascencion Coulter MD;  Location: [facility];  Service: Neurosurgery    PLANTAR FASCIA SURGERY Left      NC ARTHRS KNE SURG W/MENISCECTOMY MED/LAT W/SHVG Right 04/04/2018     Procedure: ARTHROSCOPY KNEE PARTIAL MEDIAL MENISECTOMY , CHONDROPLASTY;  Surgeon: Rocio Roe DO;  Location: [facility];  Service: Orthopedics    NC BREAST REDUCTION Bilateral 03/12/2021     Procedure: BREAST REDUCTION;  Surgeon: Cortez Sandoval MD;  Location:  MAIN OR;  Service: Plastics    NC COLONOSCOPY FLX DX W/COLLJ SPEC WHEN PFRMD N/A 02/20/2018     Procedure: COLONOSCOPY with polypectomy;  Surgeon: Apryl Garcia MD;  Location: [facility];  Service: General    NC ESOPHAGOGASTRODUODENOSCOPY TRANSORAL DIAGNOSTIC N/A 05/24/2017     Procedure: ESOPHAGOGASTRODUODENOSCOPY (EGD);  Surgeon: Marcello Street MD;  Location: [facility] GI LAB;  Service: Gastroenterology    NC ESOPHAGOGASTRODUODENOSCOPY TRANSORAL DIAGNOSTIC N/A 08/31/2017     Procedure: ESOPHAGOGASTRODUODENOSCOPY (EGD) W RFA;  Surgeon: Macho Aguayo MD;  Location:  GI LAB;  Service: Gastroenterology    NC LAPS RPR RECURRENT INCISIONAL HERNIA REDUCIBLE N/A 01/21/2021     Procedure: REPAIR HERNIA INCISIONAL LAPAROSCOPIC W/ ROBOTICS, with mesh;  Surgeon: Errol Bermudez MD;  Location: [facility];  Service: General    NC RPR AA HERNIA 1ST 3-10 CM REDUCIBLE N/A 2/19/2024     Procedure: VENTRAL HERNIA REPAIR 3CM WITH MESH;  Surgeon: Chelle Butler MD;  Location:  MAIN OR;  Service: General    WISDOM TOOTH EXTRACTION            Family History             Family History   Problem Relation Age of Onset    Lung cancer Mother      Cancer Mother      Alcohol abuse Father       Other Father           Cardiac Disorder    Depression Father      Hypertension Father      Heart attack Father      Breast cancer Maternal Grandmother      Lung cancer Maternal Grandmother      Diabetes type I Other      No Known Problems Sister      No Known Problems Brother      No Known Problems Maternal Grandfather      Leukemia Paternal Grandmother      No Known Problems Paternal Grandfather      No Known Problems Sister      No Known Problems Maternal Aunt      No Known Problems Paternal Aunt      Stroke Neg Hx             Social History   Social History                Socioeconomic History    Marital status: Single       Spouse name: None    Number of children: None    Years of education: None    Highest education level: None   Occupational History    None   Tobacco Use    Smoking status: Never    Smokeless tobacco: Never   Vaping Use    Vaping status: Never Used   Substance and Sexual Activity    Alcohol use: Not Currently       Comment: rarely-every 3 mos    Drug use: Not Currently       Comment: medical marijuana 7 years ago    Sexual activity: Not Currently   Other Topics Concern    None   Social History Narrative           Social Drivers of Health              Financial Resource Strain: Low Risk  (10/3/2024)     Received from [employer]     Overall Financial Resource Strain (CARDIA)      Difficulty of Paying Living Expenses: Not hard at all   Food Insecurity: No Food Insecurity (1/2/2025)     Hunger Vital Sign      Worried About Running Out of Food in the Last Year: Never true      Ran Out of Food in the Last Year: Never true   Transportation Needs: No Transportation Needs (1/2/2025)     PRAPARE - Transportation      Lack of Transportation (Medical): No      Lack of Transportation (Non-Medical): No   Physical Activity: Insufficiently Active (10/3/2024)     Received from [employer]     Exercise Vital Sign      Days of Exercise per Week: 2 days      Minutes of Exercise per Session: 30  min   Stress: No Stress Concern Present (10/3/2024)     Received from [facility]     [place] [employer] - Occupational Stress Questionnaire      Feeling of Stress : Only a little   Social Connections: Socially Isolated (10/3/2024)     Received from [facility]     Social Connection and Isolation Panel [NHANES]      Frequency of Communication with Friends and Family: More than three times a week      Frequency of Social Gatherings with Friends and Family: More than three times a week      Attends Cheondoism Services: Never      Active Member of Clubs or Organizations: No      Attends Club or Organization Meetings: Never      Marital Status: Never    Intimate Partner Violence: Unknown (12/31/2024)     Nursing IPS      Feels Physically and Emotionally Safe: Not on file      Physically Hurt by Someone: Not on file      Humiliated or Emotionally Abused by Someone: Not on file      Physically Hurt by Someone: No      Hurt or Threatened by Someone: No   Housing Stability: Unknown (1/2/2025)     Housing Stability Vital Sign      Unable to Pay for Housing in the Last Year: No      Number of Times Moved in the Last Year: Not on file      Homeless in the Last Year: No              Current Medical Diagnosis Allergies   Problem List         Patient Active Problem List   Diagnosis    Chronic low back pain    Chronic pain syndrome    Chronic venous insufficiency    Depression, recurrent (HCC)    Hyperlipidemia    Lumbar postlaminectomy syndrome    Primary osteoarthritis of left hip    Restless leg syndrome    Sleep disturbance    Hernia of anterior abdominal wall    S/P right knee arthroscopy    Environmental allergies    Lumbar spondylosis    Lumbar radiculopathy    Prediabetes    Vitamin D deficiency    Dolan's esophagus with dysplasia    Gastroesophageal reflux disease    Laryngopharyngeal reflux (LPR)    Chronic cough    Vocal fold paresis, right    Dysphonia    Muscle tension dysphonia     "Glottic insufficiency    Reflux laryngitis    Laryngeal edema    Allergic rhinitis due to [employer] house dust mite    Mild intermittent asthma without complication    Neck pain    Cervical spondylosis without myelopathy    Osteoarthritis of multiple joints    Sacroiliitis, not elsewhere classified (HCC)    Lipoma of torso    Recurrent umbilical hernia    Macromastia    Long-term current use of opiate analgesic    Uncomplicated opioid dependence (HCC)    Motion sickness    Recurrent incisional hernia    Podagra    MARV (iron deficiency anemia)    Myofascial pain syndrome    Ventral hernia without obstruction or gangrene    Lymphedema    Ambulatory dysfunction    Class 1 obesity due to excess calories without serious comorbidity with body mass index (BMI) of 34.0 to 34.9 in adult    Compression of thoracic spinal cord with myelopathy (HCC)       Allergies             Allergies   Allergen Reactions    Dust Mite Extract Shortness Of Breath    Latex Blisters, Itching, Other (See Comments) and Rash       contact    Other Other (See Comments)       Seasonal AND cock roaches    Sulfa Antibiotics GI Intolerance, Other (See Comments) and Vomiting       Topical-blistering     \"intense vomiting\"                Medical Necessity Criteria for ARC Admission: Leukocystosis. In addition, the preadmission screen, post-admission physical evaluation, overall plan of care and admissions order demonstrate a reasonable expectation that the following criteria were met at the time of [facility] to the [facility].  The patient requires active and ongoing therapeutic intervention of multiple therapy disciplines (physical therapy, occupational therapy, speech-language pathology, or prosthetics/orthotics), one of which is physical or occupational therapy.    Patient requires an intensive rehabilitation therapy program, as defined in Chapter 1, section 110.2.2 of the CMS Medicare Policy Manual. This intensive rehabilitation therapy program will consist " of at least 3 hours of therapy per day at least 5 days per week or at least 15 hours of intensive rehabilitation therapy within a 7 consecutive day period, beginning with the date of admission to the [facility].    The patient is reasonably expected to actively participate in, and benefit significantly from, the intensive rehabilitation therapy program as defined in Chapter 1, section 110.2.2 of the CMS Medicare Policy Manual at this time of admission to the [facility]. She can reasonably be expected to make measurable improvement (that will be of practical value to improve the patient’s functional capacity or adaptation to impairments) as a result of the rehabilitation treatment, as defined in section 110.3, and such improvement can be expected to be made within the prescribed period of time. As noted in the CMS Medicare Policy Manual, the patient need not be expected to achieve complete independence in the domain of self-care nor be expected to return to his or her prior level of functioning in order to meet this standard.  The patient must require physician supervision by a rehabilitation physician. As such, a rehabilitation physician will conduct face-to-face visits with the patient at least 3 days per week throughout the patient’s stay in the [facility] to assess the patient both medically and functionally, as well as to modify the course of treatment as needed to maximize the patient’s capacity to benefit from the rehabilitation process.  The patient requires an intensive and coordinated interdisciplinary approach to providing rehabilitation, as defined in Chapter 1, section 110.2.5 of the CMS Medicare Policy Manual. This will be achieved through periodic team conferences, conducted at least once in a 7-day period, and comprising of an interdisciplinary team of medical professionals consisting of: a rehabilitation physician, registered nurse,  and/or , and a licensed/certified therapist from each therapy  discipline involved in treating the patient.         ** Please Note: Fluency Direct voice to text software may have been used in the creation of this document. **

## 2025-01-15 NOTE — ASSESSMENT & PLAN NOTE
Continue home Cymbalta 60mg daily.  Had been on trazodone 50mg at HS at home but would like to continue without at this time.  Supportive counseling as needed.

## 2025-01-16 PROBLEM — E66.9 OBESITY: Status: ACTIVE | Noted: 2024-12-30

## 2025-01-16 PROBLEM — K59.00 CONSTIPATION: Status: ACTIVE | Noted: 2025-01-16

## 2025-01-16 LAB
25(OH)D3 SERPL-MCNC: 32.3 NG/ML (ref 30–100)
ALBUMIN SERPL BCG-MCNC: 3.5 G/DL (ref 3.5–5)
ALP SERPL-CCNC: 77 U/L (ref 34–104)
ALT SERPL W P-5'-P-CCNC: 19 U/L (ref 7–52)
ANION GAP SERPL CALCULATED.3IONS-SCNC: 9 MMOL/L (ref 4–13)
ANISOCYTOSIS BLD QL SMEAR: PRESENT
AST SERPL W P-5'-P-CCNC: 13 U/L (ref 13–39)
BASOPHILS # BLD MANUAL: 0 THOUSAND/UL (ref 0–0.1)
BASOPHILS NFR MAR MANUAL: 0 % (ref 0–1)
BILIRUB SERPL-MCNC: 0.29 MG/DL (ref 0.2–1)
BUN SERPL-MCNC: 18 MG/DL (ref 5–25)
CALCIUM SERPL-MCNC: 9.1 MG/DL (ref 8.4–10.2)
CHLORIDE SERPL-SCNC: 99 MMOL/L (ref 96–108)
CO2 SERPL-SCNC: 29 MMOL/L (ref 21–32)
CREAT SERPL-MCNC: 0.64 MG/DL (ref 0.6–1.3)
EOSINOPHIL # BLD MANUAL: 0.18 THOUSAND/UL (ref 0–0.4)
EOSINOPHIL NFR BLD MANUAL: 2 % (ref 0–6)
ERYTHROCYTE [DISTWIDTH] IN BLOOD BY AUTOMATED COUNT: 16.9 % (ref 11.6–15.1)
GFR SERPL CREATININE-BSD FRML MDRD: 95 ML/MIN/1.73SQ M
GLUCOSE P FAST SERPL-MCNC: 99 MG/DL (ref 65–99)
GLUCOSE SERPL-MCNC: 99 MG/DL (ref 65–140)
HCT VFR BLD AUTO: 36.5 % (ref 34.8–46.1)
HGB BLD-MCNC: 11.2 G/DL (ref 11.5–15.4)
LYMPHOCYTES # BLD AUTO: 3.84 THOUSAND/UL (ref 0.6–4.47)
LYMPHOCYTES # BLD AUTO: 36 % (ref 14–44)
MCH RBC QN AUTO: 26.9 PG (ref 26.8–34.3)
MCHC RBC AUTO-ENTMCNC: 30.7 G/DL (ref 31.4–37.4)
MCV RBC AUTO: 88 FL (ref 82–98)
METAMYELOCYTE ABSOLUTE CT: 0.09 THOUSAND/UL (ref 0–0.1)
METAMYELOCYTES NFR BLD MANUAL: 1 % (ref 0–1)
MONOCYTES # BLD AUTO: 0.27 THOUSAND/UL (ref 0–1.22)
MONOCYTES NFR BLD: 3 % (ref 4–12)
MYELOCYTE ABSOLUTE CT: 0.09 THOUSAND/UL (ref 0–0.1)
MYELOCYTES NFR BLD MANUAL: 1 % (ref 0–1)
NEUTROPHILS # BLD MANUAL: 4.47 THOUSAND/UL (ref 1.85–7.62)
NEUTS SEG NFR BLD AUTO: 50 % (ref 43–75)
PLATELET # BLD AUTO: 385 THOUSANDS/UL (ref 149–390)
PLATELET BLD QL SMEAR: ADEQUATE
PMV BLD AUTO: 8.9 FL (ref 8.9–12.7)
POIKILOCYTOSIS BLD QL SMEAR: PRESENT
POLYCHROMASIA BLD QL SMEAR: PRESENT
POTASSIUM SERPL-SCNC: 4.2 MMOL/L (ref 3.5–5.3)
PROT SERPL-MCNC: 6.2 G/DL (ref 6.4–8.4)
RBC # BLD AUTO: 4.17 MILLION/UL (ref 3.81–5.12)
RBC MORPH BLD: PRESENT
SMUDGE CELLS BLD QL SMEAR: PRESENT
SODIUM SERPL-SCNC: 137 MMOL/L (ref 135–147)
VARIANT LYMPHS # BLD AUTO: 7 %
WBC # BLD AUTO: 8.94 THOUSAND/UL (ref 4.31–10.16)

## 2025-01-16 PROCEDURE — 97110 THERAPEUTIC EXERCISES: CPT

## 2025-01-16 PROCEDURE — 80053 COMPREHEN METABOLIC PANEL: CPT | Performed by: INTERNAL MEDICINE

## 2025-01-16 PROCEDURE — 82306 VITAMIN D 25 HYDROXY: CPT | Performed by: INTERNAL MEDICINE

## 2025-01-16 PROCEDURE — 97116 GAIT TRAINING THERAPY: CPT

## 2025-01-16 PROCEDURE — 97535 SELF CARE MNGMENT TRAINING: CPT

## 2025-01-16 PROCEDURE — 97530 THERAPEUTIC ACTIVITIES: CPT

## 2025-01-16 PROCEDURE — 85007 BL SMEAR W/DIFF WBC COUNT: CPT | Performed by: INTERNAL MEDICINE

## 2025-01-16 PROCEDURE — 97163 PT EVAL HIGH COMPLEX 45 MIN: CPT

## 2025-01-16 PROCEDURE — 99232 SBSQ HOSP IP/OBS MODERATE 35: CPT | Performed by: INTERNAL MEDICINE

## 2025-01-16 PROCEDURE — 85027 COMPLETE CBC AUTOMATED: CPT | Performed by: INTERNAL MEDICINE

## 2025-01-16 PROCEDURE — 97166 OT EVAL MOD COMPLEX 45 MIN: CPT

## 2025-01-16 RX ORDER — OMEPRAZOLE 40 MG/1
40 CAPSULE, DELAYED RELEASE ORAL
Status: DISCONTINUED | OUTPATIENT
Start: 2025-01-16 | End: 2025-01-24 | Stop reason: HOSPADM

## 2025-01-16 RX ADMIN — METHOCARBAMOL TABLETS 1000 MG: 500 TABLET, COATED ORAL at 05:30

## 2025-01-16 RX ADMIN — METHOCARBAMOL TABLETS 1000 MG: 500 TABLET, COATED ORAL at 21:14

## 2025-01-16 RX ADMIN — POTASSIUM CHLORIDE 40 MEQ: 1500 TABLET, EXTENDED RELEASE ORAL at 09:18

## 2025-01-16 RX ADMIN — MELATONIN TAB 3 MG 3 MG: 3 TAB at 21:14

## 2025-01-16 RX ADMIN — Medication 2000 UNITS: at 09:17

## 2025-01-16 RX ADMIN — HYDROMORPHONE HYDROCHLORIDE 4 MG: 4 TABLET ORAL at 09:49

## 2025-01-16 RX ADMIN — DIPHENHYDRAMINE HYDROCHLORIDE 25 MG: 25 TABLET ORAL at 07:37

## 2025-01-16 RX ADMIN — DIPHENHYDRAMINE HYDROCHLORIDE 25 MG: 25 TABLET ORAL at 01:11

## 2025-01-16 RX ADMIN — ALUMINUM HYDROXIDE, MAGNESIUM HYDROXIDE, AND SIMETHICONE 30 ML: 200; 200; 20 SUSPENSION ORAL at 01:11

## 2025-01-16 RX ADMIN — METHOCARBAMOL TABLETS 1000 MG: 500 TABLET, COATED ORAL at 13:45

## 2025-01-16 RX ADMIN — SENNOSIDES 8.6 MG: 8.6 TABLET, FILM COATED ORAL at 09:19

## 2025-01-16 RX ADMIN — CALCIUM CARBONATE 1000 MG: 500 TABLET, CHEWABLE ORAL at 01:11

## 2025-01-16 RX ADMIN — OMEPRAZOLE 40 MG: 40 CAPSULE, DELAYED RELEASE ORAL at 16:56

## 2025-01-16 RX ADMIN — SENNOSIDES 8.6 MG: 8.6 TABLET, FILM COATED ORAL at 17:00

## 2025-01-16 RX ADMIN — ACETAMINOPHEN 975 MG: 650 SUSPENSION ORAL at 13:45

## 2025-01-16 RX ADMIN — PANTOPRAZOLE SODIUM 40 MG: 40 TABLET, DELAYED RELEASE ORAL at 05:30

## 2025-01-16 RX ADMIN — KETOROLAC TROMETHAMINE 10 MG: 10 TABLET, FILM COATED ORAL at 21:14

## 2025-01-16 RX ADMIN — HYDROMORPHONE HYDROCHLORIDE 4 MG: 4 TABLET ORAL at 05:30

## 2025-01-16 RX ADMIN — DOCUSATE SODIUM 100 MG: 100 CAPSULE, LIQUID FILLED ORAL at 17:00

## 2025-01-16 RX ADMIN — ACETAMINOPHEN 975 MG: 650 SUSPENSION ORAL at 05:30

## 2025-01-16 RX ADMIN — DIPHENHYDRAMINE HYDROCHLORIDE 25 MG: 25 TABLET ORAL at 21:16

## 2025-01-16 RX ADMIN — DOCUSATE SODIUM 100 MG: 100 CAPSULE, LIQUID FILLED ORAL at 09:17

## 2025-01-16 RX ADMIN — Medication 400 MG: at 09:17

## 2025-01-16 RX ADMIN — ENOXAPARIN SODIUM 40 MG: 40 INJECTION SUBCUTANEOUS at 09:17

## 2025-01-16 RX ADMIN — PRAMIPEXOLE DIHYDROCHLORIDE 0.5 MG: 0.25 TABLET ORAL at 21:14

## 2025-01-16 RX ADMIN — DULOXETINE HYDROCHLORIDE 60 MG: 60 CAPSULE, DELAYED RELEASE ORAL at 09:17

## 2025-01-16 RX ADMIN — CYANOCOBALAMIN TAB 1000 MCG 1000 MCG: 1000 TAB at 09:19

## 2025-01-16 RX ADMIN — KETOROLAC TROMETHAMINE 10 MG: 10 TABLET, FILM COATED ORAL at 07:38

## 2025-01-16 RX ADMIN — KETOROLAC TROMETHAMINE 10 MG: 10 TABLET, FILM COATED ORAL at 13:49

## 2025-01-16 NOTE — PROGRESS NOTES
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  - her home in [place] is also ML with curb step to enter, and 1/2 bath 1st, 2nd floor bed and full bath.)   Method of Entry Stairs;Hand Rail Right   Number of Stairs 6  (front entrance, 1 no HR + 5 R HR)   Available Equipment Roller Walker  (can borrow friends while she is here.)   Baseline Information   Vocation   (on disability)   Transportation    Prior Level of Function   Self-Care 3. Independent - Patient completed the activities by him/herself, with or without an assistive device, with no assistance from a helper.   Indoor-Mobility (Ambulation) 3. Independent - Patient completed the activities by him/herself, with or without an assistive device, with no assistance from a helper.   Stairs 3. Independent - Patient completed the activities by him/herself, with or without an assistive device, with no assistance from a helper.   Functional Cognition 3. Independent - Patient completed the activities by him/herself, with or without an assistive device, with no assistance from a helper.   Prior Device Used Z. None of the above   Falls in the Last Year   Number of falls in the past 12 months 6   Type of Injury Associated with Fall Injury  (bumps and bruises)   Patient Preference   Nicknamgrace (Patient Preference) kay    Restrictions/Precautions   Precautions Bed/chair alarms;Fall Risk;Pain;Supervision on toilet/commode   Pain Assessment   Pain Assessment Tool 0-10   Pain Score 4   Pain Location/Orientation Orientation: Mid;Location: Back   Transfer Bed/Chair/Wheelchair   Type of Assistance Needed Physical assistance   Physical Assistance Level 26%-50%   Comment min/mod A stand pivot no AD   Chair/Bed-to-Chair Transfer CARE Score 3   Roll Left and Right   Type of Assistance Needed Supervision   Physical Assistance Level No physical assistance   Roll Left and Right CARE Score 4   Sit to Lying   Type of Assistance Needed Supervision   Physical Assistance Level No physical assistance   Sit to Lying CARE Score 4   Lying to Sitting on Side of Bed   Type of Assistance Needed Supervision   Physical Assistance Level No physical assistance   Comment cues to log roll however pt prefers to sit up straight and then move legs. Per pt surgeon recommended >30 degrees HOB elevated, pt has adjustable bed at her home in [place].   Lying to Sitting on Side of Bed CARE Score 4   Sit to Stand   Type of Assistance Needed Physical assistance   Physical Assistance Level 25% or less   Comment min A no AD   Sit to Stand CARE Score 3   Picking Up Object   Type of Assistance Needed Physical assistance   Physical Assistance Level Total assistance   Comment pt unable to pickup  item as she would at baseline [facility] AD, needs use of reacher due to recent back surgery, has reacher at her home. CG with reacher for marker pickup from floor.   Picking Up Object CARE Score 1   Car Transfer   Type of Assistance Needed Physical assistance   Physical Assistance Level 26%-50%   Comment given pts current clinical presentation - pt would require mod A for car transfer withoutAD, can trial simulation when able.   Car Transfer CARE Score 3   Ambulation   Primary Mode of Locomotion Prior to Admission Walk   Distance Walked (feet) 125 ft  (x2)   Assist Device Roller Walker   Gait  Pattern Ataxic;Inconsistant Yadira;Slow Yadira;Step through;Improper weight shift   Limitations Noted In Balance;Endurance;Speed;Strength;Swing   Provided Assistance with: Balance   Walk Assist Level Contact Guard   Findings unable to amb without RW support - trialed amb with hands gently placed on RW x10 ft, inc ataxia, and shakiness, RW recommended at this time. Needs inc time due to pain onset.   Walk 10 Feet   Type of Assistance Needed Physical assistance   Physical Assistance Level Total assistance   Comment unable to amb wihtout AD compared to baseline - needs RW support   Walk 10 Feet CARE Score 1   Walk 50 Feet with Two Turns   Type of Assistance Needed Physical assistance   Physical Assistance Level Total assistance   Comment unable to amb without AD compared to baseline - needs RW support. Chair pulled behind for safety in case of buckling or inc ataxia.   Walk 50 Feet with Two Turns CARE Score 1   Walk 150 Feet   Reason if not Attempted Safety concerns   Walk 150 Feet CARE Score 88   Walking 10 Feet on Uneven Surfaces   Comment (S)  trial ramp or over mat next session.   Wheelchair mobility   Findings anticipate ambulatory at DC   Wheel 50 Feet with Two Turns   Reason if not Attempted Activity not applicable   Wheel 50 Feet with Two Turns CARE Score 9   Wheel 150 Feet   Reason if not Attempted Activity not applicable   Wheel 150 Feet CARE Score 9   Curb or Single Stair   Style negotiated Single stair   Type of Assistance Needed Physical assistance   Physical Assistance Level 26%-50%   Comment R HR, non- recipcal cues for sequence.   1 Step (Curb) CARE Score 3   4 Steps   Type of Assistance Needed Physical assistance   Physical Assistance Level 26%-50%   Comment min/mod A with R HR, non reciprocal cues for sequence.   4 Steps CARE Score 3   12 Steps   Comment limited by pain, would have stair glide at friends home, does have FF at her home in [place].   Reason if not Attempted Safety concerns   12 Steps CARE  "Score 88   Memory   Initiates Tasks Yes   Short-Term Intact   Long Term Intact   Recalls Precaution Yes   Strength RLE   RLE Overall Strength 4+/5   Strength LLE   LLE Overall Strength 4/5   Coordination   Movements are Fluid and Coordinated 0   Coordination and Movement Description mild ataxia with L LE   Sensation   Light Touch No apparent deficits  (B LE - feels some numbness however intact to light touch.)   Cognition   Overall Cognitive Status WFL   Arousal/Participation Alert;Cooperative   Attention Within functional limits   Orientation Level Oriented X4   Memory Within functional limits   Following Commands Follows all commands and directions without difficulty   Vision   Vision Comments wears glasses at all times.   Perception   Inattention/Neglect Appears intact   Objective Measure   PT Findings cold pack to upper back at end of session in recliner.   Therapeutic Exercise   Therapeutic Exercise/Activity supine SAQ, hip abd and heel slides B LE x20 each. Time spent reviewing surgical procedure, steroids ending, pain mgmt techniques, and benefit of implements spinal precautions (no order however) to ensure healing in proper alignment as well as reduce irritation. Education on location of surgery and effects with ecessive arm movements and trunk twisting. Pt mainly upset since she felt better several days ago and was able to do more, likely now steroids ended, pain is increased.   Discharge Information   Vocational Plan Retired/not working  (on disability)   Patient's Discharge Plan return to friends for a short time with goal to go home or to sisters in [place] for a bit.   Patient's Rehab Expectations \"I need to be able to walk to live a normal life\"   Barriers to Discharge Home Limited Family Support;Decreased Strength;Decreased Endurance;Pain;Safety Considerations   Impressions Pt 64 yr old female visiting friends from [place] for the holidays (here since 12/4/24) came to network on 12/30/24 with progressive and " worsening chronic LE weakness. Testing revealed +1.4cm thoracic meningioma with cord compression, pt transferred to [facility] for neurosurgery f/u. Ultimately pt underwent B T2 - T4 lami due to tumor by Dr Coulter and Dr Shah on 1/7/25. Pt with other PMH: multiple spinal surgeries, spinal stenosis with ridiculopathy, obesity, and lymphedema. At baseline pt lives alone was fully I without AD, , on disability. Her home in [place] is  with curb out front, 1/2 bath first, full bath and bedroom 2nd floor. Stay with friend (Trang) locally has 1+5 DELLA R HR and stair glide down to finished basement where pt has been staying. Pt has borrowed QC (which sounds like its missing rubber caps), and recently started using Trang's husbands RW due to progressive weakness.  Pt emotional with change in status over past few days, feeling she was doing better a few days ago but was not ready to get on a plane to go back to [place]. Pt c/o  and presents with L>R LE weakness, some sensation changes however intact to light touch, +L LE ataxia which worsens with inc pain or ftg. Discussed pain mgmt strategies, and attending to when her pain medications are due. Pt to benefit from skilled PT intervention in efforts to improve pain, functional balance and safety, B LE strengthening, improve gait quality and endurance for safe DC. Pt demonstrates good rehab potential pending pain mgmt vs control to reach set mod I goals with LRAD for DC to back to friends home (or sisters in [place]) with probable home services to follow in [place] 10-14 days. Will likely benefit from RW.   PT Therapy Minutes   PT Time In 1000   PT Time Out 1130   PT Total Time (minutes) 90   PT Mode of treatment - Individual (minutes) 90   PT Mode of treatment - Concurrent (minutes) 0   PT Mode of treatment - Group (minutes) 0   PT Mode of treatment - Co-treat (minutes) 0   PT Mode of Treatment - Total time(minutes) 90 minutes   PT Cumulative Minutes 90   Cumulative Minutes   Cumulative  therapy minutes 180

## 2025-01-16 NOTE — PROGRESS NOTES
01/16/25 0700   Rehab Team Goals   ADL Team Goal Patient will be independent with ADLs with least restrictive device upon completion of rehab program   Cognitive Team Goal Patient will be independent for basic and complex tasks upon completion of rehab program   Rehab Team Interventions   OT Interventions Self Care;Therapeutic Exercise;Energy Conservation;Patient/Family Education;Group Therapy;Other   Eating Goal   Eating Goal 06. Independent - Patient completes the activity by him/herself with no assistance from a helper.   Meal Complete All meals   Status Ongoing;Target goal - two weeks  (10-14 days)   Interventions Optimal Position   Grooming Goal   Oral Hygiene Goal 06. Independent - Patient completes the activity by him/herself with no assistance from a helper.   Task Wash/Dry Face;Wash/Dry Hands;Brush Teeth;Comb Hair;Complete Groom   Safety Precautions Safety Precaution   Status Ongoing;Target goal - two weeks  (10-14 days)   Tub/Shower Transfer Goal   Method Tub Shower   Assist Device Seat with Back   Status Ongoing;Target goal - two weeks  (10-14 days)   Bathing Goal   Shower/bathe self Goal 06. Independent - Patient completes the activity by him/herself with no assistance from a helper.   Safety Precautions Safety Precaution   Status Ongoing;Target goal - two weeks  (10-14 days)   Intervention ADL Training;Assistive Device;Therapeutic Exercise   Upper Body Dressing Goal   Upper body dressing Goal 06. Independent - Patient completes the activity by him/herself with no assistance from a helper.   Task Upper Body;Arms in/out;Over Head   Environment Seated   Safety Precautions Safety Precaution   Status Ongoing  (10-14 days)   Intervention Assistive Device;Balance Work;Therapeutic Exercise;Tolerance Work   Lower Body Dressing Goal   Lower body dressing Goal 06. Independent - Patient completes the activity by him/herself with no assistance from a helper.   Putting on/taking off footwear Goal 06. Independent -  Patient completes the activity by him/herself with no assistance from a helper.   Task Lower Body;Shoe/Slipper;Socks;Pants;Undergarment   Safety Precautions Safety Precaution   Status Ongoing;Target goal - two weeks  (10-14 days)   Intervention Assistive Device;Balance Work;Therapeutic Exercise;Tolerance Work   Toileting Transfer Goal   Toilet transfer Goal 06. Independent - Patient completes the activity by him/herself with no assistance from a helper.   Safety Safety Precaution   Status Ongoing;Target goal - two weeks  (10-14 days)   Intervention ADL Training;Balance Work   Toileting Goal   Toileting hygiene Goal 06. Independent - Patient completes the activity by him/herself with no assistance from a helper.   Task Pants Up;Pants Down;Hygiene   Status Ongoing;Target goal - two weeks  (10-14 days)   Intervention ADL Training;Balance Work   Comprehension Goal   Comprehension Assist Level Moderate [place]   Status Ongoing;Target goal - two weeks  (10-14 days)   Expression Goal   Expression Assist Level Independant   Status Ongoing;Target goal - two weeks  (10-14 days)   Social Interaction Goal   Social Interaction Assist Level Independant   Status Ongoing;Target goal - two weeks  (10-14 days)   Problem Solving Goal   Problem Solving Assist Level Independant   Status Ongoing;Target goal - two weeks  (10-14 days)   Memory Goal   Memory Assist Level Independant   Status Ongoing;Target goal - two weeks  (10-14 days)   Meal Prep and Kitchen Mobility   Assist Level Independent   Status Ongoing;Target goal - two weeks  (10-14 days)   Medication Management   Assist Level Independent   Status Ongoing;Target goal - two weeks  (10-14 days)   Laundry   Assist Level Independent   Status Ongoing;Target goal - two weeks  (10-14 days)

## 2025-01-16 NOTE — ASSESSMENT & PLAN NOTE
Hgb currently stable at 11.2.  Received IV venofer x3 doses in acute setting.  Iron panel on 12/30/2024: Iron Sat 10%, TIBC 396, Iron 40, and Ferritin 9.  Consider starting PO supplementation once constipation resolves.

## 2025-01-16 NOTE — CASE MANAGEMENT
"       Case Management Open     Met with the patient today to complete CM open. 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 Prior to admission the patient lives at home alone in [place]. Pt was in [place] visiting friends. Pt reports upon d/c from [facility] she could stay with her friend Porsha, need to confirm official address but [place]. Hayley is aware she has her neurosx follow up in February, asked if doctors ever would transfer her to [place]. CM informed that typically surgeons prefer to see their own pts, but CM. Also of note, pt did not drive her but flew, and therefore would need to determine medical clearance for flying. Pt reports \"I can stay in [place] if that's what is best for my health and recovery\". In [place], there are 4 DELLA her friend's house and then she will stay in finished basement that has full flight of steps, but access to a stair glide. She has a cane, but no other DME. Pt's emergency contact is her sister Corry. PTA pt was independent in ADL/IADL/driving. Educated pt on the [facility] program.    Of note, CM was informed by [facility] admissions Liaison Martine that pt's insurance had not been updated in EPIC, but is correct in the PREADMISSION SCREEN:     Primary Payor:  NOAH  Rep ID# [id number]   Authorization #: [id number]   Start of Care: 01/15/25  Next Review Date: 01/20/2025  Submit next review on 1/20/25 to: H&CC Portal vs F#: [phone]     Benefits: [facility] - Ded $0, OOPM $5900 remaining, copay $325 days 1-6, coins 0%     FYI: [facility]  rep termed on 12/31/24 ; [facility] became primary on 1/1/25     MEDICARE #: [id number]     The patient was educated that other members of the patient's support are able to ask questions and observe as the patient wished. The patient was educated on the rehabilitation process including therapy program, the interdisciplinary team, and weekly " team meetings. Estimated length of stay was reviewed with the patient as well as expectations of discussions of discharge planning. The role of the  was reviewed including providing care coordination, discharge planning and discharge facilitation.     IMM was reviewed with the patient and a copy was provided for their reference. The patient verbalized understanding of the information provided and denied any further questions at this time. CM will continue to follow and assist the patient throughout their rehabilitation stay.

## 2025-01-16 NOTE — PROGRESS NOTES
Progress Note - PMR   Name: Hayley Rios 64 y.o. female I MRN: [id number]  Unit/Bed#: -01 I Date of Admission: 1/15/2025   Date of Service: 1/16/2025 I Hospital Day: 1     Assessment & Plan  Compression of thoracic spinal cord with myelopathy (HCC)  Hx known T3 spinal meningioma presented to [facility] with left lower extremity weakness and spasms  MRI T spine: Suboptimal examination due to mild, moderate, and severe motion artifact - worse on postcontrast sequences. Similar 1.4 cm intradural extramedullary probable meningioma in left posterolateral thoracic spine region at approximately T3 level with associated marked spinal cord compression with severe canal stenosis given motion degraded exam. No cord edema given motion degradation.   MRI L spine: Suboptimal examination due to moderate-to-severe motion degraded exam of lumbar spine and 3 plane localizer images, sagittal T1 post contrast and axial T1 postcontrast sequences. No abnormal enhancement in lumbar spine given limitations of motion degradation.   S/p T2-4 laminectomy for resection of thoracic tumor by Dr. Coulter 1/7, pathology consistent with meningioma, cleared to resume DVT ppx  Keep incision clean and dry and wipe daily w/ MARY wipe, can be left open to air  Completed dexamethasone taper 1/14, 2 week fu ~1/24  6 week surgeon f/u ~ 2/21  C/w PT/OT    Ambulatory dysfunction  Patient was evaluated by the rehabilitation team MD and an appropriate candidate for acute inpatient rehabilitation program at this time.  The patient will tolerate 3 hours/day 5 to 7 days/week of intensive physical, occupational and speech therapy in order to obtain goals for community discharge  Due to the patient's functional Compared to their baseline level of function in addition to their ongoing medical needs, the patient would benefit from daily supervision from a rehabilitation physician as well as rehabilitation nursing to implement and adjust the medical as  well as functional plan of care in order to meet the patient's goals.   Acute pain  Cw tylenol 975 Q8h, cymbalta 60 mg daily, robaxin 1g Q8h  PRN: dilaudid 2-4 mg Q4h, tordol 10 mg Q6h, Valium 2mg Q6h  Nursing writing down next PRN dose in pt room  Titrate opioids as able  Ice/heat PRN, lidocaine patch bilateral shoulders either side of incision  Depression, recurrent (HCC)  Will monitor mood/behavior and consult neuropsych if needed  Restless leg syndrome  Cw pramipexole, b12 inj  Prediabetes  A1c 5.8%  POCT glucose 100-130s  POCT glucose and accuchecks defer to IM  Vitamin D deficiency  Cw vitamin D supplementation  Gastroesophageal reflux disease  CW protonix  PRN Tums, maalox  Protonix changed to omeprazole  MARV (iron deficiency anemia)  S/p iV venofer X3  Hgb 1/12 11.7----> 11.2 1/16  Monitor CBC, transfuse for hemoglobin <7  Ventral hernia without obstruction or gangrene  Hx 6 hernia surgeries, most recent 10/24/24 w/ hx bowel obstruction  Monitor BM, abominal pain, cw bowel meds  Lymphedema  Cw home lasix 20 mg daily  Leukocytosis  11.52 1/12, recently completed steroid taper  Monitoring with CBC  Vitamin B12 deficiency  346 1/1  Cw Cyancobalmin PO  F/u outpt providers  Constipation  Small BM 1/15, prior before admission 2 weeks ago  CW bowel reg   Continues to pass gas  Obesity  Encourage lifestyle changes    History of Present Illness   Hayley Rios is a 64 y.o. female w/ PMHx of restless leg syndrome, MARV, GERD w/ barrets esophagous, abdominal wall hernia repair, lymphedema, multiple spinal surgeries, who initially presented to [facility] on 12/30/2024 with increased ambulatory dysfunction and weakness. Patient was visiting friends in the area over the holidays when she noticed increased LE weakness. Patient w/ known T3 spinal meningioma who was lost to follow up. She was initially admitted to [facility] 12/30-12/31 w/ worsening LLE weakness and spasm. She  underwent multiple imaging studies at that shakeel which re-demonstrated known T3 meningioma w/ marked spinal cord compression and severe canal stenosis. She was transferred to [facility] on 12/31 for further assessment by NSGY. CT spine and MRI spine w/ increased size and marked cord compression w/ focal cord edema at the lvl of compression. She underwent T2-4 laminectomy on 1/7/2025 w/ NSGY Dr. Coulter. MEP and SSEPs remained stable without any changes. Her BRAYAN drain was removed 1/10. Her decadron was wean was completed 1/14/2025. She had significant pain post op and is now on oral analgesics. . Hayley Rios was admitted to the [facility] on 01/15/25       Chief Complaint: f/u ambulatory dysfunction    Interval: Patient seen and examined today at bedside table. Overnight, patient states she had trouble sleeping due to itching at incision site and hair irritating it. Reports pain, itching at incision site, more tingling in legs, and shaking in hands. Feels like tingling in legs started after steroid taper. Last BM 1/15.    Review of Systems   Constitutional:  Negative for chills and fever.   Respiratory:  Negative for shortness of breath.    Cardiovascular:  Negative for chest pain, palpitations and leg swelling.   Gastrointestinal:  Positive for abdominal distention. Negative for abdominal pain, constipation, diarrhea, nausea and vomiting.       Objective   Functional Update:  Physical Therapy Occupational Therapy Speech Therapy                           Temp:  [97.5 °F (36.4 °C)-98.4 °F (36.9 °C)] 98.1 °F (36.7 °C)  HR:  [69-86] 78  Resp:  [16-20] 20  BP: ()/(52-79) 113/79  SpO2:  [97 %] 97 %    Physical Exam  Constitutional:       General: She is not in acute distress.     Appearance: She is well-developed.   HENT:      Head: Normocephalic and atraumatic.   Eyes:      Conjunctiva/sclera: Conjunctivae normal.   Cardiovascular:      Rate and Rhythm: Normal rate and regular rhythm.   Pulmonary:      Effort:  Pulmonary effort is normal. No respiratory distress.      Breath sounds: Normal breath sounds.   Abdominal:      General: Bowel sounds are normal. There is distension.      Tenderness: There is no abdominal tenderness.   Musculoskeletal:         General: Normal range of motion.   Skin:     General: Skin is warm and dry.      Findings: Erythema present.      Comments: Incision site has surrounding erythema, not well demarcated   Neurological:      Mental Status: She is alert. Mental status is at baseline.   Psychiatric:         Mood and Affect: Mood normal.         Behavior: Behavior normal.           Scheduled Meds:  Current Facility-Administered Medications   Medication Dose Route Frequency Provider Last Rate    acetaminophen  975 mg Oral Q8H Samantha Allen PA-C      aluminum-magnesium hydroxide-simethicone  30 mL Oral Q4H PRN Samantha Allen PA-C      bisacodyl  10 mg Rectal Daily PRN Samantha Allen PA-C      calcium carbonate  1,000 mg Oral TID PRN Samantha Allen PA-C      cholecalciferol  2,000 Units Oral Daily ROGE Yan      cyanocobalamin  1,000 mcg Oral Daily ROGE Yan      diazepam  2 mg Oral Q6H PRN Samantha Allen PA-C      diphenhydrAMINE  25 mg Oral Q6H PRN Samantha Allen PA-C      docusate sodium  100 mg Oral BID Samantha Allen PA-C      DULoxetine  60 mg Oral Daily Samantha Allen PA-C      enoxaparin  40 mg Subcutaneous Q24H [facility] Loni Wong MD      HYDROmorphone  2 mg Oral Q4H PRN Samantha Allen PA-C      HYDROmorphone  4 mg Oral Q4H PRN Samantha Allen PA-C      ketorolac  10 mg Oral Q6H PRN Samantha Allen PA-C      magnesium Oxide  400 mg Oral Daily ROGE Yan      melatonin  3 mg Oral HS Samantha Allen PA-C      methocarbamol  1,000 mg Oral Q8H EVGENY Samantha Allen PA-C      omeprazole  40 mg Oral BID AC ROGE Yan      ondansetron  4 mg Oral Q6H PRN  Samantha Allen PA-C      polyethylene glycol  17 g Oral Daily Samantha Allen PA-C      potassium chloride  40 mEq Oral Daily With Breakfast Samantha Allen PA-C      pramipexole  0.5 mg Oral HS Samantha Allen PA-C      senna  1 tablet Oral BID Samantha Allen PA-C           Lab Results: I have reviewed the following results:  Results from last 7 days   Lab Units 01/16/25 0537 01/12/25 0644 01/11/25  0527   HEMOGLOBIN g/dL 11.2* 11.7 11.2*   HEMATOCRIT % 36.5 38.0 36.5   WBC Thousand/uL 8.94 11.52* 12.61*   PLATELETS Thousands/uL 385 315 305     Results from last 7 days   Lab Units 01/16/25 0537 01/12/25 0644 01/11/25  0527   BUN mg/dL 18 18 15   SODIUM mmol/L 137 135 136   POTASSIUM mmol/L 4.2 4.2 4.1   CHLORIDE mmol/L 99 97 97   CREATININE mg/dL 0.64 0.54* 0.50*   AST U/L 13  --   --    ALT U/L 19  --   --               Samantha Allen PA-C  Physical Medicine and Rehabilitation   01/16/25

## 2025-01-16 NOTE — ASSESSMENT & PLAN NOTE
WBC count currently 8.94.  Completed steroid taper on 1/14.  Likely steroid induced.  No active s/s of infection at this time.  Continue to trend routine CBC.

## 2025-01-16 NOTE — PROGRESS NOTES
Progress Note - Internal Medicine   Name: Hayley Rios 64 y.o. female I MRN: [id number]  Unit/Bed#: -01 I Date of Admission: 1/15/2025   Date of Service: 1/16/2025 I Hospital Day: 1    Assessment & Plan  Compression of thoracic spinal cord with myelopathy (HCC)  Presented on 12/30 with LLE radiculopathy that had been worsening over the past week.  Hx of known thoracic meningioma and prior back surgeries (scoliosis s/p Guerra nydia).  MRI thoracic spine showed similar 1.4cm intradural extramedullary probable meningioma in the L posterolateral thoracic spine region at approximately T3 level with associated marked spinal cord compression with severe canal stenosis.  MRI C-spine showed re-demonstration of meningioma in the posterior L aspect of the canal at T3, compared with 2022 - mass is mildly increased in size and marked cord compression also mildly increase.    OR on 1/7 for bilateral T2-T4 laminectomy with resection of tumor performed by Dr. Coulter.  Pathology consistent with meningioma.    Hold AC/AP for at least 2 weeks post-op.  Neurovascular checks Q shift.  Ensure adequate pain control.  Monitor incision for s/s of infection.    Follow-up with Neurovascular in 2 weeks (1/21) and in 6 weeks.    Primary team following.  PT/OT.  Depression, recurrent (HCC)  Continue home Cymbalta 60mg daily.  Had been on trazodone 50mg at HS at home but would like to continue without at this time.  Supportive counseling as needed.  Restless leg syndrome  Continue home Mirapex 0.5mg at HS.  Had been on Mirapex 0.25mg TID in addition at home but pt would like to continue without at this time.  Prediabetes  Last A1c 5.8 on 1/1/2025.  Encourage lifestyle modifications.  Monitor BG with routine labs.  Vitamin D deficiency  Continue home vitamin D 2000u daily.  Gastroesophageal reflux disease  Continue home omeprazole 40mg BID.  Reflux symptoms worsened after being placed on steroid pack.  MARV (iron deficiency anemia)  Hgb  currently stable at 11.2.  Received IV venofer x3 doses in acute setting.  Iron panel on 2024: Iron Sat 10%, TIBC 396, Iron 40, and Ferritin 9.  Consider starting PO supplementation once constipation resolves.  Ventral hernia without obstruction or gangrene  Recently underwent ventral hernia repair with complications and required revision with panniculectomy on 10/24/24.  Follow-up with General Surgery as needed.  Lymphedema  Continue to elevate legs when able.  Apply ACE wraps as tolerated.  Takes Lasix 20mg daily at home.  Discontinue Lasix today due to hypotension.  May benefit from lymphedema specialist on discharge.  Leukocytosis  WBC count currently 8.94.  Completed steroid taper on .  Likely steroid induced.  No active s/s of infection at this time.  Continue to trend routine CBC.  Vitamin B12 deficiency  Vitamin B12 level 346 on 25.  Received cyanocobalamin injections in acute setting.  Start on PO cyanocobalamin daily.    VTE Pharmacologic Prophylaxis:   Pharmacologic: Enoxaparin (Lovenox)  Mechanical VTE Prophylaxis in Place: Yes    Current Length of Stay: 1 day(s)    Current Patient Status: Inpatient Rehab     Discharge Plan: As per primary team.    Code Status: Level 1 - Full Code    Subjective:   Pt examined while pt sitting in recliner in pt room.  Currently has no complaints of pain while sitting in the recliner.  States that she did have pain while in therapy along with tingling of the LLE.  Concerned that the tingling had stopped in acute care and now it is back.  Still feels weak in the lower extremities and states that she was tremoring during therapy.  Encouraged that therapy should help with her symptoms.  Had a large BM today but still feels bloated.  Has no other concerns or complaints at this time.    Objective:     Vitals:   Temp (24hrs), Av.9 °F (36.6 °C), Min:97.5 °F (36.4 °C), Max:98.4 °F (36.9 °C)    Temp:  [97.5 °F (36.4 °C)-98.4 °F (36.9 °C)] 98.1 °F (36.7 °C)  HR:   [69-86] 78  Resp:  [16-20] 20  BP: ()/(52-79) 113/79  SpO2:  [97 %] 97 %  Body mass index is 36.39 kg/m².     Review of Systems   Constitutional:  Negative for appetite change, chills, fatigue and fever.   HENT:  Negative for trouble swallowing.    Eyes:  Negative for visual disturbance.   Respiratory:  Negative for cough, shortness of breath, wheezing and stridor.    Cardiovascular:  Negative for chest pain, palpitations and leg swelling.   Gastrointestinal:  Negative for abdominal distention, abdominal pain, constipation, diarrhea, nausea and vomiting.        LBM 1/16   Genitourinary:  Negative for difficulty urinating.   Musculoskeletal:  Positive for neck pain (neck/upper back pain during therapies, denies any pain while sitting in the recliner). Negative for arthralgias, back pain and gait problem.   Neurological:  Positive for tremors. Negative for dizziness, weakness, light-headedness, numbness and headaches.        Tingling to LLE   Psychiatric/Behavioral:  Negative for dysphoric mood and sleep disturbance. The patient is not nervous/anxious.    All other systems reviewed and are negative.       Input and Output Summary (last 24 hours):       Intake/Output Summary (Last 24 hours) at 1/16/2025 0911  Last data filed at 1/15/2025 1719  Gross per 24 hour   Intake 640 ml   Output --   Net 640 ml       Physical Exam:     Physical Exam  Vitals and nursing note reviewed.   Constitutional:       General: She is not in acute distress.     Appearance: Normal appearance. She is not ill-appearing.   HENT:      Head: Normocephalic and atraumatic.   Cardiovascular:      Rate and Rhythm: Normal rate and regular rhythm.      Pulses: Normal pulses.      Heart sounds: Murmur heard.      Systolic murmur is present with a grade of 1/6.      No friction rub.   Pulmonary:      Effort: Pulmonary effort is normal. No respiratory distress.      Breath sounds: Normal breath sounds. No wheezing or rhonchi.   Abdominal:       General: Abdomen is flat. Bowel sounds are normal. There is no distension.      Palpations: Abdomen is soft. There is no mass.      Tenderness: There is no abdominal tenderness. There is no guarding or rebound.      Hernia: No hernia is present.   Musculoskeletal:      Cervical back: Normal range of motion and neck supple. No tenderness.      Right lower leg: Edema (lymphedema) present.      Left lower leg: Edema (lymphedema) present.   Skin:     General: Skin is warm and dry.   Neurological:      Mental Status: She is alert and oriented to person, place, and time.   Psychiatric:         Mood and Affect: Mood normal.         Behavior: Behavior normal.         Additional Data:     Labs:    Results from last 7 days   Lab Units 01/16/25  0537 01/12/25  0644   WBC Thousand/uL 8.94 11.52*   HEMOGLOBIN g/dL 11.2* 11.7   HEMATOCRIT % 36.5 38.0   PLATELETS Thousands/uL 385 315   SEGS PCT %  --  76*   LYMPHO PCT %  --  14   MONO PCT %  --  5   EOS PCT %  --  3     Results from last 7 days   Lab Units 01/16/25  0537   SODIUM mmol/L 137   POTASSIUM mmol/L 4.2   CHLORIDE mmol/L 99   CO2 mmol/L 29   BUN mg/dL 18   CREATININE mg/dL 0.64   ANION GAP mmol/L 9   CALCIUM mg/dL 9.1   ALBUMIN g/dL 3.5   TOTAL BILIRUBIN mg/dL 0.29   ALK PHOS U/L 77   ALT U/L 19   AST U/L 13   GLUCOSE RANDOM mg/dL 99         Results from last 7 days   Lab Units 01/15/25  1131 01/15/25  0753 01/14/25  2105 01/14/25  1614 01/14/25  1210 01/14/25  0729 01/13/25  1709 01/13/25  1217 01/13/25  0719 01/12/25  2033 01/12/25  1716 01/12/25  1152   POC GLUCOSE mg/dl 106 106 124 168* 126 111 130 105 120 132 169* 114               Labs reviewed    Imaging:    Imaging reviewed    Recent Cultures (last 7 days):           Last 24 Hours Medication List:   Current Facility-Administered Medications   Medication Dose Route Frequency Provider Last Rate    acetaminophen  975 mg Oral Q8H Samantha Allen PA-C      aluminum-magnesium hydroxide-simethicone  30 mL Oral Q4H  PRN Samantha Allen, MUNA      bisacodyl  10 mg Rectal Daily PRN Samantha Allen, MUNA      calcium carbonate  1,000 mg Oral TID PRN Samantha Allen, MUNA      cholecalciferol  2,000 Units Oral Daily Pk Alexandra Padron, CRNP      cyanocobalamin  1,000 mcg Oral Daily Pk Alexandra Padron, CRNP      diazepam  2 mg Oral Q6H PRN Samantha Allen, MUNA      diphenhydrAMINE  25 mg Oral Q6H PRN Samantha Allen, PA-C      docusate sodium  100 mg Oral BID Samantha Allen, MUNA      DULoxetine  60 mg Oral Daily Samantha Allen PA-C      enoxaparin  40 mg Subcutaneous Q24H [facility] Loni Wong MD      HYDROmorphone  2 mg Oral Q4H PRN Samantha Allen, MUNA      HYDROmorphone  4 mg Oral Q4H PRN Samantha Allen PA-C      ketorolac  10 mg Oral Q6H PRN Samantha Allen, MUNA      magnesium Oxide  400 mg Oral Daily Pk Alexandra Padron, CRNP      melatonin  3 mg Oral HS Samantha Allen, MUNA      methocarbamol  1,000 mg Oral Q8H EVGENY Samantha Allen, MUNA      ondansetron  4 mg Oral Q6H PRN Samantha Allen, MUNA      pantoprazole  40 mg Oral Early Morning Samantha Allen, MUNA      polyethylene glycol  17 g Oral Daily Samantha Allen PA-C      potassium chloride  40 mEq Oral Daily With Breakfast Samantha Allen PA-C      pramipexole  0.5 mg Oral HS Samantha Allen PA-C      senna  1 tablet Oral BID Samantha Allen PA-C          M*Modal software was used to dictate this note.  It may contain errors with dictating incorrect words or incorrect spelling. Please contact the provider directly with any questions.

## 2025-01-16 NOTE — PROGRESS NOTES
ARC ICP  PT LTGS  ELOS 10- 14 days       01/16/25 1000   Rehab Team Goals   Transfer Team Goal Patient will be independent with transfers with least restrictive device upon completion of rehab program   Locomotion Team Goal Patient will be independent with locomotion with least restrictive device upon completion of rehab program   Rehab Team Interventions   PT Interventions Gait Training;Therapeutic Exercise;Neuromuscualr Reeducation;Transfer Training;Community Reintegration;Bed Mobility;Modalities;Patient/Family Education   PT Transfer Goal   Roll left and right Goal 06. Independent - Patient completes the activity by him/herself with no assistance from a helper.   Sit to lying Goal 06. Independent - Patient completes the activity by him/herself with no assistance from a helper.   Lying to sitting on side of bed Goal 06. Independent - Patient completes the activity by him/herself with no assistance from a helper.   Sit to stand Goal 06. Independent - Patient completes the activity by him/herself with no assistance from a helper.   Chair/bed-to-chair transfer Goal 06. Independent - Patient completes the activity by him/herself with no assistance from a helper.   Car Transfer Goal 05. Setup or clean-up assistance - [place] SETS UP or CLEANS UP, patient completes activity. [place] assists only prior to or following the activity.   Status Ongoing;Target goal - two weeks  (ELOS 10- 14 days)   Locomotion Goal   Primary discharge mode of locomotion Walking   Target Walk Distance 300 ft   Assist Device   (LRAD pending progress)   Walk 10 feet Goal 06. Independent - Patient completes the activity by him/herself with no assistance from a helper.   Walk 50 feet with 2 turns Goal 06. Independent - Patient completes the activity by him/herself with no assistance from a helper.   Walk 150 feet Goal 06. Independent - Patient completes the activity by him/herself with no assistance from a helper.   Walking 10 feet on uneven surface  06. Independent - Patient completes the activity by him/herself with no assistance from a helper.   Walking Goal Status Ongoing;Target goal - two weeks  (ELOS 10- 14 days)   Wheel 50 feet with 2 turns Goal 09. Not applicable   Wheel 150 feet Goal 09. Not applicable   Stairs Goal   1 step or curb goal 04. Supervision or touching assistance- [place] provides VERBAL CUES or supervision throughout activity.   4 steps Goal 04. Supervision or touching assistance- [place] provides VERBAL CUES or supervision throughout activity.   12 steps Goal 04. Supervision or touching assistance- [place] provides VERBAL CUES or supervision throughout activity.   Technique Non-reciprocal   Status Ongoing;Target goal - two weeks  (ELOS 10- 14 days)   Object Retrieval Goal   Picking up object Goal 06. Independent - Patient completes the activity by him/herself with no assistance from a helper.   Assistive Device  Reacher   Small Object Picked Up marker from floor - target goal ELOS 10- 14 days

## 2025-01-16 NOTE — PLAN OF CARE
Problem: GASTROINTESTINAL - ADULT  Goal: Maintains or returns to baseline bowel function  Description: INTERVENTIONS:  - Assess bowel function  - Encourage oral fluids to ensure adequate hydration  - Administer IV fluids if ordered to ensure adequate hydration  - Administer ordered medications as needed  - Encourage mobilization and activity  - Consider nutritional services referral to assist patient with adequate nutrition and appropriate food choices  Outcome: Progressing     Problem: SKIN/TISSUE INTEGRITY - ADULT  Goal: Incision(s), wounds(s) or drain site(s) healing without S/S of infection  Description: INTERVENTIONS  - Assess and document dressing, incision, wound bed, drain sites and surrounding tissue  - Provide patient and family education  - Perform skin care/dressing changes as ordered  Outcome: Progressing     Problem: MOBILITY - ADULT  Goal: Maintains/Returns to pre admission functional level  Description: INTERVENTIONS:  - Perform AM-PAC 6 Click Basic Mobility/ Daily Activity assessment daily.  - Set and communicate daily mobility goal to care team and patient/family/caregiver.   - Collaborate with rehabilitation services on mobility goals if consulted  - Perform Range of Motion 4 times a day.  - Reposition patient every 2 hours.  - Dangle patient 4 times a day  - Stand patient 4 times a day  - Ambulate patient 4 times a day  - Out of bed to chair 4 times a day   - Out of bed for meals 4 times a day  - Out of bed for toileting  - Record patient progress and toleration of activity level   Outcome: Progressing

## 2025-01-16 NOTE — NURSING NOTE
"Patient states her incision feels swollen, itchy, very painful like its being pulled. Sharp pains when she moves. Her left leg does not feel the same as it did when she first had the procedure done and it felt good. Now it feels \"weird\" per patient. Incision is slightly warm to touch, slightly blanchable, approximated without drainage.   "

## 2025-01-16 NOTE — PCC NURSING
Hayley Rios is a 64 y.o. female with a PMH of iron deficiency anemia, restless leg syndrome, lymphedema, ventral hernia s/p repair, prior back surgeries, and prediabetes who originally presented to [facility] on 12/30/2024 after developing L upper leg pain and spasms that had increasingly worsened over the past week.  MRI thoracic spine showed similar 1.4cm intradural extramedullary probably meningioma in the L posterolateral thoracic spine region at approximately the T3 level with associated marked spinal cord compression with severe canal stenosis.  Patient was transferred to [facility] for Neurosurgery evaluation.  Neurosurgery recommended obtaining further imaging prior to deciding on surgical intervention.  MRI C-spine on 1/6 re-demonstrated meningioma in the posterior L aspect of the canal at T3, compared with 2022 - mass is mildly increased in size and marked cord compression also mildly increased.  Patient was taken to the OR on 1/7/2025 for bilateral T2-T4 laminectomy with resection of tumor performed by Dr. Coulter.  Post-operatively, patient completed a steroid taper and developed increased reflux symptoms requiring an increase in her home PPI.  BRAYAN drain was removed on 1/10 without any issues.  Neurosurgery recommending to hold anticoagulants and antiplatelets for 2 weeks and will follow-up with patient at 2 weeks and 6 weeks post-operatively.  Patient was evaluated by PT/OT and recommended for acute inpatient rehabilitation.      Admitted to [facility] on 1/15/2025    Pain is managed with Tylenol  975 mg TID, Robaxin 1,000 mg every 8 hours prn, Dilaudid 2 mg for breakthrough pain or 4 mg every 4 hours PRN, baclofen 5 mg TID. Heartburn/GERD is managed with Omeprazole Sprinkle BID, Maalox and Tums as needed. DVT prophylaxis is Lovenox SQ every 24 hours. Restless leg syndrome managed with Mirapex daily at bedtime. Vitamin D deficiency managed with Vitamin D3 daily.  Lymphedema managed with Lasix 20 mg Daily and elevate legs when at rest, and ace wrap BLLE. Vitamin B12 deficiency managed with Vtiamin B-12 tablets daily,       This week we will continue to monitor vital signs and lab values. We will manage pain with the above orders so that the patient can participate in her therapy sessions to her optimal abilities. We will teach the patient how to conserve energy so that she may perform ADL's independently as much as she can. We will educate the patient on the importance of repositioning and off-loading pressure to help lower the risk of skin breakdown. We will prevent falls by keeping personal items and call bell within reach, we will also initiate and maintain hourly rounding.

## 2025-01-16 NOTE — TELEPHONE ENCOUNTER
01/16/2025- PT IS IN [facility]  01/22/2025- WK POV path review and incision check W/ EM   02/19/2025- 6-week POV with Dr. Marylin alexis

## 2025-01-16 NOTE — ASSESSMENT & PLAN NOTE
Continue to elevate legs when able.  Apply ACE wraps as tolerated.  Takes Lasix 20mg daily at home.  Discontinue Lasix today due to hypotension.  May benefit from lymphedema specialist on discharge.

## 2025-01-16 NOTE — PROGRESS NOTES
01/16/25 0700   Patient Data   Rehab Impairment Impairment of mobility, safety and Activities of Daily Living (ADLs) due to Spinal Cord Dysfunction:  Non-Traumatic:  04.130 Other Non-Traumatic    Etiologic: Compression of thoracic spinal cord with myelopathy 2/2  Benign tumor of spinal meningioma   Etiologic Diagnosis Compression of thoracic spinal cord with myelopathy 2/2  Benign tumor of spinal meningioma   Date of Onset 12/31/24  (sx 1/7/25 Bilateral - T2-4 LAMINECTOMY THORACIC FOR TUMOR)   Support System   Name Trang  (retired)   Relationship friend   Able to provide 24 hour supervision No   Able to provide physical help? No   Home Setup   Type of Home (S)  Multi Level  (townhouse in [place]. However, pt reports she will DC for couple days to friend Trang's home who has 4STE BHR, reports she will stay in basement which is accessible  with stairglide with walk in shower)   Number of Stairs 0   Number of Stairs in Home 18  (full flight)   In Home Hand Rail Left  (ascending)   First Floor Bathroom Half   Second Floor Bathroom Full;Tub;Shower;Curtain   First Floor Setup Available Yes   Home Modifications Necessary?   (pending progress)   Home Modification Comment pending progress   Available Equipment Roller Walker  (from friend about 2 weeks PTA)   Baseline Information   Vocation Other  (disability)   Transportation    Prior Device(s) Used   (RW last 2 weeks)   Prior IADL Participation   Money Management Identify Money;Estimate Costs;Estimate Change;Combine Bills;Manage Checkbook   Meal Preparation Full Participation   Laundry Full Participation   Home Cleaning Full Participation   Prior Level of Function   Self-Care 3. Independent - Patient completed the activities by him/herself, with or without an assistive device, with no assistance from a helper.   Indoor-Mobility (Ambulation) 3. Independent - Patient completed the activities by him/herself, with or without an assistive device, with no  assistance from a helper.   Stairs 3. Independent - Patient completed the activities by him/herself, with or without an assistive device, with no assistance from a helper.   Functional Cognition 3. Independent - Patient completed the activities by him/herself, with or without an assistive device, with no assistance from a helper.   Prior Assistance Needed for   (indep with meds from bottles)   Prior Device Used Z. None of the above   Falls in the Last Year   Number of falls in the past 12 months 6   Type of Injury Associated with Fall Injury  (bruises. reports all falls related to leg weakness)   Patient Preference   Tahir (Patient Preference) Hayley   Psychosocial   Psychosocial (WDL) WDL   Patient Behaviors/Mood Calm;Cooperative   Restrictions/Precautions   Precautions Bed/chair alarms;Fall Risk;Pain;Supervision on toilet/commode   Weight Bearing Restrictions No   ROM Restrictions No   Pain Assessment   Pain Assessment Tool 0-10   Pain Score 6   Pain Location/Orientation Orientation: Upper;Location: Back   Pain Radiating Towards across back   Pain Onset/Description Onset: Ongoing;Descriptor: Aching   Effect of Pain on Daily Activities repositions self   Patient's Stated Pain Goal No pain   Hospital Pain Intervention(s) Repositioned;Rest  (ZAIN Decker provided pain meds during session)   Multiple Pain Sites No   Eating Assessment   Type of Assistance Needed Independent   Comment provided breakfast   Eating CARE Score 6   Oral Hygiene   Type of Assistance Needed Physical assistance   Physical Assistance Level Total assistance   Comment PTA completed ins tance, 2* pain completed in [facility] with RW for support   Oral Hygiene CARE Score 1   Tub/Shower Transfer   Reason Not Assessed Sponge Bath   Findings offered shower, pt with inc pain and preferred SB   Shower/Bathe Self   Type of Assistance Needed Physical assistance   Physical Assistance Level 26%-50%   Comment offered shower, but 2* pain completed SB. SB seated on  recliner pt able to wash 10/10 parts, Sonja in sstance forperi/buttocks   Shower/Bathe Self CARE Score 3   Bathing   Assessed Bath Style Sponge Bath   Anticipated D/C Bath Style Tub;Shower   Able to Wash/Rinse/Dry (body part) Left Arm;Right Arm;L Upper Leg;R Upper Leg;L Lower Leg/Foot;R Lower Leg/Foot;Chest;Abdomen;Perineal Area;Buttocks   Limitations Noted in Balance;Endurance;Strength;Timeliness   Positioning Seated;Standing   Dressing/Undressing Clothing   Remove UB Clothes Pullover Shirt   Don UB Clothes Pullover Shirt   Type of Assistance Needed Supervision   Comment seated   Upper Body Dressing CARE Score 4   Remove LB Clothes Undergarment;Pants;Socks   Don LB Clothes Undergarment;Pants;Socks;Shoes   Type of Assistance Needed Physical assistance   Physical Assistance Level 25% or less   Comment xleg method to doff/don undergarment/pants. Sonja for balance in stance for CM   Lower Body Dressing CARE Score 3   Limitations Noted In Balance;Endurance;Safety;Strength;ROM   Positioning Supported Sit;Standing   Putting On/Taking Off Footwear   Type of Assistance Needed Supervision   Comment xleg method for socks/slip on sneakers   Putting On/Taking Off Footwear CARE Score 4   Toileting Hygiene   Type of Assistance Needed Physical assistance   Physical Assistance Level 26%-50%   Comment modA/sonja for steadying while completing hygiene   Toileting Hygiene CARE Score 3   Toilet Transfer   Type of Assistance Needed Physical assistance   Physical Assistance Level Total assistance   Comment use of Rw, Sonja/modA SPT with RW. PTA on AD used   Toilet Transfer CARE Score 1   Transfer Bed/Chair/Wheelchair   Type of Assistance Needed Physical assistance   Physical Assistance Level Total assistance   Comment PTA no AD. 2* pain/weakness introduced RW, Sonja/modA with RW   Chair/Bed-to-Chair Transfer CARE Score 1   Lying to Sitting on Side of Bed   Type of Assistance Needed Supervision   Comment pt has adjustable bed at home   Lying  to Sitting on Side of Bed CARE Score 4   Sit to Stand   Type of Assistance Needed Physical assistance   Physical Assistance Level 26%-50%   Comment modA no AD   Sit to Stand CARE Score 3   Walk 10 Feet   Type of Assistance Needed Physical assistance   Physical Assistance Level Total assistance   Comment PTA no AD, Bob/modA with RW   Walk 10 Feet CARE Score 1   Comprehension   Assist Devices Glasses   QI: Comprehension 4. Undestands: Clear comprehension without cues or repetitions   Comprehension (FIM) 6 - Understands complex/abstract but requires  glasses for visual comp   Expression   Verbal Basic;Complex   QI: Expression 4. Express complex messages without difficulty and with speech that is clear and easy to understan   Expression (FIM) 7 - Expresses complex/abstract ideas in a reasonable time w/o devices or helper.   Social Interaction   Social Interaction (FIM) 7 - Interacts appropriately without assistive device, medication or helper   Problem Solving   Problem solving (FIM) 7 - Solves complex problems consistently without cues/ extra time   Memory   Memory (FIM) 7 - Remembers daily routines   RUE Assessment   RUE Assessment WFL   LUE Assessment   LUE Assessment WFL   Sensation   Light Touch Partial deficits in the RLE;Partial deficits in the LLE  (c/o numbness/tingling)   Cognition   Overall Cognitive Status WFL   Arousal/Participation Alert;Cooperative   Attention Within functional limits   Orientation Level Oriented X4   Memory Within functional limits   Following Commands Follows all commands and directions without difficulty   Vision   Vision Comments wears glasses   Objective Measure   OT Findings /79 HR78   Therapeutic Exercise   Therapeutic Exercise/Activity seated engaged pt in UE Te with 2# 2x15 to cont ot inc fx strength. pt tolerated well with rest break in between   Discharge Information   Vocational Plan Other  (disability)   Barriers to Return to Vocation Strength;Endurance   Patient's  "Discharge Plan Retunr home indep   Patient's Rehab Expectations \"Walk out the door on my own two feet unassisted\"   Barriers to Discharge Home Decreased Strength;Decreased Endurance;Pain;Limited Family Support  (friends can assist)   Impressions Pt is a 64 y.o. female who has a past medical history of but not limited to obesity, lymphedema, multiple spinal surgeries, lumbar spinal stenosis with radiculopathy, known T3 spinal meningioma lost to follow-up who initially was admitted to the [facility] 12/30/2024-12/31/2024 with worsening left lower extremity weakness and spasming.  She underwent multiple imaging studies as below throughout the hospitalization and of particular note noted with MRI lumbar spine with contrast limited due to motion artifact however without abnormal enhancement in the lumbar spine given the limitations of motion degradation noted, additionally MRI thoracic spine with/without contrast was performed redemonstrating the known T3 level meningioma with associated marked spinal cord compression with severe canal stenosis. She was transferred to [facility] on 12/31/24 for further eval/assessment/consultation/sx intervention. CT of spine 1/2/25 - Partially calcified extra medullary T3 lesion is slightly larger than on the prior MRI, measuring 1.8 x 1.4 x 1.2 cm ;  MRI of spine 1/6/25 - Redemonstration of meningioma in the posterior left aspect of the canal at T3. Compared with 2022, mass is mildly increased in size and marked cord compression is also mildly increased.  On 1/7/25 patient underwent sx intervention - Bilateral - T2-4 LAMINECTOMY THORACIC FOR TUMOR.    Pt supine in bed uopn arrival. Reports she lives in a [facility] in [place], however, will DC to friends home which has 4STE and stariglide to 2nd floor. Reports PTA she was indep with all ADLs/IADls and mobility. Recelty starting using RW 2* pain/LE weakness. Pt is + and currently on disability. Offered shower but 2* " pain completed SB. In addition, did utilize RW for safety 2* to pt c/o pain. ZAIN Decker provided pain meds during OT session. Pt at this time perofrming all ADLs/transfers at Bob/modA with RW. Pt limited by pain beginning of session but once pain meds provided by RN pt able to participate better. Therapist educated pt on rehab process, goal setting and ELOS which pt verbalized understanding. Pt presents with the following performance component deficits impacting ADL/IADL skills: pain, LE weakness, impaired balance, decreased endurance, decreased coordination, increased fall risk, new onset of impairment of functional mobility, decreased ADLS, decreased IADLS, decreased activity tolerance and SOB upon exertion, that result in activity limitations and/or participation restrictions. Pt to continue to benefit from continued acute rehab OT services during hospital stay to address defined deficits and to maximize level of functional independence in the following Occupational Performance areas: grooming, bathing/shower, toilet hygiene, dressing, medication management, functional mobility, community mobility, clothing management, cleaning, meal prep and household maintenance.  Pt presents with goo rehab potential. This evaluation requires clinical decision making of moderate complexity, because the patient presents with comorbidites that affect occupational performance and required significant modification of tasks or assistance with consideration of multiple treatment options. Pt is unsafe to D/C home at this time, recommending ELOS 10-14 days to achieve Feliciano level goals with least restrictive device to address these areas and resume prior occupational roles to maximize independence to reduce risk of fall and decrease risk of readmission.  POC to focus on self-care retraining, transfer training,  improving pt's activity tolerance and endurance, UE strengthening, engagement in IADLs, balance interventions.      OT Therapy  Minutes   OT Time In 0700   OT Time Out 0830   OT Total Time (minutes) 90   OT Mode of treatment - Individual (minutes) 90   OT Mode of treatment - Concurrent (minutes) 0   OT Mode of treatment - Group (minutes) 0   OT Mode of treatment - Co-treat (minutes) 0   OT Mode of Treatment - Total time(minutes) 90 minutes   OT Cumulative Minutes 90   Cumulative Minutes   Cumulative therapy minutes 90

## 2025-01-16 NOTE — PCC PHYSICAL THERAPY
Pt 64 yr old female visiting friends from [place] for the holidays (here since 12/4/24) came to network on 12/30/24 with progressive and worsening chronic LE weakness. Testing revealed +1.4cm thoracic meningioma with cord compression, pt transferred to [facility] for neurosurgery f/u. Ultimately pt underwent B T2 - T4 lami due to tumor by Dr Coulter and Dr Shah on 1/7/25. Pt with other PMH: multiple spinal surgeries, spinal stenosis with ridiculopathy, obesity, and lymphedema. At baseline pt lives alone was fully I without AD, , on disability. Her home in [place] is  with curb out front, 1/2 bath first, full bath and bedroom 2nd floor. Stay with friend (Trang) locally has 1+5 DELLA R HR and stair glide down to finished basement where pt has been staying. Pt has borrowed QC (which sounds like its missing rubber caps), and recently started using Trang's husbands RW due to progressive weakness.  Pt emotional with change in status over past few days, feeling she was doing better a few days ago but was not ready to get on a plane to go back to [place]. Pt c/o  and presents with L>R LE weakness, some sensation changes however intact to light touch, +L LE ataxia which worsens with inc pain or ftg. Discussed pain mgmt strategies, and attending to when her pain medications are due. Pt to benefit from skilled PT intervention in efforts to improve pain, functional balance and safety, B LE strengthening, improve gait quality and endurance for safe DC. Pt demonstrates good rehab potential pending pain mgmt vs control to reach set mod I goals with LRAD for DC to back to friends home (or sisters in [place]) with probable home services to follow. Will likely benefit from RW.     1/21/25  Pt progressing well towards set mod I goals. Ongoing use of RW and pt verbalized making efforts to reduce reliance on it, has borrowed QC, education on use and sequencing as well as need to slow pace to use QC properly. Pt somewhat fearful and anxious however did  well with short distances and progress up to 150ft by end of session. Discussed with pt questionable need for f/u therapy pending neurosurgery recommendation. May still want RW to ensure safety as recovery may flucutate. Overall pt demonstrating near mod I mobiltiy with RW and feels she could manage at home, given pain is more under control.     Anticipation for hopeful DC by end of this week, has virtual neurosurgery appt tomorrow at 1230 for postop f/u, hopeful she can fly home to [place] and recover there.

## 2025-01-17 ENCOUNTER — TRANSITIONAL CARE MANAGEMENT (OUTPATIENT)
Dept: INTERNAL MEDICINE CLINIC | Facility: CLINIC | Age: 65
End: 2025-01-17

## 2025-01-17 PROCEDURE — 97116 GAIT TRAINING THERAPY: CPT

## 2025-01-17 PROCEDURE — 99232 SBSQ HOSP IP/OBS MODERATE 35: CPT | Performed by: INTERNAL MEDICINE

## 2025-01-17 PROCEDURE — 97530 THERAPEUTIC ACTIVITIES: CPT

## 2025-01-17 PROCEDURE — 97110 THERAPEUTIC EXERCISES: CPT

## 2025-01-17 PROCEDURE — 97535 SELF CARE MNGMENT TRAINING: CPT

## 2025-01-17 RX ORDER — METHOCARBAMOL 750 MG/1
1500 TABLET, FILM COATED ORAL EVERY 8 HOURS SCHEDULED
Status: DISCONTINUED | OUTPATIENT
Start: 2025-01-17 | End: 2025-01-20

## 2025-01-17 RX ORDER — HYDROCORTISONE 25 MG/G
1 CREAM TOPICAL 2 TIMES DAILY PRN
Status: DISCONTINUED | OUTPATIENT
Start: 2025-01-17 | End: 2025-01-24 | Stop reason: HOSPADM

## 2025-01-17 RX ADMIN — ACETAMINOPHEN 975 MG: 650 SUSPENSION ORAL at 05:54

## 2025-01-17 RX ADMIN — KETOROLAC TROMETHAMINE 10 MG: 10 TABLET, FILM COATED ORAL at 23:24

## 2025-01-17 RX ADMIN — ACETAMINOPHEN 975 MG: 650 SUSPENSION ORAL at 14:00

## 2025-01-17 RX ADMIN — METHOCARBAMOL TABLETS 1000 MG: 500 TABLET, COATED ORAL at 05:54

## 2025-01-17 RX ADMIN — DULOXETINE HYDROCHLORIDE 60 MG: 60 CAPSULE, DELAYED RELEASE ORAL at 08:59

## 2025-01-17 RX ADMIN — METHOCARBAMOL 1500 MG: 750 TABLET, FILM COATED ORAL at 23:23

## 2025-01-17 RX ADMIN — Medication 2000 UNITS: at 08:59

## 2025-01-17 RX ADMIN — OMEPRAZOLE 40 MG: 40 CAPSULE, DELAYED RELEASE ORAL at 16:58

## 2025-01-17 RX ADMIN — OMEPRAZOLE 40 MG: 40 CAPSULE, DELAYED RELEASE ORAL at 06:00

## 2025-01-17 RX ADMIN — DIAZEPAM 2 MG: 2 TABLET ORAL at 20:40

## 2025-01-17 RX ADMIN — CYANOCOBALAMIN TAB 1000 MCG 1000 MCG: 1000 TAB at 08:59

## 2025-01-17 RX ADMIN — MELATONIN TAB 3 MG 3 MG: 3 TAB at 20:42

## 2025-01-17 RX ADMIN — METHOCARBAMOL 1500 MG: 750 TABLET, FILM COATED ORAL at 13:01

## 2025-01-17 RX ADMIN — POTASSIUM CHLORIDE 40 MEQ: 1500 TABLET, EXTENDED RELEASE ORAL at 09:03

## 2025-01-17 RX ADMIN — KETOROLAC TROMETHAMINE 10 MG: 10 TABLET, FILM COATED ORAL at 16:58

## 2025-01-17 RX ADMIN — SENNOSIDES 8.6 MG: 8.6 TABLET, FILM COATED ORAL at 08:59

## 2025-01-17 RX ADMIN — PRAMIPEXOLE DIHYDROCHLORIDE 0.5 MG: 0.25 TABLET ORAL at 23:23

## 2025-01-17 RX ADMIN — ACETAMINOPHEN 975 MG: 650 SUSPENSION ORAL at 23:24

## 2025-01-17 RX ADMIN — DOCUSATE SODIUM 100 MG: 100 CAPSULE, LIQUID FILLED ORAL at 08:59

## 2025-01-17 RX ADMIN — POLYETHYLENE GLYCOL 3350 17 G: 17 POWDER, FOR SOLUTION ORAL at 08:59

## 2025-01-17 RX ADMIN — KETOROLAC TROMETHAMINE 10 MG: 10 TABLET, FILM COATED ORAL at 09:28

## 2025-01-17 RX ADMIN — ENOXAPARIN SODIUM 40 MG: 40 INJECTION SUBCUTANEOUS at 08:59

## 2025-01-17 RX ADMIN — Medication 400 MG: at 08:59

## 2025-01-17 RX ADMIN — KETOROLAC TROMETHAMINE 10 MG: 10 TABLET, FILM COATED ORAL at 03:50

## 2025-01-17 NOTE — ASSESSMENT & PLAN NOTE
Hx known T3 spinal meningioma presented to [facility] with left lower extremity weakness and spasms  MRI T spine: Suboptimal examination due to mild, moderate, and severe motion artifact - worse on postcontrast sequences. Similar 1.4 cm intradural extramedullary probable meningioma in left posterolateral thoracic spine region at approximately T3 level with associated marked spinal cord compression with severe canal stenosis given motion degraded exam. No cord edema given motion degradation.   MRI L spine: Suboptimal examination due to moderate-to-severe motion degraded exam of lumbar spine and 3 plane localizer images, sagittal T1 post contrast and axial T1 postcontrast sequences. No abnormal enhancement in lumbar spine given limitations of motion degradation.   S/p T2-4 laminectomy for resection of thoracic tumor by Dr. Coulter 1/7, pathology consistent with meningioma, cleared to resume DVT ppx  Keep incision clean and dry and wipe daily w/ MARY wipe, can be left open to air  Completed dexamethasone taper 1/14, 2 week fu ~1/24  6 week surgeon f/u ~ 2/21  C/w PT/OT

## 2025-01-17 NOTE — PLAN OF CARE
Problem: PAIN - ADULT  Goal: Verbalizes/displays adequate comfort level or baseline comfort level  Description: Interventions:  - Encourage patient to monitor pain and request assistance  - Assess pain using appropriate pain scale  - Administer analgesics based on type and severity of pain and evaluate response  - Implement non-pharmacological measures as appropriate and evaluate response  - Consider cultural and social influences on pain and pain management  - Notify physician/advanced practitioner if interventions unsuccessful or patient reports new pain  Outcome: Progressing     Problem: SKIN/TISSUE INTEGRITY - ADULT  Goal: Incision(s), wounds(s) or drain site(s) healing without S/S of infection  Description: INTERVENTIONS  - Assess and document dressing, incision, wound bed, drain sites and surrounding tissue  - Provide patient and family education  - Perform skin care/dressing changes as ordered  Outcome: Progressing

## 2025-01-17 NOTE — PROGRESS NOTES
01/17/25 1230   Pain Assessment   Pain Assessment Tool 0-10   Pain Score 5   Pain Location/Orientation Orientation: Upper;Location: Back   Pain Radiating Towards across upper back   Pain Onset/Description Onset: Ongoing;Descriptor: Discomfort   Effect of Pain on Daily Activities repositioning   Patient's Stated Pain Goal No pain   Restrictions/Precautions   Precautions Bed/chair alarms;Fall Risk;Pain   Weight Bearing Restrictions No   ROM Restrictions No   Sit to Lying   Type of Assistance Needed Supervision   Sit to Lying CARE Score 4   Sit to Stand   Type of Assistance Needed Incidental touching;Adaptive equipment   Comment CGA no AD   Sit to Stand CARE Score 4   Bed-Chair Transfer   Type of Assistance Needed Physical assistance   Physical Assistance Level 25% or less   Comment Bob no AD, CGA with RW   Chair/Bed-to-Chair Transfer CARE Score 3   Toileting Hygiene   Type of Assistance Needed Incidental touching   Comment pt had med BM, able to compelte hygiene seated. CGA In stance for steadying in stance for CM   Toileting Hygiene CARE Score 4   Toilet Transfer   Type of Assistance Needed Physical assistance;Adaptive equipment   Physical Assistance Level 25% or less   Comment Bob/CGA with RW   Toilet Transfer CARE Score 3   Health Management   Health Management Level of Assistance Close supervision   Health Management provided pt with med refernce sheet. pt able to recall all old meds and educ provided on new meds. pt reporting she mainly takes meds from bottles. can bnefit from tangible task next session   Therapeutic Excerise-Strength   UE Strength Yes   Right Upper Extremity- Strength   R Shoulder Flexion;Extension;Horizontal ABduction   R Elbow Elbow extension;Elbow flexion   R Position Seated   Equipment Dumbbell  (2#)   R Weight/Reps/Sets 2x15   RUE Strength Comment engaged pt in UE TE with 2# 2x15 to cont to inc fx strength tolerated well with rest break   Left Upper Extremity-Strength   LUE Strength  Comment see above   Cognition   Overall Cognitive Status WFL   Arousal/Participation Alert;Cooperative   Attention Within functional limits   Orientation Level Oriented X4   Memory Within functional limits   Following Commands Follows all commands and directions without difficulty   Additional Activities   Additional Activities Comments introduced folding waker tray with meal prep.   Activity Tolerance   Activity Tolerance Patient tolerated treatment well   Assessment   Treatment Assessment Engaged pt in 90mins of skilled OT services with focus on fx mobility, toileting, UE TE, fx mobility. Pt tolerated session well with pain but toelrated. Pt agreeable to use RW if needed for mobility and safety. Introduced folding wlaker tray and handout provided. Pt is limited by pain but participates. Cont OT POC with focus on activity tolerance, balance, transfers, UE TE, med mngmnt, meal prep with tray to inc fx performance and indep.   Prognosis Good   Problem List Decreased strength;Decreased endurance;Impaired balance;Decreased mobility;Pain;Impaired sensation   Barriers to Discharge Inaccessible home environment;Decreased caregiver support   Plan   Treatment/Interventions ADL retraining;Functional transfer training;Therapeutic exercise;Endurance training;Patient/family training;Bed mobility;Compensatory technique education   Progress Progressing toward goals   Discharge Recommendation   Rehab Resource Intensity Level, OT   (pending progress)   OT Therapy Minutes   OT Time In 1230   OT Time Out 1400   OT Total Time (minutes) 90   OT Mode of treatment - Individual (minutes) 90   OT Mode of treatment - Concurrent (minutes) 0   OT Mode of treatment - Group (minutes) 0   OT Mode of treatment - Co-treat (minutes) 0   OT Mode of Treatment - Total time(minutes) 90 minutes   OT Cumulative Minutes 180   Therapy Time missed   Time missed? No

## 2025-01-17 NOTE — PROGRESS NOTES
"   01/17/25 0930   Pain Assessment   Pain Score 9  (dec to 3-5/10 30 mins after apin meds)   Pain Location/Orientation Orientation: Upper;Location: Back;Location: Shoulder   Hospital Pain Intervention(s) Cold applied  (medicated by nursing just prior to session)   Restrictions/Precautions   Precautions Bed/chair alarms;Fall Risk;Supervision on toilet/commode;Pain   Braces or Orthoses   (no bracing needed post op)   Cognition   Arousal/Participation Alert;Cooperative   Attention Within functional limits   Memory Within functional limits   Following Commands Follows all commands and directions without difficulty   Subjective   Subjective \"I wish my pain medication has better timing\"   Sit to Lying   Type of Assistance Needed Supervision   Sit to Lying CARE Score 4   Lying to Sitting on Side of Bed   Type of Assistance Needed Supervision   Lying to Sitting on Side of Bed CARE Score 4   Sit to Stand   Type of Assistance Needed Incidental touching;Physical assistance   Physical Assistance Level 25% or less   Comment no AD   Sit to Stand CARE Score 3   Bed-Chair Transfer   Type of Assistance Needed Physical assistance   Physical Assistance Level 25% or less   Comment no AD   Chair/Bed-to-Chair Transfer CARE Score 3   Car Transfer   Type of Assistance Needed Physical assistance   Physical Assistance Level 25% or less   Comment simulated on nu step , verbal cues for technique   Car Transfer CARE Score 3   Walk 10 Feet   Type of Assistance Needed Physical assistance   Physical Assistance Level 25% or less   Comment with RW   Walk 10 Feet CARE Score 3   Walk 50 Feet with Two Turns   Type of Assistance Needed Physical assistance   Physical Assistance Level 25% or less   Comment with RW   Walk 50 Feet with Two Turns CARE Score 3   Walk 150 Feet   Type of Assistance Needed Physical assistance   Physical Assistance Level 25% or less   Comment with RW   Walk 150 Feet CARE Score 3   Walking 10 Feet on Uneven Surfaces   Type of " Assistance Needed Physical assistance   Physical Assistance Level Total assistance   Comment unable to complete without AD as per baseline, min A using RW over floor mat   Walking 10 Feet on Uneven Surfaces CARE Score 1   Ambulation   Primary Mode of Locomotion Prior to Admission Walk   Distance Walked (feet) 150 ft  (X 2)   Assist Device Roller Walker   Gait Pattern Ataxic;Inconsistant Yadira;Improper weight shift   Limitations Noted In Balance;Endurance   Walk Assist Level Contact Guard;Minimum Assist   Does the patient walk? 2. Yes   Wheel 50 Feet with Two Turns   Reason if not Attempted Activity not applicable   Wheel 50 Feet with Two Turns CARE Score 9   Wheel 150 Feet   Reason if not Attempted Activity not applicable   Wheel 150 Feet CARE Score 9   Wheelchair mobility   Findings Pt. anticipated to be ambulatory at d/c.   Does the patient use a wheelchair? 0. No   Curb or Single Stair   Style negotiated Single stair   Type of Assistance Needed Physical assistance   Physical Assistance Level 26%-50%   Comment B UE on R HR, non reciprocal, going sideways ascending   1 Step (Curb) CARE Score 3   4 Steps   Type of Assistance Needed Physical assistance   Physical Assistance Level 26%-50%   Comment B UE on R HR, non reciprocal, going sideways ascending   4 Steps CARE Score 3   12 Steps   Comment limited by pain and LE fatigue   Reason if not Attempted Safety concerns   12 Steps CARE Score 88   Stairs   Type Stairs   # of Steps 8   Weight Bearing Precautions Fall Risk   Assist Devices Single Rail   Toilet Transfer   Type of Assistance Needed Physical assistance   Physical Assistance Level 25% or less   Comment using RW / grab bar   Toilet Transfer CARE Score 3   Toilet Transfer   Surface Assessed Standard Toilet   Therapeutic Interventions   Strengthening LAQ, hip flex, add squeeze with ball, hip abd with green TB , seated heel slide, buttocks squeezes, Gave pt. printed handout for these exercises for HEP. sit to  "stand from high chair in gym with no UE support, sarah PF and DF, standing hip flex and mini squats.   Flexibility B hamstring and gastroc stretching   Equipment Use   NuStep Level 1. LE only X 10 mins , spm to 70   Assessment   Treatment Assessment Pt. in severe pain initially but then pain decreased after pain meds. Pt. cont to have c/o foot numbness and LE feeling \"wobbly\". No overt LOB or buckling noted though. Pt. able to dmeonstrate improved activity tolerance in ambulation and stairs management. RW lowered to dec pressure on upperbody and seemed to help with her pain. No other complaints reported. COnt with PT POC to inc strength, activity tolerance, balance , safety and independence   Problem List Decreased strength;Decreased endurance;Impaired balance;Decreased mobility;Pain;Impaired sensation   Barriers to Discharge Inaccessible home environment;Decreased caregiver support   PT Barriers   Functional Limitation Car transfers;Ramp negotiation;Stair negotiation;Standing;Transfers;Walking   Plan   Treatment/Interventions Functional transfer training;LE strengthening/ROM;Elevations;Therapeutic exercise;Endurance training;Patient/family training;Equipment eval/education;Bed mobility;Gait training   Discharge Recommendation   Rehab Resource Intensity Level, PT   (TBD)   Equipment Recommended Walker   PT Therapy Minutes   PT Time In 0930   PT Time Out 1100   PT Total Time (minutes) 90   PT Mode of treatment - Individual (minutes) 90   PT Mode of treatment - Concurrent (minutes) 0   PT Mode of treatment - Group (minutes) 0   PT Mode of treatment - Co-treat (minutes) 0   PT Mode of Treatment - Total time(minutes) 90 minutes   PT Cumulative Minutes 180   Therapy Time missed   Time missed? No     "

## 2025-01-17 NOTE — PROGRESS NOTES
Progress Note - PMR   Name: Hayley Rios 64 y.o. female I MRN: [id number]  Unit/Bed#: -01 I Date of Admission: 1/15/2025   Date of Service: 1/17/2025 I Hospital Day: 2     Assessment & Plan  Compression of thoracic spinal cord with myelopathy (HCC)  Hx known T3 spinal meningioma presented to [facility] with left lower extremity weakness and spasms  MRI T spine: Suboptimal examination due to mild, moderate, and severe motion artifact - worse on postcontrast sequences. Similar 1.4 cm intradural extramedullary probable meningioma in left posterolateral thoracic spine region at approximately T3 level with associated marked spinal cord compression with severe canal stenosis given motion degraded exam. No cord edema given motion degradation.   MRI L spine: Suboptimal examination due to moderate-to-severe motion degraded exam of lumbar spine and 3 plane localizer images, sagittal T1 post contrast and axial T1 postcontrast sequences. No abnormal enhancement in lumbar spine given limitations of motion degradation.   S/p T2-4 laminectomy for resection of thoracic tumor by Dr. Coulter 1/7, pathology consistent with meningioma, cleared to resume DVT ppx  Keep incision clean and dry and wipe daily w/ MARY wipe, can be left open to air  Completed dexamethasone taper 1/14, 2 week fu ~1/24  6 week surgeon f/u ~ 2/21  C/w PT/OT    Ambulatory dysfunction  Patient was evaluated by the rehabilitation team MD and an appropriate candidate for acute inpatient rehabilitation program at this time.  The patient will tolerate 3 hours/day 5 to 7 days/week of intensive physical, occupational and speech therapy in order to obtain goals for community discharge  Due to the patient's functional Compared to their baseline level of function in addition to their ongoing medical needs, the patient would benefit from daily supervision from a rehabilitation physician as well as rehabilitation nursing to implement and adjust the medical as  well as functional plan of care in order to meet the patient's goals.   Acute pain  Cw tylenol 975 Q8h, cymbalta 60 mg daily, robaxin 1g Q8h  PRN: dilaudid 2-4 mg Q4h, tordol 10 mg Q6h, Valium 2mg Q6h  Nursing writing down next PRN dose in pt room  Titrate opioids as able  Ice/heat PRN, lidocaine patch bilateral shoulders either side of incision  Depression, recurrent (HCC)  Will monitor mood/behavior and consult neuropsych if needed  Restless leg syndrome  Cw pramipexole, b12 inj  Prediabetes  A1c 5.8%  POCT glucose 100-130s  POCT glucose and accuchecks defer to IM  Vitamin D deficiency  Cw vitamin D supplementation  Gastroesophageal reflux disease  CW protonix  PRN Tums, maalox  Protonix changed to omeprazole  MARV (iron deficiency anemia)  S/p iV venofer X3  Hgb 1/12 11.7----> 11.2 1/16  Monitor CBC, transfuse for hemoglobin <7  Ventral hernia without obstruction or gangrene  Hx 6 hernia surgeries, most recent 10/24/24 w/ hx bowel obstruction  Monitor BM, abominal pain, cw bowel meds  Lymphedema  Cw home lasix 20 mg daily  Leukocytosis  11.52 1/12, recently completed steroid taper  Monitoring with CBC  Vitamin B12 deficiency  346 1/1  Cw Cyancobalmin PO  F/u outpt providers  Constipation  Small BM 1/15, prior before admission 2 weeks ago  CW bowel reg   Continues to pass gas  Obesity  Encourage lifestyle changes    History of Present Illness   luda Rios is a 64 y.o. female w/ PMHx of restless leg syndrome, MARV, GERD w/ barrets esophagous, abdominal wall hernia repair, lymphedema, multiple spinal surgeries, who initially presented to [facility] on 12/30/2024 with increased ambulatory dysfunction and weakness. Patient was visiting friends in the area over the holidays when she noticed increased LE weakness. Patient w/ known T3 spinal meningioma who was lost to follow up. She was initially admitted to [facility] 12/30-12/31 w/ worsening LLE weakness and spasm. She  underwent multiple imaging studies at that shakeel which re-demonstrated known T3 meningioma w/ marked spinal cord compression and severe canal stenosis. She was transferred to [facility] on 12/31 for further assessment by NSGY. CT spine and MRI spine w/ increased size and marked cord compression w/ focal cord edema at the lvl of compression. She underwent T2-4 laminectomy on 1/7/2025 w/ NSGY Dr. Coulter. MEP and SSEPs remained stable without any changes. Her BRAYAN drain was removed 1/10. Her decadron was wean was completed 1/14/2025. She had significant pain post op and is now on oral analgesics. . Hayley Rios was admitted to the [facility] on 01/15/25     Chief Complaint: f/u ambulatory dysfunction    Interval: Patient seen and examined in bed. No events overnight.  Reports 9/10 pain at incision site. Last BM 1/16. Sleeping well at night. Pt states she fell asleep before she asked for pain medication and feels some weakness in her legs. Pt is motivated to get into therapy and get stronger.    Review of Systems   Constitutional:  Negative for chills and fever.   Respiratory:  Negative for shortness of breath.    Cardiovascular:  Positive for leg swelling. Negative for chest pain and palpitations.   Gastrointestinal:  Positive for abdominal distention. Negative for abdominal pain, constipation, diarrhea, nausea and vomiting.   Musculoskeletal:  Positive for neck pain.   Neurological:  Positive for weakness.       Objective   Functional Update:  Physical Therapy Occupational Therapy Speech Therapy                           Temp:  [97.8 °F (36.6 °C)-98.9 °F (37.2 °C)] 97.8 °F (36.6 °C)  HR:  [80-86] 80  Resp:  [20] 20  BP: (118-127)/(51-66) 127/66  SpO2:  [96 %] 96 %    Physical Exam  Constitutional:       General: She is not in acute distress.  HENT:      Head: Normocephalic and atraumatic.      Mouth/Throat:      Mouth: Mucous membranes are moist.   Cardiovascular:      Rate and Rhythm: Normal rate and regular rhythm.    Pulmonary:      Effort: Pulmonary effort is normal.      Breath sounds: Normal breath sounds.   Abdominal:      General: Bowel sounds are normal. There is distension.   Musculoskeletal:         General: Normal range of motion.      Right lower leg: Edema present.      Left lower leg: Edema present.   Skin:     General: Skin is warm and dry.      Comments: Incision site at neck is pink and not well demarcated   Neurological:      Mental Status: She is alert. Mental status is at baseline.   Psychiatric:         Mood and Affect: Mood normal.         Behavior: Behavior normal.           Scheduled Meds:  Current Facility-Administered Medications   Medication Dose Route Frequency Provider Last Rate    acetaminophen  975 mg Oral Q8H Samantha Allen PA-C      aluminum-magnesium hydroxide-simethicone  30 mL Oral Q4H PRN Samantha Allen PA-C      bisacodyl  10 mg Rectal Daily PRN Samantha Allen PA-C      calcium carbonate  1,000 mg Oral TID PRN Samantha Allen PA-C      cholecalciferol  2,000 Units Oral Daily Pk Padron, ROGE      cyanocobalamin  1,000 mcg Oral Daily Pk Padron, ROGE      diazepam  2 mg Oral Q6H PRN Samantha Allen PA-C      diphenhydrAMINE  25 mg Oral Q6H PRN Samantha Allen, MUNA      docusate sodium  100 mg Oral BID Samantha Allen, PA-C      DULoxetine  60 mg Oral Daily Samantha Allen, PA-C      enoxaparin  40 mg Subcutaneous Q24H [facility] Loni Wong MD      HYDROmorphone  2 mg Oral Q4H PRN REED SimpsonC      HYDROmorphone  4 mg Oral Q4H PRN Samantha Allen, PA-C      ketorolac  10 mg Oral Q6H PRN Samantha Allen, PA-C      magnesium Oxide  400 mg Oral Daily Pk Padron, ROGE      melatonin  3 mg Oral HS Samantha Allen, MUNA      methocarbamol  1,000 mg Oral Q8H EVGENY Samantha Allen, PA-C      omeprazole  40 mg Oral BID AC Pk Padron, ROCKNP      ondansetron  4 mg Oral Q6H PRN Samantha Allen,  MUNA      polyethylene glycol  17 g Oral Daily Samantha Allen PA-C      potassium chloride  40 mEq Oral Daily With Breakfast Samantha Allen PA-C      pramipexole  0.5 mg Oral HS Samantha Allen PA-C      senna  1 tablet Oral BID Samantha Allen PA-C           Lab Results: I have reviewed the following results:  Results from last 7 days   Lab Units 01/16/25 0537 01/12/25 0644 01/11/25  0527   HEMOGLOBIN g/dL 11.2* 11.7 11.2*   HEMATOCRIT % 36.5 38.0 36.5   WBC Thousand/uL 8.94 11.52* 12.61*   PLATELETS Thousands/uL 385 315 305     Results from last 7 days   Lab Units 01/16/25 0537 01/12/25  0644 01/11/25  0527   BUN mg/dL 18 18 15   SODIUM mmol/L 137 135 136   POTASSIUM mmol/L 4.2 4.2 4.1   CHLORIDE mmol/L 99 97 97   CREATININE mg/dL 0.64 0.54* 0.50*   AST U/L 13  --   --    ALT U/L 19  --   --               Samantha Allen PA-C  Physical Medicine and Rehabilitation   01/17/25

## 2025-01-17 NOTE — PROGRESS NOTES
Progress Note - Internal Medicine   Name: Hayley Rios 64 y.o. female I MRN: [id number]  Unit/Bed#: -01 I Date of Admission: 1/15/2025   Date of Service: 1/17/2025 I Hospital Day: 2    Assessment & Plan  Compression of thoracic spinal cord with myelopathy (HCC)  Presented on 12/30 with LLE radiculopathy that had been worsening over the past week.  Hx of known thoracic meningioma and prior back surgeries (scoliosis s/p Guerra nydia).  MRI thoracic spine showed similar 1.4cm intradural extramedullary probable meningioma in the L posterolateral thoracic spine region at approximately T3 level with associated marked spinal cord compression with severe canal stenosis.  MRI C-spine showed re-demonstration of meningioma in the posterior L aspect of the canal at T3, compared with 2022 - mass is mildly increased in size and marked cord compression also mildly increase.    OR on 1/7 for bilateral T2-T4 laminectomy with resection of tumor performed by Dr. Coulter.  Pathology consistent with meningioma.    Hold AC/AP for at least 2 weeks post-op.  Neurovascular checks Q shift.  Ensure adequate pain control.  Monitor incision for s/s of infection.    Follow-up with Neurovascular in 2 weeks (1/21) and in 6 weeks.    Primary team following.  PT/OT.  Depression, recurrent (HCC)  Continue home Cymbalta 60mg daily.  Had been on trazodone 50mg at HS at home but would like to continue without at this time.  Supportive counseling as needed.  Restless leg syndrome  Continue home Mirapex 0.5mg at HS.  Had been on Mirapex 0.25mg TID in addition at home but pt would like to continue without at this time.  Prediabetes  Last A1c 5.8 on 1/1/2025.  Encourage lifestyle modifications.  Monitor BG with routine labs.  Vitamin D deficiency  Continue home vitamin D 2000u daily.  Gastroesophageal reflux disease  Continue home omeprazole 40mg BID.  Reflux symptoms worsened after being placed on steroid pack.  MARV (iron deficiency anemia)  Hgb  currently stable at 11.2.  Received IV venofer x3 doses in acute setting.  Iron panel on 2024: Iron Sat 10%, TIBC 396, Iron 40, and Ferritin 9.  Consider starting PO supplementation once constipation resolves.  Ventral hernia without obstruction or gangrene  Recently underwent ventral hernia repair with complications and required revision with panniculectomy on 10/24/24.  Follow-up with General Surgery as needed.  Lymphedema  Continue to elevate legs when able.  Apply ACE wraps as tolerated.  Takes Lasix 20mg daily at home.  Discontinued Lasix on  due to hypotension.  May benefit from lymphedema specialist on discharge.  Leukocytosis  WBC count currently 8.94.  Completed steroid taper on .  Likely steroid induced.  No active s/s of infection at this time.  Continue to trend routine CBC.  Vitamin B12 deficiency  Vitamin B12 level 346 on 25.  Received cyanocobalamin injections in acute setting.  Start on PO cyanocobalamin daily.    VTE Pharmacologic Prophylaxis:   Pharmacologic: Enoxaparin (Lovenox)  Mechanical VTE Prophylaxis in Place: Yes - sequential compression devices.    Current Length of Stay: 2 day(s)    Current Patient Status: Inpatient Rehab     Discharge Plan: As per primary team.    Code Status: Level 1 - Full Code    Subjective:   Pt examined while pt sitting in chair in therapy gym.  Complaints of upper back/bilateral shoulder pain, 3/10, improving after receiving Toradol this morning.  Feels that the tingling sensation in her LLE is still there but improved compared to yesterday.  Denies any fevers, chills, lightheadedness, dizziness, SOB, palpitations, or CP.  Had a BM yesterday but still feels bloated today.  Denies any abdominal pain.    Objective:     Vitals:   Temp (24hrs), Av.3 °F (36.8 °C), Min:97.8 °F (36.6 °C), Max:98.9 °F (37.2 °C)    Temp:  [97.8 °F (36.6 °C)-98.9 °F (37.2 °C)] 97.8 °F (36.6 °C)  HR:  [80-86] 80  Resp:  [20] 20  BP: (118-127)/(51-66) 127/66  SpO2:  [96  %] 96 %  Body mass index is 36.39 kg/m².     Review of Systems   Constitutional:  Negative for appetite change, chills, fatigue and fever.   HENT:  Negative for trouble swallowing.    Eyes:  Negative for visual disturbance.   Respiratory:  Negative for cough, shortness of breath, wheezing and stridor.    Cardiovascular:  Negative for chest pain, palpitations and leg swelling.   Gastrointestinal:  Negative for abdominal distention, abdominal pain, constipation, diarrhea, nausea and vomiting.        LBM 1/16, abdominal bloating   Genitourinary:  Negative for difficulty urinating.   Musculoskeletal:  Positive for back pain (upper back, B/L shoulder pain, 3/10, improving with Toradol) and neck pain. Negative for arthralgias and gait problem.   Neurological:  Negative for dizziness, weakness, light-headedness, numbness and headaches.        Tingling sensation in LLE, improving compared to yesterday   Psychiatric/Behavioral:  Negative for dysphoric mood and sleep disturbance. The patient is not nervous/anxious.    All other systems reviewed and are negative.       Input and Output Summary (last 24 hours):       Intake/Output Summary (Last 24 hours) at 1/17/2025 0950  Last data filed at 1/17/2025 0801  Gross per 24 hour   Intake 480 ml   Output --   Net 480 ml       Physical Exam:     Physical Exam  Vitals and nursing note reviewed.   Constitutional:       General: She is not in acute distress.     Appearance: Normal appearance. She is obese. She is not ill-appearing.   HENT:      Head: Normocephalic and atraumatic.   Cardiovascular:      Rate and Rhythm: Normal rate and regular rhythm.      Pulses: Normal pulses.      Heart sounds: Murmur heard.      Systolic murmur is present with a grade of 1/6.      No friction rub.   Pulmonary:      Effort: Pulmonary effort is normal. No respiratory distress.      Breath sounds: Normal breath sounds. No wheezing or rhonchi.   Abdominal:      General: Abdomen is flat. Bowel sounds are  normal. There is no distension.      Palpations: Abdomen is soft. There is no mass.      Tenderness: There is no abdominal tenderness. There is no guarding or rebound.      Hernia: No hernia is present.   Musculoskeletal:      Cervical back: Normal range of motion and neck supple. No tenderness.      Right lower leg: Edema (lymphedema) present.      Left lower leg: Edema (lymphedema) present.   Skin:     General: Skin is warm and dry.   Neurological:      Mental Status: She is alert and oriented to person, place, and time.   Psychiatric:         Mood and Affect: Mood normal.         Behavior: Behavior normal.         Additional Data:     Labs:    Results from last 7 days   Lab Units 01/16/25  0537 01/12/25  0644   WBC Thousand/uL 8.94 11.52*   HEMOGLOBIN g/dL 11.2* 11.7   HEMATOCRIT % 36.5 38.0   PLATELETS Thousands/uL 385 315   SEGS PCT %  --  76*   LYMPHO PCT % 36 14   MONO PCT % 3* 5   EOS PCT % 2 3     Results from last 7 days   Lab Units 01/16/25  0537   SODIUM mmol/L 137   POTASSIUM mmol/L 4.2   CHLORIDE mmol/L 99   CO2 mmol/L 29   BUN mg/dL 18   CREATININE mg/dL 0.64   ANION GAP mmol/L 9   CALCIUM mg/dL 9.1   ALBUMIN g/dL 3.5   TOTAL BILIRUBIN mg/dL 0.29   ALK PHOS U/L 77   ALT U/L 19   AST U/L 13   GLUCOSE RANDOM mg/dL 99         Results from last 7 days   Lab Units 01/15/25  1131 01/15/25  0753 01/14/25  2105 01/14/25  1614 01/14/25  1210 01/14/25  0729 01/13/25  1709 01/13/25  1217 01/13/25  0719 01/12/25  2033 01/12/25  1716 01/12/25  1152   POC GLUCOSE mg/dl 106 106 124 168* 126 111 130 105 120 132 169* 114               Labs reviewed    Imaging:    Imaging reviewed    Recent Cultures (last 7 days):           Last 24 Hours Medication List:   Current Facility-Administered Medications   Medication Dose Route Frequency Provider Last Rate    acetaminophen  975 mg Oral Q8H Samantha Allen PA-C      aluminum-magnesium hydroxide-simethicone  30 mL Oral Q4H PRN Samantha Allen PA-C      bisacodyl  10  mg Rectal Daily PRN Samantha Allen, MUNA      calcium carbonate  1,000 mg Oral TID PRN Samantha Allen, MUNA      cholecalciferol  2,000 Units Oral Daily Pk Padron, ROGE      cyanocobalamin  1,000 mcg Oral Daily Pk Padron, CRFRANK      diazepam  2 mg Oral Q6H PRN Samantha Allen, MUNA      diphenhydrAMINE  25 mg Oral Q6H PRN Samantha Allen, MUNA      docusate sodium  100 mg Oral BID Samantha Allen, MUNA      DULoxetine  60 mg Oral Daily Samantha Allen, MUNA      enoxaparin  40 mg Subcutaneous Q24H EVGENY Loni Wong MD      HYDROmorphone  2 mg Oral Q4H PRN Samantha Allen, MUNA      HYDROmorphone  4 mg Oral Q4H PRN Samantha Allen, MUNA      ketorolac  10 mg Oral Q6H PRN Samantha Allen, MUNA      magnesium Oxide  400 mg Oral Daily Pk Padron, ROGE      melatonin  3 mg Oral HS Samantha Allen, MUNA      methocarbamol  1,000 mg Oral Q8H EVGENY Samantha Allen, MUNA      omeprazole  40 mg Oral BID AC Pk Padron, ROGE      ondansetron  4 mg Oral Q6H PRN Samantha Allen, MUNA      polyethylene glycol  17 g Oral Daily Samantha Allen, MUNA      potassium chloride  40 mEq Oral Daily With Breakfast Samantha Allen PA-C      pramipexole  0.5 mg Oral HS Samantha Allen PA-C      senna  1 tablet Oral BID Samantha Allen PA-C          M*Modal software was used to dictate this note.  It may contain errors with dictating incorrect words or incorrect spelling. Please contact the provider directly with any questions.

## 2025-01-17 NOTE — PLAN OF CARE
Problem: PAIN - ADULT  Goal: Verbalizes/displays adequate comfort level or baseline comfort level  Description: Interventions:  - Encourage patient to monitor pain and request assistance  - Assess pain using appropriate pain scale  - Administer analgesics based on type and severity of pain and evaluate response  - Implement non-pharmacological measures as appropriate and evaluate response  - Consider cultural and social influences on pain and pain management  - Notify physician/advanced practitioner if interventions unsuccessful or patient reports new pain  Outcome: Progressing     Problem: MOBILITY - ADULT  Goal: Maintain or return to baseline ADL function  Description: INTERVENTIONS:  -  Assess patient's ability to carry out ADLs; assess patient's baseline for ADL function and identify physical deficits which impact ability to perform ADLs (bathing, care of mouth/teeth, toileting, grooming, dressing, etc.)  - Assess/evaluate cause of self-care deficits   - Assess range of motion  - Assess patient's mobility; develop plan if impaired  - Assess patient's need for assistive devices and provide as appropriate  - Encourage maximum independence but intervene and supervise when necessary  - Involve family in performance of ADLs  - Assess for home care needs following discharge   - Consider OT consult to assist with ADL evaluation and planning for discharge  - Provide patient education as appropriate  Outcome: Progressing

## 2025-01-17 NOTE — TELEPHONE ENCOUNTER
1/17/25 - PT IN  [facility]. CALLED FACILITY AND SPOKE TO YASSINE CONFIRMING VIRTUAL ROUNDING W/EM    [facility]  01/22/2025- [facility] POV path review and incision check W/ EM   02/19/2025- 6-week POV with Dr. Marylin alexis

## 2025-01-17 NOTE — ASSESSMENT & PLAN NOTE
Continue to elevate legs when able.  Apply ACE wraps as tolerated.  Takes Lasix 20mg daily at home.  Discontinued Lasix on 1/16 due to hypotension.  May benefit from lymphedema specialist on discharge.

## 2025-01-18 PROCEDURE — 97116 GAIT TRAINING THERAPY: CPT

## 2025-01-18 PROCEDURE — 97535 SELF CARE MNGMENT TRAINING: CPT

## 2025-01-18 PROCEDURE — 97110 THERAPEUTIC EXERCISES: CPT

## 2025-01-18 PROCEDURE — 97530 THERAPEUTIC ACTIVITIES: CPT

## 2025-01-18 RX ORDER — ACETAMINOPHEN 325 MG/1
975 TABLET ORAL EVERY 8 HOURS SCHEDULED
Status: DISCONTINUED | OUTPATIENT
Start: 2025-01-18 | End: 2025-01-24 | Stop reason: HOSPADM

## 2025-01-18 RX ADMIN — METHOCARBAMOL 1500 MG: 750 TABLET, FILM COATED ORAL at 23:13

## 2025-01-18 RX ADMIN — KETOROLAC TROMETHAMINE 10 MG: 10 TABLET, FILM COATED ORAL at 23:14

## 2025-01-18 RX ADMIN — ACETAMINOPHEN 975 MG: 325 TABLET, FILM COATED ORAL at 23:14

## 2025-01-18 RX ADMIN — ENOXAPARIN SODIUM 40 MG: 40 INJECTION SUBCUTANEOUS at 08:45

## 2025-01-18 RX ADMIN — KETOROLAC TROMETHAMINE 10 MG: 10 TABLET, FILM COATED ORAL at 17:14

## 2025-01-18 RX ADMIN — DULOXETINE HYDROCHLORIDE 60 MG: 60 CAPSULE, DELAYED RELEASE ORAL at 08:45

## 2025-01-18 RX ADMIN — HYDROMORPHONE HYDROCHLORIDE 4 MG: 4 TABLET ORAL at 20:48

## 2025-01-18 RX ADMIN — OMEPRAZOLE 40 MG: 40 CAPSULE, DELAYED RELEASE ORAL at 06:43

## 2025-01-18 RX ADMIN — PRAMIPEXOLE DIHYDROCHLORIDE 0.5 MG: 0.25 TABLET ORAL at 23:14

## 2025-01-18 RX ADMIN — ACETAMINOPHEN 975 MG: 650 SUSPENSION ORAL at 06:42

## 2025-01-18 RX ADMIN — Medication 400 MG: at 08:45

## 2025-01-18 RX ADMIN — POTASSIUM CHLORIDE 40 MEQ: 1500 TABLET, EXTENDED RELEASE ORAL at 08:45

## 2025-01-18 RX ADMIN — KETOROLAC TROMETHAMINE 10 MG: 10 TABLET, FILM COATED ORAL at 06:43

## 2025-01-18 RX ADMIN — MELATONIN TAB 3 MG 3 MG: 3 TAB at 23:14

## 2025-01-18 RX ADMIN — HYDROMORPHONE HYDROCHLORIDE 4 MG: 4 TABLET ORAL at 10:29

## 2025-01-18 RX ADMIN — CYANOCOBALAMIN TAB 1000 MCG 1000 MCG: 1000 TAB at 08:45

## 2025-01-18 RX ADMIN — OMEPRAZOLE 40 MG: 40 CAPSULE, DELAYED RELEASE ORAL at 17:00

## 2025-01-18 RX ADMIN — Medication 2000 UNITS: at 08:45

## 2025-01-18 RX ADMIN — METHOCARBAMOL 1500 MG: 750 TABLET, FILM COATED ORAL at 06:43

## 2025-01-18 RX ADMIN — ACETAMINOPHEN 975 MG: 325 TABLET, FILM COATED ORAL at 13:43

## 2025-01-18 RX ADMIN — METHOCARBAMOL 1500 MG: 750 TABLET, FILM COATED ORAL at 13:43

## 2025-01-18 NOTE — PROGRESS NOTES
01/18/25 0700   Pain Assessment   Pain Assessment Tool 0-10   Pain Score 6  (6/10 at start of session, 3/10 by end of session)   Pain Location/Orientation Orientation: Upper;Location: Back   Pain Onset/Description Onset: Ongoing;Frequency: Constant/Continuous   Hospital Pain Intervention(s) Emotional support;Shower/Bath;Repositioned  (pt received pain med prior to OT session)   Restrictions/Precautions   Precautions Bed/chair alarms;Fall Risk;Pain;Supervision on toilet/commode   Weight Bearing Restrictions No   ROM Restrictions No   Eating   Type of Assistance Needed Independent   Physical Assistance Level No physical assistance   Comment seated in recliner for breakfast meal at end of OT session   Eating CARE Score 6   Oral Hygiene   Type of Assistance Needed Set-up / clean-up   Physical Assistance Level No physical assistance   Comment S/U A to brush teeth while seated in w/c at sink (seated 2* pain)   Oral Hygiene CARE Score 5   Grooming   Able To Initiate Tasks;Comb/Brush Hair;Brush/Clean Teeth;Wash/Dry Hands   Limitation Noted In Strength   Findings S/U A for all grooming tasks while seated in w/c at sink 2* pain   Shower/Bathe Self   Type of Assistance Needed Incidental touching;Verbal cues;Adaptive equipment   Physical Assistance Level No physical assistance   Comment Pt engaged in showering, able to wash 10/10 parts, washing UB and BLEs while seated on shower chair, using xleg technique for lower legs/feet. CGA in stance with unilateral UE release from shower bar to wash suzan/rear, no LOB. Used baby shampoo and did not scrub incision area, just let soap/water run over it and patted dry.   Shower/Bathe Self CARE Score 4   Bathing   Assessed Bath Style Shower   Anticipated D/C Bath Style Tub;Shower   Able to Gather/Transport No   Able to Adjust Water Temperature Yes   Able to Wash/Rinse/Dry (body part) Left Arm;Right Arm;L Upper Leg;R Upper Leg;L Lower Leg/Foot;R Lower Leg/Foot;Chest;Abdomen;Perineal  Area;Buttocks   Limitations Noted in Balance;Endurance;ROM;Strength;Timeliness   Positioning Seated;Standing   Adaptive Equipment Shower Bars;Shower Seat;Hand Held Shower   Tub/Shower Transfer   Limitations Noted In Balance;Endurance;LE Strength   Adaptive Equipment Grab Bars;Seat with Back   Assessed Shower   Findings CGA fxl mob w/RW, EOB>shower chair, parking RW outside walk-in-shower and transitioning hands to shower bar w/o cues, side-stepping in/out of shower, no LOB.   Upper Body Dressing   Type of Assistance Needed Supervision   Physical Assistance Level No physical assistance   Comment seated   Upper Body Dressing CARE Score 4   Lower Body Dressing   Type of Assistance Needed Incidental touching   Physical Assistance Level No physical assistance   Comment Sup seated using xleg technique to thread LEs, then CGA in stance for CM over hips, no LOB   Lower Body Dressing CARE Score 4   Putting On/Taking Off Footwear   Type of Assistance Needed Supervision   Physical Assistance Level No physical assistance   Comment seated using xleg technique   Putting On/Taking Off Footwear CARE Score 4   Dressing/Undressing Clothing   Remove UB Clothes Pullover Shirt   Don UB Clothes Pullover Shirt   Remove LB Clothes Pants;Undergarment;Socks   Don LB Clothes Pants;Undergarment;Socks   Limitations Noted In Balance;Endurance;ROM;Strength   Positioning Supported Sit;Standing   Lying to Sitting on Side of Bed   Type of Assistance Needed Supervision   Physical Assistance Level No physical assistance   Comment increased time required   Lying to Sitting on Side of Bed CARE Score 4   Sit to Stand   Type of Assistance Needed Incidental touching;Adaptive equipment;Verbal cues   Physical Assistance Level No physical assistance   Comment CGA w/RW, vc's for hand placement   Sit to Stand CARE Score 4   Bed-Chair Transfer   Type of Assistance Needed Incidental touching;Adaptive equipment   Physical Assistance Level No physical assistance    Comment CGA fxl mob w/RW, no LOB   Chair/Bed-to-Chair Transfer CARE Score 4   Cognition   Overall Cognitive Status WFL   Arousal/Participation Alert;Cooperative   Attention Within functional limits   Orientation Level Oriented X4   Memory Within functional limits   Following Commands Follows all commands and directions without difficulty   Activity Tolerance   Activity Tolerance Patient tolerated treatment well   Assessment   Treatment Assessment Pt seen for 90min skilled OT session focused on ADL retraining (first shower while on AR[facility]), shower transfer, short-distance fxl mob w/RW, bed mobility, and grooming at sink, for increased independence w/ADLs/IADLs and decreased caregiver burden. See detailed descriptions of fxl performance above. Pt tolerated session well, with upper back pain decreasing t/o session 2* receiving pain med prior to start of session and warm shower lowering pain, per pt. Pt demo G safety awareness and insight into deficits, completing all seated ADLs components w/Sup and all standing activities w/CGA for safety. Pt continues to be limited by BLE weakness, decreased endurance, activity tolerance, strength, and fxl standing balance/tolerance. Pt would benefit from continued skilled OT focused on ADL retraining, UE strengthening, activity tolerance, standing balance/tolerance, transfers, med mgmt, meal prep w/tray, and D/C planning.   Prognosis Good   Problem List Decreased strength;Decreased range of motion;Decreased endurance;Impaired balance;Decreased mobility;Pain;Decreased skin integrity;Obesity;Impaired sensation   Plan   Treatment/Interventions ADL retraining;Functional transfer training;Therapeutic exercise;Endurance training;Patient/family training;Equipment eval/education;Bed mobility;Compensatory technique education   Progress Progressing toward goals   Discharge Recommendation   Rehab Resource Intensity Level, OT   (pending)   OT Therapy Minutes   OT Time In 0700   OT Time Out 0830    OT Total Time (minutes) 90   OT Mode of treatment - Individual (minutes) 90   OT Mode of treatment - Concurrent (minutes) 0   OT Mode of treatment - Group (minutes) 0   OT Mode of treatment - Co-treat (minutes) 0   OT Mode of Treatment - Total time(minutes) 90 minutes   OT Cumulative Minutes 270   Therapy Time missed   Time missed? No

## 2025-01-18 NOTE — PROGRESS NOTES
01/18/25 1030   Pain Assessment   Pain Assessment Tool 0-10   Pain Score 6   Pain Location/Orientation Orientation: Mid;Location: Back   Restrictions/Precautions   Precautions Bed/chair alarms;Fall Risk;Pain;Supervision on toilet/commode   Sit to Stand   Type of Assistance Needed Supervision   Comment CS with RW-  cues for hand placement   Sit to Stand CARE Score 4   Bed-Chair Transfer   Type of Assistance Needed Supervision   Comment CS with RW   Chair/Bed-to-Chair Transfer CARE Score 4   Walk 10 Feet   Type of Assistance Needed Supervision   Comment CS with RW   Walk 10 Feet CARE Score 4   Walk 50 Feet with Two Turns   Type of Assistance Needed Supervision   Comment CS with RW   Walk 50 Feet with Two Turns CARE Score 4   Walk 150 Feet   Type of Assistance Needed Supervision   Comment CS with RW   Walk 150 Feet CARE Score 4   Walking 10 Feet on Uneven Surfaces   Type of Assistance Needed Incidental touching   Comment CG up/down ramp with RW   Walking 10 Feet on Uneven Surfaces CARE Score 4   Ambulation   Primary Mode of Locomotion Prior to Admission Walk   Distance Walked (feet) 300 ft  (300 max distance,  150x1,  115x2)   Assist Device Roller Walker   Gait Pattern Narrow ONEIL;Step through   Walk Assist Level Close Supervision;Contact Guard   Findings started session CG because pt stated legs feel a little weaker and L leg is quivering  but did not need any assist, provided CS -  also for longer distance used WC follow but not needed,  Pt is amb consistantly 150 feet   Does the patient walk? 2. Yes   Wheel 50 Feet with Two Turns   Reason if not Attempted Activity not applicable   Wheel 50 Feet with Two Turns CARE Score 9   Wheel 150 Feet   Reason if not Attempted Activity not applicable   Wheel 150 Feet CARE Score 9   Wheelchair mobility   Does the patient use a wheelchair? 0. No   Curb or Single Stair   Style negotiated Curb   Type of Assistance Needed Incidental touching   Comment CG up/down 8inch curb with RW-   educated pt on technique and sequence due to D/C place has a curb step   1 Step (Curb) CARE Score 4   4 Steps   Type of Assistance Needed Physical assistance   Physical Assistance Level 25% or less   Comment Min A for balance and safety-  performed 8 with BUE on R HR but pt had increased pain, so trialed side stepping but pt had poor footplacement with decent,  then performed again with R HR and NBQC (pt does have one) ( this looked like the best technique and less pain)   4 Steps CARE Score 3   Therapeutic Interventions   Strengthening Seated LAQ 2#,  Hip add isometric,  Hip abd Green Tband, 10x3   Modalities cold pack to thoracic back and CP on cervical  held on with ace wrap lightly   Equipment Use   NuStep for warm up and ROM  10 mins performed LE only  L2   Assessment   Treatment Assessment 90 min skilled PT session focused on functional mobility / stair training/ curb training/ ramp/ and seated LE ex with CP.  Pt states CP helped bring down pain 2 levels.  Pt performed multiple bouts of steps which best option might be QC and railing to prevent twisting and best foot placement.  Educated pt on curb step as there is 1 step with no railing, pt showed improvement with repeated practice.  Overal pt showing improvement with gait/ balance/ and steps.  Pt will benefit from cont PT program to reach LTGs.   Problem List Decreased strength;Decreased range of motion;Decreased endurance;Impaired balance;Decreased mobility;Pain;Decreased skin integrity;Obesity;Impaired sensation   Barriers to Discharge Inaccessible home environment;Decreased caregiver support   Plan   Progress Progressing toward goals   PT Therapy Minutes   PT Time In 1030   PT Time Out 1200   PT Total Time (minutes) 90   PT Mode of treatment - Individual (minutes) 90   PT Mode of treatment - Concurrent (minutes) 0   PT Mode of treatment - Group (minutes) 0   PT Mode of treatment - Co-treat (minutes) 0   PT Mode of Treatment - Total time(minutes) 90  minutes   PT Cumulative Minutes 270   Therapy Time missed   Time missed? No

## 2025-01-19 PROCEDURE — 97116 GAIT TRAINING THERAPY: CPT

## 2025-01-19 PROCEDURE — 97530 THERAPEUTIC ACTIVITIES: CPT

## 2025-01-19 PROCEDURE — 97535 SELF CARE MNGMENT TRAINING: CPT

## 2025-01-19 PROCEDURE — 97110 THERAPEUTIC EXERCISES: CPT

## 2025-01-19 RX ADMIN — Medication 400 MG: at 08:14

## 2025-01-19 RX ADMIN — METHOCARBAMOL 1500 MG: 750 TABLET, FILM COATED ORAL at 06:32

## 2025-01-19 RX ADMIN — METHOCARBAMOL 1500 MG: 750 TABLET, FILM COATED ORAL at 15:32

## 2025-01-19 RX ADMIN — POTASSIUM CHLORIDE 40 MEQ: 1500 TABLET, EXTENDED RELEASE ORAL at 08:12

## 2025-01-19 RX ADMIN — ACETAMINOPHEN 975 MG: 325 TABLET, FILM COATED ORAL at 15:32

## 2025-01-19 RX ADMIN — PRAMIPEXOLE DIHYDROCHLORIDE 0.5 MG: 0.25 TABLET ORAL at 23:14

## 2025-01-19 RX ADMIN — ACETAMINOPHEN 975 MG: 325 TABLET, FILM COATED ORAL at 06:32

## 2025-01-19 RX ADMIN — ONDANSETRON 4 MG: 4 TABLET, ORALLY DISINTEGRATING ORAL at 06:32

## 2025-01-19 RX ADMIN — HYDROMORPHONE HYDROCHLORIDE 4 MG: 4 TABLET ORAL at 06:32

## 2025-01-19 RX ADMIN — OMEPRAZOLE 40 MG: 40 CAPSULE, DELAYED RELEASE ORAL at 06:32

## 2025-01-19 RX ADMIN — Medication 2000 UNITS: at 08:14

## 2025-01-19 RX ADMIN — METHOCARBAMOL 1500 MG: 750 TABLET, FILM COATED ORAL at 23:14

## 2025-01-19 RX ADMIN — DULOXETINE HYDROCHLORIDE 60 MG: 60 CAPSULE, DELAYED RELEASE ORAL at 08:14

## 2025-01-19 RX ADMIN — OMEPRAZOLE 40 MG: 40 CAPSULE, DELAYED RELEASE ORAL at 16:38

## 2025-01-19 RX ADMIN — KETOROLAC TROMETHAMINE 10 MG: 10 TABLET, FILM COATED ORAL at 18:43

## 2025-01-19 RX ADMIN — HYDROMORPHONE HYDROCHLORIDE 4 MG: 4 TABLET ORAL at 11:31

## 2025-01-19 RX ADMIN — ACETAMINOPHEN 975 MG: 325 TABLET, FILM COATED ORAL at 23:14

## 2025-01-19 RX ADMIN — CYANOCOBALAMIN TAB 1000 MCG 1000 MCG: 1000 TAB at 08:14

## 2025-01-19 RX ADMIN — HYDROMORPHONE HYDROCHLORIDE 4 MG: 4 TABLET ORAL at 20:57

## 2025-01-19 RX ADMIN — ENOXAPARIN SODIUM 40 MG: 40 INJECTION SUBCUTANEOUS at 08:14

## 2025-01-19 RX ADMIN — MELATONIN TAB 3 MG 3 MG: 3 TAB at 23:20

## 2025-01-19 NOTE — PROGRESS NOTES
01/19/25 1215   Pain Assessment   Pain Assessment Tool 0-10   Pain Score 6   Pain Location/Orientation Location: Back   Restrictions/Precautions   Precautions Bed/chair alarms;Fall Risk;Supervision on toilet/commode   Sit to Stand   Type of Assistance Needed Supervision   Comment RW   Sit to Stand CARE Score 4   Bed-Chair Transfer   Type of Assistance Needed Supervision   Comment RW   Chair/Bed-to-Chair Transfer CARE Score 4   Walk 10 Feet   Type of Assistance Needed Supervision   Comment CS with RW   Walk 10 Feet CARE Score 4   Walk 50 Feet with Two Turns   Type of Assistance Needed Supervision   Comment CS with RW   Walk 50 Feet with Two Turns CARE Score 4   Walk 150 Feet   Type of Assistance Needed Supervision   Comment CS with RW   Walk 150 Feet CARE Score 4   Walking 10 Feet on Uneven Surfaces   Type of Assistance Needed Supervision   Comment CS on ramp with RW,  needed Min A on uneven floor mat with canes under   Walking 10 Feet on Uneven Surfaces CARE Score 4   Ambulation   Primary Mode of Locomotion Prior to Admission Walk   Distance Walked (feet) 300 ft   Assist Device Roller Walker   Walk Assist Level Close Supervision   Findings continues narrow ONEIL with RW,  no overt LOB   Does the patient walk? 2. Yes   Wheel 50 Feet with Two Turns   Reason if not Attempted Activity not applicable   Wheel 50 Feet with Two Turns CARE Score 9   Wheel 150 Feet   Reason if not Attempted Activity not applicable   Wheel 150 Feet CARE Score 9   Wheelchair mobility   Does the patient use a wheelchair? 0. No   Curb or Single Stair   Style negotiated Curb   Type of Assistance Needed Incidental touching   Comment CG multiple times with RW on 8inch curb  with friend watching that will be helping at D/C -  first time slight R knee give but not fully buckle when going up   1 Step (Curb) CARE Score 4   4 Steps   Type of Assistance Needed Supervision   Comment performed with QC on L and R HR step to pattern,  friend watching and  educated to place walker up top for her at D/C   4 Steps CARE Score 4   12 Steps   Type of Assistance Needed Supervision   Comment for strengthneing performed bilat HR with reciprocal step pattern   12 Steps CARE Score 4   Therapeutic Interventions   Strengthening standing green Tband TKE to focus on stance phase stability,  Side stepping with yellow Tband around thighs to focus on hip abductors,   Balance t/o session bouts of standing unsupported eyes closed 10 seconds (inc sway) ,   worked in llbars unilat support  multiple laps working on balance and more fluid gait without walker to prep for using SPC   Other reminders during transfers to not use much arms and use more legs and dont stand with grabbing walker   Equipment Use   NuStep 10 mins LE only L3   Other Comments   Comments Pt wants to talk to billing as she is concerned that she is paying out of pocket to be on unit   Assessment   Treatment Assessment 90 min skilled PT session focused on training with pts frined of the house shes going to.  Ed her on steps and walking.  She observed whole session,  pt is functionall performing tasks that are needed to be performed for D/C but will benefit from continued strengthening and balance to reduce fall risk and reduce knee buckle risk.   Problem List Decreased strength;Decreased endurance;Impaired balance;Decreased mobility;Impaired sensation   Barriers to Discharge Inaccessible home environment;Decreased caregiver support   Plan   Progress Progressing toward goals   PT Therapy Minutes   PT Time In 1215   PT Time Out 1345   PT Total Time (minutes) 90   PT Mode of treatment - Individual (minutes) 90   PT Mode of treatment - Concurrent (minutes) 0   PT Mode of treatment - Group (minutes) 0   PT Mode of treatment - Co-treat (minutes) 0   PT Mode of Treatment - Total time(minutes) 90 minutes   PT Cumulative Minutes 360   Therapy Time missed   Time missed? No

## 2025-01-19 NOTE — PLAN OF CARE
Problem: SAFETY ADULT  Goal: Patient will remain free of falls  Description: INTERVENTIONS:  - Educate patient/family on patient safety including physical limitations  - Instruct patient to call for assistance with activity   - Consult OT/PT to assist with strengthening/mobility   - Keep Call bell within reach  - Keep bed low and locked with side rails adjusted as appropriate  - Keep care items and personal belongings within reach  - Initiate and maintain comfort rounds  - Make Fall Risk Sign visible to staff  - Offer Toileting every  Hours, in advance of need  - Initiate/Maintain bed alarm  - Obtain necessary fall risk management equipment: RW  - Apply yellow socks and bracelet for high fall risk patients  - Consider moving patient to room near nurses station  Outcome: Progressing     Problem: RESPIRATORY - ADULT  Goal: Achieves optimal ventilation and oxygenation  Description: INTERVENTIONS:  - Assess for changes in respiratory status  - Assess for changes in mentation and behavior  - Position to facilitate oxygenation and minimize respiratory effort  - Oxygen administered by appropriate delivery if ordered  - Initiate smoking cessation education as indicated  - Encourage broncho-pulmonary hygiene including cough, deep breathe, Incentive Spirometry  - Assess the need for suctioning and aspirate as needed  - Assess and instruct to report SOB or any respiratory difficulty  - Respiratory Therapy support as indicated  Outcome: Progressing

## 2025-01-19 NOTE — PLAN OF CARE
Problem: SAFETY ADULT  Goal: Patient will remain free of falls  Description: INTERVENTIONS:  - Educate patient/family on patient safety including physical limitations  - Instruct patient to call for assistance with activity   - Consult OT/PT to assist with strengthening/mobility   - Keep Call bell within reach  - Keep bed low and locked with side rails adjusted as appropriate  - Keep care items and personal belongings within reach  - Initiate and maintain comfort rounds  - Make Fall Risk Sign visible to staff  - Offer Toileting every 2 Hours, in advance of need  - Initiate/Maintain bed/ chair alarm  - Obtain necessary fall risk management equipment: bed/chair alarm  - Apply yellow socks and bracelet for high fall risk patients  - Consider moving patient to room near nurses station  Outcome: Progressing

## 2025-01-19 NOTE — PROGRESS NOTES
"OT TREATMENT       01/19/25 3543   Pain Assessment   Pain Assessment Tool 0-10   Pain Score No Pain   Restrictions/Precautions   Precautions Bed/chair alarms;Fall Risk;Pain;Supervision on toilet/commode   Weight Bearing Restrictions No   ROM Restrictions No   Lifestyle   Autonomy \"I would love to get into the shower again\"   Oral Hygiene   Type of Assistance Needed Supervision   Physical Assistance Level No physical assistance   Comment to complete oral care in stance at sink   Oral Hygiene CARE Score 4   Grooming   Able To Initiate Tasks;Comb/Brush Hair;Wash/Dry Face;Brush/Clean Teeth;Wash/Dry Hands   Findings in stance/seated at sink   Shower/Bathe Self   Type of Assistance Needed Supervision   Physical Assistance Level No physical assistance   Comment pt completed shower seated on shower chair with use of HH shower hose. Pt able to wash UB and BLE seated. In stance with use of GB to wash suzan/rear with CS.   Shower/Bathe Self CARE Score 4   Bathing   Assessed Bath Style Shower   Tub/Shower Transfer   Limitations Noted In Balance   Adaptive Equipment Grab Bars;Seat with Back   Assessed Shower   Findings ambulatory transfer with use of RW, use of grab bar to step into shower and sit on shower chair   Upper Body Dressing   Type of Assistance Needed Set-up / clean-up   Physical Assistance Level No physical assistance   Comment setup for clothing retrieval   Upper Body Dressing CARE Score 5   Lower Body Dressing   Type of Assistance Needed Supervision   Physical Assistance Level No physical assistance   Comment pt thread underwear and pants seated, CS in stance to accelerate over hips with RW   Lower Body Dressing CARE Score 4   Putting On/Taking Off Footwear   Type of Assistance Needed Set-up / clean-up   Physical Assistance Level No physical assistance   Comment seated to don  socks   Putting On/Taking Off Footwear CARE Score 5   Sit to Lying   Type of Assistance Needed Supervision   Physical Assistance Level No " physical assistance   Sit to Lying CARE Score 4   Lying to Sitting on Side of Bed   Type of Assistance Needed Supervision   Physical Assistance Level No physical assistance   Comment HOB elevated   Lying to Sitting on Side of Bed CARE Score 4   Sit to Stand   Type of Assistance Needed Supervision   Physical Assistance Level No physical assistance   Comment RW   Sit to Stand CARE Score 4   Bed-Chair Transfer   Type of Assistance Needed Supervision   Physical Assistance Level No physical assistance   Comment RW   Chair/Bed-to-Chair Transfer CARE Score 4   Toileting Hygiene   Type of Assistance Needed Supervision   Physical Assistance Level No physical assistance   Comment CS for CM and hygiene in stance with RW   Toileting Hygiene CARE Score 4   Toilet Transfer   Type of Assistance Needed Supervision   Physical Assistance Level No physical assistance   Comment RW   Toilet Transfer CARE Score 4   Cognition   Overall Cognitive Status WFL   Arousal/Participation Alert;Cooperative   Attention Within functional limits   Orientation Level Oriented X4   Memory Within functional limits   Following Commands Follows all commands and directions without difficulty   Additional Activities   Additional Activities Other (Comment)   Additional Activities Comments Pt completed functional ambulation of household distances x3 trials during session with use of RW and CS. Min verbal cuing for seated rest breaks   Activity Tolerance   Activity Tolerance Patient tolerated treatment well   Assessment   Treatment Assessment Pt participated in skilled OT session with focus on ADL retraining and endurance. Pt tolerated treatment well, pt remained supine in bed with all immediate needs met, call bell accessible, bed alarm secured . Pt will continue to benefit from skilled OT services to ensure safe discharge.   Prognosis Good   Problem List Decreased strength;Decreased endurance;Impaired balance;Decreased mobility;Impaired sensation   Barriers  to Discharge Inaccessible home environment;Decreased caregiver support   Plan   Treatment/Interventions ADL retraining;Functional transfer training;Therapeutic exercise;Endurance training;Patient/family training;Equipment eval/education;Bed mobility;Compensatory technique education;Continued evaluation   Progress Progressing toward goals   Discharge Recommendation   Rehab Resource Intensity Level, OT   (pending)   OT Therapy Minutes   OT Time In 0830   OT Time Out 1000   OT Total Time (minutes) 90   OT Mode of treatment - Individual (minutes) 90   OT Mode of treatment - Concurrent (minutes) 0   OT Mode of treatment - Group (minutes) 0   OT Mode of treatment - Co-treat (minutes) 0   OT Mode of Treatment - Total time(minutes) 90 minutes   OT Cumulative Minutes 360   Therapy Time missed   Time missed? No

## 2025-01-20 LAB
ANION GAP SERPL CALCULATED.3IONS-SCNC: 7 MMOL/L (ref 4–13)
BASOPHILS # BLD AUTO: 0.05 THOUSANDS/ΜL (ref 0–0.1)
BASOPHILS NFR BLD AUTO: 1 % (ref 0–1)
BUN SERPL-MCNC: 12 MG/DL (ref 5–25)
CALCIUM SERPL-MCNC: 8.9 MG/DL (ref 8.4–10.2)
CHLORIDE SERPL-SCNC: 103 MMOL/L (ref 96–108)
CO2 SERPL-SCNC: 31 MMOL/L (ref 21–32)
CREAT SERPL-MCNC: 0.64 MG/DL (ref 0.6–1.3)
EOSINOPHIL # BLD AUTO: 0.27 THOUSAND/ΜL (ref 0–0.61)
EOSINOPHIL NFR BLD AUTO: 4 % (ref 0–6)
ERYTHROCYTE [DISTWIDTH] IN BLOOD BY AUTOMATED COUNT: 16.8 % (ref 11.6–15.1)
GFR SERPL CREATININE-BSD FRML MDRD: 95 ML/MIN/1.73SQ M
GLUCOSE P FAST SERPL-MCNC: 99 MG/DL (ref 65–99)
GLUCOSE SERPL-MCNC: 99 MG/DL (ref 65–140)
HCT VFR BLD AUTO: 37.1 % (ref 34.8–46.1)
HGB BLD-MCNC: 11 G/DL (ref 11.5–15.4)
IMM GRANULOCYTES # BLD AUTO: 0.13 THOUSAND/UL (ref 0–0.2)
IMM GRANULOCYTES NFR BLD AUTO: 2 % (ref 0–2)
LYMPHOCYTES # BLD AUTO: 1.72 THOUSANDS/ΜL (ref 0.6–4.47)
LYMPHOCYTES NFR BLD AUTO: 23 % (ref 14–44)
MCH RBC QN AUTO: 27 PG (ref 26.8–34.3)
MCHC RBC AUTO-ENTMCNC: 29.6 G/DL (ref 31.4–37.4)
MCV RBC AUTO: 91 FL (ref 82–98)
MONOCYTES # BLD AUTO: 0.38 THOUSAND/ΜL (ref 0.17–1.22)
MONOCYTES NFR BLD AUTO: 5 % (ref 4–12)
NEUTROPHILS # BLD AUTO: 4.8 THOUSANDS/ΜL (ref 1.85–7.62)
NEUTS SEG NFR BLD AUTO: 65 % (ref 43–75)
NRBC BLD AUTO-RTO: 0 /100 WBCS
PLATELET # BLD AUTO: 379 THOUSANDS/UL (ref 149–390)
PMV BLD AUTO: 8.5 FL (ref 8.9–12.7)
POTASSIUM SERPL-SCNC: 3.9 MMOL/L (ref 3.5–5.3)
RBC # BLD AUTO: 4.08 MILLION/UL (ref 3.81–5.12)
SODIUM SERPL-SCNC: 141 MMOL/L (ref 135–147)
WBC # BLD AUTO: 7.35 THOUSAND/UL (ref 4.31–10.16)

## 2025-01-20 PROCEDURE — 97530 THERAPEUTIC ACTIVITIES: CPT

## 2025-01-20 PROCEDURE — 99232 SBSQ HOSP IP/OBS MODERATE 35: CPT | Performed by: INTERNAL MEDICINE

## 2025-01-20 PROCEDURE — 97535 SELF CARE MNGMENT TRAINING: CPT

## 2025-01-20 PROCEDURE — 85025 COMPLETE CBC W/AUTO DIFF WBC: CPT | Performed by: NURSE PRACTITIONER

## 2025-01-20 PROCEDURE — 97530 THERAPEUTIC ACTIVITIES: CPT | Performed by: PHYSICAL THERAPIST

## 2025-01-20 PROCEDURE — 97112 NEUROMUSCULAR REEDUCATION: CPT | Performed by: PHYSICAL THERAPIST

## 2025-01-20 PROCEDURE — 97110 THERAPEUTIC EXERCISES: CPT | Performed by: PHYSICAL THERAPIST

## 2025-01-20 PROCEDURE — 80048 BASIC METABOLIC PNL TOTAL CA: CPT | Performed by: NURSE PRACTITIONER

## 2025-01-20 RX ORDER — BACLOFEN 10 MG/1
5 TABLET ORAL 3 TIMES DAILY
Status: DISCONTINUED | OUTPATIENT
Start: 2025-01-20 | End: 2025-01-24 | Stop reason: HOSPADM

## 2025-01-20 RX ORDER — METHOCARBAMOL 500 MG/1
1000 TABLET, FILM COATED ORAL EVERY 8 HOURS PRN
Status: DISCONTINUED | OUTPATIENT
Start: 2025-01-20 | End: 2025-01-24 | Stop reason: HOSPADM

## 2025-01-20 RX ORDER — HYDROMORPHONE HYDROCHLORIDE 2 MG/1
2 TABLET ORAL EVERY 4 HOURS PRN
Refills: 0 | Status: DISCONTINUED | OUTPATIENT
Start: 2025-01-20 | End: 2025-01-24 | Stop reason: HOSPADM

## 2025-01-20 RX ORDER — IBUPROFEN 400 MG/1
800 TABLET, FILM COATED ORAL EVERY 8 HOURS PRN
Status: DISCONTINUED | OUTPATIENT
Start: 2025-01-20 | End: 2025-01-24 | Stop reason: HOSPADM

## 2025-01-20 RX ADMIN — OMEPRAZOLE 40 MG: 40 CAPSULE, DELAYED RELEASE ORAL at 16:33

## 2025-01-20 RX ADMIN — MELATONIN TAB 3 MG 3 MG: 3 TAB at 20:57

## 2025-01-20 RX ADMIN — BACLOFEN 5 MG: 10 TABLET ORAL at 12:08

## 2025-01-20 RX ADMIN — PRAMIPEXOLE DIHYDROCHLORIDE 0.5 MG: 0.25 TABLET ORAL at 21:00

## 2025-01-20 RX ADMIN — METHOCARBAMOL 1500 MG: 750 TABLET, FILM COATED ORAL at 05:26

## 2025-01-20 RX ADMIN — IBUPROFEN 800 MG: 400 TABLET ORAL at 16:32

## 2025-01-20 RX ADMIN — DULOXETINE HYDROCHLORIDE 60 MG: 60 CAPSULE, DELAYED RELEASE ORAL at 08:42

## 2025-01-20 RX ADMIN — BACLOFEN 5 MG: 10 TABLET ORAL at 20:58

## 2025-01-20 RX ADMIN — ACETAMINOPHEN 975 MG: 325 TABLET, FILM COATED ORAL at 05:26

## 2025-01-20 RX ADMIN — POTASSIUM CHLORIDE 40 MEQ: 1500 TABLET, EXTENDED RELEASE ORAL at 08:41

## 2025-01-20 RX ADMIN — ENOXAPARIN SODIUM 40 MG: 40 INJECTION SUBCUTANEOUS at 08:42

## 2025-01-20 RX ADMIN — Medication 400 MG: at 08:42

## 2025-01-20 RX ADMIN — HYDROMORPHONE HYDROCHLORIDE 4 MG: 4 TABLET ORAL at 09:52

## 2025-01-20 RX ADMIN — ACETAMINOPHEN 975 MG: 325 TABLET, FILM COATED ORAL at 14:06

## 2025-01-20 RX ADMIN — HYDROMORPHONE HYDROCHLORIDE 4 MG: 4 TABLET ORAL at 14:06

## 2025-01-20 RX ADMIN — OMEPRAZOLE 40 MG: 40 CAPSULE, DELAYED RELEASE ORAL at 06:04

## 2025-01-20 RX ADMIN — BACLOFEN 5 MG: 10 TABLET ORAL at 16:33

## 2025-01-20 RX ADMIN — ACETAMINOPHEN 975 MG: 325 TABLET, FILM COATED ORAL at 21:00

## 2025-01-20 RX ADMIN — CYANOCOBALAMIN TAB 1000 MCG 1000 MCG: 1000 TAB at 08:42

## 2025-01-20 RX ADMIN — Medication 2000 UNITS: at 08:42

## 2025-01-20 NOTE — ASSESSMENT & PLAN NOTE
Hx known T3 spinal meningioma presented to [facility] with left lower extremity weakness and spasms  MRI T spine: Suboptimal examination due to mild, moderate, and severe motion artifact - worse on postcontrast sequences. Similar 1.4 cm intradural extramedullary probable meningioma in left posterolateral thoracic spine region at approximately T3 level with associated marked spinal cord compression with severe canal stenosis given motion degraded exam. No cord edema given motion degradation.   MRI L spine: Suboptimal examination due to moderate-to-severe motion degraded exam of lumbar spine and 3 plane localizer images, sagittal T1 post contrast and axial T1 postcontrast sequences. No abnormal enhancement in lumbar spine given limitations of motion degradation.   S/p T2-4 laminectomy for resection of thoracic tumor by Dr. Coulter 1/7, pathology consistent with meningioma, cleared to resume DVT ppx  Keep incision clean and dry and wipe daily w/ MARY wipe, can be left open to air  Completed dexamethasone taper 1/14, 2 week fu ~1/24  6 week surgeon f/u ~ 2/21  C/w PT/OT

## 2025-01-20 NOTE — PROGRESS NOTES
Progress Note - Internal Medicine   Name: Hayley Rios 64 y.o. female I MRN: [id number]  Unit/Bed#: -01 I Date of Admission: 1/15/2025   Date of Service: 1/20/2025 I Hospital Day: 5    Assessment & Plan  Compression of thoracic spinal cord with myelopathy (HCC)  Presented on 12/30 with LLE radiculopathy that had been worsening over the past week.  Hx of known thoracic meningioma and prior back surgeries (scoliosis s/p Guerra nydia).  MRI thoracic spine showed similar 1.4cm intradural extramedullary probable meningioma in the L posterolateral thoracic spine region at approximately T3 level with associated marked spinal cord compression with severe canal stenosis.  MRI C-spine showed re-demonstration of meningioma in the posterior L aspect of the canal at T3, compared with 2022 - mass is mildly increased in size and marked cord compression also mildly increase.    OR on 1/7 for bilateral T2-T4 laminectomy with resection of tumor performed by Dr. Coulter.  Pathology consistent with meningioma.    Hold AC/AP for at least 2 weeks post-op.  Neurovascular checks Q shift.  Ensure adequate pain control.  Monitor incision for s/s of infection.    Follow-up with Neurovascular in 2 weeks (1/21) and in 6 weeks.    Primary team following.  PT/OT.  Depression, recurrent (HCC)  Continue home Cymbalta 60mg daily.  Had been on trazodone 50mg at HS at home but would like to continue without at this time.  Supportive counseling as needed.  Restless leg syndrome  Continue home Mirapex 0.5mg at HS.  Had been on Mirapex 0.25mg TID in addition at home but pt would like to continue without at this time.  Prediabetes  Last A1c 5.8 on 1/1/2025.  Encourage lifestyle modifications.  Monitor BG with routine labs.  Vitamin D deficiency  Continue home vitamin D 2000u daily.  Gastroesophageal reflux disease  Continue home omeprazole 40mg BID.  Reflux symptoms worsened after being placed on steroid pack.  MARV (iron deficiency anemia)  Hgb  currently stable at 11.0  Received IV venofer x3 doses in acute setting.  Iron panel on 2024: Iron Sat 10%, TIBC 396, Iron 40, and Ferritin 9.  Consider starting PO supplementation once constipation resolves.  Ventral hernia without obstruction or gangrene  Recently underwent ventral hernia repair with complications and required revision with panniculectomy on 10/24/24.  Follow-up with General Surgery as needed.  Lymphedema  Continue to elevate legs when able.  Apply ACE wraps as tolerated.  Takes Lasix 20mg daily at home.  Discontinued Lasix on  due to hypotension.  May benefit from lymphedema specialist on discharge.  Leukocytosis  WBC count currently 7.35  Completed steroid taper on .  Likely steroid induced.  No active s/s of infection at this time.  Continue to trend routine CBC.  Vitamin B12 deficiency  Vitamin B12 level 346 on 25.  Received cyanocobalamin injections in acute setting.  Start on PO cyanocobalamin daily.  Ambulatory dysfunction    Obesity    Acute pain    Constipation      VTE Pharmacologic Prophylaxis:   Pharmacologic: Enoxaparin (Lovenox)  Mechanical VTE Prophylaxis in Place: Yes - sequential compression devices.    Current Length of Stay: 5 day(s)    Current Patient Status: Inpatient Rehab     Discharge Plan: As per primary team.    Code Status: Level 1 - Full Code    Subjective:   Pt examined while pt sitting in chair in therapy gym.  Complaints of continued upper back/bilateral shoulder pain.   Denies any fevers, chills, lightheadedness, dizziness, SOB, palpitations, or CP.  Had two episodes of diarrhea the past few days, she continues to feel bloated.  Denies any abdominal pain/dysuria.     BP mildly elevated today, had nursing staff recheck BP, likely in the setting of pain.     Objective:     Vitals:   Temp (24hrs), Av.8 °F (36.6 °C), Min:97.4 °F (36.3 °C), Max:98.3 °F (36.8 °C)    Temp:  [97.4 °F (36.3 °C)-98.3 °F (36.8 °C)] 97.8 °F (36.6 °C)  HR:  [70-88]  70  Resp:  [20] 20  BP: (116-156)/(57-71) 156/71  SpO2:  [97 %-98 %] 97 %  Body mass index is 36.39 kg/m².     Review of Systems   Constitutional:  Negative for chills and fever.   Respiratory:  Negative for cough, chest tightness, shortness of breath and wheezing.    Cardiovascular:  Negative for chest pain, palpitations and leg swelling.   Gastrointestinal:  Positive for diarrhea. Negative for abdominal pain, constipation, nausea and vomiting.        Abdominal bloating    Genitourinary:  Negative for difficulty urinating, dysuria and urgency.   Musculoskeletal:  Positive for back pain and neck pain.   Neurological:  Negative for dizziness, weakness, numbness and headaches.        Input and Output Summary (last 24 hours):       Intake/Output Summary (Last 24 hours) at 1/20/2025 1230  Last data filed at 1/20/2025 1100  Gross per 24 hour   Intake 360 ml   Output --   Net 360 ml         Physical Exam:     Physical Exam  Vitals reviewed.   Constitutional:       General: She is not in acute distress.     Appearance: She is not diaphoretic.   HENT:      Head: Normocephalic and atraumatic.   Cardiovascular:      Rate and Rhythm: Normal rate and regular rhythm.      Heart sounds: Murmur heard.   Pulmonary:      Effort: Pulmonary effort is normal. No respiratory distress.      Breath sounds: Normal breath sounds. No wheezing, rhonchi or rales.   Abdominal:      General: Bowel sounds are increased. There is no distension.      Palpations: Abdomen is soft.      Tenderness: There is no abdominal tenderness.   Musculoskeletal:      Right lower leg: Edema present.      Left lower leg: Edema present.   Neurological:      Mental Status: She is alert. Mental status is at baseline.   Psychiatric:         Mood and Affect: Mood normal.         Behavior: Behavior normal.         Additional Data:     Labs:    Results from last 7 days   Lab Units 01/20/25  0526   WBC Thousand/uL 7.35   HEMOGLOBIN g/dL 11.0*   HEMATOCRIT % 37.1    PLATELETS Thousands/uL 379   SEGS PCT % 65   LYMPHO PCT % 23   MONO PCT % 5   EOS PCT % 4     Results from last 7 days   Lab Units 01/20/25  0526 01/16/25  0537   SODIUM mmol/L 141 137   POTASSIUM mmol/L 3.9 4.2   CHLORIDE mmol/L 103 99   CO2 mmol/L 31 29   BUN mg/dL 12 18   CREATININE mg/dL 0.64 0.64   ANION GAP mmol/L 7 9   CALCIUM mg/dL 8.9 9.1   ALBUMIN g/dL  --  3.5   TOTAL BILIRUBIN mg/dL  --  0.29   ALK PHOS U/L  --  77   ALT U/L  --  19   AST U/L  --  13   GLUCOSE RANDOM mg/dL 99 99         Results from last 7 days   Lab Units 01/15/25  1131 01/15/25  0753 01/14/25  2105 01/14/25  1614 01/14/25  1210 01/14/25  0729 01/13/25  1709   POC GLUCOSE mg/dl 106 106 124 168* 126 111 130               Labs reviewed    Imaging:    Imaging reviewed    Recent Cultures (last 7 days):           Last 24 Hours Medication List:   Current Facility-Administered Medications   Medication Dose Route Frequency Provider Last Rate    acetaminophen  975 mg Oral Q8H [facility] Loni Wong MD      aluminum-magnesium hydroxide-simethicone  30 mL Oral Q4H PRN Samantha Allen PA-C      baclofen  5 mg Oral TID Loni Wong MD      bisacodyl  10 mg Rectal Daily PRN Samantha Allen PA-C      calcium carbonate  1,000 mg Oral TID PRN Samantha Allen PA-C      cholecalciferol  2,000 Units Oral Daily ROGE Yan      cyanocobalamin  1,000 mcg Oral Daily ROGE Yan      diphenhydrAMINE  25 mg Oral Q6H PRN Samantha Allen PA-C      DULoxetine  60 mg Oral Daily Samantha Allen PA-C      enoxaparin  40 mg Subcutaneous Q24H [facility] Loni Wong MD      hydrocortisone  1 Application Topical BID PRN Loni Wong MD      HYDROmorphone  2 mg Oral Q4H PRN Samantha Allen PA-C      HYDROmorphone  4 mg Oral Q4H PRN Samantha Delores Alejandro, PA-C      ibuprofen  800 mg Oral Q8H PRN Loni Wong MD      magnesium Oxide  400 mg Oral Daily ROGE Yan      melatonin  3 mg Oral HS Samantha Estrella  MUNA Allen      methocarbamol  1,000 mg Oral Q8H PRN Loni Wong MD      omeprazole  40 mg Oral BID AC ROGE Yan      ondansetron  4 mg Oral Q6H PRN Samantha Allen PA-C      polyethylene glycol  17 g Oral Daily Samantha Allen PA-C      potassium chloride  40 mEq Oral Daily With Breakfast Samantha Allen PA-C      pramipexole  0.5 mg Oral HS Samantha Allen PA-C      senna  1 tablet Oral BID Samantha Allen PA-C          M*Modal software was used to dictate this note.  It may contain errors with dictating incorrect words or incorrect spelling. Please contact the provider directly with any questions.

## 2025-01-20 NOTE — ASSESSMENT & PLAN NOTE
Small BM 1/15, prior before admission 2 weeks ago  CW bowel reg   Continues to pass gas  1/20 Colace d/c after reports of diarrhea

## 2025-01-20 NOTE — PLAN OF CARE
Problem: NEUROSENSORY - ADULT  Goal: Achieves maximal functionality and self care  Description: INTERVENTIONS  - Monitor swallowing and airway patency with patient fatigue and changes in neurological status  - Encourage and assist patient to increase activity and self care.   - Encourage visually impaired, hearing impaired and aphasic patients to use assistive/communication devices  Outcome: Progressing     Problem: METABOLIC, FLUID AND ELECTROLYTES - ADULT  Goal: Fluid balance maintained  Description: INTERVENTIONS:  - Monitor labs   - Monitor I/O and WT  - Instruct patient on fluid and nutrition as appropriate  - Assess for signs & symptoms of volume excess or deficit  Outcome: Progressing     Problem: SKIN/TISSUE INTEGRITY - ADULT  Goal: Skin Integrity remains intact(Skin Breakdown Prevention)  Description: Assess:  -Perform Germain assessment every   -Clean and moisturize skin every   -Inspect skin when repositioning, toileting, and assisting with ADLS  -Assess under medical devices such as  every   -Assess extremities for adequate circulation and sensation     Bed Management:  -Have minimal linens on bed & keep smooth, unwrinkled  -Change linens as needed when moist or perspiring  -Avoid sitting or lying in one position for more than  hours while in bed  -Keep HOB at degrees     Toileting:  -Offer bedside commode  -Assess for incontinence every   -Use incontinent care products after each incontinent episode such as     Activity:  -Mobilize patient  times a day  -Encourage activity and walks on unit  -Encourage or provide ROM exercises   -Turn and reposition patient every  Hours  -Use appropriate equipment to lift or move patient in bed  -Instruct/ Assist with weight shifting every  when out of bed in chair  -Consider limitation of chair time  hour intervals    Skin Care:  -Avoid use of baby powder, tape, friction and shearing, hot water or constrictive clothing  -Relieve pressure over bony prominences using    -Do not massage red bony areas    Next Steps:  -Teach patient strategies to minimize risks such as    -Consider consults to  interdisciplinary teams such as   Outcome: Progressing

## 2025-01-20 NOTE — ASSESSMENT & PLAN NOTE
WBC count currently 7.35  Completed steroid taper on 1/14.  Likely steroid induced.  No active s/s of infection at this time.  Continue to trend routine CBC.

## 2025-01-20 NOTE — PROGRESS NOTES
01/20/25 1000   Pain Assessment   Pain Assessment Tool 0-10   Pain Score No Pain   Restrictions/Precautions   Precautions Bed/chair alarms;Fall Risk;Supervision on toilet/commode   Cognition   Arousal/Participation Alert;Cooperative   Subjective   Subjective Pt reports feeling frustrated that she cannot take 1 of her pain medications anymore, wanted to time it before PT session but unable. Current pain 5/10.   Sit to Stand   Type of Assistance Needed Supervision   Physical Assistance Level No physical assistance   Sit to Stand CARE Score 4   Bed-Chair Transfer   Type of Assistance Needed Supervision;Adaptive equipment   Physical Assistance Level No physical assistance   Chair/Bed-to-Chair Transfer CARE Score 4   Walk 10 Feet   Type of Assistance Needed Supervision;Adaptive equipment   Physical Assistance Level No physical assistance   Walk 10 Feet CARE Score 4   Walk 50 Feet with Two Turns   Type of Assistance Needed Supervision;Adaptive equipment   Physical Assistance Level No physical assistance   Walk 50 Feet with Two Turns CARE Score 4   Walk 150 Feet   Type of Assistance Needed Supervision;Adaptive equipment   Physical Assistance Level No physical assistance   Walk 150 Feet CARE Score 4   Ambulation   Primary Mode of Locomotion Prior to Admission Walk   Distance Walked (feet) 300 ft   Assist Device Roller Walker   Gait Pattern Step through;Narrow ONEIL   Limitations Noted In Balance;Endurance;Strength   Provided Assistance with: Balance   Walk Assist Level Close Supervision   Findings no instances of buckling   Does the patient walk? 2. Yes   Wheel 50 Feet with Two Turns   Reason if not Attempted Activity not applicable   Wheel 50 Feet with Two Turns CARE Score 9   Wheel 150 Feet   Reason if not Attempted Activity not applicable   Wheel 150 Feet CARE Score 9   Wheelchair mobility   Does the patient use a wheelchair? 0. No   Curb or Single Stair   Style negotiated Curb   Type of Assistance Needed Incidental  touching;Adaptive equipment   Physical Assistance Level No physical assistance   Comment RW   1 Step (Curb) CARE Score 4   4 Steps   Type of Assistance Needed Supervision;Adaptive equipment   Physical Assistance Level No physical assistance   Comment R HR + L quad cane   4 Steps CARE Score 4   12 Steps   Reason if not Attempted Safety concerns   12 Steps CARE Score 88   Stairs   Type Stairs   # of Steps 4   Weight Bearing Precautions Fall Risk   Assist Devices Single Rail;Quad Cane   Findings R HR + L quad cane on  stairs, performed 2 sets of 4 stairs   Toilet Transfer   Type of Assistance Needed Supervision;Adaptive equipment   Physical Assistance Level No physical assistance   Toilet Transfer CARE Score 4   Therapeutic Interventions   Other sidestepping in // bars with red TB, walking in // bars with RUE support only as trial for progression to SPC, sit to stands no UE support 3 reps x 2 sets, squats 20x, TKE's with red TB 20x, heel raises/AP sway 20x, cone taps initially with RLE to focus on LLE Stance and then with LLE to focus on motor control   Equipment Use   NuStep L2 x 10 min, LE's only   Assessment   Treatment Assessment Pt participated in 90 min skilled PT session focusing on LE stability/strength and functional mobility. No evidence of LLE buckling or instability today. Observed decreased L ankle control when sidestepping in // bars, but no issues during ambulation. Good carryover of sequencing for stairs. Good motor control during cone taps. Overall she is functioning @ CS level, and CG for curb/stairs, using RW. She requires continued skilled PT to maximize independence and safety with all functional mobility.   Barriers to Discharge Inaccessible home environment;Decreased caregiver support   PT Barriers   Physical Impairment Decreased strength;Decreased endurance;Decreased mobility;Impaired balance;Impaired sensation   Functional Limitation Car transfers;Ramp negotiation;Stair  negotiation;Standing;Walking;Transfers   Plan   Treatment/Interventions Functional transfer training;LE strengthening/ROM;Elevations;Therapeutic exercise;Endurance training;Patient/family training;Bed mobility;Equipment eval/education;Gait training   Progress Progressing toward goals   Discharge Recommendation   Equipment Recommended Walker   PT Therapy Minutes   PT Time In 1000   PT Time Out 1130   PT Total Time (minutes) 90   PT Mode of treatment - Individual (minutes) 90   PT Mode of treatment - Concurrent (minutes) 0   PT Mode of treatment - Group (minutes) 0   PT Mode of treatment - Co-treat (minutes) 0   PT Mode of Treatment - Total time(minutes) 90 minutes   PT Cumulative Minutes 450   Therapy Time missed   Time missed? No

## 2025-01-20 NOTE — PROGRESS NOTES
01/20/25 1230   Pain Assessment   Pain Assessment Tool 0-10   Pain Score No Pain   Restrictions/Precautions   Precautions Bed/chair alarms;Fall Risk;Pain;Supervision on toilet/commode   Sit to Stand   Type of Assistance Needed Supervision   Physical Assistance Level No physical assistance   Comment Sup with RW   Sit to Stand CARE Score 4   Bed-Chair Transfer   Type of Assistance Needed Supervision   Physical Assistance Level No physical assistance   Comment Sup with RW   Chair/Bed-to-Chair Transfer CARE Score 4   Toileting Hygiene   Type of Assistance Needed Supervision   Physical Assistance Level No physical assistance   Comment Sup for CM; IND for urine hygiene seated on raised toilet.   Toileting Hygiene CARE Score 4   Toilet Transfer   Type of Assistance Needed Supervision   Physical Assistance Level No physical assistance   Comment Sup with RW   Toilet Transfer CARE Score 4   Kitchen Mobility   Kitchen-Mobility Level   (Sup with RW and foldable walker tray)   Kitchen Mobility Comments   (See assessment section below. Please address meal prep during upcoming OT sessions.)   Health Management   Health Management Level of Assistance Independent   Health Management Pt able to recall med reference sheet and recall current medications without verbal cues. Pt able to recall and demonstrate opening/closing mocked pill containers with 100% accuracy and recall.   Cognition   Overall Cognitive Status WFL   Arousal/Participation Alert;Cooperative   Attention Within functional limits   Orientation Level Oriented X4   Memory Within functional limits   Following Commands Follows all commands and directions without difficulty   Assessment   Treatment Assessment Pt participated in skilled OT session for 90 minutes w/ fair activity tolerance w/ focus on ADL training and therapeutic activities. Pt seated supported in bed upon therapist entering room. Pt agreeable to OT session. Pt and OTR reviewed medication management w/ pt  able to recall medications from reference list/current medication list. Pt able to demonstrate knowledge of current meds and able to open/close medication bottles without need for assistance. Pt IND in mocked medication management task during OT session. Pt requesting to utilize bathroom prior to leaving room for therapy. Pt participated in functional mobility to bathroom w/ RW at Sup level. Pt participated in toileting routine at Sup level w/ RW. Pt participated in functional mobility from bedroom to therapy gym at Sup level w/ RW. Pt expressed interest in RW foldable tray w/ tray added to RW to allow for kitchen mobility tasks during OT session. Pt reports interest in purchasing for home use. Pt and OTR discussed at length the importance of utilizing LH reacher upon d/c. Pt agreeable and reports having one at home to utilize for varying tasks. Pt participated in functional mobility from therapy gym to ADL suite kitchen at Sup level w/ RW. Pt participated in kitchen/safety education. Pt educated on having friend/family move commonly utilized items up to waist level in kitchen to avoid bending/reaching. Pt educated on purchasing smaller containers of food items to avoid lifting. Pt trialed w/ opening/closing lower drawers in fridge w/ LH reacher w/ pt performing at Sup level with RW and LH reacher. Pt and OTR discussed cooking w/ pt reporting counter top toaster oven for cooking. Pt participated in tub shower transfer at CGA level w/ grab bars w/ pt requiring verbal cues for tech and safety during transfer. Pt and OTR discussed shower chair w/ back at this time w/ pt reporting friend has one for her at d/c. Pt discussed installing grab bars in home shower upon return to her residence w/ OT recommendation made at this time. Pt participated in functional mobility from ADL suite to bedroom at Sup level w/ RW. Pt seated supported in bed at end of therapy session w/ bed alarm on, all items in reach, call bell in hand,  and no further OT needs. Pt would benefit from continued OT services to progress pt w/ endurance, activity tolerance, strength, functional stand bal/jourdan, functional transfers, meal prep, and d/c planning. Continue w/ established POC at this time.   Prognosis Good   Problem List Decreased strength;Decreased endurance;Impaired balance;Decreased mobility;Impaired sensation   Barriers to Discharge Inaccessible home environment;Decreased caregiver support   Plan   Treatment/Interventions ADL retraining;Functional transfer training;Therapeutic exercise;Endurance training;Patient/family training;Equipment eval/education;Bed mobility;Compensatory technique education   OT Therapy Minutes   OT Time In 1230   OT Time Out 1400   OT Total Time (minutes) 90   OT Mode of treatment - Individual (minutes) 90   OT Mode of treatment - Concurrent (minutes) 0   OT Mode of treatment - Group (minutes) 0   OT Mode of treatment - Co-treat (minutes) 0   OT Mode of Treatment - Total time(minutes) 90 minutes   OT Cumulative Minutes 450   Therapy Time missed   Time missed? No

## 2025-01-20 NOTE — CASE MANAGEMENT
CM called to speak with nurse reviewer at [facility], left 2 messages with contact information and request for return phone call , to clarify admission dates and auth.  CM faxed updated clinical information to [facility] via [employer], awaiting determination.

## 2025-01-20 NOTE — ASSESSMENT & PLAN NOTE
S/p iV venofer X3  Hgb 1/12 11.7----> 11.2 1/16 ----> 11.0 1/20  Monitor CBC, transfuse for hemoglobin <7

## 2025-01-20 NOTE — ASSESSMENT & PLAN NOTE
Hgb currently stable at 11.0  Received IV venofer x3 doses in acute setting.  Iron panel on 12/30/2024: Iron Sat 10%, TIBC 396, Iron 40, and Ferritin 9.  Consider starting PO supplementation once constipation resolves.

## 2025-01-20 NOTE — PROGRESS NOTES
Progress Note - PMR   Name: Hayley Rios 64 y.o. female I MRN: [id number]  Unit/Bed#: -01 I Date of Admission: 1/15/2025   Date of Service: 1/20/2025 I Hospital Day: 5     Assessment & Plan  Compression of thoracic spinal cord with myelopathy (HCC)  Hx known T3 spinal meningioma presented to [facility] with left lower extremity weakness and spasms  MRI T spine: Suboptimal examination due to mild, moderate, and severe motion artifact - worse on postcontrast sequences. Similar 1.4 cm intradural extramedullary probable meningioma in left posterolateral thoracic spine region at approximately T3 level with associated marked spinal cord compression with severe canal stenosis given motion degraded exam. No cord edema given motion degradation.   MRI L spine: Suboptimal examination due to moderate-to-severe motion degraded exam of lumbar spine and 3 plane localizer images, sagittal T1 post contrast and axial T1 postcontrast sequences. No abnormal enhancement in lumbar spine given limitations of motion degradation.   S/p T2-4 laminectomy for resection of thoracic tumor by Dr. Coulter 1/7, pathology consistent with meningioma, cleared to resume DVT ppx  Keep incision clean and dry and wipe daily w/ MARY wipe, can be left open to air  Completed dexamethasone taper 1/14, 2 week fu ~1/24  6 week surgeon f/u ~ 2/21  C/w PT/OT    Ambulatory dysfunction  Patient was evaluated by the rehabilitation team MD and an appropriate candidate for acute inpatient rehabilitation program at this time.  The patient will tolerate 3 hours/day 5 to 7 days/week of intensive physical, occupational and speech therapy in order to obtain goals for community discharge  Due to the patient's functional Compared to their baseline level of function in addition to their ongoing medical needs, the patient would benefit from daily supervision from a rehabilitation physician as well as rehabilitation nursing to implement and adjust the medical as  well as functional plan of care in order to meet the patient's goals.   Acute pain  Cw tylenol 975 Q8h, cymbalta 60 mg daily, robaxin 1g Q8h  PRN: dilaudid 2-4 mg Q4h, tordol 10 mg Q6h, Valium 2mg Q6h  Nursing writing down next PRN dose in pt room  Titrate opioids as able  Ice/heat PRN, lidocaine patch bilateral shoulders either side of incision  1/20 Added baclofen 5 mg and ibuprofen 800, d/c tordol 5/5 days  Depression, recurrent (HCC)  Will monitor mood/behavior and consult neuropsych if needed  Restless leg syndrome  Cw pramipexole, b12 inj  Prediabetes  A1c 5.8%  POCT glucose 100-130s  POCT glucose and accuchecks defer to IM  Vitamin D deficiency  Cw vitamin D supplementation  Gastroesophageal reflux disease  CW protonix  PRN Tums, maalox  Protonix changed to omeprazole  MARV (iron deficiency anemia)  S/p iV venofer X3  Hgb 1/12 11.7----> 11.2 1/16 ----> 11.0 1/20  Monitor CBC, transfuse for hemoglobin <7  Ventral hernia without obstruction or gangrene  Hx 6 hernia surgeries, most recent 10/24/24 w/ hx bowel obstruction  Monitor BM, abominal pain, cw bowel meds  Lymphedema  Cw home lasix 20 mg daily  Leukocytosis  11.52 1/12, recently completed steroid taper 11/10 7.35  Monitoring with CBC  Vitamin B12 deficiency  346 1/1  Cw Cyancobalmin PO  F/u outpt providers  Constipation  Small BM 1/15, prior before admission 2 weeks ago  CW bowel reg   Continues to pass gas  1/20 Colace d/c after reports of diarrhea   Obesity  Encourage lifestyle changes    History of Present Illness   Hayley Rios is a 64 y.o. female w/ PMHx of restless leg syndrome, MARV, GERD w/ barrets esophagous, abdominal wall hernia repair, lymphedema, multiple spinal surgeries, who initially presented to [facility] on 12/30/2024 with increased ambulatory dysfunction and weakness. Patient was visiting friends in the area over the holidays when she noticed increased LE weakness. Patient w/ known T3 spinal  meningioma who was lost to follow up. She was initially admitted to [facility] 12/30-12/31 w/ worsening LLE weakness and spasm. She underwent multiple imaging studies at that shakeel which re-demonstrated known T3 meningioma w/ marked spinal cord compression and severe canal stenosis. She was transferred to [facility] on 12/31 for further assessment by NSGY. CT spine and MRI spine w/ increased size and marked cord compression w/ focal cord edema at the lvl of compression. She underwent T2-4 laminectomy on 1/7/2025 w/ NSGY Dr. Coulter. MEP and SSEPs remained stable without any changes. Her BRAYAN drain was removed 1/10. Her decadron was wean was completed 1/14/2025. She had significant pain post op and is now on oral analgesics. . Hayley Rios was admitted to the [facility] on 01/15/25     Chief Complaint: f/u ambulatory dysfunction    Interval: Patient seen and examined in chair at bedside. No events overnight.  Reports 7/10 pain at incision site. Pt is concerned that Tordol was d/c b/c that was the only pain med to help. Last BM 1/20. Sleeping well at night.     Review of Systems   Respiratory:  Negative for shortness of breath.    Cardiovascular:  Negative for chest pain, palpitations and leg swelling.   Gastrointestinal:  Positive for abdominal distention and diarrhea. Negative for abdominal pain, constipation, nausea and vomiting.   Musculoskeletal:  Positive for neck pain.        Pain at incision site       Objective   Functional Update:  Physical Therapy Occupational Therapy Speech Therapy                           Temp:  [97.4 °F (36.3 °C)-98.3 °F (36.8 °C)] 97.8 °F (36.6 °C)  HR:  [70-88] 70  Resp:  [20] 20  BP: (116-156)/(57-71) 156/71  SpO2:  [97 %-98 %] 97 %    Physical Exam  Constitutional:       General: She is not in acute distress.  HENT:      Head: Normocephalic and atraumatic.      Mouth/Throat:      Mouth: Mucous membranes are moist.   Cardiovascular:      Rate and Rhythm: Normal rate and regular  rhythm.   Pulmonary:      Effort: Pulmonary effort is normal.      Breath sounds: Normal breath sounds.   Abdominal:      General: Bowel sounds are normal. There is distension.   Musculoskeletal:         General: Normal range of motion.   Skin:     General: Skin is warm and dry.   Neurological:      Mental Status: She is alert. Mental status is at baseline.   Psychiatric:         Mood and Affect: Mood normal.         Behavior: Behavior normal.           Scheduled Meds:  Current Facility-Administered Medications   Medication Dose Route Frequency Provider Last Rate    acetaminophen  975 mg Oral Q8H [facility] Loni Wong MD      aluminum-magnesium hydroxide-simethicone  30 mL Oral Q4H PRN Samantha Allen PA-C      baclofen  5 mg Oral TID Loni Wong MD      bisacodyl  10 mg Rectal Daily PRN Samantha Allen PA-C      calcium carbonate  1,000 mg Oral TID PRN Samantha Allen PA-C      cholecalciferol  2,000 Units Oral Daily ROGE Yan      cyanocobalamin  1,000 mcg Oral Daily ROGE Yan      diphenhydrAMINE  25 mg Oral Q6H PRN Samantha Allen PA-C      docusate sodium  100 mg Oral BID Samantha Allen PA-C      DULoxetine  60 mg Oral Daily Samantha Allen PA-C      enoxaparin  40 mg Subcutaneous Q24H [facility] Loni Wong MD      hydrocortisone  1 Application Topical BID PRN Loni Wong MD      HYDROmorphone  2 mg Oral Q4H PRN Samantha Allen PA-C      HYDROmorphone  4 mg Oral Q4H PRN Samantha Allen PA-C      ibuprofen  800 mg Oral Q8H PRN Loni Wong MD      magnesium Oxide  400 mg Oral Daily ROGE Yan      melatonin  3 mg Oral HS Samantha Allen PA-C      methocarbamol  1,000 mg Oral Q8H PRN Loni Wong MD      omeprazole  40 mg Oral BID AC ROGE Yan      ondansetron  4 mg Oral Q6H PRN Samantha Allen PA-C      polyethylene glycol  17 g Oral Daily Samantha Allen PA-C      potassium chloride  40  mEq Oral Daily With Breakfast Samantha Allen PA-C      pramipexole  0.5 mg Oral HS Samantha Allen PA-C      senna  1 tablet Oral BID Samantha Allen PA-C           Lab Results: I have reviewed the following results:  Results from last 7 days   Lab Units 01/20/25  0526 01/16/25  0537   HEMOGLOBIN g/dL 11.0* 11.2*   HEMATOCRIT % 37.1 36.5   WBC Thousand/uL 7.35 8.94   PLATELETS Thousands/uL 379 385     Results from last 7 days   Lab Units 01/20/25  0526 01/16/25  0537   BUN mg/dL 12 18   SODIUM mmol/L 141 137   POTASSIUM mmol/L 3.9 4.2   CHLORIDE mmol/L 103 99   CREATININE mg/dL 0.64 0.64   AST U/L  --  13   ALT U/L  --  19              Samantha Allen PA-C  Physical Medicine and Rehabilitation   01/20/25

## 2025-01-20 NOTE — ASSESSMENT & PLAN NOTE
Cw tylenol 975 Q8h, cymbalta 60 mg daily, robaxin 1g Q8h  PRN: dilaudid 2-4 mg Q4h, tordol 10 mg Q6h, Valium 2mg Q6h  Nursing writing down next PRN dose in pt room  Titrate opioids as able  Ice/heat PRN, lidocaine patch bilateral shoulders either side of incision  1/20 Added baclofen 5 mg and ibuprofen 800, d/c tordol 5/5 days

## 2025-01-21 ENCOUNTER — APPOINTMENT (INPATIENT)
Dept: RADIOLOGY | Facility: HOSPITAL | Age: 65
DRG: 560 | End: 2025-01-21
Payer: COMMERCIAL

## 2025-01-21 PROBLEM — D72.829 LEUKOCYTOSIS: Status: RESOLVED | Noted: 2025-01-15 | Resolved: 2025-01-21

## 2025-01-21 PROCEDURE — 72080 X-RAY EXAM THORACOLMB 2/> VW: CPT

## 2025-01-21 PROCEDURE — 97110 THERAPEUTIC EXERCISES: CPT

## 2025-01-21 PROCEDURE — 99232 SBSQ HOSP IP/OBS MODERATE 35: CPT | Performed by: INTERNAL MEDICINE

## 2025-01-21 PROCEDURE — 97116 GAIT TRAINING THERAPY: CPT

## 2025-01-21 PROCEDURE — 97530 THERAPEUTIC ACTIVITIES: CPT

## 2025-01-21 PROCEDURE — 97535 SELF CARE MNGMENT TRAINING: CPT

## 2025-01-21 RX ORDER — HYDROCORTISONE 25 MG/G
CREAM TOPICAL 2 TIMES DAILY
Status: DISCONTINUED | OUTPATIENT
Start: 2025-01-21 | End: 2025-01-24 | Stop reason: HOSPADM

## 2025-01-21 RX ADMIN — PRAMIPEXOLE DIHYDROCHLORIDE 0.5 MG: 0.25 TABLET ORAL at 21:35

## 2025-01-21 RX ADMIN — IBUPROFEN 800 MG: 400 TABLET ORAL at 08:00

## 2025-01-21 RX ADMIN — MELATONIN TAB 3 MG 3 MG: 3 TAB at 21:34

## 2025-01-21 RX ADMIN — HYDROMORPHONE HYDROCHLORIDE 2 MG: 2 TABLET ORAL at 21:38

## 2025-01-21 RX ADMIN — Medication 2000 UNITS: at 08:01

## 2025-01-21 RX ADMIN — ACETAMINOPHEN 975 MG: 325 TABLET, FILM COATED ORAL at 21:35

## 2025-01-21 RX ADMIN — HYDROCORTISONE: 25 CREAM TOPICAL at 17:22

## 2025-01-21 RX ADMIN — OMEPRAZOLE 40 MG: 40 CAPSULE, DELAYED RELEASE ORAL at 06:10

## 2025-01-21 RX ADMIN — DULOXETINE HYDROCHLORIDE 60 MG: 60 CAPSULE, DELAYED RELEASE ORAL at 08:02

## 2025-01-21 RX ADMIN — HYDROMORPHONE HYDROCHLORIDE 2 MG: 2 TABLET ORAL at 09:55

## 2025-01-21 RX ADMIN — BACLOFEN 5 MG: 10 TABLET ORAL at 21:34

## 2025-01-21 RX ADMIN — METHOCARBAMOL TABLETS 1000 MG: 500 TABLET, COATED ORAL at 06:28

## 2025-01-21 RX ADMIN — POTASSIUM CHLORIDE 40 MEQ: 1500 TABLET, EXTENDED RELEASE ORAL at 08:01

## 2025-01-21 RX ADMIN — Medication 400 MG: at 08:01

## 2025-01-21 RX ADMIN — ENOXAPARIN SODIUM 40 MG: 40 INJECTION SUBCUTANEOUS at 08:02

## 2025-01-21 RX ADMIN — ACETAMINOPHEN 975 MG: 325 TABLET, FILM COATED ORAL at 06:10

## 2025-01-21 RX ADMIN — OMEPRAZOLE 40 MG: 40 CAPSULE, DELAYED RELEASE ORAL at 16:12

## 2025-01-21 RX ADMIN — BACLOFEN 5 MG: 10 TABLET ORAL at 16:12

## 2025-01-21 RX ADMIN — HYDROCORTISONE 1 APPLICATION: 25 CREAM TOPICAL at 14:01

## 2025-01-21 RX ADMIN — BACLOFEN 5 MG: 10 TABLET ORAL at 08:01

## 2025-01-21 RX ADMIN — ACETAMINOPHEN 975 MG: 325 TABLET, FILM COATED ORAL at 14:02

## 2025-01-21 RX ADMIN — CYANOCOBALAMIN TAB 1000 MCG 1000 MCG: 1000 TAB at 08:02

## 2025-01-21 NOTE — ASSESSMENT & PLAN NOTE
346 1/1  Cw Cyancobalmin PO  F/u outpt providers   PRINCIPAL DISCHARGE DIAGNOSIS  Diagnosis: Rectal mass  Assessment and Plan of Treatment: Please follow up with  your heme/onc doctor for outpatient follow up.  Rectal cancer is cancer that begins in the rectum. The rectum is the last several inches of the large intestine. It starts at the end of the final segment of your colon and ends when it reaches the short, narrow passage leading to the anus.  Cancer inside the rectum (rectal cancer) and cancer inside the colon (colon cancer) are often referred to together as "colorectal cancer."  While rectal and colon cancers are similar in many ways, their treatments are quite different. This is mainly because the rectum sits in a tight space, barely  from other organs and structures. The tight space can make surgery to remove rectal cancer complex.  In the past, long-term survival was uncommon for people with rectal cancer, even after extensive treatment. Thanks to treatment advances over the last few decades, rectal cancer survival rates have greatly improved.        SECONDARY DISCHARGE DIAGNOSES  Diagnosis: Systemic inflammatory response syndrome (SIRS)  Assessment and Plan of Treatment: Please finish up your antibiotic cefpodoxime and flagyl and follow up with your primary care doctor. If you develop a fever you can take tylenol 650mg every 6hours as needed. if you develop any shortness of breath, chest pain or worsening of symptoms please go to your nearest emergency room.

## 2025-01-21 NOTE — PROGRESS NOTES
Progress Note - PMR   Name: Hayley Rios 64 y.o. female I MRN: [id number]  Unit/Bed#: -01 I Date of Admission: 1/15/2025   Date of Service: 1/21/2025 I Hospital Day: 6     Assessment & Plan  Compression of thoracic spinal cord with myelopathy (HCC)  Hx known T3 spinal meningioma presented to [facility] with left lower extremity weakness and spasms  MRI T spine: Suboptimal examination due to mild, moderate, and severe motion artifact - worse on postcontrast sequences. Similar 1.4 cm intradural extramedullary probable meningioma in left posterolateral thoracic spine region at approximately T3 level with associated marked spinal cord compression with severe canal stenosis given motion degraded exam. No cord edema given motion degradation.   MRI L spine: Suboptimal examination due to moderate-to-severe motion degraded exam of lumbar spine and 3 plane localizer images, sagittal T1 post contrast and axial T1 postcontrast sequences. No abnormal enhancement in lumbar spine given limitations of motion degradation.   S/p T2-4 laminectomy for resection of thoracic tumor by Dr. Coulter 1/7, pathology consistent with meningioma, cleared to resume DVT ppx  Keep incision clean and dry and wipe daily w/ MARY wipe, can be left open to air  Completed dexamethasone taper 1/14, 2 week fu ~1/24  6 week surgeon f/u ~ 2/21  C/w PT/OT    Ambulatory dysfunction  Patient was evaluated by the rehabilitation team MD and an appropriate candidate for acute inpatient rehabilitation program at this time.  The patient will tolerate 3 hours/day 5 to 7 days/week of intensive physical, occupational and speech therapy in order to obtain goals for community discharge  Due to the patient's functional Compared to their baseline level of function in addition to their ongoing medical needs, the patient would benefit from daily supervision from a rehabilitation physician as well as rehabilitation nursing to implement and adjust the medical as  well as functional plan of care in order to meet the patient's goals.   Acute pain  Cw tylenol 975 Q8h, cymbalta 60 mg daily, robaxin 1g Q8h  PRN: dilaudid 2-4 mg Q4h, tordol 10 mg Q6h, Valium 2mg Q6h  Nursing writing down next PRN dose in pt room  Titrate opioids as able  Ice/heat PRN, lidocaine patch bilateral shoulders either side of incision  1/20 Added baclofen 5 mg and ibuprofen 800, d/c tordol 5/5 days  Depression, recurrent (HCC)  Will monitor mood/behavior and consult neuropsych if needed  Restless leg syndrome  Cw pramipexole, b12 inj  Prediabetes  A1c 5.8%  POCT glucose 100-130s  POCT glucose and accuchecks defer to IM  Vitamin D deficiency  Cw vitamin D supplementation  Gastroesophageal reflux disease  CW protonix  PRN Tums, maalox  Protonix changed to omeprazole  MARV (iron deficiency anemia)  S/p iV venofer X3  Hgb 1/12 11.7----> 11.2 1/16 ----> 11.0 1/20  Monitor CBC, transfuse for hemoglobin <7  Ventral hernia without obstruction or gangrene  Hx 6 hernia surgeries, most recent 10/24/24 w/ hx bowel obstruction  Monitor BM, abominal pain, cw bowel meds  Lymphedema  Cw home lasix 20 mg daily  Leukocytosis (Resolved: 1/21/2025)  11.52 1/12, recently completed steroid taper 11/10 7.35  Monitoring with CBC  Vitamin B12 deficiency  346 1/1  Cw Cyancobalmin PO  F/u outpt providers  Constipation  Small BM 1/15, prior before admission 2 weeks ago  CW bowel reg   Continues to pass gas  1/20 Colace d/c after reports of diarrhea   Obesity  Encourage lifestyle changes    History of Present Illness   Hayley Rios is a 64 y.o. female w/ PMHx of restless leg syndrome, MARV, GERD w/ barrets esophagous, abdominal wall hernia repair, lymphedema, multiple spinal surgeries, who initially presented to [facility] on 12/30/2024 with increased ambulatory dysfunction and weakness. Patient was visiting friends in the area over the holidays when she noticed increased LE weakness. Patient  w/ known T3 spinal meningioma who was lost to follow up. She was initially admitted to [facility] 12/30-12/31 w/ worsening LLE weakness and spasm. She underwent multiple imaging studies at that shakeel which re-demonstrated known T3 meningioma w/ marked spinal cord compression and severe canal stenosis. She was transferred to [facility] on 12/31 for further assessment by NSGY. CT spine and MRI spine w/ increased size and marked cord compression w/ focal cord edema at the lvl of compression. She underwent T2-4 laminectomy on 1/7/2025 w/ NSGY Dr. Coulter. MEP and SSEPs remained stable without any changes. Her BRAYAN drain was removed 1/10. Her decadron was wean was completed 1/14/2025. She had significant pain post op and is now on oral analgesics. . Hayley Rios was admitted to the [facility] on 01/15/25     Chief Complaint: f/u ambulatory dysfunction    Interval: Patient seen and examined at bedside. No events overnight.  Reports overall feeling well. Last BM 1/21. Sleeping well at night. Expressed concerns about stomach distention. Reports still passing gas and having BM. This AM pt notes she had no pain for a bit and described it as not stabbing pain anymore. Apt with NSGY tomorrow, imaging today.    Review of Systems   Respiratory:  Negative for shortness of breath.    Cardiovascular:  Negative for chest pain, palpitations and leg swelling.   Gastrointestinal:  Positive for abdominal distention. Negative for abdominal pain, constipation, diarrhea, nausea and vomiting.       Objective   Functional Update:  Physical Therapy Occupational Therapy Speech Therapy       Eating: Independent  Grooming:  (HASKINS seated, CS standing)  Bathing:  (shower HASKINS-sup)  Bathing:  (shower HASKINS-sup)  Upper Body Dressing:  (setup)  Lower Body Dressing:  (setup)  Toileting: Supervision  Tub/Shower Transfer: Supervision  Toilet Transfer: Supervision  Cognition: Within Defined Limits                   Temp:  [97.2 °F (36.2 °C)-98.4 °F (36.9 °C)]  98.4 °F (36.9 °C)  HR:  [66-81] 81  Resp:  [17-18] 17  BP: (114-132)/(58-72) 123/58  SpO2:  [96 %-97 %] 97 %    Physical Exam  Constitutional:       General: She is not in acute distress.  HENT:      Head: Normocephalic and atraumatic.      Mouth/Throat:      Mouth: Mucous membranes are moist.   Cardiovascular:      Rate and Rhythm: Normal rate and regular rhythm.   Pulmonary:      Effort: Pulmonary effort is normal.      Breath sounds: Normal breath sounds.   Abdominal:      General: Bowel sounds are normal.   Musculoskeletal:         General: Normal range of motion.   Skin:     General: Skin is warm and dry.      Comments: Cervical incision minimally pink   Neurological:      Mental Status: She is alert. Mental status is at baseline.   Psychiatric:         Mood and Affect: Mood normal.         Behavior: Behavior normal.           Scheduled Meds:  Current Facility-Administered Medications   Medication Dose Route Frequency Provider Last Rate    acetaminophen  975 mg Oral Q8H [facility] Loni Wong MD      aluminum-magnesium hydroxide-simethicone  30 mL Oral Q4H PRN Samantha Allen PA-C      baclofen  5 mg Oral TID Loni Wong MD      bisacodyl  10 mg Rectal Daily PRN Samantha Allen PA-C      calcium carbonate  1,000 mg Oral TID PRN Samantha Allen PA-C      cholecalciferol  2,000 Units Oral Daily ROGE Yan      cyanocobalamin  1,000 mcg Oral Daily ROGE Yan      diphenhydrAMINE  25 mg Oral Q6H PRN Samantha Allen PA-C      DULoxetine  60 mg Oral Daily Samantha Allen PA-C      enoxaparin  40 mg Subcutaneous Q24H [facility] Loni Wong MD      hydrocortisone  1 Application Topical BID PRN Loni Wong MD      HYDROmorphone  2 mg Oral Q4H PRN Loni Wong MD      HYDROmorphone  4 mg Oral Q4H PRN Samantha Allen PA-C      ibuprofen  800 mg Oral Q8H PRN Loni Wong MD      magnesium Oxide  400 mg Oral Daily ROGE Yan      melatonin  3 mg  Oral HS Samantha Allen PA-C      methocarbamol  1,000 mg Oral Q8H PRN Loni Wong MD      omeprazole  40 mg Oral BID AC ROGE Yan      ondansetron  4 mg Oral Q6H PRN Samantha Allen PA-C      polyethylene glycol  17 g Oral Daily Samantha Allen PA-C      potassium chloride  40 mEq Oral Daily With Breakfast Samantha Allen PA-C      pramipexole  0.5 mg Oral HS Samantha Allen PA-C      senna  1 tablet Oral BID Samantha Allen PA-C           Lab Results: I have reviewed the following results:  Results from last 7 days   Lab Units 01/20/25  0526 01/16/25  0537   HEMOGLOBIN g/dL 11.0* 11.2*   HEMATOCRIT % 37.1 36.5   WBC Thousand/uL 7.35 8.94   PLATELETS Thousands/uL 379 385     Results from last 7 days   Lab Units 01/20/25  0526 01/16/25  0537   BUN mg/dL 12 18   SODIUM mmol/L 141 137   POTASSIUM mmol/L 3.9 4.2   CHLORIDE mmol/L 103 99   CREATININE mg/dL 0.64 0.64   AST U/L  --  13   ALT U/L  --  19              Samantha Allen PA-C  Physical Medicine and Rehabilitation   01/21/25

## 2025-01-21 NOTE — PROGRESS NOTES
01/21/25 1005   Pain Assessment   Pain Assessment Tool 0-10   Pain Score 5   Pain Location/Orientation Orientation: Upper;Location: Back   Pain Onset/Description Descriptor: [facility] Pain Intervention(s) Medication (See MAR);Ambulation/increased activity   Restrictions/Precautions   Precautions Bed/chair alarms;Fall Risk;Pain;Supervision on toilet/commode   Cognition   Overall Cognitive Status WFL   Arousal/Participation Alert;Cooperative   Attention Within functional limits   Orientation Level Oriented X4   Memory Within functional limits   Following Commands Follows all commands and directions without difficulty   Subjective   Subjective Feels she is getting back to herself  and reports having few minutes of no pain this AM.   Roll Left and Right   Type of Assistance Needed Independent   Roll Left and Right CARE Score 6   Lying to Sitting on Side of Bed   Type of Assistance Needed Independent   Lying to Sitting on Side of Bed CARE Score 6   Sit to Stand   Type of Assistance Needed Supervision   Comment near mod I with RW   Sit to Stand CARE Score 4   Bed-Chair Transfer   Type of Assistance Needed Supervision   Physical Assistance Level No physical assistance   Comment near mod I with Rw for stand pivot   Chair/Bed-to-Chair Transfer CARE Score 4   Car Transfer   Type of Assistance Needed Supervision   Physical Assistance Level No physical assistance   Comment simulated on nustep   Car Transfer CARE Score 4   Walk 10 Feet   Type of Assistance Needed Supervision   Physical Assistance Level No physical assistance   Comment near mod I with RW   Walk 10 Feet CARE Score 4   Walk 50 Feet with Two Turns   Type of Assistance Needed Supervision   Physical Assistance Level No physical assistance   Comment S with RW   Walk 50 Feet with Two Turns CARE Score 4   Walk 150 Feet   Type of Assistance Needed Supervision   Physical Assistance Level No physical assistance   Comment S with RW   Walk 150 Feet CARE Score 4    Walking 10 Feet on Uneven Surfaces   Type of Assistance Needed Supervision   Physical Assistance Level No physical assistance   Comment on indoor 10ft and 24ft ramp with RW   Walking 10 Feet on Uneven Surfaces CARE Score 4   Ambulation   Primary Mode of Locomotion Prior to Admission Walk   Distance Walked (feet) 500 ft  ((w/RW) - short trials implemented with SPC first then NBQC, pt able to amb back to room at end of session with NBQC - 150ft.)   Assist Device Roller Walker;Cane;Quad Cane   Walk Assist Level Supervision;Contact Guard   Findings near mod I with RW - trials with QC, however still makes pt mildly anxious and nervous.   Does the patient walk? 2. Yes   Wheelchair mobility   Does the patient use a wheelchair? 0. No   Curb or Single Stair   Style negotiated Curb   Type of Assistance Needed Supervision   Physical Assistance Level No physical assistance   Comment S on on 8inch curb with RW - fearful to trial with QC.   1 Step (Curb) CARE Score 4   4 Steps   Type of Assistance Needed Supervision   Physical Assistance Level No physical assistance   Comment R HR and QC   4 Steps CARE Score 4   12 Steps   Type of Assistance Needed Supervision   Physical Assistance Level No physical assistance   Comment x8 with single HR and QC (nonreciprocally) , last 4 B HR(reciprocally))   12 Steps CARE Score 4   Picking Up Object   Type of Assistance Needed Set-up / clean-up   Physical Assistance Level No physical assistance   Comment marker from floor with reacher and RW.   Picking Up Object CARE Score 5   Toilet Transfer   Type of Assistance Needed Supervision   Physical Assistance Level No physical assistance   Toilet Transfer CARE Score 4   Toilet Transfer   Findings able to manage pants and self hygiene with S.   Therapeutic Interventions   Strengthening supine 2 # SAQ 2x10 ; mod glute sets (slight bridge) over large bolster 3x10;   Flexibility self stretch at second step with B UE support on rails, 6x10 sec hold each  LE.   Balance SPC/QC trials for balance and conditioning.   Equipment Use   NuStep l2 x15min 1 mile. SPM up to 100 B LEs only!   Assessment   Treatment Assessment Pt engaged in 100 min skilled PT intervention in anticipation for hopeful DC by end of week, has virtual neurosurgery appt tomorrow at 1230 for postop f/u, hopeful she can fly home to [place] and recover there. Aware if she needs to stay here she is ok with that and may go to sisters in [place] for a week but she is away until early feb. Ongoing use of RW and pt verbalized mamking efforts to reduce reliance on it, has borrowed QC, education on use and sequencing as well as need to slow pace to use QC properly. Pt somewhat fearful and anxious however did well with short distances and progress up to 150ft by end of session. Discussed with pt questionable need for f/u therapy pending neurosurgery recommendation. May still want RW to ensure safety as recovery may flucutate. Overall pt demonstrating near mod I mobiltiy with RW and feels she could manage at home, given pain is more under control. To discuss at team and await neuro input. Staff reminded pt can amb with staff on unit with RW.   Plan   Progress Improving as expected   Discharge Recommendation   Walker Package Recommended Wheeled walker   PT Equipment ordered to submit DME sheet when DC date set.   PT Therapy Minutes   PT Time In 1005   PT Time Out 1145   PT Total Time (minutes) 100   PT Mode of treatment - Individual (minutes) 100   PT Mode of treatment - Concurrent (minutes) 0   PT Mode of treatment - Group (minutes) 0   PT Mode of treatment - Co-treat (minutes) 0   PT Mode of Treatment - Total time(minutes) 100 minutes   PT Cumulative Minutes 550   Therapy Time missed   Time missed? No

## 2025-01-21 NOTE — ASSESSMENT & PLAN NOTE
Hx known T3 spinal meningioma presented to [facility] with left lower extremity weakness and spasms  MRI T spine: Suboptimal examination due to mild, moderate, and severe motion artifact - worse on postcontrast sequences. Similar 1.4 cm intradural extramedullary probable meningioma in left posterolateral thoracic spine region at approximately T3 level with associated marked spinal cord compression with severe canal stenosis given motion degraded exam. No cord edema given motion degradation.   MRI L spine: Suboptimal examination due to moderate-to-severe motion degraded exam of lumbar spine and 3 plane localizer images, sagittal T1 post contrast and axial T1 postcontrast sequences. No abnormal enhancement in lumbar spine given limitations of motion degradation.   S/p T2-4 laminectomy for resection of thoracic tumor by Dr. Coulter 1/7, pathology consistent with meningioma, cleared to resume DVT ppx  Keep incision clean and dry and wipe daily w/ MARY wipe, can be left open to air  Completed dexamethasone taper 1/14, 2 week fu ~1/24  6 week surgeon f/u ~ 2/21  C/w PT/OT

## 2025-01-21 NOTE — ASSESSMENT & PLAN NOTE
Vitamin B12 level 346 on 1/1/25.  Received cyanocobalamin injections in acute setting.  Started on PO cyanocobalamin daily.

## 2025-01-21 NOTE — PLAN OF CARE
Problem: SAFETY ADULT  Goal: Maintains/Returns to pre admission functional level  Description: INTERVENTIONS:  - Perform AM-PAC 6 Click Basic Mobility/ Daily Activity assessment daily.  - Set and communicate daily mobility goal to care team and patient/family/caregiver.   - Collaborate with rehabilitation services on mobility goals if consulted  - Perform Range of Motion 3 times a day.  - Ambulate patient 3 times a day  - Out of bed to chair 3 times a day   - Out of bed for meals 3  Problem: Knowledge Deficit  Goal: Patient/family/caregiver demonstrates understanding of disease process, treatment plan, medications, and discharge instructions  Description: Complete learning assessment and assess knowledge base.  Interventions:  - Provide teaching at level of understanding  - Provide teaching via preferred learning methods  Outcome: Progressing    times a day  - Out of bed for toileting  - Record patient progress and toleration of activity level   Outcome: Progressing

## 2025-01-21 NOTE — PCC OCCUPATIONAL THERAPY
1/21/25  ADL: setup UB/LB bathing/dressing  TRANSFER:SUP with RW  D/C PLAN: Dc anticipated in next few days. At this time Dc location between home in [place] and friends home. DC depends on neuro sx appt on Wed 1/22/25 and if pt able to fly or not.     DME: RW, shower chair  Family training: not neccessary    Pt barriers include activity tolerance, cervical pain, dec fx reach, general weakness impacting participation in ADL/IADL's.  In order to address fx deficits, skilled OT services have worked on self-care, therapeutic exercise, therapeutic activity, modalities PRN in order to inc fx performance and independence.     Plan for next week: Achieve Feliciano level goals and set DC date.    Therapist has discussed POC with pt and areas of focus with pt verbalizing understanding.     Barriers Intervention   Activity tolerance Endurance training   Cervical pain Educ, repositioning   Fx reach Task modification

## 2025-01-21 NOTE — ASSESSMENT & PLAN NOTE
Improved, WBC count currently 7.35  Completed steroid taper on 1/14.  Likely steroid induced.  No active s/s of infection at this time.  Continue to trend routine CBC.

## 2025-01-21 NOTE — ASSESSMENT & PLAN NOTE
Continue home Cymbalta 60mg daily.  Had been on trazodone 50mg at HS at home but would like to continue without at this time.  Supportive counseling as needed.   22-Jan-2020 18:36

## 2025-01-21 NOTE — PROGRESS NOTES
Progress Note - Internal Medicine   Name: Hayley Rios 64 y.o. 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  Hx of known thoracic meningioma and prior back surgeries (scoliosis s/p Guerra nydia).  MRI thoracic spine showed similar 1.4cm intradural extramedullary probable meningioma in the L posterolateral thoracic spine region at approximately T3 level with associated marked spinal cord compression with severe canal stenosis.  MRI C-spine showed re-demonstration of meningioma in the posterior L aspect of the canal at T3, compared with 2022 - mass is mildly increased in size and marked cord compression also mildly increase.    OR on 1/7 for bilateral T2-T4 laminectomy with resection of tumor performed by Dr. Coulter.  Pathology consistent with meningioma.    Hold AC/AP for at least 2 weeks post-op.  Neurovascular checks Q shift.  Ensure adequate pain control.  Monitor incision for s/s of infection.    Follow-up with Neurovascular in 2 weeks (1/21) and in 6 weeks.  Virtual appt scheduled for tomorrow.    Primary team following.  PT/OT.  Depression, recurrent (HCC)  Continue home Cymbalta 60mg daily.  Had been on trazodone 50mg at HS at home but would like to continue without at this time.  Supportive counseling as needed.  Restless leg syndrome  Continue home Mirapex 0.5mg at HS.  Had been on Mirapex 0.25mg TID in addition at home but pt would like to continue without at this time.  Prediabetes  Last A1c 5.8 on 1/1/2025.  Encourage lifestyle modifications.  Monitor BG with routine labs.  Vitamin D deficiency  Continue home vitamin D 2000u daily.  Gastroesophageal reflux disease  Continue home omeprazole 40mg BID.  Reflux symptoms worsened after being placed on steroid  pack.  MARV (iron deficiency anemia)  Hgb currently stable at 11.0  Received IV venofer x3 doses in acute setting.  Iron panel on 2024: Iron Sat 10%, TIBC 396, Iron 40, and Ferritin 9.  Consider starting PO supplementation once constipation resolves.  Ventral hernia without obstruction or gangrene  Recently underwent ventral hernia repair with complications and required revision with panniculectomy on 10/24/24.  Follow-up with General Surgery as needed.  Lymphedema  Continue to elevate legs when able.  Apply ACE wraps as tolerated.  Takes Lasix 20mg daily at home.  Discontinued Lasix on  due to hypotension.  May benefit from lymphedema specialist on discharge.  Leukocytosis  Improved, WBC count currently 7.35  Completed steroid taper on .  Likely steroid induced.  No active s/s of infection at this time.  Continue to trend routine CBC.  Vitamin B12 deficiency  Vitamin B12 level 346 on 25.  Received cyanocobalamin injections in acute setting.  Started on PO cyanocobalamin daily.    VTE Pharmacologic Prophylaxis:   Pharmacologic: Enoxaparin (Lovenox)  Mechanical VTE Prophylaxis in Place: Yes - sequential compression devices.    Current Length of Stay: 6 day(s)    Current Patient Status: Inpatient Rehab     Discharge Plan: As per primary team.    Code Status: Level 1 - Full Code    Subjective:   Pt examined while pt sitting in recliner in pt room.  Complaints of posterior neck pain, gradually improving each day.  Currently 3/10.  Has not been requiring pain medication as often.  Complaints of ongoing abdominal bloating.  Has been having small BMs but today finally had a large BM.  Hopeful that bloating will start to decrease.  Denies any abdominal pain or cramping.  Has an area of skin irritation from hospital bracelets.  Will order hydrocortisone for this.  Has no other concerns or complaints at this time.    Objective:     Vitals:   Temp (24hrs), Av.9 °F (36.6 °C), Min:97.2 °F (36.2 °C), Max:98.4  °F (36.9 °C)    Temp:  [97.2 °F (36.2 °C)-98.4 °F (36.9 °C)] 98.4 °F (36.9 °C)  HR:  [66-81] 81  Resp:  [17-18] 17  BP: (114-132)/(58-72) 123/58  SpO2:  [96 %-97 %] 97 %  Body mass index is 36.39 kg/m².     Review of Systems   Constitutional:  Negative for appetite change, chills, fatigue and fever.   HENT:  Negative for trouble swallowing.    Eyes:  Negative for visual disturbance.   Respiratory:  Negative for cough, shortness of breath, wheezing and stridor.    Cardiovascular:  Negative for chest pain, palpitations and leg swelling.   Gastrointestinal:  Positive for abdominal distention (complaints of ongoing abdominal bloating). Negative for abdominal pain, constipation, diarrhea, nausea and vomiting.        LBM 1/21 - large BM   Genitourinary:  Negative for difficulty urinating.   Musculoskeletal:  Positive for neck pain (posterior neck pain, 3/10, gradually improving each day). Negative for arthralgias, back pain and gait problem.   Skin:  Positive for rash (R wrist, feels that it is due to hospital bracelets).   Neurological:  Negative for dizziness, weakness, light-headedness, numbness and headaches.   Psychiatric/Behavioral:  Negative for dysphoric mood and sleep disturbance. The patient is not nervous/anxious.    All other systems reviewed and are negative.       Input and Output Summary (last 24 hours):       Intake/Output Summary (Last 24 hours) at 1/21/2025 0923  Last data filed at 1/20/2025 1632  Gross per 24 hour   Intake 480 ml   Output --   Net 480 ml       Physical Exam:     Physical Exam  Vitals and nursing note reviewed.   Constitutional:       General: She is not in acute distress.     Appearance: Normal appearance. She is obese. She is not ill-appearing.   HENT:      Head: Normocephalic and atraumatic.   Cardiovascular:      Rate and Rhythm: Normal rate and regular rhythm.      Pulses: Normal pulses.      Heart sounds: Murmur heard.      Systolic murmur is present with a grade of 1/6.      No  friction rub.   Pulmonary:      Effort: Pulmonary effort is normal. No respiratory distress.      Breath sounds: Normal breath sounds. No wheezing or rhonchi.   Abdominal:      General: Abdomen is flat. Bowel sounds are normal. There is no distension.      Palpations: Abdomen is soft. There is no mass.      Tenderness: There is no abdominal tenderness. There is no guarding or rebound.      Hernia: No hernia is present.   Musculoskeletal:      Cervical back: Normal range of motion and neck supple.      Right lower leg: Edema (lymphedema) present.      Left lower leg: Edema (lymphedema) present.   Skin:     General: Skin is warm and dry.      Comments: Posterior neck incision, LUCA.  No drainage, erythema, or edema present.   Neurological:      Mental Status: She is alert and oriented to person, place, and time.   Psychiatric:         Mood and Affect: Mood normal.         Behavior: Behavior normal.         Additional Data:     Labs:    Results from last 7 days   Lab Units 01/20/25  0526   WBC Thousand/uL 7.35   HEMOGLOBIN g/dL 11.0*   HEMATOCRIT % 37.1   PLATELETS Thousands/uL 379   SEGS PCT % 65   LYMPHO PCT % 23   MONO PCT % 5   EOS PCT % 4     Results from last 7 days   Lab Units 01/20/25  0526 01/16/25  0537   SODIUM mmol/L 141 137   POTASSIUM mmol/L 3.9 4.2   CHLORIDE mmol/L 103 99   CO2 mmol/L 31 29   BUN mg/dL 12 18   CREATININE mg/dL 0.64 0.64   ANION GAP mmol/L 7 9   CALCIUM mg/dL 8.9 9.1   ALBUMIN g/dL  --  3.5   TOTAL BILIRUBIN mg/dL  --  0.29   ALK PHOS U/L  --  77   ALT U/L  --  19   AST U/L  --  13   GLUCOSE RANDOM mg/dL 99 99         Results from last 7 days   Lab Units 01/15/25  1131 01/15/25  0753 01/14/25  2105 01/14/25  1614 01/14/25  1210   POC GLUCOSE mg/dl 106 106 124 168* 126               Labs reviewed    Imaging:    Imaging reviewed    Recent Cultures (last 7 days):           Last 24 Hours Medication List:   Current Facility-Administered Medications   Medication Dose Route Frequency Provider  Last Rate    acetaminophen  975 mg Oral Q8H [facility] Loni Wong MD      aluminum-magnesium hydroxide-simethicone  30 mL Oral Q4H PRN Samantha Allen PA-C      baclofen  5 mg Oral TID Loni Wong MD      bisacodyl  10 mg Rectal Daily PRN Samantha Allen PA-C      calcium carbonate  1,000 mg Oral TID PRN Samantha Allen PA-C      cholecalciferol  2,000 Units Oral Daily Pk Padron, ROGE      cyanocobalamin  1,000 mcg Oral Daily Pk Padron, ROGE      diphenhydrAMINE  25 mg Oral Q6H PRN Samantha Allen PA-C      DULoxetine  60 mg Oral Daily Samantha Allen PA-C      enoxaparin  40 mg Subcutaneous Q24H [facility] Loni Wong MD      hydrocortisone  1 Application Topical BID PRN Loni Wong MD      HYDROmorphone  2 mg Oral Q4H PRN Loni Wong MD      HYDROmorphone  4 mg Oral Q4H PRN Samantha Allen PA-C      ibuprofen  800 mg Oral Q8H PRN Loni Wong MD      magnesium Oxide  400 mg Oral Daily Pk Padron, ROGE      melatonin  3 mg Oral HS Samantha Allen PA-C      methocarbamol  1,000 mg Oral Q8H PRN Loni Wong MD      omeprazole  40 mg Oral BID AC Pk Padron, ROGE      ondansetron  4 mg Oral Q6H PRN Samantha Allen PA-C      polyethylene glycol  17 g Oral Daily Samantha Allen PA-C      potassium chloride  40 mEq Oral Daily With Breakfast Samantha Allen PA-C      pramipexole  0.5 mg Oral HS Samantha Allen PA-C      senna  1 tablet Oral BID Samantha Allen PA-C          M*Modal software was used to dictate this note.  It may contain errors with dictating incorrect words or incorrect spelling. Please contact the provider directly with any questions.

## 2025-01-21 NOTE — PROGRESS NOTES
"   01/21/25 0700   Pain Assessment   Pain Assessment Tool 0-10   Pain Score 5   Pain Location/Orientation Location: Back   Restrictions/Precautions   Precautions Bed/chair alarms;Fall Risk;Pain;Supervision on toilet/commode   Lifestyle   Autonomy \"I'm getting more confident w/ my balance\"   Eating   Type of Assistance Needed Independent   Eating CARE Score 6   Oral Hygiene   Type of Assistance Needed Supervision   Comment CS w/ RW standing at sink, no LOB but noted min ataxia in stance   Oral Hygiene CARE Score 4   Grooming   Findings seated at sink in chair to blow dry hair. Seated for safety d/t dec balance, pain management, EC   Shower/Bathe Self   Type of Assistance Needed Supervision   Comment primarily seated on shower chair w/ HHSH at HASKINS level. In stance at GB for suzan/buttocks CS   Shower/Bathe Self CARE Score 4   Bathing   Assessed Bath Style Shower   Anticipated D/C Bath Style Tub;Shower   Tub/Shower Transfer   Findings fxnl mobility w/ RW to shower CS w/ min cues for safe tech to transfer to shower chair w/ GB   Upper Body Dressing   Type of Assistance Needed Set-up / clean-up   Comment pt retreives clothing w/ RW and CS. don/doff OH shirt seated HASKINS   Upper Body Dressing CARE Score 5   Lower Body Dressing   Type of Assistance Needed Supervision   Comment CM in stance at RW CS, threads BLE mod I w/ cross leg tech seated. Retrives clothe from closet RW CS   Lower Body Dressing CARE Score 4   Putting On/Taking Off Footwear   Type of Assistance Needed Independent   Comment retrieves socks/shoes from closet, don/doff seated mod I w/ cross leg tech   Putting On/Taking Off Footwear CARE Score 6   Lying to Sitting on Side of Bed   Type of Assistance Needed Set-up / clean-up   Comment use of bed functions   Lying to Sitting on Side of Bed CARE Score 5   Sit to Stand   Type of Assistance Needed Supervision   Comment RW   Sit to Stand CARE Score 4   Toileting Hygiene   Type of Assistance Needed Supervision   Comment " RW   Toileting Hygiene CARE Score 4   Toilet Transfer   Type of Assistance Needed Supervision   Comment on/off toilet RW   Toilet Transfer CARE Score 4   Transfers   Additional Comments fxnl mobility in hallway room<>OT w/ RW Sup, same OT completing WC follow but pt did not require rest break in WC. Tolerated well. Complted to inc pts activity tolerance for inc IND w/ ADLs and IADLs   Cognition   Overall Cognitive Status WFL   Arousal/Participation Alert;Cooperative   Attention Within functional limits   Orientation Level Oriented X4   Memory Within functional limits   Following Commands Follows all commands and directions without difficulty   Activity Tolerance   Activity Tolerance Patient tolerated treatment well   Assessment   Treatment Assessment OT session focusing on ADL retraining. Pt tolerates shower well, use of compensatory techs in place to inc safety and manage pain. Pt demos safe technique for tasks. Progressing well towards mod I goals. OT to continue POC at this time.   Prognosis Good   Problem List Decreased strength;Decreased range of motion;Decreased endurance;Impaired balance;Decreased mobility;Decreased coordination;Impaired sensation;Obesity;Decreased skin integrity;Pain   Plan   Treatment/Interventions ADL retraining;Functional transfer training;Therapeutic exercise;Endurance training;Patient/family training;Equipment eval/education;Compensatory technique education;Continued evaluation;Spoke to nursing   Progress Progressing toward goals   OT Therapy Minutes   OT Time In 0700   OT Time Out 0830   OT Total Time (minutes) 90   OT Mode of treatment - Individual (minutes) 90   OT Mode of treatment - Concurrent (minutes) 0   OT Mode of treatment - Group (minutes) 0   OT Mode of treatment - Co-treat (minutes) 0   OT Mode of Treatment - Total time(minutes) 90 minutes   OT Cumulative Minutes 540   Therapy Time missed   Time missed? No

## 2025-01-21 NOTE — PCC CARE MANAGEMENT
Patient admitted to [facility] on 1/15/25 after inpatient stay for T2-T4 laminectomy for tumor. Patient lives in [place], was visiting here in [place]. Independent PTA, lost  to follow up for thoracic meningioma with spinal cord displacement and compression, [facility] will be [facility] for [facility]. Patient has virtual appt on 1/22 at 12:30.Insurance update sent on 1/20 .

## 2025-01-21 NOTE — ASSESSMENT & PLAN NOTE
Presented on 12/30 with LLE radiculopathy that had been worsening over the past week.  Hx of known thoracic meningioma and prior back surgeries (scoliosis s/p Guerra nydia).  MRI thoracic spine showed similar 1.4cm intradural extramedullary probable meningioma in the L posterolateral thoracic spine region at approximately T3 level with associated marked spinal cord compression with severe canal stenosis.  MRI C-spine showed re-demonstration of meningioma in the posterior L aspect of the canal at T3, compared with 2022 - mass is mildly increased in size and marked cord compression also mildly increase.    OR on 1/7 for bilateral T2-T4 laminectomy with resection of tumor performed by Dr. Coulter.  Pathology consistent with meningioma.    Hold AC/AP for at least 2 weeks post-op.  Neurovascular checks Q shift.  Ensure adequate pain control.  Monitor incision for s/s of infection.    Follow-up with Neurovascular in 2 weeks (1/21) and in 6 weeks.  Virtual appt scheduled for tomorrow.    Primary team following.  PT/OT.

## 2025-01-22 LAB
DME PARACHUTE DELIVERY DATE REQUESTED: NORMAL
DME PARACHUTE DELIVERY NOTE: NORMAL
DME PARACHUTE ITEM DESCRIPTION: NORMAL
DME PARACHUTE ORDER STATUS: NORMAL
DME PARACHUTE SUPPLIER NAME: NORMAL
DME PARACHUTE SUPPLIER PHONE: NORMAL

## 2025-01-22 PROCEDURE — 99233 SBSQ HOSP IP/OBS HIGH 50: CPT | Performed by: INTERNAL MEDICINE

## 2025-01-22 PROCEDURE — 99232 SBSQ HOSP IP/OBS MODERATE 35: CPT | Performed by: INTERNAL MEDICINE

## 2025-01-22 PROCEDURE — 97530 THERAPEUTIC ACTIVITIES: CPT

## 2025-01-22 PROCEDURE — 97110 THERAPEUTIC EXERCISES: CPT

## 2025-01-22 PROCEDURE — 97535 SELF CARE MNGMENT TRAINING: CPT

## 2025-01-22 PROCEDURE — 97116 GAIT TRAINING THERAPY: CPT

## 2025-01-22 RX ADMIN — DULOXETINE HYDROCHLORIDE 60 MG: 60 CAPSULE, DELAYED RELEASE ORAL at 08:02

## 2025-01-22 RX ADMIN — BACLOFEN 5 MG: 10 TABLET ORAL at 15:11

## 2025-01-22 RX ADMIN — POTASSIUM CHLORIDE 40 MEQ: 1500 TABLET, EXTENDED RELEASE ORAL at 08:02

## 2025-01-22 RX ADMIN — OMEPRAZOLE 40 MG: 40 CAPSULE, DELAYED RELEASE ORAL at 17:06

## 2025-01-22 RX ADMIN — ACETAMINOPHEN 975 MG: 325 TABLET, FILM COATED ORAL at 06:47

## 2025-01-22 RX ADMIN — Medication 2000 UNITS: at 08:02

## 2025-01-22 RX ADMIN — ACETAMINOPHEN 975 MG: 325 TABLET, FILM COATED ORAL at 21:00

## 2025-01-22 RX ADMIN — ACETAMINOPHEN 975 MG: 325 TABLET, FILM COATED ORAL at 15:11

## 2025-01-22 RX ADMIN — MELATONIN TAB 3 MG 3 MG: 3 TAB at 21:00

## 2025-01-22 RX ADMIN — HYDROCORTISONE: 25 CREAM TOPICAL at 08:07

## 2025-01-22 RX ADMIN — BACLOFEN 5 MG: 10 TABLET ORAL at 21:00

## 2025-01-22 RX ADMIN — HYDROMORPHONE HYDROCHLORIDE 4 MG: 4 TABLET ORAL at 12:14

## 2025-01-22 RX ADMIN — CYANOCOBALAMIN TAB 1000 MCG 1000 MCG: 1000 TAB at 08:02

## 2025-01-22 RX ADMIN — HYDROCORTISONE: 25 CREAM TOPICAL at 17:07

## 2025-01-22 RX ADMIN — ENOXAPARIN SODIUM 40 MG: 40 INJECTION SUBCUTANEOUS at 08:02

## 2025-01-22 RX ADMIN — Medication 400 MG: at 08:02

## 2025-01-22 RX ADMIN — HYDROMORPHONE HYDROCHLORIDE 2 MG: 2 TABLET ORAL at 08:01

## 2025-01-22 RX ADMIN — BACLOFEN 5 MG: 10 TABLET ORAL at 08:02

## 2025-01-22 RX ADMIN — OMEPRAZOLE 40 MG: 40 CAPSULE, DELAYED RELEASE ORAL at 06:47

## 2025-01-22 RX ADMIN — PRAMIPEXOLE DIHYDROCHLORIDE 0.5 MG: 0.25 TABLET ORAL at 21:00

## 2025-01-22 NOTE — TEAM CONFERENCE
Acute RehabilitationTeam Conference Note  Date: 1/22/2025   Time: 1:11 PM       Patient Name:  Hayley Rios       Medical Record Number: [id number]   YOB: 1960  Sex: Female          Room/Bed:  [facility] 264/[facility] 264-01  Payor Info:  Payor: [facility] REP / Plan: AARP MEDICARE [employer]  REP / Product Type: Medicare HMO /      Admitting Diagnosis: Myelopathy (HCC) [G95.9]   Admit Date/Time:  1/15/2025  1:35 PM  Admission Comments: No comment available     Primary Diagnosis:  Compression of spinal cord with myelopathy (HCC)  Principal Problem: Compression of spinal cord with myelopathy (HCC)    Patient Active Problem List    Diagnosis Date Noted    Constipation 01/16/2025    Vitamin B12 deficiency 01/15/2025    Acute pain 01/15/2025    Compression of thoracic spinal cord with myelopathy (HCC) 12/31/2024    Lymphedema 12/30/2024    Ambulatory dysfunction 12/30/2024    Obesity 12/30/2024    Ventral hernia without obstruction or gangrene 02/19/2024    Myofascial pain syndrome 07/20/2022    MARV (iron deficiency anemia) 07/19/2022    Podagra 05/18/2021    Motion sickness     Recurrent incisional hernia     Macromastia 12/21/2020    Long-term current use of opiate analgesic 12/21/2020    Uncomplicated opioid dependence (HCC) 12/21/2020    Recurrent umbilical hernia 09/22/2020    Lipoma of torso 08/27/2020    Sacroiliitis, not elsewhere classified (HCC)     Osteoarthritis of multiple joints 02/10/2020    Neck pain     Cervical spondylosis without myelopathy     Chronic cough 11/03/2019    Vocal fold paresis, right 11/03/2019    Dysphonia 11/03/2019    Muscle tension dysphonia 11/03/2019    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    Reflux laryngitis 11/03/2019    Laryngeal edema 11/03/2019    Allergic rhinitis due to [employer] house dust mite 11/03/2019    Mild intermittent asthma without complication 11/03/2019    Laryngopharyngeal reflux (LPR) 08/23/2019    Dolan's esophagus with dysplasia 02/12/2019    Gastroesophageal  reflux disease 02/12/2019    Prediabetes 12/12/2018    Vitamin D deficiency 12/12/2018    Lumbar radiculopathy 10/01/2018    Lumbar spondylosis     Environmental allergies 06/21/2018    S/P right knee arthroscopy 05/14/2018    Hernia of anterior abdominal wall 02/05/2018    Sleep disturbance 11/16/2017    Hyperlipidemia 08/11/2017    Primary osteoarthritis of left hip 07/26/2017    Restless leg syndrome 07/11/2017    Chronic venous insufficiency 06/02/2017    Chronic low back pain 04/11/2017    Lumbar postlaminectomy syndrome 04/11/2017    Chronic pain syndrome 03/16/2017    Depression, recurrent (HCC) 03/16/2017       Physical Therapy:    Weight Bearing Status: Full Weight Bearing  Transfers: Supervision, Independent  Bed Mobility: Independent  Amulation Distance (ft): 500 feet  Ambulation: Supervision  Assistive Device for Ambulation: Roller Walker  Number of Stairs: 12  Assistive Device for Stairs: Right Hand Rail (with QC)  Stair Assistance: Supervision  Ramp: Supervision  Assistive Device for Ramp: Roller Walker  Discharge Recommendations: Home with:  DC Home with::  (per neuro recommendation)    Pt 64 yr old female visiting friends from [place] for the holidays (here since 12/4/24) came to network on 12/30/24 with progressive and worsening chronic LE weakness. Testing revealed +1.4cm thoracic meningioma with cord compression, pt transferred to [facility] for neurosurgery f/u. Ultimately pt underwent B T2 - T4 lami due to tumor by Dr Coulter and Dr Shah on 1/7/25. Pt with other PMH: multiple spinal surgeries, spinal stenosis with ridiculopathy, obesity, and lymphedema. At baseline pt lives alone was fully I without AD, , on disability. Her home in [place] is  with curb out front, 1/2 bath first, full bath and bedroom 2nd floor. Stay with friend (Trang) locally has 1+5 DELLA R HR and stair glide down to finished basement where pt has been staying. Pt has borrowed QC (which sounds like its missing rubber caps), and  recently started using Trang's husbands RW due to progressive weakness.  Pt emotional with change in status over past few days, feeling she was doing better a few days ago but was not ready to get on a plane to go back to [place]. Pt c/o  and presents with L>R LE weakness, some sensation changes however intact to light touch, +L LE ataxia which worsens with inc pain or ftg. Discussed pain mgmt strategies, and attending to when her pain medications are due. Pt to benefit from skilled PT intervention in efforts to improve pain, functional balance and safety, B LE strengthening, improve gait quality and endurance for safe DC. Pt demonstrates good rehab potential pending pain mgmt vs control to reach set mod I goals with LRAD for DC to back to friends home (or sisters in [place]) with probable home services to follow. Will likely benefit from RW.     1/21/25  Pt progressing well towards set mod I goals. Ongoing use of RW and pt verbalized making efforts to reduce reliance on it, has borrowed QC, education on use and sequencing as well as need to slow pace to use QC properly. Pt somewhat fearful and anxious however did well with short distances and progress up to 150ft by end of session. Discussed with pt questionable need for f/u therapy pending neurosurgery recommendation. May still want RW to ensure safety as recovery may flucutate. Overall pt demonstrating near mod I mobiltiy with RW and feels she could manage at home, given pain is more under control.     Anticipation for hopeful DC by end of this week, has virtual neurosurgery appt tomorrow at 1230 for postop f/u, hopeful she can fly home to [place] and recover there.     Occupational Therapy:  Eating: Independent  Grooming:  (HASKINS seated, CS standing)  Bathing:  (shower HASKINS-sup)  Bathing:  (shower HASKINS-sup)  Upper Body Dressing:  (setup)  Lower Body Dressing:  (setup)  Toileting: Supervision  Tub/Shower Transfer: Supervision  Toilet Transfer: Supervision  Cognition: Within Defined  Limits  Discharge Recommendations:  (Dc to [place] vs friends home pending neuro appt on 1/22/25)       1/21/25  ADL: setup UB/LB bathing/dressing  TRANSFER:SUP with RW  D/C PLAN: Dc anticipated in next few days. At this time Dc location between home in [place] and friends home. DC depends on neuro sx appt on Wed 1/22/25 and if pt able to fly or not.     DME: RW, shower chair  Family training: not neccessary    Pt barriers include activity tolerance, cervical pain, dec fx reach, general weakness impacting participation in ADL/IADL's.  In order to address fx deficits, skilled OT services have worked on self-care, therapeutic exercise, therapeutic activity, modalities PRN in order to inc fx performance and independence.     Plan for next week: Achieve Feliciano level goals and set DC date.    Therapist has discussed POC with pt and areas of focus with pt verbalizing understanding.     Barriers Intervention   Activity tolerance Endurance training   Cervical pain Educ, repositioning   Fx reach Task modification           Speech Therapy:           No notes on file    Nursing Notes:  Appetite: Fair  Diet Type: Regular/House                           Type of Wound (LDA):  (Surgical incisions)                                         Pain Location/Orientation: Orientation: Upper, Location: Back  Pain Score: 8                       Hospital Pain Intervention(s): Medication (See MAR), Cold applied, Rest          Hayley Rios is a 64 y.o. female with a PMH of iron deficiency anemia, restless leg syndrome, lymphedema, ventral hernia s/p repair, prior back surgeries, and prediabetes who originally presented to [facility] on 12/30/2024 after developing L upper leg pain and spasms that had increasingly worsened over the past week.  MRI thoracic spine showed similar 1.4cm intradural extramedullary probably meningioma in the L posterolateral thoracic spine region at approximately the T3 level with associated marked spinal cord compression  with severe canal stenosis.  Patient was transferred to [facility] for Neurosurgery evaluation.  Neurosurgery recommended obtaining further imaging prior to deciding on surgical intervention.  MRI C-spine on 1/6 re-demonstrated meningioma in the posterior L aspect of the canal at T3, compared with 2022 - mass is mildly increased in size and marked cord compression also mildly increased.  Patient was taken to the OR on 1/7/2025 for bilateral T2-T4 laminectomy with resection of tumor performed by Dr. Coulter.  Post-operatively, patient completed a steroid taper and developed increased reflux symptoms requiring an increase in her home PPI.  BRAYAN drain was removed on 1/10 without any issues.  Neurosurgery recommending to hold anticoagulants and antiplatelets for 2 weeks and will follow-up with patient at 2 weeks and 6 weeks post-operatively.  Patient was evaluated by PT/OT and recommended for acute inpatient rehabilitation.      Admitted to [facility] on 1/15/2025    Pain is managed with Tylenol  975 mg TID, Robaxin 1,000 mg every 8 hours prn, Dilaudid 2 mg for breakthrough pain or 4 mg every 4 hours PRN, baclofen 5 mg TID. Heartburn/GERD is managed with Omeprazole Sprinkle BID, Maalox and Tums as needed. DVT prophylaxis is Lovenox SQ every 24 hours. Restless leg syndrome managed with Mirapex daily at bedtime. Vitamin D deficiency managed with Vitamin D3 daily. Lymphedema managed with Lasix 20 mg Daily and elevate legs when at rest, and ace wrap BLLE. Vitamin B12 deficiency managed with Vtiamin B-12 tablets daily,       This week we will continue to monitor vital signs and lab values. We will manage pain with the above orders so that the patient can participate in her therapy sessions to her optimal abilities. We will teach the patient how to conserve energy so that she may perform ADL's independently as much as she can. We will educate the patient on the importance of repositioning and off-loading  pressure to help lower the risk of skin breakdown. We will prevent falls by keeping personal items and call bell within reach, we will also initiate and maintain hourly rounding.      Case Management:     Discharge Planning  Living Arrangements: Lives Alone, Other (Comment) (in [place])  Support Systems: Self, Friend, Family members  Assistance Needed: was indp. prior to admission  Type of Current Residence: Other (Comment) (2 story home in [place])  Current Home Care Services: No  Patient admitted to [facility] on 1/15/25 after inpatient stay for T2-T4 laminectomy for tumor. Patient lives in [place], was visiting here in [place]. Independent PTA, lost  to follow up for thoracic meningioma with spinal cord displacement and compression, Patient will be staying here at [facility] home for follow up. Patient has virtual appt on 1/22 at 12:30.Insurance update sent on 1/20 .    Is the patient actively participating in therapies? yes  List any modifications to the treatment plan: na    Barriers Interventions   Pain, constipation, spinal cord myelopathy Medication management, ice as needed, medical management and oversight, repositioning    Cervical incision site Local care   Decreased endurance, activity tolerance Therapeutic exercise, therapeutic activity   UE strength Therapeutic exercise, therapeutic activity   LE ataxia ADL, transfer, gait training, Therapeutic exercise, therapeutic activity   Stairs  Stair training, DME training      Is the patient making expected progress toward goals? yes  List any update or changes to goals: na    Medical Goals: Patient will be able to manage medical conditions and comorbid conditions with medications and follow up upon completion of rehab program    Weekly Team Goals:   Rehab Team Goals  ADL Team Goal: Patient will be independent with ADLs with least restrictive device upon completion of rehab program  Bowel/Bladder Team Goal: Patient will return to premorbid level for bladder/bowel management  upon completion of rehab program  Transfer Team Goal: Patient will be independent with transfers with least restrictive device upon completion of rehab program  Locomotion Team Goal: Patient will be independent with locomotion with least restrictive device upon completion of rehab program  Cognitive Team Goal: Patient will be independent for basic and complex tasks upon completion of rehab program    Mod I self care  Mod I bed mobility, transfers, and mobility    Health and wellness: patient prefers to be able to walk without walker    Discussion: Plan for return home with friend with follow-up appointment with neuro/recommendations for outpatient PT. DC with HEP.  DME: RW, info for shower chair    Anticipated Discharge Date:  Jan 24, 2025  [facility] Team Members Present:  The following team members are supervising care for this patient and were present during this Weekly Team Conference.    Physician: Dr. Marvin MD  : Janell Valencia  Registered Nurse: Ginger Rice, RN, BSN, CRRN  Physical Therapist: Anny Alfonso, PT  Occupational Therapist: Sara Wallace, MS, OTR/L

## 2025-01-22 NOTE — PROGRESS NOTES
Progress Note - PMR   Name: Hayley Rios 64 y.o. female I MRN: [id number]  Unit/Bed#: -01 I Date of Admission: 1/15/2025   Date of Service: 1/22/2025 I Hospital Day: 7     Assessment & Plan  Compression of thoracic spinal cord with myelopathy (HCC)  Hx known T3 spinal meningioma presented to [facility] with left lower extremity weakness and spasms  MRI T spine: Suboptimal examination due to mild, moderate, and severe motion artifact - worse on postcontrast sequences. Similar 1.4 cm intradural extramedullary probable meningioma in left posterolateral thoracic spine region at approximately T3 level with associated marked spinal cord compression with severe canal stenosis given motion degraded exam. No cord edema given motion degradation.   MRI L spine: Suboptimal examination due to moderate-to-severe motion degraded exam of lumbar spine and 3 plane localizer images, sagittal T1 post contrast and axial T1 postcontrast sequences. No abnormal enhancement in lumbar spine given limitations of motion degradation.   S/p T2-4 laminectomy for resection of thoracic tumor by Dr. Coulter 1/7, pathology consistent with meningioma, cleared to resume DVT ppx  Keep incision clean and dry and wipe daily w/ MARY wipe, can be left open to air  Completed dexamethasone taper 1/14, 2 week fu ~1/24  6 week surgeon f/u ~ 2/21  C/w PT/OT  Appointment with ISELA today (1/22) at 12:30    Ambulatory dysfunction  Patient was evaluated by the rehabilitation team MD and an appropriate candidate for acute inpatient rehabilitation program at this time.  The patient will tolerate 3 hours/day 5 to 7 days/week of intensive physical, occupational and speech therapy in order to obtain goals for community discharge  Due to the patient's functional Compared to their baseline level of function in addition to their ongoing medical needs, the patient would benefit from daily supervision from a rehabilitation physician as well as rehabilitation  nursing to implement and adjust the medical as well as functional plan of care in order to meet the patient's goals.   Acute pain  Cw tylenol 975 Q8h, cymbalta 60 mg daily, robaxin 1g Q8h  PRN: dilaudid 2-4 mg Q4h, tordol 10 mg Q6h, Valium 2mg Q6h  Nursing writing down next PRN dose in pt room  Titrate opioids as able  Ice/heat PRN, lidocaine patch bilateral shoulders either side of incision  1/20 Added baclofen 5 mg and ibuprofen 800, d/c tordol 5/5 days  Depression, recurrent (HCC)  Will monitor mood/behavior and consult neuropsych if needed  Restless leg syndrome  Cw pramipexole, b12 inj  Prediabetes  A1c 5.8%  POCT glucose 100-130s  POCT glucose and accuchecks defer to IM  Vitamin D deficiency  Cw vitamin D supplementation  Gastroesophageal reflux disease  CW protonix  PRN Tums, maalox  Protonix changed to omeprazole  MARV (iron deficiency anemia)  S/p iV venofer X3  Hgb 1/12 11.7----> 11.2 1/16 ----> 11.0 1/20  Monitor CBC, transfuse for hemoglobin <7  Ventral hernia without obstruction or gangrene  Hx 6 hernia surgeries, most recent 10/24/24 w/ hx bowel obstruction  Monitor BM, abominal pain, cw bowel meds  Lymphedema  Cw home lasix 20 mg daily  Vitamin B12 deficiency  346 1/1  Cw Cyancobalmin PO  F/u outpt providers  Constipation  Small BM 1/15, prior before admission 2 weeks ago  CW bowel reg   Continues to pass gas  1/20 Colace d/c after reports of diarrhea   Obesity  Encourage lifestyle changes    History of Present Illness   Hayley Rios is a 64 y.o. female w/ PMHx of restless leg syndrome, MARV, GERD w/ barrets esophagous, abdominal wall hernia repair, lymphedema, multiple spinal surgeries, who initially presented to [facility] on 12/30/2024 with increased ambulatory dysfunction and weakness. Patient was visiting friends in the area over the holidays when she noticed increased LE weakness. Patient w/ known T3 spinal meningioma who was lost to follow up. She was  initially admitted to [facility] 12/30-12/31 w/ worsening LLE weakness and spasm. She underwent multiple imaging studies at that shakeel which re-demonstrated known T3 meningioma w/ marked spinal cord compression and severe canal stenosis. She was transferred to [facility] on 12/31 for further assessment by NSGY. CT spine and MRI spine w/ increased size and marked cord compression w/ focal cord edema at the lvl of compression. She underwent T2-4 laminectomy on 1/7/2025 w/ NSGY Dr. Coulter. MEP and SSEPs remained stable without any changes. Her BRAYAN drain was removed 1/10. Her decadron was wean was completed 1/14/2025. She had significant pain post op and is now on oral analgesics. . Hayley Rios was admitted to the [facility] on 01/15/25     Chief Complaint: f/u ambulatory dysfunction    Interval: Patient seen and examined in bed. No events overnight.  Reports overall feeling well with 3/6 pain at incision site. Last BM 1/22. Sleeping well at night.     Review of Systems   Constitutional:  Negative for chills and fever.   Respiratory:  Negative for shortness of breath.    Cardiovascular:  Negative for chest pain, palpitations and leg swelling.   Gastrointestinal:  Positive for abdominal distention. Negative for abdominal pain, constipation, diarrhea, nausea and vomiting.   Musculoskeletal:  Positive for neck pain.        Pain at incision site 3/10       Objective   Functional Update:  Physical Therapy Occupational Therapy Speech Therapy   Weight Bearing Status: Full Weight Bearing  Transfers: Supervision, Independent  Bed Mobility: Independent  Amulation Distance (ft): 500 feet  Ambulation: Supervision  Assistive Device for Ambulation: Roller Walker  Number of Stairs: 12  Assistive Device for Stairs: Right Hand Rail (with QC)  Stair Assistance: Supervision  Ramp: Supervision  Assistive Device for Ramp: Roller Walker  Discharge Recommendations: Home with:  DC Home with::  (per neuro recommendation)   Eating:  Independent  Grooming:  (HASKINS seated, CS standing)  Bathing:  (shower HASKINS-sup)  Bathing:  (shower HASKINS-sup)  Upper Body Dressing:  (setup)  Lower Body Dressing:  (setup)  Toileting: Supervision  Tub/Shower Transfer: Supervision  Toilet Transfer: Supervision  Cognition: Within Defined Limits                   Temp:  [97.5 °F (36.4 °C)-99.2 °F (37.3 °C)] 97.5 °F (36.4 °C)  HR:  [77-89] 89  Resp:  [18] 18  BP: (116-155)/(60-74) 155/74  SpO2:  [93 %-98 %] 95 %    Physical Exam  Constitutional:       General: She is not in acute distress.  HENT:      Head: Normocephalic and atraumatic.      Mouth/Throat:      Mouth: Mucous membranes are moist.      Pharynx: Oropharynx is clear.   Cardiovascular:      Rate and Rhythm: Normal rate and regular rhythm.   Pulmonary:      Effort: Pulmonary effort is normal.      Breath sounds: Normal breath sounds.   Abdominal:      General: Bowel sounds are normal. There is distension.   Musculoskeletal:         General: Normal range of motion.      Right lower leg: Edema present.      Left lower leg: Edema present.   Skin:     General: Skin is warm and dry.      Comments: Surgical incision looks pink, no discharge   Neurological:      Mental Status: She is alert. Mental status is at baseline.   Psychiatric:         Mood and Affect: Mood normal.         Behavior: Behavior normal.           Scheduled Meds:  Current Facility-Administered Medications   Medication Dose Route Frequency Provider Last Rate    acetaminophen  975 mg Oral Q8H [facility] Loni Wong MD      aluminum-magnesium hydroxide-simethicone  30 mL Oral Q4H PRN Samantha Allen PA-C      baclofen  5 mg Oral TID Loni Wong MD      bisacodyl  10 mg Rectal Daily PRN Samantha Allen PA-C      calcium carbonate  1,000 mg Oral TID PRN Samantha Allen PA-C      cholecalciferol  2,000 Units Oral Daily ROGE Yan      cyanocobalamin  1,000 mcg Oral Daily ROGE Yan      diphenhydrAMINE  25 mg Oral  Q6H PRN Samantha Allen PA-C      DULoxetine  60 mg Oral Daily Samantha Allen PA-C      enoxaparin  40 mg Subcutaneous Q24H [facility] Loni Wong MD      hydrocortisone  1 Application Topical BID PRN Loni Wong MD      hydrocortisone   Topical BID Pk Padron, CRNP      HYDROmorphone  2 mg Oral Q4H PRN Loni Wong, MD      HYDROmorphone  4 mg Oral Q4H PRN Samantha Allen PA-C      ibuprofen  800 mg Oral Q8H PRN Loni Wong, MD      magnesium Oxide  400 mg Oral Daily Pk Padron, CRNP      melatonin  3 mg Oral HS Samantha Allen PA-C      methocarbamol  1,000 mg Oral Q8H PRN Loni Wong, MD      omeprazole  40 mg Oral BID AC Pk Padron, ROGE      ondansetron  4 mg Oral Q6H PRN Samantha Allen PA-C      polyethylene glycol  17 g Oral Daily Samantha Allen PA-C      potassium chloride  40 mEq Oral Daily With Breakfast Samantha Allen PA-C      pramipexole  0.5 mg Oral HS Samantha Allen PA-C      senna  1 tablet Oral BID Samantha Allen PA-C           Lab Results: I have reviewed the following results:  Results from last 7 days   Lab Units 01/20/25  0526 01/16/25  0537   HEMOGLOBIN g/dL 11.0* 11.2*   HEMATOCRIT % 37.1 36.5   WBC Thousand/uL 7.35 8.94   PLATELETS Thousands/uL 379 385     Results from last 7 days   Lab Units 01/20/25  0526 01/16/25  0537   BUN mg/dL 12 18   SODIUM mmol/L 141 137   POTASSIUM mmol/L 3.9 4.2   CHLORIDE mmol/L 103 99   CREATININE mg/dL 0.64 0.64   AST U/L  --  13   ALT U/L  --  19              Samantha Allen PA-C  Physical Medicine and Rehabilitation   01/22/25

## 2025-01-22 NOTE — ASSESSMENT & PLAN NOTE
Hx known T3 spinal meningioma presented to [facility] with left lower extremity weakness and spasms  MRI T spine: Suboptimal examination due to mild, moderate, and severe motion artifact - worse on postcontrast sequences. Similar 1.4 cm intradural extramedullary probable meningioma in left posterolateral thoracic spine region at approximately T3 level with associated marked spinal cord compression with severe canal stenosis given motion degraded exam. No cord edema given motion degradation.   MRI L spine: Suboptimal examination due to moderate-to-severe motion degraded exam of lumbar spine and 3 plane localizer images, sagittal T1 post contrast and axial T1 postcontrast sequences. No abnormal enhancement in lumbar spine given limitations of motion degradation.   S/p T2-4 laminectomy for resection of thoracic tumor by Dr. Coulter 1/7, pathology consistent with meningioma, cleared to resume DVT ppx  Keep incision clean and dry and wipe daily w/ MARY wipe, can be left open to air  Completed dexamethasone taper 1/14, 2 week fu ~1/24  6 week surgeon f/u ~ 2/21  C/w PT/OT  Appointment with NSSTEPHANIE today (1/22) at 12:30

## 2025-01-22 NOTE — PROGRESS NOTES
"   01/22/25 7275   Pain Assessment   Pain Assessment Tool 0-10   Pain Score 6   Pain Location/Orientation Orientation: Upper;Location: Back   Restrictions/Precautions   Precautions Bed/chair alarms;Fall Risk   Sit to Lying   Type of Assistance Needed Independent   Sit to Lying CARE Score 6   Lying to Sitting on Side of Bed   Type of Assistance Needed Independent   Lying to Sitting on Side of Bed CARE Score 6   Sit to Stand   Type of Assistance Needed Independent   Sit to Stand CARE Score 6   Bed-Chair Transfer   Type of Assistance Needed Independent;Adaptive equipment   Comment RW   Chair/Bed-to-Chair Transfer CARE Score 6   Car Transfer   Type of Assistance Needed Set-up / clean-up   Car Transfer CARE Score 5   Walk 10 Feet   Type of Assistance Needed Independent;Adaptive equipment   Comment RW   Walk 10 Feet CARE Score 6   Walk 50 Feet with Two Turns   Type of Assistance Needed Adaptive equipment;Independent   Comment RW   Walk 50 Feet with Two Turns CARE Score 6   Walk 150 Feet   Type of Assistance Needed Adaptive equipment;Supervision   Comment RW   Walk 150 Feet CARE Score 4   Walking 10 Feet on Uneven Surfaces   Type of Assistance Needed Supervision;Adaptive equipment   Comment indoor ramp with RW   Walking 10 Feet on Uneven Surfaces CARE Score 4   Ambulation   Primary Mode of Locomotion Prior to Admission Walk   Distance Walked (feet) 500 ft  (500 x 3)   Assist Device Roller Walker   Does the patient walk? 2. Yes   Wheelchair mobility   Does the patient use a wheelchair? 0. No   Picking Up Object   Type of Assistance Needed Independent;Adaptive equipment   Comment using Reacher and RW   Picking Up Object CARE Score 6   Therapeutic Interventions   Strengthening Step ups at bottom step of  staiirs leading with each leg 3 x 5 reps   Assessment   Treatment Assessment Pt reports her \"thighs felt tired, sore\" after therapy this morning. Pt agreeabe to using RW for mobility for her balance/gait deficits upon " discharge. DME order placed. Repated walks this session to focus on activity tolerance and LE strengthening.   Plan   Progress Progressing toward goals   PT Therapy Minutes   PT Time In 1445   PT Time Out 1515   PT Total Time (minutes) 30   PT Mode of treatment - Individual (minutes) 30   PT Mode of treatment - Concurrent (minutes) 0   PT Mode of treatment - Group (minutes) 0   PT Mode of treatment - Co-treat (minutes) 0   PT Mode of Treatment - Total time(minutes) 30 minutes   PT Cumulative Minutes 640   Therapy Time missed   Time missed? No

## 2025-01-22 NOTE — PROGRESS NOTES
"   01/22/25 0800   Pain Assessment   Pain Assessment Tool 0-10   Pain Score 5   Pain Location/Orientation Orientation: Upper;Location: Back   Restrictions/Precautions   Precautions Bed/chair alarms;Supervision on toilet/commode;Fall Risk;Pain   Subjective   Subjective \" I have vibration in my abdomen\". Performing the abd bracing makes it go away.   Roll Left and Right   Type of Assistance Needed Independent   Roll Left and Right CARE Score 6   Sit to Lying   Type of Assistance Needed Independent   Sit to Lying CARE Score 6   Lying to Sitting on Side of Bed   Type of Assistance Needed Independent   Lying to Sitting on Side of Bed CARE Score 6   Sit to Stand   Type of Assistance Needed Independent;Adaptive equipment   Comment with RW   Sit to Stand CARE Score 6   Bed-Chair Transfer   Type of Assistance Needed Adaptive equipment   Comment With RW   Chair/Bed-to-Chair Transfer CARE Score 6   Car Transfer   Type of Assistance Needed Set-up / clean-up   Car Transfer CARE Score 5   Walk 10 Feet   Type of Assistance Needed Independent;Adaptive equipment   Comment RW   Walk 10 Feet CARE Score 6   Walk 50 Feet with Two Turns   Type of Assistance Needed Set-up / clean-up;Adaptive equipment   Comment RW   Walk 50 Feet with Two Turns CARE Score 5   Walk 150 Feet   Type of Assistance Needed Supervision;Adaptive equipment   Comment RW   Walk 150 Feet CARE Score 4   Ambulation   Primary Mode of Locomotion Prior to Admission Walk   Distance Walked (feet) 250 ft   Assist Device Roller Walker   Does the patient walk? 2. Yes   Wheelchair mobility   Does the patient use a wheelchair? 0. No   Curb or Single Stair   Style negotiated Curb   Type of Assistance Needed Supervision   Physical Assistance Level No physical assistance   Comment using RW   1 Step (Curb) CARE Score 4   4 Steps   Type of Assistance Needed Supervision;Adaptive equipment   Comment single HR and QC   4 Steps CARE Score 4   12 Steps   Type of Assistance Needed " Supervision;Adaptive equipment   Comment single HR and QC   12 Steps CARE Score 4   Stairs   # of Steps 12   Therapeutic Interventions   Strengthening STS 2 x 5 perfomed with abdominal bracing; Alternating LAQ w/ 3 sec hold while performing Abd Bracing   Balance Walking in therapy gym with NBQC & CS   Other Introduced abd bracing w/ back supported and feet flat on floor in chair, with transfers as well. Educated pt on performing throughout the day in various positions.   Equipment Use   Pembe Panjur lvl 2 x 16 min completing 1.1 miles, B LE only   Assessment   Treatment Assessment Pt participating well with therapy. Worked on abdominal bracing this session. Pt with noted decreased balance and confidence when using QC, will continue with RW for mobility and safety.   Plan   Progress Improving as expected   PT Therapy Minutes   PT Time In 0800   PT Time Out 0900   PT Total Time (minutes) 60   PT Mode of treatment - Individual (minutes) 60   PT Mode of treatment - Concurrent (minutes) 0   PT Mode of treatment - Group (minutes) 0   PT Mode of treatment - Co-treat (minutes) 0   PT Mode of Treatment - Total time(minutes) 60 minutes   PT Cumulative Minutes 610   Therapy Time missed   Time missed? No

## 2025-01-22 NOTE — PROGRESS NOTES
01/22/25 1030   Pain Assessment   Pain Assessment Tool 0-10   Pain Score 6   Pain Location/Orientation Orientation: Upper;Location: Back   Pain Radiating Towards incision area   Pain Onset/Description Onset: Ongoing;Descriptor: Sore;Descriptor: Throbbing   Effect of Pain on Daily Activities repositiong   Patient's Stated Pain Goal No pain   Hospital Pain Intervention(s) Emotional support  (ZAIN Schneider to provide pain meds when due)   Multiple Pain Sites No   Restrictions/Precautions   Precautions Bed/chair alarms;Fall Risk;Supervision on toilet/commode   Weight Bearing Restrictions No   ROM Restrictions No   Eating   Type of Assistance Needed Independent   Comment provided lunch   Eating CARE Score 6   Tub/Shower Transfer   Findings complete dry tub and shower transfer. able to complete Feliciano. handout provided for shower chair   Putting On/Taking Off Footwear   Type of Assistance Needed Independent   Comment sneakers   Putting On/Taking Off Footwear CARE Score 6   Sit to Stand   Type of Assistance Needed Independent;Adaptive equipment   Comment Feliciano with RW   Sit to Stand CARE Score 6   Bed-Chair Transfer   Type of Assistance Needed Independent;Adaptive equipment   Comment Feliciano with RW   Chair/Bed-to-Chair Transfer CARE Score 6   Toileting Hygiene   Type of Assistance Needed Independent   Comment in stance for hygiene/CM   Toileting Hygiene CARE Score 6   Toilet Transfer   Type of Assistance Needed Independent   Comment Feliciano RW to regular toilet   Toilet Transfer CARE Score 6   Meal Prep   Meal Prep Level Walker   Meal Prep Level of Assistance Modified independent   Meal Preparation engaged pt in light meal prep. pt boiled egg and made keurig coffee. pt with G sfaety with stove and able to turn on/off. no LOB observed. pt reporting she will purchase tray indep.   Kitchen Mobility   Kitchen-Mobility Level Walker   Kitchen Activity Retrieve items;Transport items   Kitchen Mobility Comments see above   Light  Housekeeping   Light Housekeeping DC goal pts friend Trang to assist at DC   Exercise Tools   Exercise Tools Yes   Other Exercise Tool 1 Access Code: [email or web address]  URL: [email or web address]  Date: 01/22/2025  Prepared by: Sara Sheth    Exercises  - Seated Single Arm Shoulder External Rotation with Self-Anchored Resistance  - 1 x daily - 7 x weekly - 2 sets - 15 reps  - Seated Elbow Flexion with Self-Anchored Resistance  - 1 x daily - 7 x weekly - 2 sets - 15 reps  - Seated Shoulder Horizontal Abduction with Resistance  - 1 x daily - 7 x weekly - 2 sets - 15 reps  - Seated Shoulder Flexion with Self-Anchored Resistance  - 1 x daily - 7 x weekly - 2 sets - 15 reps  - Seated Shoulder Diagonal Pulls with Resistance  - 1 x daily - 7 x weekly - 2 sets - 15 reps  - Dynamic Hug with Resistance  - 1 x daily - 7 x weekly - 2 sets - 15 reps  - Seated Scapular Retraction  - 1 x daily - 7 x weekly - 2 sets - 15 reps   Cognition   Overall Cognitive Status WFL   Arousal/Participation Alert;Cooperative   Attention Within functional limits   Orientation Level Oriented X4   Memory Within functional limits   Following Commands Follows all commands and directions without difficulty   Activity Tolerance   Activity Tolerance Patient tolerated treatment well   Assessment   Treatment Assessment Engaged pt in 90mins of skilled OT services with focus on toileting, UE TE, meal prep, tub transfer. pt overall fx at Feliciano level with RW. Friend trang to assist with laundry. Complete DC ADl and review Ue HEP next session.   Prognosis Good   Problem List Decreased strength;Decreased range of motion;Decreased endurance;Obesity;Decreased skin integrity;Pain   Barriers to Discharge None   Plan   Treatment/Interventions ADL retraining;Functional transfer training;Therapeutic exercise;Endurance training;Patient/family training;Bed mobility;Compensatory technique education   Progress Improving as expected   Discharge Recommendation   Rehab  Resource Intensity Level, OT   (no further skilled OT services required)   Equipment Recommended Shower/Tub chair with back ($)   Additional Comments  recommend shower chair with back. However pt reporting she would like a teak shower chair instead, handout provided   OT Therapy Minutes   OT Time In 1030   OT Time Out 1200   OT Total Time (minutes) 90   OT Mode of treatment - Individual (minutes) 90   OT Mode of treatment - Concurrent (minutes) 0   OT Mode of treatment - Group (minutes) 0   OT Mode of treatment - Co-treat (minutes) 0   OT Mode of Treatment - Total time(minutes) 90 minutes   OT Cumulative Minutes 630

## 2025-01-22 NOTE — ASSESSMENT & PLAN NOTE
Presented on 12/30 with LLE radiculopathy that had been worsening over the past week.  Hx of known thoracic meningioma and prior back surgeries (scoliosis s/p Guerra nydia).  MRI thoracic spine showed similar 1.4cm intradural extramedullary probable meningioma in the L posterolateral thoracic spine region at approximately T3 level with associated marked spinal cord compression with severe canal stenosis.  MRI C-spine showed re-demonstration of meningioma in the posterior L aspect of the canal at T3, compared with 2022 - mass is mildly increased in size and marked cord compression also mildly increase.    OR on 1/7 for bilateral T2-T4 laminectomy with resection of tumor performed by Dr. Coulter.  Pathology consistent with meningioma.    Hold AC/AP for at least 2 weeks post-op.  Neurovascular checks Q shift.  Ensure adequate pain control.  Monitor incision for s/s of infection.    Follow-up with Neurovascular in 2 weeks (1/21) and in 6 weeks.  Virtual appt scheduled for today.    Primary team following.  PT/OT.

## 2025-01-22 NOTE — PROGRESS NOTES
Progress Note - Internal Medicine   Name: Hayley Rios 64 y.o. female I MRN: [id number]  Unit/Bed#: -01 I Date of Admission: 1/15/2025   Date of Service: 1/22/2025 I Hospital Day: 7    Assessment & Plan  Compression of thoracic spinal cord with myelopathy (HCC)  Presented on 12/30 with LLE radiculopathy that had been worsening over the past week.  Hx of known thoracic meningioma and prior back surgeries (scoliosis s/p Guerra nydia).  MRI thoracic spine showed similar 1.4cm intradural extramedullary probable meningioma in the L posterolateral thoracic spine region at approximately T3 level with associated marked spinal cord compression with severe canal stenosis.  MRI C-spine showed re-demonstration of meningioma in the posterior L aspect of the canal at T3, compared with 2022 - mass is mildly increased in size and marked cord compression also mildly increase.    OR on 1/7 for bilateral T2-T4 laminectomy with resection of tumor performed by Dr. Coulter.  Pathology consistent with meningioma.    Hold AC/AP for at least 2 weeks post-op.  Neurovascular checks Q shift.  Ensure adequate pain control.  Monitor incision for s/s of infection.    Follow-up with Neurovascular in 2 weeks (1/21) and in 6 weeks.  Virtual appt scheduled for today.    Primary team following.  PT/OT.  Depression, recurrent (HCC)  Continue home Cymbalta 60mg daily.  Had been on trazodone 50mg at HS at home but would like to continue without at this time.  Supportive counseling as needed.  Restless leg syndrome  Continue home Mirapex 0.5mg at HS.  Had been on Mirapex 0.25mg TID in addition at home but pt would like to continue without at this time.  Prediabetes  Last A1c 5.8 on 1/1/2025.  Encourage lifestyle modifications.  Monitor BG with routine labs.  Vitamin D deficiency  Continue home vitamin D 2000u daily.  Gastroesophageal reflux disease  Continue home omeprazole 40mg BID.  MARV (iron deficiency anemia)  Hgb currently stable at  11.0  Received IV venofer x3 doses in acute setting.  Iron panel on 2024: Iron Sat 10%, TIBC 396, Iron 40, and Ferritin 9.  Consider starting PO supplementation once constipation resolves.  Ventral hernia without obstruction or gangrene  Recently underwent ventral hernia repair with complications and required revision with panniculectomy on 10/24/24.  Follow-up with General Surgery as needed.  Lymphedema  Continue to elevate legs when able.  Apply ACE wraps as tolerated.  Takes Lasix 20mg daily at home.  Discontinued Lasix on  due to hypotension.  May benefit from lymphedema specialist on discharge.  Vitamin B12 deficiency  Vitamin B12 level 346 on 25.  Received cyanocobalamin injections in acute setting.  Started on PO cyanocobalamin daily.    VTE Pharmacologic Prophylaxis:   Pharmacologic: Enoxaparin (Lovenox)  Mechanical VTE Prophylaxis in Place: Yes - sequential compression devices.    Current Length of Stay: 7 day(s)    Current Patient Status: Inpatient Rehab     Discharge Plan: As per primary team.    Code Status: Level 1 - Full Code    Subjective:   Pt examined while pt sitting in chair in ADL kitchen.  States that she is about to work on tub transfers with OT.  Feels that her pain is slightly worse today.  Posterior neck pain currently 5/10, aching/throbbing, improves with rest, pain medication, and ice.  Plans to use ice after her therapy session.  Had 2 BMs this morning (formed) and feels that her abdominal bloating is improving.  Plans for Neurosurgery appt later today.  Currently has no other concerns or complaints at this time.    Objective:     Vitals:   Temp (24hrs), Av.1 °F (36.7 °C), Min:97.5 °F (36.4 °C), Max:99.2 °F (37.3 °C)    Temp:  [97.5 °F (36.4 °C)-99.2 °F (37.3 °C)] 97.5 °F (36.4 °C)  HR:  [77-89] 89  Resp:  [18] 18  BP: (116-155)/(60-74) 155/74  SpO2:  [93 %-98 %] 95 %  Body mass index is 36.39 kg/m².     Review of Systems   Constitutional:  Negative for appetite change,  chills, fatigue and fever.   HENT:  Negative for trouble swallowing.    Eyes:  Negative for visual disturbance.   Respiratory:  Negative for cough, shortness of breath, wheezing and stridor.    Cardiovascular:  Negative for chest pain, palpitations and leg swelling.   Gastrointestinal:  Negative for abdominal distention, abdominal pain, constipation, diarrhea, nausea and vomiting.        LBM 1/22   Genitourinary:  Negative for difficulty urinating.   Musculoskeletal:  Positive for neck pain (poster neck/upper back pain, 5/10, aching/throbbing, improves with rest, pain medication, and ice). Negative for arthralgias, back pain and gait problem.   Neurological:  Negative for dizziness, weakness, light-headedness, numbness and headaches.   Psychiatric/Behavioral:  Negative for dysphoric mood and sleep disturbance. The patient is not nervous/anxious.    All other systems reviewed and are negative.       Input and Output Summary (last 24 hours):       Intake/Output Summary (Last 24 hours) at 1/22/2025 0931  Last data filed at 1/22/2025 0801  Gross per 24 hour   Intake 900 ml   Output --   Net 900 ml       Physical Exam:     Physical Exam  Vitals and nursing note reviewed.   Constitutional:       General: She is not in acute distress.     Appearance: Normal appearance. She is obese. She is not ill-appearing.   HENT:      Head: Normocephalic and atraumatic.   Cardiovascular:      Rate and Rhythm: Normal rate and regular rhythm.      Pulses: Normal pulses.      Heart sounds: Murmur heard.      Systolic murmur is present with a grade of 1/6.      No friction rub.   Pulmonary:      Effort: Pulmonary effort is normal. No respiratory distress.      Breath sounds: Normal breath sounds. No wheezing or rhonchi.   Abdominal:      General: Abdomen is flat. Bowel sounds are normal. There is no distension.      Palpations: Abdomen is soft. There is no mass.      Tenderness: There is no abdominal tenderness. There is no guarding or  rebound.      Hernia: No hernia is present.   Musculoskeletal:      Cervical back: Normal range of motion and neck supple. No tenderness.      Right lower leg: Edema (lymphedema) present.      Left lower leg: Edema (lymphedema) present.   Skin:     General: Skin is warm and dry.   Neurological:      Mental Status: She is alert and oriented to person, place, and time.   Psychiatric:         Mood and Affect: Mood normal.         Behavior: Behavior normal.         Additional Data:     Labs:    Results from last 7 days   Lab Units 01/20/25  0526   WBC Thousand/uL 7.35   HEMOGLOBIN g/dL 11.0*   HEMATOCRIT % 37.1   PLATELETS Thousands/uL 379   SEGS PCT % 65   LYMPHO PCT % 23   MONO PCT % 5   EOS PCT % 4     Results from last 7 days   Lab Units 01/20/25  0526 01/16/25  0537   SODIUM mmol/L 141 137   POTASSIUM mmol/L 3.9 4.2   CHLORIDE mmol/L 103 99   CO2 mmol/L 31 29   BUN mg/dL 12 18   CREATININE mg/dL 0.64 0.64   ANION GAP mmol/L 7 9   CALCIUM mg/dL 8.9 9.1   ALBUMIN g/dL  --  3.5   TOTAL BILIRUBIN mg/dL  --  0.29   ALK PHOS U/L  --  77   ALT U/L  --  19   AST U/L  --  13   GLUCOSE RANDOM mg/dL 99 99         Results from last 7 days   Lab Units 01/15/25  1131   POC GLUCOSE mg/dl 106               Labs reviewed    Imaging:    Imaging reviewed    Recent Cultures (last 7 days):           Last 24 Hours Medication List:   Current Facility-Administered Medications   Medication Dose Route Frequency Provider Last Rate    acetaminophen  975 mg Oral Q8H [facility] Loni Wong MD      aluminum-magnesium hydroxide-simethicone  30 mL Oral Q4H PRN Samantha Allen PA-C      baclofen  5 mg Oral TID Loni Wong MD      bisacodyl  10 mg Rectal Daily PRN Samantha Allen PA-C      calcium carbonate  1,000 mg Oral TID PRN Samantha Allen PA-C      cholecalciferol  2,000 Units Oral Daily ROGE Yan      cyanocobalamin  1,000 mcg Oral Daily ROGE Yan      diphenhydrAMINE  25 mg Oral Q6H PRN  Samantha Allen PA-C      DULoxetine  60 mg Oral Daily Samantha Allen PA-C      enoxaparin  40 mg Subcutaneous Q24H [facility] Loni Wong MD      hydrocortisone  1 Application Topical BID PRN Loni Wong MD      hydrocortisone   Topical BID ROGE Yan      HYDROmorphone  2 mg Oral Q4H PRN Loni Wong MD      HYDROmorphone  4 mg Oral Q4H PRN Samantha Allen PA-C      ibuprofen  800 mg Oral Q8H PRN Loni Wong MD      magnesium Oxide  400 mg Oral Daily ROGE Yan      melatonin  3 mg Oral HS Samantha Allen PA-C      methocarbamol  1,000 mg Oral Q8H PRN Loni Wong MD      omeprazole  40 mg Oral BID AC ROGE Yan      ondansetron  4 mg Oral Q6H PRN Samantha Allen PA-C      polyethylene glycol  17 g Oral Daily Samantha Allen PA-C      potassium chloride  40 mEq Oral Daily With Breakfast Samantha Allen PA-C      pramipexole  0.5 mg Oral HS Samantha Allen PA-C      senna  1 tablet Oral BID Samantha Allen PA-C          M*Modal software was used to dictate this note.  It may contain errors with dictating incorrect words or incorrect spelling. Please contact the provider directly with any questions.

## 2025-01-23 ENCOUNTER — TELEPHONE (OUTPATIENT)
Dept: NEUROSURGERY | Facility: CLINIC | Age: 65
End: 2025-01-23

## 2025-01-23 PROCEDURE — 99232 SBSQ HOSP IP/OBS MODERATE 35: CPT | Performed by: INTERNAL MEDICINE

## 2025-01-23 PROCEDURE — 97530 THERAPEUTIC ACTIVITIES: CPT

## 2025-01-23 PROCEDURE — 97535 SELF CARE MNGMENT TRAINING: CPT

## 2025-01-23 PROCEDURE — 97110 THERAPEUTIC EXERCISES: CPT

## 2025-01-23 PROCEDURE — 97116 GAIT TRAINING THERAPY: CPT

## 2025-01-23 RX ORDER — OMEPRAZOLE 40 MG/1
40 CAPSULE, DELAYED RELEASE ORAL
Start: 2025-01-23 | End: 2025-01-24

## 2025-01-23 RX ORDER — HYDROMORPHONE HYDROCHLORIDE 4 MG/1
2-4 TABLET ORAL EVERY 4 HOURS PRN
Start: 2025-01-23 | End: 2025-01-24

## 2025-01-23 RX ORDER — IBUPROFEN 800 MG/1
800 TABLET, FILM COATED ORAL EVERY 8 HOURS PRN
Start: 2025-01-23 | End: 2025-01-24

## 2025-01-23 RX ORDER — METHOCARBAMOL 1000 MG/1
1000 TABLET, FILM COATED ORAL EVERY 8 HOURS PRN
Start: 2025-01-23 | End: 2025-01-24

## 2025-01-23 RX ORDER — BACLOFEN 5 MG/1
5 TABLET ORAL 3 TIMES DAILY
Start: 2025-01-23 | End: 2025-01-24

## 2025-01-23 RX ORDER — DULOXETIN HYDROCHLORIDE 60 MG/1
60 CAPSULE, DELAYED RELEASE ORAL DAILY
Start: 2025-01-23 | End: 2025-01-24

## 2025-01-23 RX ORDER — ACETAMINOPHEN 325 MG/1
975 TABLET ORAL EVERY 8 HOURS SCHEDULED
Start: 2025-01-23 | End: 2025-01-24

## 2025-01-23 RX ADMIN — BACLOFEN 5 MG: 10 TABLET ORAL at 20:09

## 2025-01-23 RX ADMIN — PRAMIPEXOLE DIHYDROCHLORIDE 0.5 MG: 0.25 TABLET ORAL at 21:39

## 2025-01-23 RX ADMIN — HYDROCORTISONE: 25 CREAM TOPICAL at 08:30

## 2025-01-23 RX ADMIN — METHOCARBAMOL TABLETS 1000 MG: 500 TABLET, COATED ORAL at 10:44

## 2025-01-23 RX ADMIN — ACETAMINOPHEN 975 MG: 325 TABLET, FILM COATED ORAL at 05:58

## 2025-01-23 RX ADMIN — MELATONIN TAB 3 MG 3 MG: 3 TAB at 20:09

## 2025-01-23 RX ADMIN — ACETAMINOPHEN 975 MG: 325 TABLET, FILM COATED ORAL at 21:40

## 2025-01-23 RX ADMIN — HYDROCORTISONE: 25 CREAM TOPICAL at 20:08

## 2025-01-23 RX ADMIN — ACETAMINOPHEN 975 MG: 325 TABLET, FILM COATED ORAL at 14:18

## 2025-01-23 RX ADMIN — BACLOFEN 5 MG: 10 TABLET ORAL at 16:35

## 2025-01-23 RX ADMIN — BACLOFEN 5 MG: 10 TABLET ORAL at 08:20

## 2025-01-23 RX ADMIN — CYANOCOBALAMIN TAB 1000 MCG 1000 MCG: 1000 TAB at 08:20

## 2025-01-23 RX ADMIN — Medication 400 MG: at 08:20

## 2025-01-23 RX ADMIN — DULOXETINE HYDROCHLORIDE 60 MG: 60 CAPSULE, DELAYED RELEASE ORAL at 08:20

## 2025-01-23 RX ADMIN — ENOXAPARIN SODIUM 40 MG: 40 INJECTION SUBCUTANEOUS at 08:20

## 2025-01-23 RX ADMIN — POTASSIUM CHLORIDE 40 MEQ: 1500 TABLET, EXTENDED RELEASE ORAL at 08:20

## 2025-01-23 RX ADMIN — OMEPRAZOLE 40 MG: 40 CAPSULE, DELAYED RELEASE ORAL at 16:35

## 2025-01-23 RX ADMIN — Medication 2000 UNITS: at 08:20

## 2025-01-23 RX ADMIN — OMEPRAZOLE 40 MG: 40 CAPSULE, DELAYED RELEASE ORAL at 06:27

## 2025-01-23 NOTE — PLAN OF CARE
Problem: PAIN - ADULT  Goal: Verbalizes/displays adequate comfort level or baseline comfort level  Description: Interventions:  - Encourage patient to monitor pain and request assistance  - Assess pain using appropriate pain scale  - Administer analgesics based on type and severity of pain and evaluate response  - Implement non-pharmacological measures as appropriate and evaluate response  - Consider cultural and social influences on pain and pain management  - Notify physician/advanced practitioner if interventions unsuccessful or patient reports new pain  Outcome: Progressing     Problem: DISCHARGE PLANNING  Goal: Discharge to home or other facility with appropriate resources  Description: INTERVENTIONS:  - Identify barriers to discharge w/patient and caregiver  - Arrange for needed discharge resources and transportation as appropriate  - Identify discharge learning needs (meds, wound care, etc.)  - Arrange for interpretive services to assist at discharge as needed  - Refer to Case Management Department for coordinating discharge planning if the patient needs post-hospital services based on physician/advanced practitioner order or complex needs related to functional status, cognitive ability, or social support system  Outcome: Progressing     Problem: MOBILITY - ADULT  Goal: Maintain or return to baseline ADL function  Description: INTERVENTIONS:  -  Assess patient's ability to carry out ADLs; assess patient's baseline for ADL function and identify physical deficits which impact ability to perform ADLs (bathing, care of mouth/teeth, toileting, grooming, dressing, etc.)  - Assess/evaluate cause of self-care deficits   - Assess range of motion  - Assess patient's mobility; develop plan if impaired  - Assess patient's need for assistive devices and provide as appropriate  - Encourage maximum independence but intervene and supervise when necessary  - Involve family in performance of ADLs  - Assess for home care  needs following discharge   - Consider OT consult to assist with ADL evaluation and planning for discharge  - Provide patient education as appropriate  Outcome: Progressing

## 2025-01-23 NOTE — PROGRESS NOTES
01/23/25 0900   Pain Assessment   Pain Assessment Tool 0-10   Pain Score 3   Pain Location/Orientation Location: Neck   Pain Radiating Towards INCISION   Pain Onset/Description Onset: Ongoing;Descriptor: Discomfort   Patient's Stated Pain Goal No pain   Hospital Pain Intervention(s) Rest;Cold applied   Multiple Pain Sites No   Restrictions/Precautions   Precautions Bed/chair alarms;Fall Risk;Pain   Weight Bearing Restrictions No   ROM Restrictions No   Oral Hygiene   Type of Assistance Needed Independent   Comment in stance at sink   Oral Hygiene CARE Score 6   Shower/Bathe Self   Type of Assistance Needed Independent   Comment completed shower seated. able to wash 10/10 parts. will have shower chair at friend Trang's home   Shower/Bathe Self CARE Score 6   Tub/Shower Transfer   Findings indep shower transfer   Upper Body Dressing   Type of Assistance Needed Independent   Comment OH shirt   Upper Body Dressing CARE Score 6   Lower Body Dressing   Type of Assistance Needed Independent   Comment xleg method undergarment/pants, indep in stance CM   Lower Body Dressing CARE Score 6   Putting On/Taking Off Footwear   Type of Assistance Needed Independent   Comment sneakers   Putting On/Taking Off Footwear CARE Score 6   Lying to Sitting on Side of Bed   Type of Assistance Needed Independent   Lying to Sitting on Side of Bed CARE Score 6   Sit to Stand   Type of Assistance Needed Independent;Adaptive equipment   Comment Feliciano with RW   Sit to Stand CARE Score 6   Bed-Chair Transfer   Type of Assistance Needed Independent;Adaptive equipment   Comment Feliciano with RW   Chair/Bed-to-Chair Transfer CARE Score 6   Exercise Tools   Other Exercise Tool 1 Reviewed UE HEP. Pt with G carryover of exercises. Recommend to pace self and if exercises inc pain to DC. Pt compelted all eercises 2x15 with rest break in between.   Cognition   Overall Cognitive Status WFL   Arousal/Participation Alert;Cooperative   Attention Within functional  limits   Orientation Level Oriented X4   Memory Within functional limits   Following Commands Follows all commands and directions without difficulty   Activity Tolerance   Activity Tolerance Patient tolerated treatment well   Assessment   Treatment Assessment Engaged in 90mins of skilled OT services with focus on DC ADL and UE HEP. pt has met all OT goals and fx at Feliciano level, G carryover of HEP. Pt with anticipated DC 1/24/25, no further OT services warranted.   Prognosis Good   Barriers to Discharge None   Discharge Recommendation   Rehab Resource Intensity Level, OT   (no OT services warranted)   Equipment Recommended Shower/Tub chair with back ($)   OT Therapy Minutes   OT Time In 0900   OT Time Out 1030   OT Total Time (minutes) 90   OT Mode of treatment - Individual (minutes) 90   OT Mode of treatment - Concurrent (minutes) 0   OT Mode of treatment - Group (minutes) 0   OT Mode of treatment - Co-treat (minutes) 0   OT Mode of Treatment - Total time(minutes) 90 minutes   OT Cumulative Minutes 720   Therapy Time missed   Time missed? No

## 2025-01-23 NOTE — PROGRESS NOTES
Progress Note - PMR   Name: Hayley Rios 64 y.o. female I MRN: [id number]  Unit/Bed#: -01 I Date of Admission: 1/15/2025   Date of Service: 1/23/2025 I Hospital Day: 8     Assessment & Plan  Compression of thoracic spinal cord with myelopathy (HCC)  Hx known T3 spinal meningioma presented to [facility] with left lower extremity weakness and spasms  MRI T spine: Suboptimal examination due to mild, moderate, and severe motion artifact - worse on postcontrast sequences. Similar 1.4 cm intradural extramedullary probable meningioma in left posterolateral thoracic spine region at approximately T3 level with associated marked spinal cord compression with severe canal stenosis given motion degraded exam. No cord edema given motion degradation.   MRI L spine: Suboptimal examination due to moderate-to-severe motion degraded exam of lumbar spine and 3 plane localizer images, sagittal T1 post contrast and axial T1 postcontrast sequences. No abnormal enhancement in lumbar spine given limitations of motion degradation.   S/p T2-4 laminectomy for resection of thoracic tumor by Dr. Coulter 1/7, pathology consistent with meningioma, cleared to resume DVT ppx  Keep incision clean and dry and wipe daily w/ MARY wipe, can be left open to air  Completed dexamethasone taper 1/14, 2 week fu ~1/24  6 week surgeon f/u ~ 2/21  C/w PT/OT  Appointment with ISELA today (1/22) at 12:30  Virtual appointment was canceled and rescheduled for in person outpt on 2/3    Ambulatory dysfunction  Patient was evaluated by the rehabilitation team MD and an appropriate candidate for acute inpatient rehabilitation program at this time.  The patient will tolerate 3 hours/day 5 to 7 days/week of intensive physical, occupational and speech therapy in order to obtain goals for community discharge  Due to the patient's functional Compared to their baseline level of function in addition to their ongoing medical needs, the patient would benefit from  daily supervision from a rehabilitation physician as well as rehabilitation nursing to implement and adjust the medical as well as functional plan of care in order to meet the patient's goals.   Acute pain  Cw tylenol 975 Q8h, cymbalta 60 mg daily, robaxin 1g Q8h  PRN: dilaudid 2-4 mg Q4h, tordol 10 mg Q6h, Valium 2mg Q6h  Nursing writing down next PRN dose in pt room  Titrate opioids as able  Ice/heat PRN, lidocaine patch bilateral shoulders either side of incision  1/20 Added baclofen 5 mg and ibuprofen 800, d/c tordol 5/5 days  Depression, recurrent (HCC)  Will monitor mood/behavior and consult neuropsych if needed  Restless leg syndrome  Cw pramipexole, b12 inj  Prediabetes  A1c 5.8%  POCT glucose 100-130s  POCT glucose and accuchecks defer to IM  Vitamin D deficiency  Cw vitamin D supplementation  Gastroesophageal reflux disease  CW protonix  PRN Tums, maalox  Protonix changed to omeprazole  MARV (iron deficiency anemia)  S/p iV venofer X3  Hgb 1/12 11.7----> 11.2 1/16 ----> 11.0 1/20  Monitor CBC, transfuse for hemoglobin <7  Ventral hernia without obstruction or gangrene  Hx 6 hernia surgeries, most recent 10/24/24 w/ hx bowel obstruction  Monitor BM, abominal pain, cw bowel meds  Lymphedema  Cw home lasix 20 mg daily  Vitamin B12 deficiency  346 1/1  Cw Cyancobalmin PO  F/u outpt providers  Constipation  Small BM 1/15, prior before admission 2 weeks ago  CW bowel reg   Continues to pass gas  1/20 Colace d/c after reports of diarrhea   Obesity  Encourage lifestyle changes    History of Present Illness   Hayley Rios is a 64 y.o. female w/ PMHx of restless leg syndrome, MARV, GERD w/ barrets esophagous, abdominal wall hernia repair, lymphedema, multiple spinal surgeries, who initially presented to [facility] on 12/30/2024 with increased ambulatory dysfunction and weakness. Patient was visiting friends in the area over the holidays when she noticed increased LE weakness.  Patient w/ known T3 spinal meningioma who was lost to follow up. She was initially admitted to [facility] 12/30-12/31 w/ worsening LLE weakness and spasm. She underwent multiple imaging studies at that shakeel which re-demonstrated known T3 meningioma w/ marked spinal cord compression and severe canal stenosis. She was transferred to [facility] on 12/31 for further assessment by NSGY. CT spine and MRI spine w/ increased size and marked cord compression w/ focal cord edema at the lvl of compression. She underwent T2-4 laminectomy on 1/7/2025 w/ NSGY Dr. Coulter. MEP and SSEPs remained stable without any changes. Her BRAYAN drain was removed 1/10. Her decadron was wean was completed 1/14/2025. She had significant pain post op and is now on oral analgesics. . Hayley Rios was admitted to the [facility] on 01/15/25     Chief Complaint: f/u ambulatory dysfunction    Interval: Patient seen and examined in bed. No events overnight.  Reports overall feeling well with pain at a 3/10. Last BM 1/23. Sleeping well at night.     Review of Systems   Respiratory:  Negative for shortness of breath.    Cardiovascular:  Negative for chest pain, palpitations and leg swelling.   Gastrointestinal:  Positive for abdominal distention. Negative for abdominal pain, constipation, diarrhea, nausea and vomiting.   Musculoskeletal:  Positive for neck pain.        Pain at incision site 3/10       Objective   Functional Update:  Physical Therapy Occupational Therapy Speech Therapy   Weight Bearing Status: Full Weight Bearing  Transfers: Supervision, Independent  Bed Mobility: Independent  Amulation Distance (ft): 500 feet  Ambulation: Supervision  Assistive Device for Ambulation: Roller Walker  Number of Stairs: 12  Assistive Device for Stairs: Right Hand Rail (with QC)  Stair Assistance: Supervision  Ramp: Supervision  Assistive Device for Ramp: Roller Walker  Discharge Recommendations: Home with:  DC Home with::  (per neuro recommendation)   Eating:  Independent  Grooming:  (HASKINS seated, CS standing)  Bathing:  (shower HASKINS-sup)  Bathing:  (shower HASKINS-sup)  Upper Body Dressing:  (setup)  Lower Body Dressing:  (setup)  Toileting: Supervision  Tub/Shower Transfer: Supervision  Toilet Transfer: Supervision  Cognition: Within Defined Limits                   Temp:  [97.9 °F (36.6 °C)-98.9 °F (37.2 °C)] 97.9 °F (36.6 °C)  HR:  [67-82] 67  Resp:  [18] 18  BP: (113-135)/(58-81) 113/66  SpO2:  [93 %-96 %] 93 %    Physical Exam  Constitutional:       General: She is not in acute distress.  HENT:      Head: Normocephalic and atraumatic.      Mouth/Throat:      Mouth: Mucous membranes are moist.   Cardiovascular:      Rate and Rhythm: Normal rate and regular rhythm.   Pulmonary:      Effort: Pulmonary effort is normal.      Breath sounds: Normal breath sounds.   Abdominal:      General: Bowel sounds are normal. There is distension.   Musculoskeletal:         General: Normal range of motion.      Right lower leg: Edema present.      Left lower leg: Edema present.   Skin:     General: Skin is warm and dry.      Comments: Incision site at neck is well approximated, no drainage, some swelling at top left incision site   Neurological:      Mental Status: She is alert. Mental status is at baseline.   Psychiatric:         Mood and Affect: Mood normal.         Behavior: Behavior normal.         Scheduled Meds:  Current Facility-Administered Medications   Medication Dose Route Frequency Provider Last Rate    acetaminophen  975 mg Oral Q8H [facility] Loni Wong MD      aluminum-magnesium hydroxide-simethicone  30 mL Oral Q4H PRN Samantha Allen PA-C      baclofen  5 mg Oral TID Loni Wong MD      bisacodyl  10 mg Rectal Daily PRN Samantha Allen PA-C      calcium carbonate  1,000 mg Oral TID PRN Samantha Allen PA-C      cholecalciferol  2,000 Units Oral Daily ROGE Yan      cyanocobalamin  1,000 mcg Oral Daily ROGE Yan       diphenhydrAMINE  25 mg Oral Q6H PRN Samantha Allen PA-C      DULoxetine  60 mg Oral Daily Samantha Allen PA-C      enoxaparin  40 mg Subcutaneous Q24H [facility] Loni Wong MD      hydrocortisone  1 Application Topical BID PRN Loni Wong MD      hydrocortisone   Topical BID Pk Padron, ROGE      HYDROmorphone  2 mg Oral Q4H PRN Loni Wong MD      HYDROmorphone  4 mg Oral Q4H PRN Samantha Allen PA-C      ibuprofen  800 mg Oral Q8H PRN Loni Wong MD      magnesium Oxide  400 mg Oral Daily Pk Padron, CRNP      melatonin  3 mg Oral HS Samantha Allen PA-C      methocarbamol  1,000 mg Oral Q8H PRN Loni Wong, MD      omeprazole  40 mg Oral BID AC Pk Padron, CRFRANK      ondansetron  4 mg Oral Q6H PRN Samantha Allen PA-C      polyethylene glycol  17 g Oral Daily Samantha Allen PA-C      potassium chloride  40 mEq Oral Daily With Breakfast Samantha Allen PA-C      pramipexole  0.5 mg Oral HS Samantha Allen PA-C      senna  1 tablet Oral BID Samantha Allen PA-C           Lab Results: I have reviewed the following results:  Results from last 7 days   Lab Units 01/20/25  0526   HEMOGLOBIN g/dL 11.0*   HEMATOCRIT % 37.1   WBC Thousand/uL 7.35   PLATELETS Thousands/uL 379     Results from last 7 days   Lab Units 01/20/25  0526   BUN mg/dL 12   SODIUM mmol/L 141   POTASSIUM mmol/L 3.9   CHLORIDE mmol/L 103   CREATININE mg/dL 0.64              Samantha Allen PA-C  Physical Medicine and Rehabilitation   01/23/25

## 2025-01-23 NOTE — DISCHARGE INSTR - AVS FIRST PAGE
DISCHARGE INSTRUCTIONS: [facility] ACUTE REHABILITATION [place]    Bring these instructions with you to your Outpatient Physician appointments so they can order and follow-up any additional lab work or imaging recommended at time of discharge.    Resume follow-up with all your prior providers that you have established care prior to this hospitalization.  Discuss with primary care physician (PCP) if you have additional questions.     It  is you or your caregivers responsibility to obtain follow-up MEDICATION REFILLS  As indicated through your Primary Care Physician (PCP) and other outpatient specialty provider(s) after discharge.  Please follow-up with your PCP as soon as possible after discharge to set-up follow-up management and when appropriate refills.      You remain a fall and injury risk which could be severe.  - Your risk of fall has decreased however since admission to acute rehab.  Caregiver training has been completed with our staff.  - Appropriate supervision +/- assistance as instructed during your rehab course is recommended to decrease risk of fall and injury.    - Continue skilled therapy as discussed after discharge to further decrease this risk    If you (or your health care proxy) have any questions or concerns regarding your acute rehabilitation stay including issues with medications, rehabilitation, and follow-up plan, please call:            [facility] Acute Rehabilitation Unit at [place] at [phone]    Should you develop fevers, chills, new weakness, changes in sensation, difficulty speaking, facial weakness, confusion, shortness of breath, chest pain, or other concerning symptoms please call 911 and/or obtain transportation to nearest ER immediately.    Should you develop worsening pain, swelling, or drainage notify your surgeon right away or obtain transportation to nearest ER for evaluation.    PHYSICIANS to see:  Please see your doctors listed in the follow up  providers section of your discharge paperwork, and take the discharge paperwork with you to your appointments.    Driving restrictions:    You are recommended against driving until cleared by an outpatient physician.        **As outlined in [place] state law, healthcare personnel are required to report every person over 15 years of age diagnosed as having a condition that could impair their ability to drive, with the exception of medical condition expected to last less than 90 days (most commonly orthopedic fractures and post orthopedic surgery conditions without other co-morbidities).  Your medical condition hopefully will have adequately improved by that time but at this time it is not certain.      **You should NOT operate a motor vehicle while under the influence of an opiate medication, muscle relaxant, or other sedative.  Doing so may lead to an accident resulting in serious injury or death to yourself or others.  You have agreed to avoid driving when under the influence of this medication.        ------------------------------------------------------------------------------------------------------------  ------------------------------------------------------------------------------------------------------------    MEDICATIONS:  Please see a full list of your medications outlined in the After Visit Summary that is attached to these Discharge Instructions.  Please note changes may have been made to your medications please refer to your discharge paperwork for your current medications and take this list with you to all your doctors appointments for your doctors to review.  Please do not resume a home medication unless the medication reconciliation sheet indicates to do so, please do not assume that a medication that you were given a prescription for is the same as a medication you have at home based on both medications having the same name as dosages and frequency may have changed.      Unless specifically  noted in your medication list provided to you in your discharge paper work do not resume prior vitamins, minerals, or supplements you may have been taking prior to your hospitalization unless instructed by an outpatient physician in the future.  Discuss with your primary care at next visit if applicable.      Sedating Medications and Pain Medications with increased risk of complications:  Your goal will be to wean off of opiate medications and then onto acetaminophen only and then off of acetaminophen as well if possible.    There are risks associated with opioid medications, including dependence, addiction and tolerance. The patient understands and agrees to use these medications only as prescribed. Potential side effects of the medications include, but are not limited to, constipation, drowsiness, addiction, impaired judgment and risk of fatal overdose if not taken as prescribed. You should not drive after taking this medication.  The patient was warned against driving while taking sedation medications.  Sharing medications is a felony. At this point in time, the patient is showing no signs of addiction, abuse, diversion or suicidal ideation.    Hydromorphone (opiate) pain medication has been used to help your acute pain.  You tolerated this medication adequately during your recent hospital stay.       You will be given a limited supply of opiate pain medications on discharge; should you require additional refills/medications, your PCP and/or surgeon may prescribe at his/her discretion.   This can be quite helpful particularly for severe acute pain.      Baclofen pain/muscle spasm medication has been used to help your acute pain and spasms.  You tolerated this medication adequately during your recent hospital stay.     - Do not take with alcohol (or marijuana/cannabis) while on this medication as this can cause increased confusion, breathing problems, falls, and severe injury.  - Follow-up with your primary care  physician within 1-2 weeks as well as relevant specialty for additional management of your medical conditions, potential refills, or adjustments of this medication.        You will be given a very limited supply of both opiate pain and muscle relaxant medications both of which can increase your risk of fall and severe and even life-threatening injury.  Taking both medications together further increase your risk of fall and even life-threatening injury as well as respiratory depression (slowing and stopping of breathing).  Physicians at times will carefully use these medications in combination but this must be done with close oversight.  You have been carefully monitored during your rehab course on these medications without signs of increased confusion, respiratory depression, or worsening balance.  Nevertheless, this risk remains.  Should you develop confusion, difficulty staying awake, imbalance or other concerning symptoms do NOT take these medications and notify your physician right away or obtain transportation to nearest emergency room.  You have been discussed risks and benefits of these medications and at this time have agreed to risks associated with these medications.      Unless specifically discussed with physician, please do not combine any muscle relaxants (including but not limited to zanaflex, flexaril, soma, robaxin, etc) pain medications (including but not limited to tramadol, vicodin, norco, percocet, oxycodone, oxycontin, morphine, hydropmorphone, oxymorphone, fentanyl, hydrocodone, etc)  or sedatives/sleep aids/anti-anxiety medications (including but not limited to ambien, trazodone, valium, xanax, klonipin, ativan, lorazepam, restoril, temazepam etc) that you may have been prescribed prior to admission with the pain medication you are being prescribed upon discharge from the rehab unit (unless listed to do so on your medication reconciliation in your discharge paperwork) .     Note:  It is  advisable to limit the use of pain medications (including but not limited to tramadol, vicodin, norco, percocet, oxycodone, oxycontin, morphine, hydropmorphone, oxymorphone, fentanyl, hydrocodone, etc) and limit certain sedatives medications (including but not limited to ambien, trazodone, valium, xanax, klonipin, ativan, lorazepam, restoril, temazepam etc), muscle relaxants (including but not limited to zanaflex, flexaril, soma, robaxin, etc); unless listed to do so on your medication reconciliation in your discharge paperwork, as they may become habit forming and lead to additiction. Some of these medications can be particularly important however to take.  You should not change how you take a prescribed medication unless done so in coordination with a physician.        NSAID Warning:  Limit the use of NSAID (including but not limited to advil, aleve, motrin, naproxen, ibuprofen, mobic, meloxicam, diclofenac etc) medications as NSAID medications may increase your risk of bleeding (which can be life-threatening), stroke, worsening kidney disease, heart attack, developing/worsening GI ulcers, worsening heart failure, worsening liver (cirrhosis) disease, potentially delay bone healing.           [facility] Phone Number:  [phone]

## 2025-01-23 NOTE — PROGRESS NOTES
Progress Note - Internal Medicine   Name: Hayley Rios 64 y.o. 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  Hx of known thoracic meningioma and prior back surgeries (scoliosis s/p Guerra nydia).  MRI thoracic spine showed similar 1.4cm intradural extramedullary probable meningioma in the L posterolateral thoracic spine region at approximately T3 level with associated marked spinal cord compression with severe canal stenosis.  MRI C-spine showed re-demonstration of meningioma in the posterior L aspect of the canal at T3, compared with 2022 - mass is mildly increased in size and marked cord compression also mildly increase.    OR on 1/7 for bilateral T2-T4 laminectomy with resection of tumor performed by Dr. Coulter.  Pathology consistent with meningioma.    Hold AC/AP for at least 2 weeks post-op.  Neurovascular checks Q shift.  Ensure adequate pain control.  Monitor incision for s/s of infection.    Follow-up with Neurosurgery on 2/3 as outpatient.    Primary team following.  PT/OT.  Depression, recurrent (HCC)  Continue home Cymbalta 60mg daily.  Had been on trazodone 50mg at HS at home but would like to continue without at this time.  Supportive counseling as needed.  Restless leg syndrome  Continue home Mirapex 0.5mg at HS.  Had been on Mirapex 0.25mg TID in addition at home but pt would like to continue without at this time.  Prediabetes  Last A1c 5.8 on 1/1/2025.  Encourage lifestyle modifications.  Monitor BG with routine labs.  Vitamin D deficiency  Continue home vitamin D 2000u daily.  Gastroesophageal reflux disease  Continue home omeprazole 40mg BID.  MARV (iron deficiency anemia)  Hgb currently stable at 11.0  Received IV venofer x3 doses in acute  setting.  Iron panel on 2024: Iron Sat 10%, TIBC 396, Iron 40, and Ferritin 9.  Consider starting PO supplementation once constipation resolves.  Ventral hernia without obstruction or gangrene  Recently underwent ventral hernia repair with complications and required revision with panniculectomy on 10/24/24.  Follow-up with General Surgery as needed.  Lymphedema  Continue to elevate legs when able.  Apply ACE wraps as tolerated.  Takes Lasix 20mg daily at home.  Discontinued Lasix on  due to hypotension.  May benefit from lymphedema specialist on discharge.  Vitamin B12 deficiency  Vitamin B12 level 346 on 25.  Received cyanocobalamin injections in acute setting.  Started on PO cyanocobalamin daily.    VTE Pharmacologic Prophylaxis:   Pharmacologic: Enoxaparin (Lovenox)  Mechanical VTE Prophylaxis in Place: Yes - sequential compression devices.    Current Length of Stay: 8 day(s)    Current Patient Status: Inpatient Rehab     Discharge Plan: As per primary team.    Code Status: Level 1 - Full Code    Subjective:   Pt examined while pt lying in bed in pt room.  Complaints of posterior neck pain along with L frontal headache after completing OT session.  Had just taken Robaxin and is planning to take ibuprofen if no improvement.  Feels that overall her pain has been becoming more manageable.  Currently has no other concerns.  Plans for discharge tomorrow.  Reviewed medication changes and that Lasix could be taken as needed for lower extremity edema.  Discussed lymphedema management and recommended further follow-up as outpatient.    Objective:     Vitals:   Temp (24hrs), Av.2 °F (36.8 °C), Min:97.9 °F (36.6 °C), Max:98.9 °F (37.2 °C)    Temp:  [97.9 °F (36.6 °C)-98.9 °F (37.2 °C)] 97.9 °F (36.6 °C)  HR:  [67-82] 67  Resp:  [18] 18  BP: (113-135)/(58-81) 113/66  SpO2:  [93 %-96 %] 93 %  Body mass index is 36.39 kg/m².     Review of Systems   Constitutional:  Negative for appetite change, chills,  fatigue and fever.   HENT:  Negative for trouble swallowing.    Eyes:  Negative for visual disturbance.   Respiratory:  Negative for cough, shortness of breath, wheezing and stridor.    Cardiovascular:  Negative for chest pain, palpitations and leg swelling.   Gastrointestinal:  Negative for abdominal distention, abdominal pain, constipation, diarrhea, nausea and vomiting.        LBM 1/23   Genitourinary:  Negative for difficulty urinating.   Musculoskeletal:  Positive for neck pain (posterior neck pain after OT, just had taken Robaxin, planning on taking ibuprofen if pain does not improve). Negative for arthralgias, back pain and gait problem.   Neurological:  Positive for headaches (frontal L sided headache, started after increasing neck pain after therapy). Negative for dizziness and light-headedness.   Psychiatric/Behavioral:  Negative for dysphoric mood and sleep disturbance. The patient is not nervous/anxious.    All other systems reviewed and are negative.       Input and Output Summary (last 24 hours):       Intake/Output Summary (Last 24 hours) at 1/23/2025 1027  Last data filed at 1/23/2025 0901  Gross per 24 hour   Intake 480 ml   Output --   Net 480 ml       Physical Exam:     Physical Exam  Vitals and nursing note reviewed.   Constitutional:       General: She is not in acute distress.     Appearance: Normal appearance. She is obese. She is not ill-appearing.   HENT:      Head: Normocephalic and atraumatic.   Cardiovascular:      Rate and Rhythm: Normal rate and regular rhythm.      Pulses: Normal pulses.      Heart sounds: Murmur heard.      Systolic murmur is present with a grade of 1/6.      No friction rub.   Pulmonary:      Effort: Pulmonary effort is normal. No respiratory distress.      Breath sounds: Normal breath sounds. No wheezing or rhonchi.   Abdominal:      General: Abdomen is flat. Bowel sounds are normal. There is no distension.      Palpations: Abdomen is soft. There is no mass.       Tenderness: There is no abdominal tenderness. There is no guarding or rebound.      Hernia: No hernia is present.   Musculoskeletal:      Cervical back: Normal range of motion and neck supple. No tenderness.      Right lower leg: Edema (lymphedema) present.      Left lower leg: Edema (lymphedema) present.   Skin:     General: Skin is warm and dry.      Comments: Posterior neck incision, LUCA.  No drainage, erythema, or edema present.   Neurological:      Mental Status: She is alert and oriented to person, place, and time.   Psychiatric:         Mood and Affect: Mood normal.         Behavior: Behavior normal.         Additional Data:     Labs:    Results from last 7 days   Lab Units 01/20/25  0526   WBC Thousand/uL 7.35   HEMOGLOBIN g/dL 11.0*   HEMATOCRIT % 37.1   PLATELETS Thousands/uL 379   SEGS PCT % 65   LYMPHO PCT % 23   MONO PCT % 5   EOS PCT % 4     Results from last 7 days   Lab Units 01/20/25  0526   SODIUM mmol/L 141   POTASSIUM mmol/L 3.9   CHLORIDE mmol/L 103   CO2 mmol/L 31   BUN mg/dL 12   CREATININE mg/dL 0.64   ANION GAP mmol/L 7   CALCIUM mg/dL 8.9   GLUCOSE RANDOM mg/dL 99                       Labs reviewed    Imaging:    Imaging reviewed    Recent Cultures (last 7 days):           Last 24 Hours Medication List:   Current Facility-Administered Medications   Medication Dose Route Frequency Provider Last Rate    acetaminophen  975 mg Oral Q8H [facility] Loni Wong MD      aluminum-magnesium hydroxide-simethicone  30 mL Oral Q4H PRN Samantha Allen PA-C      baclofen  5 mg Oral TID Loni Wong MD      bisacodyl  10 mg Rectal Daily PRN Samantha Allen PA-C      calcium carbonate  1,000 mg Oral TID PRN Samantha Allen PA-C      cholecalciferol  2,000 Units Oral Daily ROGE Yan      cyanocobalamin  1,000 mcg Oral Daily ROGE Yan      diphenhydrAMINE  25 mg Oral Q6H PRN Samantha Allen PA-C      DULoxetine  60 mg Oral Daily Samantha Allen PA-C       enoxaparin  40 mg Subcutaneous Q24H [facility] Loni Wong MD      hydrocortisone  1 Application Topical BID PRN Loni Wong MD      hydrocortisone   Topical BID ROGE Yan      HYDROmorphone  2 mg Oral Q4H PRN Loni Wong MD      HYDROmorphone  4 mg Oral Q4H PRN Samantha Allen PA-C      ibuprofen  800 mg Oral Q8H PRN Loni Wong MD      magnesium Oxide  400 mg Oral Daily ROGE Yan      melatonin  3 mg Oral HS Samantha Allen PA-C      methocarbamol  1,000 mg Oral Q8H PRN Loni Wong MD      omeprazole  40 mg Oral BID AC ROGE Yan      ondansetron  4 mg Oral Q6H PRN Samantha Allen PA-C      polyethylene glycol  17 g Oral Daily Samantha Allen PA-C      potassium chloride  40 mEq Oral Daily With Breakfast Samantha Allen PA-C      pramipexole  0.5 mg Oral HS Samantha Allen PA-C      senna  1 tablet Oral BID Samantha Allen PA-C          M*Modal software was used to dictate this note.  It may contain errors with dictating incorrect words or incorrect spelling. Please contact the provider directly with any questions.

## 2025-01-23 NOTE — PROGRESS NOTES
"   01/23/25 1400   Pain Assessment   Pain Assessment Tool 0-10   Pain Score 3   Pain Location/Orientation Orientation: Upper;Location: Back   Pain Onset/Description Onset: Ongoing;Frequency: Intermittent   Restrictions/Precautions   Precautions Bed/chair alarms;Fall Risk;Pain   Cognition   Overall Cognitive Status WFL   Arousal/Participation Alert;Cooperative   Attention Within functional limits   Orientation Level Oriented X4   Memory Within functional limits   Following Commands Follows all commands and directions without difficulty   Subjective   Subjective \"the bracing feels good, I need to do more of that\"   Roll Left and Right   Type of Assistance Needed Independent   Roll Left and Right CARE Score 6   Sit to Lying   Type of Assistance Needed Independent   Sit to Lying CARE Score 6   Lying to Sitting on Side of Bed   Type of Assistance Needed Independent   Lying to Sitting on Side of Bed CARE Score 6   Sit to Stand   Type of Assistance Needed Independent   Sit to Stand CARE Score 6   Bed-Chair Transfer   Type of Assistance Needed Independent   Chair/Bed-to-Chair Transfer CARE Score 6   Car Transfer   Type of Assistance Needed Independent   Physical Assistance Level No physical assistance   Car Transfer CARE Score 6   Walk 10 Feet   Type of Assistance Needed Independent   Physical Assistance Level No physical assistance   Walk 10 Feet CARE Score 6   Walk 50 Feet with Two Turns   Type of Assistance Needed Independent   Physical Assistance Level No physical assistance   Walk 50 Feet with Two Turns CARE Score 6   Walk 150 Feet   Type of Assistance Needed Independent   Physical Assistance Level No physical assistance   Walk 150 Feet CARE Score 6   Walking 10 Feet on Uneven Surfaces   Type of Assistance Needed Independent   Physical Assistance Level No physical assistance   Comment with RW on indoor ramp   Walking 10 Feet on Uneven Surfaces CARE Score 6   Ambulation   Primary Mode of Locomotion Prior to Admission " Walk   Distance Walked (feet) 200 ft  (2x150)   Assist Device Roller Walker;Quad Cane   Findings mod I with RW, S with NBQC x150ft   Does the patient walk? 2. Yes   Wheelchair mobility   Does the patient use a wheelchair? 0. No   Curb or Single Stair   Style negotiated Curb   Type of Assistance Needed Supervision   1 Step (Curb) CARE Score 4   4 Steps   Type of Assistance Needed Independent   Physical Assistance Level No physical assistance   Comment reciprocally wiht B HR   4 Steps CARE Score 6   12 Steps   Type of Assistance Needed Supervision   Physical Assistance Level No physical assistance   Comment L HR and QC for FF in hallway   12 Steps CARE Score 4   Picking Up Object   Type of Assistance Needed Independent;Adaptive equipment   Physical Assistance Level No physical assistance   Comment with RW and reacher   Picking Up Object CARE Score 6   Toilet Transfer   Type of Assistance Needed Independent   Toilet Transfer CARE Score 6   Therapeutic Interventions   Strengthening Access Code: [email or web address]  URL: [email or web address]  Date: 01/23/2025  Prepared by: Anny Alfonso    Exercises  - Supine Posterior Pelvic Tilt  - 1-3 x daily - 5-7 x weekly - 3 sets - 10 reps  - Standing Pelvic Tilts with March At Wall With Pelvic Floor Contraction  - 1-2 x daily - 5-7 x weekly - 2-3 sets - 10 reps  - Standing Hip Abduction with Counter Support  - 1-3 x daily - 5-7 x weekly - 2-3 sets - 10 reps  - Standing Knee Flexion  - 1 x daily - 5-7 x weekly - 2-3 sets - 10 reps  - Side Stepping with Resistance at Thighs  - 1-2 x daily - 5-7 x weekly - 2-3 sets - 10 reps   Modalities cold pack thoracic spine, in recliner at end of session.   Equipment Use   NuStep L2 x20min B LE only.   Assessment   Treatment Assessment Pt seen for 90 min skilled PTintervention, denies concern for DC home. Prolonged time spent reviewing HEP and benefits of slowed progression, not to overdue it, being mindful of posture and abd  control/contraction with all activities to stabilize pelvis. Pt eager to get into pool, advised to look for aquatic therapy in [place] once cleared by surgeon for pool. Overall pt met all set mod I goals with RW, S on Steps, and S with QC for long distances. Plan for return to friends home until neuro surgery f/u 2/3 then to sisters home in [place] for week or so with plans to fly back to [place] from [place] if cleared from surgeon. DC with HEP, to get out pt scripts from surgeon if appropriate. Awaiting JAYSHREE NIETO next day.   Plan   Progress Improving as expected   PT Therapy Minutes   PT Time In 1400   PT Time Out 1530   PT Total Time (minutes) 90   PT Mode of treatment - Individual (minutes) 90   PT Mode of treatment - Concurrent (minutes) 0   PT Mode of treatment - Group (minutes) 0   PT Mode of treatment - Co-treat (minutes) 0   PT Mode of Treatment - Total time(minutes) 90 minutes   PT Cumulative Minutes 730

## 2025-01-23 NOTE — TELEPHONE ENCOUNTER
Patient called the office questioning her activity restrictions as she is going to be discharged tomorrow. Reviewed postop activity restrictions. Patient also questioned when she will be able to fly home to [place]. Advised typically she should be safe to fly home after her 6 week visit however suggested she discuss this with Dr Coulter at her visit on 2/3    She stated an understanding and was appreciative of the call back.

## 2025-01-23 NOTE — ASSESSMENT & PLAN NOTE
Presented on 12/30 with LLE radiculopathy that had been worsening over the past week.  Hx of known thoracic meningioma and prior back surgeries (scoliosis s/p Guerra nydia).  MRI thoracic spine showed similar 1.4cm intradural extramedullary probable meningioma in the L posterolateral thoracic spine region at approximately T3 level with associated marked spinal cord compression with severe canal stenosis.  MRI C-spine showed re-demonstration of meningioma in the posterior L aspect of the canal at T3, compared with 2022 - mass is mildly increased in size and marked cord compression also mildly increase.    OR on 1/7 for bilateral T2-T4 laminectomy with resection of tumor performed by Dr. Coulter.  Pathology consistent with meningioma.    Hold AC/AP for at least 2 weeks post-op.  Neurovascular checks Q shift.  Ensure adequate pain control.  Monitor incision for s/s of infection.    Follow-up with Neurosurgery on 2/3 as outpatient.    Primary team following.  PT/OT.

## 2025-01-23 NOTE — ASSESSMENT & PLAN NOTE
Hx known T3 spinal meningioma presented to [facility] with left lower extremity weakness and spasms  MRI T spine: Suboptimal examination due to mild, moderate, and severe motion artifact - worse on postcontrast sequences. Similar 1.4 cm intradural extramedullary probable meningioma in left posterolateral thoracic spine region at approximately T3 level with associated marked spinal cord compression with severe canal stenosis given motion degraded exam. No cord edema given motion degradation.   MRI L spine: Suboptimal examination due to moderate-to-severe motion degraded exam of lumbar spine and 3 plane localizer images, sagittal T1 post contrast and axial T1 postcontrast sequences. No abnormal enhancement in lumbar spine given limitations of motion degradation.   S/p T2-4 laminectomy for resection of thoracic tumor by Dr. Coulter 1/7, pathology consistent with meningioma, cleared to resume DVT ppx  Keep incision clean and dry and wipe daily w/ MARY wipe, can be left open to air  Completed dexamethasone taper 1/14, 2 week fu ~1/24  6 week surgeon f/u ~ 2/21  C/w PT/OT  Appointment with ISELA today (1/22) at 12:30  Virtual appointment was canceled and rescheduled for in person outpt on 2/3

## 2025-01-24 VITALS
WEIGHT: 212 LBS | OXYGEN SATURATION: 97 % | HEART RATE: 72 BPM | SYSTOLIC BLOOD PRESSURE: 125 MMHG | BODY MASS INDEX: 36.19 KG/M2 | HEIGHT: 64 IN | TEMPERATURE: 97.8 F | DIASTOLIC BLOOD PRESSURE: 58 MMHG | RESPIRATION RATE: 18 BRPM

## 2025-01-24 DIAGNOSIS — R52 ACUTE PAIN: ICD-10-CM

## 2025-01-24 PROCEDURE — 99232 SBSQ HOSP IP/OBS MODERATE 35: CPT | Performed by: INTERNAL MEDICINE

## 2025-01-24 PROCEDURE — 99238 HOSP IP/OBS DSCHRG MGMT 30/<: CPT | Performed by: INTERNAL MEDICINE

## 2025-01-24 RX ORDER — DULOXETIN HYDROCHLORIDE 60 MG/1
60 CAPSULE, DELAYED RELEASE ORAL DAILY
Qty: 30 CAPSULE | Refills: 0 | Status: SHIPPED | OUTPATIENT
Start: 2025-01-24

## 2025-01-24 RX ORDER — BACLOFEN 5 MG/1
5 TABLET ORAL 3 TIMES DAILY
Qty: 90 TABLET | Refills: 0 | Status: SHIPPED | OUTPATIENT
Start: 2025-01-24

## 2025-01-24 RX ORDER — ACETAMINOPHEN 325 MG/1
975 TABLET ORAL EVERY 8 HOURS SCHEDULED
Qty: 270 TABLET | Refills: 0 | Status: SHIPPED | OUTPATIENT
Start: 2025-01-24

## 2025-01-24 RX ORDER — HYDROMORPHONE HYDROCHLORIDE 4 MG/1
2-4 TABLET ORAL EVERY 4 HOURS PRN
Qty: 15 TABLET | Refills: 0 | Status: SHIPPED | OUTPATIENT
Start: 2025-01-24 | End: 2025-02-03

## 2025-01-24 RX ORDER — IBUPROFEN 800 MG/1
800 TABLET, FILM COATED ORAL EVERY 8 HOURS PRN
Qty: 30 TABLET | Refills: 0 | Status: SHIPPED | OUTPATIENT
Start: 2025-01-24

## 2025-01-24 RX ORDER — OMEPRAZOLE 40 MG/1
40 CAPSULE, DELAYED RELEASE ORAL
Qty: 60 CAPSULE | Refills: 0 | Status: SHIPPED | OUTPATIENT
Start: 2025-01-24

## 2025-01-24 RX ORDER — HYDROMORPHONE HYDROCHLORIDE 4 MG/1
2-4 TABLET ORAL EVERY 4 HOURS PRN
Qty: 15 TABLET | Refills: 0 | Status: SHIPPED | OUTPATIENT
Start: 2025-01-24 | End: 2025-01-24

## 2025-01-24 RX ADMIN — METHOCARBAMOL TABLETS 1000 MG: 500 TABLET, COATED ORAL at 11:29

## 2025-01-24 RX ADMIN — IBUPROFEN 800 MG: 400 TABLET ORAL at 01:08

## 2025-01-24 RX ADMIN — CYANOCOBALAMIN TAB 1000 MCG 1000 MCG: 1000 TAB at 08:53

## 2025-01-24 RX ADMIN — DIPHENHYDRAMINE HYDROCHLORIDE 25 MG: 25 TABLET ORAL at 01:08

## 2025-01-24 RX ADMIN — ACETAMINOPHEN 975 MG: 325 TABLET, FILM COATED ORAL at 06:00

## 2025-01-24 RX ADMIN — DULOXETINE HYDROCHLORIDE 60 MG: 60 CAPSULE, DELAYED RELEASE ORAL at 08:53

## 2025-01-24 RX ADMIN — BACLOFEN 5 MG: 10 TABLET ORAL at 08:52

## 2025-01-24 RX ADMIN — OMEPRAZOLE 40 MG: 40 CAPSULE, DELAYED RELEASE ORAL at 06:00

## 2025-01-24 RX ADMIN — Medication 400 MG: at 08:53

## 2025-01-24 RX ADMIN — HYDROCORTISONE 1 APPLICATION: 25 CREAM TOPICAL at 08:54

## 2025-01-24 RX ADMIN — POTASSIUM CHLORIDE 40 MEQ: 1500 TABLET, EXTENDED RELEASE ORAL at 08:53

## 2025-01-24 RX ADMIN — Medication 2000 UNITS: at 08:52

## 2025-01-24 RX ADMIN — ENOXAPARIN SODIUM 40 MG: 40 INJECTION SUBCUTANEOUS at 08:52

## 2025-01-24 RX ADMIN — HYDROCORTISONE: 25 CREAM TOPICAL at 08:55

## 2025-01-24 NOTE — DISCHARGE SUMMARY
Discharge Summary - PMR   Name: Hayley Rios 64 y.o. female I MRN: [id number]  Unit/Bed#: [facility] 264-01 I Date of Admission: 1/15/2025   Date of Service: 1/24/2025 I Hospital Day: 9    Medical Problems       Resolved Problems  Date Reviewed: 1/24/2025          Resolved    Leukocytosis 1/21/2025     Resolved by  Samantha Allen PA-C          Admission Date: 1/15/2025   Discharge Date:     Etiologic/Rehabilitation Diagnosis: Impairment of mobility, safety and Activities of Daily Living (ADLs) due to Spinal Cord Dysfunction:  Non-Traumatic:  04.130 Other Non-Traumatic      HPI: Hayley Rios is a 64 y.o. female w/ PMHx of restless leg syndrome, MARV, GERD w/ barrets esophagous, abdominal wall hernia repair, lymphedema, multiple spinal surgeries, who initially presented to [facility] on 12/30/2024 with increased ambulatory dysfunction and weakness. Patient was visiting friends in the area over the holidays when she noticed increased LE weakness. Patient w/ known T3 spinal meningioma who was lost to follow up. She was initially admitted to [facility] 12/30-12/31 w/ worsening LLE weakness and spasm. She underwent multiple imaging studies at that shakeel which re-demonstrated known T3 meningioma w/ marked spinal cord compression and severe canal stenosis. She was transferred to [facility] on 12/31 for further assessment by NSGY. CT spine and MRI spine w/ increased size and marked cord compression w/ focal cord edema at the lvl of compression. She underwent T2-4 laminectomy on 1/7/2025 w/ NSGY Dr. Coulter. MEP and SSEPs remained stable without any changes. Her BRAYAN drain was removed 1/10. Her decadron was wean was completed 1/14/2025. She had significant pain post op and is now on oral analgesics. . Hayley Rios was admitted to the [facility] on 01/15/25     Procedures Performed During [facility] Admission: none    Acute Rehabilitation Center Course: Patient participated in a comprehensive  interdisciplinary inpatient rehabilitation program which included involvment of MD, therapies (PT, OT, and/or SLP), RN, CM, SW, dietary, and psychology services. She was able to be advanced to an overall supervision level of assist and considered safe for discharge home with family. Please see below for patient's day to day management of medical needs.      Assessment & Plan  Compression of thoracic spinal cord with myelopathy (HCC)  Hx known T3 spinal meningioma presented to [facility] with left lower extremity weakness and spasms  MRI T spine: Suboptimal examination due to mild, moderate, and severe motion artifact - worse on postcontrast sequences. Similar 1.4 cm intradural extramedullary probable meningioma in left posterolateral thoracic spine region at approximately T3 level with associated marked spinal cord compression with severe canal stenosis given motion degraded exam. No cord edema given motion degradation.   MRI L spine: Suboptimal examination due to moderate-to-severe motion degraded exam of lumbar spine and 3 plane localizer images, sagittal T1 post contrast and axial T1 postcontrast sequences. No abnormal enhancement in lumbar spine given limitations of motion degradation.   S/p T2-4 laminectomy for resection of thoracic tumor by Dr. Coulter 1/7, pathology consistent with meningioma, cleared to resume DVT ppx  Keep incision clean and dry and wipe daily w/ MARY wipe, can be left open to air  Completed dexamethasone taper 1/14, 2 week fu ~1/24  6 week surgeon f/u ~ 2/21  C/w PT/OT  Appointment with ISELA 1/22 at 12:30  Virtual appointment was canceled and rescheduled for in person outpt on 2/3    Ambulatory dysfunction  Patient was evaluated by the rehabilitation team MD and an appropriate candidate for acute inpatient rehabilitation program at this time.  The patient will tolerate 3 hours/day 5 to 7 days/week of intensive physical, occupational and speech therapy in order to obtain goals for  community discharge  Due to the patient's functional Compared to their baseline level of function in addition to their ongoing medical needs, the patient would benefit from daily supervision from a rehabilitation physician as well as rehabilitation nursing to implement and adjust the medical as well as functional plan of care in order to meet the patient's goals.   Acute pain  Cw tylenol 975 Q8h, cymbalta 60 mg daily, robaxin 1g Q8h  PRN: dilaudid 2-4 mg Q4h, tordol 10 mg Q6h, Valium 2mg Q6h  Nursing writing down next PRN dose in pt room  Titrate opioids as able  Ice/heat PRN, lidocaine patch bilateral shoulders either side of incision  1/20 Added baclofen 5 mg and ibuprofen 800, d/c tordol 5/5 days  Depression, recurrent (HCC)  Will monitor mood/behavior and consult neuropsych if needed  Restless leg syndrome  Cw pramipexole, b12 inj  Prediabetes  A1c 5.8%  POCT glucose 100-130s  POCT glucose and accuchecks defer to IM  Vitamin D deficiency  Cw vitamin D supplementation  Gastroesophageal reflux disease  CW protonix  PRN Tums, maalox  Protonix changed to omeprazole  MARV (iron deficiency anemia)  S/p iV venofer X3  Hgb 1/12 11.7----> 11.2 1/16 ----> 11.0 1/20  Monitor CBC, transfuse for hemoglobin <7  Ventral hernia without obstruction or gangrene  Hx 6 hernia surgeries, most recent 10/24/24 w/ hx bowel obstruction  Monitor BM, abominal pain, cw bowel meds  Lymphedema  Cw home lasix 20 mg daily  Vitamin B12 deficiency  346 1/1  Cw Cyancobalmin PO  F/u outpt providers  Constipation  Small BM 1/15, prior before admission 2 weeks ago  CW bowel reg   Continues to pass gas  1/20 Colace d/c after reports of diarrhea   Obesity  Encourage lifestyle changes  Subjective: Patient was examined in bed this morning. She reports that her pain at the incision site is now constant and dull compared to sharp and stabbing on her admission. Her last BM was 1/24. She denies chest pain, SOB, nausea, constipation, diarrhea, and chills.  Patient is excited to be discharged.    Physical Exam  Constitutional:       General: She is not in acute distress.  HENT:      Head: Normocephalic and atraumatic.      Mouth/Throat:      Mouth: Mucous membranes are moist.   Cardiovascular:      Rate and Rhythm: Normal rate and regular rhythm.   Pulmonary:      Effort: Pulmonary effort is normal.      Breath sounds: Normal breath sounds.   Abdominal:      General: Bowel sounds are normal. There is distension.      Comments: Abdominal distention has reduced since admission   Musculoskeletal:         General: Normal range of motion.      Right lower leg: Edema present.      Left lower leg: Edema present.   Skin:     General: Skin is warm and dry.      Comments: Incision site is healing nicely   Neurological:      Mental Status: She is alert. Mental status is at baseline.   Psychiatric:         Mood and Affect: Mood normal.         Behavior: Behavior normal.         Significant Findings, Care, Treatment and Services Provided: none    Complications: none    Functional Status Upon Admission to [facility]:Supervision    Functional Status Upon Discharge from [facility]: independence    Discharge Diagnosis: Impairment of mobility, safety and Activities of Daily Living (ADLs) due to Spinal Cord Dysfunction:  Non-Traumatic:  04.130 Other Non-Traumatic      Discharge Medications:   See after visit summary for reconciled discharge medications provided to patient and family.      Condition at Discharge: good     Discharge instructions/Information to patient and family:   See after visit summary for information provided to patient and family.      Provisions for Follow-Up Care:  See after visit summary for information related to follow-up care and any pertinent home health orders.      Future Appointments   Date Time Provider Department Center   2/3/2025 12:30 PM Ascencion Coulter MD [facility]   2/19/2025 12:30 PM Ascencion Coulter MD [facility]       Disposition:  Home      Planned Readmission: No        Discharge Medications:  See after visit summary for reconciled discharge medications provided to patient and family.

## 2025-01-24 NOTE — NURSING NOTE
This RN reviewed  all medications, fall prevention, and follow up appointments. All belongings are accounted for , all questions answered. PT has the [facility] phone number to contact us should any questions arise.

## 2025-01-24 NOTE — PROGRESS NOTES
Progress Note - Internal Medicine   Name: Hayley Rios 64 y.o. female I MRN: [id number]  Unit/Bed#: -01 I Date of Admission: 1/15/2025   Date of Service: 1/24/2025 I Hospital Day: 9    Assessment & Plan  Compression of thoracic spinal cord with myelopathy (HCC)  Presented on 12/30 with LLE radiculopathy that had been worsening over the past week.  Hx of known thoracic meningioma and prior back surgeries (scoliosis s/p Guerra nydia).  MRI thoracic spine showed similar 1.4cm intradural extramedullary probable meningioma in the L posterolateral thoracic spine region at approximately T3 level with associated marked spinal cord compression with severe canal stenosis.  MRI C-spine showed re-demonstration of meningioma in the posterior L aspect of the canal at T3, compared with 2022 - mass is mildly increased in size and marked cord compression also mildly increase.    OR on 1/7 for bilateral T2-T4 laminectomy with resection of tumor performed by Dr. Coulter.  Pathology consistent with meningioma.    Hold AC/AP for at least 2 weeks post-op.  Neurovascular checks Q shift.  Ensure adequate pain control.  Monitor incision for s/s of infection.    Follow-up with Neurosurgery on 2/3 as outpatient.    Primary team following.  PT/OT.  Depression, recurrent (HCC)  Continue home Cymbalta 60mg daily.  Had been on trazodone 50mg at HS at home but would like to continue without at this time.  Supportive counseling as needed.  Restless leg syndrome  Continue home Mirapex 0.5mg at HS.  Had been on Mirapex 0.25mg TID in addition at home but pt would like to continue without at this time.  Prediabetes  Last A1c 5.8 on 1/1/2025.  Encourage lifestyle modifications.  Monitor BG with routine labs.  Vitamin D deficiency  Continue home vitamin D 2000u daily.  Gastroesophageal reflux disease  Continue home omeprazole 40mg BID.  MARV (iron deficiency anemia)  Hgb currently stable at 11.0  Received IV venofer x3 doses in acute  setting.  Iron panel on 2024: Iron Sat 10%, TIBC 396, Iron 40, and Ferritin 9.  Follow-up with PCP on discharge.  Ventral hernia without obstruction or gangrene  Recently underwent ventral hernia repair with complications and required revision with panniculectomy on 10/24/24.  Follow-up with General Surgery as needed.  Lymphedema  Continue to elevate legs when able.  Apply ACE wraps as tolerated.  Takes Lasix 20mg daily at home.  Discontinued Lasix on  due to hypotension.  May benefit from lymphedema specialist on discharge.  Vitamin B12 deficiency  Vitamin B12 level 346 on 25.  Received cyanocobalamin injections in acute setting.  Started on PO cyanocobalamin daily.    VTE Pharmacologic Prophylaxis:   Pharmacologic: Enoxaparin (Lovenox)  Mechanical VTE Prophylaxis in Place: Yes - sequential compression devices.    Current Length of Stay: 9 day(s)    Current Patient Status: Inpatient Rehab     Discharge Plan: As per primary team.    Code Status: Level 1 - Full Code    Subjective:   Pt examined while pt sitting in bed in pt room.  Complaints of posterior neck pain, 6-7/10, aching, improves with pain medication.  Currently waiting to take pain medication closer to discharge.  Plans for discharge later today.  Currently has no other concerns or complaints.  No questions or concerns in regards to discharge.    Objective:     Vitals:   Temp (24hrs), Av.2 °F (36.8 °C), Min:97.8 °F (36.6 °C), Max:98.6 °F (37 °C)    Temp:  [97.8 °F (36.6 °C)-98.6 °F (37 °C)] 97.8 °F (36.6 °C)  HR:  [72-78] 72  Resp:  [18] 18  BP: (113-129)/(58) 125/58  SpO2:  [95 %-97 %] 97 %  Body mass index is 36.39 kg/m².     Review of Systems   Constitutional:  Negative for appetite change, chills, fatigue and fever.   HENT:  Negative for trouble swallowing.    Eyes:  Negative for visual disturbance.   Respiratory:  Negative for cough, shortness of breath, wheezing and stridor.    Cardiovascular:  Negative for chest pain, palpitations  and leg swelling.   Gastrointestinal:  Negative for abdominal distention, abdominal pain, constipation, diarrhea, nausea and vomiting.        LBM 1/23   Genitourinary:  Negative for difficulty urinating.   Musculoskeletal:  Positive for neck pain (posterior neck pain, 6-7/10, aching, improves with pain medication). Negative for arthralgias, back pain and gait problem.   Neurological:  Negative for dizziness, weakness, light-headedness, numbness and headaches.   Psychiatric/Behavioral:  Negative for dysphoric mood and sleep disturbance. The patient is not nervous/anxious.    All other systems reviewed and are negative.       Input and Output Summary (last 24 hours):       Intake/Output Summary (Last 24 hours) at 1/24/2025 0912  Last data filed at 1/24/2025 0844  Gross per 24 hour   Intake 300 ml   Output --   Net 300 ml       Physical Exam:     Physical Exam  Vitals and nursing note reviewed.   Constitutional:       General: She is not in acute distress.     Appearance: Normal appearance. She is obese. She is not ill-appearing.   HENT:      Head: Normocephalic and atraumatic.   Cardiovascular:      Rate and Rhythm: Normal rate and regular rhythm.      Pulses: Normal pulses.      Heart sounds: Murmur heard.      Systolic murmur is present with a grade of 1/6.      No friction rub.   Pulmonary:      Effort: Pulmonary effort is normal. No respiratory distress.      Breath sounds: Normal breath sounds. No wheezing or rhonchi.   Abdominal:      General: Abdomen is flat. Bowel sounds are normal. There is no distension.      Palpations: Abdomen is soft. There is no mass.      Tenderness: There is no abdominal tenderness. There is no guarding or rebound.      Hernia: No hernia is present.   Musculoskeletal:      Cervical back: Normal range of motion and neck supple. No tenderness.      Right lower leg: Edema (lymphedema) present.      Left lower leg: Edema (lymphedema) present.   Skin:     General: Skin is warm and dry.       Comments: Posterior neck incision, LUCA.  No drainage, erythema, or edema present.   Neurological:      Mental Status: She is alert and oriented to person, place, and time.   Psychiatric:         Mood and Affect: Mood normal.         Behavior: Behavior normal.         Additional Data:     Labs:    Results from last 7 days   Lab Units 01/20/25  0526   WBC Thousand/uL 7.35   HEMOGLOBIN g/dL 11.0*   HEMATOCRIT % 37.1   PLATELETS Thousands/uL 379   SEGS PCT % 65   LYMPHO PCT % 23   MONO PCT % 5   EOS PCT % 4     Results from last 7 days   Lab Units 01/20/25  0526   SODIUM mmol/L 141   POTASSIUM mmol/L 3.9   CHLORIDE mmol/L 103   CO2 mmol/L 31   BUN mg/dL 12   CREATININE mg/dL 0.64   ANION GAP mmol/L 7   CALCIUM mg/dL 8.9   GLUCOSE RANDOM mg/dL 99                       Labs reviewed    Imaging:    Imaging reviewed    Recent Cultures (last 7 days):           Last 24 Hours Medication List:   Current Facility-Administered Medications   Medication Dose Route Frequency Provider Last Rate    acetaminophen  975 mg Oral Q8H [facility] Loni Wong MD      aluminum-magnesium hydroxide-simethicone  30 mL Oral Q4H PRN Samanhta Allen PA-C      baclofen  5 mg Oral TID Loni Wong MD      bisacodyl  10 mg Rectal Daily PRN Samantha Allen PA-C      calcium carbonate  1,000 mg Oral TID PRN Samantha Allen PA-C      cholecalciferol  2,000 Units Oral Daily ROGE Yan      cyanocobalamin  1,000 mcg Oral Daily ROGE Yan      diphenhydrAMINE  25 mg Oral Q6H PRN Samantha Allen PA-C      DULoxetine  60 mg Oral Daily Samantha Allen PA-C      enoxaparin  40 mg Subcutaneous Q24H [facility] Loni Wong MD      hydrocortisone  1 Application Topical BID PRN Loni Wong MD      hydrocortisone   Topical BID ROGE Yan      HYDROmorphone  2 mg Oral Q4H PRN Loni Wong MD      HYDROmorphone  4 mg Oral Q4H PRN Samantha Allen PA-C      ibuprofen  800 mg Oral Q8H PRN Loni  MD Marvin      magnesium Oxide  400 mg Oral Daily ROGE Yan      melatonin  3 mg Oral HS Samantha Allen PA-C      methocarbamol  1,000 mg Oral Q8H PRN Loni Wong MD      omeprazole  40 mg Oral BID AC ROGE Yan      ondansetron  4 mg Oral Q6H PRN Samantha Allen PA-C      polyethylene glycol  17 g Oral Daily Samantha Allen PA-C      potassium chloride  40 mEq Oral Daily With Breakfast Samantha Allen PA-C      pramipexole  0.5 mg Oral HS Samantha Allen PA-C          M*Modal software was used to dictate this note.  It may contain errors with dictating incorrect words or incorrect spelling. Please contact the provider directly with any questions.

## 2025-01-24 NOTE — ASSESSMENT & PLAN NOTE
Hgb currently stable at 11.0  Received IV venofer x3 doses in acute setting.  Iron panel on 12/30/2024: Iron Sat 10%, TIBC 396, Iron 40, and Ferritin 9.  Follow-up with PCP on discharge.

## 2025-01-24 NOTE — CASE MANAGEMENT
CM met with patient 1/23, reviewed team meeting information, including plan for discharge on 1/24/25 to her friend's home . Patient confirmed she will not be setting up outpt therapy appts, she has HEP, until after seeing neurology on 2/3. Patient stated her sister will come to pick her up at her friend's home on 2/5, and will take her to [place] for one week. CM suggested for patient to request a paper script for the prescribed outpt therapies at her neurology appt. Patient stated she is also going to inquire when she can fly back home to  [place] as she wishes to start outpt therapy in [place]  CM made her aware there is a $7.94 copay cost for her RW, gave her contact information to call [facility] [employer] to pay for this. WARREN will continue to follow.

## 2025-01-24 NOTE — ASSESSMENT & PLAN NOTE
Hx known T3 spinal meningioma presented to [facility] with left lower extremity weakness and spasms  MRI T spine: Suboptimal examination due to mild, moderate, and severe motion artifact - worse on postcontrast sequences. Similar 1.4 cm intradural extramedullary probable meningioma in left posterolateral thoracic spine region at approximately T3 level with associated marked spinal cord compression with severe canal stenosis given motion degraded exam. No cord edema given motion degradation.   MRI L spine: Suboptimal examination due to moderate-to-severe motion degraded exam of lumbar spine and 3 plane localizer images, sagittal T1 post contrast and axial T1 postcontrast sequences. No abnormal enhancement in lumbar spine given limitations of motion degradation.   S/p T2-4 laminectomy for resection of thoracic tumor by Dr. Coulter 1/7, pathology consistent with meningioma, cleared to resume DVT ppx  Keep incision clean and dry and wipe daily w/ MARY wipe, can be left open to air  Completed dexamethasone taper 1/14, 2 week fu ~1/24  6 week surgeon f/u ~ 2/21  C/w PT/OT  Appointment with ISELA 1/22 at 12:30  Virtual appointment was canceled and rescheduled for in person outpt on 2/3

## 2025-01-24 NOTE — PLAN OF CARE
Problem: PAIN - ADULT  Goal: Verbalizes/displays adequate comfort level or baseline comfort level  Description: Interventions:  - Encourage patient to monitor pain and request assistance  - Assess pain using appropriate pain scale  - Administer analgesics based on type and severity of pain and evaluate response  - Implement non-pharmacological measures as appropriate and evaluate response  - Consider cultural and social influences on pain and pain management  - Notify physician/advanced practitioner if interventions unsuccessful or patient reports new pain  Outcome: Completed     Problem: INFECTION - ADULT  Goal: Absence or prevention of progression during hospitalization  Description: INTERVENTIONS:  - Assess and monitor for signs and symptoms of infection  - Monitor lab/diagnostic results  - Monitor all insertion sites, i.e. indwelling lines, tubes, and drains  - Monitor endotracheal if appropriate and nasal secretions for changes in amount and color  - [place] appropriate cooling/warming therapies per order  - Administer medications as ordered  - Instruct and encourage patient and family to use good hand hygiene technique  - Identify and instruct in appropriate isolation precautions for identified infection/condition  Outcome: Completed  Goal: Absence of fever/infection during neutropenic period  Description: INTERVENTIONS:  - Monitor WBC    Outcome: Completed     Problem: SAFETY ADULT  Goal: Patient will remain free of falls  Description: INTERVENTIONS:  - Educate patient/family on patient safety including physical limitations  - Instruct patient to call for assistance with activity   - Consult OT/PT to assist with strengthening/mobility   - Keep Call bell within reach  - Keep bed low and locked with side rails adjusted as appropriate  - Keep care items and personal belongings within reach  - Initiate and maintain comfort rounds  - Make Fall Risk Sign visible to staff  - Offer Toileting every  Hours, in  advance of need  - Initiate/Maintain alarm  - Obtain necessary fall risk management equipment: RW  - Apply yellow socks and bracelet for high fall risk patients  - Consider moving patient to room near nurses station  Outcome: Completed  Goal: Maintain or return to baseline ADL function  Description: INTERVENTIONS:  -  Assess patient's ability to carry out ADLs; assess patient's baseline for ADL function and identify physical deficits which impact ability to perform ADLs (bathing, care of mouth/teeth, toileting, grooming, dressing, etc.)  - Assess/evaluate cause of self-care deficits   - Assess range of motion  - Assess patient's mobility; develop plan if impaired  - Assess patient's need for assistive devices and provide as appropriate  - Encourage maximum independence but intervene and supervise when necessary  - Involve family in performance of ADLs  - Assess for home care needs following discharge   - Consider OT consult to assist with ADL evaluation and planning for discharge  - Provide patient education as appropriate  1/24/2025 0738 by Brianne Jacobs RN  Outcome: Completed  1/24/2025 0737 by Brianne Jacobs RN  Outcome: Progressing  Goal: Maintains/Returns to pre admission functional level  Description: INTERVENTIONS:  - Perform AM-PAC 6 Click Basic Mobility/ Daily Activity assessment daily.  - Set and communicate daily mobility goal to care team and patient/family/caregiver.   - Collaborate with rehabilitation services on mobility goals if consulted  - Perform Range of Motion  times a day.  - Reposition patient every  hours.  - Dangle patient  times a day  - Stand patient  times a day  - Ambulate patient  times a day  - Out of bed to chair  times a day   - Out of bed for meals  times a day  - Out of bed for toileting  - Record patient progress and toleration of activity level   Outcome: Completed     Problem: DISCHARGE PLANNING  Goal: Discharge to home or other facility with appropriate resources  Description:  INTERVENTIONS:  - Identify barriers to discharge w/patient and caregiver  - Arrange for needed discharge resources and transportation as appropriate  - Identify discharge learning needs (meds, wound care, etc.)  - Arrange for interpretive services to assist at discharge as needed  - Refer to Case Management Department for coordinating discharge planning if the patient needs post-hospital services based on physician/advanced practitioner order or complex needs related to functional status, cognitive ability, or social support system  Outcome: Completed     Problem: Knowledge Deficit  Goal: Patient/family/caregiver demonstrates understanding of disease process, treatment plan, medications, and discharge instructions  Description: Complete learning assessment and assess knowledge base.  Interventions:  - Provide teaching at level of understanding  - Provide teaching via preferred learning methods  Outcome: Completed     Problem: NEUROSENSORY - ADULT  Goal: Achieves stable or improved neurological status  Description: INTERVENTIONS  - Monitor and report changes in neurological status  - Monitor vital signs such as temperature, blood pressure, glucose, and any other labs ordered   - Initiate measures to prevent increased intracranial pressure  - Monitor for seizure activity and implement precautions if appropriate      Outcome: Completed  Goal: Remains free of injury related to seizures activity  Description: INTERVENTIONS  - Maintain airway, patient safety  and administer oxygen as ordered  - Monitor patient for seizure activity, document and report duration and description of seizure to physician/advanced practitioner  - If seizure occurs,  ensure patient safety during seizure  - Reorient patient post seizure  - Seizure pads on all 4 side rails  - Instruct patient/family to notify RN of any seizure activity including if an aura is experienced  - Instruct patient/family to call for assistance with activity based on  nursing assessment  - Administer anti-seizure medications if ordered    Outcome: Completed  Goal: Achieves maximal functionality and self care  Description: INTERVENTIONS  - Monitor swallowing and airway patency with patient fatigue and changes in neurological status  - Encourage and assist patient to increase activity and self care.   - Encourage visually impaired, hearing impaired and aphasic patients to use assistive/communication devices  Outcome: Completed     Problem: CARDIOVASCULAR - ADULT  Goal: Maintains optimal cardiac output and hemodynamic stability  Description: INTERVENTIONS:  - Monitor I/O, vital signs and rhythm  - Monitor for S/S and trends of decreased cardiac output  - Administer and titrate ordered vasoactive medications to optimize hemodynamic stability  - Assess quality of pulses, skin color and temperature  - Assess for signs of decreased coronary artery perfusion  - Instruct patient to report change in severity of symptoms  Outcome: Completed  Goal: Absence of cardiac dysrhythmias or at baseline rhythm  Description: INTERVENTIONS:  - Continuous cardiac monitoring, vital signs, obtain 12 lead EKG if ordered  - Administer antiarrhythmic and heart rate control medications as ordered  - Monitor electrolytes and administer replacement therapy as ordered  Outcome: Completed     Problem: RESPIRATORY - ADULT  Goal: Achieves optimal ventilation and oxygenation  Description: INTERVENTIONS:  - Assess for changes in respiratory status  - Assess for changes in mentation and behavior  - Position to facilitate oxygenation and minimize respiratory effort  - Oxygen administered by appropriate delivery if ordered  - Initiate smoking cessation education as indicated  - Encourage broncho-pulmonary hygiene including cough, deep breathe, Incentive Spirometry  - Assess the need for suctioning and aspirate as needed  - Assess and instruct to report SOB or any respiratory difficulty  - Respiratory Therapy support as  indicated  Outcome: Completed     Problem: GASTROINTESTINAL - ADULT  Goal: Minimal or absence of nausea and/or vomiting  Description: INTERVENTIONS:  - Administer IV fluids if ordered to ensure adequate hydration  - Maintain NPO status until nausea and vomiting are resolved  - Nasogastric tube if ordered  - Administer ordered antiemetic medications as needed  - Provide nonpharmacologic comfort measures as appropriate  - Advance diet as tolerated, if ordered  - Consider nutrition services referral to assist patient with adequate nutrition and appropriate food choices  Outcome: Completed  Goal: Maintains or returns to baseline bowel function  Description: INTERVENTIONS:  - Assess bowel function  - Encourage oral fluids to ensure adequate hydration  - Administer IV fluids if ordered to ensure adequate hydration  - Administer ordered medications as needed  - Encourage mobilization and activity  - Consider nutritional services referral to assist patient with adequate nutrition and appropriate food choices  Outcome: Completed  Goal: Maintains adequate nutritional intake  Description: INTERVENTIONS:  - Monitor percentage of each meal consumed  - Identify factors contributing to decreased intake, treat as appropriate  - Assist with meals as needed  - Monitor I&O, weight, and lab values if indicated  - Obtain nutrition services referral as needed  Outcome: Completed  Goal: Oral mucous membranes remain intact  Description: INTERVENTIONS  - Assess oral mucosa and hygiene practices  - Implement preventative oral hygiene regimen  - Implement oral medicated treatments as ordered  - Initiate Nutrition services referral as needed  Outcome: Completed     Problem: GENITOURINARY - ADULT  Goal: Maintains or returns to baseline urinary function  Description: INTERVENTIONS:  - Assess urinary function  - Encourage oral fluids to ensure adequate hydration if ordered  - Administer IV fluids as ordered to ensure adequate hydration  -  Administer ordered medications as needed  - Offer frequent toileting  - Follow urinary retention protocol if ordered  Outcome: Completed  Goal: Absence of urinary retention  Description: INTERVENTIONS:  - Assess patient’s ability to void and empty bladder  - Monitor I/O  - Bladder scan as needed  - Discuss with physician/AP medications to alleviate retention as needed  - Discuss catheterization for long term situations as appropriate  Outcome: Completed     Problem: METABOLIC, FLUID AND ELECTROLYTES - ADULT  Goal: Electrolytes maintained within normal limits  Description: INTERVENTIONS:  - Monitor labs and assess patient for signs and symptoms of electrolyte imbalances  - Administer electrolyte replacement as ordered  - Monitor response to electrolyte replacements, including repeat lab results as appropriate  - Instruct patient on fluid and nutrition as appropriate  Outcome: Completed  Goal: Fluid balance maintained  Description: INTERVENTIONS:  - Monitor labs   - Monitor I/O and WT  - Instruct patient on fluid and nutrition as appropriate  - Assess for signs & symptoms of volume excess or deficit  Outcome: Completed  Goal: Glucose maintained within target range  Description: INTERVENTIONS:  - Monitor Blood Glucose as ordered  - Assess for signs and symptoms of hyperglycemia and hypoglycemia  - Administer ordered medications to maintain glucose within target range  - Assess nutritional intake and initiate nutrition service referral as needed  Outcome: Completed     Problem: SKIN/TISSUE INTEGRITY - ADULT  Goal: Skin Integrity remains intact(Skin Breakdown Prevention)  Description: Assess:  -Perform Germain assessment every   -Clean and moisturize skin every   -Inspect skin when repositioning, toileting, and assisting with ADLS  -Assess under medical devices such as  every   -Assess extremities for adequate circulation and sensation     Bed Management:  -Have minimal linens on bed & keep smooth, unwrinkled  -Change  linens as needed when moist or perspiring  -Avoid sitting or lying in one position for more than  hours while in bed  -Keep HOB at degrees     Toileting:  -Offer bedside commode  -Assess for incontinence every   -Use incontinent care products after each incontinent episode such as     Activity:  -Mobilize patient  times a day  -Encourage activity and walks on unit  -Encourage or provide ROM exercises   -Turn and reposition patient every  Hours  -Use appropriate equipment to lift or move patient in bed  -Instruct/ Assist with weight shifting every  when out of bed in chair  -Consider limitation of chair time  hour intervals    Skin Care:  -Avoid use of baby powder, tape, friction and shearing, hot water or constrictive clothing  -Relieve pressure over bony prominences using   -Do not massage red bony areas    Next Steps:  -Teach patient strategies to minimize risks such as    -Consider consults to  interdisciplinary teams such as   Outcome: Completed  Goal: Incision(s), wounds(s) or drain site(s) healing without S/S of infection  Description: INTERVENTIONS  - Assess and document dressing, incision, wound bed, drain sites and surrounding tissue  - Provide patient and family education  - Perform skin care/dressing changes every   Outcome: Completed  Goal: Pressure injury heals and does not worsen  Description: Interventions:  - Implement low air loss mattress or specialty surface (Criteria met)  - Apply silicone foam dressing  - Instruct/assist with weight shifting every  minutes when in chair   - Limit chair time to  hour intervals  - Use special pressure reducing interventions such as  when in chair   - Apply fecal or urinary incontinence containment device   - Perform passive or active ROM every   - Turn and reposition patient & offload bony prominences every  hours   - Utilize friction reducing device or surface for transfers   - Consider consults to  interdisciplinary teams such as   - Use incontinent care  products after each incontinent episode such as   - Consider nutrition services referral as needed  Outcome: Completed     Problem: HEMATOLOGIC - ADULT  Goal: Maintains hematologic stability  Description: INTERVENTIONS  - Assess for signs and symptoms of bleeding or hemorrhage  - Monitor labs  - Administer supportive blood products/factors as ordered and appropriate  Outcome: Completed     Problem: MUSCULOSKELETAL - ADULT  Goal: Maintain or return mobility to safest level of function  Description: INTERVENTIONS:  - Assess patient's ability to carry out ADLs; assess patient's baseline for ADL function and identify physical deficits which impact ability to perform ADLs (bathing, care of mouth/teeth, toileting, grooming, dressing, etc.)  - Assess/evaluate cause of self-care deficits   - Assess range of motion  - Assess patient's mobility  - Assess patient's need for assistive devices and provide as appropriate  - Encourage maximum independence but intervene and supervise when necessary  - Involve family in performance of ADLs  - Assess for home care needs following discharge   - Consider OT consult to assist with ADL evaluation and planning for discharge  - Provide patient education as appropriate  Outcome: Completed  Goal: Maintain proper alignment of affected body part  Description: INTERVENTIONS:  - Support, maintain and protect limb and body alignment  - Provide patient/ family with appropriate education  Outcome: Completed     Problem: MOBILITY - ADULT  Goal: Maintain or return to baseline ADL function  Description: INTERVENTIONS:  -  Assess patient's ability to carry out ADLs; assess patient's baseline for ADL function and identify physical deficits which impact ability to perform ADLs (bathing, care of mouth/teeth, toileting, grooming, dressing, etc.)  - Assess/evaluate cause of self-care deficits   - Assess range of motion  - Assess patient's mobility; develop plan if impaired  - Assess patient's need for  assistive devices and provide as appropriate  - Encourage maximum independence but intervene and supervise when necessary  - Involve family in performance of ADLs  - Assess for home care needs following discharge   - Consider OT consult to assist with ADL evaluation and planning for discharge  - Provide patient education as appropriate  Outcome: Completed  Goal: Maintains/Returns to pre admission functional level  Description: INTERVENTIONS:  - Perform AM-PAC 6 Click Basic Mobility/ Daily Activity assessment daily.  - Set and communicate daily mobility goal to care team and patient/family/caregiver.   - Collaborate with rehabilitation services on mobility goals if consulted  - Perform Range of Motion  times a day.  - Reposition patient every  hours.  - Dangle patient  times a day  - Stand patient  times a day  - Ambulate patient  times a day  - Out of bed to chair  times a day   - Out of bed for meals  times a day  - Out of bed for toileting  - Record patient progress and toleration of activity level   Outcome: Completed     Problem: MOBILITY - ADULT  Goal: Maintain or return to baseline ADL function  Description: INTERVENTIONS:  -  Assess patient's ability to carry out ADLs; assess patient's baseline for ADL function and identify physical deficits which impact ability to perform ADLs (bathing, care of mouth/teeth, toileting, grooming, dressing, etc.)  - Assess/evaluate cause of self-care deficits   - Assess range of motion  - Assess patient's mobility; develop plan if impaired  - Assess patient's need for assistive devices and provide as appropriate  - Encourage maximum independence but intervene and supervise when necessary  - Involve family in performance of ADLs  - Assess for home care needs following discharge   - Consider OT consult to assist with ADL evaluation and planning for discharge  - Provide patient education as appropriate  Outcome: Completed  Goal: Maintains/Returns to pre admission functional  level  Description: INTERVENTIONS:  - Perform AM-PAC 6 Click Basic Mobility/ Daily Activity assessment daily.  - Set and communicate daily mobility goal to care team and patient/family/caregiver.   - Collaborate with rehabilitation services on mobility goals if consulted  - Perform Range of Motion  times a day.  - Reposition patient every  hours.  - Dangle patient  times a day  - Stand patient  times a day  - Ambulate patient  times a day  - Out of bed to chair  times a day   - Out of bed for meals  times a day  - Out of bed for toileting  - Record patient progress and toleration of activity level   Outcome: Completed

## 2025-01-27 ENCOUNTER — TELEPHONE (OUTPATIENT)
Age: 65
End: 2025-01-27

## 2025-01-27 ENCOUNTER — TRANSITIONAL CARE MANAGEMENT (OUTPATIENT)
Dept: INTERNAL MEDICINE CLINIC | Facility: CLINIC | Age: 65
End: 2025-01-27

## 2025-01-27 NOTE — TELEPHONE ENCOUNTER
Pt called and confirmed 2/3/25 appt, she will talk to Dr Palomo about the 2/29 appt at that time as she lives in [place].

## 2025-01-28 ENCOUNTER — RESULTS FOLLOW-UP (OUTPATIENT)
Dept: INTERNAL MEDICINE CLINIC | Facility: CLINIC | Age: 65
End: 2025-01-28

## 2025-01-28 ENCOUNTER — TELEPHONE (OUTPATIENT)
Dept: NEUROSURGERY | Facility: CLINIC | Age: 65
End: 2025-01-28

## 2025-01-28 ENCOUNTER — NURSE TRIAGE (OUTPATIENT)
Age: 65
End: 2025-01-28

## 2025-01-28 ENCOUNTER — TELEPHONE (OUTPATIENT)
Age: 65
End: 2025-01-28

## 2025-01-28 ENCOUNTER — RA CDI HCC (OUTPATIENT)
Dept: OTHER | Facility: HOSPITAL | Age: 65
End: 2025-01-28

## 2025-01-28 ENCOUNTER — APPOINTMENT (OUTPATIENT)
Dept: LAB | Facility: CLINIC | Age: 65
End: 2025-01-28
Payer: COMMERCIAL

## 2025-01-28 DIAGNOSIS — R39.9 UTI SYMPTOMS: Primary | ICD-10-CM

## 2025-01-28 DIAGNOSIS — N30.01 ACUTE CYSTITIS WITH HEMATURIA: Primary | ICD-10-CM

## 2025-01-28 DIAGNOSIS — R39.9 UTI SYMPTOMS: ICD-10-CM

## 2025-01-28 LAB
BACTERIA UR QL AUTO: ABNORMAL /HPF
BILIRUB UR QL STRIP: NEGATIVE
CLARITY UR: CLEAR
COLOR UR: ABNORMAL
GLUCOSE UR STRIP-MCNC: NEGATIVE MG/DL
HGB UR QL STRIP.AUTO: ABNORMAL
KETONES UR STRIP-MCNC: NEGATIVE MG/DL
LEUKOCYTE ESTERASE UR QL STRIP: ABNORMAL
NITRITE UR QL STRIP: NEGATIVE
NON-SQ EPI CELLS URNS QL MICRO: ABNORMAL /HPF
PH UR STRIP.AUTO: 6 [PH]
PROT UR STRIP-MCNC: ABNORMAL MG/DL
RBC #/AREA URNS AUTO: ABNORMAL /HPF
SP GR UR STRIP.AUTO: 1.02 (ref 1–1.03)
UROBILINOGEN UR STRIP-ACNC: <2 MG/DL
WBC #/AREA URNS AUTO: ABNORMAL /HPF

## 2025-01-28 PROCEDURE — 87086 URINE CULTURE/COLONY COUNT: CPT

## 2025-01-28 PROCEDURE — 87077 CULTURE AEROBIC IDENTIFY: CPT

## 2025-01-28 PROCEDURE — 81001 URINALYSIS AUTO W/SCOPE: CPT

## 2025-01-28 PROCEDURE — 87186 SC STD MICRODIL/AGAR DIL: CPT

## 2025-01-28 RX ORDER — CEPHALEXIN 500 MG/1
500 CAPSULE ORAL EVERY 12 HOURS SCHEDULED
Qty: 14 CAPSULE | Refills: 0 | Status: SHIPPED | OUTPATIENT
Start: 2025-01-28 | End: 2025-02-04

## 2025-01-28 NOTE — PROGRESS NOTES
01/28/25 1012   Hello, [Guardian’s Name / Patient’s Name], this is [Caller Name] from [facility], and our clinical care team wanted to check on you / your child after your recent visit to the hospital. It will only take 3-5 minutes. Is this a good time?   Discharge Call Type/ Specific Diagnosis: [facility] General   [facility] Discharge Follow- Up   [facility] Follow- Up Time Frame 5 Day follow up   Call Complete   Attempted Number of Calls 1   Discharge phone call complete? Left Message

## 2025-01-28 NOTE — TELEPHONE ENCOUNTER
Patient of Dr. Wadawski  At office: [facility]  Calling about :Patient states if prescription has to be called, in based off of Urine culture (Order [id number]) , results. Patient request medication be sent to [facility] on [facility]    Preferred method of contact: Patient request  a callback at [phone], if any further questions, and if are when prescription is submitted.

## 2025-01-28 NOTE — TELEPHONE ENCOUNTER
Call received from patient stating she has been having blood in her urine since yesterday with associated urgency. She denies s/s of cauda equina. Suggested she reach out to her PCP as she may have a UTI.     She stated an understanding and was appreciative of the call.

## 2025-01-28 NOTE — CASE MANAGEMENT
CM D/C NOTE:       Pt made good progress during [facility] stay. Pt d/c to home. Therapy provided HEP. Pt was ordered for RW.

## 2025-01-28 NOTE — TELEPHONE ENCOUNTER
Patient called, states she received a notification on [employer] unable to access [employer] request status of previous message, Patient awaits, lab results, and callback with clarification.

## 2025-01-28 NOTE — TELEPHONE ENCOUNTER
"Patient contacted the office this morning c/o possible uti, slight burning started 2 days ago, noticed last night into today blood in urine with some clots. She contacted surgeons office who relayed that they feel this is unrelated to spinal surgery. She does have a virtual visit scheduled for tomorrow. Asking if pcp would be willing to order a urine sample to complete. Please advise.    Reason for Disposition   Urinating more frequently than usual (i.e., frequency) OR new-onset of the feeling of an urgent need to urinate (i.e., urgency)    Answer Assessment - Initial Assessment Questions  1. SYMPTOM: \"What's the main symptom you're concerned about?\" (e.g., frequency, incontinence)      Slight burning, blood in urine, frequent urination   2. ONSET: \"When did the  symptoms  start?\"      Slight burning started 2 days ago, blood in urine (clots) yesterday and today  3. PAIN: \"Is there any pain?\" If Yes, ask: \"How bad is it?\" (Scale: 1-10; mild, moderate, severe)      denies  4. CAUSE: \"What do you think is causing the symptoms?\"      UTI  5. OTHER SYMPTOMS: \"Do you have any other symptoms?\" (e.g., blood in urine, fever, flank pain, pain with urination)      Blood in urine  6. PREGNANCY: \"Is there any chance you are pregnant?\" \"When was your last menstrual period?\"      N/A    Protocols used: Urinary Symptoms-Adult-[place]    "

## 2025-01-29 ENCOUNTER — TELEMEDICINE (OUTPATIENT)
Dept: INTERNAL MEDICINE CLINIC | Facility: CLINIC | Age: 65
End: 2025-01-29
Payer: COMMERCIAL

## 2025-01-29 DIAGNOSIS — D50.9 IRON DEFICIENCY ANEMIA, UNSPECIFIED IRON DEFICIENCY ANEMIA TYPE: ICD-10-CM

## 2025-01-29 DIAGNOSIS — G95.20 COMPRESSION OF SPINAL CORD WITH MYELOPATHY (HCC): Primary | ICD-10-CM

## 2025-01-29 DIAGNOSIS — N30.01 ACUTE CYSTITIS WITH HEMATURIA: ICD-10-CM

## 2025-01-29 PROCEDURE — 99495 TRANSJ CARE MGMT MOD F2F 14D: CPT | Performed by: INTERNAL MEDICINE

## 2025-01-29 NOTE — ASSESSMENT & PLAN NOTE
Patient initially presented to the hospital around 12/31 with progressively worsening lower extremity weakness and paresthesias in addition to muscle spasms of the bilateral lower extremities in the setting of known thoracic meningioma. Had MRI of the thoracic spine completed showing similar 1.4 cm intradural extramedullary probable meningioma in the left posterolateral thoracic spine region at the T3 level with associated marked spinal cord compression with severe canal stenosis.  There was concern that the T3 lesion was causing progressively worsening myelopathy.  She was transferred to the [facility] for neurosurgery evaluation.  Underwent T2-4 laminectomy for resection of thoracic tumor by Dr. Coulter on 1/7/2025.  Pathology consistent with meningioma.  Evaluated by physical therapy and acute rehab was recommended    Hayley is currently a resident in [place] and had been in the area visiting when she was admitted to the hospital.  She is hoping to return to [place] in the near future.  Continues to have ongoing difficulty with pain but seems to be managing okay.    Plan:  -Continue follow-up with neurosurgery as directed  -Pain regimen with Tylenol, baclofen ibuprofen as needed for mild to moderate pain  -Dilaudid as needed for severe breakthrough pain

## 2025-01-29 NOTE — PROGRESS NOTES
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 Had MRI of the thoracic spine completed showing similar 1.4 cm intradural extramedullary probable meningioma in the left posterolateral thoracic spine region at the T3 level with associated marked spinal cord compression with severe canal stenosis.  There was concern that the T3 lesion was causing progressively worsening myelopathy.  She was transferred to the [facility] for neurosurgery evaluation.  Underwent T2-4 laminectomy for resection of thoracic tumor by Dr. Coulter on 1/7/2025.  Pathology consistent with meningioma.  Evaluated by physical therapy and acute rehab was recommended    Hayley is currently a resident in [place] and had been in the area visiting when she was admitted to the hospital.  She is hoping to return to [place] in the near future.  Continues to have ongoing difficulty with pain but seems to be managing okay.    Plan:  -Continue follow-up with neurosurgery as directed  -Pain regimen with Tylenol, baclofen ibuprofen as needed for mild to moderate pain  -Dilaudid as needed for severe breakthrough pain       Acute cystitis with hematuria  Onset of burning with urination approximately 2 days ago, noticed blood in the urine with some clots.  This has improved.  Urinalysis positive for moderate leukocytes, trace protein, 10-20 RBCs, 20-30 WBCs.  Prescription for Keflex was sent to  pharmacy.  Urine cultures positive for E. Coli    I advised Hayley to continue prescription for Keflex as prescribed.  Will await sensitivities  -Most likely hemorrhagic cystitis, resolving.  We are going to repeat urinalysis in approximately 3 weeks.  If there is recurrence of gross hematuria or persistent microscopic hematuria on urinalysis, recommend  imaging +/- referral to urology for cystoscopy     Orders:  •  Urinalysis with microscopic; Future    Iron deficiency anemia, unspecified iron deficiency anemia type  Received IV Venofer while inpatient  Hemoglobin stable on discharge at 11.0            Encounter provider Mau Garcia,      Provider located at [address] [place] [address] INTERNAL MEDICINE [address] [id number]    After connecting through [employer], the patient was identified by name and date of birth. Hayley Rios was informed that this is a telemedicine visit and that the visit is being conducted through the Epic Embedded platform. She agrees to proceed..  My office door was closed. No one else was in the room.  She acknowledged consent and understanding of privacy and security of the video platform. The patient has agreed to participate and understands they can discontinue the visit at any time.    Patient is aware this is a billable service.    History of Present Illness     Transitional Care Management Review:   Hayley Rios is a 64 y.o. female here for TCM follow up.    During the TCM phone call patient stated:  TCM Call     Date and time call was made  1/27/2025  2:58 PM    Hospital care reviewed  Records reviewed    Patient was hospitialized at  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  No    Scheduled for follow up?  Yes    Did you obtain your prescribed medications  Yes    Do you need help managing your prescriptions or medications  No    Is transportation to your appointment needed  No    I have advised the patient to call PCP with any new or worsening symptoms  Sara Spence MA        HPI  Review of Systems  Objective   There were no vitals taken for this visit.    Physical Exam  Medications have been reviewed by provider in current encounter      Mau Garcia, DO      VIRTUAL VISIT DISCLAIMER    Hayley Rios verbally agrees to participate in Virtual Care Services. Pt is aware that Virtual Care Services could be limited without vital signs or the ability to perform a full hands-on physical exam. Hayley NORIS Gabriel understands she or the provider may request at any time to terminate the video visit and request the patient to seek care or treatment in person.

## 2025-01-29 NOTE — OCCUPATIONAL THERAPY NOTE
OT DISCHARGE SUMMARY    Pt made good progress during stay on the [facility] following admission for compression of thoracic spinal cord with myelopathy.  Pt presented upon IE with generalized weakness, decreased endurance, activity tolerance, and functional mobility. On evaluation, pt required maxA/modA to complete all ADLs and functional transfers. Pt was discharged to friends home. Pt currently functioning at Feliciano level for ADL, Feliciano level for transfers with RW. The following DME was recommended shower chair. Pt has met all OT goals an fx at indep level. No further services warranted. Pt to stay with hossein Costello for 2 weeks and then go to sisters home in [place].      -Sara Wallace MS, OTR/L, CSRS

## 2025-01-30 LAB
BACTERIA UR CULT: ABNORMAL
BACTERIA UR CULT: ABNORMAL

## 2025-01-30 NOTE — PROGRESS NOTES
01/30/25 1312   Hello, [Guardian’s Name / Patient’s Name], this is [Caller Name] from [facility], and our clinical care team wanted to check on you / your child after your recent visit to the hospital. It will only take 3-5 minutes. Is this a good time?   Discharge Call Type/ Specific Diagnosis: ARC General   ARC Discharge Follow- Up   ARC Follow- Up Time Frame 5 Day follow up   Call Complete   Attempted Number of Calls 2   Discharge phone call complete? Left Message

## 2025-02-03 ENCOUNTER — OFFICE VISIT (OUTPATIENT)
Dept: NEUROSURGERY | Facility: CLINIC | Age: 65
End: 2025-02-03

## 2025-02-03 VITALS
BODY MASS INDEX: 36.19 KG/M2 | DIASTOLIC BLOOD PRESSURE: 68 MMHG | OXYGEN SATURATION: 98 % | TEMPERATURE: 98.3 F | HEART RATE: 91 BPM | SYSTOLIC BLOOD PRESSURE: 138 MMHG | HEIGHT: 64 IN | RESPIRATION RATE: 14 BRPM | WEIGHT: 212 LBS

## 2025-02-03 DIAGNOSIS — E66.01 OBESITY, MORBID (HCC): ICD-10-CM

## 2025-02-03 DIAGNOSIS — G95.20 COMPRESSION OF SPINAL CORD WITH MYELOPATHY (HCC): Primary | ICD-10-CM

## 2025-02-03 PROCEDURE — 99024 POSTOP FOLLOW-UP VISIT: CPT | Performed by: NEUROLOGICAL SURGERY

## 2025-02-03 NOTE — ASSESSMENT & PLAN NOTE
T2-3 laminectomy for resection of T3 intradural extra-medullary mass, WHO grade 1 meningioma, 1/7/2025.  She is now 4 weeks status post resection.  She has been doing well.  She is still living with a friend that she is from [place] initially.  She has been progressing well and using a walker.  We reviewed her preoperative imaging and discussed the natural history and diagnosis of WHO 1 meningiomas and ongoing management of spinal meningiomas.    She is eager to go home.  At this juncture I believe she can transfer home however I reinforced the importance of establishing with a local neurosurgeon as she will continue to need follow-up at 3 months, 6 months 12 months and annually thereafter in accordance to NCCN guidelines.  I will attempt to make a phone call to a local neurosurgeon to see if I can facilitate this.  We will also try to obtain her radiographs to help facilitate this transfer.    I will see her in 2 to 4 weeks as a virtual visit to ensure that the steps have been taken.    Orders:    Ambulatory Referral to Physical Therapy; Future     Otezla Pregnancy And Lactation Text: This medication is Pregnancy Category C and it isn't known if it is safe during pregnancy. It is unknown if it is excreted in breast milk.

## 2025-02-03 NOTE — PROGRESS NOTES
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  She is now 4 weeks status post resection.  She has been doing well.  She is still living with a friend that she is from [place] initially.  She has been progressing well and using a walker.  We reviewed her preoperative imaging and discussed the natural history and diagnosis of WHO 1 meningiomas and ongoing management of spinal meningiomas.    She is eager to go home.  At this juncture I believe she can transfer home however I reinforced the importance of establishing with a local neurosurgeon as she will continue to need follow-up at 3 months, 6 months 12 months and annually thereafter in accordance to NCCN guidelines.  I will attempt to make a phone call to a local neurosurgeon to see if I can facilitate this.  We will also try to obtain her radiographs to help facilitate this transfer.    I will see her in 2 to 4 weeks as a virtual visit to ensure that the steps have been taken.    Orders:    Ambulatory Referral to Physical Therapy; Future        History of Present Illness   HPI  This is a pleasant 64-year-old female with a past medical history significant for obesity, lymphedema, scoliosis surgery with Guerra nydia, lower leg pain and weakness and multiple abdominal surgeries who  had previously establish care with the practice for a thoracic meningioma prior to her moving to [place].     She returned from [place] to visit friends over the holidays.  Upon her visit she had rapidly progressive strength and decreased ambulatory ability and worsening numbness.  She did not have any urinary incontinence or retention.   "She did endorse some urinary urgency.    Exam on presentation was:  Slight decrease in sensation in the mid to low thoracic dermatome.  Her lower extremities: Right-4 hip flexion and knee extension 4 dorsiflexion plantarflexion 4 -.  Left 3 hip flexion, knee extension 4 -, dorsiflexion plantarflexion 4 -.  She has Phillip's on her left.  3 beats of clonus bilaterally.     Given all these findings that appear to be rapidly progressive we discussed surgery for resection of the tumor.  Ultimately she underwent surgery on 1/7/2025.  Her surgical course was uncomplicated.  She is currently living with a friend.    She is able to walk with a walker without much difficulty.  She still has some \"vibrating feeling \"in her abdomen intermittently in her legs.  However, she states her strength has improved.  She was supposed to see physical therapy twice a week.  She is asked for a new prescription for this as well.      She is not .  She has no children.  She is on disability due to her back.  Denies any tobacco or alcohol abuse.     Review of Systems   Constitutional:  Negative for fatigue.   Gastrointestinal: Negative.    Genitourinary: Negative.    Musculoskeletal:  Positive for back pain (upper back pain) and gait problem (ambulating with walker). Negative for myalgias.   Neurological:  Positive for weakness (b/l legs) and numbness (n+t abdomen and b/l feet).   Psychiatric/Behavioral:  Negative for sleep disturbance.     I have personally reviewed the MA's review of systems and made changes as necessary.         Objective   /68 (BP Location: Right arm, Patient Position: Sitting, Cuff Size: Standard)   Pulse 91   Temp 98.3 °F (36.8 °C) (Temporal)   Resp 14   Ht 5' 4\" (1.626 m)   Wt 96.2 kg (212 lb)   SpO2 98%   BMI 36.39 kg/m²     Physical Exam  Neurological Exam  Incision well-healed.  She has full strength in bilateral uppers.  She is ambulating with a walker.  She has 4+ out of 5 for bilateral hip " flexion and knee extension.  Her dorsiflexion and plantarflexion are 4+.  She has improved sensation in her lower extremities.    We reviewed her preoperative and postoperative MRIs in detail.

## 2025-02-03 NOTE — PHYSICAL THERAPY NOTE
"ARC PT DC SUMMARY    Pt 64 yr old female visiting friends from [place] for the holidays (here since 12/4/24) came to network on 12/30/24 with progressive and worsening chronic LE weakness. Testing revealed +1.4cm thoracic meningioma with cord compression, pt transferred to [facility] for neurosurgery f/u. Ultimately pt underwent B T2 - T4 lami due to tumor by Dr Coulter and Dr Shah on 1/7/25. Pt with other PMH: multiple spinal surgeries, spinal stenosis with ridiculopathy, obesity, and lymphedema. At baseline pt lives alone was fully I without AD, , on disability. Her home in [place] is  with curb out front, 1/2 bath first, full bath and bedroom 2nd floor. Stay with friend (Trang) locally has 1+5 DELLA R HR and stair glide down to finished basement where pt has been staying. Pt has borrowed QC (which sounds like its missing rubber caps), and recently started using Trang's husbands RW due to progressive weakness.    Pain control/mgmt and L LE neuro radiculopathy was a barrier for quite some time limiting pt progress and tolerance initially.  Ongoing use of RW with NBQC trials up to 150 ft and on steps, due to dec balance and L>R LE ataxia. However overall pt met all set mod I goals with RW support.  HEP provided with LE strengthening with focus on abd bracing tasks to control pts feelings of \"internal vibrations.\"    Plan is for pt to DC back to local friends home as neuro surgery wants to see pt in person Monday 2/3, with then plans to go to sisters home in [place] and fly back to [place] from there when able. Scripts provided for out pt PT if cleared by surgery and when pt able to start.   Pt provided with RW. Cleared for DC, all goals met.   "

## 2025-02-04 LAB
DME PARACHUTE DELIVERY DATE ACTUAL: NORMAL
DME PARACHUTE DELIVERY DATE REQUESTED: NORMAL
DME PARACHUTE DELIVERY NOTE: NORMAL
DME PARACHUTE ITEM DESCRIPTION: NORMAL
DME PARACHUTE ORDER STATUS: NORMAL
DME PARACHUTE SUPPLIER NAME: NORMAL
DME PARACHUTE SUPPLIER PHONE: NORMAL

## 2025-02-05 NOTE — PROGRESS NOTES
02/05/25 5005   Hello, [Guardian’s Name / Patient’s Name], this is [Caller Name] from [facility], and our clinical care team wanted to check on you / your child after your recent visit to the hospital. It will only take 3-5 minutes. Is this a good time?   Discharge Call Type/ Specific Diagnosis: ARC General   ARC Discharge Follow- Up   ARC Follow- Up Time Frame 5 Day follow up   Call Complete   Attempted Number of Calls 3   Discharge phone call complete? Left Message

## 2025-02-05 NOTE — ASSESSMENT & PLAN NOTE
Due to primary problem, see plan of care outlined above    Is This A New Presentation, Or A Follow-Up?: Phototherapy Treatment

## 2025-02-12 ENCOUNTER — TELEPHONE (OUTPATIENT)
Age: 65
End: 2025-02-12

## 2025-02-12 NOTE — TELEPHONE ENCOUNTER
"Pt LOV was 2/3/2 with Dr. Coulter. Per office visit note appt can be virtual   \"I will see her in 2 to 4 weeks as a virtual visit to ensure that the steps have been taken.\"   Pts f/u appt is scheduled for 2/19 @ 12:30 with Dr. Coulter.     Pt called to verify that appt was in fact virtual.     Please assist with changing appt.     Thank you.   "

## 2025-02-19 ENCOUNTER — TELEMEDICINE (OUTPATIENT)
Dept: NEUROSURGERY | Facility: CLINIC | Age: 65
End: 2025-02-19

## 2025-02-19 DIAGNOSIS — D32.9 MENINGIOMA (HCC): ICD-10-CM

## 2025-02-19 DIAGNOSIS — G95.20 COMPRESSION OF SPINAL CORD WITH MYELOPATHY (HCC): Primary | ICD-10-CM

## 2025-02-19 PROCEDURE — 99024 POSTOP FOLLOW-UP VISIT: CPT | Performed by: NEUROLOGICAL SURGERY

## 2025-02-19 NOTE — PROGRESS NOTES
Virtual Regular VisitName: Hayley Rios      : 1960      MRN: [id number]  Encounter Provider: Ascencion Coulter MD  Encounter Date: 2025   Encounter department: 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  She is now 6-week status post resection.  She has been doing well.  She is back at her house in [place].  We once again reviewed the natural history and diagnosis of WHO grade 1 meningiomas of the thoracic spine.  I have encouraged her to obtain local follow-up with a local neurosurgeon or at the very least with her primary care to obtain annual imaging.  She confirmed that she would do this in the coming days.    She is also eager to continue physical therapy.  She has been doing relatively well and ambulates around the house without any assistive devices.  Meningioma (HCC)  See above           History of Present Illness   This is a pleasant 64-year-old female with a past medical history significant for obesity, lymphedema, scoliosis surgery with Guerra nydia, lower leg pain and weakness and multiple abdominal surgeries who  had previously establish care with the practice for a thoracic meningioma prior to her moving to [place].     She returned from [place] to visit friends over the holidays.  Upon her visit she had rapidly progressive strength and decreased ambulatory ability and worsening numbness.  She did not have any urinary incontinence or retention.  She did endorse some urinary urgency.     Exam on presentation was:  Slight decrease in sensation in the mid to low thoracic dermatome.  Her lower extremities: Right-4 hip flexion and knee extension 4 dorsiflexion plantarflexion 4 -.  Left 3 hip flexion, knee extension 4 -, dorsiflexion plantarflexion 4 -.  She has Phillip's on her left.  3  beats of clonus bilaterally.      Given all these findings that appear to be rapidly progressive we discussed surgery for resection of the tumor.  Ultimately she underwent surgery on 1/7/2025.  Her surgical course was uncomplicated.  She is currently living with a friend.     She is now 6-week status post resection.  She is back at home in [place].  She has not yet established care with a neurosurgeon locally or started physical therapy locally.  Otherwise she has been doing very well and states that she has improving strength in her legs and while using a walker/cane still ambulates around the house independently.       She is not .  She has no children.  She is on disability due to her back.  Denies any tobacco or alcohol abuse.    Review of Systems   Constitutional:  Positive for fatigue.   Gastrointestinal: Negative.    Genitourinary: Negative.    Musculoskeletal:  Positive for back pain (upper back pain), gait problem (ambulating with walker) and myalgias.   Neurological:  Positive for weakness (b/l legs) and numbness (n+t abdomen and b/l feet).   Psychiatric/Behavioral:  Negative for sleep disturbance.        Objective   There were no vitals taken for this visit.    Physical Exam  Deferred    Administrative Statements   Encounter provider Ascencion Coulter MD    The Patient is located at Home and in the following state in which I hold an active license [place].    The patient was identified by name and date of birth. Hayley Rios was informed that this is a telemedicine visit and that the visit is being conducted through the Epic Embedded platform. She agrees to proceed..  My office door was closed. No one else was in the room.  She acknowledged consent and understanding of privacy and security of the video platform. The patient has agreed to participate and understands they can discontinue the visit at any time.    I have spent a total time of 25 minutes in caring for this patient on the day of the  visit/encounter including Diagnostic results, Prognosis, Risks and benefits of tx options, Instructions for management, Patient and family education, Importance of tx compliance, Risk factor reductions, Impressions, Counseling / Coordination of care, Documenting in the medical record, Reviewing/placing orders in the medical record (including tests, medications, and/or procedures), and Obtaining or reviewing history  .

## 2025-02-19 NOTE — ASSESSMENT & PLAN NOTE
T2-3 laminectomy for resection of T3 intradural extra-medullary mass, WHO grade 1 meningioma, 1/7/2025.  She is now 6-week status post resection.  She has been doing well.  She is back at her house in [place].  We once again reviewed the natural history and diagnosis of WHO grade 1 meningiomas of the thoracic spine.  I have encouraged her to obtain local follow-up with a local neurosurgeon or at the very least with her primary care to obtain annual imaging.  She confirmed that she would do this in the coming days.    She is also eager to continue physical therapy.  She has been doing relatively well and ambulates around the house without any assistive devices.

## 2025-02-26 ENCOUNTER — TELEPHONE (OUTPATIENT)
Age: 65
End: 2025-02-26

## 2025-02-26 NOTE — TELEPHONE ENCOUNTER
Pt called and is currently in [place]. Pt stated she is having a problem with her condition where she is at and needed Dr. Garcia to help her. I am not understanding what she is requesting. I did ask her to sign a medical release to try to obtain some information but she said she just needs to speak to the doctor.

## 2025-05-22 ENCOUNTER — TELEPHONE (OUTPATIENT)
Dept: INTERNAL MEDICINE CLINIC | Facility: CLINIC | Age: 65
End: 2025-05-22

## 2025-05-22 NOTE — TELEPHONE ENCOUNTER
Please remove Dr. Garcia from Primary Care Provider    Patient is currently living in [facility] and following with Primary Care Provider there

## (undated) DEVICE — SUT ETHIBOND 0 CT-2 30 IN X412H

## (undated) DEVICE — DISPOSABLE SLIM BIPOLAR FORCEPS, NON-STICK,: Brand: SPETZLER-MALIS

## (undated) DEVICE — SUT MONOCRYL 4-0 PS-2 27 IN Y426H

## (undated) DEVICE — INTENDED FOR TISSUE SEPARATION, AND OTHER PROCEDURES THAT REQUIRE A SHARP SURGICAL BLADE TO PUNCTURE OR CUT.: Brand: BARD-PARKER SAFETY BLADES SIZE 11, STERILE

## (undated) DEVICE — DRESSING MEPILEX AG BORDER POST-OP 4 X 8 IN

## (undated) DEVICE — DISPOSABLE OR TOWEL: Brand: CARDINAL HEALTH

## (undated) DEVICE — GLOVE INDICATOR PI UNDERGLOVE SZ 6.5 BLUE

## (undated) DEVICE — GLOVE SRG BIOGEL 7.5

## (undated) DEVICE — CHLORAPREP HI-LITE 26ML ORANGE

## (undated) DEVICE — PMI DISPOSABLE PUNCTURE CLOSURE DEVICE / SUTURE GRASPER: Brand: PMI

## (undated) DEVICE — TRAY FOLEY 16FR URIMETER SURESTEP

## (undated) DEVICE — CHANNEL RFA ENDOSCOPIC CATHETER,ESOPHAGEAL TTS, 120 APPLICATIONS: Brand: BARRX

## (undated) DEVICE — INTENDED FOR TISSUE SEPARATION, AND OTHER PROCEDURES THAT REQUIRE A SHARP SURGICAL BLADE TO PUNCTURE OR CUT.: Brand: BARD-PARKER ® SAFETYLOCK CARBON RIB-BACK BLADES

## (undated) DEVICE — LARGE NEEDLE DRIVER: Brand: ENDOWRIST

## (undated) DEVICE — SUT ETHILON 3-0 PS-1 18 IN 1663G

## (undated) DEVICE — SUT STRATAFIX SPIRAL PDS PLUS 0 CT-2 30CM SXPP1B405

## (undated) DEVICE — SUT VICRYL 2-0 CT-1 27 IN J259H

## (undated) DEVICE — NEEDLE HYPO 22G X 1-1/2 IN

## (undated) DEVICE — SPONGE STICK WITH PVP-I: Brand: KENDALL

## (undated) DEVICE — SUT STRATAFIX SPIRAL PDS PLUS 1  CT-1 18 IN SXPP1A404

## (undated) DEVICE — LUBRICANT INST ELECTROLUBE ANTISTK WO PAD

## (undated) DEVICE — TIBURON SPLIT SHEET: Brand: CONVERTORS

## (undated) DEVICE — SUT VICRYL 0 UR-6 27 IN J603H

## (undated) DEVICE — CYSTO TUBING SINGLE IRRIGATION

## (undated) DEVICE — ALLENTOWN LAP CHOLE APP PACK: Brand: CARDINAL HEALTH

## (undated) DEVICE — ENDOPATH ECHELON ENDOSCOPIC LINEAR CUTTER RELOADS, BLUE, 60MM: Brand: ECHELON ENDOPATH

## (undated) DEVICE — SNAP KOVER: Brand: UNBRANDED

## (undated) DEVICE — NEEDLE BLUNT 18 G X 1 1/2IN

## (undated) DEVICE — SUT PROLENE 2-0 CT-2 30 IN 8411H

## (undated) DEVICE — [HIGH FLOW INSUFFLATOR,  DO NOT USE IF PACKAGE IS DAMAGED,  KEEP DRY,  KEEP AWAY FROM SUNLIGHT,  PROTECT FROM HEAT AND RADIOACTIVE SOURCES.]: Brand: PNEUMOSURE

## (undated) DEVICE — TISSUE RETRIEVAL SYSTEM: Brand: INZII RETRIEVAL SYSTEM

## (undated) DEVICE — TROCAR: Brand: KII FIOS FIRST ENTRY

## (undated) DEVICE — 3M™ STERI-STRIP™ REINFORCED ADHESIVE SKIN CLOSURES, R1547, 1/2 IN X 4 IN (12 MM X 100 MM), 6 STRIPS/ENVELOPE: Brand: 3M™ STERI-STRIP™

## (undated) DEVICE — OCCLUSIVE GAUZE STRIP,3% BISMUTH TRIBROMOPHENATE IN PETROLATUM BLEND: Brand: XEROFORM

## (undated) DEVICE — STANDARD SURGICAL GOWN, L: Brand: CONVERTORS

## (undated) DEVICE — COLUMN DRAPE

## (undated) DEVICE — SYRINGE 30ML LL

## (undated) DEVICE — BASIC PACK: Brand: CONVERTORS

## (undated) DEVICE — RFA CLEANING CAP: Brand: BARRX

## (undated) DEVICE — TUBING SUCTION 5MM X 12 FT

## (undated) DEVICE — SCD SEQUENTIAL COMPRESSION COMFORT SLEEVE MEDIUM KNEE LENGTH: Brand: KENDALL SCD

## (undated) DEVICE — SINGLE PORT MANIFOLD: Brand: NEPTUNE 2

## (undated) DEVICE — SUT VLOC 90 2-0 GS-22 12 IN VLOCM2115

## (undated) DEVICE — NEPTUNE E-SEP SMOKE EVACUATION PENCIL, COATED, 70MM BLADE, PUSH BUTTON SWITCH: Brand: NEPTUNE E-SEP

## (undated) DEVICE — DISPOSABLE EQUIPMENT COVER: Brand: SMALL TOWEL DRAPE

## (undated) DEVICE — ANTIBACTERIAL VIOLET BRAIDED (POLYGLACTIN 910), SYNTHETIC ABSORBABLE SUTURE: Brand: COATED VICRYL

## (undated) DEVICE — SPONGE LAP 18 X 18 IN STRL RFD

## (undated) DEVICE — BLUE HEAT SCOPE WARMER

## (undated) DEVICE — JACKSON-PRATT 100CC BULB RESERVOIR: Brand: CARDINAL HEALTH

## (undated) DEVICE — JACKSON TABLE FOAM POSITIONING KIT: Brand: CARDINAL HEALTH

## (undated) DEVICE — DRAPE SURGIKIT SADDLE BAG

## (undated) DEVICE — NEEDLE 25G X 1 1/2

## (undated) DEVICE — STERILE POLYISOPRENE POWDER-FREE SURGICAL GLOVES: Brand: PROTEXIS

## (undated) DEVICE — SUT NUROLON 4-0 TF CR/8 18 IN C584D

## (undated) DEVICE — TELFA NON-ADHERENT ABSORBENT DRESSING: Brand: TELFA

## (undated) DEVICE — LIGHT HANDLE COVER SLEEVE DISP BLUE STELLAR

## (undated) DEVICE — SUT VICRYL PLUS 0 UR-6 27IN VCP603H

## (undated) DEVICE — SURGICEL SNOW 1 X 2 IN

## (undated) DEVICE — GLOVE SRG LF STRL BGL SKNSNS 6.5 PF

## (undated) DEVICE — SURGICEL 2 X 14

## (undated) DEVICE — ECHELON FLEX POWERED PLUS ARTICULATING ENDOSCOPIC LINEAR CUTTER , 60MM: Brand: ECHELON FLEX

## (undated) DEVICE — PENCIL ELECTROSURG E-Z CLEAN -0035H

## (undated) DEVICE — GLOVE INDICATOR PI UNDERGLOVE SZ 8 BLUE

## (undated) DEVICE — DRAPE EQUIPMENT RF WAND

## (undated) DEVICE — SINGLE-USE BIOPSY FORCEPS: Brand: RADIAL JAW 4

## (undated) DEVICE — TIP COVER ACCESSORY

## (undated) DEVICE — GLOVE SRG BIOGEL 6.5

## (undated) DEVICE — 5 MM CURVED DISSECTORS WITH MONOPOLAR CAUTERY: Brand: ENDOPATH

## (undated) DEVICE — INTENDED FOR TISSUE SEPARATION, AND OTHER PROCEDURES THAT REQUIRE A SHARP SURGICAL BLADE TO PUNCTURE OR CUT.: Brand: BARD-PARKER SAFETY BLADES SIZE 15, STERILE

## (undated) DEVICE — DRAPE TOWEL: Brand: CONVERTORS

## (undated) DEVICE — VISUALIZATION SYSTEM: Brand: CLEARIFY

## (undated) DEVICE — INTENDED FOR TISSUE SEPARATION, AND OTHER PROCEDURES THAT REQUIRE A SHARP SURGICAL BLADE TO PUNCTURE OR CUT.: Brand: BARD-PARKER SAFETY BLADES SIZE 10, STERILE

## (undated) DEVICE — TOOL MR8-14MH30 MR8 14CM MATCH 3MM: Brand: MIDAS REX MR8

## (undated) DEVICE — REM POLYHESIVE ADULT PATIENT RETURN ELECTRODE: Brand: VALLEYLAB

## (undated) DEVICE — LIGHT GLOVE GREEN

## (undated) DEVICE — BLADE SHAVER DISSECTOR 3.5MM 13CM COOLCUT

## (undated) DEVICE — BLADELESS OBTURATOR: Brand: WECK VISTA

## (undated) DEVICE — SUT VICRYL 2-0 SH 27 IN UNDYED J417H

## (undated) DEVICE — PENCIL SMOKE EVAC TELESCOPING W/TUBING

## (undated) DEVICE — ACE WRAP 6 IN UNSTERILE

## (undated) DEVICE — SUT VLOC 90 3-0 V-20 9IN VLOCM0644

## (undated) DEVICE — WEBRIL 6 IN UNSTERILE

## (undated) DEVICE — GAUZE SPONGES,USP TYPE VII GAUZE, 12 PLY: Brand: CURITY

## (undated) DEVICE — TUBING ARTHROSCOPIC WAVE  MAIN PUMP

## (undated) DEVICE — ADHESIVE SKIN HIGH VISCOSITY EXOFIN 1ML

## (undated) DEVICE — UTILITY MARKER,BLACK WITH LABELS: Brand: DEVON

## (undated) DEVICE — BINDER ABDOMINAL 46-62 IN

## (undated) DEVICE — TROCAR: Brand: KII® SLEEVE

## (undated) DEVICE — ADHESIVE SKIN CLOSURE SYS EXOFIN FUSION 22CM

## (undated) DEVICE — SUT MONOCRYL 2-0 SH 27 IN Y417H

## (undated) DEVICE — DRAPE SHEET X-LG

## (undated) DEVICE — HEMOSTATIC MATRIX SURGIFLO 8ML W/THROMBIN

## (undated) DEVICE — SKN PRP WNG SPNGE PVP SCRB STR: Brand: MEDLINE INDUSTRIES, INC.

## (undated) DEVICE — 4-PORT MANIFOLD: Brand: NEPTUNE 2

## (undated) DEVICE — MONITORING SPINAL IMPULSE CASE FEE

## (undated) DEVICE — IMPERVIOUS STOCKINETTE: Brand: DEROYAL

## (undated) DEVICE — CANNULA SEAL

## (undated) DEVICE — ABDOMINAL PAD: Brand: DERMACEA

## (undated) DEVICE — STAPLER CANNULA SEAL: Brand: ENDOWRIST

## (undated) DEVICE — MONOPOLAR CURVED SCISSORS: Brand: ENDOWRIST

## (undated) DEVICE — INTENDED FOR TISSUE SEPARATION, AND OTHER PROCEDURES THAT REQUIRE A SHARP SURGICAL BLADE TO PUNCTURE OR CUT.: Brand: BARD-PARKER ® CARBON RIB-BACK BLADES

## (undated) DEVICE — DURASEAL EXACT SPINE SEALANT SYSTEM 5ML

## (undated) DEVICE — DRAPE SHEET THREE QUARTER

## (undated) DEVICE — ASTOUND STANDARD SURGICAL GOWN, XL: Brand: CONVERTORS

## (undated) DEVICE — BLADE SHAVER EXCALIBUR 4MM 13CM CRV COOLCUT

## (undated) DEVICE — PADDING CAST 6IN COTTON STRL

## (undated) DEVICE — UNDYED MONOFILAMENT (POLYDIOXANONE), ABSORBABLE SURGICAL SUTURE: Brand: PDS

## (undated) DEVICE — SKIN MARKER DUAL TIP WITH RULER CAP, FLEXIBLE RULER AND LABELS: Brand: DEVON

## (undated) DEVICE — JP CHAN DRN SIL HUBLESS 15FR W/TRO: Brand: CARDINAL HEALTH

## (undated) DEVICE — SURGICEL NU-KNIT 3 X 4

## (undated) DEVICE — SURGIFOAM 8.5 X 12.5

## (undated) DEVICE — DRAIN HUBLESS 15FR 3 1/16IN

## (undated) DEVICE — SUT PDS II 2-0 CT-1 27 IN Z339H

## (undated) DEVICE — MEGA SUTURECUT ND: Brand: ENDOWRIST

## (undated) DEVICE — RFA ENDOSCOPIC GUIDEWIRE: Brand: BARRX

## (undated) DEVICE — SYRINGE 10ML LL

## (undated) DEVICE — 1820 FOAM BLOCK NEEDLE COUNTER: Brand: DEVON

## (undated) DEVICE — ABSORBABLE WOUND CLOSURE DEVICE: Brand: V-LOC 180

## (undated) DEVICE — INSULATED BLADE ELECTRODE: Brand: EDGE

## (undated) DEVICE — STERILE POLYISOPRENE POWDER-FREE SURGICAL GLOVES WITH EMOLLIENT COATING: Brand: PROTEXIS

## (undated) DEVICE — DRAPE MICROSCOPE OPMI PENTERO

## (undated) DEVICE — SPECIMEN CONTAINER STERILE PEEL PACK

## (undated) DEVICE — GLOVE INDICATOR PI UNDERGLOVE SZ 7 BLUE

## (undated) DEVICE — 90 RFA FOCAL CATHETER,80 APPLICATIONS: Brand: BARRX

## (undated) DEVICE — PAD GROUNDING ADULT

## (undated) DEVICE — CATH ABLATION BARRX 360 EXPRESS

## (undated) DEVICE — SUPPLY FEE STD

## (undated) DEVICE — ARM DRAPE

## (undated) DEVICE — SUT MONOCRYL 3-0 PS-2 27 IN Y427H

## (undated) DEVICE — COBAN 6 IN STERILE

## (undated) DEVICE — CHEST/BREAST DRAPE: Brand: CONVERTORS

## (undated) DEVICE — GLOVE SRG BIOGEL ECLIPSE 7.5

## (undated) DEVICE — NEURO PATTIES 1/2 X 1 1/2

## (undated) DEVICE — MINOR PROCEDURE DRAPE: Brand: CONVERTORS

## (undated) DEVICE — SUT STRATAFIX SPIRAL MONOCRYL PLUS 4-0 PS-2 45CM SXMP1B118

## (undated) DEVICE — BETHLEHEM UNIVERSAL SPINE, KIT: Brand: CARDINAL HEALTH

## (undated) DEVICE — BULB SYRINGE,IRRIGATION WITH PROTECTIVE CAP: Brand: DOVER

## (undated) DEVICE — SUT VICRYL 3-0 SH 27 IN J416H

## (undated) DEVICE — SYRINGE 10ML LL CONTROL TOP

## (undated) DEVICE — HEX-LOCKING BLADE ELECTRODE: Brand: EDGE

## (undated) DEVICE — METZENBAUM ADTEC SINGLE USE DISSECTING SCISSORS, SHAFT ONLY, MONOPOLAR, CURVED TO LEFT, WORKING LENGTH: 12 1/4", (310 MM), DIAM. 5 MM, INSULATED, DOUBLE ACTION, STERILE, DISPOSABLE, PACKAGE OF 10 PIECES: Brand: AESCULAP

## (undated) DEVICE — ELECTRODE LAP J HOOK SPLIT STEM E-Z CLEAN 33CM -0021S

## (undated) DEVICE — PLUMEPEN PRO 10FT

## (undated) DEVICE — TUBING SMOKE EVAC W/FILTRATION DEVICE PLUMEPORT ACTIV

## (undated) DEVICE — 3M™ TEGADERM™ TRANSPARENT FILM DRESSING FRAME STYLE, 1628, 6 IN X 8 IN (15 CM X 20 CM), 10/CT 8CT/CASE: Brand: 3M™ TEGADERM™

## (undated) DEVICE — PACK UNIVERSAL ARTHRSCOPY PBDS

## (undated) DEVICE — 3M™ IOBAN™ 2 ANTIMICROBIAL INCISE DRAPE 6648EZ: Brand: IOBAN™ 2

## (undated) DEVICE — 3M™ STERI-DRAPE™ U-DRAPE 1015: Brand: STERI-DRAPE™

## (undated) DEVICE — PROGRASP FORCEPS: Brand: ENDOWRIST

## (undated) DEVICE — CUFF TOURNIQUET 30 X 4 IN QUICK CONNECT DISP 1BLA